# Patient Record
Sex: FEMALE | Race: BLACK OR AFRICAN AMERICAN | NOT HISPANIC OR LATINO | Employment: OTHER | URBAN - METROPOLITAN AREA
[De-identification: names, ages, dates, MRNs, and addresses within clinical notes are randomized per-mention and may not be internally consistent; named-entity substitution may affect disease eponyms.]

---

## 2017-01-03 ENCOUNTER — TELEPHONE (OUTPATIENT)
Dept: GASTROENTEROLOGY | Facility: CLINIC | Age: 72
End: 2017-01-03

## 2017-01-03 NOTE — TELEPHONE ENCOUNTER
Called patient and patient did not answer. A message was left own the voicemail that it is okay and if anymore question just give me call back.

## 2017-01-03 NOTE — TELEPHONE ENCOUNTER
----- Message from Leticia Flores sent at 1/3/2017 11:37 AM CST -----  Contact: pt  States she needs to speak to the nurse. States she has a procedure tomorrow and she wants to know if she can get the chewable gas X. Please call pt at [phone]. Thank you

## 2017-01-04 ENCOUNTER — SURGERY (OUTPATIENT)
Age: 72
End: 2017-01-04

## 2017-01-04 ENCOUNTER — TELEPHONE (OUTPATIENT)
Dept: GASTROENTEROLOGY | Facility: CLINIC | Age: 72
End: 2017-01-04

## 2017-01-04 ENCOUNTER — ANESTHESIA (OUTPATIENT)
Dept: ENDOSCOPY | Facility: HOSPITAL | Age: 72
End: 2017-01-04
Payer: MEDICARE

## 2017-01-04 ENCOUNTER — ANESTHESIA EVENT (OUTPATIENT)
Dept: ENDOSCOPY | Facility: HOSPITAL | Age: 72
End: 2017-01-04
Payer: MEDICARE

## 2017-01-04 PROBLEM — Z86.010 HX OF COLONIC POLYPS: Status: ACTIVE | Noted: 2017-01-04

## 2017-01-04 PROBLEM — Z86.0100 HX OF COLONIC POLYPS: Status: ACTIVE | Noted: 2017-01-04

## 2017-01-04 PROCEDURE — 25000003 PHARM REV CODE 250: Performed by: NURSE ANESTHETIST, CERTIFIED REGISTERED

## 2017-01-04 PROCEDURE — 63600175 PHARM REV CODE 636 W HCPCS: Performed by: NURSE ANESTHETIST, CERTIFIED REGISTERED

## 2017-01-04 RX ORDER — AMLODIPINE BESYLATE 10 MG/1
TABLET ORAL
Qty: 30 TABLET | Refills: 6 | Status: SHIPPED | OUTPATIENT
Start: 2017-01-04 | End: 2017-08-03 | Stop reason: SDUPTHER

## 2017-01-04 RX ORDER — LIDOCAINE HCL/PF 100 MG/5ML
SYRINGE (ML) INTRAVENOUS
Status: DISCONTINUED | OUTPATIENT
Start: 2017-01-04 | End: 2017-01-04

## 2017-01-04 RX ORDER — SODIUM CHLORIDE, SODIUM LACTATE, POTASSIUM CHLORIDE, CALCIUM CHLORIDE 600; 310; 30; 20 MG/100ML; MG/100ML; MG/100ML; MG/100ML
INJECTION, SOLUTION INTRAVENOUS CONTINUOUS PRN
Status: DISCONTINUED | OUTPATIENT
Start: 2017-01-04 | End: 2017-01-04

## 2017-01-04 RX ORDER — PROPOFOL 10 MG/ML
INJECTION, EMULSION INTRAVENOUS
Status: DISCONTINUED | OUTPATIENT
Start: 2017-01-04 | End: 2017-01-04

## 2017-01-04 RX ADMIN — PROPOFOL 30 MG: 10 INJECTION, EMULSION INTRAVENOUS at 09:01

## 2017-01-04 RX ADMIN — PROPOFOL 20 MG: 10 INJECTION, EMULSION INTRAVENOUS at 09:01

## 2017-01-04 RX ADMIN — SODIUM CHLORIDE, SODIUM LACTATE, POTASSIUM CHLORIDE, AND CALCIUM CHLORIDE: 600; 310; 30; 20 INJECTION, SOLUTION INTRAVENOUS at 09:01

## 2017-01-04 RX ADMIN — SODIUM CHLORIDE, SODIUM LACTATE, POTASSIUM CHLORIDE, AND CALCIUM CHLORIDE: 600; 310; 30; 20 INJECTION, SOLUTION INTRAVENOUS at 08:01

## 2017-01-04 RX ADMIN — LIDOCAINE HYDROCHLORIDE 50 MG: 20 INJECTION, SOLUTION INTRAVENOUS at 09:01

## 2017-01-04 RX ADMIN — EPHEDRINE SULFATE 25 MG: 50 INJECTION, SOLUTION INTRAMUSCULAR; INTRAVENOUS; SUBCUTANEOUS at 09:01

## 2017-01-04 RX ADMIN — PROPOFOL 140 MG: 10 INJECTION, EMULSION INTRAVENOUS at 09:01

## 2017-01-04 NOTE — ANESTHESIA POSTPROCEDURE EVALUATION
"Anesthesia Post Evaluation    Patient: Flory Cam    Procedure(s) Performed: Procedure(s):  COLONOSCOPY    Final Anesthesia Type: MAC  Patient location during evaluation: PACU  Patient participation: Yes- Able to Participate  Level of consciousness: awake and alert and oriented  Post-procedure vital signs: reviewed and stable  Pain management: adequate  Airway patency: patent  PONV status at discharge: No PONV  Anesthetic complications: no      Cardiovascular status: blood pressure returned to baseline  Respiratory status: unassisted, room air and spontaneous ventilation  Hydration status: euvolemic  Follow-up not needed.        Visit Vitals    BP (!) 116/58 (BP Location: Left arm, Patient Position: Lying, BP Method: Automatic)    Pulse 83    Temp 36.4 °C (97.5 °F)    Resp 16    Ht 5' 7" (1.702 m)    Wt 99.3 kg (219 lb)    SpO2 98%    BMI 34.3 kg/m2       Pain/Ángel Score: Pain Assessment Performed: Yes (1/4/2017  9:43 AM)  Presence of Pain: denies (1/4/2017  9:43 AM)      "

## 2017-01-04 NOTE — TRANSFER OF CARE
"Anesthesia Transfer of Care Note    Patient: Flory Cam    Procedure(s) Performed: Procedure(s):  COLONOSCOPY    Patient location: PACU    Anesthesia Type: MAC    Transport from OR: Transported from OR on room air with adequate spontaneous ventilation    Post pain: adequate analgesia    Post assessment: no apparent anesthetic complications    Post vital signs: stable    Level of consciousness: awake and alert    Nausea/Vomiting: no nausea/vomiting    Complications: none          Last vitals:   Visit Vitals    BP (!) 116/58 (BP Location: Left arm, Patient Position: Lying, BP Method: Automatic)    Pulse 83    Temp 36.4 °C (97.5 °F)    Resp 16    Ht 5' 7" (1.702 m)    Wt 99.3 kg (219 lb)    SpO2 98%    BMI 34.3 kg/m2     "

## 2017-01-04 NOTE — ANESTHESIA PREPROCEDURE EVALUATION
01/04/2017  Flory Cam is a 71 y.o., female.    [facility] Anesthesia Evaluation    I have reviewed the Patient Summary Reports.    I have reviewed the Nursing Notes.   I have reviewed the Medications.     Review of Systems  Anesthesia Hx:  No problems with previous Anesthesia    Social:  Smoker, No Alcohol Use 1 ppd for for over 50 year.   Hematology/Oncology:  Hematology Normal   Oncology Normal     Cardiovascular:   Exercise tolerance: good Hypertension, well controlled CAD asymptomatic CABG/stent  PVD hyperlipidemia Angioplasty last week with no changes in medication.  PVD  Acute c   Pulmonary:   Shortness of breath Clear productive cough.  snore   Renal/:  Renal/ Normal     Hepatic/GI:   Bowel Prep. PUD, GERD, well controlled 0430 last drink of fluid.   Musculoskeletal:   Arthritis     Neurological:  Neurology Normal    Endocrine:   Hypothyroidism    Dermatological:  Skin Normal    Psych:  Psychiatric Normal           Physical Exam  General:  Well nourished, Obesity    Airway/Jaw/Neck:  Airway Findings: Mallampati: III                Anesthesia Plan  Type of Anesthesia, risks & benefits discussed:  Anesthesia Type:  MAC  Patient's Preference:   Intra-op Monitoring Plan:   Intra-op Monitoring Plan Comments:   Post Op Pain Control Plan:   Post Op Pain Control Plan Comments:   Induction:   IV  Beta Blocker:  Patient is not currently on a Beta-Blocker (No further documentation required).       Informed Consent: Patient understands risks and agrees with Anesthesia plan.  Questions answered. Anesthesia consent signed with patient.  ASA Score: 2     Day of Surgery Review of History & Physical: I have interviewed and examined the patient. I have reviewed the patient's H&P dated: 01/04/16. There are no significant changes.  H&P update referred to the surgeon.         Ready For Surgery From Anesthesia  Perspective.

## 2017-01-04 NOTE — ANESTHESIA RELEASE NOTE
"Anesthesia Release from PACU Note    Patient: Flory Cam    Procedure(s) Performed: Procedure(s):  COLONOSCOPY    Anesthesia type: MAC    Post pain: Adequate analgesia    Post assessment: no apparent anesthetic complications, tolerated procedure well and no evidence of recall    Last Vitals:   Visit Vitals    BP (!) 116/58 (BP Location: Left arm, Patient Position: Lying, BP Method: Automatic)    Pulse 83    Temp 36.4 °C (97.5 °F)    Resp 16    Ht 5' 7" (1.702 m)    Wt 99.3 kg (219 lb)    SpO2 98%    BMI 34.3 kg/m2       Post vital signs: stable    Level of consciousness: awake, alert  and oriented    Nausea/Vomiting: no nausea/no vomiting    Complications: none    Airway Patency: patent    Respiratory: unassisted, spontaneous ventilation, room air    Cardiovascular: stable    Hydration: euvolemic  "

## 2017-01-05 ENCOUNTER — TELEPHONE (OUTPATIENT)
Dept: GASTROENTEROLOGY | Facility: CLINIC | Age: 72
End: 2017-01-05

## 2017-01-09 ENCOUNTER — TELEPHONE (OUTPATIENT)
Dept: INTERNAL MEDICINE | Facility: CLINIC | Age: 72
End: 2017-01-09

## 2017-01-09 NOTE — TELEPHONE ENCOUNTER
Pt was informed at this time [facility] no [facility] pt verbalization was understood.

## 2017-01-09 NOTE — TELEPHONE ENCOUNTER
----- Message from [place] Arreola sent at 1/9/2017 12:53 PM CST -----  Contact: Pt   Pt called and stated she needed to speak to the nurse. She stated she has questions and did not give details. She can be reached at [phone] (home).    Thanks,  TF

## 2017-01-16 ENCOUNTER — TELEPHONE (OUTPATIENT)
Dept: INTERNAL MEDICINE | Facility: CLINIC | Age: 72
End: 2017-01-16

## 2017-01-16 RX ORDER — TRAZODONE HYDROCHLORIDE 50 MG/1
50 TABLET ORAL NIGHTLY PRN
Qty: 30 TABLET | Refills: 3 | Status: SHIPPED | OUTPATIENT
Start: 2017-01-16 | End: 2018-02-22 | Stop reason: SDUPTHER

## 2017-01-16 NOTE — TELEPHONE ENCOUNTER
Pt requesting to resume trazodone 25 mg and discontinue Restoril because pt states medication makes he sleep to long and she awakes feeling groggy .

## 2017-01-16 NOTE — TELEPHONE ENCOUNTER
----- Message from Cris Garcia sent at 1/16/2017 11:11 AM CST -----  Contact: PT  Pt requests nurse to call her regarding being placed back on Trazodone. Pt can be reached at [phone] (davz)

## 2017-01-16 NOTE — TELEPHONE ENCOUNTER
Pt was informed per Dr. Ovalle sent prescription for Trazodone as requested pt verbalized understanding.

## 2017-01-23 ENCOUNTER — TELEPHONE (OUTPATIENT)
Dept: CARDIOLOGY | Facility: CLINIC | Age: 72
End: 2017-01-23

## 2017-01-23 NOTE — TELEPHONE ENCOUNTER
Patient requests that someone help her with personal care and helping with errands.  I advised patient call pcp or the [facility].  Patient voiced understanding.

## 2017-01-24 ENCOUNTER — TELEPHONE (OUTPATIENT)
Dept: INTERNAL MEDICINE | Facility: CLINIC | Age: 72
End: 2017-01-24

## 2017-01-24 NOTE — TELEPHONE ENCOUNTER
----- Message from Gilles Jeronimo sent at 1/24/2017 11:43 AM CST -----  Pt is requesting a call from nurse to discuss some questions and concerns regarding her health.        Please call pt back at [phone]

## 2017-01-26 ENCOUNTER — TELEPHONE (OUTPATIENT)
Dept: INTERNAL MEDICINE | Facility: CLINIC | Age: 72
End: 2017-01-26

## 2017-01-26 NOTE — LETTER
February 6, 2017    Flory Cam  [address]  [address]  [place] [address]             [facility]  [address] [place] [address]  Phone: [phone]  Fax: [phone] To whom it may concern:     Flory Cam is currently a patient under my care. She has been under my care since August of 2012. Due to her multiple chronic medical conditions, she requires assistance. Please accommodate accordingly.     If you have any questions or concerns, do not hesitate to contact my office.    Sincerely,        Tayler Ovalle D.O.  Internal Medicine Physician

## 2017-01-26 NOTE — TELEPHONE ENCOUNTER
Pt request letter stating she needs assistance due to her chronic health conditions. Pt states her niece helps her with her needs weekly  and she would like to have her added to apartment  lease pt states   requested letter for PCP stating she needs assistance.

## 2017-01-30 ENCOUNTER — TELEPHONE (OUTPATIENT)
Dept: GASTROENTEROLOGY | Facility: CLINIC | Age: 72
End: 2017-01-30

## 2017-01-30 NOTE — TELEPHONE ENCOUNTER
----- Message from Lea Garcia sent at 1/30/2017 10:57 AM CST -----  Contact: self [phone]  States that she is returning call please call back at [phone]//thank you acc

## 2017-01-30 NOTE — TELEPHONE ENCOUNTER
----- Message from Lea Garcia sent at 1/30/2017 10:57 AM CST -----  Contact: self [phone]  States that she is returning call please call back at [phone]//thank you acc

## 2017-02-08 RX ORDER — ESOMEPRAZOLE MAGNESIUM 40 MG/1
40 CAPSULE, DELAYED RELEASE ORAL
Qty: 90 CAPSULE | Refills: 1 | Status: SHIPPED | OUTPATIENT
Start: 2017-02-08 | End: 2017-10-02 | Stop reason: SDUPTHER

## 2017-02-17 ENCOUNTER — TELEPHONE (OUTPATIENT)
Dept: INTERNAL MEDICINE | Facility: CLINIC | Age: 72
End: 2017-02-17

## 2017-02-17 NOTE — TELEPHONE ENCOUNTER
----- Message from Mireya Nieves sent at 2/17/2017  8:29 AM CST -----  Contact: pt  Please call pt @ [phone], pt have some questions, pt will discuss with nurse.

## 2017-02-17 NOTE — TELEPHONE ENCOUNTER
Pt states she has frequent urination and burning upon urination and would like to try AZO for symptoms . Pt would like to make sure AZO would be safe take along with her current daily medication ? Pt was informed if symptoms persist or worsen she will need to go to urgent care pt verbalized understanding.

## 2017-02-21 ENCOUNTER — OFFICE VISIT (OUTPATIENT)
Dept: INTERNAL MEDICINE | Facility: CLINIC | Age: 72
End: 2017-02-21
Payer: MEDICARE

## 2017-02-21 VITALS
OXYGEN SATURATION: 98 % | DIASTOLIC BLOOD PRESSURE: 80 MMHG | WEIGHT: 220.88 LBS | HEART RATE: 70 BPM | BODY MASS INDEX: 34.67 KG/M2 | HEIGHT: 67 IN | SYSTOLIC BLOOD PRESSURE: 136 MMHG | TEMPERATURE: 96 F

## 2017-02-21 DIAGNOSIS — L98.9 SKIN LESION OF LEFT LOWER EXTREMITY: Primary | ICD-10-CM

## 2017-02-21 PROCEDURE — 99999 PR PBB SHADOW E&M-EST. PATIENT-LVL III: CPT | Mod: PBBFAC,,, | Performed by: INTERNAL MEDICINE

## 2017-02-21 PROCEDURE — 99213 OFFICE O/P EST LOW 20 MIN: CPT | Mod: S$PBB,,, | Performed by: INTERNAL MEDICINE

## 2017-02-21 PROCEDURE — 99213 OFFICE O/P EST LOW 20 MIN: CPT | Mod: PBBFAC | Performed by: INTERNAL MEDICINE

## 2017-02-21 RX ORDER — CLOTRIMAZOLE AND BETAMETHASONE DIPROPIONATE 10; .64 MG/G; MG/G
CREAM TOPICAL 2 TIMES DAILY
Qty: 15 G | Refills: 0 | Status: SHIPPED | OUTPATIENT
Start: 2017-02-21 | End: 2017-03-03

## 2017-02-21 NOTE — PROGRESS NOTES
Flory [facility]  71 y.o.  Black or  female    Chief Complaint   Patient presents with    Follow-up       HPI:  Initially the appointment was scheduled because she had burning with urination, but that has completely resolved.   She has a spot on her left upper thigh that was treated in the past with Lotrisone. It has improved but has not completely gone away.     PMH: Reviewed    MEDS: Reviewed med card    ALLERGIES: Reviewed allergy card    PE: Reviewed vitals  GENERAL: Alert and oriented, no acute distress  HEART: Regular rate  LUNGS: Unlabored respirations  SKIN: Left upper thigh fading circular lesion with few scabs, no drainage      ASSESSMENT/PLAN:    Flory was seen today for follow-up.    Diagnoses and all orders for this visit:    Skin lesion of left lower extremity  -     clotrimazole-betamethasone 1-0.05% (LOTRISONE) cream; Apply topically 2 (two) times daily.    RTC: As needed

## 2017-02-26 RX ORDER — EZETIMIBE 10 MG
TABLET ORAL
Qty: 90 TABLET | Refills: 1 | Status: SHIPPED | OUTPATIENT
Start: 2017-02-26 | End: 2017-08-29 | Stop reason: SDUPTHER

## 2017-03-08 RX ORDER — ATORVASTATIN CALCIUM 80 MG/1
80 TABLET, FILM COATED ORAL DAILY
Qty: 90 TABLET | Refills: 0 | Status: SHIPPED | OUTPATIENT
Start: 2017-03-08 | End: 2018-02-21 | Stop reason: SDUPTHER

## 2017-03-29 ENCOUNTER — TELEPHONE (OUTPATIENT)
Dept: DERMATOLOGY | Facility: CLINIC | Age: 72
End: 2017-03-29

## 2017-03-29 NOTE — TELEPHONE ENCOUNTER
Spoke with pt and offered pt appt. to see Dr. Yap tomorrow at 2:45, she declined and stated she would call back to get another appt.

## 2017-03-29 NOTE — TELEPHONE ENCOUNTER
----- Message from Hasmukh Kessler sent at 3/29/2017  9:54 AM CDT -----  Contact: Patient  Pt needs a return call regarding being worked into the schedule for a earlier appt as she is a lupus patient. Please call back @ ..[phone] (home) Thank you/NH

## 2017-04-10 DIAGNOSIS — M79.672 LEFT FOOT PAIN: Primary | ICD-10-CM

## 2017-04-11 ENCOUNTER — OFFICE VISIT (OUTPATIENT)
Dept: PODIATRY | Facility: CLINIC | Age: 72
End: 2017-04-11
Payer: MEDICARE

## 2017-04-11 ENCOUNTER — LAB VISIT (OUTPATIENT)
Dept: LAB | Facility: HOSPITAL | Age: 72
End: 2017-04-11
Attending: PODIATRIST
Payer: MEDICARE

## 2017-04-11 VITALS
HEART RATE: 72 BPM | HEIGHT: 67 IN | BODY MASS INDEX: 34.67 KG/M2 | SYSTOLIC BLOOD PRESSURE: 122 MMHG | DIASTOLIC BLOOD PRESSURE: 74 MMHG | WEIGHT: 220.88 LBS

## 2017-04-11 DIAGNOSIS — B35.1 DERMATOPHYTOSIS OF NAIL: ICD-10-CM

## 2017-04-11 DIAGNOSIS — L03.116 CELLULITIS OF LEFT FOOT: Primary | ICD-10-CM

## 2017-04-11 DIAGNOSIS — I73.9 PERIPHERAL VASCULAR DISEASE: ICD-10-CM

## 2017-04-11 DIAGNOSIS — L84 CORN OR CALLUS: ICD-10-CM

## 2017-04-11 DIAGNOSIS — L03.116 CELLULITIS OF LEFT FOOT: ICD-10-CM

## 2017-04-11 DIAGNOSIS — L02.612 ABSCESS OF LEFT FOOT: ICD-10-CM

## 2017-04-11 LAB
BASOPHILS # BLD AUTO: 0.03 K/UL
BASOPHILS NFR BLD: 0.4 %
CREAT SERPL-MCNC: 0.9 MG/DL
DIFFERENTIAL METHOD: NORMAL
EOSINOPHIL # BLD AUTO: 0.2 K/UL
EOSINOPHIL NFR BLD: 2.4 %
ERYTHROCYTE [DISTWIDTH] IN BLOOD BY AUTOMATED COUNT: 14.2 %
ERYTHROCYTE [SEDIMENTATION RATE] IN BLOOD BY WESTERGREN METHOD: 20 MM/HR
EST. GFR  (AFRICAN AMERICAN): >60 ML/MIN/1.73 M^2
EST. GFR  (NON AFRICAN AMERICAN): >60 ML/MIN/1.73 M^2
HCT VFR BLD AUTO: 43.4 %
HGB BLD-MCNC: 14.5 G/DL
LYMPHOCYTES # BLD AUTO: 2.6 K/UL
LYMPHOCYTES NFR BLD: 32.1 %
MCH RBC QN AUTO: 30.5 PG
MCHC RBC AUTO-ENTMCNC: 33.4 %
MCV RBC AUTO: 91 FL
MONOCYTES # BLD AUTO: 0.8 K/UL
MONOCYTES NFR BLD: 9.5 %
NEUTROPHILS # BLD AUTO: 4.5 K/UL
NEUTROPHILS NFR BLD: 55.4 %
PLATELET # BLD AUTO: 280 K/UL
PMV BLD AUTO: 10.5 FL
RBC # BLD AUTO: 4.75 M/UL
WBC # BLD AUTO: 8.07 K/UL

## 2017-04-11 PROCEDURE — 36415 COLL VENOUS BLD VENIPUNCTURE: CPT | Mod: PO

## 2017-04-11 PROCEDURE — 11721 DEBRIDE NAIL 6 OR MORE: CPT | Mod: 59,Q8,S$PBB, | Performed by: PODIATRIST

## 2017-04-11 PROCEDURE — 11055 PARING/CUTG B9 HYPRKER LES 1: CPT | Mod: Q8,S$PBB,, | Performed by: PODIATRIST

## 2017-04-11 PROCEDURE — 82565 ASSAY OF CREATININE: CPT

## 2017-04-11 PROCEDURE — 99214 OFFICE O/P EST MOD 30 MIN: CPT | Mod: 25,S$PBB,, | Performed by: PODIATRIST

## 2017-04-11 PROCEDURE — 85025 COMPLETE CBC W/AUTO DIFF WBC: CPT

## 2017-04-11 PROCEDURE — 99999 PR PBB SHADOW E&M-EST. PATIENT-LVL IV: CPT | Mod: PBBFAC,,, | Performed by: PODIATRIST

## 2017-04-11 PROCEDURE — 85651 RBC SED RATE NONAUTOMATED: CPT | Mod: PO

## 2017-04-11 RX ORDER — AMOXICILLIN AND CLAVULANATE POTASSIUM 875; 125 MG/1; MG/1
1 TABLET, FILM COATED ORAL EVERY 12 HOURS
Qty: 20 TABLET | Refills: 0 | Status: SHIPPED | OUTPATIENT
Start: 2017-04-11 | End: 2017-04-21

## 2017-04-11 NOTE — MR AVS SNAPSHOT
[facility]  [address] [place] [address]  Phone: [phone]  Fax: [phone]                  Flory Cam   2017 9:20 AM   Office Visit    Description:  Female : 1945   Provider:  Melva Orellana DPM   Department:  [facility] - Pod[facility]ry           Reason for Visit     Callouses           Diagnoses this Visit        Comments    Corn or callus    -  Primary     Cellulitis of left foot         Dermatophytosis of nail         Peripheral vascular disease         Abscess of left foot                To Do List           Future Appointments        Provider Department Dept Phone    2017 10:45 AM LAB, SAME DAY [facility] [phone]    2017 8:00 AM [facility] -  [phone]    2017 1:00 PM Melva Orellana DPM [facility] Podiatry [phone]    2017 8:00 AM LABORATORY, [address] [facility] [phone]    5/3/2017 9:00 AM DO [facility] [phone]      Goals (5 Years of Data)     None       These Medications        Disp Refills Start End    amoxicillin-clavulanate 875-125mg (AUGMENTIN) 875-125 mg per tablet 20 tablet 0 2017    Take 1 tablet by mouth every 12 (twelve) hours. - Oral    Pharmacy: [facility].  #: [phone]         [facility]     [facility] Nurse Care Line - 24/ Assistance  Unless otherwise directed by your provider, please contact [facility], our nurse care line that is available for / assistance.     Registered nurses in the [facility] provide: appointment scheduling, clinical advisement, health education, and other advisory services.  Call: [phone] (toll free)               Medications           Message regarding Medications     Verify the changes and/or additions to your medication regime listed below are the same as  discussed with your clinician today.  If any of these changes or additions are incorrect, please notify your healthcare provider.        START taking these NEW medications        Refills    amoxicillin-clavulanate 875-125mg (AUGMENTIN) 875-125 mg per tablet 0    Sig: Take 1 tablet by mouth every 12 (twelve) hours.    Class: Normal    Route: Oral           Verify that the below list of medications is an accurate representation of the medications you are currently taking.  If none reported, the list may be blank. If incorrect, please contact your healthcare provider. Carry this list with you in case of emergency.           Current Medications     albuterol 90 mcg/actuation inhaler Inhale 2 puffs into the lungs every 6 (six) hours as needed.    amlodipine (NORVASC) 10 MG tablet take 1 tablet by mouth once daily    atorvastatin (LIPITOR) 80 MG tablet Take 1 tablet (80 mg total) by mouth once daily.    cilostazol (PLETAL) 100 MG Tab Take 1 tablet (100 mg total) by mouth 2 (two) times daily.    clopidogrel (PLAVIX) 75 mg tablet take 1 tablet by mouth once daily    esomeprazole (NEXIUM) 40 MG capsule Take 1 capsule (40 mg total) by mouth before breakfast.    hydrocodone-acetaminophen 10-325mg (NORCO)  mg Tab Take  mg by mouth. 1 Tablet Oral Four times a day.  narcotic warning, contains tylenol    hydroxychloroquine (PLAQUENIL) 200 mg tablet Take 1 tablet (200 mg total) by mouth 2 (two) times daily.    levothyroxine (SYNTHROID) 137 MCG Tab tablet take 1 tablet by mouth once daily BEFORE BREAKFAST    trazodone (DESYREL) 50 MG tablet Take 1 tablet (50 mg total) by mouth nightly as needed for Insomnia.    urea (CARMOL) 40 % Crea Apply topically 2 (two) times daily. Apply to thickened areas of skin    valsartan (DIOVAN) 320 MG tablet Take 1 tablet (320 mg total) by mouth once daily.    ZETIA 10 mg tablet take 1 tablet by mouth once daily    amoxicillin-clavulanate 875-125mg (AUGMENTIN) 875-125 mg per tablet Take 1  "tablet by mouth every 12 (twelve) hours.           Clinical Reference Information           Your Vitals Were     BP Pulse Height Weight BMI    122/74 (BP Location: Left arm, Patient Position: Sitting, BP Method: Automatic) 72 5' 7" (1.702 m) 100.2 kg (220 lb 14.4 oz) 34.6 kg/m2      Blood Pressure          Most Recent Value    BP  122/74      Allergies as of 4/11/2017     No Known Allergies      Immunizations Administered on Date of Encounter - 4/11/2017     None      Orders Placed During Today's Visit      Normal Orders This Visit    Aerobic culture     Culture, Anaerobic     Future Labs/Procedures Expected by Expires    CBC auto differential  4/11/2017 6/10/2018    Creatinine, serum  4/11/2017 6/10/2018    MRI Lower Extremity W WO Contrast Left  4/11/2017 4/11/2018    Sedimentation rate, manual  4/11/2017 6/10/2018      [facility] Sign-Up     Activating your [facility] account is as easy as 1-2-3!     1) Visit [email or web address], select Sign Up Now, enter this activation code and your date of birth, then select Next.  [email or web address]  Expires: 5/26/2017 10:21 AM      2) Create a username and password to use when you visit [facility] in the future and select a security question in case you lose your password and select Next.    3) Enter your e-mail address and click Sign Up!    Additional Information  If you have questions, please e-mail [email or web address] or call [phone] to talk to our [facility] staff. Remember, [facility] is NOT to be used for urgent needs. For medical emergencies, dial 911.         Smoking Cessation     If you would like to quit smoking:   You may be eligible for free services if you are a [place] resident and started smoking cigarettes before September 1, 1988.  Call the Smoking Cessation Trust (SCT) toll free at (873) 110-4838 or (731) 652-2041.   Call 3-800-QUIT-NOW if you do not meet the above criteria.   Contact us via email: [email or web address]   View our website for more " information: [email or web address]        Language Assistance Services     ATTENTION: Language assistance services are available, free of charge. Please call [phone].      ATENCIÓN: Si habla ke, tiene a márquez disposición servicios gratuitos de asistencia lingüística. Llame al [phone].     CHÚ Ý: N?u b?n nói Ti?ng Vi?t, có các d?ch v? h? tr? ngôn ng? mi?n phí dành cho b?n. G?i s? [phone].         [facility] complies with applicable Federal civil rights laws and does not discriminate on the basis of race, color, national origin, age, disability, or sex.

## 2017-04-11 NOTE — PATIENT INSTRUCTIONS
Perform warm epsom salt soaks x 15 minutes, once daily  Apply iodine ointment to wound site on left foot  Cover with breathable band aid    Watch for any signs of infection (redness, pus, pain, increased swelling or fever) and call if such occurs or report to Emergency room if after hours.

## 2017-04-11 NOTE — PROGRESS NOTES
PODIATRY NOTE    CHIEF COMPLAINT  Chief Complaint   Patient presents with    Callouses     left foot callous pain located in arch of foot pain mostly when walking       HPI  SUBJECTIVE: Flory Cam is a 71 y.o. female w/ PMH of PVD, Lupus, hx of intermittent leg claudifcation- s/p vascular intervention with much improvement in symptoms and other medical problems who presents to clinic for high risk  foot exam and care.Patient admits to painful toenails and calluses aggravated by increased weight bearing, shoe gear, and pressure. States pain is relieved with routine debridements. She is being treated for lupus dermatological manifestations by Dr. Yap in dermatology. She has canceled follow up appointment with Dr. Yap for more recent evaluation and treatment recommendation.    Pt states left foot pain has increased. She rates pain 9/10. She has tried warm epsom salt soaks. She continues to smoke cigarettes. She does not want to quit. She notes some redness on bottom of left foot around callus site. She denies any N/V/F. Patient has no other pedal complaints at this time.    PCP: Dr. Tayler Ovalle, DO  / Last visit:2/21/17    HgA1c:   Hemoglobin A1C   Date Value Ref Range Status   09/14/2016 6.2 4.5 - 6.2 % Final     Comment:     According to ADA guidelines, hemoglobin A1C <7.0% represents  optimal control in non-pregnant diabetic patients.  Different  metrics may apply to specific populations.   Standards of Medical Care in Diabetes - 2016.  For the purpose of screening for the presence of diabetes:  <5.7%     Consistent with the absence of diabetes  5.7-6.4%  Consistent with increasing risk for diabetes   (prediabetes)  >or=6.5%  Consistent with diabetes  Currently no consensus exists for use of hemoglobin A1C  for diagnosis of diabetes for children.     04/13/2010 5.9 4.0 - 6.2 % Final       REVIEW OF SYSTEMS  General: Denies any fever or chills  Chest: Denies shortness of breath, wheezing, coughing, or  "sputum production  Heart: Denies chest pain.  As noted above and per history of current illness above, otherwise negative in the remainder of the 14 systems.     PHYSICAL EXAM  Vitals:    04/11/17 0942   BP: 122/74   Pulse: 72   Weight: 100.2 kg (220 lb 14.4 oz)   Height: 5' 7" (1.702 m)   PainSc:   9   PainLoc: Foot       GEN:  This patient is well-developed, well-nourished and appears stated age, well-oriented to person, place and time, and cooperative and pleasant on today's visit.      LOWER EXTREMITY  Vascular:   · DP pedal pulse 0/4 b/l, PT pedal pulse 1/4 b/l  · Skin temperature warm to warm from prox to distally  · CFT <5 secs b/l  · There is LE edema noted b/l.     Dermatologic:   · Thickened, dystrophic, elongated toenails with subungal debris 1-5 b/l.   · Webspaces are C/D/I B/L.  · There is hyperkeratotic tissue noted plantar aspect of b/l feet at medial arch x 2.  · Left foot with mild fluctuance, serosanguineous drainage, and localized erythema to plantar aspect.   · Skin texture and turgor dry with hyperkeratosis diffusely b/l plantar heel   · There is no pedal hair growth noted    Neurologic:  · Vibratory sensation diminished at level of Hallux IPJ b/l    · Protective sensation absent at 0/10 sites upon examination with Somers Weinsten 5.07 g monofilament.   · Propioception intact at 1st MTPJ b/l.   · Achilles and patellar deep tendon reflexes intact  · Babinski reflex absent b/l. Light touch and sharp/dull sensation intact b/l.    Musculoskeletal/Orthopedic:  · No symptomatic structural abnormalities noted.   · Muscle strength is 5/5 for foot inverters, everters, plantarflexors, and dorsiflexors. Muscle tone is normal.  · Pain free range of motion in all four quadrants with stiffness and limitation b/l  · PAIN with palpation of callus plantar medial arch, LEFT      ASSESSMENT  Cellulitis of left foot  -     MRI Lower Extremity W WO Contrast Left; Future; Expected date: 4/11/17  -     Creatinine, " serum; Future; Expected date: 4/11/17  -     CBC auto differential; Future; Expected date: 4/11/17  -     Sedimentation rate, manual; Future; Expected date: 4/11/17  -     Aerobic culture  -     Culture, Anaerobic    Corn or callus    Dermatophytosis of nail    Peripheral vascular disease  -     MRI Lower Extremity W WO Contrast Left; Future; Expected date: 4/11/17  -     Creatinine, serum; Future; Expected date: 4/11/17    Abscess of left foot  -     Aerobic culture  -     Culture, Anaerobic    Other orders  -     amoxicillin-clavulanate 875-125mg (AUGMENTIN) 875-125 mg per tablet; Take 1 tablet by mouth every 12 (twelve) hours.  Dispense: 20 tablet; Refill: 0        Plan:  -Discuss presenting problems, etiology, pathologic processes and management options with patient today.   -With patient's permission,Sharp excisional debridement of hyperkeratotic lesions deep to epidermal layer x 1 on left foot  was performed with a #15 blade without incident. There was scant purulent drainage expressed from the site. Wound culture taken.   -Rx Augmentin for antibiotic therapy, local wound care instructions   -With patient's permission, nails were aggressively reduced and debrided x 10 to their soft tissue attachment mechanically and with electric , removing all offending nail and debris. Patient relates relief following the procedure.   -MRI ordered, baseline labs  The total visit time face to face with the patient was over 30 minutes. Time spent in counseling, discussion and coordination of care with the patient was over 15 minutes. Topics discussed as noted above.      Future Appointments  Date Time Provider Department Center   4/18/2017 8:00 AM [address] MRI [place]   4/20/2017 1:00 PM Melva Orellana DPM [facility] POD [facility]   4/25/2017 8:00 AM LABORATORY, [address] [facility] LAB [facility]   5/3/2017 9:00 [facility]         Report Electronically Signed By:  Melva Orellana DPM   Podiatric  Medicine & Surgery  [facility]  4/11/2017

## 2017-04-13 LAB — BACTERIA SPEC AEROBE CULT: NORMAL

## 2017-04-17 LAB — BACTERIA SPEC ANAEROBE CULT: NORMAL

## 2017-04-24 ENCOUNTER — HOSPITAL ENCOUNTER (OUTPATIENT)
Dept: RADIOLOGY | Facility: HOSPITAL | Age: 72
Discharge: HOME | End: 2017-04-24
Attending: PODIATRIST
Payer: MEDICARE

## 2017-04-24 DIAGNOSIS — I73.9 PERIPHERAL VASCULAR DISEASE: ICD-10-CM

## 2017-04-24 DIAGNOSIS — L03.116 CELLULITIS OF LEFT FOOT: ICD-10-CM

## 2017-04-24 PROCEDURE — 25500020 PHARM REV CODE 255: Performed by: PODIATRIST

## 2017-04-24 PROCEDURE — A9585 GADOBUTROL INJECTION: HCPCS | Performed by: PODIATRIST

## 2017-04-24 PROCEDURE — 73720 MRI LWR EXTREMITY W/O&W/DYE: CPT | Mod: TC,LT

## 2017-04-24 RX ORDER — GADOBUTROL 604.72 MG/ML
10 INJECTION INTRAVENOUS
Status: COMPLETED | OUTPATIENT
Start: 2017-04-24 | End: 2017-04-24

## 2017-04-24 RX ADMIN — GADOBUTROL 10 ML: 604.72 INJECTION INTRAVENOUS at 03:04

## 2017-04-25 ENCOUNTER — TELEPHONE (OUTPATIENT)
Dept: PODIATRY | Facility: CLINIC | Age: 72
End: 2017-04-25

## 2017-04-25 DIAGNOSIS — I73.9 PVD (PERIPHERAL VASCULAR DISEASE): Primary | ICD-10-CM

## 2017-04-25 DIAGNOSIS — I73.9 PVD (PERIPHERAL VASCULAR DISEASE): ICD-10-CM

## 2017-04-25 DIAGNOSIS — L02.612 ABSCESS OF LEFT FOOT: Primary | ICD-10-CM

## 2017-04-25 NOTE — TELEPHONE ENCOUNTER
----- Message from Melva Orellana DPM sent at 4/25/2017  8:22 AM CDT -----  Call Dr. Cohen nurse for urgent evaluation, his pt with PVD has abscess in foot. Schedule to see her same day access or by tomorrow    Schedule ultrasound TODAY and VIPIN- call [facility] if any issues with scheduling-they have same day access    Also, go ahead and schedule vascular surgery consult- first available.    Then call patient, provide appts and inform her MRI shows she does have abscess/infection in her foot.     Email me back status on all note above.

## 2017-04-25 NOTE — TELEPHONE ENCOUNTER
Contacted patient and informed her per Dr. Esteban DPM MRI results show abscess/infection in foot. Pt was informed of VIPIN and Ultrasound testing appointments on 4/26/17 at 12:15 at [facility] and appointment with Dr. Cohen on 4/27/17 at 8:30. Pt expressed understanding to information given call ended pleasantly.  Niya Velez MA  Office of Dr. Melva Orellana DPM   Podiatry Department, [facility]

## 2017-04-26 ENCOUNTER — CLINICAL SUPPORT (OUTPATIENT)
Dept: CARDIOLOGY | Facility: CLINIC | Age: 72
End: 2017-04-26
Payer: MEDICARE

## 2017-04-26 DIAGNOSIS — I73.9 PVD (PERIPHERAL VASCULAR DISEASE): ICD-10-CM

## 2017-04-26 LAB — VASCULAR ANKLE BRACHIAL INDEX (ABI) LEFT: 0.57 (ref 0.9–1.2)

## 2017-04-26 PROCEDURE — 93925 LOWER EXTREMITY STUDY: CPT | Mod: 26,S$PBB,, | Performed by: INTERNAL MEDICINE

## 2017-04-26 PROCEDURE — 93922 UPR/L XTREMITY ART 2 LEVELS: CPT | Mod: PBBFAC | Performed by: INTERNAL MEDICINE

## 2017-04-27 ENCOUNTER — OFFICE VISIT (OUTPATIENT)
Dept: CARDIOLOGY | Facility: CLINIC | Age: 72
End: 2017-04-27
Payer: MEDICARE

## 2017-04-27 VITALS
HEART RATE: 68 BPM | WEIGHT: 218.69 LBS | SYSTOLIC BLOOD PRESSURE: 126 MMHG | HEIGHT: 67 IN | DIASTOLIC BLOOD PRESSURE: 76 MMHG | BODY MASS INDEX: 34.33 KG/M2

## 2017-04-27 DIAGNOSIS — E03.9 HYPOTHYROIDISM, UNSPECIFIED TYPE: ICD-10-CM

## 2017-04-27 DIAGNOSIS — I25.10 CORONARY ARTERY DISEASE, ANGINA PRESENCE UNSPECIFIED, UNSPECIFIED VESSEL OR LESION TYPE, UNSPECIFIED WHETHER NATIVE OR TRANSPLANTED HEART: Primary | ICD-10-CM

## 2017-04-27 DIAGNOSIS — I73.9 PVD (PERIPHERAL VASCULAR DISEASE): ICD-10-CM

## 2017-04-27 DIAGNOSIS — L98.9 SKIN DISORDER: ICD-10-CM

## 2017-04-27 DIAGNOSIS — E78.5 HYPERLIPIDEMIA, UNSPECIFIED HYPERLIPIDEMIA TYPE: ICD-10-CM

## 2017-04-27 DIAGNOSIS — Z72.0 TOBACCO USE: ICD-10-CM

## 2017-04-27 DIAGNOSIS — I10 ESSENTIAL HYPERTENSION: ICD-10-CM

## 2017-04-27 DIAGNOSIS — L02.619 CELLULITIS AND ABSCESS OF FOOT: ICD-10-CM

## 2017-04-27 DIAGNOSIS — L03.119 CELLULITIS AND ABSCESS OF FOOT: ICD-10-CM

## 2017-04-27 DIAGNOSIS — Z98.62 S/P ANGIOPLASTY: ICD-10-CM

## 2017-04-27 DIAGNOSIS — M32.9 SYSTEMIC LUPUS ERYTHEMATOSUS, UNSPECIFIED SLE TYPE, UNSPECIFIED ORGAN INVOLVEMENT STATUS: ICD-10-CM

## 2017-04-27 DIAGNOSIS — Z95.1 HX OF CABG: ICD-10-CM

## 2017-04-27 DIAGNOSIS — M05.771 RHEUMATOID ARTHRITIS INVOLVING BOTH FEET WITH POSITIVE RHEUMATOID FACTOR: ICD-10-CM

## 2017-04-27 DIAGNOSIS — L93.0 DISCOID LUPUS: ICD-10-CM

## 2017-04-27 DIAGNOSIS — I25.10 CORONARY ARTERY DISEASE INVOLVING NATIVE CORONARY ARTERY OF NATIVE HEART WITHOUT ANGINA PECTORIS: Primary | ICD-10-CM

## 2017-04-27 DIAGNOSIS — M32.19 OTHER SYSTEMIC LUPUS ERYTHEMATOSUS WITH OTHER ORGAN INVOLVEMENT: ICD-10-CM

## 2017-04-27 DIAGNOSIS — I65.23 BILATERAL CAROTID ARTERY STENOSIS: ICD-10-CM

## 2017-04-27 DIAGNOSIS — I24.9 ACUTE CORONARY SYNDROME: ICD-10-CM

## 2017-04-27 DIAGNOSIS — M05.772 RHEUMATOID ARTHRITIS INVOLVING BOTH FEET WITH POSITIVE RHEUMATOID FACTOR: ICD-10-CM

## 2017-04-27 DIAGNOSIS — I70.219 ATHEROSCLEROTIC PVD WITH INTERMITTENT CLAUDICATION: ICD-10-CM

## 2017-04-27 PROCEDURE — 99213 OFFICE O/P EST LOW 20 MIN: CPT | Mod: PBBFAC | Performed by: INTERNAL MEDICINE

## 2017-04-27 PROCEDURE — 99214 OFFICE O/P EST MOD 30 MIN: CPT | Mod: S$PBB,,, | Performed by: INTERNAL MEDICINE

## 2017-04-27 PROCEDURE — 99999 PR PBB SHADOW E&M-EST. PATIENT-LVL III: CPT | Mod: PBBFAC,,, | Performed by: INTERNAL MEDICINE

## 2017-04-27 NOTE — PROGRESS NOTES
Subjective:   Patient ID:  Flory Cam is a 71 y.o. female who presents for follow up of Coronary Artery Disease; Peripheral Vascular Disease; Hyperlipidemia; and Hypertension      HPI  A 70 yo female with h/o cad s/p cabg htn hlp sle discoid lupus is herre referred back from DR MORIN FOR LEFT FOOT ABSCESS CLINICALLY THAT WAS DRAINED AND STILL BY MRI. HE RPAIN IS IMPROVED SHE IS ON ANTIBIOTICS SHE HAS VASCULAR STUDIES DONE SHOWING LCFA SIGNIFICANT STENOSIS AND SFA OCCLUSION AS WELL AS MONOPHASIC FLOW IN THE INFRAPOPLITEAL VESSELS. HER VIPIN ON THE LEFT IS 0.57. SEH  HAS NO NEW COMPLAINTS OTHERWISE SHE CLAIMS SHE IS NOT SMOKING ANYMORE.HER MRI SHOWS MULTIPLE SOFT TISSUE EDEMA AND ABCSESS.   Past Medical History:   Diagnosis Date    Acute coronary syndrome     Anxiety     Bilateral carotid artery stenosis 11/17/2015    Chronic pain     Coronary artery disease     GERD (gastroesophageal reflux disease)     Hyperlipidemia     Hypertension     Hypothyroidism     Low back pain     Lupus     Osteoporosis     Poor circulation     PVD (peripheral vascular disease)     S/P peripheral artery angioplasty 2/13/2014    Skin disease        Past Surgical History:   Procedure Laterality Date    COLONOSCOPY N/A 1/4/2017    Procedure: COLONOSCOPY;  Surgeon: Sylvester Arboleda III, MD;  Location: [facility];  Service: Endoscopy;  Laterality: N/A;    CORONARY ANGIOPLASTY      CORONARY ARTERY BYPASS GRAFT      HYSTERECTOMY      THYROIDECTOMY         Social History   Substance Use Topics    Smoking status: Former Smoker     Packs/day: 0.25     Years: 40.00     Start date: 1961     Quit date: 03/2016    Smokeless tobacco: Never Used    Alcohol use No       Family History   Problem Relation Age of Onset    Melanoma Neg Hx     Psoriasis Neg Hx     Lupus Neg Hx     Eczema Neg Hx        Current Outpatient Prescriptions   Medication Sig    amlodipine (NORVASC) 10 MG tablet take 1 tablet by mouth once daily     atorvastatin (LIPITOR) 80 MG tablet Take 1 tablet (80 mg total) by mouth once daily.    cilostazol (PLETAL) 100 MG Tab Take 1 tablet (100 mg total) by mouth 2 (two) times daily.    clopidogrel (PLAVIX) 75 mg tablet take 1 tablet by mouth once daily    esomeprazole (NEXIUM) 40 MG capsule Take 1 capsule (40 mg total) by mouth before breakfast.    hydrocodone-acetaminophen 10-325mg (NORCO)  mg Tab Take  mg by mouth. 1 Tablet Oral Four times a day.  narcotic warning, contains tylenol    hydroxychloroquine (PLAQUENIL) 200 mg tablet Take 1 tablet (200 mg total) by mouth 2 (two) times daily.    levothyroxine (SYNTHROID) 137 MCG Tab tablet take 1 tablet by mouth once daily BEFORE BREAKFAST    trazodone (DESYREL) 50 MG tablet Take 1 tablet (50 mg total) by mouth nightly as needed for Insomnia.    valsartan (DIOVAN) 320 MG tablet Take 1 tablet (320 mg total) by mouth once daily.    ZETIA 10 mg tablet take 1 tablet by mouth once daily     No current facility-administered medications for this visit.      Current Outpatient Prescriptions on File Prior to Visit   Medication Sig    amlodipine (NORVASC) 10 MG tablet take 1 tablet by mouth once daily    atorvastatin (LIPITOR) 80 MG tablet Take 1 tablet (80 mg total) by mouth once daily.    cilostazol (PLETAL) 100 MG Tab Take 1 tablet (100 mg total) by mouth 2 (two) times daily.    clopidogrel (PLAVIX) 75 mg tablet take 1 tablet by mouth once daily    esomeprazole (NEXIUM) 40 MG capsule Take 1 capsule (40 mg total) by mouth before breakfast.    hydrocodone-acetaminophen 10-325mg (NORCO)  mg Tab Take  mg by mouth. 1 Tablet Oral Four times a day.  narcotic warning, contains tylenol    hydroxychloroquine (PLAQUENIL) 200 mg tablet Take 1 tablet (200 mg total) by mouth 2 (two) times daily.    levothyroxine (SYNTHROID) 137 MCG Tab tablet take 1 tablet by mouth once daily BEFORE BREAKFAST    trazodone (DESYREL) 50 MG tablet Take 1 tablet (50 mg  total) by mouth nightly as needed for Insomnia.    valsartan (DIOVAN) 320 MG tablet Take 1 tablet (320 mg total) by mouth once daily.    ZETIA 10 mg tablet take 1 tablet by mouth once daily    [DISCONTINUED] albuterol 90 mcg/actuation inhaler Inhale 2 puffs into the lungs every 6 (six) hours as needed.    [DISCONTINUED] urea (CARMOL) 40 % Crea Apply topically 2 (two) times daily. Apply to thickened areas of skin     No current facility-administered medications on file prior to visit.      Review of patient's allergies indicates:  No Known Allergies  Review of Systems   Constitution: Negative for diaphoresis, weakness, malaise/fatigue and weight gain.   HENT: Negative for headaches and hoarse voice.    Eyes: Negative for double vision and visual disturbance.   Cardiovascular: Negative for chest pain, claudication, cyanosis, dyspnea on exertion, irregular heartbeat, leg swelling, near-syncope, orthopnea, palpitations, paroxysmal nocturnal dyspnea and syncope.   Respiratory: Negative for cough, hemoptysis, shortness of breath and snoring.    Hematologic/Lymphatic: Negative for bleeding problem. Does not bruise/bleed easily.   Skin: Positive for color change, poor wound healing and suspicious lesions.        HAS MULTIPLE CALLOUSES AND SKIN CAHNGES FROM DISCOID LUPUS.   Musculoskeletal: Negative for muscle cramps, muscle weakness and myalgias.   Gastrointestinal: Negative for bloating, abdominal pain, change in bowel habit, diarrhea, heartburn, hematemesis, hematochezia, melena and nausea.   Neurological: Negative for excessive daytime sleepiness, dizziness, light-headedness, loss of balance and numbness.   Psychiatric/Behavioral: Negative for memory loss. The patient does not have insomnia.    Allergic/Immunologic: Negative for hives.       Objective:   Physical Exam  Vitals:    04/27/17 0932 04/27/17 0933   BP: 130/80 126/76   BP Location: Right arm Left arm   Patient Position: Sitting Sitting   Pulse: 68   "  Weight: 99.2 kg (218 lb 11.1 oz)    Height: 5' 7" (1.702 m)    Constitutional: She is oriented to person, place, and time. She appears well-developed and well-nourished. She does not appear ill. No distress.   HENT:   Head: Normocephalic and atraumatic.   Eyes: EOM are normal. Pupils are equal, round, and reactive to light. No scleral icterus.   Neck: Normal range of motion. Neck supple. Normal carotid pulses, no hepatojugular reflux and no JVD present. Carotid bruit is not present. No tracheal deviation present. No thyromegaly present.   Cardiovascular: Normal rate, regular rhythm and normal heart sounds. Exam reveals no gallop and no friction rub.   No murmur heard.  Pulses:  Carotid pulses are 3+ on the right side, and 3+ on the left side.LEFT CAROTID BRUIT NOTED  Radial pulses are 3+ on the right side, and 3+ on the left side.   Femoral pulses are 3+ on the right side, and 2+ on the left side.BILATERAL FEMORAL BRUITS NOTED  Popliteal pulses are 1+ on the right side, and 1+ on the left side.   Dorsalis pedis pulses are 1+ on the right side, and 0 on the left side.   Posterior tibial pulses are 1+ on the right side, and 0 on the left side.   Bilateral groin scars well healed.   Pulmonary/Chest: Effort normal and breath sounds normal. No respiratory distress. She has no wheezes. She has no rhonchi. She has no rales. She exhibits no tenderness.   Scar cabg well healed.   Abdominal: Soft. Normal appearance, normal aorta and bowel sounds are normal. She exhibits no abdominal bruit, no ascites and no pulsatile midline mass. There is no hepatomegaly. There is no tenderness.   Musculoskeletal: She exhibits no edema. SCAR OF GROIN SURGERY WELL HEALED.  Neurological: She is alert and oriented to person, place, and time. She has normal strength. No cranial nerve deficit. Coordination normal.   Skin: Skin is warm and dry. No rash noted. She is not diaphoretic. No cyanosis or erythema. Nails show no clubbing.   Has multiple " skin changes  HAS MULTIPLE CALLOUSES IN BOTH FEET ON THE PLANTAR ASPECT NO DISCHARGE.   Psychiatric: She has a normal mood and affect. Her speech is normal and behavior is normal.   Nursing note and vitals reviewed.     Lab Results   Component Value Date    CHOL 165 09/14/2016    CHOL 147 07/28/2015    CHOL 169 02/02/2015     Lab Results   Component Value Date    HDL 51 09/14/2016    HDL 50 07/28/2015    HDL 50 02/02/2015     Lab Results   Component Value Date    LDLCALC 93.0 09/14/2016    LDLCALC 87.0 07/28/2015    LDLCALC 104.4 02/02/2015     Lab Results   Component Value Date    TRIG 105 09/14/2016    TRIG 50 07/28/2015    TRIG 73 02/02/2015     Lab Results   Component Value Date    CHOLHDL 30.9 09/14/2016    CHOLHDL 34.0 07/28/2015    CHOLHDL 29.6 02/02/2015       Chemistry        Component Value Date/Time     10/26/2016 1038    K 4.2 10/26/2016 1038     10/26/2016 1038    CO2 24 10/26/2016 1038    BUN 18 10/26/2016 1038    CREATININE 0.9 04/11/2017 1048    GLU 92 10/26/2016 1038        Component Value Date/Time    CALCIUM 9.5 10/26/2016 1038    ALKPHOS 181 (H) 10/26/2016 1038    AST 16 10/26/2016 1038    ALT 9 (L) 10/26/2016 1038    BILITOT 0.4 10/26/2016 1038          Lab Results   Component Value Date    TSH 3.113 10/17/2016     Lab Results   Component Value Date    INR 0.9 07/26/2007     Lab Results   Component Value Date    WBC 8.07 04/11/2017    HGB 14.5 04/11/2017    HCT 43.4 04/11/2017    MCV 91 04/11/2017     04/11/2017     BMP  Sodium   Date Value Ref Range Status   10/26/2016 140 136 - 145 mmol/L Final     Potassium   Date Value Ref Range Status   10/26/2016 4.2 3.5 - 5.1 mmol/L Final     Chloride   Date Value Ref Range Status   10/26/2016 105 95 - 110 mmol/L Final     CO2   Date Value Ref Range Status   10/26/2016 24 23 - 29 mmol/L Final     BUN, Bld   Date Value Ref Range Status   10/26/2016 18 8 - 23 mg/dL Final     Creatinine   Date Value Ref Range Status   04/11/2017 0.9 0.5 -  1.4 mg/dL Final     Calcium   Date Value Ref Range Status   10/26/2016 9.5 8.7 - 10.5 mg/dL Final     Anion Gap   Date Value Ref Range Status   10/26/2016 11 8 - 16 mmol/L Final     eGFR if    Date Value Ref Range Status   04/11/2017 >60.0 >60 mL/min/1.73 m^2 Final     eGFR if non    Date Value Ref Range Status   04/11/2017 >60.0 >60 mL/min/1.73 m^2 Final     Comment:     Calculation used to obtain the estimated glomerular filtration  rate (eGFR) is the CKD-EPI equation. Since race is unknown   in our information system, the eGFR values for   -American and Non--American patients are given   for each creatinine result.       CrCl cannot be calculated (Patient has no serum creatinine result on file.).    PRE-TEST DATA   Resting VIPIN:    The right ankle brachial index was 0.65 which suggests moderate right lower extremity arterial disease.   The left ankle brachial index was 0.57 which suggests severe left lower extremity arterial disease.   The right TBI is 0.48.   The left TBI is 0.58.     TEST DESCRIPTION   RIGHT   cm/sec   cm/sec  SFAo 134 cm/sec  SFAp 152 cm/sec  SFAm 196 cm/sec  SFAd 208 cm/sec  POP 99 cm/sec  TIB TRNK 85 cm/sec  PER 68 cm/sec  AT 85 cm/sec  PT 58 cm/sec    The right VIPIN is .65    LEFT   cm/sec   cm/sec  SFAo  cm/sec,  occlusion  SFAp  cm/sec,  occlusion  SFAm  cm/sec  SFAd 149 cm/sec  POP 54 cm/sec  TIB TRNK 73 cm/sec  PER 50 cm/sec  AT 63 cm/sec  PT 51 cm/sec    SFAm stent velocity origin  cm/sec,  proximal  cm/sec,  mid  cm/sec,  distal  cm/sec    The left VIPIN is .65    vipin is suggestive of bilateral severe pvd.  the rt lower extremity waveforms are monophasic suggestive of inflow disease.  the left sfa is occluded with monophasic flow in the popliteal and infrapopliteal vessels.      MRI of the left foot without and with IV contrast    Clinical History: L03.116 Cellulitis of left lower limb; I73.9 Peripheral vascular  disease, unspecified    Technique: Standard foot MRI protocol without and with IV contrast was performed.       Finding: There are several ulcers in the soft tissue volar to the fourth and fifth metatarsals. There are several fluid collections adjacent to the ulcer volar to the fourth metatarsal. The largest one measures 11 mm in size and is located in the subcutaneous fat adjacent to the ulcer. There is a mild amount of edema in the proximal portion of the first metatarsal. This edema appears to be secondary to a nondisplaced fracture. There is no dislocation. There is a mild amount of edema in the lateral aspect of the medial cuneiform. There is a minimal amount of edema in the anterior aspect of the navicular bone. The visualized joints of the left foot and ankle have a normal amount of fluid within them. The visualized portions of the tendons and ligaments are normal in appearance.   Impression     1. There are several ulcers in the soft tissue volar to the fourth and fifth metatarsals. There are several fluid collections adjacent to the ulcer volar to the fourth metatarsal. These are characteristic of abscesses. The largest one measures 11 mm in size and is located in the subcutaneous fat adjacent to the ulcer.   2. There is a mild amount of edema in the proximal portion of the first metatarsal. This edema appears to be secondary to a nondisplaced fracture.   3. There is a mild amount of edema in the lateral aspect of the medial cuneiform. There is a minimal amount of edema in the anterior aspect of the navicular bone. This is characteristic of degenerative changes.  4. There is no suspected acute osteomyelitis.      Electronically signed by: KLEBER GATICA MD  Date: 04/24/17  Time: 16:50     Encounter   View Encounter      Reviewed By   Niya Velez MA on 4/25/2017  4:35 PM   Melva Orellana DPM on 4/25/2017  8:22 AM       Assessment:     1. Coronary artery disease involving native coronary artery of native  heart without angina pectoris    2. Tobacco use    3. Hx of CABG    4. Discoid lupus    5. PVD (peripheral vascular disease)    6. Other systemic lupus erythematosus with other organ involvement    7. Hypothyroidism, unspecified type    8. Hyperlipidemia, unspecified hyperlipidemia type    9. Rheumatoid arthritis involving both feet with positive rheumatoid factor    10. Bilateral carotid artery stenosis    11. Atherosclerotic PVD with intermittent claudication    12. Cellulitis and abscess of foot      PATIENT AHS A SIGNIFICANTLY ABNORMAL MRI POST DRAINAGE OF CALLOUS ON ANTIBIOTICS WITH STILL EVIDENEC OF SIGNIFICANT STENOSIS IN THE LCFA ENADRTERECTOMY SITE AND OCCLUDED LEFT SFA  SHE WILL BENEFIT FROM ANGIOGRAPHY TO ASSESS REVASCUL;ARIZATION HOPE TO HELP RESOLVE THE INFECTIOUS PROCESS IN THE ELFT LEG. SHE WA SCOUNSELD AGAIN ABOUT SMOKING CESSATION AND COMPLIANCE.    Plan:   AO GRAM WITH RUN OFF VIA RT CFA APPROACH/PTA  I have explained the risks, benefits , and alternatives of the procedure in detail.the patient voices understanding and all questions have been answered.the patient agrees to proceed as planned.  NEEDS STILL SOME  HYDRATION TO AVOID CONTRAST NEPHROPATHY BEFORE PROCEDURE

## 2017-04-27 NOTE — MR AVS SNAPSHOT
[facility]  [address] , [address]  [place] [address]  Phone: [phone]  Fax: [phone]                  Flory Cam   2017 8:45 AM   Office Visit    Description:  Female : 1945   Provider:  Christopher Cohen MD   Department:  [facility]           Reason for Visit     Coronary Artery Disease     Peripheral Vascular Disease     Hyperlipidemia     Hypertension           Diagnoses this Visit        Comments    Coronary artery disease involving native coronary artery of native heart without angina pectoris    -  Primary     Tobacco use         Hx of CABG         Discoid lupus         PVD (peripheral vascular disease)         Other systemic lupus erythematosus with other organ involvement         Hypothyroidism, unspecified type         Hyperlipidemia, unspecified hyperlipidemia type         Rheumatoid arthritis involving both feet with positive rheumatoid factor         Bilateral carotid artery stenosis         Atherosclerotic PVD with intermittent claudication         Cellulitis and abscess of foot                To Do List           Future Appointments        Provider Department Dept Phone    2017 11:20 AM Melva Orellana DPM [facility] [phone]    5/3/2017 9:00 AM Tayler Ovalle DO [facility] [phone]    2017 2:45 PM Christopher Cohen MD [facility] [phone]      Goals (5 Years of Data)     None      [facility]     [facility] - 24/7 Assistance  Unless otherwise directed by your provider, please contact [facility], our nurse care line that is available for 24/7 assistance.     Registered nurses in the [facility] provide: appointment scheduling, clinical advisement, health education, and other advisory services.  Call: [phone] (toll free)               Medications           Message regarding Medications     Verify the changes and/or  "additions to your medication regime listed below are the same as discussed with your clinician today.  If any of these changes or additions are incorrect, please notify your healthcare provider.        STOP taking these medications     albuterol 90 mcg/actuation inhaler Inhale 2 puffs into the lungs every 6 (six) hours as needed.    urea (CARMOL) 40 % Crea Apply topically 2 (two) times daily. Apply to thickened areas of skin           Verify that the below list of medications is an accurate representation of the medications you are currently taking.  If none reported, the list may be blank. If incorrect, please contact your healthcare provider. Carry this list with you in case of emergency.           Current Medications     amlodipine (NORVASC) 10 MG tablet take 1 tablet by mouth once daily    atorvastatin (LIPITOR) 80 MG tablet Take 1 tablet (80 mg total) by mouth once daily.    cilostazol (PLETAL) 100 MG Tab Take 1 tablet (100 mg total) by mouth 2 (two) times daily.    clopidogrel (PLAVIX) 75 mg tablet take 1 tablet by mouth once daily    esomeprazole (NEXIUM) 40 MG capsule Take 1 capsule (40 mg total) by mouth before breakfast.    hydrocodone-acetaminophen 10-325mg (NORCO)  mg Tab Take  mg by mouth. 1 Tablet Oral Four times a day.  narcotic warning, contains tylenol    hydroxychloroquine (PLAQUENIL) 200 mg tablet Take 1 tablet (200 mg total) by mouth 2 (two) times daily.    levothyroxine (SYNTHROID) 137 MCG Tab tablet take 1 tablet by mouth once daily BEFORE BREAKFAST    trazodone (DESYREL) 50 MG tablet Take 1 tablet (50 mg total) by mouth nightly as needed for Insomnia.    valsartan (DIOVAN) 320 MG tablet Take 1 tablet (320 mg total) by mouth once daily.    ZETIA 10 mg tablet take 1 tablet by mouth once daily           Clinical Reference Information           Your Vitals Were     BP Pulse Height Weight BMI    126/76 (BP Location: Left arm, Patient Position: Sitting) 68 5' 7" (1.702 m) 99.2 kg (218 lb " 11.1 oz) 34.25 kg/m2      Blood Pressure          Most Recent Value    Right Arm BP - Sitting  130/80    Left Arm BP - Sitting  126/76    BP  126/76      Allergies as of 4/27/2017     No Known Allergies      Immunizations Administered on Date of Encounter - 4/27/2017     None      [facility] Sign-Up     Activating your [facility] account is as easy as 1-2-3!     1) Visit [email or web address], select Sign Up Now, enter this activation code and your date of birth, then select Next.  [email or web address]  Expires: 5/26/2017 10:21 AM      2) Create a username and password to use when you visit [facility] in the future and select a security question in case you lose your password and select Next.    3) Enter your e-mail address and click Sign Up!    Additional Information  If you have questions, please e-mail [email or web address] or call [phone] to talk to our [facility] staff. Remember, [facility] is NOT to be used for urgent needs. For medical emergencies, dial 911.         Language Assistance Services     ATTENTION: Language assistance services are available, free of charge. Please call [phone].      ATENCIÓN: Si habla alconañol, tiene a márquez disposición servicios gratuitos de asistencia lingüística. Llame al [phone].     CHÚ Ý: N?u b?n nói Ti?ng Vi?t, có các d?ch v? h? tr? ngôn ng? mi?n phí dành cho b?n. G?i s? [phone].         [facility] complies with applicable Federal civil rights laws and does not discriminate on the basis of race, color, national origin, age, disability, or sex.

## 2017-04-28 ENCOUNTER — OFFICE VISIT (OUTPATIENT)
Dept: PODIATRY | Facility: CLINIC | Age: 72
End: 2017-04-28
Payer: MEDICARE

## 2017-04-28 ENCOUNTER — LAB VISIT (OUTPATIENT)
Dept: LAB | Facility: HOSPITAL | Age: 72
End: 2017-04-28
Attending: INTERNAL MEDICINE
Payer: MEDICARE

## 2017-04-28 VITALS — BODY MASS INDEX: 34.33 KG/M2 | HEIGHT: 67 IN | WEIGHT: 218.69 LBS

## 2017-04-28 DIAGNOSIS — I73.9 PVD (PERIPHERAL VASCULAR DISEASE): ICD-10-CM

## 2017-04-28 DIAGNOSIS — I24.9 ACUTE CORONARY SYNDROME: ICD-10-CM

## 2017-04-28 DIAGNOSIS — L02.612 ABSCESS OF LEFT FOOT: Primary | ICD-10-CM

## 2017-04-28 DIAGNOSIS — E78.5 HYPERLIPIDEMIA, UNSPECIFIED HYPERLIPIDEMIA TYPE: ICD-10-CM

## 2017-04-28 DIAGNOSIS — M32.9 SYSTEMIC LUPUS ERYTHEMATOSUS, UNSPECIFIED SLE TYPE, UNSPECIFIED ORGAN INVOLVEMENT STATUS: ICD-10-CM

## 2017-04-28 DIAGNOSIS — E03.9 HYPOTHYROIDISM, UNSPECIFIED TYPE: ICD-10-CM

## 2017-04-28 DIAGNOSIS — I10 ESSENTIAL HYPERTENSION: ICD-10-CM

## 2017-04-28 DIAGNOSIS — I25.10 CORONARY ARTERY DISEASE, ANGINA PRESENCE UNSPECIFIED, UNSPECIFIED VESSEL OR LESION TYPE, UNSPECIFIED WHETHER NATIVE OR TRANSPLANTED HEART: ICD-10-CM

## 2017-04-28 DIAGNOSIS — Z98.62 S/P ANGIOPLASTY: ICD-10-CM

## 2017-04-28 LAB
ANION GAP SERPL CALC-SCNC: 10 MMOL/L
APTT BLDCRRT: 22.7 SEC
BASOPHILS # BLD AUTO: 0.03 K/UL
BASOPHILS NFR BLD: 0.4 %
BUN SERPL-MCNC: 14 MG/DL
CALCIUM SERPL-MCNC: 9.6 MG/DL
CHLORIDE SERPL-SCNC: 105 MMOL/L
CO2 SERPL-SCNC: 26 MMOL/L
CREAT SERPL-MCNC: 0.8 MG/DL
DIFFERENTIAL METHOD: NORMAL
EOSINOPHIL # BLD AUTO: 0.2 K/UL
EOSINOPHIL NFR BLD: 2.1 %
ERYTHROCYTE [DISTWIDTH] IN BLOOD BY AUTOMATED COUNT: 14.5 %
EST. GFR  (AFRICAN AMERICAN): >60 ML/MIN/1.73 M^2
EST. GFR  (NON AFRICAN AMERICAN): >60 ML/MIN/1.73 M^2
GLUCOSE SERPL-MCNC: 90 MG/DL
HCT VFR BLD AUTO: 44.1 %
HGB BLD-MCNC: 14.2 G/DL
INR PPP: 1
LYMPHOCYTES # BLD AUTO: 2.4 K/UL
LYMPHOCYTES NFR BLD: 32.2 %
MCH RBC QN AUTO: 30.1 PG
MCHC RBC AUTO-ENTMCNC: 32.2 %
MCV RBC AUTO: 93 FL
MONOCYTES # BLD AUTO: 0.7 K/UL
MONOCYTES NFR BLD: 8.7 %
NEUTROPHILS # BLD AUTO: 4.2 K/UL
NEUTROPHILS NFR BLD: 56.5 %
PLATELET # BLD AUTO: 273 K/UL
PMV BLD AUTO: 11.2 FL
POTASSIUM SERPL-SCNC: 4.4 MMOL/L
PROTHROMBIN TIME: 10.4 SEC
RBC # BLD AUTO: 4.72 M/UL
SODIUM SERPL-SCNC: 141 MMOL/L
WBC # BLD AUTO: 7.45 K/UL

## 2017-04-28 PROCEDURE — 99213 OFFICE O/P EST LOW 20 MIN: CPT | Mod: PBBFAC | Performed by: PODIATRIST

## 2017-04-28 PROCEDURE — 99999 PR PBB SHADOW E&M-EST. PATIENT-LVL III: CPT | Mod: PBBFAC,,, | Performed by: PODIATRIST

## 2017-04-28 PROCEDURE — 99213 OFFICE O/P EST LOW 20 MIN: CPT | Mod: S$PBB,,, | Performed by: PODIATRIST

## 2017-04-28 RX ORDER — LIDOCAINE HYDROCHLORIDE 10 MG/ML
1 INJECTION, SOLUTION EPIDURAL; INFILTRATION; INTRACAUDAL; PERINEURAL ONCE
Status: CANCELLED | OUTPATIENT
Start: 2017-04-28 | End: 2017-04-28

## 2017-04-28 NOTE — MR AVS SNAPSHOT
[facility]  [address] [facility] [address]  Phone: [phone]  Fax: [phone]                  Flory Cam   2017 11:20 AM   Office Visit    Description:  Female : 1945   Provider:  Melva Orellana DPM   Department:  [facility]           Reason for Visit     Wound Care                To Do List           Future Appointments        Provider Department Dept Phone    2017 1:30 PM LABORATORY, [facility] [phone]    2017 10:00 AM DO [facility] [phone]    5/3/2017 2:30 PM PODIATRY NURSE, [facility] Podiatry [phone]    2017 2:45 PM Christopher Cohen MD [facility] Cardiology [phone]      Your Future Surgeries/Procedures     May 02, 2017   Surgery with Christopher Cohen MD   [facility]    [address] [facility] [address] [phone]              Goals (5 Years of Data)     None      [facility]     [facility] Nurse Care Line - 24/ Assistance  Unless otherwise directed by your provider, please contact [facility], our nurse care line that is available for 24/7 assistance.     Registered nurses in the [facility] provide: appointment scheduling, clinical advisement, health education, and other advisory services.  Call: [phone] (toll free)               Medications           Message regarding Medications     Verify the changes and/or additions to your medication regime listed below are the same as discussed with your clinician today.  If any of these changes or additions are incorrect, please notify your healthcare provider.             Verify that the below list of medications is an accurate representation of the medications you are currently taking.  If none reported, the list may be blank. If incorrect, please contact your healthcare provider. Carry this list with you  "in case of emergency.           Current Medications     amlodipine (NORVASC) 10 MG tablet take 1 tablet by mouth once daily    atorvastatin (LIPITOR) 80 MG tablet Take 1 tablet (80 mg total) by mouth once daily.    cilostazol (PLETAL) 100 MG Tab Take 1 tablet (100 mg total) by mouth 2 (two) times daily.    clopidogrel (PLAVIX) 75 mg tablet take 1 tablet by mouth once daily    esomeprazole (NEXIUM) 40 MG capsule Take 1 capsule (40 mg total) by mouth before breakfast.    hydrocodone-acetaminophen 10-325mg (NORCO)  mg Tab Take  mg by mouth. 1 Tablet Oral Four times a day.  narcotic warning, contains tylenol    hydroxychloroquine (PLAQUENIL) 200 mg tablet Take 1 tablet (200 mg total) by mouth 2 (two) times daily.    levothyroxine (SYNTHROID) 137 MCG Tab tablet take 1 tablet by mouth once daily BEFORE BREAKFAST    trazodone (DESYREL) 50 MG tablet Take 1 tablet (50 mg total) by mouth nightly as needed for Insomnia.    valsartan (DIOVAN) 320 MG tablet Take 1 tablet (320 mg total) by mouth once daily.    ZETIA 10 mg tablet take 1 tablet by mouth once daily           Clinical Reference Information           Your Vitals Were     Height Weight BMI          5' 7" (1.702 m) 99.2 kg (218 lb 11.1 oz) 34.25 kg/m2        Allergies as of 4/28/2017     No Known Allergies      Immunizations Administered on Date of Encounter - 4/28/2017     None      [facility] Sign-Up     Activating your [facility] account is as easy as 1-2-3!     1) Visit [email or web address], select Sign Up Now, enter this activation code and your date of birth, then select Next.  [email or web address]  Expires: 5/26/2017 10:21 AM      2) Create a username and password to use when you visit [facility] in the future and select a security question in case you lose your password and select Next.    3) Enter your e-mail address and click Sign Up!    Additional Information  If you have questions, please e-mail [email or web address] or call [phone] to talk to our " [facility] staff. Remember, [facility] is NOT to be used for urgent needs. For medical emergencies, dial 911.         Language Assistance Services     ATTENTION: Language assistance services are available, free of charge. Please call [phone].      ATENCIÓN: Si habla ke, tiene a márquez disposición servicios gratuitos de asistencia lingüística. Llame al [phone].     CHÚ Ý: N?u b?n nói Ti?ng Vi?t, có các d?ch v? h? tr? ngôn ng? mi?n phí dành cho b?n. G?i s? [phone].         [facility] complies with applicable Federal civil rights laws and does not discriminate on the basis of race, color, national origin, age, disability, or sex.

## 2017-05-01 ENCOUNTER — TELEPHONE (OUTPATIENT)
Dept: CARDIOLOGY | Facility: CLINIC | Age: 72
End: 2017-05-01

## 2017-05-01 ENCOUNTER — OFFICE VISIT (OUTPATIENT)
Dept: INTERNAL MEDICINE | Facility: CLINIC | Age: 72
DRG: 581 | End: 2017-05-01
Payer: MEDICARE

## 2017-05-01 ENCOUNTER — TELEPHONE (OUTPATIENT)
Dept: PODIATRY | Facility: CLINIC | Age: 72
End: 2017-05-01

## 2017-05-01 VITALS
OXYGEN SATURATION: 95 % | WEIGHT: 216 LBS | DIASTOLIC BLOOD PRESSURE: 68 MMHG | HEIGHT: 66 IN | BODY MASS INDEX: 34.72 KG/M2 | SYSTOLIC BLOOD PRESSURE: 130 MMHG | HEART RATE: 67 BPM | TEMPERATURE: 98 F

## 2017-05-01 DIAGNOSIS — L02.612 FOOT ABSCESS, LEFT: ICD-10-CM

## 2017-05-01 DIAGNOSIS — I10 ESSENTIAL HYPERTENSION: ICD-10-CM

## 2017-05-01 DIAGNOSIS — Z01.818 PREOPERATIVE EXAMINATION: Primary | ICD-10-CM

## 2017-05-01 DIAGNOSIS — R73.03 PREDIABETES: ICD-10-CM

## 2017-05-01 DIAGNOSIS — S92.315A: ICD-10-CM

## 2017-05-01 DIAGNOSIS — I73.9 PVD (PERIPHERAL VASCULAR DISEASE): ICD-10-CM

## 2017-05-01 DIAGNOSIS — I25.10 CORONARY ARTERY DISEASE INVOLVING NATIVE CORONARY ARTERY OF NATIVE HEART WITHOUT ANGINA PECTORIS: ICD-10-CM

## 2017-05-01 PROCEDURE — 99999 PR PBB SHADOW E&M-EST. PATIENT-LVL III: CPT | Mod: PBBFAC,,, | Performed by: INTERNAL MEDICINE

## 2017-05-01 PROCEDURE — 99214 OFFICE O/P EST MOD 30 MIN: CPT | Mod: S$PBB,,, | Performed by: INTERNAL MEDICINE

## 2017-05-01 NOTE — TELEPHONE ENCOUNTER
----- Message from Isabela Mcdermott sent at 5/1/2017 11:23 AM CDT -----  Contact: Patient  Patient called to speak with the nurse; she is having a procedure done tomorrow and had a question. She can be contacted at [phone].    Thanks,  Isabela

## 2017-05-01 NOTE — MR AVS SNAPSHOT
[facility]  [address] [facility] [address]  Phone: [phone]  Fax: [phone]                  Flory Cam   2017 10:00 AM   Office Visit    Description:  Female : 1945   Provider:  Tayler Ovalle DO   Department:  [facility]           Reason for Visit     Pre-op Exam           Diagnoses this Visit        Comments    Foot abscess, left    -  Primary     Nondisplaced fracture of first metatarsal bone of left foot, initial encounter         PVD (peripheral vascular disease)         Essential hypertension         Prediabetes                To Do List           Future Appointments        Provider Department Dept Phone    5/3/2017 2:30 PM PODIATRY NURSE, [facility] Podiatry [phone]    2017 2:45 PM Christopher Cohen MD [facility] Cardiology [phone]    2017 7:40 AM Melva Orellana DPM [facility] Podiatry [phone]    2017 3:00 PM Melva Orellana DPM [facility] Podiatry [phone]      Your Future Surgeries/Procedures     May 02, 2017   Surgery with Christopher Cohen MD   [facility]    [address] [facility] [address] [phone]              Goals (5 Years of Data)     None      [facility]     [facility] Nurse Care Line -  Assistance  Unless otherwise directed by your provider, please contact [facility], our nurse care line that is available for  assistance.     Registered nurses in the [facility] provide: appointment scheduling, clinical advisement, health education, and other advisory services.  Call: [phone] (toll free)               Medications           Message regarding Medications     Verify the changes and/or additions to your medication regime listed below are the same as discussed with your clinician today.  If any of these changes or additions are incorrect, please notify your healthcare provider.       "       Verify that the below list of medications is an accurate representation of the medications you are currently taking.  If none reported, the list may be blank. If incorrect, please contact your healthcare provider. Carry this list with you in case of emergency.           Current Medications     amlodipine (NORVASC) 10 MG tablet take 1 tablet by mouth once daily    atorvastatin (LIPITOR) 80 MG tablet Take 1 tablet (80 mg total) by mouth once daily.    cilostazol (PLETAL) 100 MG Tab Take 1 tablet (100 mg total) by mouth 2 (two) times daily.    clopidogrel (PLAVIX) 75 mg tablet take 1 tablet by mouth once daily    esomeprazole (NEXIUM) 40 MG capsule Take 1 capsule (40 mg total) by mouth before breakfast.    hydrocodone-acetaminophen 10-325mg (NORCO)  mg Tab Take  mg by mouth. 1 Tablet Oral Four times a day.  narcotic warning, contains tylenol    hydroxychloroquine (PLAQUENIL) 200 mg tablet Take 1 tablet (200 mg total) by mouth 2 (two) times daily.    levothyroxine (SYNTHROID) 137 MCG Tab tablet take 1 tablet by mouth once daily BEFORE BREAKFAST    trazodone (DESYREL) 50 MG tablet Take 1 tablet (50 mg total) by mouth nightly as needed for Insomnia.    valsartan (DIOVAN) 320 MG tablet Take 1 tablet (320 mg total) by mouth once daily.    ZETIA 10 mg tablet take 1 tablet by mouth once daily           Clinical Reference Information           Your Vitals Were     BP Pulse Temp Height Weight SpO2    130/68 67 97.7 °F (36.5 °C) (Tympanic) 5' 6" (1.676 m) 98 kg (216 lb) 95%    BMI                34.86 kg/m2          Blood Pressure          Most Recent Value    BP  130/68      Allergies as of 5/1/2017     No Known Allergies      Immunizations Administered on Date of Encounter - 5/1/2017     None      [facility] Sign-Up     Activating your [facility] account is as easy as 1-2-3!     1) Visit [email or web address], select Sign Up Now, enter this activation code and your date of birth, then select " Next.  [email or web address]  Expires: 5/26/2017 10:21 AM      2) Create a username and password to use when you visit [facility] in the future and select a security question in case you lose your password and select Next.    3) Enter your e-mail address and click Sign Up!    Additional Information  If you have questions, please e-mail [email or web address] or call [phone] to talk to our [email or web address] staff. Remember, [facility] is NOT to be used for urgent needs. For medical emergencies, dial 911.         Language Assistance Services     ATTENTION: Language assistance services are available, free of charge. Please call [phone].      ATENCIÓN: Si cj rondonaislinn, tiene a márquez disposición servicios gratuitos de asistencia lingüística. Llame al [phone].     CHÚ Ý: N?u b?n nói Ti?ng Vi?t, có các d?ch v? h? tr? ngôn ng? mi?n phí dành cho b?n. G?i s? [phone].         [facility] complies with applicable Federal civil rights laws and does not discriminate on the basis of race, color, national origin, age, disability, or sex.

## 2017-05-01 NOTE — PROGRESS NOTES
Subjective:       Patient ID: Flory Cam is a 71 y.o. female.    Chief Complaint: Pre-op Exam    HPI Comments: Flory Cam  71 y.o. Black or  female     Patient presents with:  Pre-op Exam    HPI: Here today for a preoperative evaluation. She will be having foot surgery for left foot abscess seen on MRI. She also has a first metatarsal non displaced fracture seen on MRI. She states she thinks she injured it approximately one month ago.   She is currently on antibiotics.   She denies prior history of surgical or anesthetic complications.  PVD--she will be having a stent placed in the left lower extremity tomorrow.   CAD--asymptomatic. She had a negative nuclear stress test in January 2016.    HTN--stable on current management.   Prediabetes--she denies symptoms of diabetes. Her recent glucose was within normal range.                  HGBA1C                   6.2                 09/14/2016              Past Medical History:  Acute coronary syndrome  Anxiety  11/17/2015: Bilateral carotid artery stenosis  Chronic pain  Coronary artery disease  GERD (gastroesophageal reflux disease)  Hyperlipidemia  Hypertension  Hypothyroidism  Low back pain  Lupus  Osteoporosis  Poor circulation  PVD (peripheral vascular disease)  02/13/2014: S/P peripheral artery angioplasty  Skin disease    Past Surgical History:  1/4/2017: COLONOSCOPY N/A      Comment: Procedure: COLONOSCOPY;  Surgeon: Sylvester Arboleda III, MD;  Location: [facility];  Service:               Endoscopy;  Laterality: N/A;  CORONARY ANGIOPLASTY  CORONARY ARTERY BYPASS GRAFT  HYSTERECTOMY  THYROIDECTOMY    Review of patient's family history indicates:    Melanoma                       Neg Hx                    Psoriasis                      Neg Hx                    Lupus                          Neg Hx                    Eczema                         Neg Hx                    Current Outpatient Prescriptions on File  Prior to Visit:  amlodipine (NORVASC) 10 MG tablet, take 1 tablet by mouth once daily, Disp: 30 tablet, Rfl: 6  atorvastatin (LIPITOR) 80 MG tablet, Take 1 tablet (80 mg total) by mouth once daily., Disp: 90 tablet, Rfl: 0  cilostazol (PLETAL) 100 MG Tab, Take 1 tablet (100 mg total) by mouth 2 (two) times daily., Disp: 60 tablet, Rfl: 6  clopidogrel (PLAVIX) 75 mg tablet, take 1 tablet by mouth once daily, Disp: 90 tablet, Rfl: 3  esomeprazole (NEXIUM) 40 MG capsule, Take 1 capsule (40 mg total) by mouth before breakfast., Disp: 90 capsule, Rfl: 1  hydrocodone-acetaminophen 10-325mg (NORCO)  mg Tab, Take  mg by mouth. 1 Tablet Oral Four times a day.  narcotic warning, contains tylenol, Disp: , Rfl:   hydroxychloroquine (PLAQUENIL) 200 mg tablet, Take 1 tablet (200 mg total) by mouth 2 (two) times daily., Disp: 180 tablet, Rfl: 0  levothyroxine (SYNTHROID) 137 MCG Tab tablet, take 1 tablet by mouth once daily BEFORE BREAKFAST, Disp: 30 tablet, Rfl: 11  trazodone (DESYREL) 50 MG tablet, Take 1 tablet (50 mg total) by mouth nightly as needed for Insomnia., Disp: 30 tablet, Rfl: 3  valsartan (DIOVAN) 320 MG tablet, Take 1 tablet (320 mg total) by mouth once daily., Disp: 90 tablet, Rfl: 3  ZETIA 10 mg tablet, take 1 tablet by mouth once daily, Disp: 90 tablet, Rfl: 1    Allergies:  Review of patient's allergies indicates:  No Known Allergies      Review of Systems   Constitutional: Negative for fever and unexpected weight change.   HENT: Negative for sore throat.    Respiratory: Negative for cough and shortness of breath.    Cardiovascular: Negative for chest pain.   Gastrointestinal: Negative for abdominal pain and constipation.   Genitourinary: Negative for dysuria.   Musculoskeletal: Positive for gait problem.   Skin: Positive for wound.   Neurological: Negative for dizziness and headaches.       Objective:      Physical Exam   Constitutional: She is oriented to person, place, and time. She appears  well-developed and well-nourished. No distress.   Eyes: No scleral icterus.   Neck: No tracheal deviation present. No thyromegaly present.   Cardiovascular: Normal rate, regular rhythm and normal heart sounds.    Pulmonary/Chest: Effort normal and breath sounds normal. No respiratory distress. She has no wheezes. She has no rales.   Abdominal: Soft. Bowel sounds are normal.   Lymphadenopathy:     She has no cervical adenopathy.   Neurological: She is alert and oriented to person, place, and time.   Skin: Skin is warm and dry.   Left foot lesion on plantar surface with mild drainage and swelling.   Psychiatric: She has a normal mood and affect.   Vitals reviewed.      Assessment:       1. Preoperative examination    2. Foot abscess, left    3. Nondisplaced fracture of first metatarsal bone of left foot, initial encounter    4. PVD (peripheral vascular disease)    5. Coronary artery disease involving native coronary artery of native heart without angina pectoris    6. Essential hypertension    7. Prediabetes        Plan:       Flory was seen today for pre-op exam.    Diagnoses and all orders for this visit:    Preoperative examination  -     Reviewed labs--all normal  -     No active cardiac conditions   -     Proceed with surgery    Foot abscess, left    Nondisplaced fracture of first metatarsal bone of left foot, initial encounter    PVD (peripheral vascular disease)    Coronary artery disease involving native coronary artery of native heart without angina pectoris    Essential hypertension    Prediabetes    Proceed with surgery as planned.

## 2017-05-01 NOTE — TELEPHONE ENCOUNTER
Spoke to patient who called to confirm that she should stop taking Plavix before surgery scheduled with Dr. Esteban DPM. States that she already contacted cardiology office to get information on medications that she should stop taking. Informed patient that she should stop taking blood thinners if recommended by cardiology. Verbalized understanding.

## 2017-05-01 NOTE — TELEPHONE ENCOUNTER
----- Message from Gilles Jeronimo sent at 5/1/2017 11:38 AM CDT -----  Pt is requesting a call from nurse to discuss her medications before her procedure.            Please call pt back at [phone]

## 2017-05-02 ENCOUNTER — HOSPITAL ENCOUNTER (INPATIENT)
Facility: HOSPITAL | Age: 72
LOS: 2 days | Discharge: HOME HEALTH | DRG: 581 | End: 2017-05-06
Attending: INTERNAL MEDICINE | Admitting: INTERNAL MEDICINE
Payer: MEDICARE

## 2017-05-02 ENCOUNTER — SURGERY (OUTPATIENT)
Age: 72
End: 2017-05-02

## 2017-05-02 DIAGNOSIS — L02.619 CELLULITIS AND ABSCESS OF FOOT: ICD-10-CM

## 2017-05-02 DIAGNOSIS — I70.219 ATHEROSCLEROSIS OF NATIVE ARTERIES OF EXTREMITY WITH INTERMITTENT CLAUDICATION: ICD-10-CM

## 2017-05-02 DIAGNOSIS — I10 ESSENTIAL HYPERTENSION: ICD-10-CM

## 2017-05-02 DIAGNOSIS — I73.9 PVD (PERIPHERAL VASCULAR DISEASE): ICD-10-CM

## 2017-05-02 DIAGNOSIS — I70.219 ATHEROSCLEROTIC PVD WITH INTERMITTENT CLAUDICATION: Primary | ICD-10-CM

## 2017-05-02 DIAGNOSIS — L02.612 FOOT ABSCESS, LEFT: ICD-10-CM

## 2017-05-02 DIAGNOSIS — L03.119 CELLULITIS AND ABSCESS OF FOOT: ICD-10-CM

## 2017-05-02 DIAGNOSIS — I25.119 ATHEROSCLEROTIC HEART DISEASE OF NATIVE CORONARY ARTERY WITH ANGINA PECTORIS: ICD-10-CM

## 2017-05-02 DIAGNOSIS — L02.612 ABSCESS OF LEFT FOOT: ICD-10-CM

## 2017-05-02 LAB
ALBUMIN SERPL BCP-MCNC: 3.5 G/DL
ALP SERPL-CCNC: 174 U/L
ALT SERPL W/O P-5'-P-CCNC: 18 U/L
ANION GAP SERPL CALC-SCNC: 8 MMOL/L
AST SERPL-CCNC: 24 U/L
BASOPHILS # BLD AUTO: 0.03 K/UL
BASOPHILS NFR BLD: 0.3 %
BILIRUB SERPL-MCNC: 0.4 MG/DL
BUN SERPL-MCNC: 16 MG/DL
CALCIUM SERPL-MCNC: 9 MG/DL
CHLORIDE SERPL-SCNC: 108 MMOL/L
CO2 SERPL-SCNC: 27 MMOL/L
CREAT SERPL-MCNC: 0.8 MG/DL
DIFFERENTIAL METHOD: ABNORMAL
EOSINOPHIL # BLD AUTO: 0.2 K/UL
EOSINOPHIL NFR BLD: 2.4 %
ERYTHROCYTE [DISTWIDTH] IN BLOOD BY AUTOMATED COUNT: 14.6 %
EST. GFR  (AFRICAN AMERICAN): >60 ML/MIN/1.73 M^2
EST. GFR  (NON AFRICAN AMERICAN): >60 ML/MIN/1.73 M^2
GLUCOSE SERPL-MCNC: 108 MG/DL
HCT VFR BLD AUTO: 41.7 %
HGB BLD-MCNC: 13.8 G/DL
LYMPHOCYTES # BLD AUTO: 3.3 K/UL
LYMPHOCYTES NFR BLD: 36.1 %
MCH RBC QN AUTO: 30.9 PG
MCHC RBC AUTO-ENTMCNC: 33.1 %
MCV RBC AUTO: 94 FL
MONOCYTES # BLD AUTO: 0.6 K/UL
MONOCYTES NFR BLD: 6.4 %
NEUTROPHILS # BLD AUTO: 5 K/UL
NEUTROPHILS NFR BLD: 54.8 %
PERIPHERAL STENOSIS: ABNORMAL
PLATELET # BLD AUTO: 242 K/UL
PMV BLD AUTO: 10.7 FL
POC ACTIVATED CLOTTING TIME K: 152 SEC (ref 74–137)
POC ACTIVATED CLOTTING TIME K: 178 SEC (ref 74–137)
POTASSIUM SERPL-SCNC: 4.3 MMOL/L
PROT SERPL-MCNC: 6.8 G/DL
RBC # BLD AUTO: 4.46 M/UL
SAMPLE: ABNORMAL
SAMPLE: ABNORMAL
SODIUM SERPL-SCNC: 143 MMOL/L
WBC # BLD AUTO: 9.2 K/UL

## 2017-05-02 PROCEDURE — 85025 COMPLETE CBC W/AUTO DIFF WBC: CPT

## 2017-05-02 PROCEDURE — 25000003 PHARM REV CODE 250

## 2017-05-02 PROCEDURE — 85347 COAGULATION TIME ACTIVATED: CPT | Performed by: INTERNAL MEDICINE

## 2017-05-02 PROCEDURE — 63600175 PHARM REV CODE 636 W HCPCS

## 2017-05-02 PROCEDURE — 25000003 PHARM REV CODE 250: Performed by: INTERNAL MEDICINE

## 2017-05-02 PROCEDURE — 80053 COMPREHEN METABOLIC PANEL: CPT

## 2017-05-02 PROCEDURE — 27200991 HC D-STAT PATCH: Performed by: INTERNAL MEDICINE

## 2017-05-02 PROCEDURE — 75716 ARTERY X-RAYS ARMS/LEGS: CPT | Mod: 26,,, | Performed by: INTERNAL MEDICINE

## 2017-05-02 PROCEDURE — 36200 PLACE CATHETER IN AORTA: CPT | Mod: ,,, | Performed by: INTERNAL MEDICINE

## 2017-05-02 PROCEDURE — 75625 CONTRAST EXAM ABDOMINL AORTA: CPT | Mod: 26,,, | Performed by: INTERNAL MEDICINE

## 2017-05-02 PROCEDURE — 36415 COLL VENOUS BLD VENIPUNCTURE: CPT

## 2017-05-02 PROCEDURE — 99152 MOD SED SAME PHYS/QHP 5/>YRS: CPT | Mod: ,,, | Performed by: INTERNAL MEDICINE

## 2017-05-02 PROCEDURE — C1769 GUIDE WIRE: HCPCS

## 2017-05-02 RX ORDER — CLOPIDOGREL BISULFATE 75 MG/1
75 TABLET ORAL DAILY
Status: DISCONTINUED | OUTPATIENT
Start: 2017-05-03 | End: 2017-05-04

## 2017-05-02 RX ORDER — SODIUM CHLORIDE 9 MG/ML
INJECTION, SOLUTION INTRAVENOUS CONTINUOUS
Status: ACTIVE | OUTPATIENT
Start: 2017-05-02 | End: 2017-05-03

## 2017-05-02 RX ORDER — IPRATROPIUM BROMIDE AND ALBUTEROL SULFATE 2.5; .5 MG/3ML; MG/3ML
3 SOLUTION RESPIRATORY (INHALATION) EVERY 6 HOURS PRN
Status: DISCONTINUED | OUTPATIENT
Start: 2017-05-02 | End: 2017-05-06 | Stop reason: HOSPADM

## 2017-05-02 RX ORDER — TRAZODONE HYDROCHLORIDE 50 MG/1
50 TABLET ORAL NIGHTLY PRN
Status: DISCONTINUED | OUTPATIENT
Start: 2017-05-02 | End: 2017-05-06 | Stop reason: HOSPADM

## 2017-05-02 RX ORDER — HYDROXYCHLOROQUINE SULFATE 200 MG/1
200 TABLET, FILM COATED ORAL 2 TIMES DAILY
Status: DISCONTINUED | OUTPATIENT
Start: 2017-05-02 | End: 2017-05-06 | Stop reason: HOSPADM

## 2017-05-02 RX ORDER — DIAZEPAM 5 MG/1
5 TABLET ORAL
Status: DISCONTINUED | OUTPATIENT
Start: 2017-05-02 | End: 2017-05-02

## 2017-05-02 RX ORDER — VALSARTAN 80 MG/1
320 TABLET ORAL DAILY
Status: DISCONTINUED | OUTPATIENT
Start: 2017-05-03 | End: 2017-05-06 | Stop reason: HOSPADM

## 2017-05-02 RX ORDER — PANTOPRAZOLE SODIUM 40 MG/1
40 TABLET, DELAYED RELEASE ORAL DAILY
Status: DISCONTINUED | OUTPATIENT
Start: 2017-05-03 | End: 2017-05-06 | Stop reason: HOSPADM

## 2017-05-02 RX ORDER — VALSARTAN 320 MG/1
TABLET ORAL
Qty: 90 TABLET | Refills: 2 | Status: SHIPPED | OUTPATIENT
Start: 2017-05-02 | End: 2018-05-02 | Stop reason: SDUPTHER

## 2017-05-02 RX ORDER — DIPHENHYDRAMINE HCL 50 MG
50 CAPSULE ORAL ONCE
Status: COMPLETED | OUTPATIENT
Start: 2017-05-02 | End: 2017-05-02

## 2017-05-02 RX ORDER — SODIUM CHLORIDE 9 MG/ML
INJECTION, SOLUTION INTRAVENOUS CONTINUOUS
Status: DISCONTINUED | OUTPATIENT
Start: 2017-05-02 | End: 2017-05-02

## 2017-05-02 RX ORDER — AMLODIPINE BESYLATE 10 MG/1
10 TABLET ORAL DAILY
Status: DISCONTINUED | OUTPATIENT
Start: 2017-05-03 | End: 2017-05-06 | Stop reason: HOSPADM

## 2017-05-02 RX ORDER — HYDROCODONE BITARTRATE AND ACETAMINOPHEN 10; 325 MG/1; MG/1
1 TABLET ORAL EVERY 6 HOURS PRN
Status: DISCONTINUED | OUTPATIENT
Start: 2017-05-02 | End: 2017-05-06 | Stop reason: HOSPADM

## 2017-05-02 RX ORDER — ATORVASTATIN CALCIUM 40 MG/1
80 TABLET, FILM COATED ORAL DAILY
Status: DISCONTINUED | OUTPATIENT
Start: 2017-05-03 | End: 2017-05-06 | Stop reason: HOSPADM

## 2017-05-02 RX ORDER — EZETIMIBE 10 MG/1
10 TABLET ORAL DAILY
Status: DISCONTINUED | OUTPATIENT
Start: 2017-05-03 | End: 2017-05-06 | Stop reason: HOSPADM

## 2017-05-02 RX ORDER — CILOSTAZOL 100 MG/1
100 TABLET ORAL 2 TIMES DAILY
Status: DISCONTINUED | OUTPATIENT
Start: 2017-05-02 | End: 2017-05-06 | Stop reason: HOSPADM

## 2017-05-02 RX ORDER — NAPROXEN SODIUM 220 MG/1
81 TABLET, FILM COATED ORAL ONCE
Status: COMPLETED | OUTPATIENT
Start: 2017-05-02 | End: 2017-05-02

## 2017-05-02 RX ADMIN — TRAZODONE HYDROCHLORIDE 50 MG: 50 TABLET ORAL at 11:05

## 2017-05-02 RX ADMIN — SODIUM CHLORIDE: 9 INJECTION, SOLUTION INTRAVENOUS at 08:05

## 2017-05-02 RX ADMIN — SODIUM CHLORIDE: 0.9 INJECTION, SOLUTION INTRAVENOUS at 04:05

## 2017-05-02 RX ADMIN — Medication 50 MG: at 08:05

## 2017-05-02 RX ADMIN — NAPROXEN SODIUM 81 MG: 220 TABLET, FILM COATED ORAL at 08:05

## 2017-05-02 RX ADMIN — DIAZEPAM 5 MG: 5 TABLET ORAL at 08:05

## 2017-05-02 NOTE — OP NOTE
INPATIENT Operative Note         SUMMARY     Surgery Date: 5/2/2017     Surgeon(s) and Role:     * Christopher Cohen MD - Primary    ASSISTANT:Tayler Sanabria    Pre-op Diagnosis:  Atherosclerosis of artery of extremity with intermittent claudication [I70.219]  Left foot absess  Pain in both lower extremities [M79.604, M79.605]      Post-op Diagnosis:  Occluded lsfa    Procedure(s) (LRB):  AORTOGRAM WITH RUNOFF/PTA (Right)  PTA-PERIPHERAL (Right)    COMPLICATION:none    Anesthesia: RN IV Sedation    Findings/Key Components:  Occluded lsfa   Diffuse disease of the left and rt iliacs w/o any significant gardient.  3 vessel run off bilateral.    Estimated Blood Loss: < 50 ML.         SPECIMEN: NONE    Devices/Prostetics: None    PLAN:  Admit and proceed with surgery as planned.  Discussed with ANYI LOYD AND SHANTA

## 2017-05-02 NOTE — ASSESSMENT & PLAN NOTE
-Podiatry consulted  -MRI of LLE showed edema and multiple abscess  -antibiotics continued  -pt afebrile with WBC normal

## 2017-05-02 NOTE — IP AVS SNAPSHOT
[address] [place] [address]           Patient Discharge Instructions   Our goal is to set you up for success. This packet includes information on your condition, medications, and your home care.  It will help you care for yourself to prevent having to return to the hospital.     Please ask your nurse if you have any questions.      There are many details to remember when preparing to leave the hospital. Here is what you will need to do:    1. Take your medicine. If you are prescribed medications, review your Medication List on the following pages. You may have new medications to  at the pharmacy and others that you'll need to stop taking. Review the instructions for how and when to take your medications. Talk with your doctor or nurses if you are unsure of what to do.     2. Go to your follow-up appointments. Specific follow-up information is listed in the following pages. Your may be contacted by a nurse or clinical provider about future appointments. Be sure we have all of the phone numbers to reach you. Please contact your provider's office if you are unable to make an appointment.     3. Watch for warning signs. Your doctor or nurse will give you detailed warning signs to watch for and when to call for assistance. These instructions may also include educational information about your condition. If you experience any of warning signs to your health, call your doctor.               ** Verify the list of medication(s) below is accurate and up to date. Carry this with you in case of emergency. If your medications have changed, please notify your healthcare provider.             Medication List      START taking these medications        Additional Info                      clindamycin 300 MG capsule   Commonly known as:  CLEOCIN   Quantity:  40 capsule   Refills:  0   Dose:  300 mg    Instructions:  Take 1 capsule (300 mg total) by mouth 4 (four) times daily.      Begin Date    AM    Noon    PM    Bedtime         CHANGE how you take these medications        Additional Info                      cilostazol 100 MG Tab   Commonly known as:  PLETAL   Quantity:  60 tablet   Refills:  6   Dose:  100 mg   What changed:  when to take this    Last time this was given:  100 mg on 5/6/2017  9:31 AM   Instructions:  Take 1 tablet (100 mg total) by mouth 2 (two) times daily.     Begin Date    AM    Noon    PM    Bedtime       hydroxychloroquine 200 mg tablet   Commonly known as:  PLAQUENIL   Quantity:  180 tablet   Refills:  0   Dose:  200 mg   What changed:  when to take this   Comments:  **Patient requests 90 days supply**    Last time this was given:  200 mg on 5/6/2017  9:33 AM   Instructions:  Take 1 tablet (200 mg total) by mouth 2 (two) times daily.     Begin Date    AM    Noon    PM    Bedtime       valsartan 320 MG tablet   Commonly known as:  DIOVAN   Quantity:  90 tablet   Refills:  2   What changed:  See the new instructions.    Last time this was given:  320 mg on 5/6/2017  9:31 AM   Instructions:  take 1 tablet by mouth once daily     Begin Date    AM    Noon    PM    Bedtime         CONTINUE taking these medications        Additional Info                      amlodipine 10 MG tablet   Commonly known as:  NORVASC   Quantity:  30 tablet   Refills:  6    Last time this was given:  10 mg on 5/6/2017  9:31 AM   Instructions:  take 1 tablet by mouth once daily     Begin Date    AM    Noon    PM    Bedtime       atorvastatin 80 MG tablet   Commonly known as:  LIPITOR   Quantity:  90 tablet   Refills:  0   Dose:  80 mg   Comments:  **Patient requests 90 days supply**    Last time this was given:  80 mg on 5/6/2017  9:32 AM   Instructions:  Take 1 tablet (80 mg total) by mouth once daily.     Begin Date    AM    Noon    PM    Bedtime       clopidogrel 75 mg tablet   Commonly known as:  PLAVIX   Quantity:  90 tablet   Refills:  3    Last time this was given:  75 mg on 5/4/2017   8:40 AM   Instructions:  take 1 tablet by mouth once daily     Begin Date    AM    Noon    PM    Bedtime       esomeprazole 40 MG capsule   Commonly known as:  NEXIUM   Quantity:  90 capsule   Refills:  1   Dose:  40 mg   Comments:  **Patient requests 90 days supply**    Instructions:  Take 1 capsule (40 mg total) by mouth before breakfast.     Begin Date    AM    Noon    PM    Bedtime       hydrocodone-acetaminophen 10-325mg  mg Tab   Commonly known as:  NORCO   Refills:  0   Dose:   mg    Instructions:  Take  mg by mouth. 1 Tablet Oral Four times a day.  narcotic warning, contains tylenol     Begin Date    AM    Noon    PM    Bedtime       levothyroxine 137 MCG Tab tablet   Commonly known as:  SYNTHROID   Quantity:  30 tablet   Refills:  11    Last time this was given:  137 mcg on 5/6/2017  9:31 AM   Instructions:  take 1 tablet by mouth once daily BEFORE BREAKFAST     Begin Date    AM    Noon    PM    Bedtime       trazodone 50 MG tablet   Commonly known as:  DESYREL   Quantity:  30 tablet   Refills:  3   Dose:  50 mg    Last time this was given:  50 mg on 5/3/2017 10:39 PM   Instructions:  Take 1 tablet (50 mg total) by mouth nightly as needed for Insomnia.     Begin Date    AM    Noon    PM    Bedtime       ZETIA 10 mg tablet   Quantity:  90 tablet   Refills:  1   Generic drug:  ezetimibe    Last time this was given:  10 mg on 5/6/2017  9:32 AM   Instructions:  take 1 tablet by mouth once daily     Begin Date    AM    Noon    PM    Bedtime            Where to Get Your Medications      These [facility] [address] [facility]  [address] [facility]  [address] [facility] [address] [address] [address]     Phone:  [phone]     clindamycin 300 MG capsule    valsartan 320 MG tablet                  Please bring to all follow up appointments:    1. A copy of your discharge instructions.  2. All medicines you are currently taking in their  original bottles.  3. Identification and insurance card.    Please arrive 15 minutes ahead of scheduled appointment time.    Please call 24 hours in advance if you must reschedule your appointment and/or time.        Your Scheduled Appointments     May 09, 2017  7:40 AM CDT   Post OP with XIAO Burk - Podiatry ([facility])    [address] [place] [address] [phone]            May 18, 2017  3:00 PM CDT   Post OP with XIAO Burk - Podiatry ([facility])    [address]  [place] [id number] [phone]            Aug 04, 2017  8:10 AM CDT   Fasting Lab with PAM MERRITT   [facility] ([facility])    [address] [place] [id number] [phone]            Aug 07, 2017 10:40 AM CDT   Established Patient Visit with DO Pam Mayorga - Internal Medicine ([facility])    [address] [place] [id number] [phone]              Follow-up Information     Follow up with Tayler Ovalle DO In 1 week.    Specialty:  Internal Medicine    Why:  Hospital follow up    Contact information:    [address] [place] [address] DR [place] [id number] [phone]          Follow up with Melva Orellana DPM In 1 week.    Specialty:  Podiatry    Why:  Wound care and hospital follow up.    Contact information:    [address]  [place] [address] [phone]          Follow up with Bessie Cohen MD In 2 weeks.    Specialty:  Cardiology    Why:  Hospital follow up    Contact information:    [address] [place] [address] [phone]          Follow up with [facility] Call today.    Specialty:  Home Health Services    Why:  Please call your home [facility] agency when you arrive home today.     Contact information:    [address] [facility]  [address]  [place] [id number] [phone]          Discharge Instructions     Future Orders    Diet Cardiac     Weight  "bearing restrictions (specify)     Scheduling Instructions:    Non-weight bearing left foot    WHEELCHAIR FOR HOME USE     Questions:    Hours in W/C per day:  12    Type of Wheelchair:  Standard    Size(Width):  18"(STD adult)    Leg Support:  STD footrests    Arm Height:      Desired seat depth:      Back height:      Lower leg length:      Actual seat depth:      Lap Belt:  Velcro    Accessories:      Cushion:  Basic    Justification for cushion:      Height:  5' 6" (1.676 m)    Weight:  97.9 kg (215 lb 13.3 oz)    Does patient have medical equipment at home?:  none    Length of need (1-99 months):  3    Please check all that apply:  Patient mobility limitations cannot be sufficiently resolved by the use of other ambulatory therapies.      Discharge References/Attachments     ABSCESS, INCISION AND DRAINAGE (CHILD) (ENGLISH)    CHOLESTEROL, LIFESTYLE CHANGES TO CONTROL (ENGLISH)        Primary Diagnosis     Your primary diagnosis was:  Abscess Of Left Foot      Admission Information     Date & Time Provider Department CSN    5/2/2017  7:04 AM Brendon Carbone MD [facility] -  [id number]      Care Providers     Provider Role Specialty Primary office phone    Brendon Carbone MD Attending Provider Internal Medicine [phone]    Christopher Cohen MD Surgeon  Cardiology [phone]    Melva Orellana DPM Consulting Physician  Podiatry [phone]    Melva Orellana DPM Surgeon  Podiatry [phone]    Brendon Carbone MD Team Attending  Internal Medicine [phone]      Your Vitals Were     BP Pulse Temp Resp Height Weight    149/58 (BP Location: Right arm, Patient Position: Lying, BP Method: Automatic) 81 98 °F (36.7 °C) (Oral) 18 5' 6" (1.676 m) 97.9 kg (215 lb 13.3 oz)    SpO2 BMI             94% 34.84 kg/m2         Recent Lab Values        4/13/2010 9/14/2016                       10:20 AM  8:43 AM          A1C 5.9 6.2          Comment for A1C at  8:43 AM on 9/14/2016:  According to ADA " guidelines, hemoglobin A1C <7.0% represents  optimal control in non-pregnant diabetic patients.  Different  metrics may apply to specific populations.   Standards of Medical Care in Diabetes - 2016.  For the purpose of screening for the presence of diabetes:  <5.7%     Consistent with the absence of diabetes  5.7-6.4%  Consistent with increasing risk for diabetes   (prediabetes)  >or=6.5%  Consistent with diabetes  Currently no consensus exists for use of hemoglobin A1C  for diagnosis of diabetes for children.        Pending Labs     Order Current Status    Aerobic culture In process    Aerobic culture In process    Culture, Anaerobe In process    Culture, Anaerobe In process    Fungus culture In process    Fungus culture In process      Allergies as of 5/6/2017     No Known Allergies      [facility]     [facility] Nurse Care Line - 24/7 Assistance  Unless otherwise directed by your provider, please contact [facility], our nurse care line that is available for 24/7 assistance.     Registered nurses in the [facility] provide clinical advisement, health education, appointment booking, and other advisory services.  Call for this free service at [phone].        Advance Directives     An advance directive is a document which, in the event you are no longer able to make decisions for yourself, tells your healthcare team what kind of treatment you do or do not want to receive, or who you would like to make those decisions for you.  If you do not currently have an advance directive, [facility] encourages you to create one.  For more information call:  [phone]), [phone] ([phone]),  or log on to [email or web address].        Smoking Cessation     If you would like to quit smoking:   You may be eligible for free services if you are a [place] resident and started smoking cigarettes before September 1, 1988.  Call the Smoking Cessation Trust (SCT) toll free at  (243) 880-3201 or (841) 767-1496.   Call 1-800-QUIT-NOW if you do not meet the above criteria.   Contact us via email: [email or web address]   View our website for more information: [email or web address]        Language Assistance Services     ATTENTION: Language assistance services are available, free of charge. Please call [phone].      ATENCIÓN: Si habla español, tiene a márquez disposición servicios gratuitos de asistencia lingüística. Llame al [phone].     CHÚ Ý: N?u b?n nói Ti?ng Vi?t, có các d?ch v? h? tr? ngôn ng? mi?n phí dành cho b?n. G?i s? [phone].        Stroke Education              [facility] Sign-Up     Activating your [facility] account is as easy as 1-2-3!     1) Visit [email or web address], select Sign Up Now, enter this activation code and your date of birth, then select Next.  [email or web address]  Expires: 5/26/2017 10:21 AM      2) Create a username and password to use when you visit [facility] in the future and select a security question in case you lose your password and select Next.    3) Enter your e-mail address and click Sign Up!    Additional Information  If you have questions, please e-mail [email or web address] or call [phone] to talk to our [facility] staff. Remember, [facility] is NOT to be used for urgent needs. For medical emergencies, dial 911.          [facility] complies with applicable Federal civil rights laws and does not discriminate on the basis of race, color, national origin, age, disability, or sex.

## 2017-05-02 NOTE — SUBJECTIVE & OBJECTIVE
Interval History: Angiogram with run off completed by Dr. Cohen (Cardiology) on LLE.  No intervention performed.  Dr. Alejandro (Podiatry) consulted for drainage of multiple abscesses present on MRI.      Review of Systems   Constitutional: Negative for activity change, appetite change, fatigue and fever.   HENT: Negative for congestion, postnasal drip, sinus pressure, sneezing and sore throat.    Eyes: Negative for redness, itching and visual disturbance.   Respiratory: Negative for cough, chest tightness, shortness of breath and wheezing.    Cardiovascular: Positive for leg swelling. Negative for chest pain and palpitations.   Gastrointestinal: Negative for abdominal distention, abdominal pain, diarrhea, nausea and vomiting.   Endocrine: Negative for cold intolerance and heat intolerance.   Genitourinary: Negative for difficulty urinating, dysuria, flank pain, frequency and urgency.   Musculoskeletal: Positive for arthralgias and myalgias. Negative for back pain.   Skin: Positive for wound. Negative for color change.   Allergic/Immunologic: Negative for environmental allergies and food allergies.   Neurological: Negative for dizziness, weakness and headaches.   Hematological: Does not bruise/bleed easily.   Psychiatric/Behavioral: Negative for confusion and sleep disturbance. The patient is not nervous/anxious.      Objective:     Vital Signs (Most Recent):  Temp: 98.9 °F (37.2 °C) (05/02/17 1619)  Pulse: (!) 57 (05/02/17 1619)  Resp: 20 (05/02/17 1619)  BP: (!) 158/73 (05/02/17 1619)  SpO2: 97 % (05/02/17 1619) Vital Signs (24h Range):  Temp:  [97.5 °F (36.4 °C)-98.9 °F (37.2 °C)] 98.9 °F (37.2 °C)  Pulse:  [48-69] 57  Resp:  [13-20] 20  SpO2:  [92 %-97 %] 97 %  BP: ()/() 158/73     Weight: 97.9 kg (215 lb 13.3 oz)  Body mass index is 34.84 kg/(m^2).  No intake or output data in the 24 hours ending 05/02/17 1703   Physical Exam   Constitutional: She is oriented to person, place, and time. She appears  well-developed and well-nourished.   HENT:   Head: Normocephalic.   Nose: Nose normal.   Mouth/Throat: Oropharynx is clear and moist.   Eyes: Conjunctivae and EOM are normal.   Neck: Normal range of motion. Neck supple.   Cardiovascular: Normal rate, regular rhythm and intact distal pulses.  Exam reveals no friction rub.    No murmur heard.  Pulses:       Dorsalis pedis pulses are 1+ on the right side, and 1+ on the left side.        Posterior tibial pulses are 1+ on the right side, and 1+ on the left side.   Pulmonary/Chest: Effort normal. No respiratory distress. She has wheezes.   Abdominal: Soft. Bowel sounds are normal. She exhibits no distension. There is no tenderness.   Musculoskeletal: Normal range of motion.   Neurological: She is alert and oriented to person, place, and time.   Skin: Skin is warm and dry.   Healing wounds to bottom of feet.  Skin to BLE with multiple areas of discoloration   Psychiatric: She has a normal mood and affect. Her behavior is normal. Thought content normal.       Significant Labs: CBC: No results for input(s): WBC, HGB, HCT, PLT in the last 48 hours.  CMP: No results for input(s): NA, K, CL, CO2, GLU, BUN, CREATININE, CALCIUM, PROT, ALBUMIN, BILITOT, ALKPHOS, AST, ALT, ANIONGAP, EGFRNONAA in the last 48 hours.    Invalid input(s): ESTGF[place]FR[place]    Significant Imaging:   Imaging Results         IR Heart Cath Images (In process)

## 2017-05-02 NOTE — NURSING TRANSFER
Nursing Transfer Note      5/2/2017     Transfer To: 231    Transfer via stretcher    Transfer with NONE    Transported by KENJI CANDELARIA RN    Medicines sent: NONE    Chart send with patient: Yes    Notified: JONG HORTON    Patient reassessed at: TRANSFERRED TO ROOM. TRANSFERRED TO BED.  TELEMETRY MONITER ON.  IV FLUIDS ON PUMP AT PRESCRIBED RATE.  PROCEDURAL ACCESS SITE WITHOUT BLEEDING OR HEMATOMA.  DRESSING DRY AND INTACT.  CARE HANDED OFF TO [facility] 5/2/17 1540.......... (date, time)    Upon arrival to floor: cardiac monitor applied, patient oriented to room, call bell in reach and bed in lowest position

## 2017-05-02 NOTE — INTERVAL H&P NOTE
The patient has been examined and the H&P has been reviewed:    I concur with the findings and no changes have occurred since H&P was written.  Has multi[ple abcess in the left foot needing drainage needing revascularization.  Anesthesia/Surgery risks, benefits and alternative options discussed and understood by patient/family.  I have explained the risks, benefits , and alternatives of the procedure in detail.the patient voices understanding and all questions have been answered.the patient agrees to proceed as planned.          Active Hospital Problems    Diagnosis  POA    *Atherosclerotic PVD with intermittent claudication [I73.9]  Yes      Resolved Hospital Problems    Diagnosis Date Resolved POA   No resolved problems to display.

## 2017-05-02 NOTE — PROGRESS NOTES
[facility] Hospital Medicine  Progress Note    Patient Name: Flory Cam  MRN: [id number]  Patient Class: OP- Outpatient Recovery   Admission Date: 5/2/2017  Length of Stay: 0 days  Attending Physician: Christopher Cohen MD  Primary Care Provider: Tayler Ovalle DO        Subjective:     Principal Problem:Atherosclerotic PVD with intermittent claudication    HPI:  Flory Cam is a 71 y.o. female who presents with PMhx of Coronary Artery Disease s/p CABG, Peripheral Vascular Disease; Hyperlipidemia; SLE discoid lupus; and Hypertension.  Pt initially seen by Dr. Alejandro (Podiatry) for left foot abscess.  Abscess drained by Podiatry outpatient and pt placed on antibiotics due to remaining presence on MRI.  Vascular studies completed with patient referred to Cardiology.  Stenosis, SFA occlusion, and left VIPIN of 0.57 noted per Cardiology.  MRI of left lower extremity showed edema and several abscess.  Angiogram with run off completed per Cardiology today.  Pt admitted to Observation Unit from [facility] and care transferred to Hospital Medicine at the request of Cardiology for further management and evaluation.  Pt maintains compliance with prescribed medications. Pt continues to smoke cigarettes.                  Hospital Course:       Interval History: Angiogram with run off completed by Dr. Cohen (Cardiology) on LLE.  No intervention performed.  Dr. Alejandro (Podiatry) consulted for drainage of multiple abscesses present on MRI.      Review of Systems   Constitutional: Negative for activity change, appetite change, fatigue and fever.   HENT: Negative for congestion, postnasal drip, sinus pressure, sneezing and sore throat.    Eyes: Negative for redness, itching and visual disturbance.   Respiratory: Negative for cough, chest tightness, shortness of breath and wheezing.    Cardiovascular: Positive for leg swelling. Negative for chest pain and palpitations.   Gastrointestinal: Negative for  abdominal distention, abdominal pain, diarrhea, nausea and vomiting.   Endocrine: Negative for cold intolerance and heat intolerance.   Genitourinary: Negative for difficulty urinating, dysuria, flank pain, frequency and urgency.   Musculoskeletal: Positive for arthralgias and myalgias. Negative for back pain.   Skin: Positive for wound. Negative for color change.   Allergic/Immunologic: Negative for environmental allergies and food allergies.   Neurological: Negative for dizziness, weakness and headaches.   Hematological: Does not bruise/bleed easily.   Psychiatric/Behavioral: Negative for confusion and sleep disturbance. The patient is not nervous/anxious.      Objective:     Vital Signs (Most Recent):  Temp: 98.9 °F (37.2 °C) (05/02/17 1619)  Pulse: (!) 57 (05/02/17 1619)  Resp: 20 (05/02/17 1619)  BP: (!) 158/73 (05/02/17 1619)  SpO2: 97 % (05/02/17 1619) Vital Signs (24h Range):  Temp:  [97.5 °F (36.4 °C)-98.9 °F (37.2 °C)] 98.9 °F (37.2 °C)  Pulse:  [48-69] 57  Resp:  [13-20] 20  SpO2:  [92 %-97 %] 97 %  BP: ()/() 158/73     Weight: 97.9 kg (215 lb 13.3 oz)  Body mass index is 34.84 kg/(m^2).  No intake or output data in the 24 hours ending 05/02/17 1703   Physical Exam   Constitutional: She is oriented to person, place, and time. She appears well-developed and well-nourished.   HENT:   Head: Normocephalic.   Nose: Nose normal.   Mouth/Throat: Oropharynx is clear and moist.   Eyes: Conjunctivae and EOM are normal.   Neck: Normal range of motion. Neck supple.   Cardiovascular: Normal rate, regular rhythm and intact distal pulses.  Exam reveals no friction rub.    No murmur heard.  Pulses:       Dorsalis pedis pulses are 1+ on the right side, and 1+ on the left side.        Posterior tibial pulses are 1+ on the right side, and 1+ on the left side.   Pulmonary/Chest: Effort normal. No respiratory distress. She has wheezes.   Abdominal: Soft. Bowel sounds are normal. She exhibits no distension. There  is no tenderness.   Musculoskeletal: Normal range of motion.   Neurological: She is alert and oriented to person, place, and time.   Skin: Skin is warm and dry.   Healing wounds to bottom of feet.  Skin to BLE with multiple areas of discoloration   Psychiatric: She has a normal mood and affect. Her behavior is normal. Thought content normal.       Significant Labs: CBC: No results for input(s): WBC, HGB, HCT, PLT in the last 48 hours.  CMP: No results for input(s): NA, K, CL, CO2, GLU, BUN, CREATININE, CALCIUM, PROT, ALBUMIN, BILITOT, ALKPHOS, AST, ALT, ANIONGAP, EGFRNONAA in the last 48 hours.    Invalid input(s): ESTGFAFR[place]    Significant Imaging:   Imaging Results         IR Heart Cath Images (In process)         Assessment/Plan:      * Atherosclerotic PVD with intermittent claudication  -Pt admitted to Observation Unit  -s/p Angiogram   -Pletal continued  -Podiatry consulted  -neurovascular checks  -assess vascular site and pedal pulses  -analgesia prn       HLD (hyperlipidemia)  -Statin and Zetia continued      HTN (hypertension)  -home medications resumed      Hypothyroid  -Synthroid continued      Discoid lupus  -Plaquenil continued    Tobacco use  -pt counseled on smoking cessation   -Duonebs prn     CAD (coronary artery disease)  ASA and Plavix continued      Foot abscess, left  -Podiatry consulted  -MRI of LLE showed edema and multiple abscess  -antibiotics continued  -pt afebrile with WBC normal      VTE Risk Mitigation     None          Lisbeth Mercado NP  Department of Hospital Medicine   [facility]

## 2017-05-02 NOTE — ASSESSMENT & PLAN NOTE
-Pt admitted to Observation Unit  -s/p Angiogram   -Pletal continued  -Podiatry consulted  -neurovascular checks  -assess vascular site and pedal pulses  -analgesia prn

## 2017-05-02 NOTE — PLAN OF CARE
Problem: Patient Care Overview  Goal: Plan of Care Review  Outcome: Ongoing (interventions implemented as appropriate)  Patient remains free from falls, fall precaution in place. Bed rest.   VS stable. R groin site clean, dry, intact.  Angiogram unsucessful.  No other C/O at this time.Call bell and belongings within reach, reminded to call for assistance.

## 2017-05-03 ENCOUNTER — TELEPHONE (OUTPATIENT)
Dept: PODIATRY | Facility: CLINIC | Age: 72
End: 2017-05-03

## 2017-05-03 LAB
ANION GAP SERPL CALC-SCNC: 7 MMOL/L
BASOPHILS # BLD AUTO: 0.02 K/UL
BASOPHILS NFR BLD: 0.3 %
BUN SERPL-MCNC: 19 MG/DL
CALCIUM SERPL-MCNC: 8.6 MG/DL
CHLORIDE SERPL-SCNC: 110 MMOL/L
CO2 SERPL-SCNC: 24 MMOL/L
CREAT SERPL-MCNC: 0.8 MG/DL
DIFFERENTIAL METHOD: ABNORMAL
EOSINOPHIL # BLD AUTO: 0.2 K/UL
EOSINOPHIL NFR BLD: 2.9 %
ERYTHROCYTE [DISTWIDTH] IN BLOOD BY AUTOMATED COUNT: 14.8 %
EST. GFR  (AFRICAN AMERICAN): >60 ML/MIN/1.73 M^2
EST. GFR  (NON AFRICAN AMERICAN): >60 ML/MIN/1.73 M^2
GLUCOSE SERPL-MCNC: 106 MG/DL
HCT VFR BLD AUTO: 40.2 %
HGB BLD-MCNC: 13.2 G/DL
LYMPHOCYTES # BLD AUTO: 2.4 K/UL
LYMPHOCYTES NFR BLD: 32.8 %
MCH RBC QN AUTO: 30.8 PG
MCHC RBC AUTO-ENTMCNC: 32.8 %
MCV RBC AUTO: 94 FL
MONOCYTES # BLD AUTO: 0.6 K/UL
MONOCYTES NFR BLD: 8.8 %
NEUTROPHILS # BLD AUTO: 4 K/UL
NEUTROPHILS NFR BLD: 55.2 %
PLATELET # BLD AUTO: 217 K/UL
PMV BLD AUTO: 10.6 FL
POTASSIUM SERPL-SCNC: 4.1 MMOL/L
RBC # BLD AUTO: 4.29 M/UL
SODIUM SERPL-SCNC: 141 MMOL/L
WBC # BLD AUTO: 7.17 K/UL

## 2017-05-03 PROCEDURE — 63600175 PHARM REV CODE 636 W HCPCS: Performed by: INTERNAL MEDICINE

## 2017-05-03 PROCEDURE — 94761 N-INVAS EAR/PLS OXIMETRY MLT: CPT

## 2017-05-03 PROCEDURE — 25000003 PHARM REV CODE 250: Performed by: INTERNAL MEDICINE

## 2017-05-03 PROCEDURE — 99223 1ST HOSP IP/OBS HIGH 75: CPT | Mod: ,,, | Performed by: PODIATRIST

## 2017-05-03 PROCEDURE — 36415 COLL VENOUS BLD VENIPUNCTURE: CPT

## 2017-05-03 PROCEDURE — 85025 COMPLETE CBC W/AUTO DIFF WBC: CPT

## 2017-05-03 PROCEDURE — 99232 SBSQ HOSP IP/OBS MODERATE 35: CPT | Mod: ,,, | Performed by: INTERNAL MEDICINE

## 2017-05-03 PROCEDURE — 80048 BASIC METABOLIC PNL TOTAL CA: CPT

## 2017-05-03 RX ORDER — LINEZOLID 2 MG/ML
600 INJECTION, SOLUTION INTRAVENOUS
Status: DISCONTINUED | OUTPATIENT
Start: 2017-05-03 | End: 2017-05-06 | Stop reason: HOSPADM

## 2017-05-03 RX ORDER — METRONIDAZOLE 500 MG/100ML
500 INJECTION, SOLUTION INTRAVENOUS
Status: DISCONTINUED | OUTPATIENT
Start: 2017-05-03 | End: 2017-05-06 | Stop reason: HOSPADM

## 2017-05-03 RX ORDER — CEFEPIME HYDROCHLORIDE 1 G/50ML
1 INJECTION, SOLUTION INTRAVENOUS
Status: DISCONTINUED | OUTPATIENT
Start: 2017-05-03 | End: 2017-05-06 | Stop reason: HOSPADM

## 2017-05-03 RX ADMIN — PANTOPRAZOLE SODIUM 40 MG: 40 TABLET, DELAYED RELEASE ORAL at 08:05

## 2017-05-03 RX ADMIN — LINEZOLID 600 MG: 600 INJECTION, SOLUTION INTRAVENOUS at 10:05

## 2017-05-03 RX ADMIN — AMLODIPINE BESYLATE 10 MG: 10 TABLET ORAL at 08:05

## 2017-05-03 RX ADMIN — ATORVASTATIN CALCIUM 80 MG: 40 TABLET, FILM COATED ORAL at 08:05

## 2017-05-03 RX ADMIN — TRAZODONE HYDROCHLORIDE 50 MG: 50 TABLET ORAL at 10:05

## 2017-05-03 RX ADMIN — METRONIDAZOLE 500 MG: 500 INJECTION, SOLUTION INTRAVENOUS at 08:05

## 2017-05-03 RX ADMIN — CLOPIDOGREL BISULFATE 75 MG: 75 TABLET ORAL at 08:05

## 2017-05-03 RX ADMIN — CEFEPIME HYDROCHLORIDE 1 G: 1 INJECTION, POWDER, FOR SOLUTION INTRAMUSCULAR; INTRAVENOUS at 08:05

## 2017-05-03 RX ADMIN — CILOSTAZOL 100 MG: 100 TABLET ORAL at 08:05

## 2017-05-03 RX ADMIN — LEVOTHYROXINE SODIUM 137 MCG: 25 TABLET ORAL at 08:05

## 2017-05-03 RX ADMIN — VALSARTAN 320 MG: 80 TABLET, FILM COATED ORAL at 08:05

## 2017-05-03 RX ADMIN — EZETIMIBE 10 MG: 10 TABLET ORAL at 08:05

## 2017-05-03 RX ADMIN — HYDROXYCHLOROQUINE SULFATE 200 MG: 200 TABLET, FILM COATED ORAL at 08:05

## 2017-05-03 NOTE — ASSESSMENT & PLAN NOTE
-Podiatry on board  -MRI of LLE showed edema and multiple abscess  -antibiotics continued  -pt afebrile with WBC normal  -Surgery on Friday per Podiatrist

## 2017-05-03 NOTE — TELEPHONE ENCOUNTER
----- Message from Cris Garcia sent at 5/3/2017  9:42 AM CDT -----  Contact: pt  Pt requests nurse to call her at [phone]  in reference to Dr Orellana visiting her in the hospital today.

## 2017-05-03 NOTE — CONSULTS
PODIATRY CONSULTATION/ H&P NOTE  5/3/2017    CONSULTING PHYSICIAN Dr. Cohen    REASON FOR CONSULTATION multiple abscess of left foot     CHIEF COMPLAINT left foot infection     HISTORY OF PRESENT ILLNESS    Flory Cam is a 71 y.o. female  w/ PMH of PVD, CAD, Lupus  and other multiple medical co-morbidities, who was admitted to the hospital for PVD and left foot infection. Pt has had MRI performed in which was positive for abscess in left foot. She did undergo bedside drainage in my clinic on 4/11/17. Cultures to date reveal negative bacteria. Pt has severe PVD and was taken into surgery by Dr. Cohen on 5/2/17, Aortogram with runoff PTA Right, PTA Peripheral on RIGHT. Of note, patient has occluded LSFA, diffuse disease of left and right illiacs, and 3 vessel run off. Discussed case with  who notes patient should have adequate perfusion to heal incision and drainage of left foot. Pt has been admitted for IV antibiosis. Surgery is scheduled Friday, 5/5/17. Pt seen at bedside this AM. She notes pain has decreased since bedside debridements. She notes mild pain with ambulation/direct pressure. No further pedal complaints.    PMH   Past Medical History:   Diagnosis Date    Acute coronary syndrome     Anxiety     Bilateral carotid artery stenosis 11/17/2015    Chronic pain     Coronary artery disease     GERD (gastroesophageal reflux disease)     Hyperlipidemia     Hypertension     Hypothyroidism     Low back pain     Lupus     Osteoporosis     Poor circulation     PVD (peripheral vascular disease)     S/P peripheral artery angioplasty 02/13/2014    Skin disease         MEDS  Current Facility-Administered Medications   Medication Dose Route Frequency Provider Last Rate Last Dose    albuterol-ipratropium 2.5mg-0.5mg/3mL nebulizer solution 3 mL  3 mL Nebulization Q6H PRN Lisbeth Mercado NP        amlodipine tablet 10 mg  10 mg Oral Daily Christopher Cohen MD   10 mg at 05/03/17 0812     atorvastatin tablet 80 mg  80 mg Oral Daily Christopher Cohen MD   80 mg at 05/03/17 0811    cilostazol tablet 100 mg  100 mg Oral BID Christopher Cohen MD   100 mg at 05/03/17 0811    clopidogrel tablet 75 mg  75 mg Oral Daily Christopher Cohen MD   75 mg at 05/03/17 0810    ezetimibe tablet 10 mg  10 mg Oral Daily Christopher Cohen MD   10 mg at 05/03/17 0811    hydrocodone-acetaminophen 10-325mg per tablet 1 tablet  1 tablet Oral Q6H PRN Christopher Cohen MD        hydroxychloroquine tablet 200 mg  200 mg Oral BID Christopher Cohen MD   200 mg at 05/03/17 0812    levothyroxine tablet 137 mcg  137 mcg Oral Daily Christopher Cohen MD   137 mcg at 05/03/17 0812    pantoprazole EC tablet 40 mg  40 mg Oral Daily Christopher Cohen MD   40 mg at 05/03/17 0811    trazodone tablet 50 mg  50 mg Oral Nightly PRN Christopher Cohen MD   50 mg at 05/02/17 2315    valsartan tablet 320 mg  320 mg Oral Daily Christopher Cohen MD   320 mg at 05/03/17 0810       Current Facility-Administered Medications   Medication    albuterol-ipratropium 2.5mg-0.5mg/3mL nebulizer solution 3 mL    amlodipine tablet 10 mg    atorvastatin tablet 80 mg    cilostazol tablet 100 mg    clopidogrel tablet 75 mg    ezetimibe tablet 10 mg    hydrocodone-acetaminophen 10-325mg per tablet 1 tablet    hydroxychloroquine tablet 200 mg    levothyroxine tablet 137 mcg    pantoprazole EC tablet 40 mg    trazodone tablet 50 mg    valsartan tablet 320 mg       VITAL SIGNS (Last 24H):  Temp:  [97.5 °F (36.4 °C)-98.9 °F (37.2 °C)]   Pulse:  [48-66]   Resp:  [13-20]   BP: ()/()   SpO2:  [92 %-100 %]     PSH   Past Surgical History:   Procedure Laterality Date    COLONOSCOPY N/A 1/4/2017    Procedure: COLONOSCOPY;  Surgeon: Sylvester Arboleda III, MD;  Location: [facility];  Service: Endoscopy;  Laterality: N/A;    CORONARY ANGIOPLASTY      CORONARY ARTERY BYPASS GRAFT      HYSTERECTOMY      THYROIDECTOMY          ALL  Review of patient's allergies  "indicates:  No Known Allergies     SOC    Social History   Substance Use Topics    Smoking status: Former Smoker     Packs/day: 0.25     Years: 40.00     Start date:      Quit date: 2016    Smokeless tobacco: Never Used    Alcohol use No       Family HX  Family History   Problem Relation Age of Onset    Melanoma Neg Hx     Psoriasis Neg Hx     Lupus Neg Hx     Eczema Neg Hx           OBJECTIVE  Temp (24hrs), Av.3 °F (36.8 °C), Min:97.5 °F (36.4 °C), Max:98.9 °F (37.2 °C)    Vitals:    17 1145 17 1200 17 1215 17 1230   BP: 137/66 (!) 136/58 (!) 142/66 91/68   Pulse: (!) 51 (!) 50 (!) 49 (!) 50   Resp: 13 14 15 16   Temp:       TempSrc:       SpO2: (!) 93% 96% (!) 93% 95%   Weight:       Height:        17 1245 17 1250 17 1300 17 1315   BP: 116/86 (!) 129/57 (!) 142/66 137/64   Pulse: (!) 53 (!) 52 (!) 49 (!) 49   Resp: 18 18 15 15   Temp:       TempSrc:       SpO2: (!) 92% (!) 94% (!) 93% 97%   Weight:       Height:        17 1330 17 1400 17 1619 17   BP: (!) 157/130 (!) 161/70 (!) 158/73 (!) 162/76   Pulse: (!) 48 61 (!) 57 60   Resp: 15 17 20 18   Temp:   98.9 °F (37.2 °C) 98.4 °F (36.9 °C)   TempSrc:   Oral Oral   SpO2: 95% (!) 94% 97% 96%   Weight:   97.9 kg (215 lb 13.3 oz)    Height:   5' 6" (1.676 m)     17 0049 17 0415 17 0701   BP: (!) 152/74  (!) 151/68   Pulse: (!) 57 66 (!) 58   Resp:    Temp: 98.4 °F (36.9 °C) 98.3 °F (36.8 °C) 98.1 °F (36.7 °C)   TempSrc: Oral Oral Oral   SpO2: (!) 94% 100% 98%   Weight:      Height:          VITAL SIGNS (Last 24H):  Temp:  [97.5 °F (36.4 °C)-98.9 °F (37.2 °C)]   Pulse:  [48-66]   Resp:  [13-20]   BP: ()/()   SpO2:  [92 %-100 %]     GENERAL  -14 point ROS negative other than stated in HPI  - Pleasant female with no apparent distress    LOWER EXTREMITY  Vascular:   · DP pedal pulse 0/4 b/l, PT pedal pulse 1/4 RIGHT  · Skin temperature warm " to warm from prox to distally  · CFT <5 secs b/l  · There is LE edema noted b/l.      Dermatologic:   · Thickened, dystrophic, elongated toenails with subungal debris 1-5 b/l.   · Webspaces are C/D/I B/L.  · Left foot hyperkeratotic tissue distal shaft 4th/5th metatarsal, post debridement sanguineous drainage   · Skin texture and turgor dry with hyperkeratosis diffusely b/l plantar heel   · There is no pedal hair growth noted     Neurologic:  · Vibratory sensation diminished at level of Hallux IPJ b/l   · Protective sensation absent at 0/10 sites upon examination with [place] 5.07 g monofilament.   · Propioception intact at 1st MTPJ b/l.   · Achilles and patellar deep tendon reflexes intact  · Babinski reflex absent b/l. Light touch and sharp/dull sensation intact b/l.     Musculoskeletal/Orthopedic:  · No symptomatic structural abnormalities noted.   · Muscle strength is 5/5 for foot inverters, everters, plantarflexors, and dorsiflexors. Muscle tone is normal.  · Pain free range of motion in all four quadrants with stiffness and limitation b/l  · PAIN with palpation of callus plantar medial arch, LEFT    SELECTED RESULTS  Recent Labs      05/02/17   1819  05/03/17   0515   NA  143  141   K  4.3  4.1   CL  108  110   CO2  27  24   BUN  16  19   ALT  18   --    AST  24   --      Lab Results   Component Value Date     05/03/2017    K 4.1 05/03/2017     05/03/2017    CO2 24 05/03/2017     Lab Results   Component Value Date    WBC 7.17 05/03/2017    HGB 13.2 05/03/2017    HCT 40.2 05/03/2017    MCV 94 05/03/2017     05/03/2017      [unfilled]  Lab Results   Component Value Date    SEDRATE 20 04/11/2017     Lab Results   Component Value Date    CRP 4.0 10/26/2016     Lab Results   Component Value Date    HGBA1C 6.2 09/14/2016       Reviewed and noted in plan where applicable. Please see chart for full laboratory data.    No results for input(s): CPK, CPKMB, TROPONINI, MB in the last 24  hours. No results for input(s): POCTGLUCOSE in the last 24 hours.     Lab Results   Component Value Date    INR 1.0 04/28/2017    INR 0.9 07/26/2007       Lab Results   Component Value Date    WBC 7.17 05/03/2017    HGB 13.2 05/03/2017    HCT 40.2 05/03/2017    MCV 94 05/03/2017     05/03/2017       BMP    Recent Labs  Lab 05/03/17  0515         K 4.1      CO2 24   BUN 19   CREATININE 0.8   CALCIUM 8.6*       INTAKE & OUTPUT (Last 24H):    Intake/Output Summary (Last 24 hours) at 05/03/17 1053  Last data filed at 05/03/17 0600   Gross per 24 hour   Intake           651.67 ml   Output                0 ml   Net           651.67 ml       RESULTS OF DIAGNOSTIC STUDIES  Imaging Results         IR Heart Cath Images (In process)           MRI:   MRI of the left foot without and with IV contrast    Clinical History: L03.116 Cellulitis of left lower limb; I73.9 Peripheral vascular disease, unspecified    Technique: Standard foot MRI protocol without and with IV contrast was performed.       Finding: There are several ulcers in the soft tissue volar to the fourth and fifth metatarsals. There are several fluid collections adjacent to the ulcer volar to the fourth metatarsal. The largest one measures 11 mm in size and is located in the subcutaneous fat adjacent to the ulcer. There is a mild amount of edema in the proximal portion of the first metatarsal. This edema appears to be secondary to a nondisplaced fracture. There is no dislocation. There is a mild amount of edema in the lateral aspect of the medial cuneiform. There is a minimal amount of edema in the anterior aspect of the navicular bone. The visualized joints of the left foot and ankle have a normal amount of fluid within them. The visualized portions of the tendons and ligaments are normal in appearance.   Impression     1. There are several ulcers in the soft tissue volar to the fourth and fifth metatarsals. There are several fluid  collections adjacent to the ulcer volar to the fourth metatarsal. These are characteristic of abscesses. The largest one measures 11 mm in size and is located in the subcutaneous fat adjacent to the ulcer.   2. There is a mild amount of edema in the proximal portion of the first metatarsal. This edema appears to be secondary to a nondisplaced fracture.   3. There is a mild amount of edema in the lateral aspect of the medial cuneiform. There is a minimal amount of edema in the anterior aspect of the navicular bone. This is characteristic of degenerative changes.  4. There is no suspected acute osteomyelitis.         MICRO  Aerobic:  Anaerobic:  Gram Stain:  Pathology:  Blood Cx:  Antibiosis:    ASSESSMENT  Flory Cam is a 71 y.o. female who presents with LEFT foot abscess,PVD, s/p aortogram w/runoff/PTA (Right) and PTA-peripheral (Right), DOS 5/2/17.      PLAN  · Discussed with Pt the nature and prognosis of, and the therapeutic/surgical options if indicated for Pt's condition.  · IV antibiotics- broad spectrum while in house  · Plan for surgery Incision and drainage, Friday 5/5/17.NPO after midnight 5/5/17  Informed Consent has been given to the patient. The patient understands the surgery, procedure, risks, alternatives and complications, including but not limited to reaction to medication/anesthetics, bleeding, infection, continuous pain, incomplete pain relief, worse pain, complex regional pain syndrome/RSD, failure to relieve pain, incomplete bone and soft tissue healing, numbness, nerve damage, prolonged or incomplete correction, recurrence, foot/ankle deterioration, new deformity, permanent edema, nerve or vascular damage, blood clots, pulmonary embolism, scarring, instability, limb length inequality, problems with motion and strength, failure of implants/fixation (if applicable), the possible need for more extensive surgery, the possible need for future surgery, and the distant possibility of  myocardial infarction, stroke, loss of limb/life. We also discussed the expected benefits and alternatives of the procedure, including the consequences of not proceeding with the surgery, as well as the expected postop course. No guarantees were given nor implied. The procedure has been fully reviewed with the patient and written informed consent has been obtained. We have discussed the perioperative expectations. Having answered these questions and understanding the risks and benefits of the surgery, patient has opted to proceed with the surgery.   · Will continue to follow     Thank you for allowing us to participate in the care of this patient. Feel free to contact if any further questions or assistance needed.    Report Electronically Signed By:  Melva Orellana DPM   Podiatric Medicine & Surgery  [facility]  5/3/2017

## 2017-05-03 NOTE — PLAN OF CARE
Problem: Patient Care Overview  Goal: Plan of Care Review  Outcome: Ongoing (interventions implemented as appropriate)  Plan of care reviewed with patient and ongoing

## 2017-05-03 NOTE — PROGRESS NOTES
[facility] Hospital Medicine  Progress Note    Patient Name: Flory Cam  MRN: [id number]  Patient Class: OP- Outpatient Recovery   Admission Date: 5/2/2017  Length of Stay: 0 days  Attending Physician: Christopher Cohen MD  Primary Care Provider: Tayler Ovalle DO        Subjective:     Principal Problem:Atherosclerotic PVD with intermittent claudication    HPI:  Flory Cam is a 71 y.o. female who presents with PMhx of Coronary Artery Disease s/p CABG, Peripheral Vascular Disease; Hyperlipidemia; SLE discoid lupus; and Hypertension.  Pt initially seen by Dr. Alejandro (Podiatry) for left foot abscess.  Abscess drained by Podiatry outpatient and pt placed on antibiotics due to remaining presence on MRI.  Vascular studies completed with patient referred to Cardiology.  Stenosis, SFA occlusion, and left VIPIN of 0.57 noted per Cardiology.  MRI of left lower extremity showed edema and several abscess.  Angiogram with run off completed per Cardiology today.  Pt admitted to Observation Unit from [facility] and care transferred to Hospital Medicine at the request of Cardiology for further management and evaluation.  Pt maintains compliance with prescribed medications. Pt continues to smoke cigarettes.                  Hospital Course:  72 y/o aaf admitted to the hospital for PVD and left foot infection. Pt has had MRI performed in which was positive for abscess in left foot. She did undergo bedside drainage by Dr Orellana on 4/11/17. Cultures to date reveal negative bacteria. Pt has severe PVD and was taken into surgery by Dr. Cohen on 5/2/17, Aortogram with runoff PTA Right, PTA Peripheral on RIGHT. Patient has occluded LSFA, diffuse disease of left and right illiacs, and 3 vessel run off. Surgery is scheduled Friday, 5/5/17 by Dr Orellana.     Interval History: Pt was seen and examined at bedside . She denies any complaint  For today . No acute event overnight .     Review of Systems    Constitutional: Negative for activity change, appetite change, fatigue and fever.   HENT: Negative for congestion, postnasal drip, sinus pressure, sneezing and sore throat.    Eyes: Negative for redness, itching and visual disturbance.   Respiratory: Negative for cough, chest tightness, shortness of breath and wheezing.    Cardiovascular: Positive for leg swelling. Negative for chest pain and palpitations.   Gastrointestinal: Negative for abdominal distention, abdominal pain, diarrhea, nausea and vomiting.   Endocrine: Negative for cold intolerance and heat intolerance.   Genitourinary: Negative for difficulty urinating, dysuria, flank pain, frequency and urgency.   Musculoskeletal: Positive for arthralgias and myalgias. Negative for back pain.   Skin: Positive for wound. Negative for color change.   Allergic/Immunologic: Negative for environmental allergies and food allergies.   Neurological: Negative for dizziness, weakness and headaches.   Hematological: Does not bruise/bleed easily.   Psychiatric/Behavioral: Negative for confusion and sleep disturbance. The patient is not nervous/anxious.      Objective:     Vital Signs (Most Recent):  Temp: 98.1 °F (36.7 °C) (05/03/17 0701)  Pulse: (!) 58 (05/03/17 0701)  Resp: 18 (05/03/17 0701)  BP: (!) 151/68 (05/03/17 0701)  SpO2: 98 % (05/03/17 0701) Vital Signs (24h Range):  Temp:  [98.1 °F (36.7 °C)-98.9 °F (37.2 °C)] 98.1 °F (36.7 °C)  Pulse:  [48-66] 58  Resp:  [13-20] 18  SpO2:  [92 %-100 %] 98 %  BP: ()/() 151/68     Weight: 97.9 kg (215 lb 13.3 oz)  Body mass index is 34.84 kg/(m^2).    Intake/Output Summary (Last 24 hours) at 05/03/17 1143  Last data filed at 05/03/17 0600   Gross per 24 hour   Intake           651.67 ml   Output                0 ml   Net           651.67 ml      Physical Exam   Constitutional: She is oriented to person, place, and time. She appears well-developed and well-nourished.   HENT:   Head: Normocephalic.   Nose: Nose normal.    Mouth/Throat: Oropharynx is clear and moist.   Eyes: Conjunctivae and EOM are normal.   Neck: Normal range of motion. Neck supple.   Cardiovascular: Normal rate, regular rhythm and intact distal pulses.  Exam reveals no friction rub.    No murmur heard.  Pulses:       Dorsalis pedis pulses are 1+ on the right side, and 1+ on the left side.        Posterior tibial pulses are 1+ on the right side, and 1+ on the left side.   Pulmonary/Chest: Effort normal. No respiratory distress.   Abdominal: Soft. Bowel sounds are normal. She exhibits no distension. There is no tenderness.   Musculoskeletal: Normal range of motion.   Neurological: She is alert and oriented to person, place, and time.   Skin: Skin is warm and dry.   Healing wounds to bottom of feet.  Skin to BLE with multiple areas of discoloration   Psychiatric: She has a normal mood and affect. Her behavior is normal. Thought content normal.       Significant Labs:   BMP:   Recent Labs  Lab 05/03/17  0515         K 4.1      CO2 24   BUN 19   CREATININE 0.8   CALCIUM 8.6*     CBC:   Recent Labs  Lab 05/02/17  1819 05/03/17  0515   WBC 9.20 7.17   HGB 13.8 13.2   HCT 41.7 40.2    217       Significant Imaging: I have reviewed all pertinent imaging results/findings within the past 24 hours.    Assessment/Plan:      * Atherosclerotic PVD with intermittent claudication  -s/p Angiogram   -Pletal continued  -Podiatry consulted  -neurovascular checks  -assess vascular site and pedal pulses  -analgesia prn       HLD (hyperlipidemia)  -Statin and Zetia continued      HTN (hypertension)  -home medications resumed      Hypothyroid  -Synthroid continued      Discoid lupus  -Plaquenil continued    Tobacco use  -pt counseled on smoking cessation   -Duonebs prn     CAD (coronary artery disease)  ASA and Plavix continued      Foot abscess, left  -Podiatry on board  -MRI of LLE showed edema and multiple abscess  -antibiotics continued  -pt afebrile with  WBC normal  -Surgery on Friday per Podiatrist       VTE Risk Mitigation     None          David Castillo MD  Department of Hospital Medicine   [facility]

## 2017-05-03 NOTE — ASSESSMENT & PLAN NOTE
-s/p Angiogram   -Pletal continued  -Podiatry consulted  -neurovascular checks  -assess vascular site and pedal pulses  -analgesia prn

## 2017-05-03 NOTE — PLAN OF CARE
Problem: Patient Care Overview  Goal: Plan of Care Review  Outcome: Ongoing (interventions implemented as appropriate)  Patient stable. Plan of care reviewed. Patient verbalizes understanding. Left pedal pulses obtained per doppler. IVF completed. Bed low, wheels locked, bed alarm on, call light in reach. Patient instructed to call for assistance. Will continue to monitor.

## 2017-05-03 NOTE — SUBJECTIVE & OBJECTIVE
Interval History: Pt was seen and examined at bedside . She denies any complaint  For today . No acute event overnight .     Review of Systems   Constitutional: Negative for activity change, appetite change, fatigue and fever.   HENT: Negative for congestion, postnasal drip, sinus pressure, sneezing and sore throat.    Eyes: Negative for redness, itching and visual disturbance.   Respiratory: Negative for cough, chest tightness, shortness of breath and wheezing.    Cardiovascular: Positive for leg swelling. Negative for chest pain and palpitations.   Gastrointestinal: Negative for abdominal distention, abdominal pain, diarrhea, nausea and vomiting.   Endocrine: Negative for cold intolerance and heat intolerance.   Genitourinary: Negative for difficulty urinating, dysuria, flank pain, frequency and urgency.   Musculoskeletal: Positive for arthralgias and myalgias. Negative for back pain.   Skin: Positive for wound. Negative for color change.   Allergic/Immunologic: Negative for environmental allergies and food allergies.   Neurological: Negative for dizziness, weakness and headaches.   Hematological: Does not bruise/bleed easily.   Psychiatric/Behavioral: Negative for confusion and sleep disturbance. The patient is not nervous/anxious.      Objective:     Vital Signs (Most Recent):  Temp: 98.1 °F (36.7 °C) (05/03/17 0701)  Pulse: (!) 58 (05/03/17 0701)  Resp: 18 (05/03/17 0701)  BP: (!) 151/68 (05/03/17 0701)  SpO2: 98 % (05/03/17 0701) Vital Signs (24h Range):  Temp:  [98.1 °F (36.7 °C)-98.9 °F (37.2 °C)] 98.1 °F (36.7 °C)  Pulse:  [48-66] 58  Resp:  [13-20] 18  SpO2:  [92 %-100 %] 98 %  BP: ()/() 151/68     Weight: 97.9 kg (215 lb 13.3 oz)  Body mass index is 34.84 kg/(m^2).    Intake/Output Summary (Last 24 hours) at 05/03/17 1143  Last data filed at 05/03/17 0600   Gross per 24 hour   Intake           651.67 ml   Output                0 ml   Net           651.67 ml      Physical Exam   Constitutional:  She is oriented to person, place, and time. She appears well-developed and well-nourished.   HENT:   Head: Normocephalic.   Nose: Nose normal.   Mouth/Throat: Oropharynx is clear and moist.   Eyes: Conjunctivae and EOM are normal.   Neck: Normal range of motion. Neck supple.   Cardiovascular: Normal rate, regular rhythm and intact distal pulses.  Exam reveals no friction rub.    No murmur heard.  Pulses:       Dorsalis pedis pulses are 1+ on the right side, and 1+ on the left side.        Posterior tibial pulses are 1+ on the right side, and 1+ on the left side.   Pulmonary/Chest: Effort normal. No respiratory distress.   Abdominal: Soft. Bowel sounds are normal. She exhibits no distension. There is no tenderness.   Musculoskeletal: Normal range of motion.   Neurological: She is alert and oriented to person, place, and time.   Skin: Skin is warm and dry.   Healing wounds to bottom of feet.  Skin to BLE with multiple areas of discoloration   Psychiatric: She has a normal mood and affect. Her behavior is normal. Thought content normal.       Significant Labs:   BMP:   Recent Labs  Lab 05/03/17  0515         K 4.1      CO2 24   BUN 19   CREATININE 0.8   CALCIUM 8.6*     CBC:   Recent Labs  Lab 05/02/17  1819 05/03/17  0515   WBC 9.20 7.17   HGB 13.8 13.2   HCT 41.7 40.2    217       Significant Imaging: I have reviewed all pertinent imaging results/findings within the past 24 hours.

## 2017-05-04 ENCOUNTER — TELEPHONE (OUTPATIENT)
Dept: PODIATRY | Facility: CLINIC | Age: 72
End: 2017-05-04

## 2017-05-04 ENCOUNTER — ANESTHESIA EVENT (OUTPATIENT)
Dept: SURGERY | Facility: HOSPITAL | Age: 72
DRG: 581 | End: 2017-05-04
Payer: MEDICARE

## 2017-05-04 PROBLEM — L02.612 ABSCESS OF LEFT FOOT: Status: ACTIVE | Noted: 2017-05-04

## 2017-05-04 PROCEDURE — 99232 SBSQ HOSP IP/OBS MODERATE 35: CPT | Mod: ,,, | Performed by: INTERNAL MEDICINE

## 2017-05-04 PROCEDURE — 11000001 HC ACUTE MED/SURG PRIVATE ROOM

## 2017-05-04 PROCEDURE — 63600175 PHARM REV CODE 636 W HCPCS: Performed by: INTERNAL MEDICINE

## 2017-05-04 PROCEDURE — 25000003 PHARM REV CODE 250: Performed by: INTERNAL MEDICINE

## 2017-05-04 PROCEDURE — 94761 N-INVAS EAR/PLS OXIMETRY MLT: CPT

## 2017-05-04 PROCEDURE — 25000003 PHARM REV CODE 250: Performed by: TECHNICIAN, OTHER

## 2017-05-04 RX ORDER — LIDOCAINE HYDROCHLORIDE 10 MG/ML
1 INJECTION, SOLUTION EPIDURAL; INFILTRATION; INTRACAUDAL; PERINEURAL ONCE
Status: DISCONTINUED | OUTPATIENT
Start: 2017-05-04 | End: 2017-05-05 | Stop reason: HOSPADM

## 2017-05-04 RX ORDER — CEFAZOLIN SODIUM 2 G/50ML
2 SOLUTION INTRAVENOUS
Status: DISCONTINUED | OUTPATIENT
Start: 2017-05-04 | End: 2017-05-05 | Stop reason: HOSPADM

## 2017-05-04 RX ORDER — SODIUM CHLORIDE, SODIUM LACTATE, POTASSIUM CHLORIDE, CALCIUM CHLORIDE 600; 310; 30; 20 MG/100ML; MG/100ML; MG/100ML; MG/100ML
INJECTION, SOLUTION INTRAVENOUS CONTINUOUS
Status: DISCONTINUED | OUTPATIENT
Start: 2017-05-04 | End: 2017-05-05

## 2017-05-04 RX ADMIN — CEFEPIME HYDROCHLORIDE 1 G: 1 INJECTION, POWDER, FOR SOLUTION INTRAMUSCULAR; INTRAVENOUS at 11:05

## 2017-05-04 RX ADMIN — CILOSTAZOL 100 MG: 100 TABLET ORAL at 08:05

## 2017-05-04 RX ADMIN — AMLODIPINE BESYLATE 10 MG: 10 TABLET ORAL at 08:05

## 2017-05-04 RX ADMIN — CEFEPIME HYDROCHLORIDE 1 G: 1 INJECTION, POWDER, FOR SOLUTION INTRAMUSCULAR; INTRAVENOUS at 04:05

## 2017-05-04 RX ADMIN — METRONIDAZOLE 500 MG: 500 INJECTION, SOLUTION INTRAVENOUS at 11:05

## 2017-05-04 RX ADMIN — METRONIDAZOLE 500 MG: 500 INJECTION, SOLUTION INTRAVENOUS at 03:05

## 2017-05-04 RX ADMIN — VALSARTAN 320 MG: 80 TABLET, FILM COATED ORAL at 08:05

## 2017-05-04 RX ADMIN — CLOPIDOGREL BISULFATE 75 MG: 75 TABLET ORAL at 08:05

## 2017-05-04 RX ADMIN — LEVOTHYROXINE SODIUM 137 MCG: 25 TABLET ORAL at 08:05

## 2017-05-04 RX ADMIN — SODIUM CHLORIDE, SODIUM LACTATE, POTASSIUM CHLORIDE, AND CALCIUM CHLORIDE: 600; 310; 30; 20 INJECTION, SOLUTION INTRAVENOUS at 08:05

## 2017-05-04 RX ADMIN — PANTOPRAZOLE SODIUM 40 MG: 40 TABLET, DELAYED RELEASE ORAL at 08:05

## 2017-05-04 RX ADMIN — CEFEPIME HYDROCHLORIDE 1 G: 1 INJECTION, POWDER, FOR SOLUTION INTRAMUSCULAR; INTRAVENOUS at 01:05

## 2017-05-04 RX ADMIN — EZETIMIBE 10 MG: 10 TABLET ORAL at 08:05

## 2017-05-04 RX ADMIN — HYDROXYCHLOROQUINE SULFATE 200 MG: 200 TABLET, FILM COATED ORAL at 08:05

## 2017-05-04 RX ADMIN — ATORVASTATIN CALCIUM 80 MG: 40 TABLET, FILM COATED ORAL at 08:05

## 2017-05-04 RX ADMIN — LINEZOLID 600 MG: 600 INJECTION, SOLUTION INTRAVENOUS at 09:05

## 2017-05-04 RX ADMIN — METRONIDAZOLE 500 MG: 500 INJECTION, SOLUTION INTRAVENOUS at 08:05

## 2017-05-04 NOTE — PROGRESS NOTES
Cardiology Progress Note        SUBJECTIVE:     History of Present Illness:  Patient is a 71 y.o. female presents with PVD. She is feeling well today denies chest pain or shortness of breath. Denies bilateral leg pain or claudication. She has been ambulating in fish without difficult.         OBJECTIVE:     Vital Signs (Most Recent)  Temp: 98.8 °F (37.1 °C) (05/04/17 1224)  Pulse: 87 (05/04/17 1224)  Resp: 18 (05/04/17 1224)  BP: (!) 149/77 (05/04/17 1224)  SpO2: 98 % (05/04/17 1500)    Vital Signs Range (Last 24H):  Temp:  [97.6 °F (36.4 °C)-98.8 °F (37.1 °C)]   Pulse:  [51-87]   Resp:  [18]   BP: ()/(64-77)   SpO2:  [96 %-98 %]     Intake/Output last 3 shifts:  I/O last 3 completed shifts:  In: 1470 [P.O.:1020; IV Piggyback:450]  Out: -     Intake/Output this shift:  I/O this shift:  In: 240 [P.O.:240]  Out: -     Review of patient's allergies indicates:  No Known Allergies    Current Facility-Administered Medications   Medication    albuterol-ipratropium 2.5mg-0.5mg/3mL nebulizer solution 3 mL    amlodipine tablet 10 mg    atorvastatin tablet 80 mg    cefepime in dextrose 5 % 1 gram/50 mL IVPB 1 g    cilostazol tablet 100 mg    ezetimibe tablet 10 mg    hydrocodone-acetaminophen 10-325mg per tablet 1 tablet    hydroxychloroquine tablet 200 mg    levothyroxine tablet 137 mcg    linezolid 600mg/300ml IVPB 600 mg    metronidazole IVPB 500 mg    pantoprazole EC tablet 40 mg    trazodone tablet 50 mg    valsartan tablet 320 mg     No current facility-administered medications on file prior to encounter.      Current Outpatient Prescriptions on File Prior to Encounter   Medication Sig    amlodipine (NORVASC) 10 MG tablet take 1 tablet by mouth once daily    atorvastatin (LIPITOR) 80 MG tablet Take 1 tablet (80 mg total) by mouth once daily.    cilostazol (PLETAL) 100 MG Tab Take 1 tablet (100 mg total) by mouth 2 (two) times daily. (Patient taking differently: Take 100 mg by mouth once daily at  6am. )    clopidogrel (PLAVIX) 75 mg tablet take 1 tablet by mouth once daily    esomeprazole (NEXIUM) 40 MG capsule Take 1 capsule (40 mg total) by mouth before breakfast.    hydrocodone-acetaminophen 10-325mg (NORCO)  mg Tab Take  mg by mouth. 1 Tablet Oral Four times a day.  narcotic warning, contains tylenol    hydroxychloroquine (PLAQUENIL) 200 mg tablet Take 1 tablet (200 mg total) by mouth 2 (two) times daily. (Patient taking differently: Take 200 mg by mouth once daily at 6am. )    levothyroxine (SYNTHROID) 137 MCG Tab tablet take 1 tablet by mouth once daily BEFORE BREAKFAST    ZETIA 10 mg tablet take 1 tablet by mouth once daily    trazodone (DESYREL) 50 MG tablet Take 1 tablet (50 mg total) by mouth nightly as needed for Insomnia.       Physical Exam:  General appearance: alert, appears stated age and cooperative  Head: Normocephalic, without obvious abnormality, atraumatic  Eyes:  conjunctivae/corneas clear. PERRL, EOM's intact. Fundi benign.  Nose: no discharge  Throat: normal findings: tongue midline and normal  Neck: no adenopathy, no carotid bruit, no JVD, supple, symmetrical, trachea midline and thyroid not enlarged, symmetric, no tenderness/mass/nodules  Back:  no skin lesions, erythema, or scars  Lungs:  clear to auscultation bilaterally  Chest wall: no tenderness  Heart: regular rate and rhythm, S1, S2 normal, no murmur, click, rub or gallop  Abdomen: soft, non-tender; bowel sounds normal; no masses,  no organomegaly  Extremities: Left foot with skin discoloration to lateral and mid area of foot,   Pulses: 1+ and symmetric  Skin: Skin dry to bilaterally lower ext   Neurologic: Grossly normal    Laboratory:  Chemistry:   Lab Results   Component Value Date     05/03/2017    K 4.1 05/03/2017     05/03/2017    CO2 24 05/03/2017    BUN 19 05/03/2017    CREATININE 0.8 05/03/2017    CALCIUM 8.6 (L) 05/03/2017     Cardiac Markers:   Lab Results   Component Value Date     CKMB 0.9 04/13/2010    TROPONINI <0.04 04/13/2010     Cardiac Markers (Last 3):   Lab Results   Component Value Date    CKMB 0.9 04/13/2010    TROPONINI <0.04 04/13/2010     CBC:   Lab Results   Component Value Date    WBC 7.17 05/03/2017    HGB 13.2 05/03/2017    HCT 40.2 05/03/2017    MCV 94 05/03/2017     05/03/2017     Lipids:   Lab Results   Component Value Date    CHOL 165 09/14/2016    TRIG 105 09/14/2016    HDL 51 09/14/2016     Coagulation:   Lab Results   Component Value Date    INR 1.0 04/28/2017    APTT 22.7 04/28/2017       Diagnostic Results:  ECG: Reviewed  X-Ray: Reviewed  US: Reviewed  CT: Reviewed  Echo: Reviewed      ASSESSMENT/PLAN:     Patient Active Problem List   Diagnosis    HLD (hyperlipidemia)    HTN (hypertension)    Hypothyroid    Lupus    PVD (peripheral vascular disease)    Discoid lupus    Eczema    Osteoporosis    Osteoarthritis    Tobacco use    CAD (coronary artery disease)    Hx of CABG    Atherosclerotic PVD with intermittent claudication    Abnormal cardiovascular function study    Bilateral carotid artery stenosis    Discoid lupus erythematosus    Rheumatoid arthritis involving both feet with positive rheumatoid factor    Lichen planus    Hx of colonic polyps    Foot abscess, left        Plan:     Continue Statin, Amlodipine, and ARB  Risk modification   Continue IV antibiotic   Surgery for tomorrow per Podiatry     Chart reviewed. Dr. Lagos agrees with plan as outlined above.

## 2017-05-04 NOTE — PLAN OF CARE
Met with patient. Patient anticipates need for walker after surgery for her feet. Awaiting recommendations for IV medications, will follow for discharge needs.   Patient lives alone in a handicapped apartment but will have family support as needed.   Provided information about case management role in discharge planning and contact information for SW.      05/04/17 0939   Discharge Assessment   Assessment Type Discharge Planning Assessment   Confirmed/corrected address and phone number on facesheet? Yes   Assessment information obtained from? Patient;Medical Record   Prior to hospitilization cognitive status: Alert/Oriented   Prior to hospitalization functional status: Needs Assistance   Current cognitive status: Alert/Oriented   Current Functional Status: Needs Assistance   Arrived From home or self-care   Lives With alone  (Lives in a handicapped apt, niece comes to help her when needed. )   Able to Return to Prior Arrangements yes   Is patient able to care for self after discharge? Unable to determine at this time (comments)   How many people do you have in your home that can help with your care after discharge? 1   Patient's perception of discharge disposition home or selfcare   Readmission Within The Last 30 Days no previous admission in last 30 days   Patient currently being followed by outpatient case management? No   Patient currently receives home health services? No   Does the patient currently use HME? No   Do you have any problems affording any of your prescribed medications? No   Is the patient taking medications as prescribed? yes   Do you have any financial concerns preventing you from receiving the healthcare you need? No   Does the patient have transportation to healthcare appointments? Yes   Transportation Available public transportation  ([employer])   On Dialysis? No   Discharge Plan A Home with family   Discharge Plan B Home Health   Patient/Family In Agreement With Plan yes

## 2017-05-04 NOTE — ASSESSMENT & PLAN NOTE
-Podiatry on board  -MRI of LLE showed edema and multiple abscess  -antibiotics continued  -pt afebrile with WBC normal  -Surgery on Friday per Podiatrist   - Hold plavix

## 2017-05-04 NOTE — TELEPHONE ENCOUNTER
----- Message from Isabela Mcdermott sent at 5/4/2017  9:49 AM CDT -----  Contact: Patient  Patient called to speak with the nurse; she wants to know if Dr. Orellana is going to the hospital at [facility] today. She can be contacted at [phone].    Thanks,  Isabela

## 2017-05-04 NOTE — TELEPHONE ENCOUNTER
Spoke to patient who called to find out if Dr. Esteban DPM would be making rounds today at the hospital. Patient is currently admitted to hosptial and denies having any other questions/concerns/complications. Educated that Dr. Orellana will be rounding on her before her scheduled surgery on tomorrow and that she can contact us if she has any other question/concerns/complications. Verbalized understanding and ended call pleasantly.

## 2017-05-04 NOTE — PROGRESS NOTES
Cardiology Progress Note        SUBJECTIVE:     History of Present Illness:  Ms Downey is a 71 y.o. female presents with PVD. Patient under went Aortogram with Runoff by Dr Cohen on yesterday. Result showed occulted LSFA. Diffuse disease of the left and right iliacs w/o any significant gradient. Patient currently admitting with plans for IV antibiotic and surgery by Podiatry.      OBJECTIVE:     Vital Signs (Most Recent)  Temp: 97.8 °F (36.6 °C) (05/03/17 1623)  Pulse: 67 (05/03/17 1624)  Resp: 18 (05/03/17 1623)  BP: 138/71 (05/03/17 1623)  SpO2: 97 % (05/03/17 1624)    Vital Signs Range (Last 24H):  Temp:  [97.8 °F (36.6 °C)-98.4 °F (36.9 °C)]   Pulse:  [57-69]   Resp:  [18-19]   BP: (138-162)/(68-76)   SpO2:  [94 %-100 %]     Intake/Output last 3 shifts:  I/O last 3 completed shifts:  In: 831.7 [P.O.:660; I.V.:171.7]  Out: -     Intake/Output this shift:       Review of patient's allergies indicates:  No Known Allergies    Current Facility-Administered Medications   Medication    albuterol-ipratropium 2.5mg-0.5mg/3mL nebulizer solution 3 mL    amlodipine tablet 10 mg    atorvastatin tablet 80 mg    cefepime in dextrose 5 % 1 gram/50 mL IVPB 1 g    cilostazol tablet 100 mg    clopidogrel tablet 75 mg    ezetimibe tablet 10 mg    hydrocodone-acetaminophen 10-325mg per tablet 1 tablet    hydroxychloroquine tablet 200 mg    levothyroxine tablet 137 mcg    linezolid 600mg/300ml IVPB 600 mg    metronidazole IVPB 500 mg    pantoprazole EC tablet 40 mg    trazodone tablet 50 mg    valsartan tablet 320 mg     No current facility-administered medications on file prior to encounter.      Current Outpatient Prescriptions on File Prior to Encounter   Medication Sig    amlodipine (NORVASC) 10 MG tablet take 1 tablet by mouth once daily    atorvastatin (LIPITOR) 80 MG tablet Take 1 tablet (80 mg total) by mouth once daily.    cilostazol (PLETAL) 100 MG Tab Take 1 tablet (100 mg total) by mouth 2 (two) times  daily.    clopidogrel (PLAVIX) 75 mg tablet take 1 tablet by mouth once daily    esomeprazole (NEXIUM) 40 MG capsule Take 1 capsule (40 mg total) by mouth before breakfast.    hydrocodone-acetaminophen 10-325mg (NORCO)  mg Tab Take  mg by mouth. 1 Tablet Oral Four times a day.  narcotic warning, contains tylenol    hydroxychloroquine (PLAQUENIL) 200 mg tablet Take 1 tablet (200 mg total) by mouth 2 (two) times daily.    levothyroxine (SYNTHROID) 137 MCG Tab tablet take 1 tablet by mouth once daily BEFORE BREAKFAST    ZETIA 10 mg tablet take 1 tablet by mouth once daily    trazodone (DESYREL) 50 MG tablet Take 1 tablet (50 mg total) by mouth nightly as needed for Insomnia.       Physical Exam:  General appearance: alert, appears stated age and cooperative  Head: Normocephalic, without obvious abnormality, atraumatic  Eyes:  conjunctivae/corneas clear. PERRL, EOM's intact. Fundi benign.  Nose: no discharge  Throat: normal findings: tongue midline and normal  Neck: no adenopathy, no carotid bruit, no JVD, supple, symmetrical, trachea midline and thyroid not enlarged, symmetric, no tenderness/mass/nodules  Back:  no skin lesions, erythema, or scars  Lungs:  clear to auscultation bilaterally  Chest wall: no tenderness  Heart: regular rate and rhythm, S1, S2 normal, no murmur, click, rub or gallop  Abdomen: soft, non-tender; bowel sounds normal; no masses,  no organomegaly  Extremities: Left foot with skin discoloration to lateral and mid area of foot, Lt foot abscess   Pulses: 0+ and symmetric  Skin: Skin dry to bilateral lower ext  Neurologic: Grossly normal    Laboratory:  Chemistry:   Lab Results   Component Value Date     05/03/2017    K 4.1 05/03/2017     05/03/2017    CO2 24 05/03/2017    BUN 19 05/03/2017    CREATININE 0.8 05/03/2017    CALCIUM 8.6 (L) 05/03/2017     Cardiac Markers:   Lab Results   Component Value Date    CKMB 0.9 04/13/2010    TROPONINI <0.04 04/13/2010     Cardiac  Markers (Last 3):   Lab Results   Component Value Date    CKMB 0.9 04/13/2010    TROPONINI <0.04 04/13/2010     CBC:   Lab Results   Component Value Date    WBC 7.17 05/03/2017    HGB 13.2 05/03/2017    HCT 40.2 05/03/2017    MCV 94 05/03/2017     05/03/2017     Lipids:   Lab Results   Component Value Date    CHOL 165 09/14/2016    TRIG 105 09/14/2016    HDL 51 09/14/2016     Coagulation:   Lab Results   Component Value Date    INR 1.0 04/28/2017    APTT 22.7 04/28/2017       Diagnostic Results:  ECG: Reviewed  X-Ray: Reviewed  US: Reviewed  CT: Reviewed  Echo: Reviewed      ASSESSMENT/PLAN:     Patient Active Problem List   Diagnosis    HLD (hyperlipidemia)    HTN (hypertension)    Hypothyroid    Lupus    PVD (peripheral vascular disease)    Discoid lupus    Eczema    Osteoporosis    Osteoarthritis    Tobacco use    CAD (coronary artery disease)    Hx of CABG    Atherosclerotic PVD with intermittent claudication    Abnormal cardiovascular function study    Bilateral carotid artery stenosis    Discoid lupus erythematosus    Rheumatoid arthritis involving both feet with positive rheumatoid factor    Lichen planus    Hx of colonic polyps    Foot abscess, left        Plan:    Will continue current medical management.  Continue ASA, Plavix, Amlodipine, Statin, Plavix and ARB  IV Antibotics per Primary team  Surgery for Friday 5/52017 per Podiatry       Chart reviewed. Dr. Lagos agrees with plan as outlined above.

## 2017-05-04 NOTE — PROGRESS NOTES
[facility] Hospital Medicine  Progress Note    Patient Name: Flory Cam  MRN: [id number]  Patient Class: IP- Inpatient   Admission Date: 5/2/2017  Length of Stay: 0 days  Attending Physician: David Castillo, *  Primary Care Provider: Tayler Ovalle DO        Subjective:     Principal Problem:Foot abscess, left    HPI:  Flory Cam is a 71 y.o. female who presents with PMhx of Coronary Artery Disease s/p CABG, Peripheral Vascular Disease; Hyperlipidemia; SLE discoid lupus; and Hypertension.  Pt initially seen by Dr. Alejandro (Podiatry) for left foot abscess.  Abscess drained by Podiatry outpatient and pt placed on antibiotics due to remaining presence on MRI.  Vascular studies completed with patient referred to Cardiology.  Stenosis, SFA occlusion, and left VIPIN of 0.57 noted per Cardiology.  MRI of left lower extremity showed edema and several abscess.  Angiogram with run off completed per Cardiology today.  Pt admitted to Observation Unit from [facility] and care transferred to Hospital Medicine at the request of Cardiology for further management and evaluation.  Pt maintains compliance with prescribed medications. Pt continues to smoke cigarettes.                  Hospital Course:  70 y/o aaf admitted to the hospital for PVD and left foot infection. Pt has had MRI performed in which was positive for abscess in left foot. She did undergo bedside drainage by Dr Orellana on 4/11/17. Cultures to date reveal negative bacteria. Pt has severe PVD and was taken into surgery by Dr. Cohen on 5/2/17, Aortogram with runoff PTA Right, PTA Peripheral on RIGHT. Patient has occluded LSFA, diffuse disease of left and right illiacs, and 3 vessel run off. Surgery is scheduled Friday, 5/5/17 by Dr Orellana. 5/4- No issues of complaints overnight. Plan for I&D of left foot abscess tomorrow by Podiatry.     Interval History: No issues of complaints overnight. Plan for I&D of left foot abscess tomorrow  by Podiatry.     Review of Systems   Constitutional: Negative for activity change, appetite change, fatigue and fever.   HENT: Negative for congestion, postnasal drip, sinus pressure, sneezing and sore throat.    Eyes: Negative for redness, itching and visual disturbance.   Respiratory: Negative for cough, chest tightness, shortness of breath and wheezing.    Cardiovascular: Positive for leg swelling. Negative for chest pain and palpitations.   Gastrointestinal: Negative for abdominal distention, abdominal pain, diarrhea, nausea and vomiting.   Endocrine: Negative for cold intolerance and heat intolerance.   Genitourinary: Negative for difficulty urinating, dysuria, flank pain, frequency and urgency.   Musculoskeletal: Negative for back pain.   Skin: Positive for wound. Negative for color change.   Allergic/Immunologic: Negative for environmental allergies and food allergies.   Neurological: Negative for dizziness, weakness and headaches.   Hematological: Does not bruise/bleed easily.   Psychiatric/Behavioral: Negative for confusion and sleep disturbance. The patient is not nervous/anxious.      Objective:     Vital Signs (Most Recent):  Temp: 97.9 °F (36.6 °C) (05/04/17 0751)  Pulse: 69 (05/04/17 0751)  Resp: 18 (05/04/17 0751)  BP: (!) 150/67 (05/04/17 0751)  SpO2: 97 % (05/04/17 0751) Vital Signs (24h Range):  Temp:  [97.6 °F (36.4 °C)-98.4 °F (36.9 °C)] 97.9 °F (36.6 °C)  Pulse:  [51-71] 69  Resp:  [18] 18  SpO2:  [96 %-98 %] 97 %  BP: ()/(64-73) 150/67     Weight: 97.9 kg (215 lb 13.3 oz)  Body mass index is 34.84 kg/(m^2).    Intake/Output Summary (Last 24 hours) at 05/04/17 1100  Last data filed at 05/04/17 0900   Gross per 24 hour   Intake             1230 ml   Output                0 ml   Net             1230 ml      Physical Exam   Constitutional: She is oriented to person, place, and time. She appears well-developed and well-nourished.   HENT:   Head: Normocephalic and atraumatic.   Nose: Nose  normal.   Mouth/Throat: Oropharynx is clear and moist.   Eyes: Conjunctivae and EOM are normal. Pupils are equal, round, and reactive to light.   Neck: Normal range of motion. Neck supple.   Cardiovascular: Normal rate, regular rhythm, normal heart sounds and intact distal pulses.  Exam reveals no friction rub.    No murmur heard.  Pulses:       Dorsalis pedis pulses are 1+ on the right side, and 1+ on the left side.        Posterior tibial pulses are 1+ on the right side, and 1+ on the left side.   Pulmonary/Chest: Effort normal and breath sounds normal. No respiratory distress.   Abdominal: Soft. Bowel sounds are normal. She exhibits no distension. There is no tenderness.   Musculoskeletal: Normal range of motion. She exhibits no edema, tenderness or deformity.   Neurological: She is alert and oriented to person, place, and time. She has normal reflexes.   Skin: Skin is warm and dry. No erythema.   Healing wounds to bottom of feet.  Skin to BLE with multiple areas of discoloration   Psychiatric: She has a normal mood and affect. Her behavior is normal. Thought content normal.   Nursing note and vitals reviewed.      Significant Labs: All pertinent labs within the past 24 hours have been reviewed.    Significant Imaging:   Imaging Results         IR Heart Cath Images (Final result) Result time:  05/03/17 14:19:51    Final result by Interface, Rad Results In (05/03/17 14:19:51)    Impression:     Please see Cardiology procedure notes for report.      Electronically signed by: VIRTUAL RADIOLOGIST  Date:     05/03/17  Time:    14:19     Narrative:    Please see Cardiology procedure notes for report.                 Assessment/Plan:      * Foot abscess, left  -Podiatry on board  -MRI of LLE showed edema and multiple abscess  -antibiotics continued  -pt afebrile with WBC normal  -Surgery on Friday per Podiatrist   - Hold plavix      Atherosclerotic PVD with intermittent claudication  -s/p Angiogram   -Pletal  continued  -Podiatry consulted  -neurovascular checks  -assess vascular site and pedal pulses  -analgesia prn       HLD (hyperlipidemia)  -Statin and Zetia continued      HTN (hypertension)  -home medications resumed      Hypothyroid  -Synthroid continued      Discoid lupus  -Plaquenil continued    Tobacco use  -pt counseled on smoking cessation   -Duonebs prn     CAD (coronary artery disease)  ASA and Plavix continued      VTE Risk Mitigation         Ordered     Low Risk of VTE  Once      05/03/17 1952          James Wallace NP  Department of Hospital Medicine   [facility]

## 2017-05-04 NOTE — SUBJECTIVE & OBJECTIVE
Interval History: No issues of complaints overnight. Plan for I&D of left foot abscess tomorrow by Podiatry.     Review of Systems   Constitutional: Negative for activity change, appetite change, fatigue and fever.   HENT: Negative for congestion, postnasal drip, sinus pressure, sneezing and sore throat.    Eyes: Negative for redness, itching and visual disturbance.   Respiratory: Negative for cough, chest tightness, shortness of breath and wheezing.    Cardiovascular: Positive for leg swelling. Negative for chest pain and palpitations.   Gastrointestinal: Negative for abdominal distention, abdominal pain, diarrhea, nausea and vomiting.   Endocrine: Negative for cold intolerance and heat intolerance.   Genitourinary: Negative for difficulty urinating, dysuria, flank pain, frequency and urgency.   Musculoskeletal: Negative for back pain.   Skin: Positive for wound. Negative for color change.   Allergic/Immunologic: Negative for environmental allergies and food allergies.   Neurological: Negative for dizziness, weakness and headaches.   Hematological: Does not bruise/bleed easily.   Psychiatric/Behavioral: Negative for confusion and sleep disturbance. The patient is not nervous/anxious.      Objective:     Vital Signs (Most Recent):  Temp: 97.9 °F (36.6 °C) (05/04/17 0751)  Pulse: 69 (05/04/17 0751)  Resp: 18 (05/04/17 0751)  BP: (!) 150/67 (05/04/17 0751)  SpO2: 97 % (05/04/17 0751) Vital Signs (24h Range):  Temp:  [97.6 °F (36.4 °C)-98.4 °F (36.9 °C)] 97.9 °F (36.6 °C)  Pulse:  [51-71] 69  Resp:  [18] 18  SpO2:  [96 %-98 %] 97 %  BP: ()/(64-73) 150/67     Weight: 97.9 kg (215 lb 13.3 oz)  Body mass index is 34.84 kg/(m^2).    Intake/Output Summary (Last 24 hours) at 05/04/17 1100  Last data filed at 05/04/17 0900   Gross per 24 hour   Intake             1230 ml   Output                0 ml   Net             1230 ml      Physical Exam   Constitutional: She is oriented to person, place, and time. She appears  well-developed and well-nourished.   HENT:   Head: Normocephalic and atraumatic.   Nose: Nose normal.   Mouth/Throat: Oropharynx is clear and moist.   Eyes: Conjunctivae and EOM are normal. Pupils are equal, round, and reactive to light.   Neck: Normal range of motion. Neck supple.   Cardiovascular: Normal rate, regular rhythm, normal heart sounds and intact distal pulses.  Exam reveals no friction rub.    No murmur heard.  Pulses:       Dorsalis pedis pulses are 1+ on the right side, and 1+ on the left side.        Posterior tibial pulses are 1+ on the right side, and 1+ on the left side.   Pulmonary/Chest: Effort normal and breath sounds normal. No respiratory distress.   Abdominal: Soft. Bowel sounds are normal. She exhibits no distension. There is no tenderness.   Musculoskeletal: Normal range of motion. She exhibits no edema, tenderness or deformity.   Neurological: She is alert and oriented to person, place, and time. She has normal reflexes.   Skin: Skin is warm and dry. No erythema.   Healing wounds to bottom of feet.  Skin to BLE with multiple areas of discoloration   Psychiatric: She has a normal mood and affect. Her behavior is normal. Thought content normal.   Nursing note and vitals reviewed.      Significant Labs: All pertinent labs within the past 24 hours have been reviewed.    Significant Imaging:   Imaging Results         IR Heart Cath Images (Final result) Result time:  05/03/17 14:19:51    Final result by Interface, Rad Results In (05/03/17 14:19:51)    Impression:     Please see Cardiology procedure notes for report.      Electronically signed by: VIRTUAL RADIOLOGIST  Date:     05/03/17  Time:    14:19     Narrative:    Please see Cardiology procedure notes for report.

## 2017-05-04 NOTE — PLAN OF CARE
POC discussed and review with patient and family, verbal understanding received.   Intervention ongoing   Pain managed   Diabetes managed

## 2017-05-04 NOTE — PLAN OF CARE
Problem: Patient Care Overview  Goal: Plan of Care Review  Outcome: Ongoing (interventions implemented as appropriate)  Patient remains free from injury or falls during shift; plan of care reviewed with patient; pt verbalized understanding; IV antibiotics administered per MAR; NS-SB on monitor; patient sleeping comfortably between care; Will continue to monitor with hourly rounding.

## 2017-05-04 NOTE — ANESTHESIA PREPROCEDURE EVALUATION
05/04/2017  Flory Cam is a 71 y.o., female.    Past Medical History:  Acute coronary syndrome  Anxiety  11/17/2015: Bilateral carotid artery stenosis  Chronic pain  Coronary artery disease  GERD (gastroesophageal reflux disease)  Hyperlipidemia  Hypertension  Hypothyroidism  Low back pain  Lupus  Osteoporosis  Poor circulation  PVD (peripheral vascular disease)  02/13/2014: S/P peripheral artery angioplasty  Skin disease    Anesthesia Evaluation    I have reviewed the Patient Summary Reports.    I have reviewed the Nursing Notes.   I have reviewed the Medications.     Review of Systems  Anesthesia Hx:  No problems with previous Anesthesia  Denies Family Hx of Anesthesia complications.   Denies Personal Hx of Anesthesia complications.   Social:  Smoker, No Alcohol Use 1 ppd for for over 50 year.   Hematology/Oncology:  Hematology Normal   Oncology Normal     Cardiovascular:   Exercise tolerance: good Hypertension, well controlled CAD asymptomatic CABG/stent  PVD hyperlipidemia Carotid disease   Pulmonary:   Shortness of breath Clear productive cough.  snore   Renal/:  Renal/ Normal     Hepatic/GI:   Bowel Prep. PUD, GERD, well controlled 0430 last drink of fluid.   Musculoskeletal:   Arthritis  Osteoporosis  Left foot abscess   Neurological:  Neurology Normal    Endocrine:   Hypothyroidism Pre diabetes   Dermatological:  Skin Normal Discoid lupus   Psych:  Psychiatric Normal           Physical Exam  General:  Morbid Obesity    Airway/Jaw/Neck:  Airway Findings: Mouth Opening: Normal General Airway Assessment: Adult  Mallampati: III       Chest/Lungs:  Chest/Lungs Findings: Expiratory Wheezes, Mild     Heart/Vascular:  Heart Findings: Rate: Normal  Rhythm: Regular Rhythm             Anesthesia Plan  Type of Anesthesia, risks & benefits discussed:  Anesthesia Type:  general  Patient's Preference:    Intra-op Monitoring Plan:   Intra-op Monitoring Plan Comments:   Post Op Pain Control Plan:   Post Op Pain Control Plan Comments:   Induction:   IV  Beta Blocker:  Patient is not currently on a Beta-Blocker (No further documentation required).       Informed Consent: Patient understands risks and agrees with Anesthesia plan.  Questions answered. Anesthesia consent signed with patient.  ASA Score: 3     Day of Surgery Review of History & Physical: I have interviewed and examined the patient. I have reviewed the patient's H&P dated: 4/2/17.           Ready For Surgery From Anesthesia Perspective.

## 2017-05-05 ENCOUNTER — ANESTHESIA (OUTPATIENT)
Dept: SURGERY | Facility: HOSPITAL | Age: 72
DRG: 581 | End: 2017-05-05
Payer: MEDICARE

## 2017-05-05 ENCOUNTER — SURGERY (OUTPATIENT)
Age: 72
End: 2017-05-05

## 2017-05-05 LAB
ALBUMIN SERPL BCP-MCNC: 3.8 G/DL
ALP SERPL-CCNC: 177 U/L
ALT SERPL W/O P-5'-P-CCNC: 24 U/L
ANION GAP SERPL CALC-SCNC: 11 MMOL/L
AST SERPL-CCNC: 25 U/L
BASOPHILS # BLD AUTO: 0.02 K/UL
BASOPHILS NFR BLD: 0.3 %
BILIRUB SERPL-MCNC: 0.4 MG/DL
BUN SERPL-MCNC: 15 MG/DL
CALCIUM SERPL-MCNC: 9.6 MG/DL
CHLORIDE SERPL-SCNC: 106 MMOL/L
CO2 SERPL-SCNC: 25 MMOL/L
CREAT SERPL-MCNC: 0.8 MG/DL
DIFFERENTIAL METHOD: ABNORMAL
EOSINOPHIL # BLD AUTO: 0.2 K/UL
EOSINOPHIL NFR BLD: 2.8 %
ERYTHROCYTE [DISTWIDTH] IN BLOOD BY AUTOMATED COUNT: 14.6 %
EST. GFR  (AFRICAN AMERICAN): >60 ML/MIN/1.73 M^2
EST. GFR  (NON AFRICAN AMERICAN): >60 ML/MIN/1.73 M^2
GLUCOSE SERPL-MCNC: 99 MG/DL
HCT VFR BLD AUTO: 42.5 %
HGB BLD-MCNC: 14.1 G/DL
LYMPHOCYTES # BLD AUTO: 2.6 K/UL
LYMPHOCYTES NFR BLD: 33.8 %
MCH RBC QN AUTO: 30.8 PG
MCHC RBC AUTO-ENTMCNC: 33.2 %
MCV RBC AUTO: 93 FL
MONOCYTES # BLD AUTO: 0.7 K/UL
MONOCYTES NFR BLD: 9.1 %
NEUTROPHILS # BLD AUTO: 4.2 K/UL
NEUTROPHILS NFR BLD: 54 %
PLATELET # BLD AUTO: 248 K/UL
PMV BLD AUTO: 11 FL
POTASSIUM SERPL-SCNC: 4.5 MMOL/L
PROT SERPL-MCNC: 7.3 G/DL
RBC # BLD AUTO: 4.58 M/UL
SODIUM SERPL-SCNC: 142 MMOL/L
WBC # BLD AUTO: 7.8 K/UL

## 2017-05-05 PROCEDURE — 25000003 PHARM REV CODE 250: Performed by: INTERNAL MEDICINE

## 2017-05-05 PROCEDURE — 85025 COMPLETE CBC W/AUTO DIFF WBC: CPT

## 2017-05-05 PROCEDURE — 37000008 HC ANESTHESIA 1ST 15 MINUTES: Performed by: PODIATRIST

## 2017-05-05 PROCEDURE — 0J9R0ZZ DRAINAGE OF LEFT FOOT SUBCUTANEOUS TISSUE AND FASCIA, OPEN APPROACH: ICD-10-PCS | Performed by: PODIATRIST

## 2017-05-05 PROCEDURE — 87077 CULTURE AEROBIC IDENTIFY: CPT

## 2017-05-05 PROCEDURE — 63600175 PHARM REV CODE 636 W HCPCS: Performed by: ANESTHESIOLOGY

## 2017-05-05 PROCEDURE — 25000242 PHARM REV CODE 250 ALT 637 W/ HCPCS: Performed by: NURSE ANESTHETIST, CERTIFIED REGISTERED

## 2017-05-05 PROCEDURE — 27000221 HC OXYGEN, UP TO 24 HOURS

## 2017-05-05 PROCEDURE — 63600175 PHARM REV CODE 636 W HCPCS: Performed by: NURSE ANESTHETIST, CERTIFIED REGISTERED

## 2017-05-05 PROCEDURE — 87102 FUNGUS ISOLATION CULTURE: CPT

## 2017-05-05 PROCEDURE — 25000003 PHARM REV CODE 250: Performed by: NURSE ANESTHETIST, CERTIFIED REGISTERED

## 2017-05-05 PROCEDURE — 25000003 PHARM REV CODE 250: Performed by: TECHNICIAN, OTHER

## 2017-05-05 PROCEDURE — 63600175 PHARM REV CODE 636 W HCPCS: Performed by: PODIATRIST

## 2017-05-05 PROCEDURE — 87070 CULTURE OTHR SPECIMN AEROBIC: CPT | Mod: 59

## 2017-05-05 PROCEDURE — 37000009 HC ANESTHESIA EA ADD 15 MINS: Performed by: PODIATRIST

## 2017-05-05 PROCEDURE — 63600175 PHARM REV CODE 636 W HCPCS: Performed by: INTERNAL MEDICINE

## 2017-05-05 PROCEDURE — 94761 N-INVAS EAR/PLS OXIMETRY MLT: CPT

## 2017-05-05 PROCEDURE — 87075 CULTR BACTERIA EXCEPT BLOOD: CPT

## 2017-05-05 PROCEDURE — 25000003 PHARM REV CODE 250: Performed by: PODIATRIST

## 2017-05-05 PROCEDURE — 80053 COMPREHEN METABOLIC PANEL: CPT

## 2017-05-05 PROCEDURE — 87186 SC STD MICRODIL/AGAR DIL: CPT

## 2017-05-05 PROCEDURE — 11000001 HC ACUTE MED/SURG PRIVATE ROOM

## 2017-05-05 PROCEDURE — 28003 TREATMENT OF FOOT INFECTION: CPT | Mod: ,,, | Performed by: PODIATRIST

## 2017-05-05 PROCEDURE — 36415 COLL VENOUS BLD VENIPUNCTURE: CPT

## 2017-05-05 PROCEDURE — 87076 CULTURE ANAEROBE IDENT EACH: CPT

## 2017-05-05 PROCEDURE — 36000704 HC OR TIME LEV I 1ST 15 MIN: Performed by: PODIATRIST

## 2017-05-05 PROCEDURE — 71000033 HC RECOVERY, INTIAL HOUR: Performed by: PODIATRIST

## 2017-05-05 PROCEDURE — 36000705 HC OR TIME LEV I EA ADD 15 MIN: Performed by: PODIATRIST

## 2017-05-05 RX ORDER — PROPOFOL 10 MG/ML
VIAL (ML) INTRAVENOUS
Status: DISCONTINUED | OUTPATIENT
Start: 2017-05-05 | End: 2017-05-05

## 2017-05-05 RX ORDER — BUPIVACAINE HYDROCHLORIDE 5 MG/ML
INJECTION, SOLUTION EPIDURAL; INTRACAUDAL
Status: DISCONTINUED | OUTPATIENT
Start: 2017-05-05 | End: 2017-05-05 | Stop reason: HOSPADM

## 2017-05-05 RX ORDER — ONDANSETRON 2 MG/ML
4 INJECTION INTRAMUSCULAR; INTRAVENOUS EVERY 8 HOURS PRN
Status: DISCONTINUED | OUTPATIENT
Start: 2017-05-05 | End: 2017-05-06 | Stop reason: HOSPADM

## 2017-05-05 RX ORDER — PHENYLEPHRINE HYDROCHLORIDE 10 MG/ML
INJECTION INTRAVENOUS
Status: DISCONTINUED | OUTPATIENT
Start: 2017-05-05 | End: 2017-05-05

## 2017-05-05 RX ORDER — LIDOCAINE HCL/PF 100 MG/5ML
SYRINGE (ML) INTRAVENOUS
Status: DISCONTINUED | OUTPATIENT
Start: 2017-05-05 | End: 2017-05-05

## 2017-05-05 RX ORDER — ALBUTEROL SULFATE 90 UG/1
AEROSOL, METERED RESPIRATORY (INHALATION)
Status: DISCONTINUED | OUTPATIENT
Start: 2017-05-05 | End: 2017-05-05

## 2017-05-05 RX ORDER — SODIUM CHLORIDE 0.9 % (FLUSH) 0.9 %
3 SYRINGE (ML) INJECTION
Status: DISCONTINUED | OUTPATIENT
Start: 2017-05-05 | End: 2017-05-06 | Stop reason: HOSPADM

## 2017-05-05 RX ORDER — FENTANYL CITRATE 50 UG/ML
INJECTION, SOLUTION INTRAMUSCULAR; INTRAVENOUS
Status: DISCONTINUED | OUTPATIENT
Start: 2017-05-05 | End: 2017-05-05

## 2017-05-05 RX ORDER — SUCCINYLCHOLINE CHLORIDE 20 MG/ML
INJECTION INTRAMUSCULAR; INTRAVENOUS
Status: DISCONTINUED | OUTPATIENT
Start: 2017-05-05 | End: 2017-05-05

## 2017-05-05 RX ORDER — ROCURONIUM BROMIDE 10 MG/ML
INJECTION, SOLUTION INTRAVENOUS
Status: DISCONTINUED | OUTPATIENT
Start: 2017-05-05 | End: 2017-05-05

## 2017-05-05 RX ORDER — MORPHINE SULFATE 10 MG/ML
2 INJECTION INTRAMUSCULAR; INTRAVENOUS; SUBCUTANEOUS EVERY 5 MIN PRN
Status: DISCONTINUED | OUTPATIENT
Start: 2017-05-05 | End: 2017-05-05 | Stop reason: HOSPADM

## 2017-05-05 RX ORDER — ONDANSETRON 2 MG/ML
4 INJECTION INTRAMUSCULAR; INTRAVENOUS DAILY PRN
Status: DISCONTINUED | OUTPATIENT
Start: 2017-05-05 | End: 2017-05-05 | Stop reason: HOSPADM

## 2017-05-05 RX ORDER — HYDROCODONE BITARTRATE AND ACETAMINOPHEN 5; 325 MG/1; MG/1
1 TABLET ORAL
Status: DISCONTINUED | OUTPATIENT
Start: 2017-05-05 | End: 2017-05-05 | Stop reason: HOSPADM

## 2017-05-05 RX ORDER — ONDANSETRON 2 MG/ML
INJECTION INTRAMUSCULAR; INTRAVENOUS
Status: DISCONTINUED | OUTPATIENT
Start: 2017-05-05 | End: 2017-05-05

## 2017-05-05 RX ORDER — FENTANYL CITRATE 50 UG/ML
25 INJECTION, SOLUTION INTRAMUSCULAR; INTRAVENOUS EVERY 5 MIN PRN
Status: DISCONTINUED | OUTPATIENT
Start: 2017-05-05 | End: 2017-05-05 | Stop reason: HOSPADM

## 2017-05-05 RX ADMIN — ALBUTEROL SULFATE 2 PUFF: 90 AEROSOL, METERED RESPIRATORY (INHALATION) at 11:05

## 2017-05-05 RX ADMIN — PROPOFOL 130 MG: 10 INJECTION, EMULSION INTRAVENOUS at 11:05

## 2017-05-05 RX ADMIN — SODIUM CHLORIDE, SODIUM LACTATE, POTASSIUM CHLORIDE, AND CALCIUM CHLORIDE: 600; 310; 30; 20 INJECTION, SOLUTION INTRAVENOUS at 11:05

## 2017-05-05 RX ADMIN — METRONIDAZOLE 500 MG: 500 INJECTION, SOLUTION INTRAVENOUS at 11:05

## 2017-05-05 RX ADMIN — LEVOTHYROXINE SODIUM 137 MCG: 25 TABLET ORAL at 08:05

## 2017-05-05 RX ADMIN — Medication 20 MG: at 11:05

## 2017-05-05 RX ADMIN — LIDOCAINE HYDROCHLORIDE 80 MG: 20 INJECTION, SOLUTION INTRAVENOUS at 11:05

## 2017-05-05 RX ADMIN — ONDANSETRON 4 MG: 2 INJECTION INTRAMUSCULAR; INTRAVENOUS at 02:05

## 2017-05-05 RX ADMIN — LINEZOLID 600 MG: 600 INJECTION, SOLUTION INTRAVENOUS at 12:05

## 2017-05-05 RX ADMIN — FENTANYL CITRATE 50 MCG: 50 INJECTION, SOLUTION INTRAMUSCULAR; INTRAVENOUS at 11:05

## 2017-05-05 RX ADMIN — METRONIDAZOLE 500 MG: 500 INJECTION, SOLUTION INTRAVENOUS at 07:05

## 2017-05-05 RX ADMIN — CEFEPIME HYDROCHLORIDE 1 G: 1 INJECTION, POWDER, FOR SOLUTION INTRAMUSCULAR; INTRAVENOUS at 07:05

## 2017-05-05 RX ADMIN — EPHEDRINE SULFATE 5 MG: 50 INJECTION, SOLUTION INTRAMUSCULAR; INTRAVENOUS; SUBCUTANEOUS at 11:05

## 2017-05-05 RX ADMIN — VALSARTAN 320 MG: 80 TABLET, FILM COATED ORAL at 08:05

## 2017-05-05 RX ADMIN — ALBUTEROL SULFATE 2 PUFF: 90 AEROSOL, METERED RESPIRATORY (INHALATION) at 12:05

## 2017-05-05 RX ADMIN — EPHEDRINE SULFATE 15 MG: 50 INJECTION, SOLUTION INTRAMUSCULAR; INTRAVENOUS; SUBCUTANEOUS at 11:05

## 2017-05-05 RX ADMIN — FENTANYL CITRATE 25 MCG: 50 INJECTION INTRAMUSCULAR; INTRAVENOUS at 01:05

## 2017-05-05 RX ADMIN — CILOSTAZOL 100 MG: 100 TABLET ORAL at 09:05

## 2017-05-05 RX ADMIN — ALBUTEROL SULFATE 8 PUFF: 90 AEROSOL, METERED RESPIRATORY (INHALATION) at 12:05

## 2017-05-05 RX ADMIN — HYDROXYCHLOROQUINE SULFATE 200 MG: 200 TABLET, FILM COATED ORAL at 08:05

## 2017-05-05 RX ADMIN — ROCURONIUM BROMIDE 5 MG: 10 INJECTION, SOLUTION INTRAVENOUS at 11:05

## 2017-05-05 RX ADMIN — MORPHINE SULFATE 2 MG: 10 INJECTION, SOLUTION INTRAMUSCULAR; INTRAVENOUS at 12:05

## 2017-05-05 RX ADMIN — SUCCINYLCHOLINE CHLORIDE 160 MG: 20 INJECTION, SOLUTION INTRAMUSCULAR; INTRAVENOUS at 11:05

## 2017-05-05 RX ADMIN — MORPHINE SULFATE 2 MG: 10 INJECTION, SOLUTION INTRAMUSCULAR; INTRAVENOUS at 01:05

## 2017-05-05 RX ADMIN — HYDROXYCHLOROQUINE SULFATE 200 MG: 200 TABLET, FILM COATED ORAL at 09:05

## 2017-05-05 RX ADMIN — ONDANSETRON 4 MG: 2 INJECTION INTRAMUSCULAR; INTRAVENOUS at 09:05

## 2017-05-05 RX ADMIN — CILOSTAZOL 100 MG: 100 TABLET ORAL at 08:05

## 2017-05-05 RX ADMIN — PHENYLEPHRINE HYDROCHLORIDE 150 MCG: 10 INJECTION INTRAVENOUS at 11:05

## 2017-05-05 RX ADMIN — EZETIMIBE 10 MG: 10 TABLET ORAL at 08:05

## 2017-05-05 RX ADMIN — CEFAZOLIN SODIUM 2 G: 2 SOLUTION INTRAVENOUS at 11:05

## 2017-05-05 RX ADMIN — PANTOPRAZOLE SODIUM 40 MG: 40 TABLET, DELAYED RELEASE ORAL at 08:05

## 2017-05-05 RX ADMIN — ATORVASTATIN CALCIUM 80 MG: 40 TABLET, FILM COATED ORAL at 08:05

## 2017-05-05 RX ADMIN — PHENYLEPHRINE HYDROCHLORIDE 100 MCG: 10 INJECTION INTRAVENOUS at 11:05

## 2017-05-05 RX ADMIN — AMLODIPINE BESYLATE 10 MG: 10 TABLET ORAL at 08:05

## 2017-05-05 RX ADMIN — CEFEPIME HYDROCHLORIDE 1 G: 1 INJECTION, POWDER, FOR SOLUTION INTRAMUSCULAR; INTRAVENOUS at 02:05

## 2017-05-05 RX ADMIN — CEFEPIME HYDROCHLORIDE 1 G: 1 INJECTION, POWDER, FOR SOLUTION INTRAMUSCULAR; INTRAVENOUS at 11:05

## 2017-05-05 RX ADMIN — PROPOFOL 50 MG: 10 INJECTION, EMULSION INTRAVENOUS at 11:05

## 2017-05-05 RX ADMIN — LINEZOLID 600 MG: 600 INJECTION, SOLUTION INTRAVENOUS at 02:05

## 2017-05-05 RX ADMIN — METRONIDAZOLE 500 MG: 500 INJECTION, SOLUTION INTRAVENOUS at 05:05

## 2017-05-05 RX ADMIN — BUPIVACAINE HYDROCHLORIDE 30 ML: 5 INJECTION, SOLUTION EPIDURAL; INTRACAUDAL; PERINEURAL at 11:05

## 2017-05-05 RX ADMIN — ONDANSETRON 4 MG: 2 INJECTION, SOLUTION INTRAMUSCULAR; INTRAVENOUS at 12:05

## 2017-05-05 NOTE — PROGRESS NOTES
Pt is s/p left foot incision and drainage with closure  Pt is OK to DC to home as long as cleared from medical standpoint  Will need PO antibiotics x 10 days for discharge and pain medication norco 5/325   Pt should keep dressing clean, dry, intact. Do not get dressing wet. Do not remove dressing. Must be NON WEIGHT BEARING TO OPERATIVE FOOT. Will need PT clearance prior to discharge for non weight bearing and assistive device   Pt is scheduled to follow up in podiatry clinic as scheduled below for post wound check with Dr. Orellana      Future Appointments  Date Time Provider Department Center   5/9/2017 7:40 AM Melva Orellana DPM [facility] POD [place]   5/18/2017 3:00 PM [facility] [place]   8/4/2017 8:10 AM JAQUELINE MERRITT [facility] LAB [place]   8/7/2017 10:40 [facility]     Report Electronically Signed By:  Melva Orellana DPM   Podiatric Medicine & Surgery  [facility]  5/5/2017  12:39 PM

## 2017-05-05 NOTE — TRANSFER OF CARE
"Anesthesia Transfer of Care Note    Patient: Flory Cam    Procedure(s) Performed: Procedure(s) (LRB):  INCISION AND DRAINAGE (I&D), FOOT (Left)    Patient location: PACU    Anesthesia Type: general    Transport from OR: Transported from OR on room air with adequate spontaneous ventilation    Post pain: adequate analgesia    Post assessment: no apparent anesthetic complications    Post vital signs: stable    Level of consciousness: awake    Nausea/Vomiting: no nausea/vomiting    Complications: none    Transfer of care protocol was followed      Last vitals:   Visit Vitals    BP (!) 153/70 (BP Location: Left arm, Patient Position: Lying, BP Method: Automatic)    Pulse 87    Temp 36.5 °C (97.7 °F) (Temporal)    Resp (!) 21    Ht 5' 6" (1.676 m)    Wt 97.9 kg (215 lb 13.3 oz)    SpO2 100%    Breastfeeding No    BMI 34.84 kg/m2     "

## 2017-05-05 NOTE — INTERVAL H&P NOTE
The patient has been examined and the H&P has been reviewed:    I concur with the findings and no changes have occurred since H&P was written.    Anesthesia/Surgery risks, benefits and alternative options discussed and understood by patient/family.          Active Hospital Problems    Diagnosis  POA    *Foot abscess, left [L02.612]  Yes    Abscess of left foot [L02.612]  Yes    Atherosclerotic PVD with intermittent claudication [I73.9]  Yes    Tobacco use [Z72.0]  Yes    CAD (coronary artery disease) [I25.10]  Yes    Discoid lupus [L93.0]  Yes    HLD (hyperlipidemia) [E78.5]  Yes    HTN (hypertension) [I10]  Yes    Hypothyroid [E03.9]  Yes      Resolved Hospital Problems    Diagnosis Date Resolved POA   No resolved problems to display.

## 2017-05-05 NOTE — SUBJECTIVE & OBJECTIVE
Interval History: Pt was seen and examined at bedside. She is s/p  Foot I&D . She is drowsy  And complaining of nausea most likely due  anesthesia side effect .      Review of Systems   Constitutional: Negative for activity change, appetite change, fatigue and fever.   HENT: Negative for congestion, postnasal drip, sinus pressure, sneezing and sore throat.    Eyes: Negative for redness, itching and visual disturbance.   Respiratory: Negative for cough, chest tightness, shortness of breath and wheezing.    Cardiovascular: Positive for leg swelling. Negative for chest pain and palpitations.   Gastrointestinal: Negative for abdominal distention, abdominal pain, diarrhea, nausea and vomiting.   Endocrine: Negative for cold intolerance and heat intolerance.   Genitourinary: Negative for difficulty urinating, dysuria, flank pain, frequency and urgency.   Musculoskeletal: Negative for back pain.   Skin: Positive for wound. Negative for color change.   Allergic/Immunologic: Negative for environmental allergies and food allergies.   Neurological: Negative for dizziness, weakness and headaches.   Hematological: Does not bruise/bleed easily.   Psychiatric/Behavioral: Negative for confusion and sleep disturbance. The patient is not nervous/anxious.      Objective:     Vital Signs (Most Recent):  Temp: 97.2 °F (36.2 °C) (05/05/17 1527)  Pulse: 74 (05/05/17 1527)  Resp: 18 (05/05/17 1527)  BP: (!) 103/57 (05/05/17 1527)  SpO2: 95 % (05/05/17 1527) Vital Signs (24h Range):  Temp:  [97.1 °F (36.2 °C)-98.5 °F (36.9 °C)] 97.2 °F (36.2 °C)  Pulse:  [] 74  Resp:  [17-21] 18  SpO2:  [94 %-100 %] 95 %  BP: (103-196)/(57-94) 103/57     Weight: 97.9 kg (215 lb 13.3 oz)  Body mass index is 34.84 kg/(m^2).    Intake/Output Summary (Last 24 hours) at 05/05/17 1652  Last data filed at 05/05/17 1445   Gross per 24 hour   Intake             2813 ml   Output               10 ml   Net             2803 ml      Physical Exam   Constitutional:  She is oriented to person, place, and time. She appears well-developed and well-nourished.   HENT:   Head: Normocephalic and atraumatic.   Nose: Nose normal.   Mouth/Throat: Oropharynx is clear and moist.   Eyes: Conjunctivae and EOM are normal. Pupils are equal, round, and reactive to light.   Neck: Normal range of motion. Neck supple.   Cardiovascular: Normal rate, regular rhythm, normal heart sounds and intact distal pulses.  Exam reveals no friction rub.    No murmur heard.  Pulses:       Dorsalis pedis pulses are 1+ on the right side, and 1+ on the left side.        Posterior tibial pulses are 1+ on the right side, and 1+ on the left side.   Pulmonary/Chest: Effort normal and breath sounds normal. No respiratory distress.   Abdominal: Soft. Bowel sounds are normal. She exhibits no distension. There is no tenderness.   Musculoskeletal: Normal range of motion. She exhibits no edema, tenderness or deformity.   Neurological: She is alert and oriented to person, place, and time. She has normal reflexes.   Skin: Skin is warm and dry. No erythema.   Healing wounds to bottom of feet.  Skin to BLE with multiple areas of discoloration   Psychiatric: She has a normal mood and affect. Her behavior is normal. Thought content normal.   Nursing note and vitals reviewed.      Significant Labs:   Blood Culture: No results for input(s): LABBLOO in the last 48 hours.  BMP:   Recent Labs  Lab 05/05/17 0427   GLU 99      K 4.5      CO2 25   BUN 15   CREATININE 0.8   CALCIUM 9.6     CBC:   Recent Labs  Lab 05/05/17 0427   WBC 7.80   HGB 14.1   HCT 42.5          Significant Imaging: I have reviewed all pertinent imaging results/findings within the past 24 hours.

## 2017-05-05 NOTE — ASSESSMENT & PLAN NOTE
-Podiatry on board  -MRI of LLE showed edema and multiple abscess  -antibiotics continued  -pt afebrile with WBC normal  -S/P I&D  - resume plavix on d/c plavix

## 2017-05-05 NOTE — PROGRESS NOTES
Pt remains free of injuries. C/o pain 4-5/10, Lt foot, moderately controlled c PRN meds. IVF antibiotics per MD orders. Intermittent n/v controlled c PRN zofran. 12 hr chart completed. Will continue to monitor.

## 2017-05-05 NOTE — H&P (VIEW-ONLY)
PODIATRY CONSULTATION/ H&P NOTE  5/3/2017    CONSULTING PHYSICIAN Dr. Cohen    REASON FOR CONSULTATION multiple abscess of left foot     CHIEF COMPLAINT left foot infection     HISTORY OF PRESENT ILLNESS    Flory Cam is a 71 y.o. female  w/ PMH of PVD, CAD, Lupus  and other multiple medical co-morbidities, who was admitted to the hospital for PVD and left foot infection. Pt has had MRI performed in which was positive for abscess in left foot. She did undergo bedside drainage in my clinic on 4/11/17. Cultures to date reveal negative bacteria. Pt has severe PVD and was taken into surgery by Dr. Cohen on 5/2/17, Aortogram with runoff PTA Right, PTA Peripheral on RIGHT. Of note, patient has occluded LSFA, diffuse disease of left and right illiacs, and 3 vessel run off. Discussed case with  who notes patient should have adequate perfusion to heal incision and drainage of left foot. Pt has been admitted for IV antibiosis. Surgery is scheduled Friday, 5/5/17. Pt seen at bedside this AM. She notes pain has decreased since bedside debridements. She notes mild pain with ambulation/direct pressure. No further pedal complaints.    PMH   Past Medical History:   Diagnosis Date    Acute coronary syndrome     Anxiety     Bilateral carotid artery stenosis 11/17/2015    Chronic pain     Coronary artery disease     GERD (gastroesophageal reflux disease)     Hyperlipidemia     Hypertension     Hypothyroidism     Low back pain     Lupus     Osteoporosis     Poor circulation     PVD (peripheral vascular disease)     S/P peripheral artery angioplasty 02/13/2014    Skin disease         MEDS  Current Facility-Administered Medications   Medication Dose Route Frequency Provider Last Rate Last Dose    albuterol-ipratropium 2.5mg-0.5mg/3mL nebulizer solution 3 mL  3 mL Nebulization Q6H PRN Lisbeth Mercado NP        amlodipine tablet 10 mg  10 mg Oral Daily Christopher Cohen MD   10 mg at 05/03/17 0812     atorvastatin tablet 80 mg  80 mg Oral Daily Christopher Cohen MD   80 mg at 05/03/17 0811    cilostazol tablet 100 mg  100 mg Oral BID Christopher Cohen MD   100 mg at 05/03/17 0811    clopidogrel tablet 75 mg  75 mg Oral Daily Christopher Cohen MD   75 mg at 05/03/17 0810    ezetimibe tablet 10 mg  10 mg Oral Daily Christopher Cohen MD   10 mg at 05/03/17 0811    hydrocodone-acetaminophen 10-325mg per tablet 1 tablet  1 tablet Oral Q6H PRN Christopher Cohen MD        hydroxychloroquine tablet 200 mg  200 mg Oral BID Christopher Cohen MD   200 mg at 05/03/17 0812    levothyroxine tablet 137 mcg  137 mcg Oral Daily Christopher Cohen MD   137 mcg at 05/03/17 0812    pantoprazole EC tablet 40 mg  40 mg Oral Daily Christopher Cohen MD   40 mg at 05/03/17 0811    trazodone tablet 50 mg  50 mg Oral Nightly PRN Christopehr Cohen MD   50 mg at 05/02/17 2315    valsartan tablet 320 mg  320 mg Oral Daily Christopher Cohen MD   320 mg at 05/03/17 0810       Current Facility-Administered Medications   Medication    albuterol-ipratropium 2.5mg-0.5mg/3mL nebulizer solution 3 mL    amlodipine tablet 10 mg    atorvastatin tablet 80 mg    cilostazol tablet 100 mg    clopidogrel tablet 75 mg    ezetimibe tablet 10 mg    hydrocodone-acetaminophen 10-325mg per tablet 1 tablet    hydroxychloroquine tablet 200 mg    levothyroxine tablet 137 mcg    pantoprazole EC tablet 40 mg    trazodone tablet 50 mg    valsartan tablet 320 mg       VITAL SIGNS (Last 24H):  Temp:  [97.5 °F (36.4 °C)-98.9 °F (37.2 °C)]   Pulse:  [48-66]   Resp:  [13-20]   BP: ()/()   SpO2:  [92 %-100 %]     PSH   Past Surgical History:   Procedure Laterality Date    COLONOSCOPY N/A 1/4/2017    Procedure: COLONOSCOPY;  Surgeon: Sylvester Arboleda III, MD;  [facility];  Service: Endoscopy;  Laterality: N/A;    CORONARY ANGIOPLASTY      CORONARY ARTERY BYPASS GRAFT      HYSTERECTOMY      THYROIDECTOMY          ALL  Review of patient's allergies  "indicates:  No Known Allergies     SOC    Social History   Substance Use Topics    Smoking status: Former Smoker     Packs/day: 0.25     Years: 40.00     Start date:      Quit date: 2016    Smokeless tobacco: Never Used    Alcohol use No       Family HX  Family History   Problem Relation Age of Onset    Melanoma Neg Hx     Psoriasis Neg Hx     Lupus Neg Hx     Eczema Neg Hx           OBJECTIVE  Temp (24hrs), Av.3 °F (36.8 °C), Min:97.5 °F (36.4 °C), Max:98.9 °F (37.2 °C)    Vitals:    17 1145 17 1200 17 1215 17 1230   BP: 137/66 (!) 136/58 (!) 142/66 91/68   Pulse: (!) 51 (!) 50 (!) 49 (!) 50   Resp: 13 14 15 16   Temp:       TempSrc:       SpO2: (!) 93% 96% (!) 93% 95%   Weight:       Height:        17 1245 17 1250 17 1300 17 1315   BP: 116/86 (!) 129/57 (!) 142/66 137/64   Pulse: (!) 53 (!) 52 (!) 49 (!) 49   Resp: 18 18 15 15   Temp:       TempSrc:       SpO2: (!) 92% (!) 94% (!) 93% 97%   Weight:       Height:        17 1330 17 1400 17 1619 17   BP: (!) 157/130 (!) 161/70 (!) 158/73 (!) 162/76   Pulse: (!) 48 61 (!) 57 60   Resp: 15 17 20 18   Temp:   98.9 °F (37.2 °C) 98.4 °F (36.9 °C)   TempSrc:   Oral Oral   SpO2: 95% (!) 94% 97% 96%   Weight:   97.9 kg (215 lb 13.3 oz)    Height:   5' 6" (1.676 m)     17 0049 17 0415 17 0701   BP: (!) 152/74  (!) 151/68   Pulse: (!) 57 66 (!) 58   Resp:    Temp: 98.4 °F (36.9 °C) 98.3 °F (36.8 °C) 98.1 °F (36.7 °C)   TempSrc: Oral Oral Oral   SpO2: (!) 94% 100% 98%   Weight:      Height:          VITAL SIGNS (Last 24H):  Temp:  [97.5 °F (36.4 °C)-98.9 °F (37.2 °C)]   Pulse:  [48-66]   Resp:  [13-20]   BP: ()/()   SpO2:  [92 %-100 %]     GENERAL  -14 point ROS negative other than stated in HPI  - Pleasant female with no apparent distress    LOWER EXTREMITY  Vascular:   · DP pedal pulse 0/4 b/l, PT pedal pulse 1/4 RIGHT  · Skin temperature warm " to warm from prox to distally  · CFT <5 secs b/l  · There is LE edema noted b/l.      Dermatologic:   · Thickened, dystrophic, elongated toenails with subungal debris 1-5 b/l.   · Webspaces are C/D/I B/L.  · Left foot hyperkeratotic tissue distal shaft 4th/5th metatarsal, post debridement sanguineous drainage   · Skin texture and turgor dry with hyperkeratosis diffusely b/l plantar heel   · There is no pedal hair growth noted     Neurologic:  · Vibratory sensation diminished at level of Hallux IPJ b/l   · Protective sensation absent at 0/10 sites upon examination with [place] Weinsten 5.07 g monofilament.   · Propioception intact at 1st MTPJ b/l.   · Achilles and patellar deep tendon reflexes intact  · Babinski reflex absent b/l. Light touch and sharp/dull sensation intact b/l.     Musculoskeletal/Orthopedic:  · No symptomatic structural abnormalities noted.   · Muscle strength is 5/5 for foot inverters, everters, plantarflexors, and dorsiflexors. Muscle tone is normal.  · Pain free range of motion in all four quadrants with stiffness and limitation b/l  · PAIN with palpation of callus plantar medial arch, LEFT    SELECTED RESULTS  Recent Labs      05/02/17   1819  05/03/17   0515   NA  143  141   K  4.3  4.1   CL  108  110   CO2  27  24   BUN  16  19   ALT  18   --    AST  24   --      Lab Results   Component Value Date     05/03/2017    K 4.1 05/03/2017     05/03/2017    CO2 24 05/03/2017     Lab Results   Component Value Date    WBC 7.17 05/03/2017    HGB 13.2 05/03/2017    HCT 40.2 05/03/2017    MCV 94 05/03/2017     05/03/2017      [unfilled]  Lab Results   Component Value Date    SEDRATE 20 04/11/2017     Lab Results   Component Value Date    CRP 4.0 10/26/2016     Lab Results   Component Value Date    HGBA1C 6.2 09/14/2016       Reviewed and noted in plan where applicable. Please see chart for full laboratory data.    No results for input(s): CPK, CPKMB, TROPONINI, MB in the last 24  hours. No results for input(s): POCTGLUCOSE in the last 24 hours.     Lab Results   Component Value Date    INR 1.0 04/28/2017    INR 0.9 07/26/2007       Lab Results   Component Value Date    WBC 7.17 05/03/2017    HGB 13.2 05/03/2017    HCT 40.2 05/03/2017    MCV 94 05/03/2017     05/03/2017       BMP    Recent Labs  Lab 05/03/17  0515         K 4.1      CO2 24   BUN 19   CREATININE 0.8   CALCIUM 8.6*       INTAKE & OUTPUT (Last 24H):    Intake/Output Summary (Last 24 hours) at 05/03/17 1053  Last data filed at 05/03/17 0600   Gross per 24 hour   Intake           651.67 ml   Output                0 ml   Net           651.67 ml       RESULTS OF DIAGNOSTIC STUDIES  Imaging Results         IR Heart Cath Images (In process)           MRI:   MRI of the left foot without and with IV contrast    Clinical History: L03.116 Cellulitis of left lower limb; I73.9 Peripheral vascular disease, unspecified    Technique: Standard foot MRI protocol without and with IV contrast was performed.       Finding: There are several ulcers in the soft tissue volar to the fourth and fifth metatarsals. There are several fluid collections adjacent to the ulcer volar to the fourth metatarsal. The largest one measures 11 mm in size and is located in the subcutaneous fat adjacent to the ulcer. There is a mild amount of edema in the proximal portion of the first metatarsal. This edema appears to be secondary to a nondisplaced fracture. There is no dislocation. There is a mild amount of edema in the lateral aspect of the medial cuneiform. There is a minimal amount of edema in the anterior aspect of the navicular bone. The visualized joints of the left foot and ankle have a normal amount of fluid within them. The visualized portions of the tendons and ligaments are normal in appearance.   Impression     1. There are several ulcers in the soft tissue volar to the fourth and fifth metatarsals. There are several fluid  collections adjacent to the ulcer volar to the fourth metatarsal. These are characteristic of abscesses. The largest one measures 11 mm in size and is located in the subcutaneous fat adjacent to the ulcer.   2. There is a mild amount of edema in the proximal portion of the first metatarsal. This edema appears to be secondary to a nondisplaced fracture.   3. There is a mild amount of edema in the lateral aspect of the medial cuneiform. There is a minimal amount of edema in the anterior aspect of the navicular bone. This is characteristic of degenerative changes.  4. There is no suspected acute osteomyelitis.         MICRO  Aerobic:  Anaerobic:  Gram Stain:  Pathology:  Blood Cx:  Antibiosis:    ASSESSMENT  Flory Cam is a 71 y.o. female who presents with LEFT foot abscess,PVD, s/p aortogram w/runoff/PTA (Right) and PTA-peripheral (Right), DOS 5/2/17.      PLAN  · Discussed with Pt the nature and prognosis of, and the therapeutic/surgical options if indicated for Pt's condition.  · IV antibiotics- broad spectrum while in house  · Plan for surgery Incision and drainage, Friday 5/5/17.NPO after midnight 5/5/17  Informed Consent has been given to the patient. The patient understands the surgery, procedure, risks, alternatives and complications, including but not limited to reaction to medication/anesthetics, bleeding, infection, continuous pain, incomplete pain relief, worse pain, complex regional pain syndrome/RSD, failure to relieve pain, incomplete bone and soft tissue healing, numbness, nerve damage, prolonged or incomplete correction, recurrence, foot/ankle deterioration, new deformity, permanent edema, nerve or vascular damage, blood clots, pulmonary embolism, scarring, instability, limb length inequality, problems with motion and strength, failure of implants/fixation (if applicable), the possible need for more extensive surgery, the possible need for future surgery, and the distant possibility of  myocardial infarction, stroke, loss of limb/life. We also discussed the expected benefits and alternatives of the procedure, including the consequences of not proceeding with the surgery, as well as the expected postop course. No guarantees were given nor implied. The procedure has been fully reviewed with the patient and written informed consent has been obtained. We have discussed the perioperative expectations. Having answered these questions and understanding the risks and benefits of the surgery, patient has opted to proceed with the surgery.   · Will continue to follow     Thank you for allowing us to participate in the care of this patient. Feel free to contact if any further questions or assistance needed.    Report Electronically Signed By:  Melva Orellana DPM   Podiatric Medicine & Surgery  [facility]  5/3/2017

## 2017-05-05 NOTE — OP NOTE
[facility]  OPERATIVE NOTE  Date: 5/5/2017   Patient: Flory Cam   Medical Record Number: [id number]   Surgeon: Melva Orellana DPM  Assistant: Virginia De La Rosa LPN   Preoperative Diagnosis: left foot abscess  Postoperative Diagnosis: left foot abscess  Surgical Procedure(s): left foot incision and drainage deep space abscess   Pathology: deep wound cultures pre and post irrigation   Anesthesia:   General  Hemostasis: none  Estimated blood loss: 10 mL  Materials:   1.2-0 and 3-0 nylon suture   Implants:none   Injectables: Post operative block consisting of 10ccs of 0.5% marcaine plain   Findings: consistent with diagnosis  Fluids: 800 mL of Lactated Ringers  Complications: None  Condition: Stable    Indications for Surgery:   Flory Cam is a 71 y.o. female who presents with left foot abscess. The patient's preoperative radiographs and objective clinical findings were reviewed with the patient. Potential risks and complications were discussed with the patient in detail. The patient voiced understanding of such. The patient elected to undergo surgical treatment and accepts risks and possible complications. Preoperative history and physical were reviewed. There were no obvious contraindications noted to the surgical procedure. All the patient's questions were answered. Absolutely no guarantees were given or implied.     Procedure in Detail:   Patient was brought to operating room and placed on table in supine position. A time out was performed to confirm correct surgical procedure and patient identification. Following IV sedation, the left lower extremity was scrubbed, prepped and draped in the usual sterile manner.     Attention was directed to the plantar aspect of the left foot just plantar to fourth and fifth metatarsal shafts.  An incision was made overlying the infection sited deep to the plantar fascia at the fourth and fifth metatarsal and purulent drainage was immediately expressed.  The foot was milked from lateral to medially and purulent drainage was milked from the sites. Deep wound cultures were taken and sent for gram stain, aerobic, anaerobic, and fungus. The wound was evaluated and milked for further purulent drainage. No further necrosis or purulence was noted. There was some bleeding noted. The wound was copiously irrigated with normal saline with 3L of pulse lavage. Post irrigation deep soft tissue cultures were taken and sent for gram stain, aerobic, anaerobic and fungus. The wound was noted to be cleaned and therefore the wound was closed with 2-0 and 3-0 nylon sutures. A dry sterile dressing was then applied to the operative foot.     The patient tolerated the procedure and anesthesia well without complication and was transferred to the recovery room with vital signs stable and vascular status intact to all toes of the left foot. After a period of postoperative monitoring, the patient will be transferred back to the medical/surgery floor.    Report Electronically Signed By:  Melva Orellana DPM   Podiatric Medicine & Surgery  [facility]  5/5/2017  12:37 PM

## 2017-05-05 NOTE — ANESTHESIA POSTPROCEDURE EVALUATION
"Anesthesia Post Evaluation    Patient: Flory Cam    Procedure(s) Performed: Procedure(s) (LRB):  INCISION AND DRAINAGE (I&D), FOOT (Left)    Final Anesthesia Type: general  Patient location during evaluation: PACU  Patient participation: Yes- Able to Participate  Level of consciousness: awake and alert  Post-procedure vital signs: reviewed and stable  Pain management: adequate  Airway patency: patent  PONV status at discharge: No PONV  Anesthetic complications: no      Cardiovascular status: hemodynamically stable  Respiratory status: unassisted  Hydration status: euvolemic  Follow-up not needed.        Visit Vitals    BP (!) 103/57 (BP Location: Right arm, Patient Position: Lying, BP Method: Automatic)    Pulse 74    Temp 36.2 °C (97.2 °F) (Oral)    Resp 18    Ht 5' 6" (1.676 m)    Wt 97.9 kg (215 lb 13.3 oz)    SpO2 95%    Breastfeeding No    BMI 34.84 kg/m2       Pain/Ángel Score: Pain Assessment Performed: Yes (5/5/2017  1:30 PM)  Presence of Pain: complains of pain/discomfort (5/5/2017  1:30 PM)  Pain Rating Prior to Med Admin: 6 (5/5/2017  1:24 PM)  Ángel Score: 9 (5/5/2017  1:30 PM)      "

## 2017-05-05 NOTE — PROGRESS NOTES
[facility] Hospital Medicine  Progress Note    Patient Name: Flory Cam  MRN: [id number]  Patient Class: IP- Inpatient   Admission Date: 5/2/2017  Length of Stay: 1 days  Attending Physician: David Castillo, *  Primary Care Provider: Tayler Ovalle DO        Subjective:     Principal Problem:Foot abscess, left    HPI:  Flory Cam is a 71 y.o. female who presents with PMhx of Coronary Artery Disease s/p CABG, Peripheral Vascular Disease; Hyperlipidemia; SLE discoid lupus; and Hypertension.  Pt initially seen by Dr. Alejandro (Podiatry) for left foot abscess.  Abscess drained by Podiatry outpatient and pt placed on antibiotics due to remaining presence on MRI.  Vascular studies completed with patient referred to Cardiology.  Stenosis, SFA occlusion, and left VIPIN of 0.57 noted per Cardiology.  MRI of left lower extremity showed edema and several abscess.  Angiogram with run off completed per Cardiology today.  Pt admitted to Observation Unit from [facility] and care transferred to Hospital Medicine at the request of Cardiology for further management and evaluation.  Pt maintains compliance with prescribed medications. Pt continues to smoke cigarettes.                  Hospital Course:  70 y/o aaf admitted to the hospital for PVD and left foot infection. Pt has had MRI performed in which was positive for abscess in left foot. She did undergo bedside drainage by Dr Orellana on 4/11/17. Cultures to date reveal negative bacteria. Pt has severe PVD and was taken into surgery by Dr. Cohen on 5/2/17, Aortogram with runoff PTA Right, PTA Peripheral on RIGHT. Patient has occluded LSFA, diffuse disease of left and right illiacs, and 3 vessel run off. Surgery is scheduled Friday, 5/5/17 by Dr Orellana. 5/4- No issues of complaints overnight. Plan for I&D of left foot abscess tomorrow by Podiatry.     Interval History: Pt was seen and examined at bedside. She is s/p  Foot I&D . She is drowsy  And  complaining of nausea most likely due  anesthesia side effect .      Review of Systems   Constitutional: Negative for activity change, appetite change, fatigue and fever.   HENT: Negative for congestion, postnasal drip, sinus pressure, sneezing and sore throat.    Eyes: Negative for redness, itching and visual disturbance.   Respiratory: Negative for cough, chest tightness, shortness of breath and wheezing.    Cardiovascular: Positive for leg swelling. Negative for chest pain and palpitations.   Gastrointestinal: Negative for abdominal distention, abdominal pain, diarrhea, nausea and vomiting.   Endocrine: Negative for cold intolerance and heat intolerance.   Genitourinary: Negative for difficulty urinating, dysuria, flank pain, frequency and urgency.   Musculoskeletal: Negative for back pain.   Skin: Positive for wound. Negative for color change.   Allergic/Immunologic: Negative for environmental allergies and food allergies.   Neurological: Negative for dizziness, weakness and headaches.   Hematological: Does not bruise/bleed easily.   Psychiatric/Behavioral: Negative for confusion and sleep disturbance. The patient is not nervous/anxious.      Objective:     Vital Signs (Most Recent):  Temp: 97.2 °F (36.2 °C) (05/05/17 1527)  Pulse: 74 (05/05/17 1527)  Resp: 18 (05/05/17 1527)  BP: (!) 103/57 (05/05/17 1527)  SpO2: 95 % (05/05/17 1527) Vital Signs (24h Range):  Temp:  [97.1 °F (36.2 °C)-98.5 °F (36.9 °C)] 97.2 °F (36.2 °C)  Pulse:  [] 74  Resp:  [17-21] 18  SpO2:  [94 %-100 %] 95 %  BP: (103-196)/(57-94) 103/57     Weight: 97.9 kg (215 lb 13.3 oz)  Body mass index is 34.84 kg/(m^2).    Intake/Output Summary (Last 24 hours) at 05/05/17 1652  Last data filed at 05/05/17 1445   Gross per 24 hour   Intake             2813 ml   Output               10 ml   Net             2803 ml      Physical Exam   Constitutional: She is oriented to person, place, and time. She appears well-developed and well-nourished.    HENT:   Head: Normocephalic and atraumatic.   Nose: Nose normal.   Mouth/Throat: Oropharynx is clear and moist.   Eyes: Conjunctivae and EOM are normal. Pupils are equal, round, and reactive to light.   Neck: Normal range of motion. Neck supple.   Cardiovascular: Normal rate, regular rhythm, normal heart sounds and intact distal pulses.  Exam reveals no friction rub.    No murmur heard.  Pulses:       Dorsalis pedis pulses are 1+ on the right side, and 1+ on the left side.        Posterior tibial pulses are 1+ on the right side, and 1+ on the left side.   Pulmonary/Chest: Effort normal and breath sounds normal. No respiratory distress.   Abdominal: Soft. Bowel sounds are normal. She exhibits no distension. There is no tenderness.   Musculoskeletal: Normal range of motion. She exhibits no edema, tenderness or deformity.   Neurological: She is alert and oriented to person, place, and time. She has normal reflexes.   Skin: Skin is warm and dry. No erythema.   Healing wounds to bottom of feet.  Skin to BLE with multiple areas of discoloration   Psychiatric: She has a normal mood and affect. Her behavior is normal. Thought content normal.   Nursing note and vitals reviewed.      Significant Labs:   Blood Culture: No results for input(s): LABBLOO in the last 48 hours.  BMP:   Recent Labs  Lab 05/05/17  0427   GLU 99      K 4.5      CO2 25   BUN 15   CREATININE 0.8   CALCIUM 9.6     CBC:   Recent Labs  Lab 05/05/17  0427   WBC 7.80   HGB 14.1   HCT 42.5          Significant Imaging: I have reviewed all pertinent imaging results/findings within the past 24 hours.    Assessment/Plan:      * Foot abscess, left  -Podiatry on board  -MRI of LLE showed edema and multiple abscess  -antibiotics continued  -pt afebrile with WBC normal  -S/P I&D  - resume plavix on d/c plavix      HLD (hyperlipidemia)  -Statin and Zetia continued      HTN (hypertension)  -home medications resumed      Hypothyroid  -Synthroid  continued      Discoid lupus  -Plaquenil continued    Tobacco use  -pt counseled on smoking cessation   -Duonebs prn     CAD (coronary artery disease)  ASA and Plavix continued      Atherosclerotic PVD with intermittent claudication  -s/p Angiogram   -Pletal continued  -Podiatry consulted  -neurovascular checks  -assess vascular site and pedal pulses  -analgesia prn     Plan: Pt can ne d/c tomorrow on po antibiotic for 10d ay  F/u with PCP , Dr Akhtar  And Dr Olsen   VTE Risk Mitigation         Ordered     Low Risk of VTE  Once      05/03/17 1952          David Castillo MD  Department of Hospital Medicine   [facility]

## 2017-05-05 NOTE — PLAN OF CARE
Problem: Patient Care Overview  Goal: Plan of Care Review  Outcome: Ongoing (interventions implemented as appropriate)  erum had a restful night, iv abx infusing, no complaints of pain, remained free from falls, npo after midngiht for sx in am. Will continue to [facility].

## 2017-05-05 NOTE — PLAN OF CARE
Ambulated well in room to stretcher. Escorted to preop via stretcher and accompanied by rn with portable monitor. Dr ely at bedside. Pt marked. Consent signed by dr ely and pt. Dr ely informed pt took plavix yesterday morning and pletal x2 doses yesterday and 1 dose this am at 0842. Plan to proceed with surgery as scheduled.

## 2017-05-06 VITALS
OXYGEN SATURATION: 94 % | HEIGHT: 66 IN | HEART RATE: 81 BPM | WEIGHT: 215.81 LBS | BODY MASS INDEX: 34.68 KG/M2 | TEMPERATURE: 98 F | RESPIRATION RATE: 18 BRPM | SYSTOLIC BLOOD PRESSURE: 149 MMHG | DIASTOLIC BLOOD PRESSURE: 58 MMHG

## 2017-05-06 PROCEDURE — 63600175 PHARM REV CODE 636 W HCPCS: Performed by: INTERNAL MEDICINE

## 2017-05-06 PROCEDURE — 94761 N-INVAS EAR/PLS OXIMETRY MLT: CPT

## 2017-05-06 PROCEDURE — 25000003 PHARM REV CODE 250: Performed by: INTERNAL MEDICINE

## 2017-05-06 PROCEDURE — 94799 UNLISTED PULMONARY SVC/PX: CPT

## 2017-05-06 PROCEDURE — G8979 MOBILITY GOAL STATUS: HCPCS | Mod: CJ

## 2017-05-06 PROCEDURE — G8978 MOBILITY CURRENT STATUS: HCPCS | Mod: CL

## 2017-05-06 PROCEDURE — 97162 PT EVAL MOD COMPLEX 30 MIN: CPT

## 2017-05-06 PROCEDURE — 27000221 HC OXYGEN, UP TO 24 HOURS

## 2017-05-06 PROCEDURE — 99900035 HC TECH TIME PER 15 MIN (STAT)

## 2017-05-06 RX ORDER — ONDANSETRON 4 MG/1
4 TABLET, FILM COATED ORAL EVERY 6 HOURS PRN
Qty: 20 TABLET | Refills: 1 | Status: SHIPPED | OUTPATIENT
Start: 2017-05-06 | End: 2017-05-11

## 2017-05-06 RX ORDER — CLINDAMYCIN HYDROCHLORIDE 300 MG/1
300 CAPSULE ORAL 4 TIMES DAILY
Qty: 40 CAPSULE | Refills: 0 | Status: SHIPPED | OUTPATIENT
Start: 2017-05-06 | End: 2017-05-06

## 2017-05-06 RX ORDER — CLINDAMYCIN HYDROCHLORIDE 300 MG/1
300 CAPSULE ORAL 4 TIMES DAILY
Qty: 40 CAPSULE | Refills: 0 | Status: SHIPPED | OUTPATIENT
Start: 2017-05-06 | End: 2017-05-08 | Stop reason: SINTOL

## 2017-05-06 RX ADMIN — EZETIMIBE 10 MG: 10 TABLET ORAL at 09:05

## 2017-05-06 RX ADMIN — VALSARTAN 320 MG: 80 TABLET, FILM COATED ORAL at 09:05

## 2017-05-06 RX ADMIN — HYDROXYCHLOROQUINE SULFATE 200 MG: 200 TABLET, FILM COATED ORAL at 09:05

## 2017-05-06 RX ADMIN — ATORVASTATIN CALCIUM 80 MG: 40 TABLET, FILM COATED ORAL at 09:05

## 2017-05-06 RX ADMIN — METRONIDAZOLE 500 MG: 500 INJECTION, SOLUTION INTRAVENOUS at 11:05

## 2017-05-06 RX ADMIN — LINEZOLID 600 MG: 600 INJECTION, SOLUTION INTRAVENOUS at 01:05

## 2017-05-06 RX ADMIN — LEVOTHYROXINE SODIUM 137 MCG: 25 TABLET ORAL at 09:05

## 2017-05-06 RX ADMIN — CEFEPIME HYDROCHLORIDE 1 G: 1 INJECTION, POWDER, FOR SOLUTION INTRAMUSCULAR; INTRAVENOUS at 06:05

## 2017-05-06 RX ADMIN — METRONIDAZOLE 500 MG: 500 INJECTION, SOLUTION INTRAVENOUS at 03:05

## 2017-05-06 RX ADMIN — LINEZOLID 600 MG: 600 INJECTION, SOLUTION INTRAVENOUS at 12:05

## 2017-05-06 RX ADMIN — AMLODIPINE BESYLATE 10 MG: 10 TABLET ORAL at 09:05

## 2017-05-06 RX ADMIN — PANTOPRAZOLE SODIUM 40 MG: 40 TABLET, DELAYED RELEASE ORAL at 09:05

## 2017-05-06 RX ADMIN — CILOSTAZOL 100 MG: 100 TABLET ORAL at 09:05

## 2017-05-06 NOTE — DISCHARGE SUMMARY
[facility] Hospital Medicine  Discharge Summary      Patient Name: Flory Cam  MRN: [id number]  Admission Date: 5/2/2017  Hospital Length of Stay: 2 days  Discharge Date and Time:  1830, 06 May, 2017  Attending Physician: Brendon Carbone MD   Discharging Provider: Brendon Carbone MD  Primary Care Provider: Tayler Ovalle DO      HPI:   Flory Cma is a 71 y.o. female who presents with PMhx of Coronary Artery Disease s/p CABG, Peripheral Vascular Disease; Hyperlipidemia; SLE discoid lupus; and Hypertension.  Pt initially seen by Dr. Alejandro (Podiatry) for left foot abscess.  Abscess drained by Podiatry outpatient and pt placed on antibiotics due to remaining presence on MRI.  Vascular studies completed with patient referred to Cardiology.  Stenosis, SFA occlusion, and left VIPIN of 0.57 noted per Cardiology.  MRI of left lower extremity showed edema and several abscess.  Angiogram with run off completed per Cardiology today.  Pt admitted to Observation Unit from [facility] and care transferred to Hospital Medicine at the request of Cardiology for further management and evaluation.  Pt maintains compliance with prescribed medications. Pt continues to smoke cigarettes.                  Procedure(s) (LRB):  INCISION AND DRAINAGE (I&D), FOOT (Left)      Indwelling Lines/Drains at time of discharge:   Lines/Drains/Airways          No matching active lines, drains, or airways        Hospital Course:   72 y/o aaf admitted to the hospital for PVD and left foot infection. Pt has had MRI performed in which was positive for abscess in left foot. She did undergo bedside drainage by Dr Orellana on 4/11/17. Cultures to date reveal negative bacteria. Pt has severe PVD and was taken into surgery by Dr. Cohen on 5/2/17, Aortogram with runoff PTA Right, PTA Peripheral on RIGHT. Patient has occluded LSFA, diffuse disease of left and right illiacs, and 3 vessel run off.  I&D of left foot abscess by  Podiatry, Dr. Orellana, on 05 May.  Treated with Cefepime, Linezolid, and Metronidazole.  Anaerobic cultures negative.  Changed to Clindamycin for 10 days at discharge.  Follow up plan with Podiatry for wound care and to remain non-weight bearing on the left foot.  Arranged wheelchair for Monday at home and crutches for ambulation until then.  I have seen and examined Ms. Cam on the day of discharge and deemed her appropriate to return home.       Consults:   Consults         Status Ordering Provider     Inpatient consult to Podiatry  Once     Provider:  Melva Orellana DPM    Completed CHANCE VELASQUEZ.     Inpatient consult to Social Work  Once     Provider:  (Not yet assigned)    Acknowledged JULIAN CONNER     Inpatient consult to Social Work  Once     Provider:  (Not yet assigned)    Acknowledged JULIAN CONNER          Significant Diagnostic Studies: Labs:   CMP   Recent Labs  Lab 05/05/17  0427      K 4.5      CO2 25   GLU 99   BUN 15   CREATININE 0.8   CALCIUM 9.6   PROT 7.3   ALBUMIN 3.8   BILITOT 0.4   ALKPHOS 177*   AST 25   ALT 24   ANIONGAP 11   ESTGFRAFRICA >60   EGFRNONAA >60    and CBC   Recent Labs  Lab 05/05/17 0427   WBC 7.80   HGB 14.1   HCT 42.5          Pending Diagnostic Studies:     None        Final Active Diagnoses:    Diagnosis Date Noted POA    PRINCIPAL PROBLEM:  Foot abscess, left [L02.612] 04/27/2017 Yes    Abscess of left foot [L02.612] 05/04/2017 Yes    Atherosclerotic PVD with intermittent claudication [I73.9] 11/17/2015 Yes    Tobacco use [Z72.0] 10/08/2015 Yes    CAD (coronary artery disease) [I25.10] 10/08/2015 Yes    Discoid lupus [L93.0] 02/26/2014 Yes    HLD (hyperlipidemia) [E78.5] 09/30/2013 Yes    HTN (hypertension) [I10] 09/30/2013 Yes    Hypothyroid [E03.9] 09/30/2013 Yes      Problems Resolved During this Admission:    Diagnosis Date Noted Date Resolved POA      No new Assessment & Plan notes have been filed under this hospital  "service since the last note was generated.  Service: Hospital Medicine      Discharged Condition: good    Disposition: Home-Health Care [place]    Follow Up:  Follow-up Information     Follow up with Tayler Ovalle DO In 1 week.    Specialty:  Internal Medicine    Why:  Hospital follow up    Contact information:    [id number] [place] [address] [address] [place] [place] [id number]  [phone]          Follow up with Melva Orellana DPM In 1 week.    Specialty:  Podiatry    Why:  Wound care and hospital follow up.    Contact information:    [address]  [place] [place] [address] [phone]          Follow up with Bessie Cohen MD In 2 weeks.    Specialty:  Cardiology    Why:  Hospital follow up    Contact information:    [address] [address] [phone]          Follow up with [facility] - . Call today.    Specialty:  Home Health Services    Why:  Please call your home [facility] agency when you arrive home today.     Contact information:    [address]  [address]  [place] [address] [phone]          Patient Instructions:     WHEELCHAIR FOR HOME USE   Order Specific Question Answer Comments   Hours in W/C per day: 12    Type of Wheelchair: Standard    Size(Width): 18"(STD adult)    Leg Support: STD footrests    Lap Belt: Velcro    Cushion: Basic    Height: 5' 6" (1.676 m)    Weight: 97.9 kg (215 lb 13.3 oz)    Does patient have medical equipment at home? none    Length of need (1-99 months): 3    Please check all that apply: Patient mobility limitations cannot be sufficiently resolved by the use of other ambulatory therapies.      Diet Cardiac     Weight bearing restrictions (specify)   Scheduling Instructions: Non-weight bearing left foot       Medications:  Reconciled Home Medications:   Current Discharge Medication List      START taking these medications    Details   clindamycin (CLEOCIN) 300 MG capsule Take 1 capsule (300 mg total) by mouth 4 (four) times daily.  Qty: 40 capsule, " Refills: 0      ondansetron (ZOFRAN) 4 MG tablet Take 1 tablet (4 mg total) by mouth every 6 (six) hours as needed for Nausea.  Qty: 20 tablet, Refills: 1         CONTINUE these medications which have NOT CHANGED    Details   amlodipine (NORVASC) 10 MG tablet take 1 tablet by mouth once daily  Qty: 30 tablet, Refills: 6      atorvastatin (LIPITOR) 80 MG tablet Take 1 tablet (80 mg total) by mouth once daily.  Qty: 90 tablet, Refills: 0    Comments: **Patient requests 90 days supply**      cilostazol (PLETAL) 100 MG Tab Take 1 tablet (100 mg total) by mouth 2 (two) times daily.  Qty: 60 tablet, Refills: 6    Associated Diagnoses: PAD (peripheral artery disease)      clopidogrel (PLAVIX) 75 mg tablet take 1 tablet by mouth once daily  Qty: 90 tablet, Refills: 3    Associated Diagnoses: PAD (peripheral artery disease)      esomeprazole (NEXIUM) 40 MG capsule Take 1 capsule (40 mg total) by mouth before breakfast.  Qty: 90 capsule, Refills: 1    Comments: **Patient requests 90 days supply**      hydrocodone-acetaminophen 10-325mg (NORCO)  mg Tab Take  mg by mouth. 1 Tablet Oral Four times a day.  narcotic warning, contains tylenol      hydroxychloroquine (PLAQUENIL) 200 mg tablet Take 1 tablet (200 mg total) by mouth 2 (two) times daily.  Qty: 180 tablet, Refills: 0    Comments: **Patient requests 90 days supply**  Associated Diagnoses: Discoid lupus erythematosus; Rheumatoid arthritis involving both feet with positive rheumatoid factor      levothyroxine (SYNTHROID) 137 MCG Tab tablet take 1 tablet by mouth once daily BEFORE BREAKFAST  Qty: 30 tablet, Refills: 11      ZETIA 10 mg tablet take 1 tablet by mouth once daily  Qty: 90 tablet, Refills: 1      trazodone (DESYREL) 50 MG tablet Take 1 tablet (50 mg total) by mouth nightly as needed for Insomnia.  Qty: 30 tablet, Refills: 3      valsartan (DIOVAN) 320 MG tablet take 1 tablet by mouth once daily  Qty: 90 tablet, Refills: 2    Associated Diagnoses:  Essential hypertension           Time spent on the discharge of patient: 30 minutes    Brendon Carbone MD  Department of Hospital Medicine  [facility]

## 2017-05-06 NOTE — PLAN OF CARE
Problem: Patient Care Overview  Goal: Plan of Care Review  Outcome: Ongoing (interventions implemented as appropriate)  POC reviewed with patient. Indications for medications and possible side effects explained. Pt verbalized understanding. Antibiotics administered per order without any adverse reactions noted. Dressing to LLE dry and intact. Toes warm, without discoloration, and cap refill <3 secs. Pt c/o nausea and vomiting after arriving on the unit. Zofran administered. Pt reported moderate relief. VS stable. Pt resting quietly; respirations even and unlabored. Will continue to monitor. 24 hour chart check complete.

## 2017-05-06 NOTE — PT/OT/SLP EVAL
Physical Therapy  Evaluation    Flory Cam   MRN: [id number]   Admitting Diagnosis: Foot abscess, left    PT Received On: 05/06/17  PT Start Time: 0845     PT Stop Time: 0922    PT Total Time (min): 37 min       Billable Minutes:  Evaluation 37    Diagnosis: Foot abscess, left      Past Medical History:   Diagnosis Date    Acute coronary syndrome     Anxiety     Bilateral carotid artery stenosis 11/17/2015    Chronic pain     Coronary artery disease     GERD (gastroesophageal reflux disease)     Hyperlipidemia     Hypertension     Hypothyroidism     Low back pain     Lupus     Osteoporosis     Poor circulation     PVD (peripheral vascular disease)     S/P peripheral artery angioplasty 02/13/2014    Skin disease       Past Surgical History:   Procedure Laterality Date    COLONOSCOPY N/A 1/4/2017    Procedure: COLONOSCOPY;  Surgeon: Sylvester Arboleda III, MD;  Location: [facility];  Service: Endoscopy;  Laterality: N/A;    CORONARY ANGIOPLASTY      CORONARY ARTERY BYPASS GRAFT      HYSTERECTOMY      THYROIDECTOMY         Referring physician: Dr Carbone  Date referred to PT: 5/5/17    General Precautions: Standard, fall  Orthopedic Precautions: LLE non weight bearing   Braces:              Patient History:  Lives With: alone (niece is staying with her)  Living Arrangements: apartment  Home Accessibility:  (wheelchair accessible first floor apartment)  Home Layout: Able to live on 1st floor  Stair Railings at Home: none  Transportation Available: car, public transportation  Equipment Currently Used at Home: none  DME owned (not currently used): none    Previous Level of Function:  Ambulation Skills: independent  Transfer Skills: independent  ADL Skills: independent  Work/Leisure Activity: independent    Subjective:  Communicated with Epic and Farrand prior to session.    Chief Complaint: I am not going to be able to hop  Patient goals: to go home    Pain Rating: 3/10   Location - Side: Left      Location: foot     Pain Rating Post-Intervention: 3/10    Objective:   Patient found with: peripheral IV     Cognitive Exam:  Oriented to: Person, Place, Time and Situation    Follows Commands/attention: Follows multistep  commands  Communication: clear/fluent  Safety awareness/insight to disability: intact    Physical Exam:  Postural examination/scapula alignment: No postural abnormalities identified    Skin integrity: Wound  Covered left foot  Edema: None noted     Sensation:   Intact    Upper Extremity Range of Motion:  Right Upper Extremity: WFL  Left Upper Extremity: WFL    Upper Extremity Strength:  Right Upper Extremity: WFL  Left Upper Extremity: WFL    Lower Extremity Range of Motion:  Right Lower Extremity: WFL  Left Lower Extremity: Deficits: decrease DF ROM    Lower Extremity Strength:  Right Lower Extremity: WFL except hip strength 3/5  Left Lower Extremity: WFL except hip strength 3/5     Fine motor coordination:  Intact    Gross motor coordination: WFL    Functional Mobility:  Bed Mobility:  Rolling/Turning to Left: Supervision  Rolling/Turning Right: Supervision  Scooting/Bridging: Supervision  Supine to Sit: Supervision    Transfers:  Sit <> Stand Assistance: Minimum Assistance  Sit <> Stand Assistive Device: Rolling Walker  Bed <> Chair Technique: Stand Pivot  Bed <> Chair Assistance: Minimum Assistance  Bed <> Chair Assistive Device: Rolling Walker    Gait:   Gait Distance:  (3')  Assistance 1: Minimum assistance  Gait Assistive Device: Rolling walker  Gait Pattern: non-weightbearing  Gait Deviation(s): decreased toe-to-floor clearance    Stairs:      Balance:   Static Sit: GOOD: Takes MODERATE challenges from all directions  Dynamic Sit: GOOD: Maintains balance through MODERATE excursions of active trunk movement  Static Stand: FAIR: Maintains without assist but unable to take challenges  Dynamic stand: FAIR: Needs CONTACT GUARD during gait    Therapeutic Activities and Exercises:  Sit to stand  from bed and chair min A.  Able to ambulate/hop 3 steps forward, patient state difficult and fear of falling.    AM-PAC 6 CLICK MOBILITY  How much help from another person does this patient currently need?   1 = Unable, Total/Dependent Assistance  2 = A lot, Maximum/Moderate Assistance  3 = A little, Minimum/Contact Guard/Supervision  4 = None, Modified [place]/Independent          AM-PAC Raw Score CMS G-Code Modifier Level of Impairment Assistance   6 % Total / Unable   7 - 9 CM 80 - 100% Maximal Assist   10 - 14 CL 60 - 80% Moderate Assist   15 - 19 CK 40 - 60% Moderate Assist   20 - 22 CJ 20 - 40% Minimal Assist   23 CI 1-20% SBA / CGA   24 CH 0% Independent/ Mod I     Patient left up in chair, left foot elevated with all lines intact, call button in reach and Farrand notified.    Assessment:   Flory Cam is a 71 y.o. female with a medical diagnosis of Foot abscess, left and presents with gait instability and decreased functional mobility.    Rehab identified problem list/impairments: Rehab identified problem list/impairments: weakness, impaired functional mobilty, impaired balance, gait instability, pain    Rehab potential is good.    Activity tolerance: Good    Discharge recommendations: Discharge Facility/Level Of Care Needs: home health PT (home health versus LTAC based on wound)     Barriers to discharge: Barriers to Discharge: Decreased caregiver support    Equipment recommendations: Equipment Needed After Discharge: walker, rolling, wheelchair     GOALS:   Physical Therapy Goals        Problem: Physical Therapy Goal    Goal Priority Disciplines Outcome Goal Variances Interventions   Physical Therapy Goal     PT/OT, PT Ongoing (interventions implemented as appropriate)               PLAN:    Patient to be seen  (pt will be seen a min of 5-7 days per week as tolerated) to address the above listed problems via gait training, therapeutic activities, therapeutic exercises  Plan of Care  expires: 05/13/17  Plan of Care reviewed with: patient          Milvia Nanci, PT  05/06/2017

## 2017-05-06 NOTE — PLAN OF CARE
CM consult noted for Home Health and E; Ownership disclosure form completed and signed. Michelle  notified of referral- spoke to Obdul[facility] on call nurse, notified of discharge today.  Order for wheel chair faxed to [facility]-[phone]; As per [facility] wheelchair will not be delivered until Monday 5/8/17; informed MD Carbone and patient of wheelchair delivery date. As per Dr. Carbone, he will order for patient to have crutches until wheelchair arrives.

## 2017-05-06 NOTE — NURSING
Pt instructed to make cards and PCP appt, NWRORY to [facility], w/c will be delivered on Monday, when to f/u c Dr. Orellana, and that podiatry will handle dressing changes. Will call when ride arrives.

## 2017-05-06 NOTE — PLAN OF CARE
Problem: Physical Therapy Goal  Goal: Physical Therapy Goal  Outcome: Ongoing (interventions implemented as appropriate)  Goals to be achieved within 7 days  1.  Pt will t/f sit=stand Mod I.  2.  Pt will t/f chair=bed Mod I.  3.  Pt will ambulate 10' with RW maintaining NWB status left LE.    4.  Pt will be mod I with bed mobility.

## 2017-05-06 NOTE — PLAN OF CARE
Problem: Patient Care Overview  Goal: Plan of Care Review  Outcome: Ongoing (interventions implemented as appropriate)  Pt on room air tolerating well. No distress noted at this time. RT educated patient about IS and its uses. IS X 10 @ 1000 per patient tolerance at this time. Please reinforce patient education with continued attempts.   Thank you much, Respiratory.

## 2017-05-08 ENCOUNTER — TELEPHONE (OUTPATIENT)
Dept: PODIATRY | Facility: CLINIC | Age: 72
End: 2017-05-08

## 2017-05-08 ENCOUNTER — PATIENT OUTREACH (OUTPATIENT)
Dept: ADMINISTRATIVE | Facility: CLINIC | Age: 72
End: 2017-05-08
Payer: MEDICARE

## 2017-05-08 LAB — BACTERIA SPEC AEROBE CULT: NORMAL

## 2017-05-08 RX ORDER — SULFAMETHOXAZOLE AND TRIMETHOPRIM 800; 160 MG/1; MG/1
1 TABLET ORAL 2 TIMES DAILY
Qty: 20 TABLET | Refills: 0 | Status: SHIPPED | OUTPATIENT
Start: 2017-05-08 | End: 2017-05-18

## 2017-05-08 NOTE — TELEPHONE ENCOUNTER
Patient made aware of new antibiotic prescription called into [facility] pharmacy on file. Verbalized understanding and states that she will talk to Dr. Esteban DPM at her post-op appointment about other concerns.

## 2017-05-08 NOTE — TELEPHONE ENCOUNTER
----- Message from Malka Weinberg sent at 5/8/2017  9:11 AM CDT -----  PLEASE CALL PT BACK [phone] CONCERNING HER DISCHARGE.

## 2017-05-08 NOTE — PATIENT INSTRUCTIONS
Abscess (Incision & Drainage)  An abscess (sometimes called a boil) occurs when bacteria get trapped under the skin and start to grow. Pus forms inside the abscess as the body responds to the bacteria. An abscess can happen with an insect bite, ingrown hair, blocked oil gland, pimple, cyst, or puncture wound.  Your healthcare provider has drained the pus from your abscess. If the abscess pocket was large, your healthcare provider may have inserted gauze packing. Your provider will need to remove and possibly replace it on your next visit. You may not need antibiotics to treat a simple abscess, unless the infection is spreading into the skin around the wound (cellulitis).  Healing of the wound will take about 1 to 2 weeks, depending on the size of the abscess. Healthy tissue will grow from the bottom and sides of the opening until it seals over.  Home care  These tips can help your wound heal:  · The wound may drain for the first 2 days. Cover the wound with a clean dry dressing. If the dressing becomes soaked with blood or pus, change it.  · If a gauze packing was placed inside the abscess cavity, you may be told to remove it yourself. You may do this in the shower. Once the packing is removed, you should wash the area in the shower or bath 3 to 4 times a day, until the skin opening has closed. Make sure you wash your hands after changing the packing or cleaning the wound.  · If you were prescribed antibiotics, take them as directed until they are all gone.  · You may use acetaminophen or ibuprofen to control pain, unless another pain medicine was prescribed. If you have liver disease or ever had a stomach ulcer, talk with your doctor before using these medicines.  Follow-up care  Follow up with your healthcare provider, or as advised. If a gauze packing was inserted in your wound, it should be removed in 1 to 2 days. Check your wound every day for the signs of worsening infection listed below.  When to seek  medical advice  Call your healthcare provider right away if any of these occur:  · Increasing redness or swelling  · Red streaks in the skin leading away from the wound  · Increasing local pain or swelling  · Continued pus draining from the wound 2 days after treatment  · Fever of 100.4ºF (38ºC) or higher, or as directed by your healthcare provider  · Boil returns when you are at home  Date Last Reviewed: 9/1/2016  © 5198-7032 [employer]. [address] [place] [address]. All rights reserved. This information is not intended as a substitute for professional medical care. Always follow your healthcare professional's instructions.

## 2017-05-08 NOTE — TELEPHONE ENCOUNTER
----- Message from Elissa Tejeda DPM sent at 5/8/2017  1:29 PM CDT -----  She may discontinue clindamycin and begin bactrim which has been called in to her pharmacy.    ----- Message -----     From: Virginia De La Rosa LPN     Sent: 5/8/2017  10:49 AM       To: Elissa Tejeda DPM    States that she is having severe diarrhea since she started taking Clindamycin. Please advise.

## 2017-05-08 NOTE — TELEPHONE ENCOUNTER
Spoke to patient who states that she has been having severe diarrhea since starting Clindamycin. Denies fever, chills, nausea/vomiting. Informed that I would consult with on-call podiatrist and contact her once new orders are received. Educated that she should stop taking Clindamycin until new orders are received due to reaction. Fluids encouraged as tolerated. Verbalized understanding.

## 2017-05-09 ENCOUNTER — OFFICE VISIT (OUTPATIENT)
Dept: PODIATRY | Facility: CLINIC | Age: 72
End: 2017-05-09
Payer: MEDICARE

## 2017-05-09 VITALS
SYSTOLIC BLOOD PRESSURE: 135 MMHG | BODY MASS INDEX: 34.68 KG/M2 | HEART RATE: 78 BPM | TEMPERATURE: 99 F | WEIGHT: 215.81 LBS | DIASTOLIC BLOOD PRESSURE: 86 MMHG | HEIGHT: 66 IN

## 2017-05-09 DIAGNOSIS — L02.612 ABSCESS OF LEFT FOOT: Primary | ICD-10-CM

## 2017-05-09 PROCEDURE — 99024 POSTOP FOLLOW-UP VISIT: CPT | Mod: ,,, | Performed by: PODIATRIST

## 2017-05-09 PROCEDURE — 99213 OFFICE O/P EST LOW 20 MIN: CPT | Mod: PBBFAC,PO | Performed by: PODIATRIST

## 2017-05-09 PROCEDURE — 99999 PR PBB SHADOW E&M-EST. PATIENT-LVL III: CPT | Mod: PBBFAC,,, | Performed by: PODIATRIST

## 2017-05-09 NOTE — MR AVS SNAPSHOT
[facility]  [address] [place] [address]  Phone: [phone]  Fax: [phone]                  Flory Cam   2017 7:40 AM   Office Visit    Description:  Female : 1945   Provider:  Melva Orellana DPM   Department:  [facility]           Reason for Visit     Post-op Evaluation                To Do List           Future Appointments        Provider Department Dept Phone    2017 1:30 PM TRACIE Deng [facility] [phone]    2017 3:00 PM Melva Orellana DPM [facility] [phone]    2017 8:10 AM [address] [facility] [phone]    2017 10:40 AM Tayler Ovalle DO [facility] - Internal Medicine [phone]      Goals (5 Years of Data)     None      [facility]     [facility] Nurse Care Line -  Assistance  Unless otherwise directed by your provider, please contact [facility], our nurse care line that is available for  assistance.     Registered nurses in the [facility] provide: appointment scheduling, clinical advisement, health education, and other advisory services.  Call: [phone] (toll free)               Medications           Message regarding Medications     Verify the changes and/or additions to your medication regime listed below are the same as discussed with your clinician today.  If any of these changes or additions are incorrect, please notify your healthcare provider.             Verify that the below list of medications is an accurate representation of the medications you are currently taking.  If none reported, the list may be blank. If incorrect, please contact your healthcare provider. Carry this list with you in case of emergency.           Current Medications     amlodipine (NORVASC) 10 MG tablet take 1 tablet by mouth once daily    atorvastatin (LIPITOR) 80 MG tablet Take 1 tablet (80 mg total) by mouth once daily.    cilostazol (PLETAL)  "100 MG Tab Take 1 tablet (100 mg total) by mouth 2 (two) times daily.    clopidogrel (PLAVIX) 75 mg tablet take 1 tablet by mouth once daily    hydrocodone-acetaminophen 10-325mg (NORCO)  mg Tab Take  mg by mouth. 1 Tablet Oral Four times a day.  narcotic warning, contains tylenol    hydroxychloroquine (PLAQUENIL) 200 mg tablet Take 1 tablet (200 mg total) by mouth 2 (two) times daily.    levothyroxine (SYNTHROID) 137 MCG Tab tablet take 1 tablet by mouth once daily BEFORE BREAKFAST    ondansetron (ZOFRAN) 4 MG tablet Take 1 tablet (4 mg total) by mouth every 6 (six) hours as needed for Nausea.    trazodone (DESYREL) 50 MG tablet Take 1 tablet (50 mg total) by mouth nightly as needed for Insomnia.    valsartan (DIOVAN) 320 MG tablet take 1 tablet by mouth once daily    ZETIA 10 mg tablet take 1 tablet by mouth once daily    esomeprazole (NEXIUM) 40 MG capsule Take 1 capsule (40 mg total) by mouth before breakfast.    sulfamethoxazole-trimethoprim 800-160mg (BACTRIM DS) 800-160 mg Tab Take 1 tablet by mouth 2 (two) times daily.           Clinical Reference Information           Your Vitals Were     BP Pulse Temp Height Weight BMI    135/86 (BP Location: Right arm, Patient Position: Sitting, BP Method: Automatic) 78 98.6 °F (37 °C) (Oral) 5' 6" (1.676 m) 97.9 kg (215 lb 13.3 oz) 34.84 kg/m2      Blood Pressure          Most Recent Value    BP  135/86      Allergies as of 5/9/2017     No Known Allergies      Immunizations Administered on Date of Encounter - 5/9/2017     None      [facility] Sign-Up     Activating your [facility] account is as easy as 1-2-3!     1) Visit [email or web address], select Sign Up Now, enter this activation code and your date of birth, then select Next.  [email or web address]  Expires: 5/26/2017 10:21 AM      2) Create a username and password to use when you visit [facility] in the future and select a security question in case you lose your password and select Next.    3) Enter your e-mail " address and click Sign Up!    Additional Information  If you have questions, please e-mail [email or web address] or call [phone] to talk to our [facility] staff. Remember, [facility] is NOT to be used for urgent needs. For medical emergencies, dial 911.         Language Assistance Services     ATTENTION: Language assistance services are available, free of charge. Please call [phone].      ATENCIÓN: Si habla ke, tiene a márquez disposición servicios gratuitos de asistencia lingüística. Llame al [phone].     CHÚ Ý: N?u b?n nói Ti?ng Vi?t, có các d?ch v? h? tr? ngôn ng? mi?n phí dành cho b?n. G?i s? [phone].         [facility] complies with applicable Federal civil rights laws and does not discriminate on the basis of race, color, national origin, age, disability, or sex.

## 2017-05-09 NOTE — PROGRESS NOTES
"PODIATRY PROGRESS NOTE  Chief Complaint   Patient presents with    Post-op Evaluation     1 week post-op DOS 5/5/17 Left foot I&D.         SUBJECTIVE   Flory Cam is a 71 y.o. female status post left foot I&D secondary to abscess , DOS 5/5/17. Pt is appx 1 week(s) post operatively. Pain is well controlled with pain medications. Pt has been WBAT.  States  has kept dressing clean/dry; Pt denies fever, chills, nausea, and/or vomiting.     She does admit to diarrhea. She stopped clindamycin and contacted on call podiatrist who prescribed Bactrim. she has not started Bactrim but will  medication today. Pt has no new complaint(s).     OBJECTIVE   Vitals:    05/09/17 0813   BP: 135/86   Pulse: 78   Temp: 98.6 °F (37 °C)   TempSrc: Oral   Weight: 97.9 kg (215 lb 13.3 oz)   Height: 5' 6" (1.676 m)   PainSc:   4   PainLoc: Foot       Neurovascular status - vascular status grossly intact.   Surgical incision well coapted with sutures plantar left foot   Pins - N/A   Erythema - none  Edema - none  Warmth - no increase in skin temp from prox to distally b/l   TTP - mild at the incision site consistent with po course  ROM - good, pain free    Dehiscence- none  Drainage- none    MICRO: Staph Lugdunesis     ASSESSMENT  Flory Cam is a 71 y.o. female s/p left foot incision and drainage 2/2 to abscess, +Staph, DOS 5/5/17 with appropriate post operative recovery course.    PLAN  - Weight bearing status - heel WBAT   - Ambulatory aids - crutches/wheelchair/walker  - Dressing - Dressing change was performed. Pt instructed to keep clean, dry, intact. Do not get wet.   - Follow up - RTC as scheduled     Future Appointments  Date Time Provider Department Center   5/11/2017 1:30 PM TRACIE Deng [facility]   5/18/2017 3:00 PM Melva Orellana DPM [facility]   8/4/2017 8:10 AM [facility]ATORY, [facility]   8/7/2017 10:40 AM Tayler Ovalle DO [facility]       Report Electronically " Signed By:  Melva Orellana DPM   Podiatric Medicine & Surgery  [facility]  5/9/2017

## 2017-05-10 LAB — BACTERIA SPEC AEROBE CULT: NORMAL

## 2017-05-11 ENCOUNTER — DOCUMENTATION ONLY (OUTPATIENT)
Dept: CARDIOLOGY | Facility: CLINIC | Age: 72
End: 2017-05-11

## 2017-05-11 LAB
BACTERIA SPEC ANAEROBE CULT: NORMAL
BACTERIA SPEC ANAEROBE CULT: NORMAL

## 2017-05-15 NOTE — PROGRESS NOTES
Contacted [employer] regarding Medication Alert--- Control # [id number]  Patient was prescribed a 10 day therapy of Bactrim by another Provider with lab work and blood pressure follow-up

## 2017-05-18 ENCOUNTER — OFFICE VISIT (OUTPATIENT)
Dept: PODIATRY | Facility: CLINIC | Age: 72
End: 2017-05-18
Payer: MEDICARE

## 2017-05-18 VITALS
HEIGHT: 66 IN | SYSTOLIC BLOOD PRESSURE: 141 MMHG | HEART RATE: 64 BPM | DIASTOLIC BLOOD PRESSURE: 82 MMHG | WEIGHT: 215.81 LBS | BODY MASS INDEX: 34.68 KG/M2

## 2017-05-18 DIAGNOSIS — L02.612 ABSCESS OF LEFT FOOT: Primary | ICD-10-CM

## 2017-05-18 PROCEDURE — 99213 OFFICE O/P EST LOW 20 MIN: CPT | Mod: PBBFAC,PO | Performed by: PODIATRIST

## 2017-05-18 PROCEDURE — 99999 PR PBB SHADOW E&M-EST. PATIENT-LVL III: CPT | Mod: PBBFAC,,, | Performed by: PODIATRIST

## 2017-05-18 PROCEDURE — 99024 POSTOP FOLLOW-UP VISIT: CPT | Mod: ,,, | Performed by: PODIATRIST

## 2017-05-18 NOTE — PROGRESS NOTES
"PODIATRY PROGRESS NOTE  Chief Complaint   Patient presents with    Post-op Evaluation     DOS 5/5/17 Left foot I&D         SUBJECTIVE   Flory Cam is a 71 y.o. female status post left foot I&D secondary to abscess , DOS 5/5/17. Pt is appx 2 week(s) post operatively. Pain is well controlled with pain medications. Pt has been WBAT.  States  has kept dressing clean/dry; Pt denies fever, chills, nausea, and/or vomiting.     She does admit to diarrhea. She stopped clindamycin and contacted on call podiatrist who prescribed Bactrim. she has not started Bactrim but will  medication today. Pt has no new complaint(s).     OBJECTIVE   Vitals:    05/18/17 1540   BP: (!) 141/82   Pulse: 64   Weight: 97.9 kg (215 lb 13.3 oz)   Height: 5' 6" (1.676 m)   PainSc: 0-No pain       Neurovascular status - vascular status grossly intact.   Surgical incision well coapted with sutures plantar left foot   Pins - N/A   Erythema - none  Edema - none  Warmth - no increase in skin temp from prox to distally b/l   TTP - mild at the incision site consistent with po course  ROM - good, pain free    Dehiscence- none  Drainage- none    MICRO: Staph Lugdunesis     ASSESSMENT  Flory Cam is a 71 y.o. female s/p left foot incision and drainage 2/2 to abscess, +Staph, DOS 5/5/17 with appropriate post operative recovery course.    PLAN  - Weight bearing status - heel WBAT   - Ambulatory aids - crutches/wheelchair/walker  - Dressing - Dressing change was performed. Pt instructed to keep clean, dry, intact. Do not get wet.   - Follow up - RTC as scheduled     Future Appointments  Date Time Provider Department Center   5/25/2017 1:20 PM Melva Orellana DPM [facility]   8/4/2017 8:10 AM LAB[facility]ATORY, [facility] [facility]   8/7/2017 10:40 AM [facility]       Report Electronically Signed By:  Melva Orellana DPM   Podiatric Medicine & Surgery  [facility]  5/18/2017              "

## 2017-05-18 NOTE — MR AVS SNAPSHOT
[facility]  [address] [place] [address]  Phone: [phone]  Fax: [phone]                  Flory Cam   2017 3:40 PM   Office Visit    Description:  Female : 1945   Provider:  Melva Orellana DPM   Department:  [facility]           Reason for Visit     Post-op Evaluation                To Do List           Future Appointments        Provider Department Dept Phone    2017 1:20 PM Melva Orellana DPM [facility] [phone]    2017 8:10 AM SHIANA, [address] [facility] [phone]    2017 10:40 AM Tayler Ovalle DO [facility] [phone]      Goals (5 Years of Data)     None      [facility]     [facility] Nurse Care Line -  Assistance  Unless otherwise directed by your provider, please contact [facility], our nurse care line that is available for  assistance.     Registered nurses in the [facility] provide: appointment scheduling, clinical advisement, health education, and other advisory services.  Call: [phone] (toll free)               Medications           Message regarding Medications     Verify the changes and/or additions to your medication regime listed below are the same as discussed with your clinician today.  If any of these changes or additions are incorrect, please notify your healthcare provider.             Verify that the below list of medications is an accurate representation of the medications you are currently taking.  If none reported, the list may be blank. If incorrect, please contact your healthcare provider. Carry this list with you in case of emergency.           Current Medications     amlodipine (NORVASC) 10 MG tablet take 1 tablet by mouth once daily    atorvastatin (LIPITOR) 80 MG tablet Take 1 tablet (80 mg total) by mouth once daily.    cilostazol (PLETAL) 100 MG Tab Take 1 tablet (100 mg total) by mouth 2 (two) times daily.     "clopidogrel (PLAVIX) 75 mg tablet take 1 tablet by mouth once daily    esomeprazole (NEXIUM) 40 MG capsule Take 1 capsule (40 mg total) by mouth before breakfast.    hydrocodone-acetaminophen 10-325mg (NORCO)  mg Tab Take  mg by mouth. 1 Tablet Oral Four times a day.  narcotic warning, contains tylenol    hydroxychloroquine (PLAQUENIL) 200 mg tablet Take 1 tablet (200 mg total) by mouth 2 (two) times daily.    levothyroxine (SYNTHROID) 137 MCG Tab tablet take 1 tablet by mouth once daily BEFORE BREAKFAST    sulfamethoxazole-trimethoprim 800-160mg (BACTRIM DS) 800-160 mg Tab Take 1 tablet by mouth 2 (two) times daily.    trazodone (DESYREL) 50 MG tablet Take 1 tablet (50 mg total) by mouth nightly as needed for Insomnia.    valsartan (DIOVAN) 320 MG tablet take 1 tablet by mouth once daily    ZETIA 10 mg tablet take 1 tablet by mouth once daily           Clinical Reference Information           Your Vitals Were     BP Pulse Height Weight BMI    141/82 (BP Location: Right arm, Patient Position: Sitting, BP Method: Automatic) 64 5' 6" (1.676 m) 97.9 kg (215 lb 13.3 oz) 34.84 kg/m2      Blood Pressure          Most Recent Value    BP  (!)  141/82      Allergies as of 5/18/2017     No Known Allergies      Immunizations Administered on Date of Encounter - 5/18/2017     None      [facility] Sign-Up     Activating your [facility] account is as easy as 1-2-3!     1) Visit [email or web address], select Sign Up Now, enter this activation code and your date of birth, then select Next.  [email or web address]  Expires: 5/26/2017 10:21 AM      2) Create a username and password to use when you visit [facility] in the future and select a security question in case you lose your password and select Next.    3) Enter your e-mail address and click Sign Up!    Additional Information  If you have questions, please e-mail [email or web address] or call [phone] to talk to our [facility] staff. Remember, [facility] is NOT to be used for " urgent needs. For medical emergencies, dial 911.         Language Assistance Services     ATTENTION: Language assistance services are available, free of charge. Please call [phone].      ATENCIÓN: Si habla ke, tiene a márquez disposición servicios gratuitos de asistencia lingüística. Llame al [phone].     CHÚ Ý: N?u b?n nói Ti?ng Vi?t, có các d?ch v? h? tr? ngôn ng? mi?n phí dành cho b?n. G?i s? [phone].         [facility] complies with applicable Federal civil rights laws and does not discriminate on the basis of race, color, national origin, age, disability, or sex.

## 2017-05-22 RX ORDER — ALBUTEROL SULFATE 90 UG/1
2 AEROSOL, METERED RESPIRATORY (INHALATION) EVERY 6 HOURS PRN
COMMUNITY
End: 2017-05-22 | Stop reason: SDUPTHER

## 2017-05-22 NOTE — TELEPHONE ENCOUNTER
----- Message from Melani Christo sent at 5/19/2017  4:09 PM CDT -----  Contact: Patient   Refill request for Rx Inhaler, Please call her at [phone].      [facility] [facility] [address] [facility]  [address] [place] [address]  Phone: [phone] Fax: [phone]    Thanks  td

## 2017-05-23 RX ORDER — ALBUTEROL SULFATE 90 UG/1
2 AEROSOL, METERED RESPIRATORY (INHALATION) EVERY 6 HOURS PRN
Qty: 1 INHALER | Refills: 1 | Status: SHIPPED | OUTPATIENT
Start: 2017-05-23 | End: 2019-04-26 | Stop reason: SDUPTHER

## 2017-05-25 ENCOUNTER — OFFICE VISIT (OUTPATIENT)
Dept: PODIATRY | Facility: CLINIC | Age: 72
End: 2017-05-25
Payer: MEDICARE

## 2017-05-25 VITALS
WEIGHT: 215.81 LBS | BODY MASS INDEX: 34.68 KG/M2 | HEIGHT: 66 IN | HEART RATE: 68 BPM | DIASTOLIC BLOOD PRESSURE: 73 MMHG | SYSTOLIC BLOOD PRESSURE: 126 MMHG

## 2017-05-25 DIAGNOSIS — Z09 FOLLOW-UP EXAMINATION FOLLOWING SURGERY: Primary | ICD-10-CM

## 2017-05-25 PROCEDURE — 99213 OFFICE O/P EST LOW 20 MIN: CPT | Mod: PBBFAC,PO | Performed by: PODIATRIST

## 2017-05-25 PROCEDURE — 99999 PR PBB SHADOW E&M-EST. PATIENT-LVL III: CPT | Mod: PBBFAC,,, | Performed by: PODIATRIST

## 2017-05-25 PROCEDURE — 99024 POSTOP FOLLOW-UP VISIT: CPT | Mod: ,,, | Performed by: PODIATRIST

## 2017-05-25 RX ORDER — DICLOFENAC SODIUM 10 MG/G
GEL TOPICAL
COMMUNITY
Start: 2017-03-10 | End: 2017-12-27

## 2017-05-25 RX ORDER — AMMONIUM LACTATE 12 G/100G
LOTION TOPICAL
COMMUNITY
Start: 2017-04-05 | End: 2020-04-17

## 2017-05-25 NOTE — PROGRESS NOTES
"PODIATRY PROGRESS NOTE  Chief Complaint   Patient presents with    Post-op Evaluation     3 wk post I&D DOS 5/5/17. Sutures intact, no redness or irritation noted         SUBJECTIVE   Flory Cam is a 71 y.o. female status post left foot I&D secondary to abscess , DOS 5/5/17. Pt is appx  3 week(s) post operatively. Pain is well controlled with pain medications. Pt has been WBAT.  States  has kept dressing clean/dry; Pt denies fever, chills, nausea, and/or vomiting.       OBJECTIVE   Vitals:    05/25/17 1339   BP: 126/73   Pulse: 68   Weight: 97.9 kg (215 lb 13.3 oz)   Height: 5' 6" (1.676 m)   PainSc: 0-No pain       Neurovascular status - vascular status grossly intact.   Surgical incision well coapted with sutures plantar left foot   Pins - N/A   Erythema - none  Edema - none  Warmth - no increase in skin temp from prox to distally b/l   TTP - mild at the incision site consistent with po course  ROM - good, pain free    Dehiscence- none  Drainage- none    MICRO: Staph Lugdunesis     ASSESSMENT  Flory Cam is a 71 y.o. female s/p left foot incision and drainage 2/2 to abscess, +Staph, DOS 5/5/17 with appropriate post operative recovery course.    PLAN  - Weight bearing status - heel WBAT   - Ambulatory aids - crutches/wheelchair/walker  - Dressing - Dressing change was performed. Sutures removed and dry sterile dressing applied. Pt instructed to keep clean, dry, intact. Do not get wet.   - Follow up - RTC as scheduled     Future Appointments  Date Time Provider Department Center   6/2/2017 12:40 PM Melva Orellana DPM [facility]   8/4/2017 8:10 AM JAQUELINE MERRITT ON LAB [facility]   8/7/2017 10:40 AM DO JULIÁN Mayorga IM [facility]       Report Electronically Signed By:  Melva Orellana DPM   Podiatric Medicine & Surgery  [facility]  5/25/2017              "

## 2017-05-30 ENCOUNTER — OFFICE VISIT (OUTPATIENT)
Dept: CARDIOLOGY | Facility: CLINIC | Age: 72
End: 2017-05-30
Payer: MEDICARE

## 2017-05-30 ENCOUNTER — OFFICE VISIT (OUTPATIENT)
Dept: INTERNAL MEDICINE | Facility: CLINIC | Age: 72
End: 2017-05-30
Payer: MEDICARE

## 2017-05-30 VITALS
DIASTOLIC BLOOD PRESSURE: 76 MMHG | BODY MASS INDEX: 31.71 KG/M2 | SYSTOLIC BLOOD PRESSURE: 124 MMHG | WEIGHT: 202 LBS | HEIGHT: 67 IN | HEART RATE: 74 BPM

## 2017-05-30 VITALS
TEMPERATURE: 100 F | HEIGHT: 67 IN | BODY MASS INDEX: 31.64 KG/M2 | DIASTOLIC BLOOD PRESSURE: 70 MMHG | HEART RATE: 62 BPM | SYSTOLIC BLOOD PRESSURE: 130 MMHG | OXYGEN SATURATION: 97 %

## 2017-05-30 DIAGNOSIS — Z95.1 HX OF CABG: ICD-10-CM

## 2017-05-30 DIAGNOSIS — M32.19 OTHER SYSTEMIC LUPUS ERYTHEMATOSUS WITH OTHER ORGAN INVOLVEMENT: ICD-10-CM

## 2017-05-30 DIAGNOSIS — J06.9 UPPER RESPIRATORY TRACT INFECTION, UNSPECIFIED TYPE: Primary | ICD-10-CM

## 2017-05-30 DIAGNOSIS — I25.10 CORONARY ARTERY DISEASE INVOLVING NATIVE CORONARY ARTERY OF NATIVE HEART WITHOUT ANGINA PECTORIS: ICD-10-CM

## 2017-05-30 DIAGNOSIS — I70.219 ATHEROSCLEROTIC PVD WITH INTERMITTENT CLAUDICATION: ICD-10-CM

## 2017-05-30 DIAGNOSIS — Z72.0 TOBACCO USE: ICD-10-CM

## 2017-05-30 DIAGNOSIS — I10 ESSENTIAL HYPERTENSION: ICD-10-CM

## 2017-05-30 DIAGNOSIS — I65.23 BILATERAL CAROTID ARTERY STENOSIS: ICD-10-CM

## 2017-05-30 DIAGNOSIS — I73.9 PVD (PERIPHERAL VASCULAR DISEASE): Primary | ICD-10-CM

## 2017-05-30 PROCEDURE — 99214 OFFICE O/P EST MOD 30 MIN: CPT | Mod: S$PBB,,, | Performed by: PHYSICIAN ASSISTANT

## 2017-05-30 PROCEDURE — 99999 PR PBB SHADOW E&M-EST. PATIENT-LVL III: CPT | Mod: PBBFAC,,, | Performed by: NURSE PRACTITIONER

## 2017-05-30 PROCEDURE — 99214 OFFICE O/P EST MOD 30 MIN: CPT | Mod: PBBFAC | Performed by: PHYSICIAN ASSISTANT

## 2017-05-30 PROCEDURE — 99214 OFFICE O/P EST MOD 30 MIN: CPT | Mod: S$PBB,,, | Performed by: NURSE PRACTITIONER

## 2017-05-30 PROCEDURE — 99999 PR PBB SHADOW E&M-EST. PATIENT-LVL IV: CPT | Mod: PBBFAC,,, | Performed by: PHYSICIAN ASSISTANT

## 2017-05-30 RX ORDER — PROMETHAZINE HYDROCHLORIDE AND CODEINE PHOSPHATE 6.25; 1 MG/5ML; MG/5ML
5 SOLUTION ORAL EVERY 4 HOURS PRN
Qty: 240 ML | Refills: 0 | Status: SHIPPED | OUTPATIENT
Start: 2017-05-30 | End: 2017-05-30

## 2017-05-30 RX ORDER — PREDNISONE 10 MG/1
TABLET ORAL
Qty: 18 TABLET | Refills: 0 | Status: SHIPPED | OUTPATIENT
Start: 2017-05-30 | End: 2017-10-13

## 2017-05-30 RX ORDER — BENZONATATE 200 MG/1
200 CAPSULE ORAL 3 TIMES DAILY PRN
Qty: 30 CAPSULE | Refills: 1 | Status: SHIPPED | OUTPATIENT
Start: 2017-05-30 | End: 2017-06-09

## 2017-05-30 NOTE — PROGRESS NOTES
Subjective:    Patient ID:  Flory Cam is a 71 y.o. female who presents for follow-up of Peripheral Vascular Disease and Hospital Follow Up        HPI   Ms Cam is a 71 year old male with PMHx of PVD, CAD, HLD, HTN, tobacco use, CABG and DM. She visits the clinic today for hospital follow up. Patient underwent Aorto Gram with Runoff by Dr Cohen on 5/2/2017 results showed three vessel runoff below the knee bilaterally, occluded lsfa with excellent collaterals via profunda and minimal inflow disease on the left w/o any hemodynamically significant stenosis. Patient also underwent Lt foot I &D of abscess by Dr Orellana on 5/5/2017. She has been doing well. Denies chest pain, shortness of breath, palpitations, dizziness or syncope. Lt foot ACE wrap intact, being treated by Podiatry. Rt groin puncture site intact no bleeding or hematoma present.     Review of Systems   Constitution: Negative for diaphoresis, weakness, malaise/fatigue, weight gain and weight loss.   HENT: Negative for congestion and nosebleeds.    Eyes: Negative for blurred vision and double vision.   Cardiovascular: Negative for chest pain, claudication, cyanosis, dyspnea on exertion, irregular heartbeat, leg swelling, near-syncope, orthopnea, palpitations, paroxysmal nocturnal dyspnea and syncope.   Respiratory: Positive for cough and sputum production. Negative for hemoptysis, shortness of breath and wheezing.    Hematologic/Lymphatic: Negative for bleeding problem. Does not bruise/bleed easily.   Skin: Negative for rash.   Musculoskeletal: Positive for back pain and joint swelling. Negative for falls, joint pain and neck pain.   Gastrointestinal: Negative for abdominal pain, heartburn and vomiting.   Genitourinary: Negative for dysuria, frequency and hematuria.   Neurological: Negative for difficulty with concentration, dizziness, focal weakness, light-headedness, numbness and seizures.   Psychiatric/Behavioral: Negative for depression,  memory loss and substance abuse.   Allergic/Immunologic: Negative for HIV exposure and hives.               Past Medical History:   Diagnosis Date    Acute coronary syndrome     Anxiety     Bilateral carotid artery stenosis 11/17/2015    Chronic pain     Coronary artery disease     GERD (gastroesophageal reflux disease)     Hyperlipidemia     Hypertension     Hypothyroidism     Low back pain     Lupus     Osteoporosis     Poor circulation     PVD (peripheral vascular disease)     S/P peripheral artery angioplasty 02/13/2014    Skin disease      Past Surgical History:   Procedure Laterality Date    COLONOSCOPY N/A 1/4/2017    Procedure: COLONOSCOPY;  Surgeon: Sylvester Arboleda III, MD;  Location: [facility];  Service: Endoscopy;  Laterality: N/A;    CORONARY ANGIOPLASTY      CORONARY ARTERY BYPASS GRAFT      HYSTERECTOMY      THYROIDECTOMY       Family History   Problem Relation Age of Onset    Melanoma Neg Hx     Psoriasis Neg Hx     Lupus Neg Hx     Eczema Neg Hx      Social History     Social History    Marital status:      Spouse name: N/A    Number of children: N/A    Years of education: N/A     Occupational History    Retired      Dispatcher     Social History Main Topics    Smoking status: Former Smoker     Packs/day: 0.25     Years: 40.00     Start date: 1961     Quit date: 03/2016    Smokeless tobacco: Never Used    Alcohol use No    Drug use: No    Sexual activity: Not Asked     Other Topics Concern    Are You Pregnant Or Think You May Be? No    Breast-Feeding No     Social History Narrative    Patient is retired dispatcher.     Review of patient's allergies indicates:  No Known Allergies  Current Outpatient Prescriptions on File Prior to Visit   Medication Sig Dispense Refill    albuterol 90 mcg/actuation inhaler Inhale 2 puffs into the lungs every 6 (six) hours as needed for Wheezing. Rescue 1 Inhaler 1    amlodipine (NORVASC) 10 MG tablet take 1 tablet by mouth  once daily 30 tablet 6    ammonium lactate (LAC-HYDRIN) 12 % lotion       atorvastatin (LIPITOR) 80 MG tablet Take 1 tablet (80 mg total) by mouth once daily. 90 tablet 0    cilostazol (PLETAL) 100 MG Tab Take 1 tablet (100 mg total) by mouth 2 (two) times daily. (Patient taking differently: Take 100 mg by mouth once daily at 6am. ) 60 tablet 6    clopidogrel (PLAVIX) 75 mg tablet take 1 tablet by mouth once daily 90 tablet 3    diclofenac sodium 1 % Gel       esomeprazole (NEXIUM) 40 MG capsule Take 1 capsule (40 mg total) by mouth before breakfast. 90 capsule 1    hydrocodone-acetaminophen 10-325mg (NORCO)  mg Tab Take  mg by mouth. 1 Tablet Oral Four times a day.  narcotic warning, contains tylenol      hydroxychloroquine (PLAQUENIL) 200 mg tablet Take 1 tablet (200 mg total) by mouth 2 (two) times daily. (Patient taking differently: Take 200 mg by mouth once daily at 6am. ) 180 tablet 0    levothyroxine (SYNTHROID) 137 MCG Tab tablet take 1 tablet by mouth once daily BEFORE BREAKFAST 30 tablet 11    trazodone (DESYREL) 50 MG tablet Take 1 tablet (50 mg total) by mouth nightly as needed for Insomnia. 30 tablet 3    valsartan (DIOVAN) 320 MG tablet take 1 tablet by mouth once daily 90 tablet 2    ZETIA 10 mg tablet take 1 tablet by mouth once daily 90 tablet 1     No current facility-administered medications on file prior to visit.      .  Objective:    Physical Exam   Constitutional: She appears well-developed and well-nourished.   HENT:   Head: Normocephalic and atraumatic.   Eyes: Right eye exhibits no discharge. Left eye exhibits no discharge.   Neck: Normal range of motion. No JVD present.   Cardiovascular:   No murmur heard.  Pulmonary/Chest: Effort normal and breath sounds normal. No respiratory distress. She has no wheezes. She has no rales. She exhibits no tenderness.   Abdominal: Soft. Bowel sounds are normal. She exhibits no distension and no mass. There is no tenderness. There  "is no rebound and no guarding.   Musculoskeletal: She exhibits no edema, tenderness or deformity.   ACE wrap to Lt foot   Rt groin puncture site intact, no bleeding or hematoma present   Neurological: She is alert.   Skin: Skin is warm and dry.   Psychiatric: She has a normal mood and affect. Her behavior is normal. Judgment and thought content normal.   Nursing note and vitals reviewed.        Lab Results   Component Value Date    CHOL 165 09/14/2016    CHOL 147 07/28/2015    CHOL 169 02/02/2015     Lab Results   Component Value Date    HDL 51 09/14/2016    HDL 50 07/28/2015    HDL 50 02/02/2015     Lab Results   Component Value Date    LDLCALC 93.0 09/14/2016    LDLCALC 87.0 07/28/2015    LDLCALC 104.4 02/02/2015     Lab Results   Component Value Date    TRIG 105 09/14/2016    TRIG 50 07/28/2015    TRIG 73 02/02/2015     Lab Results   Component Value Date    CHOLHDL 30.9 09/14/2016    CHOLHDL 34.0 07/28/2015    CHOLHDL 29.6 02/02/2015       Chemistry        Component Value Date/Time     05/05/2017 0427    K 4.5 05/05/2017 0427     05/05/2017 0427    CO2 25 05/05/2017 0427    BUN 15 05/05/2017 0427    CREATININE 0.8 05/05/2017 0427    GLU 99 05/05/2017 0427        Component Value Date/Time    CALCIUM 9.6 05/05/2017 0427    ALKPHOS 177 (H) 05/05/2017 0427    AST 25 05/05/2017 0427    ALT 24 05/05/2017 0427    BILITOT 0.4 05/05/2017 0427          Lab Results   Component Value Date    TSH 3.113 10/17/2016     Lab Results   Component Value Date    INR 1.0 04/28/2017    INR 0.9 07/26/2007     Lab Results   Component Value Date    WBC 7.80 05/05/2017    HGB 14.1 05/05/2017    HCT 42.5 05/05/2017    MCV 93 05/05/2017     05/05/2017     /76 (BP Location: Right arm, Patient Position: Sitting, BP Method: Manual)   Pulse 74   Ht 5' 7" (1.702 m)   Wt 91.6 kg (202 lb)   BMI 31.64 kg/m²     Assessment:       1. PVD (peripheral vascular disease)    2. Hx of CABG    3. Tobacco use    4. Other systemic " lupus erythematosus with other organ involvement    5. Essential hypertension    6. Coronary artery disease involving native coronary artery of native heart without angina pectoris    7. Atherosclerotic PVD with intermittent claudication    8. Bilateral carotid artery stenosis      Patient doing well. Vital signs stable. Patient reports she has not started back smoking.      Plan:     Will continue current medications and treatment plan  Risk modification   Continue smoking cessation   Follow up in clinic in 3 months or sooner if needed

## 2017-05-30 NOTE — PROGRESS NOTES
Subjective:       Patient ID: Flory Cam is a 71 y.o. female.    Chief Complaint: URI    URI    This is a new problem. The current episode started in the past 7 days. The problem has been gradually worsening. There has been no fever. Associated symptoms include congestion, coughing and wheezing. Pertinent negatives include no abdominal pain, chest pain, headaches, plugged ear sensation, rhinorrhea, sinus pain, sneezing or sore throat. She has tried nothing for the symptoms.     Review of Systems   Constitutional: Negative for chills, fatigue and fever.   HENT: Positive for congestion. Negative for hearing loss, postnasal drip, rhinorrhea, sinus pressure, sneezing and sore throat.    Respiratory: Positive for cough and wheezing. Negative for chest tightness and shortness of breath.    Cardiovascular: Negative for chest pain.   Gastrointestinal: Negative for abdominal pain.   Neurological: Negative for headaches.       Objective:      Physical Exam   Constitutional: She appears well-developed and well-nourished. No distress.   HENT:   Head: Normocephalic and atraumatic.   Cardiovascular: Normal rate and regular rhythm.  Exam reveals no gallop and no friction rub.    No murmur heard.  Pulmonary/Chest: Effort normal and breath sounds normal. No respiratory distress. She has no wheezes. She has no rales. She exhibits no tenderness.   Skin: She is not diaphoretic.   Nursing note and vitals reviewed.      Assessment:       1. Upper respiratory tract infection, unspecified type        Plan:       Upper respiratory tract infection, unspecified type    Other orders  -     predniSONE (DELTASONE) 10 MG tablet; Take 3 daily for 3 days, then 2 daily for three days, then 1 daily for three days.  Dispense: 18 tablet; Refill: 0  -     Discontinue: promethazine-codeine 6.25-10 mg/5 ml (PHENERGAN WITH CODEINE) 6.25-10 mg/5 mL syrup; Take 5 mLs by mouth every 4 (four) hours as needed for Cough.  Dispense: 240 mL; Refill: 0  -      benzonatate (TESSALON) 200 MG capsule; Take 1 capsule (200 mg total) by mouth 3 (three) times daily as needed for Cough.  Dispense: 30 capsule; Refill: 1

## 2017-06-02 ENCOUNTER — OFFICE VISIT (OUTPATIENT)
Dept: PODIATRY | Facility: CLINIC | Age: 72
End: 2017-06-02
Payer: MEDICARE

## 2017-06-02 VITALS
BODY MASS INDEX: 31.7 KG/M2 | DIASTOLIC BLOOD PRESSURE: 75 MMHG | WEIGHT: 201.94 LBS | HEART RATE: 77 BPM | SYSTOLIC BLOOD PRESSURE: 108 MMHG | HEIGHT: 67 IN

## 2017-06-02 DIAGNOSIS — L93.0 DISCOID LUPUS: ICD-10-CM

## 2017-06-02 DIAGNOSIS — Z09 FOLLOW-UP EXAMINATION FOLLOWING SURGERY: Primary | ICD-10-CM

## 2017-06-02 PROCEDURE — 99999 PR PBB SHADOW E&M-EST. PATIENT-LVL III: CPT | Mod: PBBFAC,,, | Performed by: PODIATRIST

## 2017-06-02 PROCEDURE — 99213 OFFICE O/P EST LOW 20 MIN: CPT | Mod: PBBFAC | Performed by: PODIATRIST

## 2017-06-02 PROCEDURE — 99024 POSTOP FOLLOW-UP VISIT: CPT | Mod: ,,, | Performed by: PODIATRIST

## 2017-06-02 NOTE — PROGRESS NOTES
"PODIATRY PROGRESS NOTE  Chief Complaint   Patient presents with    Post-op Evaluation     4 wk post-op DOS 5/5/17. Left foot I&D Steri-strips intact. Denies pain or discomfort.         SUBJECTIVE   Flory Cam is a 71 y.o. female status post left foot I&D secondary to abscess , DOS 5/5/17. Pt is appx  3 week(s) post operatively. Pain is well controlled with pain medications. Pt has been WBAT.  States  has kept dressing clean/dry; Pt denies fever, chills, nausea, and/or vomiting.       OBJECTIVE   Vitals:    06/02/17 1303   BP: 108/75   Pulse: 77   Weight: 91.6 kg (201 lb 15.1 oz)   Height: 5' 7" (1.702 m)   PainSc: 0-No pain       Neurovascular status - vascular status grossly intact.   Surgical incision well coapted with steri strips intact   Pins - N/A   Erythema - none  Edema - none  Warmth - no increase in skin temp from prox to distally b/l   TTP - mild at the incision site consistent with po course  ROM - good, pain free    Dehiscence- none  Drainage- none    MICRO: Staph Lugdunesis     ASSESSMENT  Flory Cam is a 71 y.o. female s/p left foot incision and drainage 2/2 to abscess, +Staph, DOS 5/5/17 with appropriate post operative recovery course.    PLAN  - Weight bearing status - heel WBAT   - ok to change dressing 3 days, can get wet  -keep foot clean, dry. Pt would like appt rescheduled with dermatology as she canceled her appt with dermatologist before for re-evaluation of skin disorders  -f/u with me in 2-3 weeks    Future Appointments  Date Time Provider Department Center   6/21/2017 8:30 AM Treasure Yap MD [facility] DERM [facility]   6/21/2017 10:00 AM Melva Orellana DPM [facility] POD [facility]   8/4/2017 8:10 AM LABORATORY, [facility]   8/7/2017 10:40 AM DO DREW Mayorga IM [facility]       Report Electronically Signed By:  Melva Orellana DPM   Podiatric Medicine & Surgery  [facility]  6/2/2017              "

## 2017-06-06 LAB
FUNGUS SPEC CULT: NORMAL
FUNGUS SPEC CULT: NORMAL

## 2017-06-08 ENCOUNTER — TELEPHONE (OUTPATIENT)
Dept: PODIATRY | Facility: CLINIC | Age: 72
End: 2017-06-08

## 2017-06-08 DIAGNOSIS — M05.771 RHEUMATOID ARTHRITIS INVOLVING BOTH FEET WITH POSITIVE RHEUMATOID FACTOR: ICD-10-CM

## 2017-06-08 DIAGNOSIS — L93.0 DISCOID LUPUS ERYTHEMATOSUS: ICD-10-CM

## 2017-06-08 DIAGNOSIS — M05.772 RHEUMATOID ARTHRITIS INVOLVING BOTH FEET WITH POSITIVE RHEUMATOID FACTOR: ICD-10-CM

## 2017-06-08 NOTE — TELEPHONE ENCOUNTER
Patient called stating that steri-strips were beginning to fall off. Educated that this is normal and that strips will eventually fall off on their own or be removed by Dr. Esteban DPM so she should not attempt to remove them herself. Verbalized understanding and ended call pleasantly.

## 2017-06-08 NOTE — TELEPHONE ENCOUNTER
----- Message from Oswald Oneil sent at 6/8/2017  8:06 AM CDT -----  Contact: [phone]  Pt would like to consult with nurse about strips on left foot from procedure. Strips are coming off. Please call back at [phone]. TERESITA

## 2017-06-10 RX ORDER — HYDROXYCHLOROQUINE SULFATE 200 MG/1
TABLET, FILM COATED ORAL
Qty: 180 TABLET | Refills: 0 | OUTPATIENT
Start: 2017-06-10

## 2017-06-12 DIAGNOSIS — M05.771 RHEUMATOID ARTHRITIS INVOLVING BOTH FEET WITH POSITIVE RHEUMATOID FACTOR: ICD-10-CM

## 2017-06-12 DIAGNOSIS — L93.0 DISCOID LUPUS ERYTHEMATOSUS: ICD-10-CM

## 2017-06-12 DIAGNOSIS — M05.772 RHEUMATOID ARTHRITIS INVOLVING BOTH FEET WITH POSITIVE RHEUMATOID FACTOR: ICD-10-CM

## 2017-06-12 NOTE — TELEPHONE ENCOUNTER
----- Message from Cedric Londono sent at 6/12/2017  9:14 AM CDT -----  Contact: Pt   Pt is requesting to speak with the nurse in regards to getting a refill on her plaquenil../ Please advise [phone]      [facility] - [address] [place] [address]  [address] [place] [address]  Phone: [phone] Fax: [phone]

## 2017-06-13 RX ORDER — HYDROXYCHLOROQUINE SULFATE 200 MG/1
200 TABLET, FILM COATED ORAL 2 TIMES DAILY
Qty: 180 TABLET | Refills: 0 | OUTPATIENT
Start: 2017-06-13

## 2017-06-14 RX ORDER — HYDROXYCHLOROQUINE SULFATE 200 MG/1
200 TABLET, FILM COATED ORAL 2 TIMES DAILY
Qty: 180 TABLET | Refills: 0 | Status: SHIPPED | OUTPATIENT
Start: 2017-06-14 | End: 2017-12-21 | Stop reason: SDUPTHER

## 2017-06-14 NOTE — TELEPHONE ENCOUNTER
Spoke with pt, notified of message below  Pt says that she no longer see's a rheumatologist and  She would like to know if you can refill it until she gets in to see rheum

## 2017-06-21 ENCOUNTER — OFFICE VISIT (OUTPATIENT)
Dept: PODIATRY | Facility: CLINIC | Age: 72
End: 2017-06-21
Payer: MEDICARE

## 2017-06-21 ENCOUNTER — INITIAL CONSULT (OUTPATIENT)
Dept: DERMATOLOGY | Facility: CLINIC | Age: 72
End: 2017-06-21
Payer: MEDICARE

## 2017-06-21 ENCOUNTER — TELEPHONE (OUTPATIENT)
Dept: PODIATRY | Facility: CLINIC | Age: 72
End: 2017-06-21

## 2017-06-21 VITALS
WEIGHT: 201.94 LBS | DIASTOLIC BLOOD PRESSURE: 83 MMHG | HEART RATE: 57 BPM | HEIGHT: 67 IN | BODY MASS INDEX: 31.7 KG/M2 | SYSTOLIC BLOOD PRESSURE: 151 MMHG

## 2017-06-21 DIAGNOSIS — B35.1 DERMATOPHYTOSIS OF NAIL: Primary | ICD-10-CM

## 2017-06-21 DIAGNOSIS — I73.9 PERIPHERAL VASCULAR DISEASE: ICD-10-CM

## 2017-06-21 DIAGNOSIS — L84 CORN OR CALLUS: ICD-10-CM

## 2017-06-21 DIAGNOSIS — L43.0 LICHEN PLANUS HYPERTROPHICUS: Primary | ICD-10-CM

## 2017-06-21 PROCEDURE — 11056 PARNG/CUTG B9 HYPRKR LES 2-4: CPT | Mod: Q8,S$PBB,, | Performed by: PODIATRIST

## 2017-06-21 PROCEDURE — 99999 PR PBB SHADOW E&M-EST. PATIENT-LVL II: CPT | Mod: PBBFAC,,, | Performed by: DERMATOLOGY

## 2017-06-21 PROCEDURE — 11721 DEBRIDE NAIL 6 OR MORE: CPT | Mod: 59,Q8,S$PBB, | Performed by: PODIATRIST

## 2017-06-21 PROCEDURE — 99213 OFFICE O/P EST LOW 20 MIN: CPT | Mod: PBBFAC,PO | Performed by: PODIATRIST

## 2017-06-21 PROCEDURE — 99999 PR PBB SHADOW E&M-EST. PATIENT-LVL III: CPT | Mod: PBBFAC,,, | Performed by: PODIATRIST

## 2017-06-21 PROCEDURE — 11056 PARNG/CUTG B9 HYPRKR LES 2-4: CPT | Mod: Q8,PBBFAC,PO | Performed by: PODIATRIST

## 2017-06-21 PROCEDURE — 1159F MED LIST DOCD IN RCRD: CPT | Mod: ,,, | Performed by: DERMATOLOGY

## 2017-06-21 PROCEDURE — 1159F MED LIST DOCD IN RCRD: CPT | Mod: ,,, | Performed by: PODIATRIST

## 2017-06-21 PROCEDURE — 99213 OFFICE O/P EST LOW 20 MIN: CPT | Mod: S$PBB,,, | Performed by: DERMATOLOGY

## 2017-06-21 PROCEDURE — 99213 OFFICE O/P EST LOW 20 MIN: CPT | Mod: 25,S$PBB,, | Performed by: PODIATRIST

## 2017-06-21 PROCEDURE — 11721 DEBRIDE NAIL 6 OR MORE: CPT | Mod: Q8,PBBFAC,PO | Performed by: PODIATRIST

## 2017-06-21 RX ORDER — UREA 40 %
CREAM (GRAM) TOPICAL 2 TIMES DAILY
Qty: 1 TUBE | Refills: 3 | Status: SHIPPED | OUTPATIENT
Start: 2017-06-21 | End: 2017-12-18 | Stop reason: SDUPTHER

## 2017-06-21 NOTE — TELEPHONE ENCOUNTER
----- Message from Milvia Londono sent at 6/21/2017  1:14 PM CDT -----  Patient states she need to speak to nurse regarding her medication. Please adv/call [phone].//thanks. cw

## 2017-06-21 NOTE — TELEPHONE ENCOUNTER
Pt called and stated urea cream prescribed is not covered by insurance and asked about different prescription pt was told to purchase Amlactin cream OTC at [facility] pharmacy if she is not able to get Urea. Pt expressed understanding call ended pleasantly.  Niya Velez MA  Office of Dr. Melva Orellana, DPM   Podiatry Department, [facility]

## 2017-06-21 NOTE — PATIENT INSTRUCTIONS
Understanding Lichen Planus  Lichen planus is a long-term (chronic) skin disease. Its a rash that can develop anywhere on the body. But it most often erupts on the wrists, arms, legs, scalp, and genitals. It may also appear on the nails and in the mouth. Peoples ages 30 to 60 are more likely to get it.  How to say it  LY-grant play-nuhs   What causes lichen planus?  The cause of lichen planus is often not known. But metals such as gold, and certain medicines may trigger it. These include non-steroidal anti-inflammatory medicines and penicillin. Some research suggests the disease may also be linked to hepatitis C.  Symptoms of lichen planus  Lichen planus causes flat-topped bumps or patches to form on the skin. These bumps may hurt and be very itchy. They are usually shiny and violet in color. But they may be dark red or purple. They may also have fine white lines on them. In the mouth, the condition may look like patches of white lace.  Treatment for lichen planus  Lichen planus often goes away within a few years. It may do so without treatment. But to speed up healing, treatment options include:  · Steroids. These medicines can be put directly onto the skin or injected into the affected area. They can also be taken by mouth.  · Other medicines. Your healthcare provider may give you other medicines, such as an antihistamine or retinoid, to ease itching and pain. Anti-itch creams or ointments may also work. Your provider might also prescribe other medicines that suppress the immune system.  · Phototherapy. This treatment directs ultraviolet light on the skin to help clear it.  Self-care tips for lichen planus  · Dont scratch any affected areas.  · Use mild soap and moisturizing lotion after bathing.  When to call your healthcare provider  Call your healthcare provider right away if you have any of these:  · Fever of 100.4°F (38°C) or higher, or as directed  · New symptoms  · Pain that gets worse  · Symptoms that  dont get better, or get worse  · Ulcers in the mouth   Date Last Reviewed: 5/1/2016  © 6559-6500 [employer]. [address] [place] [address], [place] [address]. All rights reserved. This information is not intended as a substitute for professional medical care. Always follow your healthcare professional's instructions.

## 2017-06-21 NOTE — PROGRESS NOTES
Subjective:       Patient ID:  Flory Cam is a 71 y.o. female who presents for   Chief Complaint   Patient presents with    Lichen Planus     f/u no new concerns no improvement     68 y/o female c/ hx of lupus and lichen planus overlap of the bilateral arms x 40 years, last seen on 3/2/15.  She had biopsy done showing benign verrucous keratosis with focal lichenoid inflammation. She has used protopic 0.1% ointment BID and fluticasone lotion but has ran out. She states that lesions are stable. She is currently on plaquenil 200 mg BID.  She understands risk of possible skin cancer and greater risk for metastases in scars.        Prior treatments: protopic 0.1% ointment BID, fluticasone, ILK and topical steroids             Review of Systems   Constitutional: Negative for fever and chills.   Gastrointestinal: Negative for nausea and vomiting.   Skin: Positive for rash. Negative for itching, daily sunscreen use, activity-related sunscreen use and recent sunburn.   Hematologic/Lymphatic: Does not bruise/bleed easily.        Objective:    Physical Exam   Constitutional: She appears well-developed and well-nourished. No distress.   Neurological: She is alert and oriented to person, place, and time. She is not disoriented.   Psychiatric: She has a normal mood and affect.   Skin:   Areas Examined (abnormalities noted in diagram):   Head / Face Inspection Performed  Neck Inspection Performed  Chest / Axilla Inspection Performed  Abdomen Inspection Performed  Back Inspection Performed  RUE Inspected  LUE Inspection Performed  Nails and Digits Inspection Performed              Assessment / Plan:        Lichen planus hypertrophicus    Currently stable.  Discussed/reiterated risks of development of SCC and metasatses in long-standing hypertrophic LP.  The patient acknowledged understanding. Will start compounded fluocinonide/urea cream.           Return in about 6 months (around 12/21/2017).

## 2017-06-21 NOTE — PROGRESS NOTES
"PODIATRY NOTE    CHIEF COMPLAINT  Chief Complaint   Patient presents with    Routine Foot Care     at risk foot/nail care PCP Dr. Ovalle 5/1/2017    Post-op Evaluation     DOS 5/5/17 left I&D       HPI  SUBJECTIVE: Flory Cam is a 71 y.o. female w/ PMH of PVD, Lupus, hx of intermittent leg claudifcation- s/p vascular intervention with much improvement in symptoms and other medical problems who presents to clinic for high risk  foot exam and care.Patient admits to painful toenails and calluses aggravated by increased weight bearing, shoe gear, and pressure. States pain is relieved with routine debridements. She is also s/p PO left foot I&D and wound closure 5/5/17. She denies any N/V/F. Patient has no other pedal complaints at this time.    HgA1c:   Hemoglobin A1C   Date Value Ref Range Status   09/14/2016 6.2 4.5 - 6.2 % Final     Comment:     According to ADA guidelines, hemoglobin A1C <7.0% represents  optimal control in non-pregnant diabetic patients.  Different  metrics may apply to specific populations.   Standards of Medical Care in Diabetes - 2016.  For the purpose of screening for the presence of diabetes:  <5.7%     Consistent with the absence of diabetes  5.7-6.4%  Consistent with increasing risk for diabetes   (prediabetes)  >or=6.5%  Consistent with diabetes  Currently no consensus exists for use of hemoglobin A1C  for diagnosis of diabetes for children.     04/13/2010 5.9 4.0 - 6.2 % Final       REVIEW OF SYSTEMS  General: Denies any fever or chills  Chest: Denies shortness of breath, wheezing, coughing, or sputum production  Heart: Denies chest pain.  As noted above and per history of current illness above, otherwise negative in the remainder of the 14 systems.     PHYSICAL EXAM  Vitals:    06/21/17 1003   BP: (!) 151/83   Pulse: (!) 57   Weight: 91.6 kg (201 lb 15.1 oz)   Height: 5' 7" (1.702 m)       GEN:  This patient is well-developed, well-nourished and appears stated age, " well-oriented to person, place and time, and cooperative and pleasant on today's visit.      LOWER EXTREMITY  Vascular:   · DP pedal pulse 0/4 b/l, PT pedal pulse 1/4 b/l  · Skin temperature warm to warm from prox to distally  · CFT <5 secs b/l  · There is LE edema noted b/l.     Dermatologic:   · Thickened, dystrophic, elongated toenails with subungal debris 1-5 b/l.   · Webspaces are C/D/I B/L.  · There is hyperkeratotic tissue noted plantar aspect of b/l feet at medial arch x 2  · Plantar incision site LEFT foot healed  · Skin texture and turgor dry with hyperkeratosis diffusely b/l plantar heel   · There is no pedal hair growth noted    Neurologic:  · Vibratory sensation diminished at level of Hallux IPJ b/l    · Protective sensation absent at 0/10 sites upon examination with Purdon Weinsten 5.07 g monofilament.   · Propioception intact at 1st MTPJ b/l.   · Achilles and patellar deep tendon reflexes intact  · Babinski reflex absent b/l. Light touch and sharp/dull sensation intact b/l.    Musculoskeletal/Orthopedic:  · No symptomatic structural abnormalities noted.   · Muscle strength is 5/5 for foot inverters, everters, plantarflexors, and dorsiflexors. Muscle tone is normal.  · Pain free range of motion in all four quadrants with stiffness and limitation b/l  · PAIN with palpation of callus plantar medial arch, LEFT      ASSESSMENT  Dermatophytosis of nail    Corn or callus    Peripheral vascular disease    Other orders  -     urea (CARMOL) 40 % Crea; Apply topically 2 (two) times daily. Apply to thickened areas of skin  Dispense: 1 Tube; Refill: 3        Plan:  -Discuss presenting problems, etiology, pathologic processes and management options with patient today.   -With patient's permission,Sharp excisional debridement of hyperkeratotic lesions deep to epidermal layer x2 on left foot  was performed with a #15 blade without incident.   -With patient's permission, nails were aggressively reduced and debrided x  10 to their soft tissue attachment mechanically and with electric , removing all offending nail and debris. Patient relates relief following the procedure.   -Rx UREA cream  -RTC as scheduled    Future Appointments  Date Time Provider Department Center   8/4/2017 8:10 AM LABORATORY, [facility] [place]   8/7/2017 10:40 AM Tayler Ovalle DO [facility] [place]   8/16/2017 12:00 PM HRA, [facility] [place]   9/28/2017 9:40 AM Melva Orellana DPM [facility] POD [place]         Report Electronically Signed By:  Melva Orellana DPM   Podiatric Medicine & Surgery  [facility]  6/21/2017

## 2017-06-21 NOTE — LETTER
June 21, 2017      Melva Orellana DPM  [address] [place] [address]           [facility]  [address]  [place] [address]  Phone: [phone]  Fax: [phone]          Patient: Flory Cam   MR Number: [id number]   YOB: 1945   Date of Visit: 6/21/2017       Dear Dr. Melva Orellana:    Thank you for referring Flory Cam to me for evaluation. Attached you will find relevant portions of my assessment and plan of care.    If you have questions, please do not hesitate to call me. I look forward to following Flory Cam along with you.    Sincerely,    Treasure Yap MD    Enclosure  CC:  No Recipients    If you would like to receive this communication electronically, please contact [email or web address] or [phone] to request more information on [facility] access.    For providers and/or their staff who would like to refer a patient to [facility], please contact us through our one-stop-shop provider referral line, [facility], at [phone].    If you feel you have received this communication in error or would no longer like to receive these types of communications, please e-mail [email or web address]

## 2017-06-26 ENCOUNTER — TELEPHONE (OUTPATIENT)
Dept: DERMATOLOGY | Facility: CLINIC | Age: 72
End: 2017-06-26

## 2017-08-04 ENCOUNTER — LAB VISIT (OUTPATIENT)
Dept: LAB | Facility: HOSPITAL | Age: 72
End: 2017-08-04
Attending: INTERNAL MEDICINE
Payer: MEDICARE

## 2017-08-04 DIAGNOSIS — R53.83 FATIGUE: ICD-10-CM

## 2017-08-04 DIAGNOSIS — E78.5 HYPERLIPIDEMIA: ICD-10-CM

## 2017-08-04 DIAGNOSIS — I10 HYPERTENSION GOAL BP (BLOOD PRESSURE) < 140/90: ICD-10-CM

## 2017-08-04 DIAGNOSIS — R73.03 PREDIABETES: ICD-10-CM

## 2017-08-04 LAB
ALBUMIN SERPL BCP-MCNC: 3.5 G/DL
ALP SERPL-CCNC: 198 U/L
ALT SERPL W/O P-5'-P-CCNC: 10 U/L
ANION GAP SERPL CALC-SCNC: 10 MMOL/L
AST SERPL-CCNC: 15 U/L
BILIRUB SERPL-MCNC: 0.4 MG/DL
BUN SERPL-MCNC: 14 MG/DL
CALCIUM SERPL-MCNC: 9.5 MG/DL
CHLORIDE SERPL-SCNC: 106 MMOL/L
CHOLEST/HDLC SERPL: 2.9 {RATIO}
CO2 SERPL-SCNC: 26 MMOL/L
CREAT SERPL-MCNC: 0.8 MG/DL
EST. GFR  (AFRICAN AMERICAN): >60 ML/MIN/1.73 M^2
EST. GFR  (NON AFRICAN AMERICAN): >60 ML/MIN/1.73 M^2
ESTIMATED AVG GLUCOSE: 120 MG/DL
GLUCOSE SERPL-MCNC: 89 MG/DL
HBA1C MFR BLD HPLC: 5.8 %
HDL/CHOLESTEROL RATIO: 34 %
HDLC SERPL-MCNC: 144 MG/DL
HDLC SERPL-MCNC: 49 MG/DL
INSULIN COLLECTION INTERVAL: NORMAL
INSULIN SERPL-ACNC: 13.1 UU/ML
LDLC SERPL CALC-MCNC: 80 MG/DL
NONHDLC SERPL-MCNC: 95 MG/DL
POTASSIUM SERPL-SCNC: 4.6 MMOL/L
PROT SERPL-MCNC: 7.1 G/DL
SODIUM SERPL-SCNC: 142 MMOL/L
TRIGL SERPL-MCNC: 75 MG/DL

## 2017-08-04 PROCEDURE — 80053 COMPREHEN METABOLIC PANEL: CPT

## 2017-08-04 PROCEDURE — 83525 ASSAY OF INSULIN: CPT

## 2017-08-04 PROCEDURE — 80061 LIPID PANEL: CPT

## 2017-08-04 PROCEDURE — 83036 HEMOGLOBIN GLYCOSYLATED A1C: CPT

## 2017-08-04 PROCEDURE — 36415 COLL VENOUS BLD VENIPUNCTURE: CPT

## 2017-08-04 RX ORDER — AMLODIPINE BESYLATE 10 MG/1
TABLET ORAL
Qty: 30 TABLET | Refills: 6 | Status: SHIPPED | OUTPATIENT
Start: 2017-08-04 | End: 2018-05-03 | Stop reason: SDUPTHER

## 2017-08-07 ENCOUNTER — OFFICE VISIT (OUTPATIENT)
Dept: INTERNAL MEDICINE | Facility: CLINIC | Age: 72
End: 2017-08-07
Payer: MEDICARE

## 2017-08-07 VITALS
HEIGHT: 67 IN | BODY MASS INDEX: 33.84 KG/M2 | SYSTOLIC BLOOD PRESSURE: 130 MMHG | TEMPERATURE: 98 F | WEIGHT: 215.63 LBS | HEART RATE: 81 BPM | DIASTOLIC BLOOD PRESSURE: 60 MMHG

## 2017-08-07 DIAGNOSIS — Z72.0 TOBACCO USE: ICD-10-CM

## 2017-08-07 DIAGNOSIS — I10 HYPERTENSION GOAL BP (BLOOD PRESSURE) < 140/90: Primary | Chronic | ICD-10-CM

## 2017-08-07 DIAGNOSIS — F41.9 ANXIETY: ICD-10-CM

## 2017-08-07 DIAGNOSIS — I25.10 CORONARY ARTERY DISEASE INVOLVING NATIVE CORONARY ARTERY OF NATIVE HEART WITHOUT ANGINA PECTORIS: ICD-10-CM

## 2017-08-07 DIAGNOSIS — R73.03 PREDIABETES: ICD-10-CM

## 2017-08-07 DIAGNOSIS — E78.5 HYPERLIPIDEMIA LDL GOAL <70: Chronic | ICD-10-CM

## 2017-08-07 DIAGNOSIS — E03.9 HYPOTHYROIDISM (ACQUIRED): ICD-10-CM

## 2017-08-07 PROCEDURE — 99999 PR PBB SHADOW E&M-EST. PATIENT-LVL III: CPT | Mod: PBBFAC,,, | Performed by: INTERNAL MEDICINE

## 2017-08-07 PROCEDURE — 99213 OFFICE O/P EST LOW 20 MIN: CPT | Mod: PBBFAC | Performed by: INTERNAL MEDICINE

## 2017-08-07 PROCEDURE — 99214 OFFICE O/P EST MOD 30 MIN: CPT | Mod: S$PBB,,, | Performed by: INTERNAL MEDICINE

## 2017-08-07 PROCEDURE — 3008F BODY MASS INDEX DOCD: CPT | Mod: ,,, | Performed by: INTERNAL MEDICINE

## 2017-08-07 PROCEDURE — 1159F MED LIST DOCD IN RCRD: CPT | Mod: ,,, | Performed by: INTERNAL MEDICINE

## 2017-08-07 RX ORDER — HYDROXYZINE HYDROCHLORIDE 25 MG/1
25 TABLET, FILM COATED ORAL 3 TIMES DAILY PRN
Qty: 30 TABLET | Refills: 0 | Status: SHIPPED | OUTPATIENT
Start: 2017-08-07 | End: 2017-12-13 | Stop reason: SDUPTHER

## 2017-08-07 NOTE — PROGRESS NOTES
Subjective:       Patient ID: Flory Cam is a 72 y.o. female.    Chief Complaint: Follow-up    Flory Cam  72 y.o. Black or  female    Patient presents with:  Follow-up    HPI: Here today to follow up on chronic conditions.  HTN--stable on amlodipine and valsartan. She denies symptoms. She checks her b/p regularly at home.   HLD--compliant with atorvastatin and Zetia.                      CHOL                     144                 08/04/2017                 HDL                      49                  08/04/2017                 LDLCALC                  80.0                08/04/2017                  TRIG                     75                  08/04/2017            Prediabetes--improved. She denies symptoms of diabetes.                           HGBA1C                   5.8 (H)             08/04/2017            CAD--asymptomatic. Management per cardiology.   Hypothyroidism--stable on levothyroxine. She denies symptoms.                 TSH                      3.113               10/17/2016            Anxiety--on occasion. She was on as needed medication in the past.   She continues to smoke but not as much.     Past Medical History:  Acute coronary syndrome  Anxiety  11/17/2015: Bilateral carotid artery stenosis  Chronic pain  Coronary artery disease  GERD (gastroesophageal reflux disease)  Hyperlipidemia  Hypertension  Hypothyroidism  Low back pain  Lupus  Osteoporosis  Poor circulation  PVD (peripheral vascular disease)  02/13/2014: S/P peripheral artery angioplasty  Skin disease    Current Outpatient Prescriptions on File Prior to Visit:  albuterol 90 mcg/actuation inhaler, Inhale 2 puffs into the lungs every 6 (six) hours as needed for Wheezing. Rescue, Disp: 1 Inhaler, Rfl: 1  amlodipine (NORVASC) 10 MG tablet, take 1 tablet by mouth once daily, Disp: 30 tablet, Rfl: 6  atorvastatin (LIPITOR) 80 MG tablet, Take 1 tablet (80 mg total) by mouth once daily., Disp: 90 tablet,  Rfl: 0  clopidogrel (PLAVIX) 75 mg tablet, take 1 tablet by mouth once daily, Disp: 90 tablet, Rfl: 3  esomeprazole (NEXIUM) 40 MG capsule, Take 1 capsule (40 mg total) by mouth before breakfast., Disp: 90 capsule, Rfl: 1  hydrocodone-acetaminophen 10-325mg (NORCO)  mg Tab, Take  mg by mouth. 1 Tablet Oral Four times a day.  narcotic warning, contains tylenol, Disp: , Rfl:   levothyroxine (SYNTHROID) 137 MCG Tab tablet, take 1 tablet by mouth once daily BEFORE BREAKFAST, Disp: 30 tablet, Rfl: 11  trazodone (DESYREL) 50 MG tablet, Take 1 tablet (50 mg total) by mouth nightly as needed for Insomnia., Disp: 30 tablet, Rfl: 3  urea (CARMOL) 40 % Crea, Apply topically 2 (two) times daily. Apply to thickened areas of skin, Disp: 1 Tube, Rfl: 3  ammonium lactate (LAC-HYDRIN) 12 % lotion, , Disp: , Rfl:   cilostazol (PLETAL) 100 MG Tab, Take 1 tablet (100 mg total) by mouth 2 (two) times daily. (Patient taking differently: Take 100 mg by mouth once daily at 6am. ), Disp: 60 tablet, Rfl: 6  diclofenac sodium 1 % Gel, , Disp: , Rfl:   hydroxychloroquine (PLAQUENIL) 200 mg tablet, Take 1 tablet (200 mg total) by mouth 2 (two) times daily., Disp: 180 tablet, Rfl: 0  predniSONE (DELTASONE) 10 MG tablet, Take 3 daily for 3 days, then 2 daily for three days, then 1 daily for three days., Disp: 18 tablet, Rfl: 0  valsartan (DIOVAN) 320 MG tablet, take 1 tablet by mouth once daily, Disp: 90 tablet, Rfl: 2  ZETIA 10 mg tablet, take 1 tablet by mouth once daily, Disp: 90 tablet, Rfl: 1    Allergies:  Review of patient's allergies indicates:  No Known Allergies          Review of Systems   Constitutional: Negative for fever and unexpected weight change.   Respiratory: Negative for shortness of breath.    Cardiovascular: Negative for chest pain and leg swelling.   Gastrointestinal: Negative for abdominal pain and constipation.   Genitourinary: Negative for dysuria.   Neurological: Negative for dizziness and headaches.    Psychiatric/Behavioral: The patient is nervous/anxious.        Objective:      Physical Exam   Constitutional: She is oriented to person, place, and time. She appears well-developed and well-nourished. No distress.   Eyes: No scleral icterus.   Neck: No tracheal deviation present.   Cardiovascular: Normal rate, regular rhythm and normal heart sounds.    Pulmonary/Chest: Effort normal and breath sounds normal. No respiratory distress.   Abdominal: Soft. Bowel sounds are normal.   Musculoskeletal: She exhibits no edema.   Neurological: She is alert and oriented to person, place, and time.   Skin: Skin is warm and dry.   Psychiatric: She has a normal mood and affect.   Vitals reviewed.      Assessment:       1. Hypertension goal BP (blood pressure) < 140/90    2. Hyperlipidemia LDL goal <70    3. Prediabetes    4. Coronary artery disease involving native coronary artery of native heart without angina pectoris    5. Hypothyroidism (acquired)    6. Anxiety    7. Tobacco use        Plan:       Flory was seen today for follow-up.    Diagnoses and all orders for this visit:    Hypertension goal BP (blood pressure) < 140/90  -     Continue current management  -     Continue home b/p monitoring    Hyperlipidemia LDL goal <70  -     Continue current management    Prediabetes  -     Continue to monitor    Coronary artery disease involving native coronary artery of native heart without angina pectoris  -     F/U with cardiology    Hypothyroidism (acquired)  -     TSH; Standing    Anxiety  -     hydrOXYzine HCl (ATARAX) 25 MG tablet; Take 1 tablet (25 mg total) by mouth 3 (three) times daily as needed for Anxiety.    Tobacco use  -     Recommend cessation    Labs and f/u in 6 months

## 2017-08-08 ENCOUNTER — TELEPHONE (OUTPATIENT)
Dept: INTERNAL MEDICINE | Facility: CLINIC | Age: 72
End: 2017-08-08

## 2017-08-08 NOTE — TELEPHONE ENCOUNTER
Spoke with pt, she is having surgery on the 21st. Dr ordered a cbc and mp, she told her dr that she had blood work done and will check to see if these were the ones ordered, I notified her that she had both cbc on 5/5/17 and cmp on 8/4/17  Would like results faxed to Alicia at [facility] in plastic surgery    Fax: [phone]

## 2017-08-08 NOTE — TELEPHONE ENCOUNTER
----- Message from Marina Zuniga sent at 8/8/2017  1:09 PM CDT -----  Contact: pt   Pt has a question, please call her back at [phone]

## 2017-08-29 RX ORDER — EZETIMIBE 10 MG
TABLET ORAL
Qty: 90 TABLET | Refills: 1 | Status: SHIPPED | OUTPATIENT
Start: 2017-08-29 | End: 2018-02-28 | Stop reason: SDUPTHER

## 2017-10-03 RX ORDER — ESOMEPRAZOLE MAGNESIUM 40 MG/1
CAPSULE, DELAYED RELEASE ORAL
Qty: 90 CAPSULE | Refills: 1 | Status: SHIPPED | OUTPATIENT
Start: 2017-10-03 | End: 2019-10-10 | Stop reason: SDUPTHER

## 2017-10-13 ENCOUNTER — HOSPITAL ENCOUNTER (OUTPATIENT)
Dept: RADIOLOGY | Facility: HOSPITAL | Age: 72
Discharge: HOME | End: 2017-10-13
Attending: PHYSICIAN ASSISTANT
Payer: MEDICARE

## 2017-10-13 ENCOUNTER — OFFICE VISIT (OUTPATIENT)
Dept: INTERNAL MEDICINE | Facility: CLINIC | Age: 72
End: 2017-10-13
Payer: MEDICARE

## 2017-10-13 VITALS
TEMPERATURE: 99 F | SYSTOLIC BLOOD PRESSURE: 138 MMHG | BODY MASS INDEX: 33.28 KG/M2 | WEIGHT: 212.06 LBS | DIASTOLIC BLOOD PRESSURE: 74 MMHG | HEIGHT: 67 IN | HEART RATE: 81 BPM

## 2017-10-13 DIAGNOSIS — L93.0 DISCOID LUPUS ERYTHEMATOSUS: Chronic | ICD-10-CM

## 2017-10-13 DIAGNOSIS — Z72.0 TOBACCO USE: ICD-10-CM

## 2017-10-13 DIAGNOSIS — M05.772 RHEUMATOID ARTHRITIS INVOLVING BOTH FEET WITH POSITIVE RHEUMATOID FACTOR: Chronic | ICD-10-CM

## 2017-10-13 DIAGNOSIS — J40 BRONCHITIS: Primary | ICD-10-CM

## 2017-10-13 DIAGNOSIS — J40 BRONCHITIS: ICD-10-CM

## 2017-10-13 DIAGNOSIS — M05.771 RHEUMATOID ARTHRITIS INVOLVING BOTH FEET WITH POSITIVE RHEUMATOID FACTOR: Chronic | ICD-10-CM

## 2017-10-13 PROCEDURE — 71020 XR CHEST PA AND LATERAL: CPT | Mod: TC

## 2017-10-13 PROCEDURE — 99215 OFFICE O/P EST HI 40 MIN: CPT | Mod: PBBFAC,25 | Performed by: PHYSICIAN ASSISTANT

## 2017-10-13 PROCEDURE — 99214 OFFICE O/P EST MOD 30 MIN: CPT | Mod: S$PBB,,, | Performed by: PHYSICIAN ASSISTANT

## 2017-10-13 PROCEDURE — 71020 XR CHEST PA AND LATERAL: CPT | Mod: 26,,, | Performed by: RADIOLOGY

## 2017-10-13 PROCEDURE — 99999 PR PBB SHADOW E&M-EST. PATIENT-LVL V: CPT | Mod: PBBFAC,,, | Performed by: PHYSICIAN ASSISTANT

## 2017-10-13 RX ORDER — FLUTICASONE PROPIONATE AND SALMETEROL 100; 50 UG/1; UG/1
1 POWDER RESPIRATORY (INHALATION) 2 TIMES DAILY
Qty: 60 EACH | Refills: 5 | Status: SHIPPED | OUTPATIENT
Start: 2017-10-13 | End: 2019-08-09

## 2017-10-13 RX ORDER — PROMETHAZINE HYDROCHLORIDE AND DEXTROMETHORPHAN HYDROBROMIDE 6.25; 15 MG/5ML; MG/5ML
5 SYRUP ORAL 3 TIMES DAILY PRN
Qty: 240 ML | Refills: 0 | Status: SHIPPED | OUTPATIENT
Start: 2017-10-13 | End: 2017-10-23

## 2017-10-13 NOTE — MEDICAL/APP STUDENT
Subjective:       Patient ID: Flory Cam is a 72 y.o. female.    Chief Complaint: URI    URI    This is a new problem. Episode onset: 3-4 days ago. The problem has been gradually worsening. There has been no fever. Associated symptoms include coughing, rhinorrhea and sneezing. Pertinent negatives include no abdominal pain, chest pain, diarrhea, headaches, nausea, sinus pain, sore throat, swollen glands, vomiting or wheezing. Associated symptoms comments: Diarrhea has resolved. Treatments tried: coriciden HBP. The treatment provided mild (Pt is a daily smoker. Has cut down to 1 cigarette daily) relief.     Review of Systems   Constitutional: Negative for chills, diaphoresis, fatigue and fever.   HENT: Positive for rhinorrhea and sneezing. Negative for sinus pain and sore throat.    Respiratory: Positive for cough. Negative for chest tightness, shortness of breath and wheezing.    Cardiovascular: Negative for chest pain.   Gastrointestinal: Negative for abdominal pain, diarrhea, nausea and vomiting.   Neurological: Negative for dizziness, weakness and headaches.       Objective:      Physical Exam   Constitutional:   Elderly female in NAD     HENT:   Mouth/Throat: Oropharynx is clear and moist. No oropharyngeal exudate.   Neck: Normal range of motion.   Cardiovascular: Normal rate, regular rhythm and normal heart sounds.    Pulmonary/Chest: Effort normal. No respiratory distress. She has wheezes.   Abdominal: Soft. Bowel sounds are normal.   Lymphadenopathy:     She has no cervical adenopathy.       Assessment:       1. Rheumatoid arthritis involving both feet with positive rheumatoid factor    2. Discoid lupus erythematosus    3. Bronchitis        Plan:         Rheumatoid arthritis involving both feet with positive rheumatoid factor    Discoid lupus erythematosus    Bronchitis  -     Ambulatory consult to Pulmonology  -     Complete PFT with bronchodilator; Future  -     X-Ray Chest PA And Lateral; Future;  Expected date: 10/13/2017    Tobacco use  -     Ambulatory consult to Pulmonology  -     Complete PFT with bronchodilator; Future  -     X-Ray Chest PA And Lateral; Future; Expected date: 10/13/2017    Other orders  -     fluticasone-salmeterol 100-50 mcg/dose (ADVAIR) 100-50 mcg/dose diskus inhaler; Inhale 1 puff into the lungs 2 (two) times daily. Controller  Dispense: 60 each; Refill: 5  -     promethazine-dextromethorphan (PROMETHAZINE-DM) 6.25-15 mg/5 mL Syrp; Take 5 mLs by mouth 3 (three) times daily as needed.  Dispense: 240 mL; Refill: 0

## 2017-10-13 NOTE — PROGRESS NOTES
Patient ID: Flory Cam is a 72 y.o. female.     Chief Complaint: URI     URI    This is a new problem. Episode onset: 3-4 days ago. The problem has been gradually worsening. There has been no fever. Associated symptoms include coughing, rhinorrhea and sneezing. Pertinent negatives include no abdominal pain, chest pain, diarrhea, headaches, nausea, sinus pain, sore throat, swollen glands, vomiting or wheezing. Associated symptoms comments: Diarrhea has resolved. Treatments tried: coriciden HBP. The treatment provided mild (Pt is a daily smoker. Has cut down to 1 cigarette daily) relief.      Past Medical History:   Diagnosis Date    Acute coronary syndrome     Anxiety     Bilateral carotid artery stenosis 11/17/2015    Chronic pain     Coronary artery disease     GERD (gastroesophageal reflux disease)     Hyperlipidemia     Hypertension     Hypothyroidism     Low back pain     Lupus     Osteoporosis     Poor circulation     PVD (peripheral vascular disease)     S/P peripheral artery angioplasty 02/13/2014    Skin disease        Past Surgical History:   Procedure Laterality Date    COLONOSCOPY N/A 1/4/2017    Procedure: COLONOSCOPY;  Surgeon: Sylvester Arboleda III, MD;  Location: [facility];  Service: Endoscopy;  Laterality: N/A;    CORONARY ANGIOPLASTY      CORONARY ARTERY BYPASS GRAFT      HYSTERECTOMY      THYROIDECTOMY         Family History   Problem Relation Age of Onset    Melanoma Neg Hx     Psoriasis Neg Hx     Lupus Neg Hx     Eczema Neg Hx        Social History     Social History    Marital status:      Spouse name: N/A    Number of children: N/A    Years of education: N/A     Occupational History    Retired      Dispatcher     Social History Main Topics    Smoking status: Current Some Day Smoker     Packs/day: 0.25     Years: 40.00     Start date: 1961     Last attempt to quit: 03/2016    Smokeless tobacco: Never Used    Alcohol use No    Drug use: No     Sexual activity: Not on file     Other Topics Concern    Are You Pregnant Or Think You May Be? No    Breast-Feeding No     Social History Narrative    Patient is retired dispatcher.       Review of patient's allergies indicates:  No Known Allergies      Current Outpatient Prescriptions:     albuterol 90 mcg/actuation inhaler, Inhale 2 puffs into the lungs every 6 (six) hours as needed for Wheezing. Rescue, Disp: 1 Inhaler, Rfl: 1    amlodipine (NORVASC) 10 MG tablet, take 1 tablet by mouth once daily, Disp: 30 tablet, Rfl: 6    atorvastatin (LIPITOR) 80 MG tablet, Take 1 tablet (80 mg total) by mouth once daily., Disp: 90 tablet, Rfl: 0    cilostazol (PLETAL) 100 MG Tab, Take 1 tablet (100 mg total) by mouth 2 (two) times daily. (Patient taking differently: Take 100 mg by mouth once daily at 6am. ), Disp: 60 tablet, Rfl: 6    clopidogrel (PLAVIX) 75 mg tablet, take 1 tablet by mouth once daily, Disp: 90 tablet, Rfl: 3    hydrocodone-acetaminophen 10-325mg (NORCO)  mg Tab, Take  mg by mouth. 1 Tablet Oral Four times a day.  narcotic warning, contains tylenol, Disp: , Rfl:     hydroxychloroquine (PLAQUENIL) 200 mg tablet, Take 1 tablet (200 mg total) by mouth 2 (two) times daily., Disp: 180 tablet, Rfl: 0    hydrOXYzine HCl (ATARAX) 25 MG tablet, Take 1 tablet (25 mg total) by mouth 3 (three) times daily as needed for Anxiety., Disp: 30 tablet, Rfl: 0    levothyroxine (SYNTHROID) 137 MCG Tab tablet, take 1 tablet by mouth once daily BEFORE BREAKFAST, Disp: 30 tablet, Rfl: 11    NEXIUM 40 mg capsule, take 1 capsule by mouth once daily BEFORE BREAKFAST, Disp: 90 capsule, Rfl: 1    trazodone (DESYREL) 50 MG tablet, Take 1 tablet (50 mg total) by mouth nightly as needed for Insomnia., Disp: 30 tablet, Rfl: 3    valsartan (DIOVAN) 320 MG tablet, take 1 tablet by mouth once daily, Disp: 90 tablet, Rfl: 2    ZETIA 10 mg tablet, take 1 tablet by mouth once daily, Disp: 90 tablet, Rfl: 1    ammonium  "lactate (LAC-HYDRIN) 12 % lotion, , Disp: , Rfl:     diclofenac sodium 1 % Gel, , Disp: , Rfl:     fluticasone-salmeterol 100-50 mcg/dose (ADVAIR) 100-50 mcg/dose diskus inhaler, Inhale 1 puff into the lungs 2 (two) times daily. Controller, Disp: 60 each, Rfl: 5    promethazine-dextromethorphan (PROMETHAZINE-DM) 6.25-15 mg/5 mL Syrp, Take 5 mLs by mouth 3 (three) times daily as needed., Disp: 240 mL, Rfl: 0    urea (CARMOL) 40 % Crea, Apply topically 2 (two) times daily. Apply to thickened areas of skin, Disp: 1 Tube, Rfl: 3    /74 (BP Location: Left arm, Patient Position: Sitting, BP Method: Large (Manual))   Pulse 81   Temp 98.9 °F (37.2 °C) (Tympanic)   Ht 5' 7" (1.702 m)   Wt 96.2 kg (212 lb 1.3 oz)   BMI 33.22 kg/m²   Review of Systems   Constitutional: Negative for chills, diaphoresis, fatigue and fever.   HENT: Positive for rhinorrhea and sneezing. Negative for sinus pain and sore throat.    Respiratory: Positive for cough. Negative for chest tightness, shortness of breath and wheezing.    Cardiovascular: Negative for chest pain.   Gastrointestinal: Negative for abdominal pain, diarrhea, nausea and vomiting.   Neurological: Negative for dizziness, weakness and headaches.       Objective:      Physical Exam   Constitutional:   Elderly female in NAD     HENT:   Mouth/Throat: Oropharynx is clear and moist. No oropharyngeal exudate.   Neck: Normal range of motion.   Cardiovascular: Normal rate, regular rhythm and normal heart sounds.    Pulmonary/Chest: Effort normal. No respiratory distress. She has wheezes.   Abdominal: Soft. Bowel sounds are normal.   Lymphadenopathy:     She has no cervical adenopathy.       Assessment:       1. Rheumatoid arthritis involving both feet with positive rheumatoid factor    2. Discoid lupus erythematosus    3. Bronchitis        Plan:         Rheumatoid arthritis involving both feet with positive rheumatoid factor     Discoid lupus erythematosus     Bronchitis  -    "  Ambulatory consult to Pulmonology  -     Complete PFT with bronchodilator; Future  -     X-Ray Chest PA And Lateral; Future; Expected date: 10/13/2017     Tobacco use  -     Ambulatory consult to Pulmonology  -     Complete PFT with bronchodilator; Future  -     X-Ray Chest PA And Lateral; Future; Expected date: 10/13/2017  Declines smoking  classes.   Other orders  -     fluticasone-salmeterol 100-50 mcg/dose (ADVAIR) 100-50 mcg/dose diskus inhaler; Inhale 1 puff into the lungs 2 (two) times daily. Controller  Dispense: 60 each; Refill: 5  -     promethazine-dextromethorphan (PROMETHAZINE-DM) 6.25-15 mg/5 mL Syrp; Take 5 mLs by mouth 3 (three) times daily as needed.  Dispense: 240 mL; Refill: 0

## 2017-10-26 ENCOUNTER — TELEPHONE (OUTPATIENT)
Dept: INTERNAL MEDICINE | Facility: CLINIC | Age: 72
End: 2017-10-26

## 2017-10-26 NOTE — TELEPHONE ENCOUNTER
appts booked for 11/17/17 pft at 8 am and Cyndy Mccall at 9 am. Patient verbalized understanding of appts and excepts them. Request for them to be mailed to home address. Done.

## 2017-10-26 NOTE — TELEPHONE ENCOUNTER
----- Message from Yolis Pina MA sent at 10/26/2017  4:04 PM CDT -----  Contact: [phone]  Please advise, pt states that you were supposed to be scheduling an appt with pulmonary.  ----- Message -----  From: Razia Tabares  Sent: 10/26/2017   1:13 PM  To: [facility] Lester Staff    Would like to consult with nurse. Has questions. Pt would not give details. Please call bk at [phone]. thx lj

## 2017-11-01 DIAGNOSIS — I73.9 PAD (PERIPHERAL ARTERY DISEASE): ICD-10-CM

## 2017-11-02 RX ORDER — CLOPIDOGREL BISULFATE 75 MG/1
TABLET ORAL
Qty: 90 TABLET | Refills: 0 | Status: SHIPPED | OUTPATIENT
Start: 2017-11-02 | End: 2018-05-19 | Stop reason: SDUPTHER

## 2017-11-02 RX ORDER — LEVOTHYROXINE SODIUM 137 UG/1
TABLET ORAL
Qty: 30 TABLET | Refills: 3 | Status: SHIPPED | OUTPATIENT
Start: 2017-11-02 | End: 2018-05-14 | Stop reason: SDUPTHER

## 2017-11-02 NOTE — TELEPHONE ENCOUNTER
Please clarify, when I refilled,  Do you want her on Pletal and Plavix?  It was listed as such, so I refilled.  Wanted to confirm.  Dr. Villafuerte

## 2017-11-13 ENCOUNTER — OFFICE VISIT (OUTPATIENT)
Dept: DERMATOLOGY | Facility: CLINIC | Age: 72
End: 2017-11-13
Payer: MEDICARE

## 2017-11-13 DIAGNOSIS — T14.8XXA EXCORIATION: ICD-10-CM

## 2017-11-13 DIAGNOSIS — L43.0 LICHEN PLANUS HYPERTROPHICUS: Primary | ICD-10-CM

## 2017-11-13 PROCEDURE — 99213 OFFICE O/P EST LOW 20 MIN: CPT | Mod: S$PBB,,, | Performed by: DERMATOLOGY

## 2017-11-13 PROCEDURE — 99212 OFFICE O/P EST SF 10 MIN: CPT | Mod: PBBFAC,PO | Performed by: DERMATOLOGY

## 2017-11-13 PROCEDURE — 99999 PR PBB SHADOW E&M-EST. PATIENT-LVL II: CPT | Mod: PBBFAC,,, | Performed by: DERMATOLOGY

## 2017-11-13 RX ORDER — TACROLIMUS 1 MG/G
OINTMENT TOPICAL 2 TIMES DAILY PRN
Qty: 60 G | Refills: 3 | Status: SHIPPED | OUTPATIENT
Start: 2017-11-13 | End: 2018-03-15

## 2017-11-13 RX ORDER — MUPIROCIN 20 MG/G
OINTMENT TOPICAL
Qty: 30 G | Refills: 1 | Status: SHIPPED | OUTPATIENT
Start: 2017-11-13 | End: 2017-12-03 | Stop reason: SDUPTHER

## 2017-11-13 NOTE — PROGRESS NOTES
Subjective:       Patient ID:  Flory Cam is a 72 y.o. female who presents for   Chief Complaint   Patient presents with    Follow-up     70 y/o female c/ hx of lupus and lichen planus overlap of the bilateral arms x 50 years, last seen on 6/21/17.  She had biopsy done showing benign verrucous keratosis with focal lichenoid inflammation of the right palm. She c/o rash on the arms and neck for several days.  + pruritus. She has used fluocinonide and urea cream without improvement.  She has run out of medications, but is uncertain of which one she has continued to use.  She is currently on plaquenil 200 mg BID.      She understands risk of possible skin cancer and greater risk for metastases in scars.         Prior treatments: protopic 0.1% ointment BID, fluticasone, ILK and topical steroids        Review of Systems   Constitutional: Negative for fever and chills.   Gastrointestinal: Negative for nausea and vomiting.   Skin: Positive for itching and rash. Negative for daily sunscreen use, activity-related sunscreen use and recent sunburn.   Hematologic/Lymphatic: Does not bruise/bleed easily.        Objective:    Physical Exam   Constitutional: She appears well-developed and well-nourished. No distress.   Neurological: She is alert and oriented to person, place, and time. She is not disoriented.   Psychiatric: She has a normal mood and affect.   Skin:   Areas Examined (abnormalities noted in diagram):   Head / Face Inspection Performed  Neck Inspection Performed  Chest / Axilla Inspection Performed  RUE Inspected  LUE Inspection Performed  Nails and Digits Inspection Performed                 Assessment / Plan:        Lichen planus hypertrophicus  -     tacrolimus (PROTOPIC) 0.1 % ointment; Apply topically 2 (two) times daily as needed. For rash on hands  Dispense: 60 g; Refill: 3  -     Currently stable.  Pt possibly using fluocinonide and urea ointments seperately, but pt is uncertain.  Will add  protopic oint.     Excoriation  -     mupirocin (BACTROBAN) 2 % ointment; AAA bid for 10 days for sores on skin  Dispense: 30 g; Refill: 1  -     No active rash noted, + secondary lesions.  Will start above meds.              Return if symptoms worsen or fail to improve.

## 2017-11-13 NOTE — PATIENT INSTRUCTIONS
Over-the-counter Retinol Products    Neutrogena Rapid Wrinkle Repair with retinol     - OR -    O[employer] of [place] Regenerist Intensive Repair with retinol    Apply a small amount to the entire face at bedtime    Use a facial sunscreen with SPF of at least 30 daily

## 2017-11-14 DIAGNOSIS — I25.10 CORONARY ARTERY DISEASE, ANGINA PRESENCE UNSPECIFIED, UNSPECIFIED VESSEL OR LESION TYPE, UNSPECIFIED WHETHER NATIVE OR TRANSPLANTED HEART: Primary | ICD-10-CM

## 2017-11-15 ENCOUNTER — OFFICE VISIT (OUTPATIENT)
Dept: CARDIOLOGY | Facility: CLINIC | Age: 72
End: 2017-11-15
Payer: MEDICARE

## 2017-11-15 ENCOUNTER — CLINICAL SUPPORT (OUTPATIENT)
Dept: CARDIOLOGY | Facility: CLINIC | Age: 72
End: 2017-11-15
Payer: MEDICARE

## 2017-11-15 VITALS
BODY MASS INDEX: 32.49 KG/M2 | WEIGHT: 207 LBS | HEIGHT: 67 IN | DIASTOLIC BLOOD PRESSURE: 68 MMHG | SYSTOLIC BLOOD PRESSURE: 128 MMHG | HEART RATE: 73 BPM

## 2017-11-15 DIAGNOSIS — I10 HYPERTENSION GOAL BP (BLOOD PRESSURE) < 140/90: Chronic | ICD-10-CM

## 2017-11-15 DIAGNOSIS — I73.9 PVD (PERIPHERAL VASCULAR DISEASE): Chronic | ICD-10-CM

## 2017-11-15 DIAGNOSIS — I25.10 CORONARY ARTERY DISEASE, ANGINA PRESENCE UNSPECIFIED, UNSPECIFIED VESSEL OR LESION TYPE, UNSPECIFIED WHETHER NATIVE OR TRANSPLANTED HEART: ICD-10-CM

## 2017-11-15 DIAGNOSIS — E78.5 HYPERLIPIDEMIA LDL GOAL <70: Chronic | ICD-10-CM

## 2017-11-15 DIAGNOSIS — I70.219 ATHEROSCLEROTIC PVD WITH INTERMITTENT CLAUDICATION: Primary | Chronic | ICD-10-CM

## 2017-11-15 DIAGNOSIS — Z95.1 HX OF CABG: Chronic | ICD-10-CM

## 2017-11-15 PROCEDURE — 93005 ELECTROCARDIOGRAM TRACING: CPT | Mod: PBBFAC,PO | Performed by: INTERNAL MEDICINE

## 2017-11-15 PROCEDURE — 99214 OFFICE O/P EST MOD 30 MIN: CPT | Mod: S$PBB,,, | Performed by: INTERNAL MEDICINE

## 2017-11-15 PROCEDURE — 99499 UNLISTED E&M SERVICE: CPT | Mod: S$PBB,,, | Performed by: INTERNAL MEDICINE

## 2017-11-15 PROCEDURE — 93010 ELECTROCARDIOGRAM REPORT: CPT | Mod: S$PBB,,, | Performed by: INTERNAL MEDICINE

## 2017-11-15 PROCEDURE — 99999 PR PBB SHADOW E&M-EST. PATIENT-LVL III: CPT | Mod: PBBFAC,,, | Performed by: INTERNAL MEDICINE

## 2017-11-15 PROCEDURE — 99213 OFFICE O/P EST LOW 20 MIN: CPT | Mod: PBBFAC,PO,25 | Performed by: INTERNAL MEDICINE

## 2017-11-15 NOTE — PROGRESS NOTES
Subjective:   Patient ID:  Flory Cam is a 72 y.o. female who presents for follow up of Peripheral Vascular Disease (Patient presents to clinic today for 6 month follow up.); Carotid Artery Disease; Hyperlipidemia; and Hypertension      HPI  A 71 yo female with h/o pvd s/p cfa endarterectomy discoid lupus carotid disease  htn hlp cad s/p cabg is here fro f/u cad and pvd has no new claudication is still smoking taking meds non compliant no regular exercise. htn however is controlled.   Past Medical History:   Diagnosis Date    Acute coronary syndrome     Anxiety     Bilateral carotid artery stenosis 11/17/2015    Chronic pain     Coronary artery disease     GERD (gastroesophageal reflux disease)     Hyperlipidemia     Hypertension     Hypothyroidism     Low back pain     Lupus     Osteoporosis     Poor circulation     PVD (peripheral vascular disease)     S/P peripheral artery angioplasty 02/13/2014    Skin disease        Past Surgical History:   Procedure Laterality Date    COLONOSCOPY N/A 1/4/2017    Procedure: COLONOSCOPY;  Surgeon: Sylvester Arboleda III, MD;  Location: [facility];  Service: Endoscopy;  Laterality: N/A;    CORONARY ANGIOPLASTY      CORONARY ARTERY BYPASS GRAFT      HYSTERECTOMY      THYROIDECTOMY         Social History   Substance Use Topics    Smoking status: Current Some Day Smoker     Packs/day: 0.25     Years: 40.00     Start date: 1961     Last attempt to quit: 03/2016    Smokeless tobacco: Never Used    Alcohol use No       Family History   Problem Relation Age of Onset    Melanoma Neg Hx     Psoriasis Neg Hx     Lupus Neg Hx     Eczema Neg Hx        Current Outpatient Prescriptions   Medication Sig    albuterol 90 mcg/actuation inhaler Inhale 2 puffs into the lungs every 6 (six) hours as needed for Wheezing. Rescue    amlodipine (NORVASC) 10 MG tablet take 1 tablet by mouth once daily    ammonium lactate (LAC-HYDRIN) 12 % lotion     atorvastatin  (LIPITOR) 80 MG tablet Take 1 tablet (80 mg total) by mouth once daily.    cilostazol (PLETAL) 100 MG Tab Take 1 tablet (100 mg total) by mouth 2 (two) times daily. (Patient taking differently: Take 100 mg by mouth once daily at 6am. )    clopidogrel (PLAVIX) 75 mg tablet take 1 tablet by mouth once daily    diclofenac sodium 1 % Gel     fluticasone-salmeterol 100-50 mcg/dose (ADVAIR) 100-50 mcg/dose diskus inhaler Inhale 1 puff into the lungs 2 (two) times daily. Controller    hydrocodone-acetaminophen 10-325mg (NORCO)  mg Tab Take  mg by mouth. 1 Tablet Oral Four times a day.  narcotic warning, contains tylenol    hydroxychloroquine (PLAQUENIL) 200 mg tablet Take 1 tablet (200 mg total) by mouth 2 (two) times daily.    hydrOXYzine HCl (ATARAX) 25 MG tablet Take 1 tablet (25 mg total) by mouth 3 (three) times daily as needed for Anxiety.    levothyroxine (SYNTHROID) 137 MCG Tab tablet TAKE 1 TABLET BY MOUTH ONCE DAILY BEFORE BREAKFAST    mupirocin (BACTROBAN) 2 % ointment AAA bid for 10 days for sores on skin    NEXIUM 40 mg capsule take 1 capsule by mouth once daily BEFORE BREAKFAST    tacrolimus (PROTOPIC) 0.1 % ointment Apply topically 2 (two) times daily as needed. For rash on hands    trazodone (DESYREL) 50 MG tablet Take 1 tablet (50 mg total) by mouth nightly as needed for Insomnia.    urea (CARMOL) 40 % Crea Apply topically 2 (two) times daily. Apply to thickened areas of skin    valsartan (DIOVAN) 320 MG tablet take 1 tablet by mouth once daily    ZETIA 10 mg tablet take 1 tablet by mouth once daily     No current facility-administered medications for this visit.      Current Outpatient Prescriptions on File Prior to Visit   Medication Sig    albuterol 90 mcg/actuation inhaler Inhale 2 puffs into the lungs every 6 (six) hours as needed for Wheezing. Rescue    amlodipine (NORVASC) 10 MG tablet take 1 tablet by mouth once daily    ammonium lactate (LAC-HYDRIN) 12 % lotion      atorvastatin (LIPITOR) 80 MG tablet Take 1 tablet (80 mg total) by mouth once daily.    cilostazol (PLETAL) 100 MG Tab Take 1 tablet (100 mg total) by mouth 2 (two) times daily. (Patient taking differently: Take 100 mg by mouth once daily at 6am. )    clopidogrel (PLAVIX) 75 mg tablet take 1 tablet by mouth once daily    diclofenac sodium 1 % Gel     fluticasone-salmeterol 100-50 mcg/dose (ADVAIR) 100-50 mcg/dose diskus inhaler Inhale 1 puff into the lungs 2 (two) times daily. Controller    hydrocodone-acetaminophen 10-325mg (NORCO)  mg Tab Take  mg by mouth. 1 Tablet Oral Four times a day.  narcotic warning, contains tylenol    hydroxychloroquine (PLAQUENIL) 200 mg tablet Take 1 tablet (200 mg total) by mouth 2 (two) times daily.    hydrOXYzine HCl (ATARAX) 25 MG tablet Take 1 tablet (25 mg total) by mouth 3 (three) times daily as needed for Anxiety.    levothyroxine (SYNTHROID) 137 MCG Tab tablet TAKE 1 TABLET BY MOUTH ONCE DAILY BEFORE BREAKFAST    mupirocin (BACTROBAN) 2 % ointment AAA bid for 10 days for sores on skin    NEXIUM 40 mg capsule take 1 capsule by mouth once daily BEFORE BREAKFAST    tacrolimus (PROTOPIC) 0.1 % ointment Apply topically 2 (two) times daily as needed. For rash on hands    trazodone (DESYREL) 50 MG tablet Take 1 tablet (50 mg total) by mouth nightly as needed for Insomnia.    urea (CARMOL) 40 % Crea Apply topically 2 (two) times daily. Apply to thickened areas of skin    valsartan (DIOVAN) 320 MG tablet take 1 tablet by mouth once daily    ZETIA 10 mg tablet take 1 tablet by mouth once daily     No current facility-administered medications on file prior to visit.      Review of patient's allergies indicates:  No Known Allergies  ROS  Constitution: Negative for diaphoresis, weakness, malaise/fatigue and weight gain.   HENT: Negative for headaches and hoarse voice.    Eyes: Negative for double vision and visual disturbance.   Cardiovascular: Negative for chest  "pain, claudication, cyanosis, dyspnea on exertion, irregular heartbeat, leg swelling, near-syncope, orthopnea, palpitations, paroxysmal nocturnal dyspnea and syncope.   Respiratory: Negative for cough, hemoptysis, shortness of breath and snoring.    Hematologic/Lymphatic: Negative for bleeding problem. Does not bruise/bleed easily.   Skin: Positive for color change, poor wound healing resolved and suspicious lesions.        HAS MULTIPLE CALLOUSES AND SKIN CAHNGES FROM DISCOID LUPUS.   Musculoskeletal: Negative for muscle cramps, muscle weakness and myalgias.   Gastrointestinal: Negative for bloating, abdominal pain, change in bowel habit, diarrhea, heartburn, hematemesis, hematochezia, melena and nausea.   Neurological: Negative for excessive daytime sleepiness, dizziness, light-headedness, loss of balance and numbness.   Psychiatric/Behavioral: Negative for memory loss. The patient does not have insomnia.    Allergic/Immunologic: Negative for hives.      Objective:   Physical Exam  Vitals:    11/15/17 1552   BP: 128/68   Pulse: 73   Weight: 93.9 kg (207 lb)   Height: 5' 7" (1.702 m)   Constitutional: She is oriented to person, place, and time. She appears well-developed and well-nourished. She does not appear ill. No distress.   HENT:   Head: Normocephalic and atraumatic.   Eyes: EOM are normal. Pupils are equal, round, and reactive to light. No scleral icterus.   Neck: Normal range of motion. Neck supple. Normal carotid pulses, no hepatojugular reflux and no JVD present. Carotid bruit is not present. No tracheal deviation present. No thyromegaly present.   Cardiovascular: Normal rate, regular rhythm and normal heart sounds. Exam reveals no gallop and no friction rub.   No murmur heard.  Pulses:  Carotid pulses are 3+ on the right side, and 3+ on the left side.LEFT CAROTID BRUIT NOTED  Radial pulses are 3+ on the right side, and 3+ on the left side.   Femoral pulses are 3+ on the right side, and 2+ on the left " side.BILATERAL FEMORAL BRUITS NOTED  Popliteal pulses are 1+ on the right side, and 1+ on the left side.   Dorsalis pedis pulses are 1+ on the right side, and 0 on the left side.   Posterior tibial pulses are 1+ on the right side, and 0 on the left side.   Bilateral groin scars well healed.   Pulmonary/Chest: Effort normal and breath sounds normal. No respiratory distress. She has no wheezes. She has no rhonchi. She has no rales. She exhibits no tenderness.   Scar cabg well healed.   Abdominal: Soft. Normal appearance, normal aorta and bowel sounds are normal. She exhibits no abdominal bruit, no ascites and no pulsatile midline mass. There is no hepatomegaly. There is no tenderness.   Musculoskeletal: She exhibits no edema. SCAR OF GROIN SURGERY WELL HEALED.  Neurological: She is alert and oriented to person, place, and time. She has normal strength. No cranial nerve deficit. Coordination normal.   Skin: Skin is warm and dry. No rash noted. She is not diaphoretic. No cyanosis or erythema. Nails show no clubbing.   Has multiple skin changes  HAS MULTIPLE CALLOUSES IN BOTH FEET ON THE PLANTAR ASPECT NO DISCHARGE.   Psychiatric: She has a normal mood and affect. Her speech is normal and behavior is normal.   Nursing note and vitals reviewed.  Lab Results   Component Value Date    CHOL 144 08/04/2017    CHOL 165 09/14/2016    CHOL 147 07/28/2015     Lab Results   Component Value Date    HDL 49 08/04/2017    HDL 51 09/14/2016    HDL 50 07/28/2015     Lab Results   Component Value Date    LDLCALC 80.0 08/04/2017    LDLCALC 93.0 09/14/2016    LDLCALC 87.0 07/28/2015     Lab Results   Component Value Date    TRIG 75 08/04/2017    TRIG 105 09/14/2016    TRIG 50 07/28/2015     Lab Results   Component Value Date    CHOLHDL 34.0 08/04/2017    CHOLHDL 30.9 09/14/2016    CHOLHDL 34.0 07/28/2015       Chemistry        Component Value Date/Time     08/04/2017 1003    K 4.6 08/04/2017 1003     08/04/2017 1003    CO2 26  08/04/2017 1003    BUN 14 08/04/2017 1003    CREATININE 0.8 08/04/2017 1003    GLU 89 08/04/2017 1003        Component Value Date/Time    CALCIUM 9.5 08/04/2017 1003    ALKPHOS 198 (H) 08/04/2017 1003    AST 15 08/04/2017 1003    ALT 10 08/04/2017 1003    BILITOT 0.4 08/04/2017 1003    ESTGFRAFRICA >60.0 08/04/2017 1003    EGFRNONAA >60.0 08/04/2017 1003          Lab Results   Component Value Date    TSH 3.113 10/17/2016     Lab Results   Component Value Date    INR 1.0 04/28/2017    INR 0.9 07/26/2007     Lab Results   Component Value Date    WBC 7.80 05/05/2017    HGB 14.1 05/05/2017    HCT 42.5 05/05/2017    MCV 93 05/05/2017     05/05/2017     BMP  Sodium   Date Value Ref Range Status   08/04/2017 142 136 - 145 mmol/L Final     Potassium   Date Value Ref Range Status   08/04/2017 4.6 3.5 - 5.1 mmol/L Final     Chloride   Date Value Ref Range Status   08/04/2017 106 95 - 110 mmol/L Final     CO2   Date Value Ref Range Status   08/04/2017 26 23 - 29 mmol/L Final     BUN, Bld   Date Value Ref Range Status   08/04/2017 14 8 - 23 mg/dL Final     Creatinine   Date Value Ref Range Status   08/04/2017 0.8 0.5 - 1.4 mg/dL Final     Calcium   Date Value Ref Range Status   08/04/2017 9.5 8.7 - 10.5 mg/dL Final     Anion Gap   Date Value Ref Range Status   08/04/2017 10 8 - 16 mmol/L Final     eGFR if    Date Value Ref Range Status   08/04/2017 >60.0 >60 mL/min/1.73 m^2 Final     eGFR if non    Date Value Ref Range Status   08/04/2017 >60.0 >60 mL/min/1.73 m^2 Final     Comment:     Calculation used to obtain the estimated glomerular filtration  rate (eGFR) is the CKD-EPI equation. Since race is unknown   in our information system, the eGFR values for   -American and Non--American patients are given   for each creatinine result.       CrCl cannot be calculated (Patient's most recent lab result is older than the maximum 7 days allowed.).    Assessment:     1.  Atherosclerotic PVD with intermittent claudication    2. Hyperlipidemia LDL goal <70    3. Hypertension goal BP (blood pressure) < 140/90    4. PVD (peripheral vascular disease)    5. CAD: Hx of CABG      Needs compliance smoking cessation diet exercise weight loss. She understands and is willing to proceed.  Plan:   Continue current therapy  Cardiac low salt diet.  Risk factor modification and excercise program.  Smoking cessation counseling  F/u in 6 months with lipid cmp

## 2017-11-27 ENCOUNTER — TELEPHONE (OUTPATIENT)
Dept: INTERNAL MEDICINE | Facility: CLINIC | Age: 72
End: 2017-11-27

## 2017-11-27 NOTE — TELEPHONE ENCOUNTER
----- Message from Yolis Pina MA sent at 11/27/2017  2:07 PM CST -----  Contact: TabithaKarma [facility]-[phone] Ext.  [phone]   Timoteo [facility]-[phone] Ext.  [phone]  would like face to face notes on patient from 05/06/17 or within six month from the date along with necessity letter for wheelchair.    ----- Message -----  From: Tayler Ovalle DO  Sent: 11/24/2017   4:27 PM  To: Yolis Pina MA    Please clarify this message. Who needs what?  ----- Message -----  From: Yolis Pina MA  Sent: 11/16/2017   2:30 PM  To: Tayler Ovalle DO    Please advise  ----- Message -----  From: Oswald Oneil  Sent: 11/16/2017  10:59 AM  To: Vasquez Lester Staff    Pt would like face to face notes on patient from 05/06/17 or within six month from the date along with necessity letter for wheelchair.  Please call back at [phone] Ext.  [phone]. Thx-AH

## 2017-11-27 NOTE — TELEPHONE ENCOUNTER
I do not see where patient has had an official evaluation by me for a wheelchair. Has she gone to PT for a wheelchair evaluation?

## 2017-11-28 ENCOUNTER — TELEPHONE (OUTPATIENT)
Dept: DERMATOLOGY | Facility: CLINIC | Age: 72
End: 2017-11-28

## 2017-11-28 ENCOUNTER — TELEPHONE (OUTPATIENT)
Dept: PODIATRY | Facility: CLINIC | Age: 72
End: 2017-11-28

## 2017-11-28 DIAGNOSIS — L98.9 DERMATOSIS: Primary | ICD-10-CM

## 2017-11-28 RX ORDER — LEVOCETIRIZINE DIHYDROCHLORIDE 5 MG/1
TABLET, FILM COATED ORAL
Qty: 30 TABLET | Refills: 3 | Status: SHIPPED | OUTPATIENT
Start: 2017-11-28 | End: 2018-04-30

## 2017-11-28 NOTE — TELEPHONE ENCOUNTER
----- Message from Leticia Flores sent at 11/28/2017 11:57 AM CST -----  Contact: pt  States she needs to speak to the nurse about an appt she had. Please call pt at [phone]. Thank you

## 2017-11-28 NOTE — TELEPHONE ENCOUNTER
----- Message from Treasure Yap MD sent at 11/28/2017 10:41 AM CST -----  Will not send prednisone due to other side effects.  Will send anti-histamine which will help with hives  ----- Message -----  From: Nino Cormier MA  Sent: 11/28/2017  10:05 AM  To: Treasure Yap MD    Pt states that she is noticing that she is breaking out in whelps after she gets out of her bath. Pt states she had to purchase a cheaper detergent and is not sure if that is the cause. She states she would like a rx for prednisone. Please advise.      ----- Message -----  From: Talita Castro  Sent: 11/28/2017   9:26 AM  To: Fracisco Amanda Staff    Patient would like for you to call her at [phone].  This is all she would tell me.                                            shea

## 2017-11-28 NOTE — TELEPHONE ENCOUNTER
Returned call to pt to reschedule appt on 12/13/17. Appt rescheduled 12/18/17 at 9:20a at the [facility] with Dr. Esteban DPM

## 2017-11-28 NOTE — TELEPHONE ENCOUNTER
Informed pt that Dr. Yap sent levocetirizine (XYZAL) 5 MG tablet to her pharmacy to help with hives, she voiced understanding.

## 2017-12-03 DIAGNOSIS — T14.8XXA EXCORIATION: ICD-10-CM

## 2017-12-04 RX ORDER — MUPIROCIN 20 MG/G
OINTMENT TOPICAL
Qty: 22 G | Refills: 1 | Status: SHIPPED | OUTPATIENT
Start: 2017-12-04 | End: 2017-12-27 | Stop reason: SDUPTHER

## 2017-12-13 DIAGNOSIS — F41.9 ANXIETY: ICD-10-CM

## 2017-12-13 NOTE — TELEPHONE ENCOUNTER
----- Message from Malka Weinberg sent at 12/13/2017  1:05 PM CST -----  Please call pt back at [phone].pt wants to ask you something.

## 2017-12-15 RX ORDER — HYDROXYZINE HYDROCHLORIDE 25 MG/1
25 TABLET, FILM COATED ORAL 3 TIMES DAILY PRN
Qty: 30 TABLET | Refills: 0 | Status: SHIPPED | OUTPATIENT
Start: 2017-12-15 | End: 2017-12-27

## 2017-12-18 ENCOUNTER — OFFICE VISIT (OUTPATIENT)
Dept: PODIATRY | Facility: CLINIC | Age: 72
End: 2017-12-18
Payer: MEDICARE

## 2017-12-18 VITALS
WEIGHT: 208.75 LBS | DIASTOLIC BLOOD PRESSURE: 86 MMHG | SYSTOLIC BLOOD PRESSURE: 139 MMHG | HEIGHT: 67 IN | BODY MASS INDEX: 32.76 KG/M2 | HEART RATE: 57 BPM

## 2017-12-18 DIAGNOSIS — L84 CORN OR CALLUS: ICD-10-CM

## 2017-12-18 DIAGNOSIS — B35.1 DERMATOPHYTOSIS OF NAIL: Primary | ICD-10-CM

## 2017-12-18 DIAGNOSIS — L93.0 DISCOID LUPUS: ICD-10-CM

## 2017-12-18 DIAGNOSIS — I73.9 PERIPHERAL VASCULAR DISEASE: ICD-10-CM

## 2017-12-18 PROCEDURE — 11055 PARING/CUTG B9 HYPRKER LES 1: CPT | Mod: Q8,S$PBB,, | Performed by: PODIATRIST

## 2017-12-18 PROCEDURE — 99999 PR PBB SHADOW E&M-EST. PATIENT-LVL IV: CPT | Mod: PBBFAC,,, | Performed by: PODIATRIST

## 2017-12-18 PROCEDURE — 11721 DEBRIDE NAIL 6 OR MORE: CPT | Mod: Q8,PBBFAC,PO | Performed by: PODIATRIST

## 2017-12-18 PROCEDURE — 99214 OFFICE O/P EST MOD 30 MIN: CPT | Mod: PBBFAC,PO,25 | Performed by: PODIATRIST

## 2017-12-18 PROCEDURE — 11055 PARING/CUTG B9 HYPRKER LES 1: CPT | Mod: Q8,PBBFAC,PO | Performed by: PODIATRIST

## 2017-12-18 PROCEDURE — 11721 DEBRIDE NAIL 6 OR MORE: CPT | Mod: 59,Q8,S$PBB, | Performed by: PODIATRIST

## 2017-12-18 PROCEDURE — 99213 OFFICE O/P EST LOW 20 MIN: CPT | Mod: 25,S$PBB,, | Performed by: PODIATRIST

## 2017-12-18 RX ORDER — UREA 40 %
CREAM (GRAM) TOPICAL 2 TIMES DAILY
Qty: 1 TUBE | Refills: 3 | Status: SHIPPED | OUTPATIENT
Start: 2017-12-18 | End: 2018-03-15

## 2017-12-18 RX ORDER — AMMONIUM LACTATE 12 G/100G
1 CREAM TOPICAL DAILY
Qty: 140 G | Refills: 1 | Status: SHIPPED | OUTPATIENT
Start: 2017-12-18 | End: 2020-04-17

## 2017-12-18 NOTE — PROGRESS NOTES
PODIATRY NOTE    CHIEF COMPLAINT  Chief Complaint   Patient presents with    Routine Foot Care     Last visit with PCP Dr. Ovalle ([facility]) 10/13/17     HPI  SUBJECTIVE: Flory Cam is a 72 y.o. female w/ PMH of PVD, Lupus, hx of intermittent leg claudifcation- s/p vascular intervention with much improvement in symptoms and other medical problems who presents to clinic for high risk  foot exam and care.Patient admits to painful toenails and calluses aggravated by increased weight bearing, shoe gear, and pressure. States pain is relieved with routine debridements. She denies any N/V/F. Patient has no other pedal complaints at this time.    HgA1c:   Hemoglobin A1C   Date Value Ref Range Status   08/04/2017 5.8 (H) 4.0 - 5.6 % Final     Comment:     According to ADA guidelines, hemoglobin A1c <7.0% represents  optimal control in non-pregnant diabetic patients. Different  metrics may apply to specific patient populations.   Standards of Medical Care in Diabetes-2016.  For the purpose of screening for the presence of diabetes:  <5.7%     Consistent with the absence of diabetes  5.7-6.4%  Consistent with increasing risk for diabetes   (prediabetes)  >or=6.5%  Consistent with diabetes  Currently, no consensus exists for use of hemoglobin A1c  for diagnosis of diabetes for children.  This Hemoglobin A1c assay has significant interference with fetal   hemoglobin   (HbF). The results are invalid for patients with abnormal amounts of   HbF,   including those with known Hereditary Persistence   of Fetal Hemoglobin. Heterozygous hemoglobin variants (HbAS, HbAC,   HbAD, HbAE, HbA2) do not significantly interfere with this assay;   however, presence of multiple variants in a sample may impact the %   interference.     09/14/2016 6.2 4.5 - 6.2 % Final     Comment:     According to ADA guidelines, hemoglobin A1C <7.0% represents  optimal control in non-pregnant diabetic patients.  Different  metrics may apply to specific  "populations.   Standards of Medical Care in Diabetes - 2016.  For the purpose of screening for the presence of diabetes:  <5.7%     Consistent with the absence of diabetes  5.7-6.4%  Consistent with increasing risk for diabetes   (prediabetes)  >or=6.5%  Consistent with diabetes  Currently no consensus exists for use of hemoglobin A1C  for diagnosis of diabetes for children.     04/13/2010 5.9 4.0 - 6.2 % Final       REVIEW OF SYSTEMS  General: Denies any fever or chills  Chest: Denies shortness of breath, wheezing, coughing, or sputum production  Heart: Denies chest pain.  As noted above and per history of current illness above, otherwise negative in the remainder of the 14 systems.     PHYSICAL EXAM  Vitals:    12/18/17 1042   BP: 139/86   Pulse: (!) 57   Weight: 94.7 kg (208 lb 12.4 oz)   Height: 5' 7" (1.702 m)   PainSc:   6   PainLoc: Foot       GEN:  This patient is well-developed, well-nourished and appears stated age, well-oriented to person, place and time, and cooperative and pleasant on today's visit.      LOWER EXTREMITY  Vascular:   · DP pedal pulse 0/4 b/l, PT pedal pulse 1/4 b/l  · Skin temperature warm to warm from prox to distally  · CFT <5 secs b/l  · There is LE edema noted b/l.     Dermatologic:   · Thickened, dystrophic, elongated toenails with subungal debris 1-5 b/l.   · Webspaces are C/D/I B/L.  · There is hyperkeratotic tissue noted plantar aspect of b/l feet at medial arch x 2  · Plantar incision site LEFT foot healed  · Skin texture and turgor dry with hyperkeratosis diffusely b/l plantar heel   · There is no pedal hair growth noted    Neurologic:  · Vibratory sensation diminished at level of Hallux IPJ b/l    · Protective sensation absent at 0/10 sites upon examination with [place] Weinsten 5.07 g monofilament.   · Propioception intact at 1st MTPJ b/l.   · Achilles and patellar deep tendon reflexes intact  · Babinski reflex absent b/l. Light touch and sharp/dull sensation intact " b/l.    Musculoskeletal/Orthopedic:  · No symptomatic structural abnormalities noted.   · Muscle strength is 5/5 for foot inverters, everters, plantarflexors, and dorsiflexors. Muscle tone is normal.  · Pain free range of motion in all four quadrants with stiffness and limitation b/l  · PAIN with palpation of callus plantar medial arch, LEFT      ASSESSMENT  Dermatophytosis of nail    Corn or callus    Peripheral vascular disease    Discoid lupus    Other orders  -     urea (CARMOL) 40 % Crea; Apply topically 2 (two) times daily. Apply to thickened areas of skin  Dispense: 1 Tube; Refill: 3  -     ammonium lactate 12 % Crea; Apply 1 application topically once daily.  Dispense: 140 g; Refill: 1        Plan:  -Discuss presenting problems, etiology, pathologic processes and management options with patient today.   -With patient's permission,Sharp excisional debridement of hyperkeratotic lesions deep to epidermal layer x1 on left foot  was performed with a #15 blade without incident.   -With patient's permission, nails were aggressively reduced and debrided x 10 to their soft tissue attachment mechanically and with electric , removing all offending nail and debris. Patient relates relief following the procedure.   -Rx UREA cream and/or amlactin   -RTC as scheduled    Future Appointments  Date Time Provider Department Center   12/20/2017 8:00 AM PULMONARY LAB, [facility]  Medical    12/20/2017 9:00 AM Cyndy Mccall NP ON [facility]  Medical C   12/20/2017 10:40 [facility]   2/7/2018 10:20 AM [facility]   3/21/2018 10:00 AM Melva Orellana DPM [facility] POD [place]         Report Electronically Signed By:  Melva Orellana DPM   Podiatric Medicine & Surgery  [facility]  12/18/2017

## 2017-12-20 ENCOUNTER — OFFICE VISIT (OUTPATIENT)
Dept: PULMONOLOGY | Facility: CLINIC | Age: 72
End: 2017-12-20
Payer: MEDICARE

## 2017-12-20 ENCOUNTER — PROCEDURE VISIT (OUTPATIENT)
Dept: PULMONOLOGY | Facility: CLINIC | Age: 72
End: 2017-12-20
Payer: MEDICARE

## 2017-12-20 VITALS
WEIGHT: 207 LBS | OXYGEN SATURATION: 95 % | HEART RATE: 70 BPM | DIASTOLIC BLOOD PRESSURE: 70 MMHG | SYSTOLIC BLOOD PRESSURE: 122 MMHG | RESPIRATION RATE: 20 BRPM | BODY MASS INDEX: 33.27 KG/M2 | HEIGHT: 66 IN

## 2017-12-20 DIAGNOSIS — Z72.0 TOBACCO USE: ICD-10-CM

## 2017-12-20 DIAGNOSIS — J44.9 CHRONIC OBSTRUCTIVE PULMONARY DISEASE, UNSPECIFIED COPD TYPE: Primary | ICD-10-CM

## 2017-12-20 DIAGNOSIS — J40 BRONCHITIS: ICD-10-CM

## 2017-12-20 PROCEDURE — 94729 DIFFUSING CAPACITY: CPT | Mod: 26,S$PBB,, | Performed by: INTERNAL MEDICINE

## 2017-12-20 PROCEDURE — 94060 EVALUATION OF WHEEZING: CPT | Mod: 26,S$PBB,, | Performed by: INTERNAL MEDICINE

## 2017-12-20 PROCEDURE — 94729 DIFFUSING CAPACITY: CPT | Mod: PBBFAC

## 2017-12-20 PROCEDURE — 99499 UNLISTED E&M SERVICE: CPT | Mod: S$PBB,,, | Performed by: NURSE PRACTITIONER

## 2017-12-20 PROCEDURE — 94060 EVALUATION OF WHEEZING: CPT | Mod: PBBFAC

## 2017-12-20 PROCEDURE — 94726 PLETHYSMOGRAPHY LUNG VOLUMES: CPT | Mod: 26,S$PBB,, | Performed by: INTERNAL MEDICINE

## 2017-12-20 PROCEDURE — 99999 PR PBB SHADOW E&M-EST. PATIENT-LVL V: CPT | Mod: PBBFAC,,, | Performed by: NURSE PRACTITIONER

## 2017-12-20 PROCEDURE — 99215 OFFICE O/P EST HI 40 MIN: CPT | Mod: PBBFAC | Performed by: NURSE PRACTITIONER

## 2017-12-20 PROCEDURE — 99214 OFFICE O/P EST MOD 30 MIN: CPT | Mod: 25,S$PBB,, | Performed by: NURSE PRACTITIONER

## 2017-12-20 PROCEDURE — 94726 PLETHYSMOGRAPHY LUNG VOLUMES: CPT | Mod: PBBFAC

## 2017-12-20 NOTE — PROGRESS NOTES
Chief Complaint   Patient presents with    Bronchitis     Subjective:      HPI:  Patient presents for evaluation of Bronchitis, referred by Edward HODGES.  Patient reports her bronchitis flare in October resolved completely.  She reports no exacerbation or symptoms at this presentation.  There is no cough, no wheezing, no shortness of breath, no hemoptysis, no sputum production, no weight loss,  no chest pain.   She has Flovent inhaler 110 mcg which she only uses for bronchitis flares, which occur about once or twice a year since about 7255-6735.  She is not on inhaler medication on a daily bases. She has had Flovent 110 mcg and Albuterol inhalers in past. She used Flovent in October 2017 for about 3-4 days. Albuterol has not been used since October 2017.  Some day smoker, she cut back since 9/2017, 10 pack year history, 40 years smoking about 1/4 pack/day. She is interested in complete cessation, referred to cessation program.     Family/Medical/Surgical/Social History  has No Known Allergies.  family history is not on file.  has a current medication list which includes the following prescription(s): albuterol, amlodipine, ammonium lactate, ammonium lactate, atorvastatin, cilostazol, clopidogrel, fluticasone-salmeterol 100-50 mcg/dose, hydrocodone-acetaminophen 10-325mg, hydroxychloroquine, levothyroxine, nexium, tacrolimus, trazodone, urea, valsartan, zetia, diclofenac sodium, hydroxyzine hcl, levocetirizine, and mupirocin.   has a past surgical history that includes Coronary artery bypass graft; Hysterectomy; Thyroidectomy; Coronary angioplasty; and Colonoscopy (N/A, 1/4/2017).   reports that she has quit smoking. Her smoking use included Cigarettes. She started smoking about 57 years ago. She has a 10.00 pack-year smoking history. She has never used smokeless tobacco. She reports that she does not drink alcohol or use drugs.    Review of Systems  Review of Systems   Constitutional: Negative for fever, chills,  "weight loss, weight gain, activity change, appetite change, fatigue and night sweats.   HENT: Negative for postnasal drip, rhinorrhea, sinus pressure, voice change and congestion.    Eyes: Negative for redness and itching.   Respiratory: Negative for snoring, cough, sputum production, chest tightness, shortness of breath, wheezing, orthopnea, asthma nighttime symptoms, dyspnea on extertion, use of rescue inhaler and somnolence.    Cardiovascular: Negative.  Negative for chest pain, palpitations and leg swelling.   Genitourinary: Negative for difficulty urinating and hematuria.   Endocrine: Negative for cold intolerance and heat intolerance.    Musculoskeletal: Negative for arthralgias, gait problem, joint swelling and myalgias.   Skin: Negative.    Gastrointestinal: Negative for nausea, vomiting, abdominal pain and acid reflux.   Neurological: Negative for dizziness, weakness, light-headedness and headaches.   Hematological: Negative for adenopathy. No excessive bruising.   All other systems reviewed and are negative.       Objective:     /70 (BP Location: Right arm, Patient Position: Sitting)   Pulse 70   Resp 20   Ht 5' 6" (1.676 m)   Wt 93.9 kg (207 lb)   SpO2 95%   BMI 33.41 kg/m²   Physical Exam   Constitutional: She is oriented to person, place, and time. She appears well-developed and well-nourished. She is active and cooperative.  Non-toxic appearance. She does not appear ill. No distress.   HENT:   Head: Normocephalic.   Right Ear: External ear normal.   Left Ear: External ear normal.   Nose: Nose normal.   Mouth/Throat: Oropharynx is clear and moist. No oropharyngeal exudate.   Eyes: Conjunctivae are normal.   Neck: Normal range of motion. Neck supple.   Cardiovascular: Normal rate, regular rhythm, normal heart sounds and intact distal pulses.    Pulmonary/Chest: Effort normal and breath sounds normal. No stridor.   Abdominal: Soft.   Musculoskeletal: Normal range of motion.   Lymphadenopathy: "     She has no cervical adenopathy.   Neurological: She is alert and oriented to person, place, and time.   Skin: Skin is warm and dry.   Psychiatric: She has a normal mood and affect. Her behavior is normal. Judgment and thought content normal.   Vitals reviewed.    Assessment:     1. Chronic obstructive pulmonary disease, unspecified COPD type    2. Tobacco use        Orders Placed This Encounter   Procedures    Ambulatory referral to Smoking Cessation Program     Referral Priority:   Routine     Referral Type:   Consultation     Referral Reason:   Specialty Services Required     Requested Specialty:   CTTS     Number of Visits Requested:   1    Spirometry with/without bronchodilator     Standing Status:   Future     Standing Expiration Date:   2018     Diagnostic Testing Reviewed  All relevant imaging and labs reviewed.  Pulmonary function tests: 2017 FEV1: 1.40  (72 % predicted), FVC:  1.97 (78 % predicted), FEV1/FVC:  71 (94 % predicted), T.03 (97 % predicted), RV/TLVC: 3.06 (144 % predicted), DLCO: 61 (148 % predicted)   Post bronchodilator: FEV1: 1.42  (73 % predicted), FVC:  2.04 (81 % predicted), FEV1/FVC:  69 (92 % predicted).   Mild to Moderate obstructive airflow defect. No bronchodilator response.  Normal Lung volumes, air trapping, however patient was unable to exhale to plateau.  Reduced DLCO at 43% predicted.     IMAGING:   Chest xray 10/13/2017  Findings: The lungs are clear and free of infiltrate.  No pleural effusion or pneumothorax is identified.    The heart is enlarged.  There is evidence for prior median sternotomy.  There is tortuosity of the descending thoracic aorta.  IMPRESSION: No acute cardiopulmonary process.    Plan:     Problem List Items Addressed This Visit     Chronic obstructive pulmonary disease - Primary     cpft 2017 mild to moderate airflow reduction.   Normal lung volumes  DLCO reduced at 43% predicted.   Has flovent 110 mcg sporadic use if has  bronchitis flare.  Has albuterol inhaler has not used in over 3 months.  No recommendation for daily inhaler use unless needed, there is no cough, no wheezing, no shortness of breath, no sputum production.   Follow up in 6 months w/review spirometry.  Inhaler instruction provided with education on Flovent controller and albuterol rescue.   Complete smoking cessation            Relevant Orders    Spirometry with/without bronchodilator    Tobacco use     Current some day smoker. Patient ready to quit. Referral to smoking cessation program.          Relevant Orders    Ambulatory referral to Smoking Cessation Program        Return in about 6 months (around 6/20/2018) for COPD follow up w/review kd.

## 2017-12-20 NOTE — ASSESSMENT & PLAN NOTE
cpft 12/20/2017 mild to moderate airflow reduction.   Normal lung volumes  DLCO reduced at 43% predicted.   Has flovent 110 mcg sporadic use if has bronchitis flare.  Has albuterol inhaler has not used in over 3 months.  No recommendation for daily inhaler use unless needed, there is no cough, no wheezing, no shortness of breath, no sputum production.   Follow up in 6 months w/review spirometry.  Inhaler instruction provided with education on Flovent controller and albuterol rescue.   Complete smoking cessation

## 2017-12-20 NOTE — LETTER
December 20, 2017      Edward Alexandre III, PA-C  [address] [place] [address] [address] [place] [id number]           [facility]  [address] [place] [address] [address] [place] [address]  Phone: [phone]  Fax: [phone]          Patient: Flory Cam   MR Number: [id number]   YOB: 1945   Date of Visit: 12/20/2017       Dear Edward Alexandre III:    Thank you for referring Flory Cam to me for evaluation. Attached you will find relevant portions of my assessment and plan of care.    If you have questions, please do not hesitate to call me. I look forward to following Flory Cam along with you.    Sincerely,    Cyndy Mccall, NP    Enclosure  CC:  No Recipients    If you would like to receive this communication electronically, please contact [email or web address] or [phone] to request more information on [employer] Link access.    For providers and/or their staff who would like to refer a patient to [facility], please contact us through our one-stop-shop provider referral line, [facility], at [phone].    If you feel you have received this communication in error or would no longer like to receive these types of communications, please e-mail [email or web address]

## 2017-12-21 DIAGNOSIS — M05.771 RHEUMATOID ARTHRITIS INVOLVING BOTH FEET WITH POSITIVE RHEUMATOID FACTOR: ICD-10-CM

## 2017-12-21 DIAGNOSIS — L93.0 DISCOID LUPUS ERYTHEMATOSUS: ICD-10-CM

## 2017-12-21 DIAGNOSIS — M05.772 RHEUMATOID ARTHRITIS INVOLVING BOTH FEET WITH POSITIVE RHEUMATOID FACTOR: ICD-10-CM

## 2017-12-21 LAB
POST FEF 25 75: 0.85 L/S (ref 1–2.47)
POST FET 100: 8.87 S
POST FEV1 FVC: 70 %
POST FEV1: 1.42 L (ref 1.63–2.28)
POST FIF 50: 1.51 L/S
POST FVC: 2.04 L (ref 2.14–2.89)
POST PEF: 3.4 L/S (ref 3.87–6.01)
PRE DLCO: 10.11 ML/MMHG/MIN (ref 19.33–27.62)
PRE ERV: 0.25 L
PRE FEF 25 75: 0.93 L/S (ref 1–2.47)
PRE FET 100: 8.82 S
PRE FEV1 FVC: 71 %
PRE FEV1: 1.4 L (ref 1.63–2.28)
PRE FIF 50: 1.34 L/S
PRE FRC PL: 3.32 L (ref 2.1–3.05)
PRE FVC: 1.97 L (ref 2.14–2.89)
PRE KROGHS K: 3.7 1/MIN
PRE PEF: 3.58 L/S (ref 3.87–6.01)
PRE RV: 3.07 L (ref 1.77–2.48)
PRE SVC: 1.97 L
PRE TLC: 5.03 L (ref 4.81–5.58)
PREDICTED DLCO: 23.47 ML/MMHG/MIN (ref 19.33–27.62)
PREDICTED FEV1 FVC: 75.3 % (ref 70.4–80.19)
PREDICTED FEV1: 1.95 L (ref 1.63–2.28)
PREDICTED FRC N2: 2.58 L (ref 2.1–3.05)
PREDICTED FRC PL: 2.58 L (ref 2.1–3.05)
PREDICTED FVC: 2.52 L (ref 2.14–2.89)
PREDICTED RV: 2.12 L (ref 1.77–2.48)
PREDICTED SVC: 2.91 L
PREDICTED TLC: 5.19 L (ref 4.81–5.58)
PROVOCATION PROTOCOL: ABNORMAL

## 2017-12-21 RX ORDER — HYDROXYCHLOROQUINE SULFATE 200 MG/1
TABLET, FILM COATED ORAL
Qty: 180 TABLET | Refills: 0 | Status: SHIPPED | OUTPATIENT
Start: 2017-12-21 | End: 2018-10-08

## 2017-12-22 NOTE — TELEPHONE ENCOUNTER
Patient notified of results and recommendations with verbalized understanding. Also let her know meds were sent to pharmacy for her.

## 2017-12-27 ENCOUNTER — OFFICE VISIT (OUTPATIENT)
Dept: INTERNAL MEDICINE | Facility: CLINIC | Age: 72
End: 2017-12-27
Payer: MEDICARE

## 2017-12-27 VITALS
SYSTOLIC BLOOD PRESSURE: 138 MMHG | HEART RATE: 68 BPM | BODY MASS INDEX: 33.31 KG/M2 | HEIGHT: 66 IN | OXYGEN SATURATION: 96 % | DIASTOLIC BLOOD PRESSURE: 74 MMHG | WEIGHT: 207.25 LBS | TEMPERATURE: 99 F

## 2017-12-27 DIAGNOSIS — T14.8XXA EXCORIATION: ICD-10-CM

## 2017-12-27 DIAGNOSIS — R59.9 LYMPH NODES ENLARGED: Primary | ICD-10-CM

## 2017-12-27 PROCEDURE — 99213 OFFICE O/P EST LOW 20 MIN: CPT | Mod: S$PBB,,, | Performed by: FAMILY MEDICINE

## 2017-12-27 PROCEDURE — 99214 OFFICE O/P EST MOD 30 MIN: CPT | Mod: PBBFAC | Performed by: FAMILY MEDICINE

## 2017-12-27 PROCEDURE — 99999 PR PBB SHADOW E&M-EST. PATIENT-LVL IV: CPT | Mod: PBBFAC,,, | Performed by: FAMILY MEDICINE

## 2017-12-27 RX ORDER — METHYLPREDNISOLONE 4 MG/1
TABLET ORAL
Qty: 1 PACKAGE | Refills: 0 | Status: SHIPPED | OUTPATIENT
Start: 2017-12-27 | End: 2018-01-17

## 2017-12-27 RX ORDER — CLOBETASOL PROPIONATE 0.5 MG/G
AEROSOL, FOAM TOPICAL 2 TIMES DAILY
COMMUNITY
End: 2018-03-15

## 2017-12-27 NOTE — PROGRESS NOTES
Subjective:       Patient ID: Flory Cam is a 72 y.o. female.    Chief Complaint: lump on neck hairline    [facility] Ms. Cam presents today with complaint of knot/bump on her neck.   She noticed this a couple weeks ago.  Woke up yesterday and it seemed to spread  Got cream a couple weeks ago from Dermatologist and that helped. She reports the initial spot that was treated was thought to be a bite.     She put a rinse in her hair and then conditioner and didn't wash it right out.     Review of Systems    Pertinent ROS listed in HPI    Family History   Problem Relation Age of Onset    Melanoma Neg Hx     Psoriasis Neg Hx     Lupus Neg Hx     Eczema Neg Hx      Objective:        Physical Exam   Constitutional: She appears well-developed and well-nourished.   HENT:   Head: Normocephalic and atraumatic.       Vitals reviewed.        Assessment/Plan:     Lymph nodes enlarged  -     methylPREDNISolone (MEDROL DOSEPACK) 4 mg tablet; use as directed  Dispense: 1 Package; Refill: 0    Discussed with patient what could have caused her symptoms. Will treat with a steroid and consider antibiotics if they do not decrease in size appropriately over next 2-5 days.       Return if symptoms worsen or fail to improve.    Gale Rico MD  [facility]   Family Medicine

## 2017-12-29 ENCOUNTER — TELEPHONE (OUTPATIENT)
Dept: INTERNAL MEDICINE | Facility: CLINIC | Age: 72
End: 2017-12-29

## 2017-12-29 DIAGNOSIS — R59.1 LYMPHADENOPATHY: Primary | ICD-10-CM

## 2017-12-29 RX ORDER — CLINDAMYCIN HYDROCHLORIDE 300 MG/1
300 CAPSULE ORAL 2 TIMES DAILY
Qty: 20 CAPSULE | Refills: 0 | Status: SHIPPED | OUTPATIENT
Start: 2017-12-29 | End: 2018-03-15

## 2017-12-29 RX ORDER — MUPIROCIN 20 MG/G
OINTMENT TOPICAL
Qty: 22 G | Refills: 1 | Status: SHIPPED | OUTPATIENT
Start: 2017-12-29 | End: 2018-06-15 | Stop reason: SDUPTHER

## 2017-12-29 NOTE — TELEPHONE ENCOUNTER
Patient states she would like to have the antibiotic sent to [facility] on [address]. Patient was seen for lumps to scalp a few days ago. Please advise.

## 2017-12-29 NOTE — TELEPHONE ENCOUNTER
----- Message from Mireya Nieves sent at 12/29/2017 11:35 AM CST -----  Contact: pt  Please call pt @ [phone], pt have some questions for doctor, will discuss with nurse.

## 2018-01-30 ENCOUNTER — TELEPHONE (OUTPATIENT)
Dept: SMOKING CESSATION | Facility: CLINIC | Age: 73
End: 2018-01-30

## 2018-01-30 NOTE — TELEPHONE ENCOUNTER
Contacted patient regarding missed intake. Patient cannot she is unable to participate in the program at this time  due to a family crisis.

## 2018-02-14 ENCOUNTER — PATIENT OUTREACH (OUTPATIENT)
Dept: ADMINISTRATIVE | Facility: HOSPITAL | Age: 73
End: 2018-02-14

## 2018-02-14 NOTE — PROGRESS NOTES
Schedule patient for blood pressure recheck on 06/21/18 at 11:20 am with Dr. Ovalle. Labs schedule the same day at 09:20 am. Patient in agreement and vocalize understanding.  I will send appointment reminder in mail today.

## 2018-02-21 RX ORDER — ATORVASTATIN CALCIUM 80 MG/1
TABLET, FILM COATED ORAL
Qty: 90 TABLET | Refills: 0 | Status: SHIPPED | OUTPATIENT
Start: 2018-02-21 | End: 2018-08-08 | Stop reason: SDUPTHER

## 2018-02-22 RX ORDER — TRAZODONE HYDROCHLORIDE 50 MG/1
TABLET ORAL
Qty: 30 TABLET | Refills: 3 | OUTPATIENT
Start: 2018-02-22

## 2018-02-22 RX ORDER — TRAZODONE HYDROCHLORIDE 50 MG/1
TABLET ORAL
Qty: 30 TABLET | Refills: 3 | Status: SHIPPED | OUTPATIENT
Start: 2018-02-22 | End: 2019-06-25 | Stop reason: SDUPTHER

## 2018-02-22 RX ORDER — TRAZODONE HYDROCHLORIDE 50 MG/1
50 TABLET ORAL NIGHTLY PRN
Qty: 30 TABLET | Refills: 3 | Status: SHIPPED | OUTPATIENT
Start: 2018-02-22 | End: 2018-02-22 | Stop reason: SDUPTHER

## 2018-02-28 RX ORDER — EZETIMIBE 10 MG
TABLET ORAL
Qty: 90 TABLET | Refills: 3 | Status: SHIPPED | OUTPATIENT
Start: 2018-02-28 | End: 2019-10-16 | Stop reason: SDUPTHER

## 2018-03-15 ENCOUNTER — OFFICE VISIT (OUTPATIENT)
Dept: INTERNAL MEDICINE | Facility: CLINIC | Age: 73
End: 2018-03-15
Payer: MEDICARE

## 2018-03-15 ENCOUNTER — HOSPITAL ENCOUNTER (OUTPATIENT)
Dept: RADIOLOGY | Facility: HOSPITAL | Age: 73
Discharge: HOME | End: 2018-03-15
Attending: PHYSICIAN ASSISTANT
Payer: MEDICARE

## 2018-03-15 ENCOUNTER — TELEPHONE (OUTPATIENT)
Dept: INTERNAL MEDICINE | Facility: CLINIC | Age: 73
End: 2018-03-15

## 2018-03-15 VITALS
BODY MASS INDEX: 33.77 KG/M2 | TEMPERATURE: 99 F | SYSTOLIC BLOOD PRESSURE: 134 MMHG | WEIGHT: 210.13 LBS | OXYGEN SATURATION: 99 % | HEIGHT: 66 IN | HEART RATE: 63 BPM | DIASTOLIC BLOOD PRESSURE: 70 MMHG

## 2018-03-15 DIAGNOSIS — H61.20 EXCESSIVE CERUMEN IN EAR CANAL, UNSPECIFIED LATERALITY: ICD-10-CM

## 2018-03-15 DIAGNOSIS — L30.9 DERMATITIS: Primary | ICD-10-CM

## 2018-03-15 DIAGNOSIS — R10.9 ABDOMINAL PAIN, ACUTE: ICD-10-CM

## 2018-03-15 LAB
BACTERIA #/AREA URNS HPF: ABNORMAL /HPF
BILIRUB UR QL STRIP: NEGATIVE
CLARITY UR: ABNORMAL
COLOR UR: YELLOW
GLUCOSE UR QL STRIP: NEGATIVE
HGB UR QL STRIP: NEGATIVE
KETONES UR QL STRIP: NEGATIVE
LEUKOCYTE ESTERASE UR QL STRIP: ABNORMAL
MICROSCOPIC COMMENT: ABNORMAL
NITRITE UR QL STRIP: POSITIVE
NON-SQ EPI CELLS #/AREA URNS HPF: 1 /HPF
PH UR STRIP: 6 [PH] (ref 5–8)
PROT UR QL STRIP: NEGATIVE
SP GR UR STRIP: 1.01 (ref 1–1.03)
SQUAMOUS #/AREA URNS HPF: 14 /HPF
URN SPEC COLLECT METH UR: ABNORMAL
WBC #/AREA URNS HPF: 15 /HPF (ref 0–5)

## 2018-03-15 PROCEDURE — 87086 URINE CULTURE/COLONY COUNT: CPT

## 2018-03-15 PROCEDURE — 87077 CULTURE AEROBIC IDENTIFY: CPT | Mod: 59

## 2018-03-15 PROCEDURE — 87186 SC STD MICRODIL/AGAR DIL: CPT

## 2018-03-15 PROCEDURE — 99999 PR PBB SHADOW E&M-EST. PATIENT-LVL V: CPT | Mod: PBBFAC,,, | Performed by: PHYSICIAN ASSISTANT

## 2018-03-15 PROCEDURE — 87088 URINE BACTERIA CULTURE: CPT

## 2018-03-15 PROCEDURE — 81000 URINALYSIS NONAUTO W/SCOPE: CPT

## 2018-03-15 PROCEDURE — 99214 OFFICE O/P EST MOD 30 MIN: CPT | Mod: S$PBB,,, | Performed by: PHYSICIAN ASSISTANT

## 2018-03-15 PROCEDURE — 74019 RADEX ABDOMEN 2 VIEWS: CPT | Mod: 26,,, | Performed by: RADIOLOGY

## 2018-03-15 PROCEDURE — 74019 RADEX ABDOMEN 2 VIEWS: CPT | Mod: TC

## 2018-03-15 PROCEDURE — 99215 OFFICE O/P EST HI 40 MIN: CPT | Mod: PBBFAC,25 | Performed by: PHYSICIAN ASSISTANT

## 2018-03-15 RX ORDER — SULFAMETHOXAZOLE AND TRIMETHOPRIM 800; 160 MG/1; MG/1
1 TABLET ORAL 2 TIMES DAILY
Qty: 20 TABLET | Refills: 0 | Status: SHIPPED | OUTPATIENT
Start: 2018-03-15 | End: 2018-03-25

## 2018-03-15 RX ORDER — TRIAMCINOLONE ACETONIDE 1 MG/G
CREAM TOPICAL 2 TIMES DAILY
Qty: 80 G | Refills: 0 | Status: SHIPPED | OUTPATIENT
Start: 2018-03-15 | End: 2019-06-25 | Stop reason: SDUPTHER

## 2018-03-15 NOTE — PROGRESS NOTES
"Patient ID: Flory Cam is a 72 y.o. female.     Chief Complaint: Abdominal Pain and Rash     71 yo. Female with PMHx of SLE presents to clinic with complaints of rash, right ear, and abdominal pain.      Rash: Patient states the rash has been there "for a while". She has seen dermatology for it; however, it remains pruritic. The area is excoriated and has scabs in areas secondary to scratching. Patient cannot remember what creams she has and is still using. The rash is on her left upper extremity.      Right Ear: patient has noticed a hearing loss in her right ear for more than a month. She denies discharge, fever, otalgia, pressure.     Abdominal Pain: suprapubic area was described as intermittent "annoying" "discomfort" "feels like gas". However, she admits no pain. It is not related to food intake. She has normal bowel movements one time per day that are not hard. Her last BM was yesterday. Denies hematuria, dysuria, hematochezia, melena, change in stool.            Review of Systems   Constitutional: Negative for activity change, appetite change, chills, diaphoresis, fatigue and fever.   HENT: Positive for hearing loss (right ear). Negative for congestion, ear discharge, ear pain, postnasal drip, rhinorrhea, sinus pain, sinus pressure and sore throat.    Eyes: Negative for pain, redness, itching and visual disturbance.   Respiratory: Negative for cough, shortness of breath and wheezing.    Cardiovascular: Negative for chest pain, palpitations and leg swelling.   Gastrointestinal: Negative for abdominal distention, abdominal pain, blood in stool, constipation, diarrhea, nausea and vomiting.   Genitourinary: Negative for difficulty urinating, dysuria, flank pain and hematuria.   Skin: Positive for rash (excoriated on left upper extremity).   Hematological: Negative for adenopathy.            Past Medical History:   Diagnosis Date    Acute coronary syndrome      Anxiety      Bilateral carotid artery " stenosis 11/17/2015    Chronic pain      Coronary artery disease      GERD (gastroesophageal reflux disease)      Hyperlipidemia      Hypertension      Hypothyroidism      Low back pain      Lupus      Osteoporosis      Poor circulation      PVD (peripheral vascular disease)      S/P peripheral artery angioplasty 02/13/2014    Skin disease                 Past Surgical History:   Procedure Laterality Date    COLONOSCOPY N/A 1/4/2017     Procedure: COLONOSCOPY;  Surgeon: Sylvester Arboleda III, MD;  Location: [facility];  Service: Endoscopy;  Laterality: N/A;    CORONARY ANGIOPLASTY        CORONARY ARTERY BYPASS GRAFT        HYSTERECTOMY        THYROIDECTOMY                   Family History   Problem Relation Age of Onset    Melanoma Neg Hx      Psoriasis Neg Hx      Lupus Neg Hx      Eczema Neg Hx           Social History   Social History            Social History    Marital status:        Spouse name: N/A    Number of children: N/A    Years of education: N/A            Occupational History    Retired         Dispatcher            Social History Main Topics    Smoking status: Current Every Day Smoker       Packs/day: 0.25       Years: 40.00       Types: Cigarettes       Start date: 1961    Smokeless tobacco: Never Used    Alcohol use No    Drug use: No    Sexual activity: Not on file           Other Topics Concern    Are You Pregnant Or Think You May Be? No    Breast-Feeding No          Social History Narrative     Patient is retired dispatcher.            Review of patient's allergies indicates:  No Known Allergies        Current Outpatient Prescriptions:     amlodipine (NORVASC) 10 MG tablet, take 1 tablet by mouth once daily, Disp: 30 tablet, Rfl: 6    atorvastatin (LIPITOR) 80 MG tablet, take 1 tablet by mouth once daily, Disp: 90 tablet, Rfl: 0    cilostazol (PLETAL) 100 MG Tab, Take 1 tablet (100 mg total) by mouth 2 (two) times daily. (Patient taking differently: Take  "100 mg by mouth once daily at 6am. ), Disp: 60 tablet, Rfl: 6    clopidogrel (PLAVIX) 75 mg tablet, take 1 tablet by mouth once daily, Disp: 90 tablet, Rfl: 0    hydrocodone-acetaminophen 10-325mg (NORCO)  mg Tab, Take  mg by mouth. 1 Tablet Oral Four times a day.  narcotic warning, contains tylenol, Disp: , Rfl:     hydroxychloroquine (PLAQUENIL) 200 mg tablet, take 1 tablet by mouth twice a day, Disp: 180 tablet, Rfl: 0    levothyroxine (SYNTHROID) 137 MCG Tab tablet, TAKE 1 TABLET BY MOUTH ONCE DAILY BEFORE BREAKFAST, Disp: 30 tablet, Rfl: 3    NEXIUM 40 mg capsule, take 1 capsule by mouth once daily BEFORE BREAKFAST, Disp: 90 capsule, Rfl: 1    traZODone (DESYREL) 50 MG tablet, take 1 tablet by mouth once daily AT NIGHT if needed for insomnia, Disp: 30 tablet, Rfl: 3    valsartan (DIOVAN) 320 MG tablet, take 1 tablet by mouth once daily, Disp: 90 tablet, Rfl: 2    ZETIA 10 mg tablet, take 1 tablet by mouth once daily, Disp: 90 tablet, Rfl: 3    albuterol 90 mcg/actuation inhaler, Inhale 2 puffs into the lungs every 6 (six) hours as needed for Wheezing. Rescue, Disp: 1 Inhaler, Rfl: 1    ammonium lactate (LAC-HYDRIN) 12 % lotion, , Disp: , Rfl:     ammonium lactate 12 % Crea, Apply 1 application topically once daily., Disp: 140 g, Rfl: 1    fluticasone-salmeterol 100-50 mcg/dose (ADVAIR) 100-50 mcg/dose diskus inhaler, Inhale 1 puff into the lungs 2 (two) times daily. Controller, Disp: 60 each, Rfl: 5    levocetirizine (XYZAL) 5 MG tablet, Take 1 tablet each morning. For hives., Disp: 30 tablet, Rfl: 3    mupirocin (BACTROBAN) 2 % ointment, apply to affected area twice a day for 10 days FOR SORES ON SKIN, Disp: 22 g, Rfl: 1    triamcinolone acetonide 0.1% (KENALOG) 0.1 % cream, Apply topically 2 (two) times daily., Disp: 80 g, Rfl: 0     /70 (BP Location: Right arm, Patient Position: Sitting, BP Method: Large (Manual))   Pulse 63   Temp 98.7 °F (37.1 °C) (Tympanic)   Ht 5' 6" " (1.676 m)   Wt 95.3 kg (210 lb 1.6 oz)   SpO2 99%   BMI 33.91 kg/m²         Objective:   Physical Exam   Constitutional: She is oriented to person, place, and time. She appears well-developed and well-nourished. No distress.   HENT:   Head: Normocephalic and atraumatic.   Left Ear: External ear normal.   Nose: Nose normal.   Mouth/Throat: Oropharynx is clear and moist. No oropharyngeal exudate.   Cerumen impaction in right ear. TM pearly grey without erythema in left ear   Eyes: Conjunctivae and EOM are normal. Right eye exhibits no discharge. Left eye exhibits no discharge. No scleral icterus.   Neck: Normal range of motion. Neck supple.   Cardiovascular: Normal rate, regular rhythm and normal heart sounds.  Exam reveals no gallop and no friction rub.    No murmur heard.  No carotid bruit auscultated   Pulmonary/Chest: Effort normal and breath sounds normal. No respiratory distress. She has no wheezes.   Abdominal: Soft. Bowel sounds are normal. She exhibits no distension and no mass. There is no tenderness. There is no rebound and no guarding.   No CVA tenderness or suprapubic tenderness   Lymphadenopathy:     She has no cervical adenopathy.   Neurological: She is alert and oriented to person, place, and time.   Skin: Skin is warm and dry. Rash (patchy and excoriated rash on left upper extremity) noted. She is not diaphoretic.   Psychiatric: She has a normal mood and affect. Her behavior is normal. Judgment and thought content normal.       Assessment:       1. Dermatitis    2. Excessive cerumen in ear canal, unspecified laterality    3. Abdominal pain, acute        Plan:       Dermatitis  - triamcinolone acetonide 0.1% cream BID     Cerumen impaction  -ENT referral for ear flushing     Abdominal pain  - urinalysis/urine culture  - flat and erect abdominal XR to r/o constipation or partial obstruction

## 2018-03-15 NOTE — TELEPHONE ENCOUNTER
----- Message from Edward Alexandre III, PA-C sent at 3/15/2018 12:15 PM CDT -----  The x-ray looks ok. But I suggest Drink 8 glasses of water daily. Suggest over the counter stool softener. Go to the ER if worse. Otherwise  follow up with your primary care as suggested.

## 2018-03-15 NOTE — TELEPHONE ENCOUNTER
----- Message from Edward Alexandre III, PA-C sent at 3/15/2018 12:46 PM CDT -----  The urine was infected. I sent in bactrim to the pharmacy.

## 2018-03-15 NOTE — PATIENT INSTRUCTIONS
Drink 8 to 12 glasses of water daily. Suggest over the counter Clearlax 2 scoops at night with water. Suggest over the counter stool softener. Go to the ER if worse. Otherwise  follow up with your primary care as suggested.

## 2018-03-15 NOTE — MEDICAL/APP STUDENT
"Subjective:       Patient ID: Flory Cam is a 72 y.o. female.    Chief Complaint: Abdominal Pain and Rash    71 yo. Female with PMHx of SLE presents to clinic with complaints of rash, right ear, and abdominal pain.     Rash: Patient states the rash has been there "for a while". She has seen dermatology for it; however, it remains pruritic. The area is excoriated and has scabs in areas secondary to scratching. Patient cannot remember what creams she has and is still using. The rash is on her left upper extremity.     Right Ear: patient has noticed a hearing loss in her right ear for more than a month. She denies discharge, fever, otalgia, pressure.    Abdominal Pain: suprapubic area was described as intermittent "annoying" "discomfort" "feels like gas". However, she admits no pain. It is not related to food intake. She has normal bowel movements one time per day that are not hard. Her last BM was yesterday. Denies hematuria, dysuria, hematochezia, melena, change in stool.            Review of Systems   Constitutional: Negative for activity change, appetite change, chills, diaphoresis, fatigue and fever.   HENT: Positive for hearing loss (right ear). Negative for congestion, ear discharge, ear pain, postnasal drip, rhinorrhea, sinus pain, sinus pressure and sore throat.    Eyes: Negative for pain, redness, itching and visual disturbance.   Respiratory: Negative for cough, shortness of breath and wheezing.    Cardiovascular: Negative for chest pain, palpitations and leg swelling.   Gastrointestinal: Negative for abdominal distention, abdominal pain, blood in stool, constipation, diarrhea, nausea and vomiting.   Genitourinary: Negative for difficulty urinating, dysuria, flank pain and hematuria.   Skin: Positive for rash (excoriated on left upper extremity).   Hematological: Negative for adenopathy.       Past Medical History:   Diagnosis Date    Acute coronary syndrome     Anxiety     Bilateral carotid " artery stenosis 11/17/2015    Chronic pain     Coronary artery disease     GERD (gastroesophageal reflux disease)     Hyperlipidemia     Hypertension     Hypothyroidism     Low back pain     Lupus     Osteoporosis     Poor circulation     PVD (peripheral vascular disease)     S/P peripheral artery angioplasty 02/13/2014    Skin disease        Past Surgical History:   Procedure Laterality Date    COLONOSCOPY N/A 1/4/2017    Procedure: COLONOSCOPY;  Surgeon: Sylvester Arboleda III, MD;  Location: [facility];  Service: Endoscopy;  Laterality: N/A;    CORONARY ANGIOPLASTY      CORONARY ARTERY BYPASS GRAFT      HYSTERECTOMY      THYROIDECTOMY         Family History   Problem Relation Age of Onset    Melanoma Neg Hx     Psoriasis Neg Hx     Lupus Neg Hx     Eczema Neg Hx        Social History     Social History    Marital status:      Spouse name: N/A    Number of children: N/A    Years of education: N/A     Occupational History    Retired      Dispatcher     Social History Main Topics    Smoking status: Current Every Day Smoker     Packs/day: 0.25     Years: 40.00     Types: Cigarettes     Start date: 1961    Smokeless tobacco: Never Used    Alcohol use No    Drug use: No    Sexual activity: Not on file     Other Topics Concern    Are You Pregnant Or Think You May Be? No    Breast-Feeding No     Social History Narrative    Patient is retired dispatcher.       Review of patient's allergies indicates:  No Known Allergies      Current Outpatient Prescriptions:     amlodipine (NORVASC) 10 MG tablet, take 1 tablet by mouth once daily, Disp: 30 tablet, Rfl: 6    atorvastatin (LIPITOR) 80 MG tablet, take 1 tablet by mouth once daily, Disp: 90 tablet, Rfl: 0    cilostazol (PLETAL) 100 MG Tab, Take 1 tablet (100 mg total) by mouth 2 (two) times daily. (Patient taking differently: Take 100 mg by mouth once daily at 6am. ), Disp: 60 tablet, Rfl: 6    clopidogrel (PLAVIX) 75 mg tablet, take 1  "tablet by mouth once daily, Disp: 90 tablet, Rfl: 0    hydrocodone-acetaminophen 10-325mg (NORCO)  mg Tab, Take  mg by mouth. 1 Tablet Oral Four times a day.  narcotic warning, contains tylenol, Disp: , Rfl:     hydroxychloroquine (PLAQUENIL) 200 mg tablet, take 1 tablet by mouth twice a day, Disp: 180 tablet, Rfl: 0    levothyroxine (SYNTHROID) 137 MCG Tab tablet, TAKE 1 TABLET BY MOUTH ONCE DAILY BEFORE BREAKFAST, Disp: 30 tablet, Rfl: 3    NEXIUM 40 mg capsule, take 1 capsule by mouth once daily BEFORE BREAKFAST, Disp: 90 capsule, Rfl: 1    traZODone (DESYREL) 50 MG tablet, take 1 tablet by mouth once daily AT NIGHT if needed for insomnia, Disp: 30 tablet, Rfl: 3    valsartan (DIOVAN) 320 MG tablet, take 1 tablet by mouth once daily, Disp: 90 tablet, Rfl: 2    ZETIA 10 mg tablet, take 1 tablet by mouth once daily, Disp: 90 tablet, Rfl: 3    albuterol 90 mcg/actuation inhaler, Inhale 2 puffs into the lungs every 6 (six) hours as needed for Wheezing. Rescue, Disp: 1 Inhaler, Rfl: 1    ammonium lactate (LAC-HYDRIN) 12 % lotion, , Disp: , Rfl:     ammonium lactate 12 % Crea, Apply 1 application topically once daily., Disp: 140 g, Rfl: 1    fluticasone-salmeterol 100-50 mcg/dose (ADVAIR) 100-50 mcg/dose diskus inhaler, Inhale 1 puff into the lungs 2 (two) times daily. Controller, Disp: 60 each, Rfl: 5    levocetirizine (XYZAL) 5 MG tablet, Take 1 tablet each morning. For hives., Disp: 30 tablet, Rfl: 3    mupirocin (BACTROBAN) 2 % ointment, apply to affected area twice a day for 10 days FOR SORES ON SKIN, Disp: 22 g, Rfl: 1    triamcinolone acetonide 0.1% (KENALOG) 0.1 % cream, Apply topically 2 (two) times daily., Disp: 80 g, Rfl: 0    /70 (BP Location: Right arm, Patient Position: Sitting, BP Method: Large (Manual))   Pulse 63   Temp 98.7 °F (37.1 °C) (Tympanic)   Ht 5' 6" (1.676 m)   Wt 95.3 kg (210 lb 1.6 oz)   SpO2 99%   BMI 33.91 kg/m²       Objective:      Physical Exam "   Constitutional: She is oriented to person, place, and time. She appears well-developed and well-nourished. No distress.   HENT:   Head: Normocephalic and atraumatic.   Left Ear: External ear normal.   Nose: Nose normal.   Mouth/Throat: Oropharynx is clear and moist. No oropharyngeal exudate.   Cerumen impaction in right ear. TM pearly grey without erythema in left ear   Eyes: Conjunctivae and EOM are normal. Right eye exhibits no discharge. Left eye exhibits no discharge. No scleral icterus.   Neck: Normal range of motion. Neck supple.   Cardiovascular: Normal rate, regular rhythm and normal heart sounds.  Exam reveals no gallop and no friction rub.    No murmur heard.  No carotid bruit auscultated   Pulmonary/Chest: Effort normal and breath sounds normal. No respiratory distress. She has no wheezes.   Abdominal: Soft. Bowel sounds are normal. She exhibits no distension and no mass. There is no tenderness. There is no rebound and no guarding.   No CVA tenderness or suprapubic tenderness   Lymphadenopathy:     She has no cervical adenopathy.   Neurological: She is alert and oriented to person, place, and time.   Skin: Skin is warm and dry. Rash (patchy and excoriated rash on left upper extremity) noted. She is not diaphoretic.   Psychiatric: She has a normal mood and affect. Her behavior is normal. Judgment and thought content normal.       Assessment:       1. Dermatitis    2. Excessive cerumen in ear canal, unspecified laterality    3. Abdominal pain, acute        Plan:       Dermatitis  - triamcinolone acetonide 0.1% cream BID    Cerumen impaction  -ENT referral for ear flushing    Abdominal pain  - urinalysis/urine culture  - flat and erect abdominal XR to r/o constipation or partial obstruction

## 2018-03-19 LAB
BACTERIA UR CULT: NORMAL
BACTERIA UR CULT: NORMAL

## 2018-03-21 ENCOUNTER — OFFICE VISIT (OUTPATIENT)
Dept: PODIATRY | Facility: CLINIC | Age: 73
End: 2018-03-21
Payer: MEDICARE

## 2018-03-21 VITALS — WEIGHT: 207.25 LBS | HEIGHT: 66 IN | BODY MASS INDEX: 33.31 KG/M2

## 2018-03-21 DIAGNOSIS — L84 CORN OR CALLUS: ICD-10-CM

## 2018-03-21 DIAGNOSIS — I73.9 PERIPHERAL VASCULAR DISEASE: ICD-10-CM

## 2018-03-21 DIAGNOSIS — L93.0 DISCOID LUPUS: ICD-10-CM

## 2018-03-21 DIAGNOSIS — B35.1 DERMATOPHYTOSIS OF NAIL: Primary | ICD-10-CM

## 2018-03-21 PROCEDURE — 11056 PARNG/CUTG B9 HYPRKR LES 2-4: CPT | Mod: PBBFAC,PO | Performed by: PODIATRIST

## 2018-03-21 PROCEDURE — 99999 PR PBB SHADOW E&M-EST. PATIENT-LVL III: CPT | Mod: PBBFAC,,, | Performed by: PODIATRIST

## 2018-03-21 PROCEDURE — 99213 OFFICE O/P EST LOW 20 MIN: CPT | Mod: 25,S$PBB,, | Performed by: PODIATRIST

## 2018-03-21 PROCEDURE — 11721 DEBRIDE NAIL 6 OR MORE: CPT | Mod: 59,Q8,S$PBB, | Performed by: PODIATRIST

## 2018-03-21 PROCEDURE — 99213 OFFICE O/P EST LOW 20 MIN: CPT | Mod: PBBFAC,PO,25 | Performed by: PODIATRIST

## 2018-03-21 PROCEDURE — 11056 PARNG/CUTG B9 HYPRKR LES 2-4: CPT | Mod: S$PBB,Q8,, | Performed by: PODIATRIST

## 2018-03-21 PROCEDURE — 11721 DEBRIDE NAIL 6 OR MORE: CPT | Performed by: PODIATRIST

## 2018-03-21 NOTE — PROGRESS NOTES
PODIATRY NOTE    CHIEF COMPLAINT  Chief Complaint   Patient presents with    Routine Foot Care     Last visit with PCP Dr. Ovalle ([facility]) 3/15/18     HPI  SUBJECTIVE: Flory Cam is a 72 y.o. female w/ PMH of PVD, Lupus, hx of intermittent leg claudifcation- s/p vascular intervention with much improvement in symptoms and other medical problems who presents to clinic for high risk  foot exam and care.Patient admits to painful toenails and calluses aggravated by increased weight bearing, shoe gear, and pressure. States pain is relieved with routine debridements. She denies any N/V/F. Patient has no other pedal complaints at this time.    HgA1c:   Hemoglobin A1C   Date Value Ref Range Status   08/04/2017 5.8 (H) 4.0 - 5.6 % Final     Comment:     According to ADA guidelines, hemoglobin A1c <7.0% represents  optimal control in non-pregnant diabetic patients. Different  metrics may apply to specific patient populations.   Standards of Medical Care in Diabetes-2016.  For the purpose of screening for the presence of diabetes:  <5.7%     Consistent with the absence of diabetes  5.7-6.4%  Consistent with increasing risk for diabetes   (prediabetes)  >or=6.5%  Consistent with diabetes  Currently, no consensus exists for use of hemoglobin A1c  for diagnosis of diabetes for children.  This Hemoglobin A1c assay has significant interference with fetal   hemoglobin   (HbF). The results are invalid for patients with abnormal amounts of   HbF,   including those with known Hereditary Persistence   of Fetal Hemoglobin. Heterozygous hemoglobin variants (HbAS, HbAC,   HbAD, HbAE, HbA2) do not significantly interfere with this assay;   however, presence of multiple variants in a sample may impact the %   interference.     09/14/2016 6.2 4.5 - 6.2 % Final     Comment:     According to ADA guidelines, hemoglobin A1C <7.0% represents  optimal control in non-pregnant diabetic patients.  Different  metrics may apply to specific  "populations.   Standards of Medical Care in Diabetes - 2016.  For the purpose of screening for the presence of diabetes:  <5.7%     Consistent with the absence of diabetes  5.7-6.4%  Consistent with increasing risk for diabetes   (prediabetes)  >or=6.5%  Consistent with diabetes  Currently no consensus exists for use of hemoglobin A1C  for diagnosis of diabetes for children.     04/13/2010 5.9 4.0 - 6.2 % Final       REVIEW OF SYSTEMS  General: Denies any fever or chills  Chest: Denies shortness of breath, wheezing, coughing, or sputum production  Heart: Denies chest pain.  As noted above and per history of current illness above, otherwise negative in the remainder of the 14 systems.     PHYSICAL EXAM  Vitals:    03/21/18 1013   Weight: 94 kg (207 lb 3.7 oz)   Height: 5' 6" (1.676 m)   PainSc: 0-No pain       GEN:  This patient is well-developed, well-nourished and appears stated age, well-oriented to person, place and time, and cooperative and pleasant on today's visit.      LOWER EXTREMITY  Vascular:   · DP pedal pulse 0/4 b/l, PT pedal pulse 1/4 b/l  · Skin temperature warm to warm from prox to distally  · CFT <5 secs b/l  · There is LE edema noted b/l.     Dermatologic:   · Thickened, dystrophic, elongated toenails with subungal debris 1-5 b/l.   · Webspaces are C/D/I B/L.  · There is hyperkeratotic tissue noted plantar aspect of b/l feet at medial arch x 2  · Skin texture and turgor dry with hyperkeratosis diffusely b/l plantar heel   · There is no pedal hair growth noted    Neurologic:  · Vibratory sensation diminished at level of Hallux IPJ b/l    · Protective sensation absent at 0/10 sites upon examination with [place] Weinsten 5.07 g monofilament.   · Propioception intact at 1st MTPJ b/l.   · Achilles and patellar deep tendon reflexes intact  · Babinski reflex absent b/l. Light touch and sharp/dull sensation intact b/l.    Musculoskeletal/Orthopedic:  · No symptomatic structural abnormalities noted. "   · Muscle strength is 5/5 for foot inverters, everters, plantarflexors, and dorsiflexors. Muscle tone is normal.  · Pain free range of motion in all four quadrants with stiffness and limitation b/l  · PAIN with palpation of callus plantar medial arch, LEFT      ASSESSMENT  Dermatophytosis of nail    Corn or callus    Peripheral vascular disease    Discoid lupus        Plan:  -Discuss presenting problems, etiology, pathologic processes and management options with patient today.   -With patient's permission,Sharp excisional debridement of hyperkeratotic lesions deep to epidermal layer x 2 on media l arch bilateral foot  was performed with a #15 blade without incident.   -With patient's permission, nails were aggressively reduced and debrided x 10 to their soft tissue attachment mechanically and with electric , removing all offending nail and debris. Patient relates relief following the procedure.   -Rx UREA cream- Prof arts  -RTC as scheduled    Future Appointments  Date Time Provider Department Center   6/6/2018 9:40 AM Melva Nettles DPM [facility]   6/21/2018 9:20 AM [facility]AT[facility], [facility]   6/21/2018 9:40 AM [facility]   6/21/2018 10:00 AM Cyndy Mccall NP [facility]   6/21/2018 11:20 AM Tayler Ovalle DO [facility]

## 2018-03-23 ENCOUNTER — TELEPHONE (OUTPATIENT)
Dept: PODIATRY | Facility: CLINIC | Age: 73
End: 2018-03-23

## 2018-03-23 NOTE — TELEPHONE ENCOUNTER
----- Message from Niya Velez MA sent at 3/22/2018  3:24 PM CDT -----  She's calling in regards to her Rx medication, pls advise, [phone] (home)

## 2018-03-23 NOTE — TELEPHONE ENCOUNTER
Returned pt's call, pt asked for over-the-counter alternative to urea cream. Pt was informed she can purchase AMLACTIN cream at local Health system. Pt expressed understanding, call ended pleasantly.    Niya Velez MA  Office of Dr. Melva Nettles, DPM   Podiatry Department, [facility]

## 2018-04-13 ENCOUNTER — OFFICE VISIT (OUTPATIENT)
Dept: CARDIOLOGY | Facility: CLINIC | Age: 73
End: 2018-04-13
Payer: MEDICARE

## 2018-04-13 VITALS
HEIGHT: 66 IN | SYSTOLIC BLOOD PRESSURE: 122 MMHG | BODY MASS INDEX: 33.94 KG/M2 | DIASTOLIC BLOOD PRESSURE: 68 MMHG | HEART RATE: 74 BPM | WEIGHT: 211.19 LBS

## 2018-04-13 DIAGNOSIS — R07.89 CHEST PAIN, ATYPICAL: Primary | ICD-10-CM

## 2018-04-13 DIAGNOSIS — I77.9 BILATERAL CAROTID ARTERY DISEASE: Chronic | ICD-10-CM

## 2018-04-13 DIAGNOSIS — Z95.1 HX OF CABG: Chronic | ICD-10-CM

## 2018-04-13 DIAGNOSIS — E78.5 HYPERLIPIDEMIA LDL GOAL <70: Chronic | ICD-10-CM

## 2018-04-13 DIAGNOSIS — Z72.0 TOBACCO USE: ICD-10-CM

## 2018-04-13 DIAGNOSIS — I73.9 PVD (PERIPHERAL VASCULAR DISEASE): Chronic | ICD-10-CM

## 2018-04-13 DIAGNOSIS — I77.89 OTHER SPECIFIED DISORDERS OF ARTERIES AND ARTERIOLES: ICD-10-CM

## 2018-04-13 PROCEDURE — 99213 OFFICE O/P EST LOW 20 MIN: CPT | Mod: PBBFAC | Performed by: INTERNAL MEDICINE

## 2018-04-13 PROCEDURE — 99999 PR PBB SHADOW E&M-EST. PATIENT-LVL III: CPT | Mod: PBBFAC,,, | Performed by: INTERNAL MEDICINE

## 2018-04-13 PROCEDURE — 99214 OFFICE O/P EST MOD 30 MIN: CPT | Mod: S$PBB,,, | Performed by: INTERNAL MEDICINE

## 2018-04-13 NOTE — PROGRESS NOTES
Subjective:   Patient ID:  Flory Cam is a 72 y.o. female who presents for follow up of Follow-up      71 yo female, came in for routine f/u  PMH CAD s/p CABG remote, PAD s/p bypass and stent, smoke, HTN, HLD.  Chest ingestion feeling/apin, once a week. Few days ago, had prolonged chest pain for hours. No associated symptoms and radiating pain. Nexium resolved the pain. Pt is concernng and made an ori.   No pain during walk.  Smokes 1/2 ppd.  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  No pain today,   MPI in  normal EF and no ischemia.          Past Medical History:   Diagnosis Date    Acute coronary syndrome     Anxiety     Bilateral carotid artery stenosis 11/17/2015    Chronic pain     Coronary artery disease     GERD (gastroesophageal reflux disease)     Hyperlipidemia     Hypertension     Hypothyroidism     Low back pain     Lupus     Osteoporosis     Poor circulation     PVD (peripheral vascular disease)     S/P peripheral artery angioplasty 02/13/2014    Skin disease        Past Surgical History:   Procedure Laterality Date    COLONOSCOPY N/A 1/4/2017    Procedure: COLONOSCOPY;  Surgeon: Sylvester Arboleda III, MD;  Location: [facility];  Service: Endoscopy;  Laterality: N/A;    CORONARY ANGIOPLASTY      CORONARY ARTERY BYPASS GRAFT      HYSTERECTOMY      THYROIDECTOMY         Social History   Substance Use Topics    Smoking status: Current Every Day Smoker     Packs/day: 0.25     Years: 40.00     Types: Cigarettes     Start date: 1961    Smokeless tobacco: Never Used    Alcohol use No       Family History   Problem Relation Age of Onset    Melanoma Neg Hx     Psoriasis Neg Hx     Lupus Neg Hx     Eczema Neg Hx          Review of Systems   Constitution: Negative for decreased appetite, diaphoresis, fever, weakness, malaise/fatigue and night sweats.   HENT: Negative for nosebleeds.    Eyes: Negative for blurred vision and double  vision.   Cardiovascular: Negative for chest pain, claudication, dyspnea on exertion, irregular heartbeat, leg swelling, near-syncope, orthopnea, palpitations, paroxysmal nocturnal dyspnea and syncope.   Respiratory: Negative for cough, shortness of breath, sleep disturbances due to breathing, snoring, sputum production and wheezing.    Endocrine: Negative for cold intolerance and polyuria.   Hematologic/Lymphatic: Does not bruise/bleed easily.   Skin: Negative for rash.   Musculoskeletal: Negative for back pain, falls, joint pain, joint swelling and neck pain.   Gastrointestinal: Negative for abdominal pain, heartburn, nausea and vomiting.   Genitourinary: Negative for dysuria, frequency and hematuria.   Neurological: Negative for difficulty with concentration, dizziness, focal weakness, headaches, light-headedness, numbness and seizures.   Psychiatric/Behavioral: Negative for depression, memory loss and substance abuse. The patient does not have insomnia.    Allergic/Immunologic: Negative for HIV exposure and hives.       Objective:   Physical Exam   Constitutional: She is oriented to person, place, and time. She appears well-nourished.   HENT:   Head: Normocephalic.   Eyes: Pupils are equal, round, and reactive to light.   Neck: Normal carotid pulses and no JVD present. Carotid bruit is not present. No thyromegaly present.   Cardiovascular: Normal rate, regular rhythm, normal heart sounds and normal pulses.   No extrasystoles are present. PMI is not displaced.  Exam reveals no gallop and no S3.    No murmur heard.  Pulmonary/Chest: Breath sounds normal. No stridor. No respiratory distress.   Abdominal: Soft. Bowel sounds are normal. There is no tenderness. There is no rebound.   Musculoskeletal: Normal range of motion.   Neurological: She is alert and oriented to person, place, and time.   Skin: Skin is intact. No rash noted.   Psychiatric: Her behavior is normal.       Lab Results   Component Value Date    CHOL  144 08/04/2017    CHOL 165 09/14/2016    CHOL 147 07/28/2015     Lab Results   Component Value Date    HDL 49 08/04/2017    HDL 51 09/14/2016    HDL 50 07/28/2015     Lab Results   Component Value Date    LDLCALC 80.0 08/04/2017    LDLCALC 93.0 09/14/2016    LDLCALC 87.0 07/28/2015     Lab Results   Component Value Date    TRIG 75 08/04/2017    TRIG 105 09/14/2016    TRIG 50 07/28/2015     Lab Results   Component Value Date    CHOLHDL 34.0 08/04/2017    CHOLHDL 30.9 09/14/2016    CHOLHDL 34.0 07/28/2015       Chemistry        Component Value Date/Time     08/04/2017 1003    K 4.6 08/04/2017 1003     08/04/2017 1003    CO2 26 08/04/2017 1003    BUN 14 08/04/2017 1003    CREATININE 0.8 08/04/2017 1003    GLU 89 08/04/2017 1003        Component Value Date/Time    CALCIUM 9.5 08/04/2017 1003    ALKPHOS 198 (H) 08/04/2017 1003    AST 15 08/04/2017 1003    ALT 10 08/04/2017 1003    BILITOT 0.4 08/04/2017 1003    ESTGFRAFRICA >60.0 08/04/2017 1003    EGFRNONAA >60.0 08/04/2017 1003          Lab Results   Component Value Date    TSH 3.113 10/17/2016     Lab Results   Component Value Date    INR 1.0 04/28/2017    INR 0.9 07/26/2007     Lab Results   Component Value Date    WBC 7.80 05/05/2017    HGB 14.1 05/05/2017    HCT 42.5 05/05/2017    MCV 93 05/05/2017     05/05/2017     BMP  Sodium   Date Value Ref Range Status   08/04/2017 142 136 - 145 mmol/L Final     Potassium   Date Value Ref Range Status   08/04/2017 4.6 3.5 - 5.1 mmol/L Final     Chloride   Date Value Ref Range Status   08/04/2017 106 95 - 110 mmol/L Final     CO2   Date Value Ref Range Status   08/04/2017 26 23 - 29 mmol/L Final     BUN, Bld   Date Value Ref Range Status   08/04/2017 14 8 - 23 mg/dL Final     Creatinine   Date Value Ref Range Status   08/04/2017 0.8 0.5 - 1.4 mg/dL Final     Calcium   Date Value Ref Range Status   08/04/2017 9.5 8.7 - 10.5 mg/dL Final     Anion Gap   Date Value Ref Range Status   08/04/2017 10 8 - 16 mmol/L  Final     eGFR if    Date Value Ref Range Status   08/04/2017 >60.0 >60 mL/min/1.73 m^2 Final     eGFR if non    Date Value Ref Range Status   08/04/2017 >60.0 >60 mL/min/1.73 m^2 Final     Comment:     Calculation used to obtain the estimated glomerular filtration  rate (eGFR) is the CKD-EPI equation. Since race is unknown   in our information system, the eGFR values for   -American and Non--American patients are given   for each creatinine result.       CrCl cannot be calculated (Patient's most recent lab result is older than the maximum 7 days allowed.).     Assessment:      1. CAD: Hx of CABG    2. Chest pain, atypical    3. Hyperlipidemia LDL goal <70    4. PVD (peripheral vascular disease)    5. Bilateral carotid artery disease    6. Tobacco use      CP, due to ingestion and GERD. Resolved after taking Nexium  BP and LD wnl    Plan:   Continue Nexium for 2 weeks and then PRN.  Check carotid and LE arterial US  Continue Plavix, Pletal, amlodipine, ARB, Lipitor and zetia  amoking cessation  F/u with Dr. Cohen in 6 months

## 2018-04-20 ENCOUNTER — CLINICAL SUPPORT (OUTPATIENT)
Dept: CARDIOLOGY | Facility: CLINIC | Age: 73
End: 2018-04-20
Attending: INTERNAL MEDICINE
Payer: MEDICARE

## 2018-04-20 DIAGNOSIS — Z72.0 TOBACCO USE: ICD-10-CM

## 2018-04-20 DIAGNOSIS — I73.9 PVD (PERIPHERAL VASCULAR DISEASE): Chronic | ICD-10-CM

## 2018-04-20 DIAGNOSIS — I77.89 OTHER SPECIFIED DISORDERS OF ARTERIES AND ARTERIOLES: ICD-10-CM

## 2018-04-20 LAB
INTERNAL CAROTID STENOSIS: NORMAL
VASCULAR ANKLE BRACHIAL INDEX (ABI) LEFT: 0.79 (ref 0.9–1.2)

## 2018-04-20 PROCEDURE — 93922 UPR/L XTREMITY ART 2 LEVELS: CPT | Mod: PBBFAC | Performed by: INTERNAL MEDICINE

## 2018-04-20 PROCEDURE — 93880 EXTRACRANIAL BILAT STUDY: CPT | Mod: PBBFAC | Performed by: INTERNAL MEDICINE

## 2018-04-20 PROCEDURE — 93925 LOWER EXTREMITY STUDY: CPT | Mod: PBBFAC | Performed by: INTERNAL MEDICINE

## 2018-04-23 ENCOUNTER — TELEPHONE (OUTPATIENT)
Dept: CARDIOLOGY | Facility: CLINIC | Age: 73
End: 2018-04-23

## 2018-04-25 ENCOUNTER — TELEPHONE (OUTPATIENT)
Dept: CARDIOLOGY | Facility: CLINIC | Age: 73
End: 2018-04-25

## 2018-04-25 NOTE — TELEPHONE ENCOUNTER
Spoke with pt with US and carotid results. Pt verbalized understanding.    ----- Message from Paul Bland MD sent at 4/23/2018  7:34 AM CDT -----  US showed stable moderate PAD. And mild carotid Dz.

## 2018-04-30 ENCOUNTER — OFFICE VISIT (OUTPATIENT)
Dept: RHEUMATOLOGY | Facility: CLINIC | Age: 73
End: 2018-04-30
Payer: MEDICARE

## 2018-04-30 VITALS
DIASTOLIC BLOOD PRESSURE: 70 MMHG | SYSTOLIC BLOOD PRESSURE: 134 MMHG | BODY MASS INDEX: 34.05 KG/M2 | HEIGHT: 66 IN | HEART RATE: 72 BPM | WEIGHT: 211.88 LBS

## 2018-04-30 DIAGNOSIS — Z51.81 MEDICATION MONITORING ENCOUNTER: ICD-10-CM

## 2018-04-30 DIAGNOSIS — M05.771 RHEUMATOID ARTHRITIS INVOLVING BOTH FEET WITH POSITIVE RHEUMATOID FACTOR: Chronic | ICD-10-CM

## 2018-04-30 DIAGNOSIS — L93.0 DISCOID LUPUS ERYTHEMATOSUS: Primary | ICD-10-CM

## 2018-04-30 DIAGNOSIS — M05.772 RHEUMATOID ARTHRITIS INVOLVING BOTH FEET WITH POSITIVE RHEUMATOID FACTOR: Chronic | ICD-10-CM

## 2018-04-30 DIAGNOSIS — R53.83 OTHER FATIGUE: ICD-10-CM

## 2018-04-30 PROCEDURE — 99999 PR PBB SHADOW E&M-EST. PATIENT-LVL IV: CPT | Mod: PBBFAC,,, | Performed by: INTERNAL MEDICINE

## 2018-04-30 PROCEDURE — 99214 OFFICE O/P EST MOD 30 MIN: CPT | Mod: PBBFAC,PO | Performed by: INTERNAL MEDICINE

## 2018-04-30 PROCEDURE — 99214 OFFICE O/P EST MOD 30 MIN: CPT | Mod: S$PBB,,, | Performed by: INTERNAL MEDICINE

## 2018-04-30 RX ORDER — METHOTREXATE 2.5 MG/1
10 TABLET ORAL
Qty: 20 TABLET | Refills: 3 | Status: SHIPPED | OUTPATIENT
Start: 2018-04-30 | End: 2018-10-08

## 2018-04-30 RX ORDER — FOLIC ACID 1 MG/1
1 TABLET ORAL DAILY
Qty: 90 TABLET | Refills: 3 | Status: SHIPPED | OUTPATIENT
Start: 2018-04-30 | End: 2019-08-09

## 2018-04-30 RX ORDER — HYDROCODONE BITARTRATE AND ACETAMINOPHEN 10; 325 MG/1; MG/1
1 TABLET ORAL 2 TIMES DAILY PRN
Refills: 0 | COMMUNITY
Start: 2018-04-19 | End: 2020-04-17

## 2018-04-30 ASSESSMENT — ROUTINE ASSESSMENT OF PATIENT INDEX DATA (RAPID3): MDHAQ FUNCTION SCORE: .1

## 2018-04-30 NOTE — PROGRESS NOTES
CC:  Chief Complaint   Patient presents with    Rheumatoid Arthritis   . Discoid lupus     History of Present Illness:  Flory Hodge a 72 y.o.yo female   Patient Active Problem List   Diagnosis    Hyperlipidemia LDL goal <70    Hypertension goal BP (blood pressure) < 140/90    Hypothyroid    PVD (peripheral vascular disease)    Eczema    Osteoporosis    Osteoarthritis    Tobacco use    CAD: Hx of CABG    Atherosclerotic PVD with intermittent claudication    Abnormal cardiovascular function study    Bilateral carotid artery disease    Discoid lupus erythematosus    Rheumatoid arthritis involving both feet with positive rheumatoid factor    Lichen planus    Hx of colonic polyps    Foot abscess, left    Chronic obstructive pulmonary disease    Chest pain, atypical    Medication monitoring encounter     Here for routine f/u of sero positive RA and lupus and lichen planus overlap   She has seen Dr URIARTE in the past   She is currently on plaquenil 200 mg bid   Last seen here in 10/2016   She is currently on plaquenil 200 mg bid , doing well   On it for > 10 years now   Denies any joint swelling'  No am stiffness   She was seen by opthalmologist Dr Gonsales , who recommended she be considered something else in place of plaquenil  Due to ongoing issues with vision   Rash inv face / hands/ forearms / feet , some on legs stable     She was seen by  in 2014 for Discoid lupus and was lost for follow up. In that visit , since she was having joint pains,  also checked for rheumatoid arthritis which came back highly positive for RF/CCP    Past Medical History:   Diagnosis Date    Acute coronary syndrome     Anxiety     Bilateral carotid artery stenosis 11/17/2015    Chronic pain     Coronary artery disease     GERD (gastroesophageal reflux disease)     Hyperlipidemia     Hypertension     Hypothyroidism     Low back pain     Lupus     Osteoporosis     Poor circulation     PVD  (peripheral vascular disease)     S/P peripheral artery angioplasty 02/13/2014    Skin disease          Past Surgical History:   Procedure Laterality Date    COLONOSCOPY N/A 1/4/2017    Procedure: COLONOSCOPY;  Surgeon: Sylvester Arboleda III, MD;  Location: [facility] ENDO;  Service: Endoscopy;  Laterality: N/A;    CORONARY ANGIOPLASTY      CORONARY ARTERY BYPASS GRAFT      HYSTERECTOMY      THYROIDECTOMY           Social History   Substance Use Topics    Smoking status: Current Every Day Smoker     Packs/day: 0.25     Years: 40.00     Types: Cigarettes     Start date: 1961    Smokeless tobacco: Never Used    Alcohol use No       Family History   Problem Relation Age of Onset    Melanoma Neg Hx     Psoriasis Neg Hx     Lupus Neg Hx     Eczema Neg Hx        Review of patient's allergies indicates:  No Known Allergies          Review of Systems:  Constitutional: Denies fever, chills. No recent weight changes.   Fatigue: no  Muscle weakness: no  Headaches: no new headaches  Eyes: No redness or dryness.  No recent visual changes.  ENT: Denies dry mouth. No oral or nasal ulcers.  Card: No chest pain.  Resp: No cough or sob.   Gastro: No nausea or vomiting.  No heartburn.  Constipation: no  Diarrhea: no  Genito:  No dysuria.  No genital ulcers.  Skin:per hpi   Raynauds:no  Neuro: No numbness / tingling.   Psych: No depression, anxiety  Endo:  no excess thirst.  Heme: no abnormal bleeding or bruising  Clots:none     OBJECTIVE:     Vital Signs   Vitals:    04/30/18 1246   BP: 134/70   Pulse: 72     Physical Exam:  General Appearance:  NAD.   Gait: not favoring.  HEENT: PERRL.  Eyes not dry or injected.  No nasal ulcers.  Mouth not dry, no oral lesions.  Lymph: cervical, supraclavicular or axillary nodes: none abnormal   Cardio: no irregularity of S1 or S2.  No gallops or rubs.   Resp: Normal respiratory motion. Clear to auscultation bilaterally.   No abnormal chest conformation.  Abd: Soft, non-tender, nondistended.   No masses.   Skin: Head and neck,  and extremities examined.   Warts on palmar aspect of rt hand   Old scars on arms, feet , some on legs   Face - hyperpigmentation   Neuro: Ox3.   Cranial nerves II-XII grossly intact.   Sensation intact  in both distal LE and upper extremities to light touch.  Musculoskeletal Exam:    Right Side Rheumatological Exam     Examination finds the shoulder, elbow, wrist, knee, 1st PIP, 1st MCP, 2nd PIP, 2nd MCP, 3rd PIP, 3rd MCP, 4th PIP, 4th MCP, 5th PIP and 5th MCP normal.    Others :  Hip: no synovitis   Ankle :no synovitis   Foot:no synovitis     Left Side Rheumatological Exam     Examination finds the shoulder, elbow, wrist, knee, 1st PIP, 1st MCP, 2nd PIP, 2nd MCP, 3rd PIP, 3rd MCP, 4th PIP, 4th MCP, 5th PIP and 5th MCP normal.    Others :  Hip:no synovitis   Ankle :no synovitis   Foot:no synovitis     Spine :  Occiput to wall :  Modified schobers :    Tender points:  Muscle strength:Equal and full in all mm groups of the upper and lower ext.    Laboratory:   Results for orders placed or performed in visit on 04/20/18   Cardiology Lab VIPIN Resting, Lower Extremities   Result Value Ref Range    Vascular VIPIN Left 0.79 (A) 0.9 - 1.2     CMP  Sodium   Date Value Ref Range Status   08/04/2017 142 136 - 145 mmol/L Final     Potassium   Date Value Ref Range Status   08/04/2017 4.6 3.5 - 5.1 mmol/L Final     Chloride   Date Value Ref Range Status   08/04/2017 106 95 - 110 mmol/L Final     CO2   Date Value Ref Range Status   08/04/2017 26 23 - 29 mmol/L Final     Glucose   Date Value Ref Range Status   08/04/2017 89 70 - 110 mg/dL Final     BUN, Bld   Date Value Ref Range Status   08/04/2017 14 8 - 23 mg/dL Final     Creatinine   Date Value Ref Range Status   08/04/2017 0.8 0.5 - 1.4 mg/dL Final     Calcium   Date Value Ref Range Status   08/04/2017 9.5 8.7 - 10.5 mg/dL Final     Total Protein   Date Value Ref Range Status   08/04/2017 7.1 6.0 - 8.4 g/dL Final     Albumin   Date Value Ref  Range Status   08/04/2017 3.5 3.5 - 5.2 g/dL Final     Total Bilirubin   Date Value Ref Range Status   08/04/2017 0.4 0.1 - 1.0 mg/dL Final     Comment:     For infants and newborns, interpretation of results should be based  on gestational age, weight and in agreement with clinical  observations.  Premature Infant recommended reference ranges:  Up to 24 hours.............<8.0 mg/dL  Up to 48 hours............<12.0 mg/dL  3-5 days..................<15.0 mg/dL  6-29 days.................<15.0 mg/dL       Alkaline Phosphatase   Date Value Ref Range Status   08/04/2017 198 (H) 55 - 135 U/L Final     AST   Date Value Ref Range Status   08/04/2017 15 10 - 40 U/L Final     ALT   Date Value Ref Range Status   08/04/2017 10 10 - 44 U/L Final     Anion Gap   Date Value Ref Range Status   08/04/2017 10 8 - 16 mmol/L Final     eGFR if    Date Value Ref Range Status   08/04/2017 >60.0 >60 mL/min/1.73 m^2 Final     eGFR if non    Date Value Ref Range Status   08/04/2017 >60.0 >60 mL/min/1.73 m^2 Final     Comment:     Calculation used to obtain the estimated glomerular filtration  rate (eGFR) is the CKD-EPI equation. Since race is unknown   in our information system, the eGFR values for   -American and Non--American patients are given   for each creatinine result.       Imaging : x ray foot :  Mild calcaneal spurring. No acute fracture. No dislocation. Minimal degenerative change first metatarsophalangeal joint.    Notes reviewed  Other procedures:    ASSESSMENT/PLAN:     Discoid lupus erythematosus  -     CBC auto differential; Standing  -     Comprehensive metabolic panel; Standing  -     C-reactive protein; Standing  -     Sedimentation rate, manual; Standing  -     Hepatitis panel, acute; Future  -     KYLE; Future; Expected date: 04/30/2018  -     C4 complement; Standing; Expected date: 04/30/2018  -     C3 complement; Standing; Expected date: 04/30/2018  -     Urinalysis;  Standing    Rheumatoid arthritis involving both feet with positive rheumatoid factor  -     CBC auto differential; Standing  -     Comprehensive metabolic panel; Standing  -     C-reactive protein; Standing  -     Sedimentation rate, manual; Standing  -     Hepatitis panel, acute; Future  -     KYLE; Future; Expected date: 04/30/2018  -     C4 complement; Standing; Expected date: 04/30/2018  -     C3 complement; Standing; Expected date: 04/30/2018  -     Urinalysis; Standing  -     Quantiferon Gold TB; Future; Expected date: 04/30/2018  -     methotrexate 2.5 MG Tab; Take 4 tablets (10 mg total) by mouth every 7 days.  Dispense: 20 tablet; Refill: 3    Medication monitoring encounter  -     Hepatitis panel, acute; Future    Other fatigue   -     Hepatitis panel, acute; Future    Other orders  -     folic acid (FOLVITE) 1 MG tablet; Take 1 tablet (1 mg total) by mouth once daily.  Dispense: 90 tablet; Refill: 3    RA/ discoid lupus/lichen planus overlap :     Neg KYLE   + RF/ CCP  No active synovitis , rash noted   Stable on plaquenil   But need to stop plaquenil per opthalmology recs   Will get her to resolve her warts - asked to f/u PCP/ Derm then   Start MTX 10 mg / week ( she has used in the past and tolerated well ) , FA 1 mg daily   She will wait till resolution of warts prior to starting MTX   Labs - all 4     Osteoporosis  :declined prolia   Use Vit d 1000 units/ day for now     3 month return   MTX - discussed mucositis, infection , malignancy risks   Hold if any infection

## 2018-05-02 DIAGNOSIS — I10 ESSENTIAL HYPERTENSION: ICD-10-CM

## 2018-05-02 RX ORDER — VALSARTAN 320 MG/1
TABLET ORAL
Qty: 90 TABLET | Refills: 2 | Status: SHIPPED | OUTPATIENT
Start: 2018-05-02 | End: 2019-05-07 | Stop reason: SDUPTHER

## 2018-05-03 RX ORDER — AMLODIPINE BESYLATE 10 MG/1
TABLET ORAL
Qty: 30 TABLET | Refills: 6 | Status: SHIPPED | OUTPATIENT
Start: 2018-05-03 | End: 2018-11-19 | Stop reason: SDUPTHER

## 2018-05-08 ENCOUNTER — DOCUMENTATION ONLY (OUTPATIENT)
Dept: RHEUMATOLOGY | Facility: CLINIC | Age: 73
End: 2018-05-08

## 2018-05-15 ENCOUNTER — TELEPHONE (OUTPATIENT)
Dept: RHEUMATOLOGY | Facility: CLINIC | Age: 73
End: 2018-05-15

## 2018-05-15 RX ORDER — LEVOTHYROXINE SODIUM 137 UG/1
TABLET ORAL
Qty: 30 TABLET | Refills: 3 | Status: SHIPPED | OUTPATIENT
Start: 2018-05-15 | End: 2018-11-03 | Stop reason: SDUPTHER

## 2018-05-15 NOTE — TELEPHONE ENCOUNTER
Spoke with patient . States that pharmacy will not fill Methotrexate. State I have to call them. Call to [facility]. Spoke with Che- Pharmacist. Both Methotrexate and Folic Acid has been filled and are ready for .

## 2018-05-15 NOTE — TELEPHONE ENCOUNTER
----- Message from Elaina Londono sent at 5/15/2018  9:19 AM CDT -----  Contact: pt  The pt request a return call, no additional info given, can be reached at [phone]///thxMW

## 2018-05-19 DIAGNOSIS — I73.9 PAD (PERIPHERAL ARTERY DISEASE): ICD-10-CM

## 2018-05-22 ENCOUNTER — TELEPHONE (OUTPATIENT)
Dept: INTERNAL MEDICINE | Facility: CLINIC | Age: 73
End: 2018-05-22

## 2018-05-22 RX ORDER — CLOPIDOGREL BISULFATE 75 MG/1
TABLET ORAL
Qty: 90 TABLET | Refills: 0 | Status: SHIPPED | OUTPATIENT
Start: 2018-05-22 | End: 2018-10-26 | Stop reason: SDUPTHER

## 2018-05-22 NOTE — TELEPHONE ENCOUNTER
----- Message from Shirin Kumar sent at 5/22/2018  2:17 PM CDT -----  Pt at [phone]///states she needs to ask the nurse a question//all information given//please call//thanks/catrachito

## 2018-06-01 ENCOUNTER — LAB VISIT (OUTPATIENT)
Dept: LAB | Facility: HOSPITAL | Age: 73
End: 2018-06-01
Attending: INTERNAL MEDICINE
Payer: MEDICARE

## 2018-06-01 DIAGNOSIS — M05.772 RHEUMATOID ARTHRITIS INVOLVING BOTH FEET WITH POSITIVE RHEUMATOID FACTOR: Chronic | ICD-10-CM

## 2018-06-01 DIAGNOSIS — Z51.81 MEDICATION MONITORING ENCOUNTER: ICD-10-CM

## 2018-06-01 DIAGNOSIS — R53.83 OTHER FATIGUE: ICD-10-CM

## 2018-06-01 DIAGNOSIS — M05.771 RHEUMATOID ARTHRITIS INVOLVING BOTH FEET WITH POSITIVE RHEUMATOID FACTOR: Chronic | ICD-10-CM

## 2018-06-01 DIAGNOSIS — L93.0 DISCOID LUPUS ERYTHEMATOSUS: ICD-10-CM

## 2018-06-01 LAB
ALBUMIN SERPL BCP-MCNC: 3.6 G/DL
ALP SERPL-CCNC: 188 U/L
ALT SERPL W/O P-5'-P-CCNC: 23 U/L
ANION GAP SERPL CALC-SCNC: 9 MMOL/L
AST SERPL-CCNC: 23 U/L
BASOPHILS # BLD AUTO: 0.03 K/UL
BASOPHILS NFR BLD: 0.4 %
BILIRUB SERPL-MCNC: 0.6 MG/DL
BUN SERPL-MCNC: 16 MG/DL
C3 SERPL-MCNC: 143 MG/DL
C4 SERPL-MCNC: 40 MG/DL
CALCIUM SERPL-MCNC: 9.6 MG/DL
CHLORIDE SERPL-SCNC: 105 MMOL/L
CO2 SERPL-SCNC: 27 MMOL/L
CREAT SERPL-MCNC: 0.8 MG/DL
CRP SERPL-MCNC: 5.3 MG/L
DIFFERENTIAL METHOD: ABNORMAL
EOSINOPHIL # BLD AUTO: 0.2 K/UL
EOSINOPHIL NFR BLD: 2.2 %
ERYTHROCYTE [DISTWIDTH] IN BLOOD BY AUTOMATED COUNT: 14.2 %
ERYTHROCYTE [SEDIMENTATION RATE] IN BLOOD BY WESTERGREN METHOD: 15 MM/HR
EST. GFR  (AFRICAN AMERICAN): >60 ML/MIN/1.73 M^2
EST. GFR  (NON AFRICAN AMERICAN): >60 ML/MIN/1.73 M^2
GLUCOSE SERPL-MCNC: 104 MG/DL
HCT VFR BLD AUTO: 43.8 %
HGB BLD-MCNC: 14.8 G/DL
LYMPHOCYTES # BLD AUTO: 2 K/UL
LYMPHOCYTES NFR BLD: 26.9 %
MCH RBC QN AUTO: 31.6 PG
MCHC RBC AUTO-ENTMCNC: 33.8 G/DL
MCV RBC AUTO: 94 FL
MONOCYTES # BLD AUTO: 0.4 K/UL
MONOCYTES NFR BLD: 6.1 %
NEUTROPHILS # BLD AUTO: 4.7 K/UL
NEUTROPHILS NFR BLD: 64.4 %
PLATELET # BLD AUTO: 266 K/UL
PMV BLD AUTO: 10 FL
POTASSIUM SERPL-SCNC: 3.9 MMOL/L
PROT SERPL-MCNC: 7 G/DL
RBC # BLD AUTO: 4.68 M/UL
SODIUM SERPL-SCNC: 141 MMOL/L
WBC # BLD AUTO: 7.24 K/UL

## 2018-06-01 PROCEDURE — 86140 C-REACTIVE PROTEIN: CPT

## 2018-06-01 PROCEDURE — 36415 COLL VENOUS BLD VENIPUNCTURE: CPT

## 2018-06-01 PROCEDURE — 86160 COMPLEMENT ANTIGEN: CPT | Mod: 59

## 2018-06-01 PROCEDURE — 80053 COMPREHEN METABOLIC PANEL: CPT

## 2018-06-01 PROCEDURE — 85651 RBC SED RATE NONAUTOMATED: CPT

## 2018-06-01 PROCEDURE — 85025 COMPLETE CBC W/AUTO DIFF WBC: CPT

## 2018-06-01 PROCEDURE — 80074 ACUTE HEPATITIS PANEL: CPT

## 2018-06-01 PROCEDURE — 86038 ANTINUCLEAR ANTIBODIES: CPT

## 2018-06-01 PROCEDURE — 86160 COMPLEMENT ANTIGEN: CPT

## 2018-06-04 ENCOUNTER — TELEPHONE (OUTPATIENT)
Dept: RHEUMATOLOGY | Facility: CLINIC | Age: 73
End: 2018-06-04

## 2018-06-04 DIAGNOSIS — N39.0 URINARY TRACT INFECTION WITHOUT HEMATURIA, SITE UNSPECIFIED: Primary | ICD-10-CM

## 2018-06-04 LAB
ANA SER QL IF: NORMAL
HAV IGM SERPL QL IA: NEGATIVE
HBV CORE IGM SERPL QL IA: NEGATIVE
HBV SURFACE AG SERPL QL IA: NEGATIVE
HCV AB SERPL QL IA: NEGATIVE

## 2018-06-04 NOTE — TELEPHONE ENCOUNTER
Denies any dysuria  Check culture sensitivity on urine  She will do it on Wednesday., please schedule.

## 2018-06-05 ENCOUNTER — TELEPHONE (OUTPATIENT)
Dept: INTERNAL MEDICINE | Facility: CLINIC | Age: 73
End: 2018-06-05

## 2018-06-05 NOTE — TELEPHONE ENCOUNTER
----- Message from Gilles Jeronimo sent at 6/5/2018  3:04 PM CDT -----  Pt is requesting a call from nurse.          Please call pt back at [phone]

## 2018-06-05 NOTE — TELEPHONE ENCOUNTER
----- Message from Jany Jackson sent at 6/5/2018 11:12 AM CDT -----  Contact: Pt   Pt requested a callback need to ask a question..[phone] (home)

## 2018-06-06 ENCOUNTER — OFFICE VISIT (OUTPATIENT)
Dept: PODIATRY | Facility: CLINIC | Age: 73
End: 2018-06-06
Payer: MEDICARE

## 2018-06-06 ENCOUNTER — LAB VISIT (OUTPATIENT)
Dept: LAB | Facility: HOSPITAL | Age: 73
End: 2018-06-06
Attending: INTERNAL MEDICINE
Payer: MEDICARE

## 2018-06-06 VITALS — HEIGHT: 66 IN | WEIGHT: 211.88 LBS | BODY MASS INDEX: 34.05 KG/M2

## 2018-06-06 DIAGNOSIS — L84 CORN OR CALLUS: ICD-10-CM

## 2018-06-06 DIAGNOSIS — B35.1 DERMATOPHYTOSIS OF NAIL: Primary | ICD-10-CM

## 2018-06-06 DIAGNOSIS — I73.9 PERIPHERAL VASCULAR DISEASE: ICD-10-CM

## 2018-06-06 DIAGNOSIS — N39.0 URINARY TRACT INFECTION WITHOUT HEMATURIA, SITE UNSPECIFIED: ICD-10-CM

## 2018-06-06 PROCEDURE — 99213 OFFICE O/P EST LOW 20 MIN: CPT | Mod: PBBFAC,PO | Performed by: PODIATRIST

## 2018-06-06 PROCEDURE — 99213 OFFICE O/P EST LOW 20 MIN: CPT | Mod: 25,S$PBB,, | Performed by: PODIATRIST

## 2018-06-06 PROCEDURE — 11721 DEBRIDE NAIL 6 OR MORE: CPT | Mod: 59,Q8,S$PBB, | Performed by: PODIATRIST

## 2018-06-06 PROCEDURE — 11056 PARNG/CUTG B9 HYPRKR LES 2-4: CPT | Mod: Q8,S$PBB,, | Performed by: PODIATRIST

## 2018-06-06 PROCEDURE — 11056 PARNG/CUTG B9 HYPRKR LES 2-4: CPT | Mod: Q8,PBBFAC,PO | Performed by: PODIATRIST

## 2018-06-06 PROCEDURE — 87088 URINE BACTERIA CULTURE: CPT

## 2018-06-06 PROCEDURE — 11721 DEBRIDE NAIL 6 OR MORE: CPT | Mod: Q8,PBBFAC,PO | Performed by: PODIATRIST

## 2018-06-06 PROCEDURE — 99999 PR PBB SHADOW E&M-EST. PATIENT-LVL III: CPT | Mod: PBBFAC,,, | Performed by: PODIATRIST

## 2018-06-06 PROCEDURE — 87086 URINE CULTURE/COLONY COUNT: CPT

## 2018-06-06 NOTE — PROGRESS NOTES
PODIATRY NOTE    CHIEF COMPLAINT  Chief Complaint   Patient presents with    Routine Foot Care     at risk foot/nail care PCP Dr. Ovalle(Bettie, [facility]) 3/15/18      HPI  SUBJECTIVE: Flory Cam is a 72 y.o. female w/ PMH of PVD, Lupus, hx of intermittent leg claudifcation- s/p vascular intervention with much improvement in symptoms and other medical problems who presents to clinic for high risk  foot exam and care.Patient admits to painful toenails and calluses aggravated by increased weight bearing, shoe gear, and pressure. States pain is relieved with routine debridements. She denies any N/V/F. Patient has no other pedal complaints at this time.    HgA1c:   Hemoglobin A1C   Date Value Ref Range Status   08/04/2017 5.8 (H) 4.0 - 5.6 % Final     Comment:     According to ADA guidelines, hemoglobin A1c <7.0% represents  optimal control in non-pregnant diabetic patients. Different  metrics may apply to specific patient populations.   Standards of Medical Care in Diabetes-2016.  For the purpose of screening for the presence of diabetes:  <5.7%     Consistent with the absence of diabetes  5.7-6.4%  Consistent with increasing risk for diabetes   (prediabetes)  >or=6.5%  Consistent with diabetes  Currently, no consensus exists for use of hemoglobin A1c  for diagnosis of diabetes for children.  This Hemoglobin A1c assay has significant interference with fetal   hemoglobin   (HbF). The results are invalid for patients with abnormal amounts of   HbF,   including those with known Hereditary Persistence   of Fetal Hemoglobin. Heterozygous hemoglobin variants (HbAS, HbAC,   HbAD, HbAE, HbA2) do not significantly interfere with this assay;   however, presence of multiple variants in a sample may impact the %   interference.     09/14/2016 6.2 4.5 - 6.2 % Final     Comment:     According to ADA guidelines, hemoglobin A1C <7.0% represents  optimal control in non-pregnant diabetic patients.  Different  metrics may apply to  "specific populations.   Standards of Medical Care in Diabetes - 2016.  For the purpose of screening for the presence of diabetes:  <5.7%     Consistent with the absence of diabetes  5.7-6.4%  Consistent with increasing risk for diabetes   (prediabetes)  >or=6.5%  Consistent with diabetes  Currently no consensus exists for use of hemoglobin A1C  for diagnosis of diabetes for children.     04/13/2010 5.9 4.0 - 6.2 % Final       REVIEW OF SYSTEMS  General: Denies any fever or chills  Chest: Denies shortness of breath, wheezing, coughing, or sputum production  Heart: Denies chest pain.  As noted above and per history of current illness above, otherwise negative in the remainder of the 14 systems.     PHYSICAL EXAM  Vitals:    06/06/18 0943   Weight: 96.1 kg (211 lb 13.8 oz)   Height: 5' 6" (1.676 m)       GEN:  This patient is well-developed, well-nourished and appears stated age, well-oriented to person, place and time, and cooperative and pleasant on today's visit.      LOWER EXTREMITY  Vascular:   · DP pedal pulse 0/4 b/l, PT pedal pulse 1/4 b/l  · Skin temperature warm to warm from prox to distally  · CFT <5 secs b/l  · There is LE edema noted b/l.     Dermatologic:   · Thickened, dystrophic, elongated toenails with subungal debris 1-5 b/l.   · Webspaces are C/D/I B/L.  · There is hyperkeratotic tissue noted plantar aspect of b/l feet at medial arch x 2  · Skin texture and turgor dry with hyperkeratosis diffusely b/l plantar heel   · There is no pedal hair growth noted    Neurologic:  · Vibratory sensation diminished at level of Hallux IPJ b/l    · Protective sensation absent at 0/10 sites upon examination with [place] Weinsten 5.07 g monofilament.   · Propioception intact at 1st MTPJ b/l.   · Achilles and patellar deep tendon reflexes intact  · Babinski reflex absent b/l. Light touch and sharp/dull sensation intact b/l.    Musculoskeletal/Orthopedic:  · No symptomatic structural abnormalities noted.   · Muscle " strength is 5/5 for foot inverters, everters, plantarflexors, and dorsiflexors. Muscle tone is normal.  · Pain free range of motion in all four quadrants with stiffness and limitation b/l  · PAIN with palpation of callus plantar medial arch, LEFT      ASSESSMENT  Dermatophytosis of nail    Corn or callus    Peripheral vascular disease        Plan:  -Discuss presenting problems, etiology, pathologic processes and management options with patient today.   -With patient's permission,Sharp excisional debridement of hyperkeratotic lesions deep to epidermal layer x 2 on media l arch bilateral foot  was performed with a #15 blade without incident.   -With patient's permission, nails were aggressively reduced and debrided x 10 to their soft tissue attachment mechanically and with electric , removing all offending nail and debris. Patient relates relief following the procedure.   -RTC as scheduled    Future Appointments  Date Time Provider Department Center   6/21/2018 9:20 AM [facility]   6/21/2018 9:40 AM PULMONARY LAB, [facility] ON PULMFS  [facility]   6/21/2018 10:00 AM Cyndy Mccall NP ON PULMSVC  [facility]   6/21/2018 11:20 AM Tayler Ovalle DO ON IM  [facility]   [facility]31/2018 9:55 [facility]   7/31/2018 10:30 AM Yeimy Monteiro MD [facility] RHEUM [facility]   8/29/2018 2:30 PM Treasure Yap MD [facility] DERM [facility]   9/5/2018 10:00 AM Melva Nettles DPM [facility] POD [facility]   10/18/2018 11:15 AM Christopher Cohen MD [facility] CARDIO  [facility]

## 2018-06-08 LAB — BACTERIA UR CULT: NORMAL

## 2018-06-11 ENCOUNTER — TELEPHONE (OUTPATIENT)
Dept: RHEUMATOLOGY | Facility: CLINIC | Age: 73
End: 2018-06-11

## 2018-06-11 NOTE — TELEPHONE ENCOUNTER
She only has some diarrhea   She has taken it in the past and just got worried   Told her to decrease to 3 tablets a week  It is still a major concern she might stop and call us back.    Denies any dysuria likely the urine culture was a contaminant

## 2018-06-11 NOTE — TELEPHONE ENCOUNTER
----- Message from Jany Jackson sent at 6/11/2018  1:49 PM CDT -----  Contact: Pt   Pt requested a callback in regards medication need advice..[phone] ([place])

## 2018-06-11 NOTE — TELEPHONE ENCOUNTER
Spoke with patient who states that the MTX is causing her to have diarrhea and she has lost her appetite. Also please review C/S of Urine. Please advise.

## 2018-06-12 ENCOUNTER — PATIENT OUTREACH (OUTPATIENT)
Dept: ADMINISTRATIVE | Facility: HOSPITAL | Age: 73
End: 2018-06-12

## 2018-06-12 ENCOUNTER — TELEPHONE (OUTPATIENT)
Dept: PODIATRY | Facility: CLINIC | Age: 73
End: 2018-06-12

## 2018-06-12 NOTE — TELEPHONE ENCOUNTER
----- Message from Jany Jackson sent at 6/11/2018  4:00 PM CDT -----  Contact: Pt  Pt requested a callback will explain during callback..[phone] (home)

## 2018-06-15 ENCOUNTER — OFFICE VISIT (OUTPATIENT)
Dept: INTERNAL MEDICINE | Facility: CLINIC | Age: 73
End: 2018-06-15
Payer: MEDICARE

## 2018-06-15 ENCOUNTER — TELEPHONE (OUTPATIENT)
Dept: INTERNAL MEDICINE | Facility: CLINIC | Age: 73
End: 2018-06-15

## 2018-06-15 VITALS
SYSTOLIC BLOOD PRESSURE: 118 MMHG | TEMPERATURE: 100 F | DIASTOLIC BLOOD PRESSURE: 72 MMHG | BODY MASS INDEX: 33.31 KG/M2 | OXYGEN SATURATION: 97 % | WEIGHT: 207.25 LBS | HEART RATE: 69 BPM | HEIGHT: 66 IN

## 2018-06-15 DIAGNOSIS — B95.61 STAPHYLOCOCCUS AUREUS SUPERFICIAL FOLLICULITIS: Primary | ICD-10-CM

## 2018-06-15 DIAGNOSIS — T14.8XXA EXCORIATION: ICD-10-CM

## 2018-06-15 DIAGNOSIS — L73.9 STAPHYLOCOCCUS AUREUS SUPERFICIAL FOLLICULITIS: Primary | ICD-10-CM

## 2018-06-15 DIAGNOSIS — L93.0 DISCOID LUPUS ERYTHEMATOSUS: Chronic | ICD-10-CM

## 2018-06-15 PROCEDURE — 99215 OFFICE O/P EST HI 40 MIN: CPT | Mod: PBBFAC | Performed by: PHYSICIAN ASSISTANT

## 2018-06-15 PROCEDURE — 99214 OFFICE O/P EST MOD 30 MIN: CPT | Mod: S$PBB,,, | Performed by: PHYSICIAN ASSISTANT

## 2018-06-15 PROCEDURE — 99999 PR PBB SHADOW E&M-EST. PATIENT-LVL V: CPT | Mod: PBBFAC,,, | Performed by: PHYSICIAN ASSISTANT

## 2018-06-15 RX ORDER — SULFAMETHOXAZOLE AND TRIMETHOPRIM 800; 160 MG/1; MG/1
1 TABLET ORAL 2 TIMES DAILY
Qty: 20 TABLET | Refills: 0 | Status: SHIPPED | OUTPATIENT
Start: 2018-06-15 | End: 2018-06-25

## 2018-06-15 RX ORDER — MUPIROCIN 20 MG/G
OINTMENT TOPICAL
Qty: 22 G | Refills: 1 | Status: SHIPPED | OUTPATIENT
Start: 2018-06-15 | End: 2020-04-17

## 2018-06-15 RX ORDER — KETOCONAZOLE 20 MG/G
CREAM TOPICAL
COMMUNITY
Start: 2018-06-14 | End: 2019-08-09

## 2018-06-15 NOTE — TELEPHONE ENCOUNTER
----- Message from Kalee Mcginnis sent at 6/15/2018  3:02 PM CDT -----  Contact: pt   Pt caller is requesting a call back from the nurse in regards to med and how many pt is suppose  to have.                               [phone] (home)

## 2018-06-15 NOTE — PROGRESS NOTES
Subjective:       Patient ID: Flory Cam is a 72 y.o. female.    Chief Complaint: Rash (on neck)    Patient is a 71 yo female with lupus comes in today for bumps forming over her left neck area      Rash   This is a new problem. The current episode started in the past 7 days. The problem is unchanged. The affected locations include the neck. The rash is characterized by redness. It is unknown if there was an exposure to a precipitant. Pertinent negatives include no fatigue, fever or shortness of breath. Past treatments include nothing.     Past Medical History:   Diagnosis Date    Acute coronary syndrome     Anxiety     Bilateral carotid artery stenosis 11/17/2015    Chronic pain     Coronary artery disease     GERD (gastroesophageal reflux disease)     Hyperlipidemia     Hypertension     Hypothyroidism     Low back pain     Lupus     Osteoporosis     Poor circulation     PVD (peripheral vascular disease)     S/P peripheral artery angioplasty 02/13/2014    Skin disease      Review of Systems   Constitutional: Negative for chills, fatigue and fever.   Respiratory: Negative for chest tightness and shortness of breath.    Cardiovascular: Negative for chest pain.   Gastrointestinal: Negative for abdominal pain.   Skin: Positive for rash.       Objective:      Physical Exam   Constitutional: She appears well-developed and well-nourished. No distress.   Cardiovascular: Normal rate and regular rhythm.  Exam reveals no gallop and no friction rub.    No murmur heard.  Pulmonary/Chest: Effort normal and breath sounds normal. No respiratory distress. She has no wheezes. She has no rales. She exhibits no tenderness.   Skin: She is not diaphoretic.   Nursing note and vitals reviewed.      Assessment:       1. Staphylococcus aureus superficial folliculitis    2. Discoid lupus erythematosus    3. Excoriation        Plan:       Staphylococcus aureus superficial folliculitis    Discoid lupus  erythematosus    Excoriation  -     mupirocin (BACTROBAN) 2 % ointment; apply to affected area twice a day for 10 days FOR SORES ON SKIN  Dispense: 22 g; Refill: 1    Other orders  -     sulfamethoxazole-trimethoprim 800-160mg (BACTRIM DS) 800-160 mg Tab; Take 1 tablet by mouth 2 (two) times daily.  Dispense: 20 tablet; Refill: 0

## 2018-06-15 NOTE — TELEPHONE ENCOUNTER
She wants to know if she can use the Nizoral 2% cream that she has on her face or should she get the mupirocin?

## 2018-06-15 NOTE — TELEPHONE ENCOUNTER
She can get it from the pharmacy with a discount card the pharmacist used for $16.98. It is to early to fill through her insurance. She already received a compound ointment from [facility]. She will call the pharmacist and get the ointment.

## 2018-07-13 ENCOUNTER — OFFICE VISIT (OUTPATIENT)
Dept: INTERNAL MEDICINE | Facility: CLINIC | Age: 73
End: 2018-07-13
Payer: MEDICARE

## 2018-07-13 VITALS
SYSTOLIC BLOOD PRESSURE: 110 MMHG | HEART RATE: 65 BPM | DIASTOLIC BLOOD PRESSURE: 70 MMHG | OXYGEN SATURATION: 98 % | HEIGHT: 66 IN | TEMPERATURE: 99 F | BODY MASS INDEX: 33.13 KG/M2 | WEIGHT: 206.13 LBS

## 2018-07-13 DIAGNOSIS — Z01.818 PREOP EXAMINATION: Primary | ICD-10-CM

## 2018-07-13 DIAGNOSIS — Z12.39 SCREENING FOR BREAST CANCER: ICD-10-CM

## 2018-07-13 DIAGNOSIS — H26.9 CATARACT, UNSPECIFIED CATARACT TYPE, UNSPECIFIED LATERALITY: ICD-10-CM

## 2018-07-13 DIAGNOSIS — Z72.0 TOBACCO USE: ICD-10-CM

## 2018-07-13 DIAGNOSIS — J44.9 CHRONIC OBSTRUCTIVE PULMONARY DISEASE, UNSPECIFIED COPD TYPE: ICD-10-CM

## 2018-07-13 DIAGNOSIS — I77.9 BILATERAL CAROTID ARTERY DISEASE: Chronic | ICD-10-CM

## 2018-07-13 DIAGNOSIS — I10 HYPERTENSION GOAL BP (BLOOD PRESSURE) < 140/90: Chronic | ICD-10-CM

## 2018-07-13 DIAGNOSIS — M89.9 BONE DISORDER: ICD-10-CM

## 2018-07-13 DIAGNOSIS — I70.219 ATHEROSCLEROTIC PVD WITH INTERMITTENT CLAUDICATION: Chronic | ICD-10-CM

## 2018-07-13 DIAGNOSIS — L93.0 DISCOID LUPUS ERYTHEMATOSUS: Chronic | ICD-10-CM

## 2018-07-13 DIAGNOSIS — Z95.1 HX OF CABG: Chronic | ICD-10-CM

## 2018-07-13 PROBLEM — R07.89 CHEST PAIN, ATYPICAL: Status: RESOLVED | Noted: 2018-04-13 | Resolved: 2018-07-13

## 2018-07-13 PROBLEM — L02.612 FOOT ABSCESS, LEFT: Status: RESOLVED | Noted: 2017-04-27 | Resolved: 2018-07-13

## 2018-07-13 PROCEDURE — 99215 OFFICE O/P EST HI 40 MIN: CPT | Mod: PBBFAC | Performed by: PHYSICIAN ASSISTANT

## 2018-07-13 PROCEDURE — 99214 OFFICE O/P EST MOD 30 MIN: CPT | Mod: S$PBB,,, | Performed by: PHYSICIAN ASSISTANT

## 2018-07-13 PROCEDURE — 99999 PR PBB SHADOW E&M-EST. PATIENT-LVL V: CPT | Mod: PBBFAC,,, | Performed by: PHYSICIAN ASSISTANT

## 2018-07-13 RX ORDER — CIPROFLOXACIN HYDROCHLORIDE 3 MG/ML
SOLUTION/ DROPS OPHTHALMIC
Refills: 0 | COMMUNITY
Start: 2018-07-12 | End: 2019-08-09

## 2018-07-13 RX ORDER — DUREZOL 0.5 MG/ML
EMULSION OPHTHALMIC
Refills: 0 | COMMUNITY
Start: 2018-07-12 | End: 2019-08-09

## 2018-07-13 NOTE — PATIENT INSTRUCTIONS
How to Quit Smoking  Smoking is one of the hardest habits to break. About half of all people who have ever smoked have been able to quit. Most people who still smoke want to quit. Here are some of the best ways to stop smoking.    Keep trying  Most smokers make many attempts at quitting before they are successful. Its important not to give up.  Go cold turkey  Most former smokers quit cold turkey (all at once). Trying to cut back gradually doesn't seem to work as well, perhaps because it continues the smoking habit. Also, it is possible to inhale more while smoking fewer cigarettes. This results in the same amount of nicotine in your body.  Get support  Support programs can be a big help, especially for heavy smokers. These groups offer lectures, ways to change behavior, and peer support. Here are some ways to find a support program:  · Free national quitline: 800-QUIT-NOW (818-190-3140).  · Hospital quit-smoking programs.  · American Lung Association: (453.373.8962).  · American Cancer Society (891-477-0841).  Support at home is important too. Nonsmokers can offer praise and encouragement. If the smoker in your life finds it hard to quit, encourage them to keep trying.  Over-the-counter medicines  Nicotine replacement therapy may make quitting easier. Certain aids, such as the nicotine patch, gum, and lozenges, are available without a prescription. It is best to use these under a doctors care, though. The skin patch provides a steady supply of nicotine. Nicotine gum and lozenges give temporary bursts of low levels of nicotine. Both methods reduce the craving for cigarettes. Warning: If you have nausea, vomiting, dizziness, weakness, or a fast heartbeat, stop using these products and see your doctor.  Prescription medicines  After reviewing your smoking patterns and past attempts to quit, your doctor may offer a prescription medicine such as bupropion, varenicline, a nicotine inhaler, or nasal spray. Each has  "advantages and side effects. Your doctor can review these with you.  Health benefits of quitting  The benefits of quitting start right away and keep improving the longer you go without smoking. These benefits occur at any age.  So whether you are 17 or 70, quitting is a good decision. Some of the benefits include:  · 20 minutes: Blood pressure and pulse return to normal.  · 8 hours: Oxygen levels return to normal.  · 2 days: Ability to smell and taste begin to improve as damaged nerves regrow.  · 2 to 3 weeks: Circulation and lung function improve.  · 1 to 9 months: Coughing, congestion, and shortness of breath decrease; tiredness decreases.  · 1 year: Risk of heart attack decreases by half.  · 5 years: Risk of lung cancer decreases by half; risk of stroke becomes the same as a nonsmokers.  For more on how to quit smoking, try these online resources:   · Smokefree.gov  · "Clearing the Air" booklet from the National Cancer Reading: smokefree.gov/sites/default/files/pdf/clearing-the-air-accessible.pdf  Date Last Reviewed: 3/1/2017  © 6792-8184 [employer]. [address] [place] [address]. All rights reserved. This information is not intended as a substitute for professional medical care. Always follow your healthcare professional's instructions.        "

## 2018-07-13 NOTE — PROGRESS NOTES
Subjective:       Patient ID: Flory Cam is a 73 y.o. female.    Chief Complaint: Pre-op Exam (eye surgery Monday  Dr. Flynn )    Patient is a 72 yo female coming in today for a pre op. She is scheduled to have cataract surgery next week. She has multiple medical problems which are currently stable. She voices no acute complaints.     Past Medical History:  No date: Acute coronary syndrome  No date: Anxiety  11/17/2015: Bilateral carotid artery stenosis  No date: Chronic pain  No date: Coronary artery disease  No date: GERD (gastroesophageal reflux disease)  No date: Hyperlipidemia  No date: Hypertension  No date: Hypothyroidism  No date: Low back pain  No date: Lupus  No date: Osteoporosis  No date: Poor circulation  No date: PVD (peripheral vascular disease)  02/13/2014: S/P peripheral artery angioplasty  No date: Skin disease    Past Surgical History:  1/4/2017: COLONOSCOPY N/A      Comment: Procedure: COLONOSCOPY;  Surgeon: Sylvester Arboleda III, MD;  Location: [facility];  Service:               Endoscopy;  Laterality: N/A;  No date: CORONARY ANGIOPLASTY  No date: CORONARY ARTERY BYPASS GRAFT  No date: HYSTERECTOMY  No date: THYROIDECTOMY    Review of patient's family history indicates:  Problem: Melanoma      Relation: Neg Hx       Age of Onset: (Not Specified)   Problem: Psoriasis      Relation: Neg Hx       Age of Onset: (Not Specified)   Problem: Lupus      Relation: Neg Hx       Age of Onset: (Not Specified)   Problem: Eczema      Relation: Neg Hx       Age of Onset: (Not Specified)       Social History    Marital status:              Spouse name:                       Years of education:                 Number of children:               Occupational History  Occupation          Employer            Comment               Retired                                 Dispatcher    Social History Main Topics    Smoking status: Current Every Day Smoker                                                      Packs/day: 0.25      Years: 40.00          Types: Cigarettes       Start date: 1961    Smokeless tobacco: Never Used                        Alcohol use: No              Drug use: No              Sexual activity: Not on file          Other Topics            Concern  Are you pregnant or th* No  Breast-feeding          No    Social History Narrative    Patient is retired dispatcher.        Review of patient's allergies indicates:  No Known Allergies    Current Outpatient Prescriptions:   amLODIPine (NORVASC) 10 MG tablet, take 1 tablet by mouth once daily, Disp: 30 tablet, Rfl: 6  atorvastatin (LIPITOR) 80 MG tablet, take 1 tablet by mouth once daily, Disp: 90 tablet, Rfl: 0  cilostazol (PLETAL) 100 MG Tab, Take 1 tablet (100 mg total) by mouth 2 (two) times daily. (Patient taking differently: Take 100 mg by mouth once daily at 6am. ), Disp: 60 tablet, Rfl: 6  clopidogrel (PLAVIX) 75 mg tablet, take 1 tablet by mouth once daily, Disp: 90 tablet, Rfl: 0  folic acid (FOLVITE) 1 MG tablet, Take 1 tablet (1 mg total) by mouth once daily., Disp: 90 tablet, Rfl: 3  hydrocodone-acetaminophen 10-325mg (NORCO)  mg Tab, 1 tablet 2 (two) times daily as needed., Disp: , Rfl: 0  levothyroxine (SYNTHROID) 137 MCG Tab tablet, take 1 tablet by mouth once daily before BREAKFAST, Disp: 30 tablet, Rfl: 3  methotrexate 2.5 MG Tab, Take 4 tablets (10 mg total) by mouth every 7 days., Disp: 20 tablet, Rfl: 3  valsartan (DIOVAN) 320 MG tablet, take 1 tablet by mouth once daily, Disp: 90 tablet, Rfl: 2  ZETIA 10 mg tablet, take 1 tablet by mouth once daily, Disp: 90 tablet, Rfl: 3  albuterol 90 mcg/actuation inhaler, Inhale 2 puffs into the lungs every 6 (six) hours as needed for Wheezing. Rescue, Disp: 1 Inhaler, Rfl: 1  ammonium lactate (LAC-HYDRIN) 12 % lotion, , Disp: , Rfl:   ammonium lactate 12 % Crea, Apply 1 application topically once daily., Disp: 140 g, Rfl: 1  ciprofloxacin HCl (CILOXAN) 0.3 %  "ophthalmic solution, instill 1 drop into both eyes every 2 hours FROM NOON UNTIL 8:00 ...  (REFER TO PRESCRIPTION NOTES)., Disp: , Rfl: 0  DUREZOL 0.05 % Drop ophthalmic solution, instill 1 drop into both eyes four times a day AFTER SURGERY FOR 14 DAYS, Disp: , Rfl: 0  fluticasone-salmeterol 100-50 mcg/dose (ADVAIR) 100-50 mcg/dose diskus inhaler, Inhale 1 puff into the lungs 2 (two) times daily. Controller, Disp: 60 each, Rfl: 5  hydroxychloroquine (PLAQUENIL) 200 mg tablet, take 1 tablet by mouth twice a day, Disp: 180 tablet, Rfl: 0  ketoconazole (NIZORAL) 2 % cream, , Disp: , Rfl:   mupirocin (BACTROBAN) 2 % ointment, apply to affected area twice a day for 10 days FOR SORES ON SKIN, Disp: 22 g, Rfl: 1  NEXIUM 40 mg capsule, take 1 capsule by mouth once daily BEFORE BREAKFAST, Disp: 90 capsule, Rfl: 1  traZODone (DESYREL) 50 MG tablet, take 1 tablet by mouth once daily AT NIGHT if needed for insomnia, Disp: 30 tablet, Rfl: 3  triamcinolone acetonide 0.1% (KENALOG) 0.1 % cream, Apply topically 2 (two) times daily., Disp: 80 g, Rfl: 0    /70 (BP Location: Left arm, Patient Position: Sitting, BP Method: Large (Manual))   Pulse 65   Temp 98.9 °F (37.2 °C) (Tympanic)   Ht 5' 6" (1.676 m)   Wt 93.5 kg (206 lb 2.1 oz)   SpO2 98%   BMI 33.27 kg/m²       Review of Systems   Constitutional: Negative for chills, fatigue and fever.   HENT: Negative.    Respiratory: Negative for chest tightness and shortness of breath.    Cardiovascular: Negative for chest pain.   Gastrointestinal: Negative for abdominal pain, diarrhea and nausea.   Genitourinary: Negative.    Neurological: Negative for dizziness, seizures, light-headedness and headaches.       Objective:      Physical Exam   Constitutional: She is oriented to person, place, and time. She appears well-developed and well-nourished. No distress.   HENT:   Head: Normocephalic and atraumatic.   Right Ear: External ear normal.   Left Ear: External ear normal. "   Nose: Nose normal.   Mouth/Throat: Oropharynx is clear and moist. No oropharyngeal exudate.   Neck: Neck supple. No thyromegaly present.   Cardiovascular: Normal rate and regular rhythm.  Exam reveals no gallop and no friction rub.    No murmur heard.  Pulmonary/Chest: Effort normal and breath sounds normal. No respiratory distress. She has no wheezes. She has no rales. She exhibits no tenderness.   Abdominal: Soft. Bowel sounds are normal. She exhibits no distension and no mass. There is no tenderness. There is no rebound and no guarding. No hernia.   Lymphadenopathy:     She has no cervical adenopathy.   Neurological: She is alert and oriented to person, place, and time.   Skin: Skin is warm and dry. She is not diaphoretic.   Psychiatric: She has a normal mood and affect.   Nursing note and vitals reviewed.      Assessment:       1. Preop examination    2. Cataract, unspecified cataract type, unspecified laterality    3. Bone disorder    4. Screening for breast cancer    5. Chronic obstructive pulmonary disease, unspecified COPD type    6. Discoid lupus erythematosus    7. Atherosclerotic PVD with intermittent claudication    8. Bilateral carotid artery disease    9. CAD: Hx of CABG    10. Hypertension goal BP (blood pressure) < 140/90    11. Tobacco use        Plan:       Preop examination  Patient is cleared at a low risk for mild sedation with anesthesia. She may stop Plavix temporarily and proceed as scheduled.      Cataract, unspecified cataract type, unspecified laterality    Bone disorder  -     DXA Bone Density Spine And Hip; Future; Expected date: 07/13/2018    Screening for breast cancer  -     Mammo Digital Screening Bilateral With CAD; Future; Expected date: 07/13/2018    Chronic obstructive pulmonary disease, unspecified COPD type  Stable and controlled. Continue current treatment plan as previously prescribed with your PCP.       Discoid lupus erythematosus  The current medical regimen is  effective;  continue present plan and medications.    Atherosclerotic PVD with intermittent claudication  The current medical regimen is effective;  continue present plan and medications.    Bilateral carotid artery disease  Stable and controlled. Continue current treatment plan as previously prescribed with your PCP.       CAD: Hx of CABG  Stable and controlled. Continue current treatment plan as previously prescribed with your PCP.       Hypertension goal BP (blood pressure) < 140/90  Stable and controlled. Continue current treatment plan as previously prescribed with your PCP.       Tobacco use  Counseled on stopping smoking. She is not ready

## 2018-07-20 ENCOUNTER — TELEPHONE (OUTPATIENT)
Dept: INTERNAL MEDICINE | Facility: CLINIC | Age: 73
End: 2018-07-20

## 2018-07-20 RX ORDER — SULFAMETHOXAZOLE AND TRIMETHOPRIM 800; 160 MG/1; MG/1
1 TABLET ORAL 2 TIMES DAILY
Qty: 20 TABLET | Refills: 0 | Status: SHIPPED | OUTPATIENT
Start: 2018-07-20 | End: 2018-07-30

## 2018-07-20 NOTE — TELEPHONE ENCOUNTER
----- Message from Mellisa Ovalle sent at 7/20/2018  9:13 AM CDT -----  Contact: Pt   Pt request call back to ask a question about her medication.[phone] (gnyp)

## 2018-07-20 NOTE — TELEPHONE ENCOUNTER
Patient would like for you to refill the bactrim for her. She says the staph is coming back. On her scalp and around her ear going down her neck.    ok

## 2018-07-23 DIAGNOSIS — F41.9 ANXIETY: ICD-10-CM

## 2018-07-24 RX ORDER — HYDROXYZINE HYDROCHLORIDE 25 MG/1
TABLET, FILM COATED ORAL
Qty: 30 TABLET | Refills: 11 | Status: SHIPPED | OUTPATIENT
Start: 2018-07-24 | End: 2019-08-09

## 2018-07-30 ENCOUNTER — TELEPHONE (OUTPATIENT)
Dept: CARDIOLOGY | Facility: CLINIC | Age: 73
End: 2018-07-30

## 2018-07-30 NOTE — TELEPHONE ENCOUNTER
Received phone call from patient voicing concern and apprehension regarding recall of Diovan 320 mg tablets and the way her Pharmacy [facility] has handled the matter.  Supplied new prescription from different  but advised patient to call old/recalled  and secure material to send back.    Dr. Cohen -please advise if patient needs substitute drug for Diovan 320 mg

## 2018-07-30 NOTE — TELEPHONE ENCOUNTER
----- Message from Cadence Jones sent at 7/30/2018  2:20 PM CDT -----  Pt needs call back rg medication that was recalled..[phone]

## 2018-07-31 ENCOUNTER — OFFICE VISIT (OUTPATIENT)
Dept: RHEUMATOLOGY | Facility: CLINIC | Age: 73
End: 2018-07-31
Payer: MEDICARE

## 2018-07-31 VITALS
WEIGHT: 208.31 LBS | HEART RATE: 83 BPM | SYSTOLIC BLOOD PRESSURE: 130 MMHG | BODY MASS INDEX: 33.63 KG/M2 | DIASTOLIC BLOOD PRESSURE: 80 MMHG

## 2018-07-31 DIAGNOSIS — M05.771 RHEUMATOID ARTHRITIS INVOLVING BOTH FEET WITH POSITIVE RHEUMATOID FACTOR: Primary | ICD-10-CM

## 2018-07-31 DIAGNOSIS — M05.772 RHEUMATOID ARTHRITIS INVOLVING BOTH FEET WITH POSITIVE RHEUMATOID FACTOR: Primary | ICD-10-CM

## 2018-07-31 PROCEDURE — 99213 OFFICE O/P EST LOW 20 MIN: CPT | Mod: PBBFAC,PO | Performed by: INTERNAL MEDICINE

## 2018-07-31 PROCEDURE — 99214 OFFICE O/P EST MOD 30 MIN: CPT | Mod: S$PBB,,, | Performed by: INTERNAL MEDICINE

## 2018-07-31 PROCEDURE — 99999 PR PBB SHADOW E&M-EST. PATIENT-LVL III: CPT | Mod: PBBFAC,,, | Performed by: INTERNAL MEDICINE

## 2018-07-31 RX ORDER — PREDNISONE 5 MG/1
5 TABLET ORAL DAILY
Qty: 40 TABLET | Refills: 0 | Status: SHIPPED | OUTPATIENT
Start: 2018-07-31 | End: 2018-09-11

## 2018-07-31 NOTE — PROGRESS NOTES
CC:  Chief Complaint   Patient presents with    Lupus    Rheumatoid Arthritis   . Discoid lupus     History of Present Illness:  Flory Hodge a 73 y.o.yo female   Patient Active Problem List   Diagnosis    Hyperlipidemia LDL goal <70    Hypertension goal BP (blood pressure) < 140/90    Hypothyroid    Eczema    Osteoporosis    Osteoarthritis    Tobacco use    CAD: Hx of CABG    Atherosclerotic PVD with intermittent claudication    Bilateral carotid artery disease    Discoid lupus erythematosus    Rheumatoid arthritis involving both feet with positive rheumatoid factor    Lichen planus    Hx of colonic polyps    Chronic obstructive pulmonary disease    Medication monitoring encounter     Here for routine f/u of sero positive RA and ? Discoid  lupus and lichen planus overlap   Since last visit Plaquenil had to be stopped per Ophthalmology recommendations  Then she was started on methotrexate 10 mg a week with daily folic acid, she was advised to start methotrexate after resolution of warts  but she started without getting the warts treated  She developed a rash on her face , she is currently on some antibiotics per Dr Alexandre , she is on Bactrim now and mupirocin ointment   No improvement noted in the rash  She still has warts on the right hand, she is due to see Dr. Yap end of the month    Looks like she was diagnosed with discoid lupus vs lichen planus at [facility], from review of records looks like she has been on prednisone, Plaquenil, methotrexate in the past   Looks like the regions were unresponsive to methotrexate and she was started on CellCept and later Imuran.  She had a rash on both CellCept and Imuran and they would discontinued  More recently she had a biopsy of right palm lesion by Dermatology which was suggestive of lichen planus , she is on topicals per Dermatology  She was last seen by Dr. Yap in November 2017  Today she is still complains of lesions on the right palm and a new  lesion on the left thigh, which she states are warts , not sure if these are w or lichen planus lesions    she denies any joint swelling  A.m. stiffness noted occasionally  She states what bothers her is the stiffness in her hands, which is severe a times  Prednisone helped her in the past    Otherwise denies any fever, chills, , oral ulcers   no history of cough or dysuria    Lab review:  Most recent labs reveal negative KYLE, normal ESR/CRP,     Initial visit  :  She has seen Dr URIARTE in the past   She is currently on plaquenil 200 mg bid   Last seen here in 10/2016   She is currently on plaquenil 200 mg bid , doing well   On it for > 10 years now   Denies any joint swelling'  No am stiffness   She was seen by opthalmologist Dr Gonsales , who recommended she be considered something else in place of plaquenil  Due to ongoing issues with vision   Rash inv face / hands/ forearms / feet , some on legs stable     She was seen by  in 2014 for Discoid lupus and was lost for follow up. In that visit , since she was having joint pains,  also checked for rheumatoid arthritis which came back highly positive for RF/CCP    Past Medical History:   Diagnosis Date    Acute coronary syndrome     Anxiety     Bilateral carotid artery stenosis 11/17/2015    Chronic pain     Coronary artery disease     GERD (gastroesophageal reflux disease)     Hyperlipidemia     Hypertension     Hypothyroidism     Low back pain     Lupus     Osteoporosis     Poor circulation     PVD (peripheral vascular disease)     S/P peripheral artery angioplasty 02/13/2014    Skin disease          Past Surgical History:   Procedure Laterality Date    COLONOSCOPY N/A 1/4/2017    Procedure: COLONOSCOPY;  Surgeon: Sylvester Arboleda III, MD;  Location: [facility];  Service: Endoscopy;  Laterality: N/A;    CORONARY ANGIOPLASTY      CORONARY ARTERY BYPASS GRAFT      HYSTERECTOMY      THYROIDECTOMY           Social History   Substance Use  Topics    Smoking status: Current Every Day Smoker     Packs/day: 0.25     Years: 40.00     Types: Cigarettes     Start date: 1961    Smokeless tobacco: Never Used    Alcohol use No       Family History   Problem Relation Age of Onset    Melanoma Neg Hx     Psoriasis Neg Hx     Lupus Neg Hx     Eczema Neg Hx        Review of patient's allergies indicates:  No Known Allergies          Review of Systems:  Constitutional: Denies fever, chills. No recent weight changes.   Fatigue: no  Muscle weakness: no  Headaches: no new headaches  Eyes: No redness or dryness.  No recent visual changes.  ENT: Denies dry mouth. No oral or nasal ulcers.  Card: No chest pain.  Resp: No cough or sob.   Gastro: No nausea or vomiting.  No heartburn.  Constipation: no  Diarrhea: no  Genito:  No dysuria.  No genital ulcers.  Skin:per hpi   Raynauds:no  Neuro: No numbness / tingling.   Psych: No depression, anxiety  Endo:  no excess thirst.  Heme: no abnormal bleeding or bruising  Clots:none     OBJECTIVE:     Vital Signs   Vitals:    07/31/18 1042   BP: 130/80   Pulse: 83     Physical Exam:  General Appearance:  NAD.   Gait: not favoring.  HEENT: PERRL.  Eyes not dry or injected.  No nasal ulcers.  Mouth not dry, no oral lesions.  Lymph: cervical, supraclavicular or axillary nodes: none abnormal   Cardio: no irregularity of S1 or S2.  No gallops or rubs.   Resp: Normal respiratory motion. Clear to auscultation bilaterally.   No abnormal chest conformation.  Abd: Soft, non-tender, nondistended.  No masses.   Skin: Head and neck,  and extremities examined.   Dry scaly lesions on  palmar aspect of rt hand , dryness noted on left palm as well   Old scars on arms, feet , some on legs   Face - hyperpigmentation ,   Neuro: Ox3.   Cranial nerves II-XII grossly intact.   Sensation intact  in both distal LE and upper extremities to light touch.  Musculoskeletal Exam:    Right Side Rheumatological Exam     2,3 MCP fullness , no tenderness noted,  ulnar deviation   Multiple dry scaly lesions noted on the right palm      Others :  Hip: no synovitis   Ankle :no synovitis   Foot: edematous , with callouses on ventral aspect , + ve MTP squeeze    Left Side Rheumatological Exam     2,3 MCP fullness , no tenderness noted     Others :  Hip:no synovitis   Ankle :no synovitis   Foot: edematous , + ve MTP squueze     Spine :TTP lower lumbar region      Tender points:  Muscle strength:Equal and full in all mm groups of the upper and lower ext.    Results for NO JARVIS (MRN [id number]) as of 7/31/2018 12:54   Ref. Range 9/11/2014 13:46 10/26/2016 10:38   CCP Antibodies Latest Ref Range: <5.0 U/mL 194.4 (H) 387.2 (H)   Rheumatoid Factor Latest Ref Range: 0.0 - 15.0 IU/mL 56.0 (H) 123.0 (H)     KYLE - negative   CMP  Sodium   Date Value Ref Range Status   06/01/2018 141 136 - 145 mmol/L Final     Potassium   Date Value Ref Range Status   06/01/2018 3.9 3.5 - 5.1 mmol/L Final     Chloride   Date Value Ref Range Status   06/01/2018 105 95 - 110 mmol/L Final     CO2   Date Value Ref Range Status   06/01/2018 27 23 - 29 mmol/L Final     Glucose   Date Value Ref Range Status   06/01/2018 104 70 - 110 mg/dL Final     BUN, Bld   Date Value Ref Range Status   06/01/2018 16 8 - 23 mg/dL Final     Creatinine   Date Value Ref Range Status   06/01/2018 0.8 0.5 - 1.4 mg/dL Final     Calcium   Date Value Ref Range Status   06/01/2018 9.6 8.7 - 10.5 mg/dL Final     Total Protein   Date Value Ref Range Status   06/01/2018 7.0 6.0 - 8.4 g/dL Final     Albumin   Date Value Ref Range Status   06/01/2018 3.6 3.5 - 5.2 g/dL Final     Total Bilirubin   Date Value Ref Range Status   06/01/2018 0.6 0.1 - 1.0 mg/dL Final     Comment:     For infants and newborns, interpretation of results should be based  on gestational age, weight and in agreement with clinical  observations.  Premature Infant recommended reference ranges:  Up to 24 hours.............<8.0 mg/dL  Up to 48  hours............<12.0 mg/dL  3-5 days..................<15.0 mg/dL  6-29 days.................<15.0 mg/dL       Alkaline Phosphatase   Date Value Ref Range Status   06/01/2018 188 (H) 55 - 135 U/L Final     AST   Date Value Ref Range Status   06/01/2018 23 10 - 40 U/L Final     ALT   Date Value Ref Range Status   06/01/2018 23 10 - 44 U/L Final     Anion Gap   Date Value Ref Range Status   06/01/2018 9 8 - 16 mmol/L Final     eGFR if    Date Value Ref Range Status   06/01/2018 >60 >60 mL/min/1.73 m^2 Final     eGFR if non    Date Value Ref Range Status   06/01/2018 >60 >60 mL/min/1.73 m^2 Final     Comment:     Calculation used to obtain the estimated glomerular filtration  rate (eGFR) is the CKD-EPI equation.        Imaging : x ray foot :  Mild calcaneal spurring. No acute fracture. No dislocation. Minimal degenerative change first metatarsophalangeal joint.    Notes reviewed  Other procedures:    ASSESSMENT/PLAN:     Rheumatoid arthritis involving both feet with positive rheumatoid factor  -     predniSONE (DELTASONE) 5 MG tablet; Take 1 tablet (5 mg total) by mouth once daily. X 2 weeks then 2.5 mg daily until seen  Dispense: 40 tablet; Refill: 0    RA/ discoid lupus/lichen planus overlap :     Neg KYLE   + RF/ CCP      rash with Imuran, CellCept in the past    Had been previously stable on Plaquenil 200 b.i.d., then on methotrexate 10 mg a week  Plaquenil had to be discontinued per Ophthalmology recommendation  methotrexate to be stop now due to worsening of facial rash    Unclear etiology of the lesions on the palm- lichen planus versus warts   Due to see Dermatology next few weeks  Until then start prednisone 5 mg daily for 2 weeks then 2.5 mg daily   return in 6 weeks- will consider sulfasalazine then    H/o discoid lupus:  Do not see any lesions suggestive of discoid lupus at this point  KYLE negative  Normal complements and UA negative for protein  Will monitor for now off  Plaquenil    Osteoporosis  :declined prolia   Use Vit d 1000 units/ day for now     Multiple calluses in feet:  Follow-up Podiatry    Chronic mild elevation in alkaline phosphatase:  Monitor     6 week return   Went over uptodate information /literature on the meds prescribed today   Steroids- weight gain, cataracts, worsening bone mineral density discussed

## 2018-08-08 NOTE — TELEPHONE ENCOUNTER
Received fax from [address] [place] [address] tel [phone] fax [phone] to request a refill for Atorvastatin 80 mg tablets take 1 tablet by mouth once daily

## 2018-08-09 RX ORDER — ATORVASTATIN CALCIUM 80 MG/1
80 TABLET, FILM COATED ORAL DAILY
Qty: 90 TABLET | Refills: 1 | Status: SHIPPED | OUTPATIENT
Start: 2018-08-09 | End: 2019-10-22 | Stop reason: SDUPTHER

## 2018-08-16 ENCOUNTER — PATIENT OUTREACH (OUTPATIENT)
Dept: ADMINISTRATIVE | Facility: HOSPITAL | Age: 73
End: 2018-08-16

## 2018-08-23 ENCOUNTER — TELEPHONE (OUTPATIENT)
Dept: DERMATOLOGY | Facility: CLINIC | Age: 73
End: 2018-08-23

## 2018-08-23 NOTE — TELEPHONE ENCOUNTER
----- Message from Jennifer Lemos sent at 8/23/2018  8:50 AM CDT -----  Contact: Flory [phone]  Patient mistakenly hit the cancel button on her confirmation page for the 29th appt. Per patient she really needs this appt, can she please get this appt date and time back.

## 2018-09-11 ENCOUNTER — OFFICE VISIT (OUTPATIENT)
Dept: PODIATRY | Facility: CLINIC | Age: 73
End: 2018-09-11
Payer: MEDICARE

## 2018-09-11 ENCOUNTER — OFFICE VISIT (OUTPATIENT)
Dept: RHEUMATOLOGY | Facility: CLINIC | Age: 73
End: 2018-09-11
Payer: MEDICARE

## 2018-09-11 VITALS
HEART RATE: 54 BPM | SYSTOLIC BLOOD PRESSURE: 153 MMHG | HEIGHT: 66 IN | WEIGHT: 207 LBS | DIASTOLIC BLOOD PRESSURE: 89 MMHG | BODY MASS INDEX: 33.27 KG/M2

## 2018-09-11 VITALS
HEART RATE: 53 BPM | DIASTOLIC BLOOD PRESSURE: 77 MMHG | WEIGHT: 208.31 LBS | BODY MASS INDEX: 33.48 KG/M2 | HEIGHT: 66 IN | SYSTOLIC BLOOD PRESSURE: 130 MMHG

## 2018-09-11 DIAGNOSIS — L84 CORN OR CALLUS: ICD-10-CM

## 2018-09-11 DIAGNOSIS — M05.79 RHEUMATOID ARTHRITIS INVOLVING MULTIPLE SITES WITH POSITIVE RHEUMATOID FACTOR: Primary | ICD-10-CM

## 2018-09-11 DIAGNOSIS — I73.9 PERIPHERAL VASCULAR DISEASE: ICD-10-CM

## 2018-09-11 DIAGNOSIS — M81.0 AGE-RELATED OSTEOPOROSIS WITHOUT CURRENT PATHOLOGICAL FRACTURE: ICD-10-CM

## 2018-09-11 DIAGNOSIS — L93.0 DISCOID LUPUS ERYTHEMATOSUS: ICD-10-CM

## 2018-09-11 DIAGNOSIS — B35.1 DERMATOPHYTOSIS OF NAIL: Primary | ICD-10-CM

## 2018-09-11 DIAGNOSIS — N39.0 URINARY TRACT INFECTION WITHOUT HEMATURIA, SITE UNSPECIFIED: ICD-10-CM

## 2018-09-11 PROCEDURE — 11056 PARNG/CUTG B9 HYPRKR LES 2-4: CPT | Mod: Q8,S$PBB,, | Performed by: PODIATRIST

## 2018-09-11 PROCEDURE — 11056 PARNG/CUTG B9 HYPRKR LES 2-4: CPT | Mod: Q8,PBBFAC,PO | Performed by: PODIATRIST

## 2018-09-11 PROCEDURE — 11721 DEBRIDE NAIL 6 OR MORE: CPT | Performed by: PODIATRIST

## 2018-09-11 PROCEDURE — 99499 UNLISTED E&M SERVICE: CPT | Mod: S$PBB,,, | Performed by: PODIATRIST

## 2018-09-11 PROCEDURE — 99999 PR PBB SHADOW E&M-EST. PATIENT-LVL IV: CPT | Mod: PBBFAC,,, | Performed by: PODIATRIST

## 2018-09-11 PROCEDURE — 99214 OFFICE O/P EST MOD 30 MIN: CPT | Mod: S$PBB,,, | Performed by: INTERNAL MEDICINE

## 2018-09-11 PROCEDURE — 99214 OFFICE O/P EST MOD 30 MIN: CPT | Mod: PBBFAC,PO | Performed by: PODIATRIST

## 2018-09-11 PROCEDURE — 11721 DEBRIDE NAIL 6 OR MORE: CPT | Mod: 59,Q8,S$PBB, | Performed by: PODIATRIST

## 2018-09-11 PROCEDURE — 99999 PR PBB SHADOW E&M-EST. PATIENT-LVL III: CPT | Mod: PBBFAC,,, | Performed by: INTERNAL MEDICINE

## 2018-09-11 PROCEDURE — 99213 OFFICE O/P EST LOW 20 MIN: CPT | Mod: PBBFAC,25,27,PO | Performed by: INTERNAL MEDICINE

## 2018-09-11 RX ORDER — PREDNISONE 2.5 MG/1
2.5 TABLET ORAL DAILY
Qty: 60 TABLET | Refills: 1 | Status: SHIPPED | OUTPATIENT
Start: 2018-09-11 | End: 2018-12-21 | Stop reason: SDUPTHER

## 2018-09-11 ASSESSMENT — ROUTINE ASSESSMENT OF PATIENT INDEX DATA (RAPID3): MDHAQ FUNCTION SCORE: .8

## 2018-09-11 NOTE — PROGRESS NOTES
PODIATRY NOTE    CHIEF COMPLAINT  Chief Complaint   Patient presents with    Routine Foot Care     PCP Vasquez 07/13/18, [facility]     HPI  SUBJECTIVE: Flory Cam is a 73 y.o. female w/ PMH of PVD, Lupus, hx of intermittent leg claudifcation- s/p vascular intervention with much improvement in symptoms and other medical problems who presents to clinic for high risk  foot exam and care.Patient admits to painful toenails and calluses aggravated by increased weight bearing, shoe gear, and pressure. States pain is relieved with routine debridements. She denies any N/V/F. Patient has no other pedal complaints at this time.    HgA1c:   Hemoglobin A1C   Date Value Ref Range Status   08/04/2017 5.8 (H) 4.0 - 5.6 % Final     Comment:     According to ADA guidelines, hemoglobin A1c <7.0% represents  optimal control in non-pregnant diabetic patients. Different  metrics may apply to specific patient populations.   Standards of Medical Care in Diabetes-2016.  For the purpose of screening for the presence of diabetes:  <5.7%     Consistent with the absence of diabetes  5.7-6.4%  Consistent with increasing risk for diabetes   (prediabetes)  >or=6.5%  Consistent with diabetes  Currently, no consensus exists for use of hemoglobin A1c  for diagnosis of diabetes for children.  This Hemoglobin A1c assay has significant interference with fetal   hemoglobin   (HbF). The results are invalid for patients with abnormal amounts of   HbF,   including those with known Hereditary Persistence   of Fetal Hemoglobin. Heterozygous hemoglobin variants (HbAS, HbAC,   HbAD, HbAE, HbA2) do not significantly interfere with this assay;   however, presence of multiple variants in a sample may impact the %   interference.     09/14/2016 6.2 4.5 - 6.2 % Final     Comment:     According to ADA guidelines, hemoglobin A1C <7.0% represents  optimal control in non-pregnant diabetic patients.  Different  metrics may apply to specific populations.   Standards  "of Medical Care in Diabetes - 2016.  For the purpose of screening for the presence of diabetes:  <5.7%     Consistent with the absence of diabetes  5.7-6.4%  Consistent with increasing risk for diabetes   (prediabetes)  >or=6.5%  Consistent with diabetes  Currently no consensus exists for use of hemoglobin A1C  for diagnosis of diabetes for children.     04/13/2010 5.9 4.0 - 6.2 % Final       REVIEW OF SYSTEMS  General: Denies any fever or chills  Chest: Denies shortness of breath, wheezing, coughing, or sputum production  Heart: Denies chest pain.  As noted above and per history of current illness above, otherwise negative in the remainder of the 14 systems.     PHYSICAL EXAM  Vitals:    09/11/18 0853   BP: 130/77   Pulse: (!) 53   Weight: 94.5 kg (208 lb 5.4 oz)   Height: 5' 6" (1.676 m)       GEN:  This patient is well-developed, well-nourished and appears stated age, well-oriented to person, place and time, and cooperative and pleasant on today's visit.      LOWER EXTREMITY  Vascular:   · DP pedal pulse 0/4 b/l, PT pedal pulse 1/4 b/l  · Skin temperature warm to warm from prox to distally  · CFT <5 secs b/l  · There is LE edema noted b/l.     Dermatologic:   · Thickened, dystrophic, elongated toenails with subungal debris 1-5 b/l.   · Webspaces are C/D/I B/L.  · There is hyperkeratotic tissue noted plantar aspect of b/l feet at medial arch x 2  · Skin texture and turgor dry with hyperkeratosis diffusely b/l plantar heel   · There is no pedal hair growth noted    Neurologic:  · Vibratory sensation diminished at level of Hallux IPJ b/l    · Protective sensation absent at 0/10 sites upon examination with Holliday Weinsten 5.07 g monofilament.   · Propioception intact at 1st MTPJ b/l.   · Achilles and patellar deep tendon reflexes intact  · Babinski reflex absent b/l. Light touch and sharp/dull sensation intact b/l.    Musculoskeletal/Orthopedic:  · No symptomatic structural abnormalities noted.   · Muscle strength is " 5/5 for foot inverters, everters, plantarflexors, and dorsiflexors. Muscle tone is normal.  · Pain free range of motion in all four quadrants with stiffness and limitation b/l  · PAIN with palpation of callus plantar medial arch, LEFT    ASSESSMENT  Encounter Diagnoses   Name Primary?    Dermatophytosis of nail Yes    Corn or callus     Discoid lupus erythematosus     Peripheral vascular disease        Plan:  -Discuss presenting problems, etiology, pathologic processes and management options with patient today.   -With patient's permission,Sharp excisional debridement of hyperkeratotic lesions deep to epidermal layer x 2 on medial arch bilateral foot  was performed with a #15 blade without incident.   -With patient's permission, nails were aggressively reduced and debrided x 10 to their soft tissue attachment mechanically and with electric , removing all offending nail and debris. Patient relates relief following the procedure.   -RTC as scheduled    Patient has above noted diagnosis and/or medical co-morbidities.They are being treated and managed by their  Primary Care Physician for management of comorbid states which are deemed to be currently stable.          Future Appointments   Date Time Provider Department Center   10/4/2018  9:30 AM [facility] XR2 [facility] XRAY [place]   10/4/2018 10:00 AM Treasure Yap MD [facility] DERM [place]   11/1/2018 11:00 AM Christopher Cohen MD [facility]ARD[facility]   11/15/2018 10:00 [facility] LAB [place]   11/15/2018 10:30 AM Yeimy Monteiro MD [facility] RHEUM [place]   12/27/2018  9:20 AM Melva Nettles DPM [facility] POD [place]

## 2018-09-11 NOTE — PATIENT INSTRUCTIONS
Denosumab injection  What is this medicine?  DENOSUMAB (den oh sherry mab) slows bone breakdown. Prolia is used to treat osteoporosis in women after menopause and in men. Xgeva is used to prevent bone fractures and other bone problems caused by cancer bone metastases. Xgeva is also used to treat giant cell tumor of the bone.  How should I use this medicine?  This medicine is for injection under the skin. It is given by a health care professional in a hospital or clinic setting.  If you are getting Prolia, a special MedGuide will be given to you by the pharmacist with each prescription and refill. Be sure to read this information carefully each time.  For Prolia, talk to your pediatrician regarding the use of this medicine in children. Special care may be needed. For Xgeva, talk to your pediatrician regarding the use of this medicine in children. While this drug may be prescribed for children as young as 13 years for selected conditions, precautions do apply.  What side effects may I notice from receiving this medicine?  Side effects that you should report to your doctor or health care professional as soon as possible:  · allergic reactions like skin rash, itching or hives, swelling of the face, lips, or tongue  · breathing problems  · chest pain  · fast, irregular heartbeat  · feeling faint or lightheaded, falls  · fever, chills, or any other sign of infection  · muscle spasms, tightening, or twitches  · numbness or tingling  · skin blisters or bumps, or is dry, peels, or red  · slow healing or unexplained pain in the mouth or jaw  · unusual bleeding or bruising  Side effects that usually do not require medical attention (Report these to your doctor or health care professional if they continue or are bothersome.):  · muscle pain  · stomach upset, gas  What may interact with this medicine?  Do not take this medicine with any of the following medications:  · other medicines containing denosumab  This medicine may also  interact with the following medications:  · medicines that suppress the immune system  · medicines that treat cancer  · steroid medicines like prednisone or cortisone  What if I miss a dose?  It is important not to miss your dose. Call your doctor or health care professional if you are unable to keep an appointment.  Where should I keep my medicine?  This medicine is only given in a clinic, doctor's office, or other health care setting and will not be stored at home.  What should I tell my health care provider before I take this medicine?  They need to know if you have any of these conditions:  · dental disease  · eczema  · infection or history of infections  · kidney disease or on dialysis  · low blood calcium or vitamin D  · malabsorption syndrome  · scheduled to have surgery or tooth extraction  · taking medicine that contains denosumab  · thyroid or parathyroid disease  · an unusual reaction to denosumab, other medicines, foods, dyes, or preservatives  · pregnant or trying to get pregnant  · breast-feeding  What should I watch for while using this medicine?  Visit your doctor or health care professional for regular checks on your progress. Your doctor or health care professional may order blood tests and other tests to see how you are doing.  Call your doctor or health care professional if you get a cold or other infection while receiving this medicine. Do not treat yourself. This medicine may decrease your body's ability to fight infection.  You should make sure you get enough calcium and vitamin D while you are taking this medicine, unless your doctor tells you not to. Discuss the foods you eat and the vitamins you take with your health care professional.  See your dentist regularly. Brush and floss your teeth as directed. Before you have any dental work done, tell your dentist you are receiving this medicine.  Do not become pregnant while taking this medicine or for 5 months after stopping it. Women should  inform their doctor if they wish to become pregnant or think they might be pregnant. There is a potential for serious side effects to an unborn child. Talk to your health care professional or pharmacist for more information.  NOTE:This sheet is a summary. It may not cover all possible information. If you have questions about this medicine, talk to your doctor, pharmacist, or health care provider. Copyright© 2017 [employer]

## 2018-09-11 NOTE — PROGRESS NOTES
CC:  Chief Complaint   Patient presents with    Rheumatoid Arthritis   . 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 Discoid  lupus and lichen planus overlap   Plaquenil had to be stopped per Ophthalmology recommendations  Then she was started on methotrexate 10 mg a week with daily folic acid, she was advised to start methotrexate after resolution of warts  but she started without getting the warts treated  She however developed a rash on her face , while on methotrexate and stopped  She is currently on prednisone 2.5 mg daily, denies any joint pain or swelling    She was also evaluated by PCP who placed her on some topicals along with Bactrim  Rash on the face resolved now  She still has warts on the right hand, and she has also noticed scaly lesion on the left thigh, she is due to see Dr. Yap soon       Denies any fever, chills, weight loss, dysuria, oral ulcers, chest pain, shortness of breath    Looks like she was diagnosed with discoid lupus vs lichen planus at [facility], from review of records looks like she has been on prednisone, Plaquenil, methotrexate in the past   Looks like the lesions were unresponsive to methotrexate and she was started on CellCept and later Imuran.  She had a rash on both CellCept and Imuran and they were discontinued  More  recently she had a biopsy of right palm lesion by Dermatology which was suggestive of lichen planus , she is on topicals per Dermatology  She was last seen by Dr. Yap in November 2017  Today she is still complains of lesions on the right palm and a new lesion on the left thigh, which she states are warts , not sure if these are w or lichen planus lesions        Lab review:  Most recent labs reveal negative KYLE, normal ESR/CRP,     Initial visit  :  She has seen Dr URIARTE in the past   She is currently on plaquenil 200 mg bid   Last seen here in 10/2016   She is currently on plaquenil 200 mg bid , doing well   On it for > 10 years now   Denies any joint swelling'  No am stiffness   She was seen by opthalmologist Dr Gonsales , who recommended she be considered something else in place of plaquenil  Due to ongoing issues with vision   Rash inv face / hands/ forearms / feet , some on legs stable     She was seen by  in 2014 for Discoid lupus and was lost for follow up. In that visit , since she was having joint pains,  also checked for rheumatoid arthritis which came back highly positive for RF/CCP    Past Medical History:   Diagnosis Date    Acute coronary syndrome     Anxiety     Bilateral carotid artery stenosis 11/17/2015    Chronic pain     Coronary artery disease     GERD (gastroesophageal reflux disease)     Hyperlipidemia     Hypertension     Hypothyroidism     Low back pain     Lupus     Osteoporosis     Poor circulation     PVD (peripheral vascular disease)     S/P peripheral artery angioplasty 02/13/2014    Skin disease          Past Surgical History:   Procedure Laterality Date    AORTOGRAM WITH RUNOFF/PTA Right 5/2/2017    Performed by [facility] Cohen [facility] at [facility] CATH LAB    COLONOSCOPY N/A 1/4/2017    Procedure: COLONOSCOPY;  Surgeon: Sylvester Arboleda III, MD;  Location: [facility];  Service: Endoscopy;  Laterality: N/A;    COLONOSCOPY N/A 1/4/2017    Performed by Sylvester OBWIE  Robles SWAN MD at [facility] ENDO    CORONARY ANGIOPLASTY      CORONARY ARTERY BYPASS GRAFT      HYSTERECTOMY      INCISION AND DRAINAGE (I&D), FOOT Left 5/5/2017    Performed by Melva Orellana DPM at [facility] OR    PTA-PERIPHERAL Right 5/2/2017    Performed by Christopher Cohen MD at [facility] CATH LAB    THYROIDECTOMY           Social History     Tobacco Use    Smoking status: Current Every Day Smoker     Packs/day: 0.25     Years: 40.00     Pack years: 10.00     Types: Cigarettes     Start date: 1961    Smokeless tobacco: Never Used   Substance Use Topics    Alcohol use: No    Drug use: No       Family History   Problem Relation Age of Onset    Melanoma Neg Hx     Psoriasis Neg Hx     Lupus Neg Hx     Eczema Neg Hx        Review of patient's allergies indicates:  No Known Allergies          Review of Systems:  Constitutional: Denies fever, chills. No recent weight changes.   Fatigue: no  Muscle weakness: no  Headaches: no new headaches  Eyes: No redness or dryness.  No recent visual changes.  ENT: Denies dry mouth. No oral or nasal ulcers.  Card: No chest pain.  Resp: No cough or sob.   Gastro: No nausea or vomiting.  No heartburn.  Constipation: no  Diarrhea: no  Genito:  No dysuria.  No genital ulcers.  Skin:per hpi   Raynauds:no  Neuro: No numbness / tingling.   Psych: No depression, anxiety  Endo:  no excess thirst.  Heme: no abnormal bleeding or bruising  Clots:none     OBJECTIVE:     Vital Signs   Vitals:    09/11/18 0942   BP: (!) 153/89   Pulse: (!) 54     Physical Exam:  General Appearance:  NAD.   Gait: not favoring.  HEENT: PERRL.  Eyes not dry or injected.  No nasal ulcers.  Mouth not dry, no oral lesions.  Lymph: cervical, supraclavicular or axillary nodes: none abnormal   Cardio: no irregularity of S1 or S2.  No gallops or rubs.   Resp: Normal respiratory motion. Clear to auscultation bilaterally.   No abnormal chest conformation.  Abd: Soft, non-tender, nondistended.  No masses.   Skin: Head and neck,   and extremities examined.   Dry scaly lesions on  palmar aspect of rt hand , dryness noted on left palm as well   Old scars on arms, feet , some on legs   Face - hyperpigmentation ,   Neuro: Ox3.   Cranial nerves II-XII grossly intact.   Sensation intact  in both distal LE and upper extremities to light touch.  Musculoskeletal Exam:    Right Side Rheumatological Exam     2,3 MCP fullness , no tenderness noted, ulnar deviation   Multiple dry scaly lesions noted on the right palm      Others :  Hip: no synovitis   Ankle :no synovitis   Foot: edematous , with callouses on ventral aspect ,     Left Side Rheumatological Exam     2,3 MCP fullness , no tenderness noted    1 cm , dry ,scaly lesion noted on left thigh     Others :  Hip:no synovitis   Ankle :no synovitis   Foot: edematous     Spine :TTP lower lumbar region      Tender points:  Muscle strength:Equal and full in all mm groups of the upper and lower ext.    Results for NO JARVIS (MRN [id number]) as of 7/31/2018 12:54   Ref. Range 9/11/2014 13:46 10/26/2016 10:38   CCP Antibodies Latest Ref Range: <5.0 U/mL 194.4 (H) 387.2 (H)   Rheumatoid Factor Latest Ref Range: 0.0 - 15.0 IU/mL 56.0 (H) 123.0 (H)     KYLE - negative   CMP  Sodium   Date Value Ref Range Status   06/01/2018 141 136 - 145 mmol/L Final     Potassium   Date Value Ref Range Status   06/01/2018 3.9 3.5 - 5.1 mmol/L Final     Chloride   Date Value Ref Range Status   06/01/2018 105 95 - 110 mmol/L Final     CO2   Date Value Ref Range Status   06/01/2018 27 23 - 29 mmol/L Final     Glucose   Date Value Ref Range Status   06/01/2018 104 70 - 110 mg/dL Final     BUN, Bld   Date Value Ref Range Status   06/01/2018 16 8 - 23 mg/dL Final     Creatinine   Date Value Ref Range Status   06/01/2018 0.8 0.5 - 1.4 mg/dL Final     Calcium   Date Value Ref Range Status   06/01/2018 9.6 8.7 - 10.5 mg/dL Final     Total Protein   Date Value Ref Range Status   06/01/2018 7.0 6.0 - 8.4 g/dL Final     Albumin    Date Value Ref Range Status   06/01/2018 3.6 3.5 - 5.2 g/dL Final     Total Bilirubin   Date Value Ref Range Status   06/01/2018 0.6 0.1 - 1.0 mg/dL Final     Comment:     For infants and newborns, interpretation of results should be based  on gestational age, weight and in agreement with clinical  observations.  Premature Infant recommended reference ranges:  Up to 24 hours.............<8.0 mg/dL  Up to 48 hours............<12.0 mg/dL  3-5 days..................<15.0 mg/dL  6-29 days.................<15.0 mg/dL       Alkaline Phosphatase   Date Value Ref Range Status   06/01/2018 188 (H) 55 - 135 U/L Final     AST   Date Value Ref Range Status   06/01/2018 23 10 - 40 U/L Final     ALT   Date Value Ref Range Status   06/01/2018 23 10 - 44 U/L Final     Anion Gap   Date Value Ref Range Status   06/01/2018 9 8 - 16 mmol/L Final     eGFR if    Date Value Ref Range Status   06/01/2018 >60 >60 mL/min/1.73 m^2 Final     eGFR if non    Date Value Ref Range Status   06/01/2018 >60 >60 mL/min/1.73 m^2 Final     Comment:     Calculation used to obtain the estimated glomerular filtration  rate (eGFR) is the CKD-EPI equation.        Imaging : x ray foot :  Mild calcaneal spurring. No acute fracture. No dislocation. Minimal degenerative change first metatarsophalangeal joint.    Notes reviewed  Other procedures:    ASSESSMENT/PLAN:     Rheumatoid arthritis involving multiple sites with positive rheumatoid factor  -     Urinalysis; Standing  -     X-Ray Foot Complete Bilateral; Future; Expected date: 09/11/2018  -     X-Ray Hand 3 View Bilateral; Future; Expected date: 09/11/2018  -     Urine culture; Future; Expected date: 09/11/2018    Urinary tract infection without hematuria, site unspecified  -     Urine culture; Future; Expected date: 09/11/2018    Age-related osteoporosis without current pathological fracture  -     denosumab (PROLIA) injection 60 mg; Inject 1 mL (60 mg total) into the  skin every 6 (six) months.  -     Prior Authorization Order  -     Comprehensive metabolic panel; Standing  -     Vitamin D; Standing  -     PTH, intact; Future; Expected date: 09/11/2018    Other orders  -     predniSONE (DELTASONE) 2.5 MG tablet; Take 1 tablet (2.5 mg total) by mouth once daily.  Dispense: 60 tablet; Refill: 1    RA/ discoid lupus/lichen planus overlap :     Neg KYLE   + RF/ CCP      rash with Imuran, CellCept in the past, and more recently rash on methotrexate as well when it was restarted  Had been previously stable on Plaquenil 200 b.i.d., then on methotrexate 10 mg a week  Plaquenil had to be discontinued per Ophthalmology recommendation  methotrexate to be stopped now due to worsening of facial rash    Unclear etiology of the lesions on the palm- lichen planus versus warts   Due to see Dermatology next few weeks  Will wait for Derm recommendations before we consider further immunosuppression,   Sulfasalazine likely next choice  Continue with prednisone 2.5 mg daily until then      H/o discoid lupus:  Do not see any lesions suggestive of discoid lupus at this point  KYLE negative  Normal complements and UA negative for protein  Will monitor for now off Plaquenil    Osteoporosis  :declined prolia   Use Vit d 1000 units/ day for now     Multiple calluses in feet:  Follow-up Podiatry    Chronic mild elevation in alkaline phosphatase:  Monitor     2 month return   Went over uptodate information /literature on the meds prescribed today   Steroids- weight gain, cataracts, worsening bone mineral density discussed    Disclaimer: This note was prepared using voice recognition system and is likely to have sound alike errors and is not proof read.  Please call me with any questions.

## 2018-10-03 ENCOUNTER — TELEPHONE (OUTPATIENT)
Dept: INTERNAL MEDICINE | Facility: CLINIC | Age: 73
End: 2018-10-03

## 2018-10-03 NOTE — TELEPHONE ENCOUNTER
----- Message from Leticia Flores sent at 10/3/2018 12:26 PM CDT -----  Contact: pt  States she needs to speak to Ms Negrete, she wouldn't say what it was regarding. Please call pt at [phone]. Thank you

## 2018-10-03 NOTE — TELEPHONE ENCOUNTER
Edward Alexandre III, JOSÉ MIGUEL Ruth MA   Caller: Unspecified (Today,  2:05 PM)             ok    Previous Messages

## 2018-10-03 NOTE — TELEPHONE ENCOUNTER
Patient has diarrhea after eating something she thinks now may not have been good. She took some pepto bismol that did not help. She will try some imodium and if no help she will let us know.

## 2018-10-04 ENCOUNTER — HOSPITAL ENCOUNTER (OUTPATIENT)
Dept: RADIOLOGY | Facility: HOSPITAL | Age: 73
Discharge: HOME | End: 2018-10-04
Attending: INTERNAL MEDICINE
Payer: MEDICARE

## 2018-10-04 ENCOUNTER — LAB VISIT (OUTPATIENT)
Dept: LAB | Facility: HOSPITAL | Age: 73
End: 2018-10-04
Attending: INTERNAL MEDICINE
Payer: MEDICARE

## 2018-10-04 ENCOUNTER — OFFICE VISIT (OUTPATIENT)
Dept: DERMATOLOGY | Facility: CLINIC | Age: 73
End: 2018-10-04
Payer: MEDICARE

## 2018-10-04 DIAGNOSIS — M05.79 RHEUMATOID ARTHRITIS INVOLVING MULTIPLE SITES WITH POSITIVE RHEUMATOID FACTOR: Primary | ICD-10-CM

## 2018-10-04 DIAGNOSIS — L93.0 DISCOID LUPUS: ICD-10-CM

## 2018-10-04 DIAGNOSIS — L43.0 LICHEN PLANUS HYPERTROPHICUS: Primary | ICD-10-CM

## 2018-10-04 DIAGNOSIS — M81.0 AGE-RELATED OSTEOPOROSIS WITHOUT CURRENT PATHOLOGICAL FRACTURE: ICD-10-CM

## 2018-10-04 DIAGNOSIS — M05.79 RHEUMATOID ARTHRITIS INVOLVING MULTIPLE SITES WITH POSITIVE RHEUMATOID FACTOR: ICD-10-CM

## 2018-10-04 LAB
25(OH)D3+25(OH)D2 SERPL-MCNC: 4 NG/ML
ALBUMIN SERPL BCP-MCNC: 3.7 G/DL
ALP SERPL-CCNC: 185 U/L
ALT SERPL W/O P-5'-P-CCNC: 15 U/L
ANION GAP SERPL CALC-SCNC: 11 MMOL/L
AST SERPL-CCNC: 16 U/L
BILIRUB SERPL-MCNC: 0.6 MG/DL
BUN SERPL-MCNC: 13 MG/DL
CALCIUM SERPL-MCNC: 9.6 MG/DL
CHLORIDE SERPL-SCNC: 105 MMOL/L
CO2 SERPL-SCNC: 27 MMOL/L
CREAT SERPL-MCNC: 0.8 MG/DL
EST. GFR  (AFRICAN AMERICAN): >60 ML/MIN/1.73 M^2
EST. GFR  (NON AFRICAN AMERICAN): >60 ML/MIN/1.73 M^2
GLUCOSE SERPL-MCNC: 103 MG/DL
POTASSIUM SERPL-SCNC: 4.2 MMOL/L
PROT SERPL-MCNC: 7.1 G/DL
SODIUM SERPL-SCNC: 143 MMOL/L

## 2018-10-04 PROCEDURE — 99213 OFFICE O/P EST LOW 20 MIN: CPT | Mod: S$PBB,,, | Performed by: DERMATOLOGY

## 2018-10-04 PROCEDURE — 99999 PR PBB SHADOW E&M-EST. PATIENT-LVL II: CPT | Mod: PBBFAC,,, | Performed by: DERMATOLOGY

## 2018-10-04 PROCEDURE — 73130 X-RAY EXAM OF HAND: CPT | Mod: 26,50,, | Performed by: RADIOLOGY

## 2018-10-04 PROCEDURE — 73630 X-RAY EXAM OF FOOT: CPT | Mod: 26,50,, | Performed by: RADIOLOGY

## 2018-10-04 PROCEDURE — 80053 COMPREHEN METABOLIC PANEL: CPT | Mod: PO

## 2018-10-04 PROCEDURE — 82306 VITAMIN D 25 HYDROXY: CPT

## 2018-10-04 PROCEDURE — 73630 X-RAY EXAM OF FOOT: CPT | Mod: 50,TC,FY,PO

## 2018-10-04 PROCEDURE — 36415 COLL VENOUS BLD VENIPUNCTURE: CPT | Mod: PO

## 2018-10-04 PROCEDURE — 73130 X-RAY EXAM OF HAND: CPT | Mod: 50,TC,FY,PO

## 2018-10-04 PROCEDURE — 99212 OFFICE O/P EST SF 10 MIN: CPT | Mod: PBBFAC,25,PO | Performed by: DERMATOLOGY

## 2018-10-05 ENCOUNTER — TELEPHONE (OUTPATIENT)
Dept: RHEUMATOLOGY | Facility: CLINIC | Age: 73
End: 2018-10-05

## 2018-10-05 RX ORDER — SULFASALAZINE 500 MG/1
1000 TABLET, DELAYED RELEASE ORAL 2 TIMES DAILY
Qty: 120 TABLET | Refills: 3 | Status: SHIPPED | OUTPATIENT
Start: 2018-10-05 | End: 2019-05-02 | Stop reason: SDUPTHER

## 2018-10-05 NOTE — TELEPHONE ENCOUNTER
----- Message from Yeimy Monteiro MD sent at 10/5/2018 12:08 PM CDT -----  Also let her know to start sulfasalazine 1 tablet twice a day for a week then increase to 2 tablets twice daily

## 2018-10-08 ENCOUNTER — OFFICE VISIT (OUTPATIENT)
Dept: INTERNAL MEDICINE | Facility: CLINIC | Age: 73
End: 2018-10-08
Payer: MEDICARE

## 2018-10-08 VITALS
HEIGHT: 66 IN | BODY MASS INDEX: 32.88 KG/M2 | WEIGHT: 204.56 LBS | HEART RATE: 72 BPM | DIASTOLIC BLOOD PRESSURE: 74 MMHG | SYSTOLIC BLOOD PRESSURE: 124 MMHG | TEMPERATURE: 99 F

## 2018-10-08 DIAGNOSIS — I10 HYPERTENSION GOAL BP (BLOOD PRESSURE) < 140/90: Primary | Chronic | ICD-10-CM

## 2018-10-08 DIAGNOSIS — M89.9 BONE DISORDER: ICD-10-CM

## 2018-10-08 DIAGNOSIS — R26.89 IMBALANCE: ICD-10-CM

## 2018-10-08 DIAGNOSIS — M54.50 CHRONIC LOW BACK PAIN WITHOUT SCIATICA, UNSPECIFIED BACK PAIN LATERALITY: ICD-10-CM

## 2018-10-08 DIAGNOSIS — S29.011A MUSCLE STRAIN OF CHEST WALL, INITIAL ENCOUNTER: ICD-10-CM

## 2018-10-08 DIAGNOSIS — G89.29 CHRONIC KNEE PAIN, UNSPECIFIED LATERALITY: ICD-10-CM

## 2018-10-08 DIAGNOSIS — E78.5 HYPERLIPIDEMIA LDL GOAL <70: Chronic | ICD-10-CM

## 2018-10-08 DIAGNOSIS — Z12.39 SCREENING FOR BREAST CANCER: ICD-10-CM

## 2018-10-08 DIAGNOSIS — M25.569 CHRONIC KNEE PAIN, UNSPECIFIED LATERALITY: ICD-10-CM

## 2018-10-08 DIAGNOSIS — M05.772 RHEUMATOID ARTHRITIS INVOLVING BOTH FEET WITH POSITIVE RHEUMATOID FACTOR: Chronic | ICD-10-CM

## 2018-10-08 DIAGNOSIS — G89.29 CHRONIC LOW BACK PAIN WITHOUT SCIATICA, UNSPECIFIED BACK PAIN LATERALITY: ICD-10-CM

## 2018-10-08 DIAGNOSIS — M05.771 RHEUMATOID ARTHRITIS INVOLVING BOTH FEET WITH POSITIVE RHEUMATOID FACTOR: Chronic | ICD-10-CM

## 2018-10-08 PROCEDURE — 99214 OFFICE O/P EST MOD 30 MIN: CPT | Mod: S$PBB,,, | Performed by: PHYSICIAN ASSISTANT

## 2018-10-08 PROCEDURE — 99215 OFFICE O/P EST HI 40 MIN: CPT | Mod: PBBFAC | Performed by: PHYSICIAN ASSISTANT

## 2018-10-08 PROCEDURE — 99999 PR PBB SHADOW E&M-EST. PATIENT-LVL V: CPT | Mod: PBBFAC,,, | Performed by: PHYSICIAN ASSISTANT

## 2018-10-08 RX ORDER — TIZANIDINE 4 MG/1
4 TABLET ORAL EVERY 8 HOURS
Qty: 180 TABLET | Refills: 0 | Status: SHIPPED | OUTPATIENT
Start: 2018-10-08 | End: 2019-08-09

## 2018-10-08 RX ORDER — TIZANIDINE 4 MG/1
4 TABLET ORAL EVERY 6 HOURS PRN
Qty: 30 TABLET | Refills: 0 | Status: SHIPPED | OUTPATIENT
Start: 2018-10-08 | End: 2018-10-08 | Stop reason: SDUPTHER

## 2018-10-08 RX ORDER — TIZANIDINE 4 MG/1
TABLET ORAL
Qty: 385 TABLET | Refills: 0 | Status: SHIPPED | OUTPATIENT
Start: 2018-10-08 | End: 2018-10-08

## 2018-10-08 NOTE — PROGRESS NOTES
Subjective:       Patient ID: Flory Cam is a 73 y.o. female.    Chief Complaint: Annual Exam and left rib pain    Hypertension   This is a chronic problem. The current episode started more than 1 year ago. The problem is unchanged. The problem is controlled. Associated symptoms include anxiety and malaise/fatigue. Pertinent negatives include no chest pain, headaches or shortness of breath. There are no associated agents to hypertension. Risk factors for coronary artery disease include dyslipidemia, post-menopausal state, sedentary lifestyle, smoking/tobacco exposure and stress. Past treatments include calcium channel blockers. The current treatment provides moderate improvement. Compliance problems include diet and exercise.    Hyperlipidemia   This is a chronic problem. The current episode started more than 1 year ago. Factors aggravating her hyperlipidemia include fatty foods and smoking. Pertinent negatives include no chest pain or shortness of breath. Current antihyperlipidemic treatment includes statins. Compliance problems include adherence to diet and adherence to exercise.  Risk factors for coronary artery disease include dyslipidemia, family history, hypertension, post-menopausal, a sedentary lifestyle and stress.   Chest Pain    This is a new problem. The current episode started in the past 7 days. The onset quality is undetermined. The problem occurs intermittently. The problem has been waxing and waning. Pain location: left lower chest wall. The pain is moderate. The quality of the pain is described as sharp and squeezing. The pain does not radiate. Associated symptoms include back pain and malaise/fatigue. Pertinent negatives include no abdominal pain, fever, headaches, nausea, shortness of breath or vomiting. Associated with: twisting her torso. She has tried nothing for the symptoms.   Her past medical history is significant for hyperlipidemia.   Arthritis   Presents for follow-up visit.  She complains of pain, stiffness and joint swelling. The symptoms have been worsening. Affected location: both knees and lower back. Pertinent negatives include no diarrhea, fatigue or fever.     Past Medical History:   Diagnosis Date    Acute coronary syndrome     Anxiety     Bilateral carotid artery stenosis 11/17/2015    Chronic pain     Coronary artery disease     GERD (gastroesophageal reflux disease)     Hyperlipidemia     Hypertension     Hypothyroidism     Low back pain     Lupus     Osteoporosis     Poor circulation     PVD (peripheral vascular disease)     S/P peripheral artery angioplasty 02/13/2014    Skin disease        Review of Systems   Constitutional: Positive for malaise/fatigue. Negative for chills, fatigue and fever.   Respiratory: Positive for wheezing. Negative for chest tightness and shortness of breath.    Cardiovascular: Negative for chest pain.   Gastrointestinal: Negative for abdominal pain, diarrhea, nausea and vomiting.   Musculoskeletal: Positive for arthralgias, arthritis, back pain, joint swelling and stiffness.   Neurological: Negative for headaches.       Objective:      Physical Exam   Constitutional: She appears well-developed and well-nourished. No distress.   HENT:   Head: Normocephalic and atraumatic.   Neck: Neck supple.   Cardiovascular: Normal rate and regular rhythm. Exam reveals no gallop and no friction rub.   No murmur heard.  Pulmonary/Chest: Effort normal and breath sounds normal. No stridor. No respiratory distress. She has no wheezes. She has no rales. She exhibits no tenderness.   Abdominal: Soft. There is no tenderness.   Lymphadenopathy:     She has no cervical adenopathy.   Skin: She is not diaphoretic.   Nursing note and vitals reviewed.      Assessment:       1. Hypertension goal BP (blood pressure) < 140/90    2. Bone disorder    3. Screening for breast cancer    4. Hyperlipidemia LDL goal <70    5. Muscle strain of chest wall, initial  encounter    6. Chronic low back pain without sciatica, unspecified back pain laterality    7. Chronic knee pain, unspecified laterality    8. Rheumatoid arthritis involving both feet with positive rheumatoid factor    9. Imbalance        Plan:       Hypertension goal BP (blood pressure) < 140/90  The current medical regimen is effective;  continue present plan and medications.      Bone disorder  -     DXA Bone Density Spine And Hip; Future; Expected date: 10/08/2018    Screening for breast cancer  -     Mammo Digital Screening Bilateral With CAD; Future; Expected date: 10/08/2018    Hyperlipidemia LDL goal <70  -     Lipid panel; Future; Expected date: 10/08/2018    Muscle strain of chest wall, initial encounter  -     Ambulatory Referral to Physical/Occupational Therapy   tiZANidine (ZANAFLEX) 4 MG tablet; Take 1 tablet (4 mg total) by mouth every 6 (six) hours as needed.      Chronic low back pain without sciatica, unspecified back pain laterality  -     Ambulatory Referral to Physical/Occupational Therapy    Chronic knee pain, unspecified laterality  -     Ambulatory Referral to Physical/Occupational Therapy    Rheumatoid arthritis involving both feet with positive rheumatoid factor  -     Ambulatory Referral to Physical/Occupational Therapy    Imbalance  -     Ambulatory Referral to Physical/Occupational Therapy    Other orders  -

## 2018-10-25 ENCOUNTER — HOSPITAL ENCOUNTER (OUTPATIENT)
Dept: RADIOLOGY | Facility: HOSPITAL | Age: 73
Discharge: HOME | End: 2018-10-25
Attending: PHYSICIAN ASSISTANT
Payer: MEDICARE

## 2018-10-25 ENCOUNTER — TELEPHONE (OUTPATIENT)
Dept: INTERNAL MEDICINE | Facility: CLINIC | Age: 73
End: 2018-10-25

## 2018-10-25 DIAGNOSIS — Z12.39 SCREENING FOR BREAST CANCER: ICD-10-CM

## 2018-10-25 PROCEDURE — 77063 BREAST TOMOSYNTHESIS BI: CPT | Mod: TC

## 2018-10-25 PROCEDURE — 77063 BREAST TOMOSYNTHESIS BI: CPT | Mod: 26,,, | Performed by: RADIOLOGY

## 2018-10-25 PROCEDURE — 77067 SCR MAMMO BI INCL CAD: CPT | Mod: TC

## 2018-10-25 PROCEDURE — 77067 SCR MAMMO BI INCL CAD: CPT | Mod: 26,,, | Performed by: RADIOLOGY

## 2018-10-26 ENCOUNTER — TELEPHONE (OUTPATIENT)
Dept: RHEUMATOLOGY | Facility: CLINIC | Age: 73
End: 2018-10-26

## 2018-10-26 ENCOUNTER — TELEPHONE (OUTPATIENT)
Dept: INTERNAL MEDICINE | Facility: CLINIC | Age: 73
End: 2018-10-26

## 2018-10-26 DIAGNOSIS — I73.9 PAD (PERIPHERAL ARTERY DISEASE): ICD-10-CM

## 2018-10-26 DIAGNOSIS — E55.9 VITAMIN D DEFICIENCY: Primary | ICD-10-CM

## 2018-10-26 RX ORDER — CLOPIDOGREL BISULFATE 75 MG/1
TABLET ORAL
Qty: 90 TABLET | Refills: 1 | Status: SHIPPED | OUTPATIENT
Start: 2018-10-26 | End: 2019-10-19 | Stop reason: SDUPTHER

## 2018-10-26 NOTE — TELEPHONE ENCOUNTER
She sees Dr. Monteiro in rheumatology. I sent a msg to her staff to make sure she sees the report.

## 2018-10-26 NOTE — TELEPHONE ENCOUNTER
----- Message from Edward Alexandre III, PA-C sent at 10/25/2018  1:53 PM CDT -----  Flory Cam    The bone density scan shows osteoporosis. I suggest that you follow up with Rheumatology on this.

## 2018-10-26 NOTE — TELEPHONE ENCOUNTER
----- Message from Yuridia Ruth MA sent at 10/26/2018  4:02 PM CDT -----  Can you please see that Dr. Monteiro sees Ms. Ruth's dexa scan from 10/08/18.  Thank you

## 2018-10-29 ENCOUNTER — TELEPHONE (OUTPATIENT)
Dept: RHEUMATOLOGY | Facility: CLINIC | Age: 73
End: 2018-10-29

## 2018-10-29 RX ORDER — ASPIRIN 325 MG
50000 TABLET, DELAYED RELEASE (ENTERIC COATED) ORAL
Qty: 12 CAPSULE | Refills: 1 | Status: SHIPPED | OUTPATIENT
Start: 2018-10-29 | End: 2019-05-02 | Stop reason: SDUPTHER

## 2018-10-29 NOTE — TELEPHONE ENCOUNTER
Discussed DEXA scan with her this is suggestive of osteoporosis.  Explain options IV Reclast versus subcu Prolia  She has not started vitamin-D supplements yet  She has significantly low vitamin-D so would like to correct it before starting further medications    We will discuss further in her next visit in November

## 2018-10-29 NOTE — TELEPHONE ENCOUNTER
----- Message from Jt Dubois sent at 10/29/2018  3:05 PM CDT -----  Contact: self   pt wants to know can she get a chair for bathtub. Please call back at [phone].    Thanks,  Jt Dubois

## 2018-10-30 ENCOUNTER — TELEPHONE (OUTPATIENT)
Dept: RHEUMATOLOGY | Facility: CLINIC | Age: 73
End: 2018-10-30

## 2018-10-30 DIAGNOSIS — M05.771 RHEUMATOID ARTHRITIS INVOLVING BOTH FEET WITH POSITIVE RHEUMATOID FACTOR: Primary | Chronic | ICD-10-CM

## 2018-10-30 DIAGNOSIS — M05.772 RHEUMATOID ARTHRITIS INVOLVING BOTH FEET WITH POSITIVE RHEUMATOID FACTOR: Primary | Chronic | ICD-10-CM

## 2018-10-30 NOTE — TELEPHONE ENCOUNTER
Called pt and let her know I have her order for her shower chair pt requested that it be mailed to her told her I will put order in the mail to her pt verbalized understanding.

## 2018-10-30 NOTE — TELEPHONE ENCOUNTER
----- Message from Yeimy Montiero MD sent at 10/30/2018  9:04 AM CDT -----  Contact: self  Ordered , please fax the order to store of her choice   Let her know     ----- Message -----  From: Zuleima Roach LPN  Sent: 10/29/2018   3:30 PM  To: Yeimy Monteiro MD    Pt wanting to know if she can get a bath chair please advise thanks  ----- Message -----  From: Jt Dubois  Sent: 10/29/2018   3:05 PM  To: Thania Gaffney Staff     pt wants to know can she get a chair for bathtub. Please call back at [phone].    Thanks,  Jt Dubois

## 2018-11-01 ENCOUNTER — OFFICE VISIT (OUTPATIENT)
Dept: CARDIOLOGY | Facility: CLINIC | Age: 73
End: 2018-11-01
Payer: MEDICARE

## 2018-11-01 VITALS
SYSTOLIC BLOOD PRESSURE: 134 MMHG | DIASTOLIC BLOOD PRESSURE: 78 MMHG | WEIGHT: 206.56 LBS | HEART RATE: 64 BPM | BODY MASS INDEX: 33.2 KG/M2 | HEIGHT: 66 IN

## 2018-11-01 DIAGNOSIS — I73.9 PERIPHERAL VASCULAR DISEASE: Chronic | ICD-10-CM

## 2018-11-01 DIAGNOSIS — I65.23 BILATERAL CAROTID ARTERY STENOSIS: Chronic | ICD-10-CM

## 2018-11-01 DIAGNOSIS — Z95.1 HX OF CABG: Primary | Chronic | ICD-10-CM

## 2018-11-01 DIAGNOSIS — L93.0 DISCOID LUPUS ERYTHEMATOSUS: Chronic | ICD-10-CM

## 2018-11-01 DIAGNOSIS — M81.0 OSTEOPOROSIS, UNSPECIFIED OSTEOPOROSIS TYPE, UNSPECIFIED PATHOLOGICAL FRACTURE PRESENCE: Chronic | ICD-10-CM

## 2018-11-01 DIAGNOSIS — J44.9 CHRONIC OBSTRUCTIVE PULMONARY DISEASE, UNSPECIFIED COPD TYPE: ICD-10-CM

## 2018-11-01 DIAGNOSIS — M05.771 RHEUMATOID ARTHRITIS INVOLVING BOTH FEET WITH POSITIVE RHEUMATOID FACTOR: Chronic | ICD-10-CM

## 2018-11-01 DIAGNOSIS — E78.5 HYPERLIPIDEMIA LDL GOAL <70: Chronic | ICD-10-CM

## 2018-11-01 DIAGNOSIS — E03.9 HYPOTHYROIDISM, UNSPECIFIED TYPE: Chronic | ICD-10-CM

## 2018-11-01 DIAGNOSIS — I10 HYPERTENSION GOAL BP (BLOOD PRESSURE) < 140/90: Chronic | ICD-10-CM

## 2018-11-01 DIAGNOSIS — I70.219 ATHEROSCLEROTIC PVD WITH INTERMITTENT CLAUDICATION: Chronic | ICD-10-CM

## 2018-11-01 DIAGNOSIS — M05.772 RHEUMATOID ARTHRITIS INVOLVING BOTH FEET WITH POSITIVE RHEUMATOID FACTOR: Chronic | ICD-10-CM

## 2018-11-01 PROCEDURE — 99213 OFFICE O/P EST LOW 20 MIN: CPT | Mod: PBBFAC | Performed by: INTERNAL MEDICINE

## 2018-11-01 PROCEDURE — 99214 OFFICE O/P EST MOD 30 MIN: CPT | Mod: S$PBB,,, | Performed by: INTERNAL MEDICINE

## 2018-11-01 PROCEDURE — 99999 PR PBB SHADOW E&M-EST. PATIENT-LVL III: CPT | Mod: PBBFAC,,, | Performed by: INTERNAL MEDICINE

## 2018-11-01 NOTE — PROGRESS NOTES
Subjective:   Patient ID:  Flory Cam is a 73 y.o. female who presents for follow up of Hyperlipidemia (f/u)      HPI  A 72 yo female with pvd cad s/p cabg  htn hlp is here for f/u cad. She is still smoking has no chest pain shortness of breath orthopnea pnd syncope near syncope palpitation. She follows with her rheumatologist on regular basis. Her lipids are out of control it is increased on same dose of meds form last year. She was counseled about diet compliance smoking cessation and meds intake.  Past Medical History:   Diagnosis Date    Acute coronary syndrome     Anxiety     Bilateral carotid artery stenosis 11/17/2015    Chronic pain     Coronary artery disease     GERD (gastroesophageal reflux disease)     Hyperlipidemia     Hypertension     Hypothyroidism     Low back pain     Lupus     Osteoporosis     Poor circulation     PVD (peripheral vascular disease)     S/P peripheral artery angioplasty 02/13/2014    Skin disease        Past Surgical History:   Procedure Laterality Date    AORTOGRAM WITH RUNOFF/PTA Right 5/2/2017    Performed by Christopher Cohen MD at [facility] CATH LAB    COLONOSCOPY N/A 1/4/2017    Procedure: COLONOSCOPY;  Surgeon: Sylvester Arboleda III, MD;  Location: [facility] ENDO;  Service: Endoscopy;  Laterality: N/A;    COLONOSCOPY N/A 1/4/2017    Performed by Sylvester Arboleda III, MD at [facility] ENDO    CORONARY ANGIOPLASTY      CORONARY ARTERY BYPASS GRAFT      HYSTERECTOMY      INCISION AND DRAINAGE (I&D), FOOT Left 5/5/2017    Performed by Melva Orellana DPM at [facility] OR    OOPHORECTOMY      PTA-PERIPHERAL Right 5/2/2017    Performed by Christopher Cohen MD at [facility] CATH LAB    THYROIDECTOMY         Social History     Tobacco Use    Smoking status: Current Every Day Smoker     Packs/day: 0.25     Years: 40.00     Pack years: 10.00     Types: Cigarettes     Start date: 1961    Smokeless tobacco: Never Used   Substance Use Topics    Alcohol use: No    Drug use: No        Family History   Problem Relation Age of Onset    Melanoma Neg Hx     Psoriasis Neg Hx     Lupus Neg Hx     Eczema Neg Hx        Current Outpatient Medications   Medication Sig    albuterol 90 mcg/actuation inhaler Inhale 2 puffs into the lungs every 6 (six) hours as needed for Wheezing. Rescue    amLODIPine (NORVASC) 10 MG tablet take 1 tablet by mouth once daily    atorvastatin (LIPITOR) 80 MG tablet Take 1 tablet (80 mg total) by mouth once daily.    cholecalciferol, vitamin D3, 50,000 unit capsule Take 1 capsule (50,000 Units total) by mouth every 7 days.    cilostazol (PLETAL) 100 MG Tab Take 1 tablet (100 mg total) by mouth 2 (two) times daily. (Patient taking differently: Take 100 mg by mouth once daily at 6am. )    clopidogrel (PLAVIX) 75 mg tablet TAKE 1 TABLET BY MOUTH DAILY    fluticasone-salmeterol 100-50 mcg/dose (ADVAIR) 100-50 mcg/dose diskus inhaler Inhale 1 puff into the lungs 2 (two) times daily. Controller    hydrocodone-acetaminophen 10-325mg (NORCO)  mg Tab 1 tablet 2 (two) times daily as needed.    hydrOXYzine HCl (ATARAX) 25 MG tablet take 1 tablet by mouth three times a day if needed for anxiety    levothyroxine (SYNTHROID) 137 MCG Tab tablet take 1 tablet by mouth once daily before BREAKFAST    NEXIUM 40 mg capsule take 1 capsule by mouth once daily BEFORE BREAKFAST    predniSONE (DELTASONE) 2.5 MG tablet Take 1 tablet (2.5 mg total) by mouth once daily.    sulfaSALAzine (AZULFIDINE) 500 MG TbEC Take 2 tablets (1,000 mg total) by mouth 2 (two) times daily. Start 1 tablet twice daily x week then increase to 2 tabs twice daily    tiZANidine (ZANAFLEX) 4 MG tablet Take 1 tablet (4 mg total) by mouth every 8 (eight) hours.    traZODone (DESYREL) 50 MG tablet take 1 tablet by mouth once daily AT NIGHT if needed for insomnia    valsartan (DIOVAN) 320 MG tablet take 1 tablet by mouth once daily    ZETIA 10 mg tablet take 1 tablet by mouth once daily    ammonium  lactate (LAC-HYDRIN) 12 % lotion     ammonium lactate 12 % Crea Apply 1 application topically once daily.    ciprofloxacin HCl (CILOXAN) 0.3 % ophthalmic solution instill 1 drop into both eyes every 2 hours FROM NOON UNTIL 8:00 ...  (REFER TO PRESCRIPTION NOTES).    DUREZOL 0.05 % Drop ophthalmic solution instill 1 drop into both eyes four times a day AFTER SURGERY FOR 14 DAYS    folic acid (FOLVITE) 1 MG tablet Take 1 tablet (1 mg total) by mouth once daily.    ketoconazole (NIZORAL) 2 % cream     mupirocin (BACTROBAN) 2 % ointment apply to affected area twice a day for 10 days FOR SORES ON SKIN    triamcinolone acetonide 0.1% (KENALOG) 0.1 % cream Apply topically 2 (two) times daily.     Current Facility-Administered Medications   Medication    denosumab (PROLIA) injection 60 mg     Current Outpatient Medications on File Prior to Visit   Medication Sig    albuterol 90 mcg/actuation inhaler Inhale 2 puffs into the lungs every 6 (six) hours as needed for Wheezing. Rescue    amLODIPine (NORVASC) 10 MG tablet take 1 tablet by mouth once daily    atorvastatin (LIPITOR) 80 MG tablet Take 1 tablet (80 mg total) by mouth once daily.    cholecalciferol, vitamin D3, 50,000 unit capsule Take 1 capsule (50,000 Units total) by mouth every 7 days.    cilostazol (PLETAL) 100 MG Tab Take 1 tablet (100 mg total) by mouth 2 (two) times daily. (Patient taking differently: Take 100 mg by mouth once daily at 6am. )    clopidogrel (PLAVIX) 75 mg tablet TAKE 1 TABLET BY MOUTH DAILY    fluticasone-salmeterol 100-50 mcg/dose (ADVAIR) 100-50 mcg/dose diskus inhaler Inhale 1 puff into the lungs 2 (two) times daily. Controller    hydrocodone-acetaminophen 10-325mg (NORCO)  mg Tab 1 tablet 2 (two) times daily as needed.    hydrOXYzine HCl (ATARAX) 25 MG tablet take 1 tablet by mouth three times a day if needed for anxiety    levothyroxine (SYNTHROID) 137 MCG Tab tablet take 1 tablet by mouth once daily before BREAKFAST     NEXIUM 40 mg capsule take 1 capsule by mouth once daily BEFORE BREAKFAST    predniSONE (DELTASONE) 2.5 MG tablet Take 1 tablet (2.5 mg total) by mouth once daily.    sulfaSALAzine (AZULFIDINE) 500 MG TbEC Take 2 tablets (1,000 mg total) by mouth 2 (two) times daily. Start 1 tablet twice daily x week then increase to 2 tabs twice daily    tiZANidine (ZANAFLEX) 4 MG tablet Take 1 tablet (4 mg total) by mouth every 8 (eight) hours.    traZODone (DESYREL) 50 MG tablet take 1 tablet by mouth once daily AT NIGHT if needed for insomnia    valsartan (DIOVAN) 320 MG tablet take 1 tablet by mouth once daily    ZETIA 10 mg tablet take 1 tablet by mouth once daily    ammonium lactate (LAC-HYDRIN) 12 % lotion     ammonium lactate 12 % Crea Apply 1 application topically once daily.    ciprofloxacin HCl (CILOXAN) 0.3 % ophthalmic solution instill 1 drop into both eyes every 2 hours FROM NOON UNTIL 8:00 ...  (REFER TO PRESCRIPTION NOTES).    DUREZOL 0.05 % Drop ophthalmic solution instill 1 drop into both eyes four times a day AFTER SURGERY FOR 14 DAYS    folic acid (FOLVITE) 1 MG tablet Take 1 tablet (1 mg total) by mouth once daily.    ketoconazole (NIZORAL) 2 % cream     mupirocin (BACTROBAN) 2 % ointment apply to affected area twice a day for 10 days FOR SORES ON SKIN    triamcinolone acetonide 0.1% (KENALOG) 0.1 % cream Apply topically 2 (two) times daily.     Current Facility-Administered Medications on File Prior to Visit   Medication    denosumab (PROLIA) injection 60 mg     Review of patient's allergies indicates:  No Known Allergies  Review of Systems   Constitution: Negative for diaphoresis, weakness, malaise/fatigue and weight gain.   HENT: Negative for hoarse voice.    Eyes: Negative for double vision and visual disturbance.   Cardiovascular: Negative for chest pain, claudication, cyanosis, dyspnea on exertion, irregular heartbeat, leg swelling, near-syncope, orthopnea, palpitations, paroxysmal  nocturnal dyspnea and syncope.   Respiratory: Negative for cough, hemoptysis, shortness of breath and snoring.    Hematologic/Lymphatic: Negative for bleeding problem. Does not bruise/bleed easily.   Skin: Negative for color change and poor wound healing.   Musculoskeletal: Positive for arthritis, back pain and joint pain. Negative for muscle cramps, muscle weakness and myalgias.   Gastrointestinal: Negative for bloating, abdominal pain, change in bowel habit, diarrhea, heartburn, hematemesis, hematochezia, melena and nausea.   Neurological: Negative for excessive daytime sleepiness, dizziness, headaches, light-headedness, loss of balance and numbness.   Psychiatric/Behavioral: Negative for memory loss. The patient does not have insomnia.    Allergic/Immunologic: Negative for hives.       Objective:   Physical Exam   Constitutional: She is oriented to person, place, and time. She appears well-developed and well-nourished. She does not appear ill. No distress.   HENT:   Head: Normocephalic and atraumatic.   Eyes: EOM are normal. Pupils are equal, round, and reactive to light. No scleral icterus.   Neck: Normal range of motion. Neck supple. Normal carotid pulses, no hepatojugular reflux and no JVD present. Carotid bruit is not present. No tracheal deviation present. No thyromegaly present.   Cardiovascular: Normal rate, regular rhythm and normal heart sounds. Exam reveals no gallop and no friction rub.   No murmur heard.  Pulses:       Carotid pulses are 2+ on the right side with bruit, and 2+ on the left side with bruit.       Radial pulses are 2+ on the right side, and 2+ on the left side.        Femoral pulses are 2+ on the right side with bruit, and 2+ on the left side with bruit.       Popliteal pulses are 2+ on the right side, and 1+ on the left side.        Dorsalis pedis pulses are 1+ on the right side, and 0 on the left side.        Posterior tibial pulses are 1+ on the right side, and 0 on the left side.  "  Scars femoral surgery well healed.    Pulmonary/Chest: Effort normal and breath sounds normal. No respiratory distress. She has no wheezes. She has no rhonchi. She has no rales. She exhibits no tenderness.   Scar cabg well healed   Abdominal: Soft. Normal appearance, normal aorta and bowel sounds are normal. She exhibits no distension, no abdominal bruit, no ascites and no pulsatile midline mass. There is no hepatomegaly. There is no tenderness.   Obese  Scar abdominal surgery well healed.    Musculoskeletal: She exhibits no edema.        Right shoulder: She exhibits no deformity.   Neurological: She is alert and oriented to person, place, and time. She has normal strength. No cranial nerve deficit. Coordination normal.   Skin: Skin is warm and dry. No rash noted. She is not diaphoretic. No cyanosis or erythema. Nails show no clubbing.   Scarring in both feet and hands from discoid lupus no drainage no gangrene. Healed callouses noted.   Psychiatric: She has a normal mood and affect. Her speech is normal and behavior is normal.   Nursing note and vitals reviewed.    Vitals:    11/01/18 1130   BP: 134/78   Patient Position: Sitting   BP Method: Small (Manual)   Pulse: 64   Weight: 93.7 kg (206 lb 9.1 oz)   Height: 5' 6" (1.676 m)     Lab Results   Component Value Date    CHOL 213 (H) 10/25/2018    CHOL 144 08/04/2017    CHOL 165 09/14/2016     Lab Results   Component Value Date    HDL 53 10/25/2018    HDL 49 08/04/2017    HDL 51 09/14/2016     Lab Results   Component Value Date    LDLCALC 141.8 10/25/2018    LDLCALC 80.0 08/04/2017    LDLCALC 93.0 09/14/2016     Lab Results   Component Value Date    TRIG 91 10/25/2018    TRIG 75 08/04/2017    TRIG 105 09/14/2016     Lab Results   Component Value Date    CHOLHDL 24.9 10/25/2018    CHOLHDL 34.0 08/04/2017    CHOLHDL 30.9 09/14/2016       Chemistry        Component Value Date/Time     10/04/2018 0933    K 4.2 10/04/2018 0933     10/04/2018 0933    CO2 27 " 10/04/2018 0933    BUN 13 10/04/2018 0933    CREATININE 0.8 10/04/2018 0933     10/04/2018 0933        Component Value Date/Time    CALCIUM 9.6 10/04/2018 0933    ALKPHOS 185 (H) 10/04/2018 0933    AST 16 10/04/2018 0933    ALT 15 10/04/2018 0933    BILITOT 0.6 10/04/2018 0933    ESTGFRAFRICA >60 10/04/2018 0933    EGFRNONAA >60 10/04/2018 0933          Lab Results   Component Value Date    TSH 3.113 10/17/2016     Lab Results   Component Value Date    INR 1.0 04/28/2017    INR 0.9 07/26/2007     Lab Results   Component Value Date    WBC 7.24 06/01/2018    HGB 14.8 06/01/2018    HCT 43.8 06/01/2018    MCV 94 06/01/2018     06/01/2018     BMP  Sodium   Date Value Ref Range Status   10/04/2018 143 136 - 145 mmol/L Final     Potassium   Date Value Ref Range Status   10/04/2018 4.2 3.5 - 5.1 mmol/L Final     Chloride   Date Value Ref Range Status   10/04/2018 105 95 - 110 mmol/L Final     CO2   Date Value Ref Range Status   10/04/2018 27 23 - 29 mmol/L Final     BUN, Bld   Date Value Ref Range Status   10/04/2018 13 8 - 23 mg/dL Final     Creatinine   Date Value Ref Range Status   10/04/2018 0.8 0.5 - 1.4 mg/dL Final     Calcium   Date Value Ref Range Status   10/04/2018 9.6 8.7 - 10.5 mg/dL Final     Anion Gap   Date Value Ref Range Status   10/04/2018 11 8 - 16 mmol/L Final     eGFR if    Date Value Ref Range Status   10/04/2018 >60 >60 mL/min/1.73 m^2 Final     eGFR if non    Date Value Ref Range Status   10/04/2018 >60 >60 mL/min/1.73 m^2 Final     Comment:     Calculation used to obtain the estimated glomerular filtration  rate (eGFR) is the CKD-EPI equation.        CrCl cannot be calculated (Patient's most recent lab result is older than the maximum 7 days allowed.).    Assessment:     1. CAD: Hx of CABG    2. Hyperlipidemia LDL goal <70    3. Hypertension goal BP (blood pressure) < 140/90    4. Hypothyroidism, unspecified type    5. Peripheral vascular disease    6.  Osteoporosis, unspecified osteoporosis type, unspecified pathological fracture presence    7. Atherosclerotic PVD with intermittent claudication    8. Bilateral carotid artery stenosis    9. Discoid lupus erythematosus    10. Rheumatoid arthritis involving both feet with positive rheumatoid factor    11. Chronic obstructive pulmonary disease, unspecified COPD type      satble clinically needs smoking cessation diet compliance and exercise     Plan:     Continue current therapy  Cardiac low salt diet.  Risk factor modification and excercise program.  Smoking cessation counseling  F/u in 6 months with lipid cmp

## 2018-11-05 ENCOUNTER — TELEPHONE (OUTPATIENT)
Dept: RHEUMATOLOGY | Facility: CLINIC | Age: 73
End: 2018-11-05

## 2018-11-05 RX ORDER — LEVOTHYROXINE SODIUM 137 UG/1
TABLET ORAL
Qty: 90 TABLET | Refills: 1 | Status: SHIPPED | OUTPATIENT
Start: 2018-11-05 | End: 2019-10-22 | Stop reason: SDUPTHER

## 2018-11-05 NOTE — TELEPHONE ENCOUNTER
----- Message from Emelia Mendoza sent at 11/5/2018  1:48 PM CST -----  Contact: self/[phone]  Would like to consult with nurse regarding a bench for the tub, patient states nurse was sending out a prescription but she has not receive it. Please call back at [phone] . Thanks/ar

## 2018-11-05 NOTE — TELEPHONE ENCOUNTER
Returned pt call told pt that the order for her tub bench is in the mail to be sent out. Pt verbalized understanding.

## 2018-11-12 ENCOUNTER — TELEPHONE (OUTPATIENT)
Dept: RHEUMATOLOGY | Facility: CLINIC | Age: 73
End: 2018-11-12

## 2018-11-12 NOTE — TELEPHONE ENCOUNTER
Spoke with Ms. Tutu who states that Medicare will not cover the bath and shower chair she requested a referral for. She states that the chair itself is $45 but if the back is included it will cost $55. I informed patient to contact Medicare to see if they cover the bath and shower chairs and if so, find out which chair and company that sells that chair. Ms. Tutu states understanding.

## 2018-11-12 NOTE — TELEPHONE ENCOUNTER
----- Message from Irish Zuniga sent at 11/12/2018 12:42 PM CST -----  Contact: pt  Calling in regards to please give her a call back and please advise. [phone] (home)

## 2018-11-12 NOTE — TELEPHONE ENCOUNTER
Returned pt call pt was asking where to get her chair from I told her she can try like [employer] or [employer] etc.. Or she can go to A [employer] company and get it pt verbalized understanidng.

## 2018-11-12 NOTE — TELEPHONE ENCOUNTER
----- Message from Raudel Haq sent at 11/12/2018 11:42 AM CST -----  Contact: Pt   States she's calling to get a bath chair to go into the bathtub and there is no where for her to pick it up or is it going to be delivered to her and can be reached at [phone]//tram/nicole

## 2018-11-15 ENCOUNTER — OFFICE VISIT (OUTPATIENT)
Dept: RHEUMATOLOGY | Facility: CLINIC | Age: 73
End: 2018-11-15
Payer: MEDICARE

## 2018-11-15 ENCOUNTER — LAB VISIT (OUTPATIENT)
Dept: LAB | Facility: HOSPITAL | Age: 73
End: 2018-11-15
Attending: INTERNAL MEDICINE
Payer: MEDICARE

## 2018-11-15 VITALS
HEART RATE: 75 BPM | SYSTOLIC BLOOD PRESSURE: 165 MMHG | DIASTOLIC BLOOD PRESSURE: 90 MMHG | WEIGHT: 205.25 LBS | BODY MASS INDEX: 32.99 KG/M2 | HEIGHT: 66 IN

## 2018-11-15 DIAGNOSIS — L93.0 DISCOID LUPUS ERYTHEMATOSUS: ICD-10-CM

## 2018-11-15 DIAGNOSIS — E55.9 VITAMIN D DEFICIENCY: ICD-10-CM

## 2018-11-15 DIAGNOSIS — L43.9 LICHEN PLANUS: ICD-10-CM

## 2018-11-15 DIAGNOSIS — M81.0 AGE-RELATED OSTEOPOROSIS WITHOUT CURRENT PATHOLOGICAL FRACTURE: ICD-10-CM

## 2018-11-15 DIAGNOSIS — M05.742 RHEUMATOID ARTHRITIS INVOLVING BOTH HANDS WITH POSITIVE RHEUMATOID FACTOR: Primary | ICD-10-CM

## 2018-11-15 DIAGNOSIS — M05.741 RHEUMATOID ARTHRITIS INVOLVING BOTH HANDS WITH POSITIVE RHEUMATOID FACTOR: Primary | ICD-10-CM

## 2018-11-15 LAB
25(OH)D3+25(OH)D2 SERPL-MCNC: 20 NG/ML
ALBUMIN SERPL BCP-MCNC: 3.6 G/DL
ALP SERPL-CCNC: 176 U/L
ALT SERPL W/O P-5'-P-CCNC: 19 U/L
ANION GAP SERPL CALC-SCNC: 6 MMOL/L
AST SERPL-CCNC: 20 U/L
BILIRUB SERPL-MCNC: 0.4 MG/DL
BUN SERPL-MCNC: 15 MG/DL
CALCIUM SERPL-MCNC: 9.8 MG/DL
CHLORIDE SERPL-SCNC: 104 MMOL/L
CO2 SERPL-SCNC: 31 MMOL/L
CREAT SERPL-MCNC: 0.9 MG/DL
EST. GFR  (AFRICAN AMERICAN): >60 ML/MIN/1.73 M^2
EST. GFR  (NON AFRICAN AMERICAN): >60 ML/MIN/1.73 M^2
GLUCOSE SERPL-MCNC: 117 MG/DL
POTASSIUM SERPL-SCNC: 4.1 MMOL/L
PROT SERPL-MCNC: 7 G/DL
PTH-INTACT SERPL-MCNC: 110 PG/ML
SODIUM SERPL-SCNC: 141 MMOL/L

## 2018-11-15 PROCEDURE — 82306 VITAMIN D 25 HYDROXY: CPT

## 2018-11-15 PROCEDURE — 80053 COMPREHEN METABOLIC PANEL: CPT | Mod: PO

## 2018-11-15 PROCEDURE — 99214 OFFICE O/P EST MOD 30 MIN: CPT | Mod: S$PBB,,, | Performed by: INTERNAL MEDICINE

## 2018-11-15 PROCEDURE — 83970 ASSAY OF PARATHORMONE: CPT

## 2018-11-15 PROCEDURE — 36415 COLL VENOUS BLD VENIPUNCTURE: CPT | Mod: PO

## 2018-11-15 PROCEDURE — 99999 PR PBB SHADOW E&M-EST. PATIENT-LVL III: CPT | Mod: PBBFAC,,, | Performed by: INTERNAL MEDICINE

## 2018-11-15 PROCEDURE — 99213 OFFICE O/P EST LOW 20 MIN: CPT | Mod: PBBFAC,PO | Performed by: INTERNAL MEDICINE

## 2018-11-15 NOTE — PROGRESS NOTES
CC:  Chief Complaint   Patient presents with    Rheumatoid Arthritis   . 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 Discoid  lupus and lichen planus overlap   Since last visit she is currently on sulfasalazine 1000 mg p.o. b.i.d..  She is tolerating it well  She was also noted to have low vitamin-D and started on 67076 units a week  She has been to dermatology for evaluation of skin lesions and the lesion on the left thigh which were thought to be lichen planus hypertrophicus   She was given topicals which she has not started yet    Plaquenil had to be stopped per Ophthalmology recommendations  Then she was started on methotrexate 10 mg a week with daily folic acid, she was advised to start methotrexate after resolution of warts  but she started without getting the warts treated  She however developed a rash on her face , while on methotrexate and stopped    She is currently on prednisone 2.5 mg daily, denies any joint pain or swelling  No other concerns at this point      Denies any fever, chills, weight loss, dysuria, oral ulcers, chest pain, shortness of breath    History  Looks like she was diagnosed with discoid lupus vs lichen planus at [facility], from review of records looks like she has been on prednisone, Plaquenil, methotrexate in the past   Looks  like the lesions were unresponsive to methotrexate and she was started on CellCept and later Imuran.  She had a rash on both CellCept and Imuran and they were discontinued  More recently she had a biopsy of right palm lesion by Dermatology which was suggestive of lichen planus , she is on topicals per Dermatology          Lab review:  Most recent labs reveal negative KYLE, normal ESR/CRP,     Initial visit  :  She has seen Dr URIARTE in the past   She is currently on plaquenil 200 mg bid   Last seen here in 10/2016   She is currently on plaquenil 200 mg bid , doing well   On it for > 10 years now   Denies any joint swelling'  No am stiffness   She was seen by opthalmologist Dr Gonsales , who recommended she be considered something else in place of plaquenil  Due to ongoing issues with vision   Rash inv face / hands/ forearms / feet , some on legs stable     She was seen by  in 2014 for Discoid lupus and was lost for follow up. In that visit , since she was having joint pains,  also checked for rheumatoid arthritis which came back highly positive for RF/CCP    Past Medical History:   Diagnosis Date    Acute coronary syndrome     Anxiety     Bilateral carotid artery stenosis 11/17/2015    Chronic pain     Coronary artery disease     GERD (gastroesophageal reflux disease)     Hyperlipidemia     Hypertension     Hypothyroidism     Low back pain     Lupus     Osteoporosis     Poor circulation     PVD (peripheral vascular disease)     S/P peripheral artery angioplasty 02/13/2014    Skin disease          Past Surgical History:   Procedure Laterality Date    AORTOGRAM WITH RUNOFF/PTA Right 5/2/2017    Performed by Christopher Cohen [facility] at [facility] CATH LAB    COLONOSCOPY N/A 1/4/2017    Procedure: COLONOSCOPY;  Surgeon: Sylvester Arboleda III, MD;  Location: [facility];  Service: Endoscopy;  Laterality: N/A;    COLONOSCOPY N/A 1/4/2017    Performed by Sylvester Abroleda III, MD at [facility] ENDO    CORONARY  ANGIOPLASTY      CORONARY ARTERY BYPASS GRAFT      HYSTERECTOMY      INCISION AND DRAINAGE (I&D), FOOT Left 5/5/2017    Per[facility]rell[facility] DPM at [facility] OR    OOPHORECTOMY      PTA-PERIPHERAL Right 5/2/2017    Performed by [facility] at [facility] CATH LAB    THYROIDECTOMY           Social History     Tobacco Use    Smoking status: Current Every Day Smoker     Packs/day: 0.25     Years: 40.00     Pack years: 10.00     Types: Cigarettes     Start date: 1961    Smokeless tobacco: Never Used   Substance Use Topics    Alcohol use: No    Drug use: No       Family History   Problem Relation Age of Onset    Melanoma Neg Hx     Psoriasis Neg Hx     Lupus Neg Hx     Eczema Neg Hx        Review of patient's allergies indicates:  No Known Allergies          Review of Systems:  Constitutional: Denies fever, chills. No recent weight changes.   Fatigue: no  Muscle weakness: no  Headaches: no new headaches  Eyes: No redness or dryness.  No recent visual changes.  ENT: Denies dry mouth. No oral or nasal ulcers.  Card: No chest pain.  Resp: No cough or sob.   Gastro: No nausea or vomiting.  No heartburn.  Constipation: no  Diarrhea: no  Genito:  No dysuria.  No genital ulcers.  Skin:per hpi   Raynauds:no  Neuro: No numbness / tingling.   Psych: No depression, anxiety  Endo:  no excess thirst.  Heme: no abnormal bleeding or bruising  Clots:none     OBJECTIVE:     Vital Signs   Vitals:    11/15/18 1029   BP: (!) 165/90   Pulse: 75     Physical Exam:  General Appearance:  NAD.   Gait: not favoring.  HEENT: PERRL.  Eyes not dry or injected.  No nasal ulcers.  Mouth not dry, no oral lesions.  Lymph: cervical, supraclavicular or axillary nodes: none abnormal   Cardio: no irregularity of S1 or S2.  No gallops or rubs.   Resp: Normal respiratory motion. Clear to auscultation bilaterally.   No abnormal chest conformation.  Abd: Soft, non-tender, nondistended.  No masses.   Skin: Head and neck,  and extremities  examined.   Dry scaly lesions on  palmar aspect of rt hand , dryness noted on left palm as well   Scaly hyperkeratotic lesion noted on the left thigh  Old scars on arms, feet , some on legs   Face - hyperpigmentation ,   Neuro: Ox3.   Cranial nerves II-XII grossly intact.   Sensation intact  in both distal LE and upper extremities to light touch.  Musculoskeletal Exam:    Right Side Rheumatological Exam     2,3 MCP fullness , no tenderness noted, ulnar deviation   Multiple dry scaly lesions/hyperkeratotic lesions  noted on the right palm      Others :  Hip: no synovitis   Ankle :no synovitis   Foot: edematous , with callouses on ventral aspect ,     Left Side Rheumatological Exam     2,3 MCP fullness , no tenderness noted    1 cm , dry ,scaly lesion noted on left thigh     Others :  Hip:no synovitis   Ankle :no synovitis   Foot: edematous     Spine :TTP lower lumbar region      Tender points:  Muscle strength:Equal and full in all mm groups of the upper and lower ext.    Results for NO JARVIS (MRN [id number]) as of 7/31/2018 12:54   Ref. Range 9/11/2014 13:46 10/26/2016 10:38   CCP Antibodies Latest Ref Range: <5.0 U/mL 194.4 (H) 387.2 (H)   Rheumatoid Factor Latest Ref Range: 0.0 - 15.0 IU/mL 56.0 (H) 123.0 (H)     KYLE - negative   CMP  Sodium   Date Value Ref Range Status   11/15/2018 141 136 - 145 mmol/L Final     Potassium   Date Value Ref Range Status   11/15/2018 4.1 3.5 - 5.1 mmol/L Final     Chloride   Date Value Ref Range Status   11/15/2018 104 95 - 110 mmol/L Final     CO2   Date Value Ref Range Status   11/15/2018 31 (H) 23 - 29 mmol/L Final     Glucose   Date Value Ref Range Status   11/15/2018 117 (H) 70 - 110 mg/dL Final     BUN, Bld   Date Value Ref Range Status   11/15/2018 15 8 - 23 mg/dL Final     Creatinine   Date Value Ref Range Status   11/15/2018 0.9 0.5 - 1.4 mg/dL Final     Calcium   Date Value Ref Range Status   11/15/2018 9.8 8.7 - 10.5 mg/dL Final     Total Protein   Date Value  Ref Range Status   11/15/2018 7.0 6.0 - 8.4 g/dL Final     Albumin   Date Value Ref Range Status   11/15/2018 3.6 3.5 - 5.2 g/dL Final     Total Bilirubin   Date Value Ref Range Status   11/15/2018 0.4 0.1 - 1.0 mg/dL Final     Comment:     For infants and newborns, interpretation of results should be based  on gestational age, weight and in agreement with clinical  observations.  Premature Infant recommended reference ranges:  Up to 24 hours.............<8.0 mg/dL  Up to 48 hours............<12.0 mg/dL  3-5 days..................<15.0 mg/dL  6-29 days.................<15.0 mg/dL       Alkaline Phosphatase   Date Value Ref Range Status   11/15/2018 176 (H) 55 - 135 U/L Final     AST   Date Value Ref Range Status   11/15/2018 20 10 - 40 U/L Final     ALT   Date Value Ref Range Status   11/15/2018 19 10 - 44 U/L Final     Anion Gap   Date Value Ref Range Status   11/15/2018 6 (L) 8 - 16 mmol/L Final     eGFR if    Date Value Ref Range Status   11/15/2018 >60 >60 mL/min/1.73 m^2 Final     eGFR if non    Date Value Ref Range Status   11/15/2018 >60 >60 mL/min/1.73 m^2 Final     Comment:     Calculation used to obtain the estimated glomerular filtration  rate (eGFR) is the CKD-EPI equation.        Imaging : x ray foot :  Mild calcaneal spurring. No acute fracture. No dislocation. Minimal degenerative change first metatarsophalangeal joint.    Notes reviewed  Other procedures:    ASSESSMENT/PLAN:     Rheumatoid arthritis involving both hands with positive rheumatoid factor    Age-related osteoporosis without current pathological fracture    Vitamin D deficiency    Lichen planus    Discoid lupus erythematosus    RA/ discoid lupus/lichen planus overlap :     Neg KYLE   + RF/ CCP      rash with Imuran, CellCept in the past, and more recently rash on methotrexate as well when it was restarted  Had been previously stable on Plaquenil 200 b.i.d., then on methotrexate 10 mg a week  Plaquenil had to  be discontinued per Ophthalmology recommendation  methotrexate to be stopped now due to worsening of facial rash    No synovitis noted today, stable on sulfasalazine  Continues sulfasalazine 1000 mg p.o. B.i.d.  Taper prednisone 2.5 mg q.o.d. for 1-2 weeks then stop      H/o discoid lupus:  Do not see any lesions suggestive of discoid lupus at this point  KYLE negative  Normal complements and UA negative for protein  Will monitor for now off Plaquenil    Lichen planus involving right hand ,  left thigh    Osteoporosis  :  Deferred decision on Prolia again today  Stay on vitamin-D 86337 units a week    Multiple calluses in feet:  Follow-up Podiatry    Chronic mild elevation in alkaline phosphatase:  Monitor     4 month return with all 4   Went over uptodate information /literature on the meds prescribed today   Steroids- weight gain, cataracts, worsening bone mineral density discussed  Sulfasalazine risk profile discussed, hold during infection or prior to surgery    Disclaimer: This note was prepared using voice recognition system and is likely to have sound alike errors and is not proof read.  Please call me with any questions.

## 2018-11-20 RX ORDER — AMLODIPINE BESYLATE 10 MG/1
10 TABLET ORAL DAILY
Qty: 90 TABLET | Refills: 3 | Status: SHIPPED | OUTPATIENT
Start: 2018-11-20 | End: 2019-10-19 | Stop reason: SDUPTHER

## 2018-12-12 ENCOUNTER — TELEPHONE (OUTPATIENT)
Dept: RHEUMATOLOGY | Facility: CLINIC | Age: 73
End: 2018-12-12

## 2018-12-12 NOTE — TELEPHONE ENCOUNTER
Patient called to see if she can take her oral Prednisone and her prednisone eye drops.  Advised patient per Dr. URIARTE since she is only taking 2.5 oral Prednisone she will be ok to take eye drops as well.  Patient verbalized understanding.

## 2018-12-12 NOTE — TELEPHONE ENCOUNTER
----- Message from Irish Zuniga sent at 12/12/2018  2:21 PM CST -----  Contact: pt   Calling in regards to please give her a call back and please advise. [phone] (home)

## 2018-12-17 ENCOUNTER — TELEPHONE (OUTPATIENT)
Dept: INTERNAL MEDICINE | Facility: CLINIC | Age: 73
End: 2018-12-17

## 2018-12-17 NOTE — TELEPHONE ENCOUNTER
----- Message from Emelia Mendoza sent at 12/17/2018  2:55 PM CST -----  Contact: self/[phone]  Would like to consult with nurse regarding personal issues, please call back at [phone]. Thanks/ar

## 2018-12-21 RX ORDER — PREDNISONE 2.5 MG/1
2.5 TABLET ORAL DAILY
Qty: 60 TABLET | Refills: 1 | Status: SHIPPED | OUTPATIENT
Start: 2018-12-21 | End: 2019-05-02 | Stop reason: ALTCHOICE

## 2019-01-07 ENCOUNTER — TELEPHONE (OUTPATIENT)
Dept: INTERNAL MEDICINE | Facility: CLINIC | Age: 74
End: 2019-01-07

## 2019-01-07 NOTE — TELEPHONE ENCOUNTER
----- Message from Aye Nava sent at 1/7/2019  2:35 PM CST -----  Contact: pt  Pt stated she will like a call back she has a question, she can be reached at [phone] Thanks

## 2019-01-07 NOTE — TELEPHONE ENCOUNTER
Spoke with pt about what was going on, states she is depress and needed to see someone today or tomorrow  to see if they can prescribe her something for her symptoms

## 2019-01-08 ENCOUNTER — OFFICE VISIT (OUTPATIENT)
Dept: INTERNAL MEDICINE | Facility: CLINIC | Age: 74
End: 2019-01-08
Payer: MEDICARE

## 2019-01-08 VITALS
DIASTOLIC BLOOD PRESSURE: 74 MMHG | BODY MASS INDEX: 31.82 KG/M2 | OXYGEN SATURATION: 97 % | HEIGHT: 66 IN | HEART RATE: 88 BPM | TEMPERATURE: 100 F | WEIGHT: 198 LBS | SYSTOLIC BLOOD PRESSURE: 126 MMHG

## 2019-01-08 DIAGNOSIS — F32.A DEPRESSION, UNSPECIFIED DEPRESSION TYPE: Primary | ICD-10-CM

## 2019-01-08 PROCEDURE — 99214 PR OFFICE/OUTPT VISIT, EST, LEVL IV, 30-39 MIN: ICD-10-PCS | Mod: S$PBB,,, | Performed by: PHYSICIAN ASSISTANT

## 2019-01-08 PROCEDURE — 99214 OFFICE O/P EST MOD 30 MIN: CPT | Mod: S$PBB,,, | Performed by: PHYSICIAN ASSISTANT

## 2019-01-08 PROCEDURE — 99999 PR PBB SHADOW E&M-EST. PATIENT-LVL V: ICD-10-PCS | Mod: PBBFAC,,, | Performed by: PHYSICIAN ASSISTANT

## 2019-01-08 PROCEDURE — 99999 PR PBB SHADOW E&M-EST. PATIENT-LVL V: CPT | Mod: PBBFAC,,, | Performed by: PHYSICIAN ASSISTANT

## 2019-01-08 PROCEDURE — 99215 OFFICE O/P EST HI 40 MIN: CPT | Mod: PBBFAC | Performed by: PHYSICIAN ASSISTANT

## 2019-01-08 RX ORDER — LEVOCETIRIZINE DIHYDROCHLORIDE 5 MG/1
TABLET, FILM COATED ORAL
Qty: 90 TABLET | Refills: 0 | Status: SHIPPED | OUTPATIENT
Start: 2019-01-08 | End: 2019-01-10 | Stop reason: SDUPTHER

## 2019-01-08 RX ORDER — PROMETHAZINE HYDROCHLORIDE AND DEXTROMETHORPHAN HYDROBROMIDE 6.25; 15 MG/5ML; MG/5ML
5 SYRUP ORAL 3 TIMES DAILY PRN
Qty: 240 ML | Refills: 0 | Status: SHIPPED | OUTPATIENT
Start: 2019-01-08 | End: 2019-01-18

## 2019-01-08 RX ORDER — BUPROPION HYDROCHLORIDE 150 MG/1
150 TABLET, EXTENDED RELEASE ORAL 2 TIMES DAILY
Qty: 60 TABLET | Refills: 11 | Status: SHIPPED | OUTPATIENT
Start: 2019-01-08 | End: 2019-08-09

## 2019-01-08 RX ORDER — LEVOCETIRIZINE DIHYDROCHLORIDE 5 MG/1
5 TABLET, FILM COATED ORAL NIGHTLY
Qty: 30 TABLET | Refills: 0 | Status: SHIPPED | OUTPATIENT
Start: 2019-01-08 | End: 2019-01-08 | Stop reason: SDUPTHER

## 2019-01-08 NOTE — PROGRESS NOTES
Subjective:       Patient ID: Flory Cam is a 73 y.o. female.    Chief Complaint: Depression (PCP - Vasquez)    Depression   Visit Type: initial  Onset of symptoms: more than 6 months ago  Progression since onset: gradually worsening  Patient presents with the following symptoms: decreased concentration, depressed mood, excessive worry, insomnia, irritability and nervousness/anxiety.  Patient is not experiencing: confusion, shortness of breath and suicidal ideas.  Frequency of symptoms: most days   Severity: causing significant distress   Aggravated by: family issues  Sleep quality: poor  Patient has a history of: chronic lung disease and depression      Past Medical History:   Diagnosis Date    Acute coronary syndrome     Anxiety     Bilateral carotid artery stenosis 11/17/2015    Chronic pain     Coronary artery disease     GERD (gastroesophageal reflux disease)     Hyperlipidemia     Hypertension     Hypothyroidism     Low back pain     Lupus     Osteoporosis     Poor circulation     PVD (peripheral vascular disease)     S/P peripheral artery angioplasty 02/13/2014    Skin disease      Review of Systems   Constitutional: Positive for irritability. Negative for chills, fatigue and fever.   HENT: Positive for congestion, postnasal drip and sore throat.    Respiratory: Negative for chest tightness and shortness of breath.    Cardiovascular: Negative for chest pain.   Gastrointestinal: Negative for abdominal pain.   Psychiatric/Behavioral: Positive for decreased concentration, depression, dysphoric mood and sleep disturbance. Negative for agitation, behavioral problems, confusion, hallucinations, self-injury and suicidal ideas. The patient is nervous/anxious and has insomnia. The patient is not hyperactive.        Objective:      Physical Exam   Constitutional: She appears well-developed and well-nourished. No distress.   HENT:   Head: Normocephalic and atraumatic.   Neck: Neck supple.    Cardiovascular: Normal rate and regular rhythm. Exam reveals no gallop and no friction rub.   No murmur heard.  Pulmonary/Chest: Effort normal and breath sounds normal. No stridor. No respiratory distress. She has no wheezes. She has no rales. She exhibits no tenderness.   Abdominal: Soft. There is no tenderness.   Lymphadenopathy:     She has no cervical adenopathy.   Skin: She is not diaphoretic.   Nursing note and vitals reviewed.      Assessment:       1. Depression, unspecified depression type      uri  Plan:       Depression, unspecified depression type    buPROPion (WELLBUTRIN SR) 150 MG TBSR 12 hr tablet; Take 1 tablet (150 mg total) by mouth 2 (two) times daily.  Dispense: 60 tablet; Refill: 11    Other orders for URI  -   -     levocetirizine (XYZAL) 5 MG tablet; Take 1 tablet (5 mg total) by mouth every evening.  Dispense: 30 tablet; Refill: 0  -     promethazine-dextromethorphan (PROMETHAZINE-DM) 6.25-15 mg/5 mL Syrp; Take 5 mLs by mouth 3 (three) times daily as needed.  Dispense: 240 mL; Refill: 0

## 2019-01-08 NOTE — PATIENT INSTRUCTIONS
Depression  Depression is one of the most common mental health problems today. It is not just a state of unhappiness or sadness. It is a true disease. The cause seems to be related to a decrease in chemicals that transmit signals in the brain. Having a family history of depression, alcoholism, or suicide increases the risk. Chronic illness, chronic pain, migraine headaches and high emotional stress also increase the risk.  Depression is something we tend to recognize in others, but may have a hard time seeing in ourselves. It can show in many physical and emotional ways:  · Loss of appetite  · Over-eating  · Not being able to sleep  · Sleeping too much  · Tiredness not related to physical exertion  · Restlessness or irritability  · Slowness of movement or speech  · Feeling depressed or withdrawn  · Loss of interest in things you once enjoyed  · Trouble concentrating, poor memory, trouble making decisions  · Thoughts of harming or killing oneself, or thoughts that life is not worth living  · Low self-esteem  The treatment for depression may include both medicine and psychotherapy. Antidepressants can reduce suffering and can improve the ability to function during the depressed period. Therapy can offer emotional support and help you understand emotional factors that may be causing the depression.  Home care  · On-going care and support helps people manage this disease.  Find a healthcare provider and therapist who meet your needs. Seek help when you feel like you may be getting ill.  · Be kind to yourself. Make it a point to do things that you enjoy (gardening, walking in nature, going to a movie, etc.). Reward yourself for small successes.  · Take care of your physical body. Eat a balanced diet (low in saturated fat and high in fruits and vegetables). Exercise at least 3 times a week for 30 minutes. Even mild-moderate exercise (like brisk walking) can make you feel better.  · Avoid alcohol, which can make  depression worse.  · Take medicine as prescribed.  · Tell each of your healthcare providers about all of the prescription drugs, over-the-counter medicines, vitamins, and supplements you take. Certain supplements interact with medicines and can result in dangerous side effects. Ask your pharmacist when you have questions about drug interactions.  · Talk with your family and trusted friends about your feelings and thoughts. Ask them to help you recognize behavior changes early so you can get help and, if needed, medicine can be adjusted.  Follow-up care  Follow up with your healthcare provider, or as advised.  Call 911  Call 911 if you:  · Have suicidal thoughts, a suicide plan, and the means to carry out the plan  · Have trouble breathing  · Are very confused  · Feel very drowsy or have trouble awakening  · Faint or lose consciousness  · Have new chest pain that becomes more severe, lasts longer, or spreads into your shoulder, arm, neck, jaw or back  When to seek medical advice  Call your healthcare provider right away if any of these occur:  · Feeling extreme depression, fear, anxiety, or anger toward yourself or others  · Feeling out of control  · Feeling that you may try to harm yourself or another  · Hearing voices that others do not hear  · Seeing things that others do not see  · Cant sleep or eat for 3 days in a row  · Friends or family express concern over your behavior and ask you to seek help  Date Last Reviewed: 9/29/2015  © 3215-1898 [employer]. [address] [place] [address], [place] [address]. All rights reserved. This information is not intended as a substitute for professional medical care. Always follow your healthcare professional's instructions.

## 2019-01-10 RX ORDER — LEVOCETIRIZINE DIHYDROCHLORIDE 5 MG/1
5 TABLET, FILM COATED ORAL NIGHTLY
Qty: 90 TABLET | Refills: 0 | Status: SHIPPED | OUTPATIENT
Start: 2019-01-10 | End: 2019-08-09

## 2019-01-20 ENCOUNTER — NURSE TRIAGE (OUTPATIENT)
Dept: ADMINISTRATIVE | Facility: CLINIC | Age: 74
End: 2019-01-20

## 2019-01-20 RX ORDER — ONDANSETRON 4 MG/1
4 TABLET, ORALLY DISINTEGRATING ORAL EVERY 8 HOURS PRN
Qty: 15 TABLET | Refills: 0 | Status: SHIPPED | OUTPATIENT
Start: 2019-01-20 | End: 2019-07-11 | Stop reason: SDUPTHER

## 2019-01-20 RX ORDER — ONDANSETRON 4 MG/1
4 TABLET, ORALLY DISINTEGRATING ORAL EVERY 8 HOURS PRN
Qty: 1 TABLET | Refills: 0 | Status: SHIPPED | OUTPATIENT
Start: 2019-01-20 | End: 2019-01-20

## 2019-01-20 NOTE — TELEPHONE ENCOUNTER
"  Reason for Disposition   [1] MODERATE vomiting (e.g., 3 - 5 times/day) AND [2] age > 60    Answer Assessment - Initial Assessment Questions  1. VOMITING SEVERITY: "How many times have you vomited in the past 24 hours?"      - MILD:  1 - 2 times/day     - MODERATE: 3 - 5 times/day, decreased oral intake without significant weight loss or symptoms of dehydration     - SEVERE: 6 or more times/day, vomits everything or nearly everything, with significant weight loss, symptoms of dehydration      Vx2, Dx 2, last uop clear yellow, cramping before stool   2. ONSET: "When did the vomiting begin?"      This am   3. FLUIDS: "What fluids or food have you vomited up today?" "Have you been able to keep any fluids down?"     Tea and water -16-20 oz   4. ABDOMINAL PAIN: "Are your having any abdominal pain?" If yes : "How bad is it and what does it feel like?" (e.g., crampy, dull, intermittent, constant)      Cramping   5. DIARRHEA: "Is there any diarrhea?" If so, ask: "How many times today?"      dx2   6. CONTACTS: "Is there anyone else in the family with the same symptoms?"      Grandson   7. CAUSE: "What do you think is causing your vomiting?"     Virus   8. HYDRATION STATUS: "Any signs of dehydration?" (e.g., dry mouth [not only dry lips], too weak to stand) "When did you last urinate?"     Mouth a little dry   9. OTHER SYMPTOMS: "Do you have any other symptoms?" (e.g., fever, headache, vertigo, vomiting blood or coffee grounds)    Afeb, no HA, no rash, no stiff neck , not too weak to stand    Protocols used: ST VOMITING-A-  Pt called re nausea, diarrhea, vomiting, cramping. Pt wants meds calling in   Grandson ill yest   Pharm: [facility]   Spoke with dr Mayra Bosch ODT 4 mg q 8 hours prn N/V #15 rec to stay Hydrated, no Imodium at present time. ED warning given. Call back with questions.   "

## 2019-02-05 ENCOUNTER — OFFICE VISIT (OUTPATIENT)
Dept: INTERNAL MEDICINE | Facility: CLINIC | Age: 74
End: 2019-02-05
Payer: MEDICARE

## 2019-02-05 ENCOUNTER — LAB VISIT (OUTPATIENT)
Dept: LAB | Facility: HOSPITAL | Age: 74
End: 2019-02-05
Payer: MEDICARE

## 2019-02-05 VITALS
DIASTOLIC BLOOD PRESSURE: 70 MMHG | BODY MASS INDEX: 31.5 KG/M2 | OXYGEN SATURATION: 97 % | HEART RATE: 64 BPM | SYSTOLIC BLOOD PRESSURE: 142 MMHG | TEMPERATURE: 98 F | HEIGHT: 66 IN | WEIGHT: 196 LBS

## 2019-02-05 DIAGNOSIS — I10 HYPERTENSION GOAL BP (BLOOD PRESSURE) < 140/90: Chronic | ICD-10-CM

## 2019-02-05 DIAGNOSIS — F33.1 MODERATE RECURRENT MAJOR DEPRESSION: Primary | ICD-10-CM

## 2019-02-05 DIAGNOSIS — E03.9 HYPOTHYROIDISM (ACQUIRED): ICD-10-CM

## 2019-02-05 DIAGNOSIS — M54.50 CHRONIC BILATERAL LOW BACK PAIN WITHOUT SCIATICA: ICD-10-CM

## 2019-02-05 DIAGNOSIS — G89.29 CHRONIC BILATERAL LOW BACK PAIN WITHOUT SCIATICA: ICD-10-CM

## 2019-02-05 LAB — TSH SERPL DL<=0.005 MIU/L-ACNC: 3.03 UIU/ML

## 2019-02-05 PROCEDURE — 99499 RISK ADDL DX/OHS AUDIT: ICD-10-PCS | Mod: ,,, | Performed by: INTERNAL MEDICINE

## 2019-02-05 PROCEDURE — 99214 OFFICE O/P EST MOD 30 MIN: CPT | Mod: S$PBB,,, | Performed by: INTERNAL MEDICINE

## 2019-02-05 PROCEDURE — 84443 ASSAY THYROID STIM HORMONE: CPT

## 2019-02-05 PROCEDURE — 99999 PR PBB SHADOW E&M-EST. PATIENT-LVL IV: CPT | Mod: PBBFAC,,, | Performed by: INTERNAL MEDICINE

## 2019-02-05 PROCEDURE — 99499 UNLISTED E&M SERVICE: CPT | Mod: ,,, | Performed by: INTERNAL MEDICINE

## 2019-02-05 PROCEDURE — 36415 COLL VENOUS BLD VENIPUNCTURE: CPT

## 2019-02-05 PROCEDURE — 99214 PR OFFICE/OUTPT VISIT, EST, LEVL IV, 30-39 MIN: ICD-10-PCS | Mod: S$PBB,,, | Performed by: INTERNAL MEDICINE

## 2019-02-05 PROCEDURE — 99499 UNLISTED E&M SERVICE: CPT | Mod: S$PBB,,, | Performed by: INTERNAL MEDICINE

## 2019-02-05 PROCEDURE — 99999 PR PBB SHADOW E&M-EST. PATIENT-LVL IV: ICD-10-PCS | Mod: PBBFAC,,, | Performed by: INTERNAL MEDICINE

## 2019-02-05 PROCEDURE — 99499 RISK ADDL DX/OHS AUDIT: ICD-10-PCS | Mod: S$PBB,,, | Performed by: INTERNAL MEDICINE

## 2019-02-05 PROCEDURE — 99214 OFFICE O/P EST MOD 30 MIN: CPT | Mod: PBBFAC | Performed by: INTERNAL MEDICINE

## 2019-02-06 NOTE — PROGRESS NOTES
Flory Soria Blauvelt  73 y.o. Black or  female    Chief Complaint   Patient presents with    Follow-up     HPI: Presents to the clinic to follow up on depression.   Depression--she was prescribed bupropion but she never took it. She does not want to take medication. She is interested in seeing a counselor/psychiatrist.   HTN--slightly elevated today. She has been compliant with amlodipine and valsartan. She denies symptoms.   Hypothyroidism--she has not had her thyroid checked in a while. She is compliant with levothyroxine.   She has chronic low back pain. She was seeing pain management but was dismissed because she was not taking her medication the way it was prescribed. She would like to see another pain management provider.     Past Medical History:   Diagnosis Date    Acute coronary syndrome     Anxiety     Bilateral carotid artery stenosis 11/17/2015    Chronic pain     Coronary artery disease     GERD (gastroesophageal reflux disease)     Hyperlipidemia     Hypertension     Hypothyroidism     Low back pain     Lupus     Osteoporosis     Poor circulation     PVD (peripheral vascular disease)     S/P peripheral artery angioplasty 02/13/2014    Skin disease        Current Outpatient Medications on File Prior to Visit   Medication Sig Dispense Refill    albuterol 90 mcg/actuation inhaler Inhale 2 puffs into the lungs every 6 (six) hours as needed for Wheezing. Rescue 1 Inhaler 1    amLODIPine (NORVASC) 10 MG tablet Take 1 tablet (10 mg total) by mouth once daily. 90 tablet 3    ammonium lactate (LAC-HYDRIN) 12 % lotion       ammonium lactate 12 % Crea Apply 1 application topically once daily. 140 g 1    atorvastatin (LIPITOR) 80 MG tablet Take 1 tablet (80 mg total) by mouth once daily. 90 tablet 1    buPROPion (WELLBUTRIN SR) 150 MG TBSR 12 hr tablet Take 1 tablet (150 mg total) by mouth 2 (two) times daily. 60 tablet 11    cholecalciferol, vitamin D3, 50,000 unit capsule  Take 1 capsule (50,000 Units total) by mouth every 7 days. 12 capsule 1    cilostazol (PLETAL) 100 MG Tab Take 1 tablet (100 mg total) by mouth 2 (two) times daily. (Patient taking differently: Take 100 mg by mouth once daily at 6am. ) 60 tablet 6    ciprofloxacin HCl (CILOXAN) 0.3 % ophthalmic solution instill 1 drop into both eyes every 2 hours FROM NOON UNTIL 8:00 ...  (REFER TO PRESCRIPTION NOTES).  0    clopidogrel (PLAVIX) 75 mg tablet TAKE 1 TABLET BY MOUTH DAILY 90 tablet 1    DUREZOL 0.05 % Drop ophthalmic solution instill 1 drop into both eyes four times a day AFTER SURGERY FOR 14 DAYS  0    folic acid (FOLVITE) 1 MG tablet Take 1 tablet (1 mg total) by mouth once daily. 90 tablet 3    hydrocodone-acetaminophen 10-325mg (NORCO)  mg Tab 1 tablet 2 (two) times daily as needed.  0    hydrOXYzine HCl (ATARAX) 25 MG tablet take 1 tablet by mouth three times a day if needed for anxiety 30 tablet 11    ketoconazole (NIZORAL) 2 % cream       levocetirizine (XYZAL) 5 MG tablet Take 1 tablet (5 mg total) by mouth every evening. ICD 10   J06.9 90 tablet 0    levothyroxine (SYNTHROID) 137 MCG Tab tablet TAKE 1 TABLET BY MOUTH ONCE DAILY BEFORE BREAKFAST 90 tablet 1    mupirocin (BACTROBAN) 2 % ointment apply to affected area twice a day for 10 days FOR SORES ON SKIN 22 g 1    NEXIUM 40 mg capsule take 1 capsule by mouth once daily BEFORE BREAKFAST 90 capsule 1    ondansetron (ZOFRAN-ODT) 4 MG TbDL Take 1 tablet (4 mg total) by mouth every 8 (eight) hours as needed. 15 tablet 0    predniSONE (DELTASONE) 2.5 MG tablet Take 1 tablet (2.5 mg total) by mouth once daily. 60 tablet 1    sulfaSALAzine (AZULFIDINE) 500 MG TbEC Take 2 tablets (1,000 mg total) by mouth 2 (two) times daily. Start 1 tablet twice daily x week then increase to 2 tabs twice daily 120 tablet 3    tiZANidine (ZANAFLEX) 4 MG tablet Take 1 tablet (4 mg total) by mouth every 8 (eight) hours. 180 tablet 0    traZODone (DESYREL) 50 MG  tablet take 1 tablet by mouth once daily AT NIGHT if needed for insomnia 30 tablet 3    valsartan (DIOVAN) 320 MG tablet take 1 tablet by mouth once daily 90 tablet 2    ZETIA 10 mg tablet take 1 tablet by mouth once daily 90 tablet 3    fluticasone-salmeterol 100-50 mcg/dose (ADVAIR) 100-50 mcg/dose diskus inhaler Inhale 1 puff into the lungs 2 (two) times daily. Controller 60 each 5    triamcinolone acetonide 0.1% (KENALOG) 0.1 % cream Apply topically 2 (two) times daily. 80 g 0     Current Facility-Administered Medications on File Prior to Visit   Medication Dose Route Frequency Provider Last Rate Last Dose    denosumab (PROLIA) injection 60 mg  60 mg Subcutaneous Q6 Months Yeimy Monteiro MD           Allergies:  Review of patient's allergies indicates:  No Known Allergies    ROS:  Denies headache, dizziness, chest pain or shortness of breath    PHYSICAL EXAM:  VITAL SIGNS: Reviewed  GENERAL: Alert and oriented, no acute distress  HEART: Regular rate   LUNGS: Respirations unlabored    ASSESSMENT/PLAN:  Flory was seen today for follow-up.    Diagnoses and all orders for this visit:    Moderate recurrent major depression  -     Ambulatory consult to Psychiatry    Hypertension goal BP (blood pressure) < 140/90  -     Lifestyle modifications discussed  -     Continue amlodipine and valsartan  -     Monitor     Hypothyroidism (acquired)  -     TSH; Standing    Chronic bilateral low back pain without sciatica  -     Ambulatory Referral to Pain Clinic    RTC in 2 weeks for b/p recheck.

## 2019-02-07 ENCOUNTER — TELEPHONE (OUTPATIENT)
Dept: INTERNAL MEDICINE | Facility: CLINIC | Age: 74
End: 2019-02-07

## 2019-02-07 NOTE — TELEPHONE ENCOUNTER
----- Message from Tayler Ovalle DO sent at 2/7/2019  2:05 PM CST -----  Notify patient TSH is normal. Continue current dose of levothyroxine.

## 2019-02-20 ENCOUNTER — TELEPHONE (OUTPATIENT)
Dept: DERMATOLOGY | Facility: CLINIC | Age: 74
End: 2019-02-20

## 2019-02-20 NOTE — TELEPHONE ENCOUNTER
Returned call to pt and rescheduled appt per pt request.  Pt verbalized understanding to all information given and had no further questions.

## 2019-02-20 NOTE — TELEPHONE ENCOUNTER
----- Message from Jt Dubois sent at 2/20/2019  9:15 AM CST -----  Contact: self  Type:  Needs Medical Advice    Who Called: pt  Symptoms (please be specific): n/a   How long has patient had these symptoms:  n/a  Pharmacy name and phone #:  n/a  Would the patient rather a call back or a response via [facility]? Call back  Best Call Back Number: [phone]  Additional Information: pt states transportation has not made it yet and she wants to know if she can be worked in today after she sees her podiatrist.      Thanks,  Jt Dubois

## 2019-02-25 ENCOUNTER — TELEPHONE (OUTPATIENT)
Dept: PODIATRY | Facility: CLINIC | Age: 74
End: 2019-02-25

## 2019-02-25 NOTE — TELEPHONE ENCOUNTER
Left message requesting call back.     ----- Message from Malka Bhatia sent at 2/25/2019 11:03 AM CST -----  Contact: pt  Please call pt. Would give no information.

## 2019-02-26 ENCOUNTER — OFFICE VISIT (OUTPATIENT)
Dept: PODIATRY | Facility: CLINIC | Age: 74
End: 2019-02-26
Payer: MEDICARE

## 2019-02-26 VITALS
HEART RATE: 76 BPM | SYSTOLIC BLOOD PRESSURE: 150 MMHG | WEIGHT: 192.88 LBS | HEIGHT: 66 IN | DIASTOLIC BLOOD PRESSURE: 85 MMHG | BODY MASS INDEX: 31 KG/M2

## 2019-02-26 DIAGNOSIS — L93.0 DISCOID LUPUS ERYTHEMATOSUS: ICD-10-CM

## 2019-02-26 DIAGNOSIS — L84 CORN OR CALLUS: ICD-10-CM

## 2019-02-26 DIAGNOSIS — I73.9 PERIPHERAL VASCULAR DISEASE: ICD-10-CM

## 2019-02-26 DIAGNOSIS — B35.1 DERMATOPHYTOSIS OF NAIL: Primary | ICD-10-CM

## 2019-02-26 PROCEDURE — 99213 OFFICE O/P EST LOW 20 MIN: CPT | Mod: PBBFAC,PN,25 | Performed by: PODIATRIST

## 2019-02-26 PROCEDURE — 11056 PARNG/CUTG B9 HYPRKR LES 2-4: CPT | Mod: Q8,PBBFAC,PN | Performed by: PODIATRIST

## 2019-02-26 PROCEDURE — 99999 PR PBB SHADOW E&M-EST. PATIENT-LVL III: ICD-10-PCS | Mod: PBBFAC,,, | Performed by: PODIATRIST

## 2019-02-26 PROCEDURE — 11056 PARNG/CUTG B9 HYPRKR LES 2-4: CPT | Mod: Q8,S$PBB,, | Performed by: PODIATRIST

## 2019-02-26 PROCEDURE — 99499 NO LOS: ICD-10-PCS | Mod: S$PBB,,, | Performed by: PODIATRIST

## 2019-02-26 PROCEDURE — 99499 UNLISTED E&M SERVICE: CPT | Mod: S$PBB,,, | Performed by: PODIATRIST

## 2019-02-26 PROCEDURE — 11721 DEBRIDE NAIL 6 OR MORE: CPT | Mod: Q8,PBBFAC,PN,59 | Performed by: PODIATRIST

## 2019-02-26 PROCEDURE — 11056 PR TRIM BENIGN HYPERKERATOTIC SKIN LESION,2-4: ICD-10-PCS | Mod: Q8,S$PBB,, | Performed by: PODIATRIST

## 2019-02-26 PROCEDURE — 11721 DEBRIDE NAIL 6 OR MORE: CPT | Mod: 59,Q8,S$PBB, | Performed by: PODIATRIST

## 2019-02-26 PROCEDURE — 11721 PR DEBRIDEMENT OF NAILS, 6 OR MORE: ICD-10-PCS | Mod: 59,Q8,S$PBB, | Performed by: PODIATRIST

## 2019-02-26 PROCEDURE — 99999 PR PBB SHADOW E&M-EST. PATIENT-LVL III: CPT | Mod: PBBFAC,,, | Performed by: PODIATRIST

## 2019-02-26 RX ORDER — UREA 40 %
CREAM (GRAM) TOPICAL 2 TIMES DAILY
Qty: 1 TUBE | Refills: 3 | Status: SHIPPED | OUTPATIENT
Start: 2019-02-26 | End: 2020-04-17

## 2019-02-26 RX ORDER — DICLOFENAC SODIUM 10 MG/G
2 GEL TOPICAL 2 TIMES DAILY
Qty: 60 G | Refills: 3 | Status: SHIPPED | OUTPATIENT
Start: 2019-02-26 | End: 2019-08-09

## 2019-02-26 NOTE — PROGRESS NOTES
PODIATRY NOTE    CHIEF COMPLAINT  Chief Complaint   Patient presents with    Routine Foot Care     [facility]   PCP: Dr. Ovalle, Last Visit: 2/5/19    HPI  SUBJECTIVE: Flory Cam is a 73 y.o. female w/ PMH of PVD, Lupus, hx of intermittent leg claudifcation- s/p vascular intervention with much improvement in symptoms and other medical problems who presents to clinic for high risk  foot exam and care.Patient admits to painful toenails and calluses aggravated by increased weight bearing, shoe gear, and pressure. States pain is relieved with routine debridements. She denies any N/V/F. Patient has no other pedal complaints at this time.    HgA1c:   Hemoglobin A1C   Date Value Ref Range Status   08/04/2017 5.8 (H) 4.0 - 5.6 % Final     Comment:     According to ADA guidelines, hemoglobin A1c <7.0% represents  optimal control in non-pregnant diabetic patients. Different  metrics may apply to specific patient populations.   Standards of Medical Care in Diabetes-2016.  For the purpose of screening for the presence of diabetes:  <5.7%     Consistent with the absence of diabetes  5.7-6.4%  Consistent with increasing risk for diabetes   (prediabetes)  >or=6.5%  Consistent with diabetes  Currently, no consensus exists for use of hemoglobin A1c  for diagnosis of diabetes for children.  This Hemoglobin A1c assay has significant interference with fetal   hemoglobin   (HbF). The results are invalid for patients with abnormal amounts of   HbF,   including those with known Hereditary Persistence   of Fetal Hemoglobin. Heterozygous hemoglobin variants (HbAS, HbAC,   HbAD, HbAE, HbA2) do not significantly interfere with this assay;   however, presence of multiple variants in a sample may impact the %   interference.     09/14/2016 6.2 4.5 - 6.2 % Final     Comment:     According to ADA guidelines, hemoglobin A1C <7.0% represents  optimal control in non-pregnant diabetic patients.  Different  metrics may apply to specific  "populations.   Standards of Medical Care in Diabetes - 2016.  For the purpose of screening for the presence of diabetes:  <5.7%     Consistent with the absence of diabetes  5.7-6.4%  Consistent with increasing risk for diabetes   (prediabetes)  >or=6.5%  Consistent with diabetes  Currently no consensus exists for use of hemoglobin A1C  for diagnosis of diabetes for children.     04/13/2010 5.9 4.0 - 6.2 % Final       REVIEW OF SYSTEMS  General: Denies any fever or chills  Chest: Denies shortness of breath, wheezing, coughing, or sputum production  Heart: Denies chest pain.  As noted above and per history of current illness above, otherwise negative in the remainder of the 14 systems.     PHYSICAL EXAM  Vitals:    02/26/19 1321   BP: (!) 150/85   Pulse: 76   Weight: 87.5 kg (192 lb 14.4 oz)   Height: 5' 6" (1.676 m)       GEN:  This patient is well-developed, well-nourished and appears stated age, well-oriented to person, place and time, and cooperative and pleasant on today's visit.      LOWER EXTREMITY  Vascular:   · DP pedal pulse 0/4 b/l, PT pedal pulse 1/4 b/l  · Skin temperature warm to warm from prox to distally  · CFT <5 secs b/l  · There is LE edema noted b/l.     Dermatologic:   · Thickened, dystrophic, elongated toenails with subungal debris 1-5 b/l.   · Webspaces are C/D/I B/L.  · There is hyperkeratotic tissue noted plantar aspect of b/l feet at medial arch x 2  · Skin texture and turgor dry with hyperkeratosis diffusely b/l plantar heel   · There is no pedal hair growth noted    Neurologic:  · Vibratory sensation diminished at level of Hallux IPJ b/l    · Protective sensation absent at 0/10 sites upon examination with [place] Weinsten 5.07 g monofilament.   · Propioception intact at 1st MTPJ b/l.   · Achilles and patellar deep tendon reflexes intact  · Babinski reflex absent b/l. Light touch and sharp/dull sensation intact b/l.    Musculoskeletal/Orthopedic:  · No symptomatic structural abnormalities " noted.   · Muscle strength is 5/5 for foot inverters, everters, plantarflexors, and dorsiflexors. Muscle tone is normal.  · Pain free range of motion in all four quadrants with stiffness and limitation b/l  · PAIN with palpation of callus plantar medial arch, LEFT    ASSESSMENT  Encounter Diagnoses   Name Primary?    Dermatophytosis of nail Yes    Corn or callus     Discoid lupus erythematosus     Peripheral vascular disease        Plan:  -Discuss presenting problems, etiology, pathologic processes and management options with patient today.   -With patient's permission,Sharp excisional debridement of hyperkeratotic lesions deep to epidermal layer x 2 on medial arch bilateral foot  was performed with a #15 blade without incident.   -With patient's permission, nails were aggressively reduced and debrided x 10 to their soft tissue attachment mechanically and with electric , removing all offending nail and debris. Patient relates relief following the procedure.     Rx UREA, Rx Voltaren pain topical     -RTC as scheduled    Patient has above noted diagnosis and/or medical co-morbidities.They are being treated and managed by their  Primary Care Physician for management of comorbid states which are deemed to be currently stable.          Future Appointments   Date Time Provider Department Center   3/13/2019 10:00 AM Cuba Jurado LCSW HG PSYCH [facility]   3/21/2019 10:00 AM Treasure Yap MD HG DERM [facility]   3/22/2019 10:00 AM LABORATORY, [facility] LAB [facility]   3/22/2019 10:30 AM Yeimy Monteiro MD HG RHEUM [facility]   5/29/2019  1:40 PM Melva Nettles DPM HG POD [facility]

## 2019-03-13 ENCOUNTER — INITIAL CONSULT (OUTPATIENT)
Dept: PSYCHIATRY | Facility: CLINIC | Age: 74
End: 2019-03-13
Payer: MEDICARE

## 2019-03-13 DIAGNOSIS — F32.2 MAJOR DEPRESSIVE DISORDER, SINGLE EPISODE, SEVERE: Primary | ICD-10-CM

## 2019-03-13 PROCEDURE — 90791 PSYCH DIAGNOSTIC EVALUATION: CPT | Mod: PBBFAC,PN | Performed by: SOCIAL WORKER

## 2019-03-13 PROCEDURE — 90791 PSYCH DIAGNOSTIC EVALUATION: CPT | Mod: S$PBB,,, | Performed by: SOCIAL WORKER

## 2019-03-13 PROCEDURE — 90791 PR PSYCHIATRIC DIAGNOSTIC EVALUATION: ICD-10-PCS | Mod: S$PBB,,, | Performed by: SOCIAL WORKER
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
## 2019-03-13 NOTE — LETTER
March 13, 2019        Tayler Ovalle DO  [address] [place] [address]             [facility]  [address]  [place] [address]  Phone: [phone]  Fax: [phone]   Patient: Flory Cam   MR Number: [id number]   YOB: 1945   Date of Visit: 3/13/2019       Dear Dr. Ovalle:    Thank you for referring Flory Cam to me for evaluation. Please see the initial evaluation for the relevant portions of my assessment and plan of care.    If you have questions, please do not hesitate to call me. I look forward to following Flory along with you.    Sincerely,      Cuba Jurado, GEOFFREY           CC  No Recipients

## 2019-03-30 ENCOUNTER — HOSPITAL ENCOUNTER (EMERGENCY)
Facility: HOSPITAL | Age: 74
Discharge: HOME | End: 2019-03-31
Attending: EMERGENCY MEDICINE
Payer: MEDICARE

## 2019-03-30 DIAGNOSIS — R51.9 NONINTRACTABLE EPISODIC HEADACHE, UNSPECIFIED HEADACHE TYPE: Primary | ICD-10-CM

## 2019-03-30 DIAGNOSIS — R11.0 NAUSEA: ICD-10-CM

## 2019-03-30 LAB
BACTERIA #/AREA URNS HPF: NORMAL /HPF
BILIRUB UR QL STRIP: NEGATIVE
CLARITY UR: CLEAR
COLOR UR: YELLOW
GLUCOSE UR QL STRIP: NEGATIVE
HGB UR QL STRIP: ABNORMAL
HYALINE CASTS #/AREA URNS LPF: 0 /LPF
KETONES UR QL STRIP: NEGATIVE
LEUKOCYTE ESTERASE UR QL STRIP: NEGATIVE
MICROSCOPIC COMMENT: NORMAL
NITRITE UR QL STRIP: NEGATIVE
PH UR STRIP: 7 [PH] (ref 5–8)
PROT UR QL STRIP: ABNORMAL
RBC #/AREA URNS HPF: 2 /HPF (ref 0–4)
SP GR UR STRIP: 1.02 (ref 1–1.03)
URN SPEC COLLECT METH UR: ABNORMAL
UROBILINOGEN UR STRIP-ACNC: 1 EU/DL
WBC #/AREA URNS HPF: 3 /HPF (ref 0–5)

## 2019-03-30 PROCEDURE — 25000003 PHARM REV CODE 250: Performed by: EMERGENCY MEDICINE

## 2019-03-30 PROCEDURE — 81000 URINALYSIS NONAUTO W/SCOPE: CPT

## 2019-03-30 PROCEDURE — 99283 EMERGENCY DEPT VISIT LOW MDM: CPT

## 2019-03-30 RX ORDER — BUTALBITAL, ACETAMINOPHEN AND CAFFEINE 50; 325; 40 MG/1; MG/1; MG/1
1 TABLET ORAL
Status: COMPLETED | OUTPATIENT
Start: 2019-03-30 | End: 2019-03-30

## 2019-03-30 RX ADMIN — BUTALBITAL, ACETAMINOPHEN, AND CAFFEINE 1 TABLET: 50; 325; 40 TABLET ORAL at 11:03

## 2019-03-31 VITALS
WEIGHT: 200.69 LBS | TEMPERATURE: 98 F | SYSTOLIC BLOOD PRESSURE: 174 MMHG | RESPIRATION RATE: 18 BRPM | OXYGEN SATURATION: 96 % | HEIGHT: 66 IN | DIASTOLIC BLOOD PRESSURE: 86 MMHG | HEART RATE: 86 BPM | BODY MASS INDEX: 32.25 KG/M2

## 2019-03-31 RX ORDER — ONDANSETRON 4 MG/1
4 TABLET, FILM COATED ORAL EVERY 6 HOURS
Qty: 12 TABLET | Refills: 0 | Status: SHIPPED | OUTPATIENT
Start: 2019-03-31 | End: 2019-08-09

## 2019-03-31 NOTE — ED PROVIDER NOTES
"SCRIBE #1 NOTE: I, Kathe Weinberg, am scribing for, and in the presence of, Janet Santa MD. I have scribed the entire note.       History     Chief Complaint   Patient presents with    Headache     pt states sewage smell in apt causing headache, NV     Review of patient's allergies indicates:  No Known Allergies      History of Present Illness     HPI    3/30/2019, 11:12 PM  History obtained from the patient      History of Present Illness: Flory Cam is a 73 y.o. female patient with a PMHx of anxiety, HTN, HLD, CAD who presents to the Emergency Department for evaluation after having one episode of N/V today. She reports she vomited "water" since she has decreased appetite and has not been eating much. Sxs are episodic and moderate in severity, she is not nauseous currently. No mitigating or exacerbating factors reported. Patient reports there has been sewage backup outside her door for the past week and since then, she has not been feeling well. She c/o a HA and mild suprapubic abdominal tenderness. Patient denies any fever, chills, diarrhea, constipation, dysuria, hematuria, dizziness, and all other sxs at this time.    Arrival mode: Personal vehicle     PCP: Tayler Ovalle DO        Past Medical History:  Past Medical History:   Diagnosis Date    Acute coronary syndrome     Anxiety     Bilateral carotid artery stenosis 11/17/2015    Chronic pain     Coronary artery disease     GERD (gastroesophageal reflux disease)     Hyperlipidemia     Hypertension     Hypothyroidism     Low back pain     Lupus     Osteoporosis     Poor circulation     PVD (peripheral vascular disease)     S/P peripheral artery angioplasty 02/13/2014    Skin disease        Past Surgical History:  Past Surgical History:   Procedure Laterality Date    AORTOGRAM WITH RUNOFF/PTA Right 5/2/2017    Performed by [facility] C[facility] LAB    COLONOSCOPY N/A 1/4/2017    Performed by Sylvester Arboleda III, MD " at [facility] ENDO    CORONARY ANGIOPLASTY      CORONARY ARTERY BYPASS GRAFT      HYSTERECTOMY      INCISION AND DRAINAGE (I&D), FOOT Left 5/5/2017    Performed by Melva Orellana DPM at [facility] OR    OOPHORECTOMY      PTA-PERIPHERAL Right 5/2/2017    Performed by [facility] at [facility] CATH LAB    THYROIDECTOMY           Family History:  Family History   Problem Relation Age of Onset    Melanoma Neg Hx     Psoriasis Neg Hx     Lupus Neg Hx     Eczema Neg Hx        Social History:  Social History     Tobacco Use    Smoking status: Current Every Day Smoker     Packs/day: 0.25     Years: 40.00     Pack years: 10.00     Types: Cigarettes     Start date: 1961    Smokeless tobacco: Never Used   Substance and Sexual Activity    Alcohol use: No    Drug use: No    Sexual activity: Unknown        Review of Systems     Review of Systems   Constitutional: Positive for appetite change (decreased). Negative for chills and fever.   HENT: Negative for sore throat.    Respiratory: Negative for shortness of breath.    Cardiovascular: Negative for chest pain.   Gastrointestinal: Positive for abdominal pain (mild), nausea and vomiting (x1). Negative for blood in stool, constipation and diarrhea.   Genitourinary: Negative for dysuria, flank pain and hematuria.   Musculoskeletal: Negative for back pain.   Skin: Negative for rash.   Neurological: Positive for headaches. Negative for dizziness, syncope, weakness and light-headedness.   Hematological: Does not bruise/bleed easily.   All other systems reviewed and are negative.       Physical Exam     Initial Vitals [03/30/19 2314]   BP Pulse Resp Temp SpO2   (!) 184/98 97 18 98.2 °F (36.8 °C) 96 %      MAP       --          Physical Exam  Nursing Notes and Vital Signs Reviewed.  Constitutional: Patient is in no acute distress. Well-developed and well-nourished.  Head: Atraumatic. Normocephalic.  Eyes: PERRL. EOM intact. Conjunctivae are not pale. No scleral icterus.  ENT: Mucous  "membranes are moist. Oropharynx is clear and symmetric.    Neck: Supple. Full ROM. No lymphadenopathy.  Cardiovascular: Regular rate. Regular rhythm. No murmurs, rubs, or gallops. Distal pulses are 2+ and symmetric.  Pulmonary/Chest: No respiratory distress. Clear to auscultation bilaterally. No wheezing or rales.  Abdominal: Soft and non-distended.  There is mild suprapubic tenderness.  No rebound, guarding, or rigidity. Good bowel sounds.  Genitourinary: No CVA tenderness  Musculoskeletal: Moves all extremities. No obvious deformities. No edema.   Skin: Warm and dry.  Neurological:  Alert, awake, and appropriate.  Normal speech.  No acute focal neurological deficits are appreciated.  Psychiatric: Normal affect. Good eye contact. Appropriate in content.     ED Course   Procedures  ED Vital Signs:  Vitals:    03/30/19 2314 03/31/19 0003   BP: (!) 184/98 (!) 174/86   Pulse: 97 86   Resp: 18 18   Temp: 98.2 °F (36.8 °C) 98.1 °F (36.7 °C)   TempSrc: Oral Oral   SpO2: 96% 96%   Weight: 91 kg (200 lb 11.2 oz)    Height: 5' 6" (1.676 m)        Abnormal Lab Results:  Labs Reviewed   URINALYSIS, REFLEX TO URINE CULTURE - Abnormal; Notable for the following components:       Result Value    Protein, UA 2+ (*)     Occult Blood UA Trace (*)     All other components within normal limits    Narrative:     Preferred Collection Type->Urine, Clean Catch   URINALYSIS MICROSCOPIC    Narrative:     Preferred Collection Type->Urine, Clean Catch        All Lab Results:  Results for orders placed or performed during the hospital encounter of 03/30/19   Urinalysis, Reflex to Urine Culture Urine, Clean Catch   Result Value Ref Range    Specimen UA Urine, Clean Catch     Color, UA Yellow Yellow, Straw, Leyda    Appearance, UA Clear Clear    pH, UA 7.0 5.0 - 8.0    Specific Gravity, UA 1.020 1.005 - 1.030    Protein, UA 2+ (A) Negative    Glucose, UA Negative Negative    Ketones, UA Negative Negative    Bilirubin (UA) Negative Negative    " Occult Blood UA Trace (A) Negative    Nitrite, UA Negative Negative    Urobilinogen, UA 1.0 <2.0 EU/dL    Leukocytes, UA Negative Negative   Urinalysis Microscopic   Result Value Ref Range    RBC, UA 2 0 - 4 /hpf    WBC, UA 3 0 - 5 /hpf    Bacteria, UA Rare None-Occ /hpf    Hyaline Casts, UA 0 0-1/lpf /lpf    Microscopic Comment SEE COMMENT             The Emergency Provider reviewed the vital signs and test results, which are outlined above.     ED Discussion     12:16 AM: Reassessed pt at this time. Pt states her condition has improved at this time. She is feeling better and is playing a game on her phone. Discussed with pt all pertinent ED information and results. Discussed pt dx and plan to tx nausea with Zofran. Gave pt all f/u and return to the ED instructions. All questions and concerns were addressed at this time. Pt expresses understanding of information and instructions, and is comfortable with plan to discharge. Pt is stable for discharge.    I discussed with patient and/or family/caretaker that evaluation in the ED does not suggest any emergent or life threatening medical conditions requiring immediate intervention beyond what was provided in the ED, and I believe patient is safe for discharge.  Regardless, an unremarkable evaluation in the ED does not preclude the development or presence of a serious of life threatening condition. As such, patient was instructed to return immediately for any worsening or change in current symptoms.    Patient's headache is either consistent with previous headache and/or lacks features concerning for emergent or life threatening condition.  I do not suspect SAH, meningitis, increased IC pressure, infectious, toxic, vascular, CNS, or other EMC.  I have discussed this at length with patient and/or family/caretaker.  ED Medication(s):  Medications   butalbital-acetaminophen-caffeine -40 mg per tablet 1 tablet (1 tablet Oral Given 3/30/19 0334)       Discharge  Medication List as of 3/31/2019 12:16 AM      START taking these medications    Details   ondansetron (ZOFRAN) 4 MG tablet Take 1 tablet (4 mg total) by mouth every 6 (six) hours., Starting Sun 3/31/2019, Print             Follow-up Information     Tayler Ovalle DO. Schedule an appointment as soon as possible for a visit in 2 days.    Specialty:  Internal Medicine  Why:  Return to the Emergency Room, If symptoms worsen  Contact information:  [address] [place] [address] DR Jesi MCDOWELL [id number]  [phone]                Medical Decision Making:   Clinical Tests:   Lab Tests: Ordered and Reviewed           Scribe Attestation:   Scribe #1: I performed the above scribed service and the documentation accurately describes the services I performed. I attest to the accuracy of the note.     Attending:   Physician Attestation Statement for Scribe #1: I, Janet Santa MD, personally performed the services described in this documentation, as scribed by Kathe Weinberg, in my presence, and it is both accurate and complete.           Clinical Impression       ICD-10-CM ICD-9-CM   1. Nonintractable episodic headache, unspecified headache type R51 784.0   2. Nausea R11.0 787.02       Disposition:   Disposition: Discharged  Condition: Stable         Janet Santa MD  03/31/19 2333

## 2019-03-31 NOTE — ED NOTES
AAO x 3. NAD noted. RR e/u, airway open and patent. Ambulatory. Gait steady. MD Santa aware of BP. MD states to continue with d/c.

## 2019-04-01 ENCOUNTER — TELEPHONE (OUTPATIENT)
Dept: INTERNAL MEDICINE | Facility: CLINIC | Age: 74
End: 2019-04-01

## 2019-04-01 NOTE — TELEPHONE ENCOUNTER
Spoke to pt she states she went to ER on Saturday and was told to get a f/u appt with PCP, was able to schedule an appt for tomorrow

## 2019-04-01 NOTE — TELEPHONE ENCOUNTER
----- Message from Valdemar Nunez sent at 4/1/2019  9:45 AM CDT -----  Contact: Pt  Please give pt a call at ..[phone] (home) she has a few questions for the nurse and won't give any details only wants to let the nurse know

## 2019-04-02 ENCOUNTER — OFFICE VISIT (OUTPATIENT)
Dept: INTERNAL MEDICINE | Facility: CLINIC | Age: 74
End: 2019-04-02
Payer: MEDICARE

## 2019-04-02 ENCOUNTER — LAB VISIT (OUTPATIENT)
Dept: LAB | Facility: HOSPITAL | Age: 74
End: 2019-04-02
Attending: INTERNAL MEDICINE
Payer: MEDICARE

## 2019-04-02 VITALS
TEMPERATURE: 98 F | DIASTOLIC BLOOD PRESSURE: 68 MMHG | OXYGEN SATURATION: 97 % | BODY MASS INDEX: 31.5 KG/M2 | WEIGHT: 196 LBS | SYSTOLIC BLOOD PRESSURE: 118 MMHG | HEIGHT: 66 IN | HEART RATE: 64 BPM

## 2019-04-02 DIAGNOSIS — M05.772 RHEUMATOID ARTHRITIS INVOLVING BOTH FEET WITH POSITIVE RHEUMATOID FACTOR: Chronic | ICD-10-CM

## 2019-04-02 DIAGNOSIS — I10 HYPERTENSION GOAL BP (BLOOD PRESSURE) < 140/90: Primary | Chronic | ICD-10-CM

## 2019-04-02 DIAGNOSIS — L93.0 DISCOID LUPUS ERYTHEMATOSUS: ICD-10-CM

## 2019-04-02 DIAGNOSIS — M05.771 RHEUMATOID ARTHRITIS INVOLVING BOTH FEET WITH POSITIVE RHEUMATOID FACTOR: Chronic | ICD-10-CM

## 2019-04-02 LAB
ALBUMIN SERPL BCP-MCNC: 3.8 G/DL (ref 3.5–5.2)
ALP SERPL-CCNC: 182 U/L (ref 55–135)
ALT SERPL W/O P-5'-P-CCNC: 11 U/L (ref 10–44)
ANION GAP SERPL CALC-SCNC: 11 MMOL/L (ref 8–16)
AST SERPL-CCNC: 18 U/L (ref 10–40)
BASOPHILS # BLD AUTO: 0.05 K/UL (ref 0–0.2)
BASOPHILS NFR BLD: 0.6 % (ref 0–1.9)
BILIRUB SERPL-MCNC: 0.5 MG/DL (ref 0.1–1)
BUN SERPL-MCNC: 20 MG/DL (ref 8–23)
C3 SERPL-MCNC: 144 MG/DL (ref 50–180)
C4 SERPL-MCNC: 35 MG/DL (ref 11–44)
CALCIUM SERPL-MCNC: 9.6 MG/DL (ref 8.7–10.5)
CHLORIDE SERPL-SCNC: 103 MMOL/L (ref 95–110)
CO2 SERPL-SCNC: 27 MMOL/L (ref 23–29)
CREAT SERPL-MCNC: 0.9 MG/DL (ref 0.5–1.4)
CRP SERPL-MCNC: 4.2 MG/L (ref 0–8.2)
DIFFERENTIAL METHOD: ABNORMAL
EOSINOPHIL # BLD AUTO: 0.2 K/UL (ref 0–0.5)
EOSINOPHIL NFR BLD: 2.6 % (ref 0–8)
ERYTHROCYTE [DISTWIDTH] IN BLOOD BY AUTOMATED COUNT: 14.8 % (ref 11.5–14.5)
ERYTHROCYTE [SEDIMENTATION RATE] IN BLOOD BY WESTERGREN METHOD: 14 MM/HR (ref 0–20)
EST. GFR  (AFRICAN AMERICAN): >60 ML/MIN/1.73 M^2
EST. GFR  (NON AFRICAN AMERICAN): >60 ML/MIN/1.73 M^2
GLUCOSE SERPL-MCNC: 96 MG/DL (ref 70–110)
HCT VFR BLD AUTO: 44.9 % (ref 37–48.5)
HGB BLD-MCNC: 14.5 G/DL (ref 12–16)
LYMPHOCYTES # BLD AUTO: 3 K/UL (ref 1–4.8)
LYMPHOCYTES NFR BLD: 34 % (ref 18–48)
MCH RBC QN AUTO: 31.1 PG (ref 27–31)
MCHC RBC AUTO-ENTMCNC: 32.3 G/DL (ref 32–36)
MCV RBC AUTO: 96 FL (ref 82–98)
MONOCYTES # BLD AUTO: 0.6 K/UL (ref 0.3–1)
MONOCYTES NFR BLD: 6.8 % (ref 4–15)
NEUTROPHILS # BLD AUTO: 4.9 K/UL (ref 1.8–7.7)
NEUTROPHILS NFR BLD: 56 % (ref 38–73)
PLATELET # BLD AUTO: 276 K/UL (ref 150–350)
PMV BLD AUTO: 10.5 FL (ref 9.2–12.9)
POTASSIUM SERPL-SCNC: 4.1 MMOL/L (ref 3.5–5.1)
PROT SERPL-MCNC: 7.1 G/DL (ref 6–8.4)
RBC # BLD AUTO: 4.66 M/UL (ref 4–5.4)
SODIUM SERPL-SCNC: 141 MMOL/L (ref 136–145)
WBC # BLD AUTO: 8.76 K/UL (ref 3.9–12.7)

## 2019-04-02 PROCEDURE — 36415 COLL VENOUS BLD VENIPUNCTURE: CPT

## 2019-04-02 PROCEDURE — 99999 PR PBB SHADOW E&M-EST. PATIENT-LVL III: ICD-10-PCS | Mod: PBBFAC,,, | Performed by: INTERNAL MEDICINE

## 2019-04-02 PROCEDURE — 99213 OFFICE O/P EST LOW 20 MIN: CPT | Mod: S$PBB,,, | Performed by: INTERNAL MEDICINE

## 2019-04-02 PROCEDURE — 85025 COMPLETE CBC W/AUTO DIFF WBC: CPT

## 2019-04-02 PROCEDURE — 99213 OFFICE O/P EST LOW 20 MIN: CPT | Mod: PBBFAC | Performed by: INTERNAL MEDICINE

## 2019-04-02 PROCEDURE — 99213 PR OFFICE/OUTPT VISIT, EST, LEVL III, 20-29 MIN: ICD-10-PCS | Mod: S$PBB,,, | Performed by: INTERNAL MEDICINE

## 2019-04-02 PROCEDURE — 86160 COMPLEMENT ANTIGEN: CPT

## 2019-04-02 PROCEDURE — 86140 C-REACTIVE PROTEIN: CPT

## 2019-04-02 PROCEDURE — 86160 COMPLEMENT ANTIGEN: CPT | Mod: 59

## 2019-04-02 PROCEDURE — 85651 RBC SED RATE NONAUTOMATED: CPT

## 2019-04-02 PROCEDURE — 99999 PR PBB SHADOW E&M-EST. PATIENT-LVL III: CPT | Mod: PBBFAC,,, | Performed by: INTERNAL MEDICINE

## 2019-04-02 PROCEDURE — 80053 COMPREHEN METABOLIC PANEL: CPT

## 2019-04-02 NOTE — PROGRESS NOTES
Flory Soria Murray  73 y.o.  Black or  female    Chief Complaint   Patient presents with    Hospital Follow Up       HPI: Presents to the clinic for an ER visit follow up. She was seen on 3/30 for a headache and nausea. She attributes her symptoms to the smell of sewage outside of her apartment. Her symptoms have resolved with the removal of the bad smell.   She also had elevated b/p. She has a history of HTN. Her b/p is within normal range today.       PMH: Reviewed    MEDS: Reviewed med card    ALLERGIES: Reviewed allergy card    PE: Reviewed vitals  GENERAL: Alert and oriented, no acute distress  HEART: Regular rate  LUNGS: Unlabored respirations     ASSESSMENT/PLAN:    Flory was seen today for hospital follow up.    Diagnoses and all orders for this visit:    Hypertension goal BP (blood pressure) < 140/90  -     Continue current management  -     Lipid panel; Standing  -     Comprehensive metabolic panel; Standing    F/U in 3 months and as needed.

## 2019-04-09 ENCOUNTER — TELEPHONE (OUTPATIENT)
Dept: RHEUMATOLOGY | Facility: CLINIC | Age: 74
End: 2019-04-09

## 2019-04-18 ENCOUNTER — TELEPHONE (OUTPATIENT)
Dept: INTERNAL MEDICINE | Facility: CLINIC | Age: 74
End: 2019-04-18

## 2019-04-18 NOTE — TELEPHONE ENCOUNTER
Spoke to pt about concerns she states her head is hurting and she just don't feel good advised her that today Dr Ovalle doesn't have any spot available because it's her half day, she states that she can't get here today was trying to schedule for tomorrow advise her nobody will be here on tomorrow she states if she feel any worse she will go to ER

## 2019-04-18 NOTE — TELEPHONE ENCOUNTER
----- Message from Cindy Camacho sent at 4/18/2019  9:38 AM CDT -----  Contact: pt  She's calling in regards to speak with nurse     Pt stated she needs to ask the nurse a question /pt wasn't specific       pls call at [phone] (home)

## 2019-04-18 NOTE — TELEPHONE ENCOUNTER
----- Message from Cindy Camacho sent at 4/18/2019  9:38 AM CDT -----  Contact: pt  She's calling in regards to speak with nurse     Pt stated she needs to ask the nurse a question /pt wasn't specific       pls call at [phone] (home)

## 2019-04-23 ENCOUNTER — TELEPHONE (OUTPATIENT)
Dept: CARDIOLOGY | Facility: CLINIC | Age: 74
End: 2019-04-23

## 2019-04-23 NOTE — TELEPHONE ENCOUNTER
----- Message from Christopher Cohen MD sent at 4/2/2019  9:56 PM CDT -----  lipds better but still uncontrolled  Is she taking meds

## 2019-04-26 ENCOUNTER — OFFICE VISIT (OUTPATIENT)
Dept: INTERNAL MEDICINE | Facility: CLINIC | Age: 74
End: 2019-04-26
Payer: MEDICARE

## 2019-04-26 VITALS
SYSTOLIC BLOOD PRESSURE: 120 MMHG | DIASTOLIC BLOOD PRESSURE: 76 MMHG | BODY MASS INDEX: 31.43 KG/M2 | OXYGEN SATURATION: 98 % | TEMPERATURE: 99 F | WEIGHT: 195.56 LBS | HEIGHT: 66 IN | HEART RATE: 90 BPM

## 2019-04-26 DIAGNOSIS — J44.9 CHRONIC OBSTRUCTIVE PULMONARY DISEASE, UNSPECIFIED COPD TYPE: ICD-10-CM

## 2019-04-26 DIAGNOSIS — K21.9 GASTROESOPHAGEAL REFLUX DISEASE, ESOPHAGITIS PRESENCE NOT SPECIFIED: Primary | ICD-10-CM

## 2019-04-26 DIAGNOSIS — M05.742 RHEUMATOID ARTHRITIS INVOLVING BOTH HANDS WITH POSITIVE RHEUMATOID FACTOR: ICD-10-CM

## 2019-04-26 DIAGNOSIS — M05.741 RHEUMATOID ARTHRITIS INVOLVING BOTH HANDS WITH POSITIVE RHEUMATOID FACTOR: ICD-10-CM

## 2019-04-26 PROCEDURE — 99215 OFFICE O/P EST HI 40 MIN: CPT | Mod: PBBFAC | Performed by: PHYSICIAN ASSISTANT

## 2019-04-26 PROCEDURE — 99499 UNLISTED E&M SERVICE: CPT | Mod: S$PBB,,, | Performed by: PHYSICIAN ASSISTANT

## 2019-04-26 PROCEDURE — 99999 PR PBB SHADOW E&M-EST. PATIENT-LVL V: ICD-10-PCS | Mod: PBBFAC,,, | Performed by: PHYSICIAN ASSISTANT

## 2019-04-26 PROCEDURE — 99499 RISK ADDL DX/OHS AUDIT: ICD-10-PCS | Mod: S$PBB,,, | Performed by: PHYSICIAN ASSISTANT

## 2019-04-26 PROCEDURE — 99999 PR PBB SHADOW E&M-EST. PATIENT-LVL V: CPT | Mod: PBBFAC,,, | Performed by: PHYSICIAN ASSISTANT

## 2019-04-26 PROCEDURE — 99214 PR OFFICE/OUTPT VISIT, EST, LEVL IV, 30-39 MIN: ICD-10-PCS | Mod: S$PBB,,, | Performed by: PHYSICIAN ASSISTANT

## 2019-04-26 PROCEDURE — 99214 OFFICE O/P EST MOD 30 MIN: CPT | Mod: S$PBB,,, | Performed by: PHYSICIAN ASSISTANT

## 2019-04-26 RX ORDER — ALBUTEROL SULFATE 90 UG/1
2 AEROSOL, METERED RESPIRATORY (INHALATION) EVERY 6 HOURS PRN
Qty: 1 INHALER | Refills: 1 | Status: SHIPPED | OUTPATIENT
Start: 2019-04-26 | End: 2019-06-24 | Stop reason: SDUPTHER

## 2019-04-26 NOTE — PROGRESS NOTES
Subjective:       Patient ID: Flory Cam is a 73 y.o. female.    Chief Complaint: Hand Pain (PCP -Vasquez)    Patient is a 72 yo female with RA and COPD. She is followed by Rheumatology and pulmonary and her symptoms are currently stable. She comes in today with GERD symptoms.     Gastroesophageal Reflux   She complains of abdominal pain and heartburn. She reports no chest pain. This is a recurrent problem. The current episode started more than 1 month ago. The problem occurs occasionally. The problem has been unchanged. The heartburn is located in the substernum. The symptoms are aggravated by certain foods. Pertinent negatives include no fatigue. She has tried a PPI for the symptoms. The treatment provided mild relief.     Past Medical History:   Diagnosis Date    Acute coronary syndrome     Anxiety     Bilateral carotid artery stenosis 11/17/2015    Chronic pain     Coronary artery disease     GERD (gastroesophageal reflux disease)     Hyperlipidemia     Hypertension     Hypothyroidism     Low back pain     Lupus     Osteoporosis     Poor circulation     PVD (peripheral vascular disease)     S/P peripheral artery angioplasty 02/13/2014    Skin disease        Review of Systems   Constitutional: Negative for chills, fatigue and fever.   Respiratory: Negative for chest tightness and shortness of breath.    Cardiovascular: Negative for chest pain.   Gastrointestinal: Positive for abdominal pain and heartburn.       Objective:      Physical Exam   Constitutional: She appears well-developed and well-nourished. No distress.   Neck: Neck supple.   Cardiovascular: Normal rate and regular rhythm. Exam reveals no gallop and no friction rub.   No murmur heard.  Pulmonary/Chest: Effort normal and breath sounds normal. No stridor. No respiratory distress. She has no wheezes. She has no rales. She exhibits no tenderness.   Abdominal: Soft. She exhibits no distension and no mass. There is no tenderness.  There is no rebound and no guarding. No hernia.   Lymphadenopathy:     She has no cervical adenopathy.   Skin: She is not diaphoretic.   Nursing note and vitals reviewed.      Assessment:       1. Gastroesophageal reflux disease, esophagitis presence not specified    2. Rheumatoid arthritis involving both hands with positive rheumatoid factor    3. Chronic obstructive pulmonary disease, unspecified COPD type        Plan:       Gastroesophageal reflux disease, esophagitis presence not specified  ranitidine (ZANTAC) 150 MG tablet; Take 1 tablet (150 mg total) by mouth nightly.  Dispense: 90 tablet; Refill: 3    Rheumatoid arthritis involving both hands with positive rheumatoid factor  Keep scheduled appoints    Chronic obstructive pulmonary disease, unspecified COPD type    albuterol (PROVENTIL/VENTOLIN HFA) 90 mcg/actuation inhaler; Inhale 2 puffs into the lungs every 6 (six) hours as needed for Wheezing. Rescue  Dispense: 1 Inhaler; Refill: 1

## 2019-04-29 ENCOUNTER — TELEPHONE (OUTPATIENT)
Dept: DERMATOLOGY | Facility: CLINIC | Age: 74
End: 2019-04-29

## 2019-04-29 NOTE — TELEPHONE ENCOUNTER
----- Message from Cara Ramos sent at 4/29/2019 10:45 AM CDT -----  Contact: self [phone]  Pt would like return call from nurse; pt states she needs to ask nurse a question.   Please call back at [phone].  Md Charity

## 2019-04-29 NOTE — TELEPHONE ENCOUNTER
Advised pt that Dr. Yap does not have any openings but we will give her a call if someone cancels.

## 2019-05-02 ENCOUNTER — OFFICE VISIT (OUTPATIENT)
Dept: RHEUMATOLOGY | Facility: CLINIC | Age: 74
End: 2019-05-02
Payer: MEDICARE

## 2019-05-02 VITALS
SYSTOLIC BLOOD PRESSURE: 155 MMHG | HEART RATE: 68 BPM | HEIGHT: 66 IN | WEIGHT: 194.44 LBS | DIASTOLIC BLOOD PRESSURE: 83 MMHG | BODY MASS INDEX: 31.25 KG/M2

## 2019-05-02 DIAGNOSIS — M05.79 RHEUMATOID ARTHRITIS INVOLVING MULTIPLE SITES WITH POSITIVE RHEUMATOID FACTOR: ICD-10-CM

## 2019-05-02 DIAGNOSIS — E55.9 VITAMIN D DEFICIENCY: ICD-10-CM

## 2019-05-02 PROCEDURE — 99214 OFFICE O/P EST MOD 30 MIN: CPT | Mod: S$PBB,,, | Performed by: INTERNAL MEDICINE

## 2019-05-02 PROCEDURE — 99214 PR OFFICE/OUTPT VISIT, EST, LEVL IV, 30-39 MIN: ICD-10-PCS | Mod: S$PBB,,, | Performed by: INTERNAL MEDICINE

## 2019-05-02 PROCEDURE — 99999 PR PBB SHADOW E&M-EST. PATIENT-LVL II: ICD-10-PCS | Mod: PBBFAC,,, | Performed by: INTERNAL MEDICINE

## 2019-05-02 PROCEDURE — 99212 OFFICE O/P EST SF 10 MIN: CPT | Mod: PBBFAC,PN | Performed by: INTERNAL MEDICINE

## 2019-05-02 PROCEDURE — 99999 PR PBB SHADOW E&M-EST. PATIENT-LVL II: CPT | Mod: PBBFAC,,, | Performed by: INTERNAL MEDICINE

## 2019-05-02 RX ORDER — SULFASALAZINE 500 MG/1
1000 TABLET, DELAYED RELEASE ORAL 2 TIMES DAILY
Qty: 120 TABLET | Refills: 3 | Status: SHIPPED | OUTPATIENT
Start: 2019-05-02 | End: 2019-08-09

## 2019-05-02 RX ORDER — CYCLOBENZAPRINE HCL 10 MG
TABLET ORAL
Refills: 0 | COMMUNITY
Start: 2019-04-19 | End: 2020-06-17

## 2019-05-02 RX ORDER — ASPIRIN 325 MG
50000 TABLET, DELAYED RELEASE (ENTERIC COATED) ORAL
Qty: 12 CAPSULE | Refills: 1 | Status: SHIPPED | OUTPATIENT
Start: 2019-05-02 | End: 2020-05-25 | Stop reason: SDUPTHER

## 2019-05-02 NOTE — PROGRESS NOTES
CC:  Chief Complaint   Patient presents with    Disease Management   . 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 Discoid  lupus and lichen planus overlap    she is currently supposed to be on sulfasalazine 1000 mg p.o. b.i.d..   She was taking it when last seen, but now she is not absolutely sure if she continued to take them   She completed HD vit d supp, levels good now   Recommended she stop prednisone 2.5 mg daily Last visit but she continue to take them  She denies any pain or swelling or stiffness in the hand      Her main concern seems to be the hand lesions  She has been to dermatology for evaluation of skin lesions and the lesion on the left thigh which were thought to be lichen planus hypertrophicus   She was given topicals but she still has persistent lesion    Plaquenil had to be stopped per Ophthalmology recommendations  Then she was started on methotrexate 10 mg a week with daily folic acid, she was advised to start methotrexate after resolution of warts  but she started without getting the warts treated  She however developed a rash on her face , while on methotrexate and stopped    She also has chronic pain in the back  She sees pain management gets Norco, takes it as needed and this is helpful      Denies any fever, chills, weight  loss, dysuria, oral ulcers, chest pain, shortness of breath    History  Looks like she was diagnosed with discoid lupus vs lichen planus at [facility], from review of records looks like she has been on prednisone, Plaquenil, methotrexate in the past   Looks like the lesions were unresponsive to methotrexate and she was started on CellCept and later Imuran.  She had a rash on both CellCept and Imuran and they were discontinued  More recently she had a biopsy of right palm lesion by Dermatology which was suggestive of lichen planus , she is on topicals per Dermatology          Lab review:  Most recent labs reveal negative KYLE, normal ESR/CRP,     Initial visit  :  She has seen Dr URIARTE in the past   She is currently on plaquenil 200 mg bid   Last seen here in 10/2016   She is currently on plaquenil 200 mg bid , doing well   On it for > 10 years now   Denies any joint swelling'  No am stiffness   She was seen by opthalmologist Dr Gonsales , who recommended she be considered something else in place of plaquenil  Due to ongoing issues with vision   Rash inv face / hands/ forearms / feet , some on legs stable     She was seen by  in 2014 for Discoid lupus and was lost for follow up. In that visit , since she was having joint pains,  also checked for rheumatoid arthritis which came back highly positive for RF/CCP    Past Medical History:   Diagnosis Date    Acute coronary syndrome     Anxiety     Bilateral carotid artery stenosis 11/17/2015    Chronic pain     Coronary artery disease     GERD (gastroesophageal reflux disease)     Hyperlipidemia     Hypertension     Hypothyroidism     Low back pain     Lupus     Osteoporosis     Poor circulation     PVD (peripheral vascular disease)     S/P peripheral artery angioplasty 02/13/2014    Skin disease          Past Surgical History:   Procedure Laterality Date    AORTOGRAM WITH RUNOFF/PTA Right 5/2/2017    [facility] LAB     COLONOSCOPY N/A 1/4/2017    Per[facility] by Sylvester [facility]boleda III, [facility] at [facility] ENDO    CORONARY ANGIOPLASTY      CORONARY ARTERY BYPASS GRAFT      HYSTERECTOMY      INCISION AND DRAINAGE (I&D), FOOT Left 5/5/2017    Performed by Melva Orellana DPM at [facility] OR    OOPHORECTOMY      PTA-PERIPHERAL Right 5/2/2017    Performed by [facility] at [facility] CATH LAB    THYROIDECTOMY           Social History     Tobacco Use    Smoking status: Current Every Day Smoker     Packs/day: 0.25     Years: 40.00     Pack years: 10.00     Types: Cigarettes     Start date: 1961    Smokeless tobacco: Never Used   Substance Use Topics    Alcohol use: No    Drug use: No       Family History   Problem Relation Age of Onset    Melanoma Neg Hx     Psoriasis Neg Hx     Lupus Neg Hx     Eczema Neg Hx        Review of patient's allergies indicates:  No Known Allergies          Review of Systems:  Constitutional: Denies fever, chills. No recent weight changes.   Fatigue: no  Muscle weakness: no  Headaches: no new headaches  Eyes: No redness or dryness.  No recent visual changes.  ENT: Denies dry mouth. No oral or nasal ulcers.  Card: No chest pain.  Resp: No cough or sob.   Gastro: No nausea or vomiting.  No heartburn.  Constipation: no  Diarrhea: no  Genito:  No dysuria.  No genital ulcers.  Skin:per hpi   Raynauds:no  Neuro: No numbness / tingling.   Psych: No depression, anxiety  Endo:  no excess thirst.  Heme: no abnormal bleeding or bruising  Clots:none     OBJECTIVE:     Vital Signs   Vitals:    05/02/19 1320   BP: (!) 155/83   Pulse: 68     Physical Exam:  General Appearance:  NAD.   Gait: not favoring.  HEENT: PERRL.  Eyes not dry or injected.  No nasal ulcers.  Mouth not dry, no oral lesions.  Lymph: cervical, supraclavicular or axillary nodes: none abnormal   Cardio: no irregularity of S1 or S2.  No gallops or rubs.   Resp: Normal respiratory motion. Clear to auscultation bilaterally.   No abnormal chest  conformation.  Abd: Soft, non-tender, nondistended.  No masses.   Skin: Head and neck,  and extremities examined.   Dry scaly lesions on  palmar aspect of rt hand , dryness noted on left palm as well     Old scars on arms, feet , some on legs   Face - hyperpigmentation ,   Neuro: Ox3.   Cranial nerves II-XII grossly intact.   Sensation intact  in both distal LE and upper extremities to light touch.  Musculoskeletal Exam:    Right Side Rheumatological Exam     2,3 MCP fullness , no tenderness noted, ulnar deviation   Multiple dry scaly lesions/hyperkeratotic lesions  noted on the right palm      Others :    Ankle :no synovitis   Foot: edematous , with callouses on ventral aspect ,     Left Side Rheumatological Exam     2,3 MCP fullness , no tenderness noted        Others :    Ankle :no synovitis   Foot: edematous     Spine :TTP lower lumbar region      Tender points:  Muscle strength:Equal and full in all mm groups of the upper and lower ext.    Results for NO JARVIS (MRN [id number]) as of 7/31/2018 12:54   Ref. Range 9/11/2014 13:46 10/26/2016 10:38   CCP Antibodies Latest Ref Range: <5.0 U/mL 194.4 (H) 387.2 (H)   Rheumatoid Factor Latest Ref Range: 0.0 - 15.0 IU/mL 56.0 (H) 123.0 (H)     KYLE - negative   CMP  Sodium   Date Value Ref Range Status   04/02/2019 141 136 - 145 mmol/L Final     Potassium   Date Value Ref Range Status   04/02/2019 4.1 3.5 - 5.1 mmol/L Final     Chloride   Date Value Ref Range Status   04/02/2019 103 95 - 110 mmol/L Final     CO2   Date Value Ref Range Status   04/02/2019 27 23 - 29 mmol/L Final     Glucose   Date Value Ref Range Status   04/02/2019 96 70 - 110 mg/dL Final     BUN, Bld   Date Value Ref Range Status   04/02/2019 20 8 - 23 mg/dL Final     Creatinine   Date Value Ref Range Status   04/02/2019 0.9 0.5 - 1.4 mg/dL Final     Calcium   Date Value Ref Range Status   04/02/2019 9.6 8.7 - 10.5 mg/dL Final     Total Protein   Date Value Ref Range Status   04/02/2019 7.1 6.0  - 8.4 g/dL Final     Albumin   Date Value Ref Range Status   04/02/2019 3.8 3.5 - 5.2 g/dL Final     Total Bilirubin   Date Value Ref Range Status   04/02/2019 0.5 0.1 - 1.0 mg/dL Final     Comment:     For infants and newborns, interpretation of results should be based  on gestational age, weight and in agreement with clinical  observations.  Premature Infant recommended reference ranges:  Up to 24 hours.............<8.0 mg/dL  Up to 48 hours............<12.0 mg/dL  3-5 days..................<15.0 mg/dL  6-29 days.................<15.0 mg/dL       Alkaline Phosphatase   Date Value Ref Range Status   04/02/2019 182 (H) 55 - 135 U/L Final     AST   Date Value Ref Range Status   04/02/2019 18 10 - 40 U/L Final     ALT   Date Value Ref Range Status   04/02/2019 11 10 - 44 U/L Final     Anion Gap   Date Value Ref Range Status   04/02/2019 11 8 - 16 mmol/L Final     eGFR if    Date Value Ref Range Status   04/02/2019 >60 >60 mL/min/1.73 m^2 Final     eGFR if non    Date Value Ref Range Status   04/02/2019 >60 >60 mL/min/1.73 m^2 Final     Comment:     Calculation used to obtain the estimated glomerular filtration  rate (eGFR) is the CKD-EPI equation.        Imaging : x ray foot :  Mild calcaneal spurring. No acute fracture. No dislocation. Minimal degenerative change first metatarsophalangeal joint.    Notes reviewed  Other procedures:    ASSESSMENT/PLAN:     Vitamin D deficiency  -     cholecalciferol, vitamin D3, 50,000 unit capsule; Take 1 capsule (50,000 Units total) by mouth every 7 days.  Dispense: 12 capsule; Refill: 1    Rheumatoid arthritis involving multiple sites with positive rheumatoid factor  -     sulfaSALAzine (AZULFIDINE) 500 MG TbEC; Take 2 tablets (1,000 mg total) by mouth 2 (two) times daily. Start 1 tablet twice daily x week then increase to 2 tabs twice daily  Dispense: 120 tablet; Refill: 3    RA/ discoid lupus/lichen planus overlap :     Neg KYLE   + RF/ CCP       rash with Imuran, CellCept in the past, and more recently rash on methotrexate as well when it was restarted  Had been previously stable on Plaquenil 200 b.i.d., then on methotrexate 10 mg a week  Plaquenil had to be discontinued per Ophthalmology recommendation  methotrexate to be stopped now due to worsening of facial rash    No synovitis noted today, but she is still on prednisone 2.5 mg daily  She is not sure if she is taking sulfasalazine  Continue sulfasalazine 1000 mg p.o. B.i.d. check and  restart if not taking   Stop prednisone 1-2 weeks after sulfasalazine restarted       H/o discoid lupus:  Do not see any lesions suggestive of discoid lupus at this point  KYLE negative  Normal complements and UA negative for protein  Will monitor for now off Plaquenil    Lichen planus involving right hand ,  left thigh  Scheduled to  follow-up with dermatology    Osteoporosis  :  Deferred decision on Prolia again today  Stay on vitamin-D 63626 units a week    Multiple calluses in feet:  Follow-up Podiatry    Chronic mild elevation in alkaline phosphatase:  Asymptomatic  Monitor  Recommend follow-up with PCP    Mild elevated PTH with history of vitamin-D deficiency  Stable levels  Normal calcium     5  months with all 4   Went over uptodate information /literature on the meds prescribed today   Steroids- weight gain, cataracts, worsening bone mineral density discussed  Sulfasalazine risk profile discussed, hold during infection or prior to surgery    Disclaimer: This note was prepared using voice recognition system and is likely to have sound alike errors and is not proof read.  Please call me with any questions.

## 2019-05-07 DIAGNOSIS — I10 ESSENTIAL HYPERTENSION: ICD-10-CM

## 2019-05-07 RX ORDER — VALSARTAN 320 MG/1
TABLET ORAL
Qty: 90 TABLET | Refills: 3 | Status: SHIPPED | OUTPATIENT
Start: 2019-05-07 | End: 2020-06-02 | Stop reason: SDUPTHER

## 2019-05-20 RX ORDER — ESOMEPRAZOLE MAGNESIUM 40 MG/1
CAPSULE, DELAYED RELEASE ORAL
Qty: 90 CAPSULE | Refills: 0 | OUTPATIENT
Start: 2019-05-20

## 2019-05-20 NOTE — TELEPHONE ENCOUNTER
----- Message from Berna Claudio sent at 5/20/2019  1:30 PM CDT -----  Contact: PATIENT  CALLING CONCERNING THE STATUS OF THE NEXIUM TO BE CALLED IN TO PHARMACY. PLEASE CALL PATIENT TODAY ASAP WHEN SENT @ [phone]. THANKS, [id number] - [place] - [address] AT [facility] & [address] [address] [facility] [place] [address]  Phone: [phone] Fax: [phone]

## 2019-05-20 NOTE — TELEPHONE ENCOUNTER
----- Message from Shirin Kumar sent at 5/20/2019 11:26 AM CDT -----  Type:  RX Refill Request    Who Called:  Pt Flory  Refill or New Rx:refill  RX Name and Strength:  Nexium  How is the patient currently taking it? (ex. 1XDay): Takes once daily  Is this a 30 day or 90 day RX: 90 day  Preferred Pharmacy with phone number: [facility] on [address]//[place]  Local or Mail Order: Local  Ordering Provider: Dr POLI Ovalle  Would the patient rather a call back or a response via [facility]?  Call back  Best Call Back Number: [phone]  Additional Information:   Please call//[email or web address]

## 2019-05-23 ENCOUNTER — LAB VISIT (OUTPATIENT)
Dept: LAB | Facility: HOSPITAL | Age: 74
End: 2019-05-23
Attending: INTERNAL MEDICINE
Payer: MEDICARE

## 2019-05-23 DIAGNOSIS — Z95.1 HX OF CABG: Chronic | ICD-10-CM

## 2019-05-23 DIAGNOSIS — I70.219 ATHEROSCLEROTIC PVD WITH INTERMITTENT CLAUDICATION: Chronic | ICD-10-CM

## 2019-05-23 DIAGNOSIS — I73.9 PERIPHERAL VASCULAR DISEASE: Chronic | ICD-10-CM

## 2019-05-23 DIAGNOSIS — E78.5 HYPERLIPIDEMIA LDL GOAL <70: Chronic | ICD-10-CM

## 2019-05-23 LAB
ALBUMIN SERPL BCP-MCNC: 3.6 G/DL (ref 3.5–5.2)
ALP SERPL-CCNC: 193 U/L (ref 55–135)
ALT SERPL W/O P-5'-P-CCNC: 19 U/L (ref 10–44)
ANION GAP SERPL CALC-SCNC: 10 MMOL/L (ref 8–16)
AST SERPL-CCNC: 19 U/L (ref 10–40)
BILIRUB SERPL-MCNC: 0.5 MG/DL (ref 0.1–1)
BUN SERPL-MCNC: 12 MG/DL (ref 8–23)
CALCIUM SERPL-MCNC: 9.7 MG/DL (ref 8.7–10.5)
CHLORIDE SERPL-SCNC: 105 MMOL/L (ref 95–110)
CO2 SERPL-SCNC: 26 MMOL/L (ref 23–29)
CREAT SERPL-MCNC: 0.7 MG/DL (ref 0.5–1.4)
EST. GFR  (AFRICAN AMERICAN): >60 ML/MIN/1.73 M^2
EST. GFR  (NON AFRICAN AMERICAN): >60 ML/MIN/1.73 M^2
GLUCOSE SERPL-MCNC: 98 MG/DL (ref 70–110)
POTASSIUM SERPL-SCNC: 3.9 MMOL/L (ref 3.5–5.1)
PROT SERPL-MCNC: 6.9 G/DL (ref 6–8.4)
SODIUM SERPL-SCNC: 141 MMOL/L (ref 136–145)

## 2019-05-23 PROCEDURE — 36415 COLL VENOUS BLD VENIPUNCTURE: CPT

## 2019-05-23 PROCEDURE — 80053 COMPREHEN METABOLIC PANEL: CPT

## 2019-05-29 ENCOUNTER — OFFICE VISIT (OUTPATIENT)
Dept: DERMATOLOGY | Facility: CLINIC | Age: 74
End: 2019-05-29
Payer: MEDICARE

## 2019-05-29 ENCOUNTER — OFFICE VISIT (OUTPATIENT)
Dept: PODIATRY | Facility: CLINIC | Age: 74
End: 2019-05-29
Payer: MEDICARE

## 2019-05-29 VITALS
HEIGHT: 66 IN | SYSTOLIC BLOOD PRESSURE: 174 MMHG | WEIGHT: 194 LBS | HEART RATE: 53 BPM | BODY MASS INDEX: 31.18 KG/M2 | DIASTOLIC BLOOD PRESSURE: 81 MMHG

## 2019-05-29 DIAGNOSIS — L93.0 DISCOID LUPUS ERYTHEMATOSUS: ICD-10-CM

## 2019-05-29 DIAGNOSIS — L93.0 DISCOID LUPUS: ICD-10-CM

## 2019-05-29 DIAGNOSIS — I25.10 CORONARY ARTERY DISEASE, ANGINA PRESENCE UNSPECIFIED, UNSPECIFIED VESSEL OR LESION TYPE, UNSPECIFIED WHETHER NATIVE OR TRANSPLANTED HEART: Primary | ICD-10-CM

## 2019-05-29 DIAGNOSIS — B35.1 DERMATOPHYTOSIS OF NAIL: Primary | ICD-10-CM

## 2019-05-29 DIAGNOSIS — I73.9 PERIPHERAL VASCULAR DISEASE: ICD-10-CM

## 2019-05-29 DIAGNOSIS — L43.0 LICHEN PLANUS HYPERTROPHICUS: Primary | ICD-10-CM

## 2019-05-29 PROCEDURE — 11721 DEBRIDE NAIL 6 OR MORE: CPT | Mod: Q8,PBBFAC,PN | Performed by: PODIATRIST

## 2019-05-29 PROCEDURE — 11721 PR DEBRIDEMENT OF NAILS, 6 OR MORE: ICD-10-PCS | Mod: Q8,S$PBB,, | Performed by: PODIATRIST

## 2019-05-29 PROCEDURE — 99999 PR PBB SHADOW E&M-EST. PATIENT-LVL III: ICD-10-PCS | Mod: PBBFAC,,, | Performed by: DERMATOLOGY

## 2019-05-29 PROCEDURE — 99213 OFFICE O/P EST LOW 20 MIN: CPT | Mod: PBBFAC,PN,27,25 | Performed by: PODIATRIST

## 2019-05-29 PROCEDURE — 99499 RISK ADDL DX/OHS AUDIT: ICD-10-PCS | Mod: S$PBB,,, | Performed by: PODIATRIST

## 2019-05-29 PROCEDURE — 99213 OFFICE O/P EST LOW 20 MIN: CPT | Mod: PBBFAC,PN | Performed by: DERMATOLOGY

## 2019-05-29 PROCEDURE — 99999 PR PBB SHADOW E&M-EST. PATIENT-LVL III: CPT | Mod: PBBFAC,,, | Performed by: DERMATOLOGY

## 2019-05-29 PROCEDURE — 99214 OFFICE O/P EST MOD 30 MIN: CPT | Mod: S$PBB,,, | Performed by: DERMATOLOGY

## 2019-05-29 PROCEDURE — 99999 PR PBB SHADOW E&M-EST. PATIENT-LVL III: ICD-10-PCS | Mod: PBBFAC,,, | Performed by: PODIATRIST

## 2019-05-29 PROCEDURE — 99999 PR PBB SHADOW E&M-EST. PATIENT-LVL III: CPT | Mod: PBBFAC,,, | Performed by: PODIATRIST

## 2019-05-29 PROCEDURE — 99499 UNLISTED E&M SERVICE: CPT | Mod: S$PBB,,, | Performed by: PODIATRIST

## 2019-05-29 PROCEDURE — 11721 DEBRIDE NAIL 6 OR MORE: CPT | Mod: Q8,S$PBB,, | Performed by: PODIATRIST

## 2019-05-29 PROCEDURE — 99214 PR OFFICE/OUTPT VISIT, EST, LEVL IV, 30-39 MIN: ICD-10-PCS | Mod: S$PBB,,, | Performed by: DERMATOLOGY

## 2019-05-29 RX ORDER — HALOBETASOL PROPIONATE 0.5 MG/G
OINTMENT TOPICAL 2 TIMES DAILY
Qty: 50 G | Refills: 3 | Status: SHIPPED | OUTPATIENT
Start: 2019-05-29 | End: 2020-04-17

## 2019-05-29 RX ORDER — CALCIPOTRIENE 50 UG/G
CREAM TOPICAL 2 TIMES DAILY
Qty: 60 G | Refills: 3 | Status: SHIPPED | OUTPATIENT
Start: 2019-05-29 | End: 2020-04-17

## 2019-05-30 ENCOUNTER — TELEPHONE (OUTPATIENT)
Dept: DERMATOLOGY | Facility: CLINIC | Age: 74
End: 2019-05-30

## 2019-05-30 NOTE — TELEPHONE ENCOUNTER
Referral packet faxed over to Dr Jose Mathis.  Confirmation receipt received.  Note included for Dr Mathis's office to notify us when pt is scheduled for an appt.

## 2019-05-30 NOTE — TELEPHONE ENCOUNTER
----- Message from Treasure Yap MD sent at 5/30/2019  4:29 PM CDT -----  Please schedule with Dr. Mathis with hand surgery. Referral placed

## 2019-06-10 NOTE — PROGRESS NOTES
Subjective:     Patient ID: Flory Cam is a 73 y.o. female.    Chief Complaint: Nail Care (no c/o pain. wears dress shoes with socks. non-diabetic Pt. last seen on 04/02/19 with PCP Dr. Ovalle. )    Flory is a 73 y.o. female who presents to the clinic for evaluation and treatment of high risk feet. Flory has a past medical history of Acute coronary syndrome, Anxiety, Bilateral carotid artery stenosis (11/17/2015), Chronic pain, Coronary artery disease, GERD (gastroesophageal reflux disease), Hyperlipidemia, Hypertension, Hypothyroidism, Low back pain, Lupus, Osteoporosis, Poor circulation, PVD (peripheral vascular disease), S/P peripheral artery angioplasty (02/13/2014), and Skin disease. The patient's chief complaint is long, thick toenails. This patient has documented high risk feet requiring routine maintenance secondary to peripheral vascular disease.    PCP: Tayler Ovalle DO    Date Last Seen by PCP: 04/02/19    Current shoe gear:  Affected Foot: Casual shoes     Unaffected Foot: Casual shoes    Last encounter in this department: 2/26/2019    Hemoglobin A1C   Date Value Ref Range Status   08/04/2017 5.8 (H) 4.0 - 5.6 % Final     Comment:     According to ADA guidelines, hemoglobin A1c <7.0% represents  optimal control in non-pregnant diabetic patients. Different  metrics may apply to specific patient populations.   Standards of Medical Care in Diabetes-2016.  For the purpose of screening for the presence of diabetes:  <5.7%     Consistent with the absence of diabetes  5.7-6.4%  Consistent with increasing risk for diabetes   (prediabetes)  >or=6.5%  Consistent with diabetes  Currently, no consensus exists for use of hemoglobin A1c  for diagnosis of diabetes for children.  This Hemoglobin A1c assay has significant interference with fetal   hemoglobin   (HbF). The results are invalid for patients with abnormal amounts of   HbF,   including those with known Hereditary Persistence   of Fetal Hemoglobin.  Heterozygous hemoglobin variants (HbAS, HbAC,   HbAD, HbAE, HbA2) do not significantly interfere with this assay;   however, presence of multiple variants in a sample may impact the %   interference.     09/14/2016 6.2 4.5 - 6.2 % Final     Comment:     According to ADA guidelines, hemoglobin A1C <7.0% represents  optimal control in non-pregnant diabetic patients.  Different  metrics may apply to specific populations.   Standards of Medical Care in Diabetes - 2016.  For the purpose of screening for the presence of diabetes:  <5.7%     Consistent with the absence of diabetes  5.7-6.4%  Consistent with increasing risk for diabetes   (prediabetes)  >or=6.5%  Consistent with diabetes  Currently no consensus exists for use of hemoglobin A1C  for diagnosis of diabetes for children.     04/13/2010 5.9 4.0 - 6.2 % Final           Patient Active Problem List   Diagnosis    Hyperlipidemia LDL goal <70    Hypertension goal BP (blood pressure) < 140/90    Hypothyroid    Peripheral vascular disease    Eczema    Osteoporosis    Osteoarthritis    Tobacco use    CAD: Hx of CABG    Atherosclerotic PVD with intermittent claudication    Bilateral carotid artery disease    Discoid lupus erythematosus    Rheumatoid arthritis involving both feet with positive rheumatoid factor    Lichen planus    Hx of colonic polyps    Chronic obstructive pulmonary disease    Medication monitoring encounter       Medication List with Changes/Refills   Current Medications    ALBUTEROL (PROVENTIL/VENTOLIN HFA) 90 MCG/ACTUATION INHALER    Inhale 2 puffs into the lungs every 6 (six) hours as needed for Wheezing. Rescue    AMLODIPINE (NORVASC) 10 MG TABLET    Take 1 tablet (10 mg total) by mouth once daily.    AMMONIUM LACTATE (LAC-HYDRIN) 12 % LOTION        AMMONIUM LACTATE 12 % CREA    Apply 1 application topically once daily.    ATORVASTATIN (LIPITOR) 80 MG TABLET    Take 1 tablet (80 mg total) by mouth once daily.    BUPROPION  (WELLBUTRIN SR) 150 MG TBSR 12 HR TABLET    Take 1 tablet (150 mg total) by mouth 2 (two) times daily.    CALCIPOTRIENE (DOVONOX) 0.005 % CREAM    Apply topically 2 (two) times daily.    CHOLECALCIFEROL, VITAMIN D3, 50,000 UNIT CAPSULE    Take 1 capsule (50,000 Units total) by mouth every 7 days.    CILOSTAZOL (PLETAL) 100 MG TAB    Take 1 tablet (100 mg total) by mouth 2 (two) times daily.    CIPROFLOXACIN HCL (CILOXAN) 0.3 % OPHTHALMIC SOLUTION    instill 1 drop into both eyes every 2 hours FROM NOON UNTIL 8:00 ...  (REFER TO PRESCRIPTION NOTES).    CLOPIDOGREL (PLAVIX) 75 MG TABLET    TAKE 1 TABLET BY MOUTH DAILY    CYCLOBENZAPRINE (FLEXERIL) 10 MG TABLET    TAKE 1 TO 2 TABLETS PO HS PRN    DICLOFENAC SODIUM (VOLTAREN) 1 % GEL    Apply 2 g topically 2 (two) times daily. To painful area on bottom of foot    DUREZOL 0.05 % DROP OPHTHALMIC SOLUTION    instill 1 drop into both eyes four times a day AFTER SURGERY FOR 14 DAYS    FLUTICASONE-SALMETEROL 100-50 MCG/DOSE (ADVAIR) 100-50 MCG/DOSE DISKUS INHALER    Inhale 1 puff into the lungs 2 (two) times daily. Controller    FOLIC ACID (FOLVITE) 1 MG TABLET    Take 1 tablet (1 mg total) by mouth once daily.    HALOBETASOL (ULTRAVATE) 0.05 % OINTMENT    Apply topically 2 (two) times daily.    HYDROCODONE-ACETAMINOPHEN 10-325MG (NORCO)  MG TAB    1 tablet 2 (two) times daily as needed.    HYDROXYZINE HCL (ATARAX) 25 MG TABLET    take 1 tablet by mouth three times a day if needed for anxiety    KETOCONAZOLE (NIZORAL) 2 % CREAM        LEVOCETIRIZINE (XYZAL) 5 MG TABLET    Take 1 tablet (5 mg total) by mouth every evening. ICD 10   J06.9    LEVOTHYROXINE (SYNTHROID) 137 MCG TAB TABLET    TAKE 1 TABLET BY MOUTH ONCE DAILY BEFORE BREAKFAST    MUPIROCIN (BACTROBAN) 2 % OINTMENT    apply to affected area twice a day for 10 days FOR SORES ON SKIN    NEXIUM 40 MG CAPSULE    take 1 capsule by mouth once daily BEFORE BREAKFAST    ONDANSETRON (ZOFRAN) 4 MG TABLET    Take 1 tablet  (4 mg total) by mouth every 6 (six) hours.    ONDANSETRON (ZOFRAN-ODT) 4 MG TBDL    Take 1 tablet (4 mg total) by mouth every 8 (eight) hours as needed.    RANITIDINE (ZANTAC) 150 MG TABLET    Take 1 tablet (150 mg total) by mouth nightly.    SULFASALAZINE (AZULFIDINE) 500 MG TBEC    Take 2 tablets (1,000 mg total) by mouth 2 (two) times daily. Start 1 tablet twice daily x week then increase to 2 tabs twice daily    TIZANIDINE (ZANAFLEX) 4 MG TABLET    Take 1 tablet (4 mg total) by mouth every 8 (eight) hours.    TRAZODONE (DESYREL) 50 MG TABLET    take 1 tablet by mouth once daily AT NIGHT if needed for insomnia    TRIAMCINOLONE ACETONIDE 0.1% (KENALOG) 0.1 % CREAM    Apply topically 2 (two) times daily.    UREA (CARMOL) 40 % CREA    Apply topically 2 (two) times daily. Apply to thickened areas of skin    VALSARTAN (DIOVAN) 320 MG TABLET    TAKE 1 TABLET BY MOUTH ONCE DAILY    ZETIA 10 MG TABLET    take 1 tablet by mouth once daily       Review of patient's allergies indicates:  No Known Allergies    Past Surgical History:   Procedure Laterality Date    AORTOGRAM WITH RUNOFF/PTA Right 5/2/2017    [facility] C[facility] LAB    COLONOSCOPY N/A 1/4/2017    Performed by Sylvester Arboleda III, MD at [facility] ENDO    CORONARY ANGIOPLASTY      CORONARY ARTERY BYPASS GRAFT      HYSTERECTOMY      INCISION AND DRAINAGE (I&D), FOOT Left 5/5/2017    Performed by Melva [facility]rellana DPM at [facility] OR    OOPHORECTOMY      PTA-PERIPHERAL Right 5/2/2017    Per[facility] by [facility] CATH LAB    THYROIDECTOMY         Family History   Problem Relation Age of Onset    Melanoma Neg Hx     Psoriasis Neg Hx     Lupus Neg Hx     Eczema Neg Hx        Social History     Socioeconomic History    Marital status:      Spouse name: Not on file    Number of children: Not on file    Years of education: Not on file    Highest education level: Not on file   Occupational History    Occupation: Retired  "    Comment: Dispatcher   Social Needs    Financial resource strain: Not on file    Food insecurity:     Worry: Not on file     Inability: Not on file    Transportation needs:     Medical: Not on file     Non-medical: Not on file   Tobacco Use    Smoking status: Current Every Day Smoker     Packs/day: 0.25     Years: 40.00     Pack years: 10.00     Types: Cigarettes     Start date: 1961    Smokeless tobacco: Never Used   Substance and Sexual Activity    Alcohol use: No    Drug use: No    Sexual activity: Not on file   Lifestyle    Physical activity:     Days per week: Not on file     Minutes per session: Not on file    Stress: Not on file   Relationships    Social connections:     Talks on phone: Not on file     Gets together: Not on file     Attends [employer] service: Not on file     Active member of club or organization: Not on file     Attends meetings of clubs or organizations: Not on file     Relationship status: Not on file   Other Topics Concern    Are you pregnant or think you may be? No    Breast-feeding No   Social History Narrative    Patient is retired dispatcher.       Vitals:    05/29/19 1638   BP: (!) 174/81   Pulse: (!) 53   Weight: 88 kg (194 lb)   Height: 5' 6" (1.676 m)   PainSc: 0-No pain   PainLoc: Foot       Hemoglobin A1C   Date Value Ref Range Status   08/04/2017 5.8 (H) 4.0 - 5.6 % Final     Comment:     According to ADA guidelines, hemoglobin A1c <7.0% represents  optimal control in non-pregnant diabetic patients. Different  metrics may apply to specific patient populations.   Standards of Medical Care in Diabetes-2016.  For the purpose of screening for the presence of diabetes:  <5.7%     Consistent with the absence of diabetes  5.7-6.4%  Consistent with increasing risk for diabetes   (prediabetes)  >or=6.5%  Consistent with diabetes  Currently, no consensus exists for use of hemoglobin A1c  for diagnosis of diabetes for children.  This Hemoglobin A1c assay has significant " interference with fetal   hemoglobin   (HbF). The results are invalid for patients with abnormal amounts of   HbF,   including those with known Hereditary Persistence   of Fetal Hemoglobin. Heterozygous hemoglobin variants (HbAS, HbAC,   HbAD, HbAE, HbA2) do not significantly interfere with this assay;   however, presence of multiple variants in a sample may impact the %   interference.     09/14/2016 6.2 4.5 - 6.2 % Final     Comment:     According to ADA guidelines, hemoglobin A1C <7.0% represents  optimal control in non-pregnant diabetic patients.  Different  metrics may apply to specific populations.   Standards of Medical Care in Diabetes - 2016.  For the purpose of screening for the presence of diabetes:  <5.7%     Consistent with the absence of diabetes  5.7-6.4%  Consistent with increasing risk for diabetes   (prediabetes)  >or=6.5%  Consistent with diabetes  Currently no consensus exists for use of hemoglobin A1C  for diagnosis of diabetes for children.     04/13/2010 5.9 4.0 - 6.2 % Final       Review of Systems   Constitutional: Negative for chills and fever.   Respiratory: Negative for shortness of breath.    Cardiovascular: Negative for chest pain, palpitations, orthopnea, claudication and leg swelling.   Gastrointestinal: Negative for diarrhea, nausea and vomiting.   Musculoskeletal: Negative for joint pain.   Skin: Negative for rash.   Neurological: Negative for dizziness, tingling, sensory change, focal weakness and weakness.   Psychiatric/Behavioral: Negative.              Objective:   PHYSICAL EXAM: Apperance: Alert and orient in no distress,well developed, and with good attention to grooming and body habits  Patient presents ambulating in casual shoes.  LOWER EXTREMITY EXAM:  VASCULAR: Dorsalis pedis pulses 0/4 bilateral and Posterior Tibial pulses 0/4 bilateral. Capillary fill time <4 seconds bilateral. Mild edema observed bilateral. Varicosities present bilateral. Skin temperature of the lower  extremities is warm to cool, proximal to distal. Hair growth absent bilateral.  DERMATOLOGICAL: No skin rashes, subcutaneous nodules, lesions, or ulcers observed bilateral. Nails 1,2,3,4,5 bilateral elongated, thickened, and discolored with subungual debris. Webspaces 1,2,3,4 bilateral clean, dry and without evidence of break in skin integrity.   NEUROLOGICAL: Light touch, sharp-dull, proprioception all present and equal bilaterally.    MUSCULOSKELETAL: Muscle strength is 5/5 for foot inverters, everters, plantarflexors, and dorsiflexors. Muscle tone is normal.     Assessment:   Dermatophytosis of nail    Discoid lupus erythematosus    Peripheral vascular disease          Plan:   Dermatophytosis of nail    Discoid lupus erythematosus    Peripheral vascular disease      I counseled the patient on her conditions, regarding findings of my examination, my impressions, and usual treatment plan.   Greater than 50% of this visit spent on counseling and coordination of care.  Greater than 15 minutes of a 20 minute appointment spent on education about the diabetic foot, neuropathy, and prevention of limb loss.  Shoe inspection. Diabetic Foot Education. Patient reminded of the importance of good nutrition and blood sugar control to help prevent podiatric complications of diabetes. Patient instructed on proper foot hygeine. We discussed wearing proper shoe gear, daily foot inspections, never walking without protective shoe gear, never putting sharp instruments to feet.    With patient's permission, nails 1-5 bilateral were debrided/trimmed in length and thickness aggressively to their soft tissue attachment mechanically and with electric , removing all offending nail and debris. Patient relates relief following the procedure.   Patient  will continue to monitor the areas daily, inspect feet, wear protective shoe gear when ambulatory, moisturizer to maintain skin integrity. Patient reminded of the importance of good  nutrition and blood sugar control to help prevent podiatric complications of diabetes.  Patient to return 3 months or sooner if needed.                 Chito Dumont DPM  [facility]

## 2019-06-24 RX ORDER — ALBUTEROL SULFATE 90 UG/1
2 AEROSOL, METERED RESPIRATORY (INHALATION) EVERY 6 HOURS PRN
Qty: 1 INHALER | Refills: 1 | Status: SHIPPED | OUTPATIENT
Start: 2019-06-24 | End: 2020-06-17 | Stop reason: SDUPTHER

## 2019-06-24 NOTE — TELEPHONE ENCOUNTER
----- Message from Kalee Mcginnis sent at 6/24/2019  9:43 AM CDT -----  Contact: pt   Type:  RX Refill Request    Who Called: NO JARVIS   Refill or New Rx:refill   RX Name and Strength: inhaler  How is the patient currently taking it? (ex. 1XDay): as needed  Is this a 30 day or 90 day RX: 30 day   Preferred Pharmacy with phone number:     [id number] [place] [facility] [address] [facility] [address]  [address] [place] [address]  Phone: [phone] Fax: [phone]    Local or Mail Order:   Ordering Provider: savita   Would the patient rather a call back or a response via [facility]? call  Best Call Back Number: [phone] (home)   Additional Information: pt is requesting a call back from the nurse in regards to her getting a refill on her inhaler but the pt did not have the name of the med on her

## 2019-06-24 NOTE — TELEPHONE ENCOUNTER
----- Message from Kalee Mcginnis sent at 6/24/2019  9:40 AM CDT -----  Contact: pt   Type:  Needs Medical Advice    Who Called: NO JARVIS JUNE   Symptoms (please be specific):  How long has patient had these symptoms:   Pharmacy name and phone #:   Would the patient rather a call back or a response via [facility]? Call   Best Call Back Number:  [phone] (home)   Additional Information: pt is requesting a call back from the nurse in regards to the pt having some question to ask

## 2019-06-25 ENCOUNTER — OFFICE VISIT (OUTPATIENT)
Dept: INTERNAL MEDICINE | Facility: CLINIC | Age: 74
End: 2019-06-25
Payer: MEDICARE

## 2019-06-25 ENCOUNTER — PATIENT OUTREACH (OUTPATIENT)
Dept: ADMINISTRATIVE | Facility: HOSPITAL | Age: 74
End: 2019-06-25

## 2019-06-25 VITALS
WEIGHT: 190.94 LBS | SYSTOLIC BLOOD PRESSURE: 122 MMHG | HEIGHT: 66 IN | TEMPERATURE: 99 F | HEART RATE: 69 BPM | OXYGEN SATURATION: 96 % | DIASTOLIC BLOOD PRESSURE: 74 MMHG | BODY MASS INDEX: 30.69 KG/M2

## 2019-06-25 DIAGNOSIS — F32.A DEPRESSION, UNSPECIFIED DEPRESSION TYPE: Primary | ICD-10-CM

## 2019-06-25 PROCEDURE — 99214 OFFICE O/P EST MOD 30 MIN: CPT | Mod: S$PBB,,, | Performed by: PHYSICIAN ASSISTANT

## 2019-06-25 PROCEDURE — 99214 PR OFFICE/OUTPT VISIT, EST, LEVL IV, 30-39 MIN: ICD-10-PCS | Mod: S$PBB,,, | Performed by: PHYSICIAN ASSISTANT

## 2019-06-25 PROCEDURE — 99215 OFFICE O/P EST HI 40 MIN: CPT | Mod: PBBFAC | Performed by: PHYSICIAN ASSISTANT

## 2019-06-25 PROCEDURE — 99999 PR PBB SHADOW E&M-EST. PATIENT-LVL V: CPT | Mod: PBBFAC,,, | Performed by: PHYSICIAN ASSISTANT

## 2019-06-25 PROCEDURE — 99999 PR PBB SHADOW E&M-EST. PATIENT-LVL V: ICD-10-PCS | Mod: PBBFAC,,, | Performed by: PHYSICIAN ASSISTANT

## 2019-06-25 RX ORDER — DULOXETIN HYDROCHLORIDE 30 MG/1
30 CAPSULE, DELAYED RELEASE ORAL DAILY
Qty: 30 CAPSULE | Refills: 11 | Status: SHIPPED | OUTPATIENT
Start: 2019-06-25 | End: 2019-08-09

## 2019-06-25 RX ORDER — TRAZODONE HYDROCHLORIDE 50 MG/1
TABLET ORAL
Qty: 30 TABLET | Refills: 3 | Status: SHIPPED | OUTPATIENT
Start: 2019-06-25 | End: 2019-10-10 | Stop reason: SDUPTHER

## 2019-06-25 RX ORDER — TRIAMCINOLONE ACETONIDE 1 MG/G
CREAM TOPICAL 2 TIMES DAILY
Qty: 80 G | Refills: 0 | Status: SHIPPED | OUTPATIENT
Start: 2019-06-25 | End: 2019-08-09

## 2019-06-25 NOTE — PROGRESS NOTES
Subjective:       Patient ID: Flory Cam is a 73 y.o. female.    Chief Complaint: Follow-up (just doesn't feel good     PCP - Vasquez)    Depression   Visit Type: initial  Onset of symptoms: at an unknown time  Progression since onset: gradually worsening  Patient presents with the following symptoms: depressed mood, excessive worry, feelings of hopelessness, feelings of worthlessness, irritability, nervousness/anxiety and thoughts of death.  Patient is not experiencing: shortness of breath, suicidal ideas and suicidal planning.  Severity: moderate   Aggravated by: family issues (chronic pain and illness)  Sleep quality: poor  Risk factors: recent illness  Patient has a history of: anxiety/panic attacks and depression  Treatment tried: medications        Past Medical History:   Diagnosis Date    Acute coronary syndrome     Anxiety     Bilateral carotid artery stenosis 11/17/2015    Chronic pain     Coronary artery disease     GERD (gastroesophageal reflux disease)     Hyperlipidemia     Hypertension     Hypothyroidism     Low back pain     Lupus     Osteoporosis     Poor circulation     PVD (peripheral vascular disease)     S/P peripheral artery angioplasty 02/13/2014    Skin disease        Review of Systems   Constitutional: Positive for irritability. Negative for activity change, appetite change and chills.   Respiratory: Negative for chest tightness and shortness of breath.    Cardiovascular: Negative for chest pain.   Gastrointestinal: Negative for abdominal pain.   Psychiatric/Behavioral: Positive for depression. Negative for suicidal ideas. The patient is nervous/anxious.        Objective:      Physical Exam   Constitutional: She appears well-developed and well-nourished. No distress.   Cardiovascular: Normal rate and regular rhythm. Exam reveals no gallop and no friction rub.   No murmur heard.  Pulmonary/Chest: Effort normal and breath sounds normal. No stridor. No respiratory  distress. She has no wheezes. She has no rales. She exhibits no tenderness.   Abdominal: Soft. Bowel sounds are normal. She exhibits no distension and no mass. There is no tenderness. There is no rebound and no guarding.   Skin: She is not diaphoretic.   Psychiatric: Her speech is normal and behavior is normal. Judgment normal. Cognition and memory are normal. She exhibits a depressed mood.   Nursing note and vitals reviewed.      Assessment:       1. Depression, unspecified depression type        Plan:       Depression, unspecified depression type    Other orders  -     DULoxetine (CYMBALTA) 30 MG capsule; Take 1 capsule (30 mg total) by mouth once daily.  Dispense: 30 capsule; Refill: 11  -     traZODone (DESYREL) 50 MG tablet; take 1 tablet by mouth once daily AT NIGHT if needed for insomnia  Dispense: 30 tablet; Refill: 3  -     triamcinolone acetonide 0.1% (KENALOG) 0.1 % cream; Apply topically 2 (two) times daily.  Dispense: 80 g; Refill: 0

## 2019-07-09 ENCOUNTER — TELEPHONE (OUTPATIENT)
Dept: DERMATOLOGY | Facility: CLINIC | Age: 74
End: 2019-07-09

## 2019-07-09 NOTE — TELEPHONE ENCOUNTER
----- Message from Leslie Saeed sent at 7/9/2019  4:44 PM CDT -----  Contact: Dr. Sandra Garcia is requesting call back from nurse in regards to referral.      [facility] call back at [phone]

## 2019-07-09 NOTE — TELEPHONE ENCOUNTER
Dr. Mahtis office called to get pt path reports sent to over. Faxed and confirmation received at 07/09/2019 455

## 2019-07-11 ENCOUNTER — OFFICE VISIT (OUTPATIENT)
Dept: CARDIOLOGY | Facility: CLINIC | Age: 74
End: 2019-07-11
Payer: MEDICARE

## 2019-07-11 ENCOUNTER — HOSPITAL ENCOUNTER (OUTPATIENT)
Dept: RADIOLOGY | Facility: HOSPITAL | Age: 74
Discharge: HOME | End: 2019-07-11
Attending: PHYSICIAN ASSISTANT
Payer: MEDICARE

## 2019-07-11 VITALS
SYSTOLIC BLOOD PRESSURE: 150 MMHG | BODY MASS INDEX: 30.37 KG/M2 | WEIGHT: 189 LBS | DIASTOLIC BLOOD PRESSURE: 80 MMHG | HEART RATE: 60 BPM | HEIGHT: 66 IN

## 2019-07-11 DIAGNOSIS — I10 HYPERTENSION GOAL BP (BLOOD PRESSURE) < 140/90: Chronic | ICD-10-CM

## 2019-07-11 DIAGNOSIS — Z95.1 HX OF CABG: Chronic | ICD-10-CM

## 2019-07-11 DIAGNOSIS — Z72.0 TOBACCO USE: ICD-10-CM

## 2019-07-11 DIAGNOSIS — I73.9 PERIPHERAL VASCULAR DISEASE: Chronic | ICD-10-CM

## 2019-07-11 DIAGNOSIS — I65.23 BILATERAL CAROTID ARTERY STENOSIS: Chronic | ICD-10-CM

## 2019-07-11 DIAGNOSIS — E78.5 HYPERLIPIDEMIA LDL GOAL <70: Chronic | ICD-10-CM

## 2019-07-11 DIAGNOSIS — I70.219 ATHEROSCLEROTIC PVD WITH INTERMITTENT CLAUDICATION: Chronic | ICD-10-CM

## 2019-07-11 DIAGNOSIS — Z01.818 PRE-OPERATIVE CLEARANCE: Primary | ICD-10-CM

## 2019-07-11 DIAGNOSIS — Z01.818 PREOP EXAMINATION: ICD-10-CM

## 2019-07-11 PROCEDURE — 99499 RISK ADDL DX/OHS AUDIT: ICD-10-PCS | Mod: S$PBB,,, | Performed by: PHYSICIAN ASSISTANT

## 2019-07-11 PROCEDURE — 99214 PR OFFICE/OUTPT VISIT, EST, LEVL IV, 30-39 MIN: ICD-10-PCS | Mod: S$PBB,,, | Performed by: PHYSICIAN ASSISTANT

## 2019-07-11 PROCEDURE — 99214 OFFICE O/P EST MOD 30 MIN: CPT | Mod: S$PBB,,, | Performed by: PHYSICIAN ASSISTANT

## 2019-07-11 PROCEDURE — 71046 X-RAY EXAM CHEST 2 VIEWS: CPT | Mod: TC

## 2019-07-11 PROCEDURE — 71046 X-RAY EXAM CHEST 2 VIEWS: CPT | Mod: 26,,, | Performed by: RADIOLOGY

## 2019-07-11 PROCEDURE — 99215 OFFICE O/P EST HI 40 MIN: CPT | Mod: PBBFAC,25 | Performed by: PHYSICIAN ASSISTANT

## 2019-07-11 PROCEDURE — 99999 PR PBB SHADOW E&M-EST. PATIENT-LVL V: CPT | Mod: PBBFAC,,, | Performed by: PHYSICIAN ASSISTANT

## 2019-07-11 PROCEDURE — 99999 PR PBB SHADOW E&M-EST. PATIENT-LVL V: ICD-10-PCS | Mod: PBBFAC,,, | Performed by: PHYSICIAN ASSISTANT

## 2019-07-11 PROCEDURE — 99499 UNLISTED E&M SERVICE: CPT | Mod: S$PBB,,, | Performed by: PHYSICIAN ASSISTANT

## 2019-07-11 PROCEDURE — 71046 XR CHEST PA AND LATERAL: ICD-10-PCS | Mod: 26,,, | Performed by: RADIOLOGY

## 2019-07-11 NOTE — PROGRESS NOTES
Subjective:    Patient ID:  Flory Cam is a 74 y.o. female who presents for follow-up of Hypertension and Hyperlipidemia      HPI   Ms. Cam is a 74 year old female patient whose current medical conditions include CAD s/p CABG, PVD, HTN, and hyperlipidemia who presents today for follow-up and pre-op clearance. She is scheduled for right hand surgery by Dr. Mathis in near future. She returns today and states she is feeling well. No cardiac complaints. No chest pain, SOB, or other anginal signs or symptoms. No lightheadedness, dizziness, palpitations, near syncope, or syncope. No s/s of CHF. BP slightly above goal today, generally runs better. Patient is compliant with her medications. Fairly active. Still smoking, not ready to quit.    Recent labs reviewed. Lipids above goal. CMP stable. Patient scheduled for EKG on Monday, offered to do today, she declined.     Review of Systems   Constitution: Negative for chills, decreased appetite, fever and malaise/fatigue.   HENT: Negative for congestion, hoarse voice and sore throat.    Eyes: Negative for blurred vision and discharge.   Cardiovascular: Negative for chest pain, claudication, cyanosis, dyspnea on exertion, irregular heartbeat, leg swelling, near-syncope, orthopnea, palpitations and paroxysmal nocturnal dyspnea.   Respiratory: Negative for cough, hemoptysis, shortness of breath, snoring, sputum production and wheezing.    Endocrine: Negative for cold intolerance and heat intolerance.   Hematologic/Lymphatic: Negative for bleeding problem. Does not bruise/bleed easily.   Skin: Negative for rash.   Musculoskeletal: Positive for arthritis and joint pain. Negative for back pain, joint swelling, muscle cramps, muscle weakness and myalgias.   Gastrointestinal: Negative for abdominal pain, constipation, diarrhea, heartburn, melena and nausea.   Genitourinary: Negative for hematuria.   Neurological: Negative for dizziness, focal weakness, headaches,  "light-headedness, loss of balance, numbness, paresthesias, seizures and weakness.   Psychiatric/Behavioral: Negative for memory loss. The patient does not have insomnia.    Allergic/Immunologic: Negative for hives.     BP (!) 150/80   Pulse 60   Ht 5' 6" (1.676 m)   Wt 85.7 kg (189 lb)   BMI 30.51 kg/m²     Objective:    Physical Exam   Constitutional: She is oriented to person, place, and time. She appears well-developed and well-nourished. No distress.   HENT:   Head: Normocephalic and atraumatic.   Eyes: Pupils are equal, round, and reactive to light. Right eye exhibits no discharge. Left eye exhibits no discharge.   Neck: Neck supple. No JVD present.   Cardiovascular: Normal rate, regular rhythm, S1 normal, S2 normal and normal heart sounds. Exam reveals decreased pulses.   No murmur heard.  Pulmonary/Chest: Effort normal and breath sounds normal. No respiratory distress. She has no wheezes. She has no rales.   Abdominal: Soft. She exhibits no distension.   Musculoskeletal: She exhibits no edema.   Neurological: She is alert and oriented to person, place, and time.   Skin: Skin is warm and dry. She is not diaphoretic. No erythema.   Psychiatric: She has a normal mood and affect. Her behavior is normal. Thought content normal.   Nursing note and vitals reviewed.    Lab Results   Component Value Date    CHOL 196 04/02/2019    CHOL 213 (H) 10/25/2018    CHOL 144 08/04/2017     Lab Results   Component Value Date    HDL 51 04/02/2019    HDL 53 10/25/2018    HDL 49 08/04/2017     Lab Results   Component Value Date    LDLCALC 128.0 04/02/2019    LDLCALC 141.8 10/25/2018    LDLCALC 80.0 08/04/2017     Lab Results   Component Value Date    TRIG 85 04/02/2019    TRIG 91 10/25/2018    TRIG 75 08/04/2017     Lab Results   Component Value Date    CHOLHDL 26.0 04/02/2019    CHOLHDL 24.9 10/25/2018    CHOLHDL 34.0 08/04/2017       Chemistry        Component Value Date/Time     07/11/2019 1020    K 4.0 07/11/2019 1020 "     07/11/2019 1020    CO2 29 07/11/2019 1020    BUN 14 07/11/2019 1020    CREATININE 0.8 07/11/2019 1020    GLU 95 07/11/2019 1020        Component Value Date/Time    CALCIUM 9.7 07/11/2019 1020    ALKPHOS 194 (H) 07/11/2019 1020    AST 18 07/11/2019 1020    ALT 13 07/11/2019 1020    BILITOT 0.4 07/11/2019 1020    ESTGFRAFRICA >60.0 07/11/2019 1020    EGFRNONAA >60.0 07/11/2019 1020        Lab Results   Component Value Date    HGBA1C 5.8 (H) 08/04/2017     PRE-TEST DATA   Resting VIPIN:    The right ankle brachial index was 0.96 which suggests minimal right lower extremity arterial disease.   The left ankle brachial index was 0.79 which suggests moderate left lower extremity arterial disease.   The right TBI is 0.51.   The left TBI is 0.40.     TEST DESCRIPTION   RIGHT  CFA 78 cm/sec   cm/sec  SFAo 148 cm/sec  SFAp 202 cm/sec  SFAm 189 cm/sec  SFAd 124 cm/sec   cm/sec  TIB TRNK 73 cm/sec  PER 67 cm/sec  AT 33 cm/sec  PT 41 cm/sec   cm/sec    The right VIPIN is .96    LEFT   cm/sec   cm/sec  SFAo 85 cm/sec  SFAp  cm/sec,  occlusion  SFAm  cm/sec,  occlusion  SFAd 42 cm/sec  POP 36 cm/sec  TIB TRNK 54 cm/sec  PER 44 cm/sec  AT 33 cm/sec  PT 24 cm/sec   cm/sec    SFAm stent veloci    The left VIPIN is .79      50-99% SFA occlusion on right with monophasic waveform and mildly decreased VIPIN suggesting PAD on right LE.    Complete occusion of proximal/mid SDA on left with monophasic waveform with collaterals with moderately reduced VIPIN on left suggesting moderate LE extremity disease    Carotid U/S results  TEST DESCRIPTION     RIGHT  The right Mid Common Carotid Artery is visualized.   The right carotid bulb artery is visualized, associated with homogeneous plaque.   The right Mid Internal Carotid Artery has 20 - 39% stenosis, associated with tortuosity.   The right external carotid artery is visualized.   The right vertebral artery is visualized, associated with anterograde  flow.   The right ICA/CCA ratio is: 1.19    LEFT  There is acceleration in the left Proximal Common Carotid Artery.   The left carotid bulb artery is visualized, associated with homogeneous plaque.   The left Mid Internal Carotid Artery has 20 - 39% stenosis, associated with tortuosity.   There is acceleration in the left external carotid artery.   The left vertebral artery is visualized, associated with anterograde flow.   The left ICA/CCA ratio is: .78      CONCLUSIONS   There is 20 - 39% right Internal Carotid stenosis.  There is 20 - 39% left Internal Carotid stenosis.  Assessment:       1. Pre-operative clearance    2. Atherosclerotic PVD with intermittent claudication    3. Bilateral carotid artery stenosis    4. CAD: Hx of CABG    5. Hyperlipidemia LDL goal <70    6. Hypertension goal BP (blood pressure) < 140/90    7. Peripheral vascular disease    8. Tobacco use      Patient presents for f/u. Doing well. Stable CV wise. No angina/equivalent. No s/s of CHF. BP above goal, generally better controlled. Needs leg studies. Counseled on dietary compliance for improved lipids. Final clearance pending review of EKG.  Plan:   -Await EKG, clearance pending results  -VIPIN, arterial U/S with phone review  -Continue current medical management and risk factor modification  -Cardiac, low salt diet  -Smoking cessation advised  -RTC 6 months with Dr. Cohen with lipid, cmp        Chart reviewed. Dr. Lagos agrees with plan as outlined above.       ADDENDUM:    EKG today reviewed, shows SR with chronic RBBB, T wave inversions. No acute changes. Patient has no complaints. No angina/equivalent. Ok to proceed with surgery at low to moderate risk of carroll and post OP CV complications.    May hold Pletal and Plavix 5-7 days prior to surgery. Resume post-operatively ASAP.

## 2019-07-12 PROBLEM — Z01.818 PRE-OPERATIVE CLEARANCE: Status: ACTIVE | Noted: 2019-07-12

## 2019-07-16 ENCOUNTER — TELEPHONE (OUTPATIENT)
Dept: INTERNAL MEDICINE | Facility: CLINIC | Age: 74
End: 2019-07-16

## 2019-07-16 NOTE — TELEPHONE ENCOUNTER
----- Message from Valdemar Nunez sent at 7/16/2019  2:06 PM CDT -----  Contact: Pt  Please give pt a call at .[phone] (home) she has a few questions aboput some appt she missed on Monday.

## 2019-07-22 ENCOUNTER — OUTPATIENT CASE MANAGEMENT (OUTPATIENT)
Dept: ADMINISTRATIVE | Facility: OTHER | Age: 74
End: 2019-07-22

## 2019-07-22 DIAGNOSIS — E78.5 HYPERLIPIDEMIA LDL GOAL <70: Primary | Chronic | ICD-10-CM

## 2019-07-22 DIAGNOSIS — I10 HYPERTENSION GOAL BP (BLOOD PRESSURE) < 140/90: Chronic | ICD-10-CM

## 2019-07-22 NOTE — PROGRESS NOTES
Please note, patient's information has been sent to Outpatient Care Management for high risk screening.    Reason for Referral:   Hypertension  Hyperlipidemia  Risk score 84    Please contact [facility] with any questions (ext. [phone]).    Thank you,    Rosanna Akers RN,CCM

## 2019-07-25 ENCOUNTER — TELEPHONE (OUTPATIENT)
Dept: CARDIOLOGY | Facility: CLINIC | Age: 74
End: 2019-07-25

## 2019-07-25 NOTE — TELEPHONE ENCOUNTER
Patient previously seen by me earlier this month for pre-op clearance. She does not need this appt but she does need appt for EKG.    Thanks

## 2019-08-06 ENCOUNTER — OUTPATIENT CASE MANAGEMENT (OUTPATIENT)
Dept: ADMINISTRATIVE | Facility: OTHER | Age: 74
End: 2019-08-06

## 2019-08-06 NOTE — LETTER
August 9, 2019    Flory Cam  [address]  [address]  [place] [address]             [facility]  [address]  [place] [address] Welcome to [facility]s Complex Care Management Program.  It was a pleasure talking with you today.  My name is Rita Bansal and I look forward to being your Care Manager.  My goal is to help you function at the healthiest and highest level possible.  You can contact me directly at [phone].    As an [facility] patient, some of the services we may be able to provide include:      Development of an individualized care plan with a Registered Nurse    Connection with a    Connection with available resources and services     Coordinate communication among your care team members    Provide coaching and education    Help you understand your doctors treatment plan   Help you obtain information about your insurance coverage.     All services provided by [facility]s Complex Care Managers and other care team members are coordinated with and communicated to your primary care team.    As part of your enrollment, you will be receiving education materials and more information about these services in your [facility] account, by phone or through the mail.  If you do not wish to participate or receive information, please contact our office at [phone].      Sincerely,        Rita Bnasal RN, CCM [facility]   Out-patient RN Complex Care Manager

## 2019-08-08 ENCOUNTER — CLINICAL SUPPORT (OUTPATIENT)
Dept: PULMONOLOGY | Facility: CLINIC | Age: 74
End: 2019-08-08
Payer: MEDICARE

## 2019-08-08 DIAGNOSIS — J44.9 CHRONIC OBSTRUCTIVE PULMONARY DISEASE, UNSPECIFIED COPD TYPE: ICD-10-CM

## 2019-08-08 LAB
BRPFT: ABNORMAL
DLCO ADJ PRE: 5.37 ML/(MIN*MMHG) (ref 15.66–27.12)
DLCO SINGLE BREATH LLN: 15.66
DLCO SINGLE BREATH PRE REF: 26 %
DLCO SINGLE BREATH REF: 21.39
DLCOC SBVA LLN: 2.78
DLCOC SBVA PRE REF: 67.2 %
DLCOC SBVA REF: 4.14
DLCOC SINGLE BREATH LLN: 15.66
DLCOC SINGLE BREATH PRE REF: 25.1 %
DLCOC SINGLE BREATH REF: 21.39
DLCOVA LLN: 2.78
DLCOVA PRE REF: 69.7 %
DLCOVA PRE: 2.89 ML/(MIN*MMHG*L) (ref 2.78–5.51)
DLCOVA REF: 4.14
DLVAADJ PRE: 2.78 ML/(MIN*MMHG*L) (ref 2.78–5.51)
ERV LLN: 0.59
ERV PRE REF: 50.1 %
ERV REF: 0.59
FEF 25 75 CHG: -2.6 %
FEF 25 75 LLN: 0.55
FEF 25 75 POST REF: 40.8 %
FEF 25 75 PRE REF: 41.9 %
FEF 25 75 REF: 1.55
FET100 CHG: -1.3 %
FEV1 CHG: -3.4 %
FEV1 FVC CHG: -5.2 %
FEV1 FVC LLN: 64
FEV1 FVC POST REF: 80.8 %
FEV1 FVC PRE REF: 85.2 %
FEV1 FVC REF: 78
FEV1 LLN: 1.27
FEV1 POST REF: 57.1 %
FEV1 PRE REF: 59.1 %
FEV1 REF: 1.86
FRCPLETH LLN: 1.97
FRCPLETH PREREF: 104.8 %
FRCPLETH REF: 2.79
FVC CHG: 1.9 %
FVC LLN: 1.67
FVC POST REF: 70 %
FVC PRE REF: 68.7 %
FVC REF: 2.41
IVC PRE: 0.9 L (ref 1.67–3.16)
IVC SINGLE BREATH LLN: 1.67
IVC SINGLE BREATH PRE REF: 37.3 %
IVC SINGLE BREATH REF: 2.41
MVV LLN: 71
MVV PRE REF: 45.6 %
MVV REF: 83
PEF CHG: -22.3 %
PEF LLN: 2.54
PEF POST REF: 42 %
PEF PRE REF: 54.1 %
PEF REF: 4.63
POST FEF 25 75: 0.63 L/S (ref 0.55–2.54)
POST FET 100: 8.44 SEC
POST FEV1 FVC: 62.82 % (ref 64.23–91.33)
POST FEV1: 1.06 L (ref 1.27–2.45)
POST FVC: 1.69 L (ref 1.67–3.16)
POST PEF: 1.95 L/S (ref 2.54–6.72)
PRE DLCO: 5.57 ML/(MIN*MMHG) (ref 15.66–27.12)
PRE ERV: 0.29 L (ref 0.59–0.59)
PRE FEF 25 75: 0.65 L/S (ref 0.55–2.54)
PRE FET 100: 8.55 SEC
PRE FEV1 FVC: 66.26 % (ref 64.23–91.33)
PRE FEV1: 1.1 L (ref 1.27–2.45)
PRE FRC PL: 2.93 L
PRE FVC: 1.66 L (ref 1.67–3.16)
PRE MVV: 38 L/MIN (ref 70.79–95.77)
PRE PEF: 2.5 L/S (ref 2.54–6.72)
PRE RV: 2.22 L (ref 1.63–2.78)
PRE TLC: 3.88 L (ref 4.18–6.15)
RAW LLN: 3.06
RAW PRE REF: 157.5 %
RAW PRE: 4.82 CMH2O*S/L (ref 3.06–3.06)
RAW REF: 3.06
RV LLN: 1.63
RV PRE REF: 100.7 %
RV REF: 2.2
RVTLC LLN: 35
RVTLC PRE REF: 128.8 %
RVTLC PRE: 57.26 % (ref 34.87–54.05)
RVTLC REF: 44
TLC LLN: 4.18
TLC PRE REF: 75 %
TLC REF: 5.17
VA PRE: 1.93 L (ref 5.02–5.02)
VA SINGLE BREATH LLN: 5.02
VA SINGLE BREATH PRE REF: 38.4 %
VA SINGLE BREATH REF: 5.02
VC LLN: 1.67
VC PRE REF: 68.7 %
VC PRE: 1.66 L (ref 1.67–3.16)
VC REF: 2.41
VTGRAWPRE: 2.97 L

## 2019-08-08 PROCEDURE — 94060 EVALUATION OF WHEEZING: CPT | Mod: PBBFAC

## 2019-08-08 PROCEDURE — 94060 PR EVAL OF BRONCHOSPASM: ICD-10-PCS | Mod: 26,S$PBB,, | Performed by: INTERNAL MEDICINE

## 2019-08-08 PROCEDURE — 94726 PULM FUNCT TST PLETHYSMOGRAP: ICD-10-PCS | Mod: 26,S$PBB,, | Performed by: INTERNAL MEDICINE

## 2019-08-08 PROCEDURE — 94060 EVALUATION OF WHEEZING: CPT | Mod: 26,S$PBB,, | Performed by: INTERNAL MEDICINE

## 2019-08-08 PROCEDURE — 94726 PLETHYSMOGRAPHY LUNG VOLUMES: CPT | Mod: 26,S$PBB,, | Performed by: INTERNAL MEDICINE

## 2019-08-08 PROCEDURE — 94726 PLETHYSMOGRAPHY LUNG VOLUMES: CPT | Mod: PBBFAC

## 2019-08-08 PROCEDURE — 94729 DIFFUSING CAPACITY: CPT | Mod: PBBFAC

## 2019-08-08 PROCEDURE — 94729 PR C02/MEMBANE DIFFUSE CAPACITY: ICD-10-PCS | Mod: 26,S$PBB,, | Performed by: INTERNAL MEDICINE

## 2019-08-08 PROCEDURE — 94729 DIFFUSING CAPACITY: CPT | Mod: 26,S$PBB,, | Performed by: INTERNAL MEDICINE

## 2019-08-09 ENCOUNTER — TELEPHONE (OUTPATIENT)
Dept: INTERNAL MEDICINE | Facility: CLINIC | Age: 74
End: 2019-08-09

## 2019-08-09 ENCOUNTER — OFFICE VISIT (OUTPATIENT)
Dept: PULMONOLOGY | Facility: CLINIC | Age: 74
End: 2019-08-09
Payer: MEDICARE

## 2019-08-09 VITALS
DIASTOLIC BLOOD PRESSURE: 78 MMHG | RESPIRATION RATE: 18 BRPM | HEART RATE: 66 BPM | OXYGEN SATURATION: 98 % | HEIGHT: 66 IN | WEIGHT: 192.25 LBS | BODY MASS INDEX: 30.9 KG/M2 | SYSTOLIC BLOOD PRESSURE: 140 MMHG

## 2019-08-09 DIAGNOSIS — R06.00 DYSPNEA, UNSPECIFIED TYPE: Primary | ICD-10-CM

## 2019-08-09 DIAGNOSIS — J44.9 CHRONIC OBSTRUCTIVE PULMONARY DISEASE, UNSPECIFIED COPD TYPE: Primary | ICD-10-CM

## 2019-08-09 PROCEDURE — 99215 OFFICE O/P EST HI 40 MIN: CPT | Mod: S$PBB,,, | Performed by: INTERNAL MEDICINE

## 2019-08-09 PROCEDURE — 99213 OFFICE O/P EST LOW 20 MIN: CPT | Mod: PBBFAC | Performed by: INTERNAL MEDICINE

## 2019-08-09 PROCEDURE — 99999 PR PBB SHADOW E&M-EST. PATIENT-LVL III: ICD-10-PCS | Mod: PBBFAC,,, | Performed by: INTERNAL MEDICINE

## 2019-08-09 PROCEDURE — 99999 PR PBB SHADOW E&M-EST. PATIENT-LVL III: CPT | Mod: PBBFAC,,, | Performed by: INTERNAL MEDICINE

## 2019-08-09 PROCEDURE — 99499 RISK ADDL DX/OHS AUDIT: ICD-10-PCS | Mod: S$PBB,,, | Performed by: INTERNAL MEDICINE

## 2019-08-09 PROCEDURE — 99215 PR OFFICE/OUTPT VISIT, EST, LEVL V, 40-54 MIN: ICD-10-PCS | Mod: S$PBB,,, | Performed by: INTERNAL MEDICINE

## 2019-08-09 PROCEDURE — 99499 UNLISTED E&M SERVICE: CPT | Mod: S$PBB,,, | Performed by: INTERNAL MEDICINE

## 2019-08-09 NOTE — PROGRESS NOTES
8/9/19 - Summary: Phone contact made with pt today for completion of Initial Screen for [facility]. She is a 75 y/o female with diagnoses of COPD, HTN, PVD, CAD, carotid artery disease, RA, OA and current every day smoker. She lives in an apt with her adult grandson. She does not use any type of assistive device for ambulation and reports she has had no falls in the past 12 months. She remains independent with all ADL's and most IADL's, requiring assistance from her family with housekeeping, shopping and driving. She reports need for surgery to her R hand related to growth of what she describes as a wart on it. She states it is related to her Lupus and she has had to have one removed already in the past. She saw her pulm provider this AM and got cleared for surgery from him and is scheduled to see her cardio provider next week. She reports some financial difficulties with paying bills, stating she had to get an extension on her electricity bill for this month. She accesses the food bank at her [employer] every month and sometimes at the [facility]. She is not always able to go to the COA related to not always having transportation. She reports she accesses [employer] transportation for getting to her provider appts. She voices a desire to change her insurance from Medicare to [employer], stating her son has [employer] and she wants to be able to get the transportation and OTC benefits he gets. She does not need any emergency preparedness info as her children will assist her in any type of disaster event. She does not currently have an advanced directive and reports she would like for this CM to mail her a packet related to this.     Interventions: Completed Initial Screen, Nursing Assessment, PHQ 2 and POC. Medication Reconciliation was not completed today as she reports Dr Hernandez's nurse went thru all her meds with her this AM. Observed numerous changes made to med list and requested to review meds with her on next contact to ensure  understanding and compliance. Discussed most concerning health issue and she advised currently it is her need for surgery to her R hand. Advised this CM will:  - Mail educational literature related to lupus and verrucous keratosis(if available). Include an Advanced Directive packet in mailout.   - Refer to Leslie Hammond, [facility] LCSW, to assist with accessing any available resources from which she can benefit, including food bank info and how to change from Medicare to [employer] insurance.   - Follow up in two weeks on Friday, 8/23/19 at any time of day, per pt's report that any day she doesn't have a scheduled appt is OK to call her.     Plan: Follow up on 8/23/19. Has she rec'd the literature mailed by this CM? If so, has she read it and have any questions? Has she had surgery to hand or has it been scheduled? How are her symptoms?    Today[facility] Self-Management Care Plan was developed with the patients/caregivers input and was based on identified barriers from todays assessment.  Goals were written today with the patient/caregiver and the patient has agreed to work towards these goals to improve his/her overall well-being. Patient verbalized understanding of the care plan, goals, and all of today's instructions. Encouraged patient/caregiver to communicate with his/her physician and health care team about health conditions and the treatment plan.  Provided my contact information today and encouraged patient/caregiver to call me with any questions as needed. Rita Bansal RN

## 2019-08-09 NOTE — PATIENT INSTRUCTIONS
Lung Anatomy  Your lungs take air in to give your body oxygen, which the body needs to work. Your lungs, like all the tissues in your body, are made up of billions of tiny specialized cells. Old lung cells die and are replaced by new, identical lung cells. This natural process helps ensure healthy lungs.    Date Last Reviewed: 11/1/2016  © 9816-0046 [employer]. [address] [place] [address], [place] [address]. All rights reserved. This information is not intended as a substitute for professional medical care. Always follow your healthcare professional's instructions.

## 2019-08-09 NOTE — LETTER
August 9, 2019      Edward Alexandre III, PA-C  [address] [place] [address] [place] [address]           [facility]  [address] [place] [address] [place] [address]  Phone: [phone]  Fax: [phone]          Patient: Flory Cam   MR Number: [id number]   YOB: 1945   Date of Visit: 8/9/2019       Dear Edward Alexandre III:    Thank you for referring Flory Cam to me for evaluation. Attached you will find relevant portions of my assessment and plan of care.    If you have questions, please do not hesitate to call me. I look forward to following Flory Cam along with you.    Sincerely,    Jaskaran Hernandez MD    Enclosure  CC:  No Recipients    If you would like to receive this communication electronically, please contact [email or web address] or [phone] to request more information on Need Link access.    For providers and/or their staff who would like to refer a patient to [facility], please contact us through our one-stop-shop provider referral line, [facility], at [phone].    If you feel you have received this communication in error or would no longer like to receive these types of communications, please e-mail [email or web address]

## 2019-08-09 NOTE — PATIENT INSTRUCTIONS
Understanding Actinic Keratosis     Wear a hat that protects your face and ears from the sun.     Actinic keratosis is a skin growth caused by sun damage. These growths are not cancer, but they may develop into skin cancer (precancerous). Many people get these growths, especially as they age. This is because of cumulative sun exposure over years. Multiple growths are called actinic keratoses.  What causes actinic keratosis?  Sun damage causes actinic keratosis. These growths usually appear on skin that is most often exposed to the sun, such as the face, ears, or back of the hands. People who easily sunburn are likely to develop actinic keratoses. Actinic keratoses often appear later in life from cumulative, extensive sun exposure.  What are the symptoms of actinic keratosis?  Actinic keratosis growths may be described as:  · Rough, like sandpaper  · Wart-like  · Scaly or scabby  · More easily felt than seen  They may appear singly or in clusters. They may start out as red patches, and the skin around them may be discolored.  Actinic keratoses usually are not painful. For some people they may make the skin feel tender.  How is actinic keratosis treated?  Because actinic keratoses may develop into skin cancer, a healthcare provider should look at them. Your healthcare provider may choose to remove one or more of these growths. It may be examined under a microscope. This is called a biopsy. You may also wish to have actinic keratoses removed if they bother you. They can be removed in several ways:  · By using a medicine on the skin such as 5 fluorouracil or imiquimod  · By removing them with a scalpel  · By freezing them with liquid nitrogen  How can I prevent actinic keratoses?  Avoiding sun damage to your skin is the best way to prevent actinic keratoses. Here are some ways to protect your skin:  · Use sunscreen with an SPF of 30 or higher on exposed skin when you are outside.  · Wear a hat to protect your face and  scalp. Consider wearing clothing that covers your arms and legs.  · Avoid the sun in the middle of the day, when sunlight is most direct.  · Be aware of how long you have been out in the sun. Reapply sunscreen according to package directions.  · Do not use tanning beds.  What are the complications of actinic keratosis?  Actinic keratoses are a sign of skin damage. They may become cancerous. Its a good idea to ask your healthcare provider to check new skin growths. Report any skin problem that concerns you.  When should I call my healthcare provider?  Call your healthcare provider right away if any of these occur:  · You have an actinic keratosis sore that does not respond to treatment  · You have an actinic keratosis sore that does not heal within a few weeks, or heals and then comes back  · You have a skin growth that is changing in size, shape, or color  · You have a skin growth that looks different on one side from the other  · You have a skin growth that is not the same color throughout   Date Last Reviewed: 5/1/2016  © 1236-2344 [employer]. [address] [place] [address]. All rights reserved. This information is not intended as a substitute for professional medical care. Always follow your healthcare professional's instructions.        Understanding Seborrheic Keratosis  Seborrheic keratoses are wart-like growths on the skin. These growths are not cancer. Many people get them, especially after age 30.  How to say it  [id number]   What causes seborrheic keratoses?  Doctors do not know what causes seborrheic keratoses. They may run in families. In addition, they become more common as people get older.  What are the symptoms of seborrheic keratoses?  Here is what seborrheic keratoses often look like:  · They tend to be round or oval in shape. They can be very small or about as big across as a quarter.  · They can appear singly or in clusters.  · They tend to be tan,  brown, or black in color.  · The edges may be scalloped or uneven-looking.  · They can have a waxy or scaly look.  · They can be a bit raised, appearing to be stuck on the skin.  · They may become red and irritated if scratched or rubbed by clothing  Sebhorreic keratoses are not painful, but they may be itchy. They can become irritated if they are continually rubbed by skin or clothing. Seborrheic keratoses most often appear on the face, arms, chest, back, or belly.  How are seborrheic keratoses treated?  Seborrheic keratoses dont need to be treated unless they bother you. You may choose to have them removed because you find them unattractive. You may also want them removed because they get irritated and uncomfortable. A healthcare provider can remove them in an office visit. Ways that seborrheic keratoses can be removed include:  · Freezing them off with liquid nitrogen  · Cutting them off with a scalpel  · Burning them off with electricity  What are the complications of seborrheic keratoses?  Seborrheic keratoses are not cancer, but they can look like some types of skin cancer. So its a good idea to ask your healthcare provider to check any new skin growths. Ask your healthcare provider about any skin problem that concerns you.  When should I call my healthcare provider?  Call your healthcare provider right away if any of these occur:  · You develop a lot of seborrheic keratoses very quickly  · You have a sore that does not heal within a few weeks, or heals and then comes back  · You have a mole or skin growth that is changing in size, shape, or color  · You have a mole or skin growth that looks different on one side from the other  · You have a mole or skin growth that is not the same color throughout   Date Last Reviewed: 5/1/2016  © 2391-5595 [employer]. [address] [place] [address], [place] [address]. All rights reserved. This information is not intended as a substitute for professional medical  care. Always follow your healthcare professional's instructions.        Understanding Lichen Planus  Lichen planus is a long-term (chronic) skin disease. Its a rash that can develop anywhere on the body. But it most often erupts on the wrists, arms, legs, scalp, and genitals. It may also appear on the nails and in the mouth. Peoples ages 30 to 60 are more likely to get it.  How to say it  LY-grant play-nuhs   What causes lichen planus?  The cause of lichen planus is often not known. But metals such as gold, and certain medicines may trigger it. These include non-steroidal anti-inflammatory medicines and penicillin. Some research suggests the disease may also be linked to hepatitis C.  Symptoms of lichen planus  Lichen planus causes flat-topped bumps or patches to form on the skin. These bumps may hurt and be very itchy. They are usually shiny and violet in color. But they may be dark red or purple. They may also have fine white lines on them. In the mouth, the condition may look like patches of white lace.  Treatment for lichen planus  Lichen planus often goes away within a few years. It may do so without treatment. But to speed up healing, treatment options include:  · Steroids. These medicines can be put directly onto the skin or injected into the affected area. They can also be taken by mouth.  · Other medicines. Your healthcare provider may give you other medicines, such as an antihistamine or retinoid, to ease itching and pain. Anti-itch creams or ointments may also work. Your provider might also prescribe other medicines that suppress the immune system.  · Phototherapy. This treatment directs ultraviolet light on the skin to help clear it.  Self-care tips for lichen planus  · Dont scratch any affected areas.  · Use mild soap and moisturizing lotion after bathing.  When to call your healthcare provider  Call your healthcare provider right away if you have any of these:  · Fever of 100.4°F (38°C) or higher, or  as directed  · New symptoms  · Pain that gets worse  · Symptoms that dont get better, or get worse  · Ulcers in the mouth   Date Last Reviewed: 5/1/2016  © 2561-5687 [employer]. [address] [place] [address], [place] [address]. All rights reserved. This information is not intended as a substitute for professional medical care. Always follow your healthcare professional's instructions.

## 2019-08-09 NOTE — PROGRESS NOTES
Preoperative pulmonary risk assessment:      Flory Cam is a 74 y.o. female who presents to the office today for a preoperative pulmonary risk assessment.     I have reviewed patients chart, relevant imaging, lab tests and pulmonary function testing.        SURGEON: Dr. Fallon    PROCEDURE: ? Hand surgery - ? Keratoacanthoma removal.    ANESTHESIA: general    DURATION: <  hours     Pulmonary Risk Estimation:     74 y.o. female with planned surgery as above.      Flory Cam is at increased risk for perioperative pulmonary complications due to Age, COPD and active smoker status.    Difficulty with intubation is not anticipated.    Risks and possible pulmonary complications of surgery discussed in detail including risk of respiratory failure requiring mechanical ventilation, post operative pneumonia, post operative atelectasis, deep venous thrombosis, pulmonary embolism and death.     Patient expressed and voiced understanding.        Calculator: ARISCAT (Leticia) preoperative pulmonary risk index          Age       [] ?50 years old (0 points)      [x] 51 to 80 years old (3 points)      [] >80 years old (16 points)   Preoperative oxygen saturation       [] ?96% (0 points)      [x] 91 to 95% (8 points)      [] ?90% (24 points)   Other clinical risk factors      [] Respiratory infection in the last month (17 points)      [] Preoperative anemia with hemoglobin ?10 g/dL (11 points)      [] Emergency surgery (8 points)   Surgical incision       [] Upper abdominal (15 points)      [] Intrathoracic (24 points)   Duration of surgery       [] ?2 hours (0 points)      [] 2 to 3 hours (16 points)      [] >3 hours (23 points)         Total criteria point count: 11             ARISCAT risk index interpretation         0 to 25 points: Low risk: 1.6% pulmonary complication rate            Per the ARISCAT INDEX , the patients risk of POST OPERATIVE PULMONARY COMPLICATIONS is 1.6% [Leticia et al. Prediction of post operative  pulmonary complications in a population - based surgical cohort. Anesthesiology 2010; 113: 1338] .    Estimated risk of postoperative PNEUMONIA is  2.6%  [Jory et al. Development and validation of a risk calculator for predicting postoperative pneumonia:  [facility]. 2013 Nov;88(11):1241-9. doi: 10.1016/j.[facility].2013.06.027.]    Estimated risk of  post operative RESPIRATORY FAILURE  is 1.0%  [Jory et al. Development and validation of a risk calculator predicting postoperative respiratory failure. Chest 2011 Nov;140(5):1207-15].       Overall, this puts the patient in a low risk class with general anesthesia    Risk has been discussed with patient who expressed and verbalized understanding.     Based on my assessment there are no absolute contraindications to planned surgery.     Current medications which may produce withdrawal symptoms if withheld perioperatively: None         Preoperative Recommendations:     1.   Begin patient education regarding lung-expansion maneuvers like deep breathing and incentive spirometry.   2.   Limit duration of surgery to less than 3 hr if possible.  3.   Use spinal or epidural anesthesia if possible.  4.   Avoid use of pancuronium or long acting neuromuscular blockers.  5.   Use laparoscopic procedures when possible.  6.   Ensure adequate post operative analgesia and pain relief.  7.   Change in medication regimen before surgery: none, continue medication regimen including morning of surgery, with sip of water.  8.   Prophylaxis for cardiac events with perioperative beta-blockers: should be considered, specific regimen per anesthesia.  9.   Invasive hemodynamic monitoring perioperatively: should be considered.  10.   Deep vein thrombosis prophylaxis postoperatively:regimen to be chosen by surgical team.  11. Surveillance for postoperative MI with ECG immediately postoperatively and on postoperative days 1 and 2 AND troponin levels 24 hours postoperatively and on day 4 or  hospital discharge (whichever comes first): should be considered.  12. Other measures: Preoperative tobacco abstention x 60 days.         TIME SPENT WITH PATIENT: Time spent: 40 minutes in face to face  discussion concerning diagnosis, prognosis, review of lab and test results, benefits of treatment as well as management of disease, counseling of patient and coordination of care between various health  care providers . Greater than half the time spent was used for coordination of care and counseling of patient.     Follow up in about 2 months (around 10/9/2019) for COPD.

## 2019-08-12 ENCOUNTER — PES CALL (OUTPATIENT)
Dept: ADMINISTRATIVE | Facility: CLINIC | Age: 74
End: 2019-08-12

## 2019-08-13 ENCOUNTER — TELEPHONE (OUTPATIENT)
Dept: INTERNAL MEDICINE | Facility: CLINIC | Age: 74
End: 2019-08-13

## 2019-08-13 DIAGNOSIS — L29.9 ITCHING: Primary | ICD-10-CM

## 2019-08-13 NOTE — TELEPHONE ENCOUNTER
Pt is requesting hydroxyzine for itching from her allergies. She has had this medication prescribed to her in the past, but not recently and she feels she needs it again.

## 2019-08-13 NOTE — TELEPHONE ENCOUNTER
----- Message from Aye Nava sent at 8/13/2019 10:23 AM CDT -----  Contact: pt   Pt stated she needs a refill on allergy medication, she can be reached at [phone] Thanks       [id number] - [place] - [address] [address] [place] [address]  Phone: [phone] Fax: [phone]

## 2019-08-14 RX ORDER — HYDROXYZINE HYDROCHLORIDE 25 MG/1
25 TABLET, FILM COATED ORAL 3 TIMES DAILY PRN
Qty: 30 TABLET | Refills: 0 | Status: SHIPPED | OUTPATIENT
Start: 2019-08-14 | End: 2019-11-15 | Stop reason: SDUPTHER

## 2019-08-15 ENCOUNTER — OFFICE VISIT (OUTPATIENT)
Dept: CARDIOLOGY | Facility: CLINIC | Age: 74
End: 2019-08-15
Payer: MEDICARE

## 2019-08-15 ENCOUNTER — CLINICAL SUPPORT (OUTPATIENT)
Dept: CARDIOLOGY | Facility: CLINIC | Age: 74
End: 2019-08-15
Payer: MEDICARE

## 2019-08-15 DIAGNOSIS — Z01.818 PREOP EXAMINATION: ICD-10-CM

## 2019-08-15 DIAGNOSIS — Z01.818 PRE-OP EVALUATION: Primary | ICD-10-CM

## 2019-08-15 PROCEDURE — 99499 NO LOS: ICD-10-PCS | Mod: S$PBB,,, | Performed by: PHYSICIAN ASSISTANT

## 2019-08-15 PROCEDURE — 93005 ELECTROCARDIOGRAM TRACING: CPT | Mod: PBBFAC | Performed by: INTERNAL MEDICINE

## 2019-08-15 PROCEDURE — 99499 UNLISTED E&M SERVICE: CPT | Mod: S$PBB,,, | Performed by: PHYSICIAN ASSISTANT

## 2019-08-15 PROCEDURE — 93010 ELECTROCARDIOGRAM REPORT: CPT | Mod: S$PBB,,, | Performed by: INTERNAL MEDICINE

## 2019-08-15 PROCEDURE — 93010 EKG 12-LEAD: ICD-10-PCS | Mod: S$PBB,,, | Performed by: INTERNAL MEDICINE

## 2019-08-19 ENCOUNTER — TELEPHONE (OUTPATIENT)
Dept: INTERNAL MEDICINE | Facility: CLINIC | Age: 74
End: 2019-08-19

## 2019-08-19 NOTE — TELEPHONE ENCOUNTER
Called Dr. Mathis's office at [facility] to have them fax over the paperwork for clearance. Pt has a preop appt on the 22 with Dr. Ovalle , Called pt to notify, left message of the upcoming appt.

## 2019-08-19 NOTE — TELEPHONE ENCOUNTER
----- Message from Cadence Jones sent at 8/19/2019  9:09 AM CDT -----  Patient needs call back rg pre op clearance..[phone] (home)

## 2019-08-20 ENCOUNTER — TELEPHONE (OUTPATIENT)
Dept: INTERNAL MEDICINE | Facility: CLINIC | Age: 74
End: 2019-08-20

## 2019-08-20 NOTE — TELEPHONE ENCOUNTER
----- Message from Cara Ramos sent at 8/20/2019  3:32 PM CDT -----  Contact: self [phone]  Pt would like return call from nurse;pt states she has question about test results being sent to her Orthopedic provider. Please call back at [phone].  Md Charity

## 2019-08-22 ENCOUNTER — OFFICE VISIT (OUTPATIENT)
Dept: INTERNAL MEDICINE | Facility: CLINIC | Age: 74
End: 2019-08-22
Payer: MEDICARE

## 2019-08-22 ENCOUNTER — LAB VISIT (OUTPATIENT)
Dept: LAB | Facility: HOSPITAL | Age: 74
End: 2019-08-22
Attending: INTERNAL MEDICINE
Payer: MEDICARE

## 2019-08-22 VITALS
BODY MASS INDEX: 30.54 KG/M2 | DIASTOLIC BLOOD PRESSURE: 76 MMHG | TEMPERATURE: 98 F | HEART RATE: 68 BPM | SYSTOLIC BLOOD PRESSURE: 144 MMHG | WEIGHT: 190.06 LBS | OXYGEN SATURATION: 99 % | HEIGHT: 66 IN

## 2019-08-22 DIAGNOSIS — I25.10 CORONARY ARTERY DISEASE INVOLVING NATIVE CORONARY ARTERY OF NATIVE HEART WITHOUT ANGINA PECTORIS: ICD-10-CM

## 2019-08-22 DIAGNOSIS — Z01.818 PREOP EXAMINATION: ICD-10-CM

## 2019-08-22 DIAGNOSIS — Z01.818 PREOP EXAMINATION: Primary | ICD-10-CM

## 2019-08-22 DIAGNOSIS — I10 HYPERTENSION GOAL BP (BLOOD PRESSURE) < 140/90: ICD-10-CM

## 2019-08-22 DIAGNOSIS — J44.9 CHRONIC OBSTRUCTIVE PULMONARY DISEASE, UNSPECIFIED COPD TYPE: ICD-10-CM

## 2019-08-22 DIAGNOSIS — F17.200 TOBACCO USE DISORDER: ICD-10-CM

## 2019-08-22 LAB
ANION GAP SERPL CALC-SCNC: 9 MMOL/L (ref 8–16)
BASOPHILS # BLD AUTO: 0.07 K/UL (ref 0–0.2)
BASOPHILS NFR BLD: 0.7 % (ref 0–1.9)
BUN SERPL-MCNC: 20 MG/DL (ref 8–23)
CALCIUM SERPL-MCNC: 9.3 MG/DL (ref 8.7–10.5)
CHLORIDE SERPL-SCNC: 105 MMOL/L (ref 95–110)
CO2 SERPL-SCNC: 28 MMOL/L (ref 23–29)
CREAT SERPL-MCNC: 0.8 MG/DL (ref 0.5–1.4)
DIFFERENTIAL METHOD: ABNORMAL
EOSINOPHIL # BLD AUTO: 0.2 K/UL (ref 0–0.5)
EOSINOPHIL NFR BLD: 1.9 % (ref 0–8)
ERYTHROCYTE [DISTWIDTH] IN BLOOD BY AUTOMATED COUNT: 14 % (ref 11.5–14.5)
EST. GFR  (AFRICAN AMERICAN): >60 ML/MIN/1.73 M^2
EST. GFR  (NON AFRICAN AMERICAN): >60 ML/MIN/1.73 M^2
GLUCOSE SERPL-MCNC: 95 MG/DL (ref 70–110)
HCT VFR BLD AUTO: 45 % (ref 37–48.5)
HGB BLD-MCNC: 14.3 G/DL (ref 12–16)
IMM GRANULOCYTES # BLD AUTO: 0.03 K/UL (ref 0–0.04)
IMM GRANULOCYTES NFR BLD AUTO: 0.3 % (ref 0–0.5)
LYMPHOCYTES # BLD AUTO: 2.6 K/UL (ref 1–4.8)
LYMPHOCYTES NFR BLD: 27 % (ref 18–48)
MCH RBC QN AUTO: 30.8 PG (ref 27–31)
MCHC RBC AUTO-ENTMCNC: 31.8 G/DL (ref 32–36)
MCV RBC AUTO: 97 FL (ref 82–98)
MONOCYTES # BLD AUTO: 0.8 K/UL (ref 0.3–1)
MONOCYTES NFR BLD: 8.3 % (ref 4–15)
NEUTROPHILS # BLD AUTO: 6 K/UL (ref 1.8–7.7)
NEUTROPHILS NFR BLD: 61.8 % (ref 38–73)
NRBC BLD-RTO: 0 /100 WBC
PLATELET # BLD AUTO: 249 K/UL (ref 150–350)
PMV BLD AUTO: 11.2 FL (ref 9.2–12.9)
POTASSIUM SERPL-SCNC: 4 MMOL/L (ref 3.5–5.1)
RBC # BLD AUTO: 4.65 M/UL (ref 4–5.4)
SODIUM SERPL-SCNC: 142 MMOL/L (ref 136–145)
WBC # BLD AUTO: 9.67 K/UL (ref 3.9–12.7)

## 2019-08-22 PROCEDURE — 36415 COLL VENOUS BLD VENIPUNCTURE: CPT

## 2019-08-22 PROCEDURE — 85025 COMPLETE CBC W/AUTO DIFF WBC: CPT

## 2019-08-22 PROCEDURE — 99999 PR PBB SHADOW E&M-EST. PATIENT-LVL III: CPT | Mod: PBBFAC,,, | Performed by: INTERNAL MEDICINE

## 2019-08-22 PROCEDURE — 99214 OFFICE O/P EST MOD 30 MIN: CPT | Mod: S$PBB,,, | Performed by: INTERNAL MEDICINE

## 2019-08-22 PROCEDURE — 80048 BASIC METABOLIC PNL TOTAL CA: CPT

## 2019-08-22 PROCEDURE — 99214 PR OFFICE/OUTPT VISIT, EST, LEVL IV, 30-39 MIN: ICD-10-PCS | Mod: S$PBB,,, | Performed by: INTERNAL MEDICINE

## 2019-08-22 PROCEDURE — 99999 PR PBB SHADOW E&M-EST. PATIENT-LVL III: ICD-10-PCS | Mod: PBBFAC,,, | Performed by: INTERNAL MEDICINE

## 2019-08-22 PROCEDURE — 99213 OFFICE O/P EST LOW 20 MIN: CPT | Mod: PBBFAC | Performed by: INTERNAL MEDICINE

## 2019-08-22 NOTE — PROGRESS NOTES
Subjective:       Patient ID: Flory Cam is a 74 y.o. female.    Chief Complaint: Pre-op Exam    Flory Cam  74 y.o. Black or  female     Patient presents with:  Pre-op Exam    HPI: Here today for a preoperative evaluation. She will be having an excision of a right wrist and right elbow mass by DR. Mathis at the [facility].   She denies prior history of surgical or anesthetic complications.  HTN--mildly elevated today. She has been compliant with amlodipine and valsartan.   CAD--asymptomatic. She has been cleared by cardiology.   COPD--she continues to smoke. She has been cleared by pulmonology.     Past Medical History:  Acute coronary syndrome  Anxiety  11/17/2015: Bilateral carotid artery stenosis  Chronic pain  Coronary artery disease  GERD (gastroesophageal reflux disease)  Hyperlipidemia  Hypertension  Hypothyroidism  Low back pain  Lupus  Osteoporosis  Poor circulation  PVD (peripheral vascular disease)  02/13/2014: S/P peripheral artery angioplasty  Skin disease    Past Surgical History:  5/2/2017: AORTOGRAM WITH RUNOFF/PTA; Right      Comment:  Performed by Christopher Cohen MD at [facility] CATH LAB  1/4/2017: COLONOSCOPY; N/A      Comment:  Performed by Sylvester Arboleda III, MD at [facility] ENDO  CORONARY ANGIOPLASTY  CORONARY ARTERY BYPASS GRAFT  HYSTERECTOMY  5/5/2017: INCISION AND DRAINAGE (I&D), FOOT; Left      Comment:  Performed by Melva Orellana DPM at [facility] OR  OOPHORECTOMY  5/2/2017: PTA-PERIPHERAL; Right      Comment:  Performed by Christopher Cohen MD at [facility] CATH LAB  THYROIDECTOMY    Review of patient's family history indicates:  Problem: Melanoma      Relation: Neg Hx          Age of Onset: (Not Specified)  Problem: Psoriasis      Relation: Neg Hx          Age of Onset: (Not Specified)  Problem: Lupus      Relation: Neg Hx          Age of Onset: (Not Specified)  Problem: Eczema      Relation: Neg Hx          Age of Onset: (Not Specified)    Current  Outpatient Medications on File Prior to Visit:  albuterol (PROVENTIL/VENTOLIN HFA) 90 mcg/actuation inhaler, Inhale 2 puffs into the lungs every 6 (six) hours as needed for Wheezing. Rescue, Disp: 1 Inhaler, Rfl: 1  amLODIPine (NORVASC) 10 MG tablet, Take 1 tablet (10 mg total) by mouth once daily., Disp: 90 tablet, Rfl: 3  ammonium lactate (LAC-HYDRIN) 12 % lotion, , Disp: , Rfl:   ammonium lactate 12 % Crea, Apply 1 application topically once daily., Disp: 140 g, Rfl: 1  atorvastatin (LIPITOR) 80 MG tablet, Take 1 tablet (80 mg total) by mouth once daily., Disp: 90 tablet, Rfl: 1  calcipotriene (DOVONOX) 0.005 % cream, Apply topically 2 (two) times daily., Disp: 60 g, Rfl: 3  cholecalciferol, vitamin D3, 50,000 unit capsule, Take 1 capsule (50,000 Units total) by mouth every 7 days., Disp: 12 capsule, Rfl: 1  cilostazol (PLETAL) 100 MG Tab, Take 1 tablet (100 mg total) by mouth 2 (two) times daily. (Patient taking differently: Take 100 mg by mouth once daily at 6am. ), Disp: 60 tablet, Rfl: 6  clopidogrel (PLAVIX) 75 mg tablet, TAKE 1 TABLET BY MOUTH DAILY, Disp: 90 tablet, Rfl: 1  cyclobenzaprine (FLEXERIL) 10 MG tablet, TAKE 1 TO 2 TABLETS PO HS PRN, Disp: , Rfl: 0  halobetasol (ULTRAVATE) 0.05 % ointment, Apply topically 2 (two) times daily., Disp: 50 g, Rfl: 3  hydrocodone-acetaminophen 10-325mg (NORCO)  mg Tab, 1 tablet 2 (two) times daily as needed., Disp: , Rfl: 0  hydrOXYzine HCl (ATARAX) 25 MG tablet, Take 1 tablet (25 mg total) by mouth 3 (three) times daily as needed for Itching., Disp: 30 tablet, Rfl: 0  levothyroxine (SYNTHROID) 137 MCG Tab tablet, TAKE 1 TABLET BY MOUTH ONCE DAILY BEFORE BREAKFAST, Disp: 90 tablet, Rfl: 1  mupirocin (BACTROBAN) 2 % ointment, apply to affected area twice a day for 10 days FOR SORES ON SKIN, Disp: 22 g, Rfl: 1  NEXIUM 40 mg capsule, take 1 capsule by mouth once daily BEFORE BREAKFAST, Disp: 90 capsule, Rfl: 1  ranitidine (ZANTAC) 150 MG tablet, Take 1 tablet (150  mg total) by mouth nightly., Disp: 90 tablet, Rfl: 3  traZODone (DESYREL) 50 MG tablet, take 1 tablet by mouth once daily AT NIGHT if needed for insomnia, Disp: 30 tablet, Rfl: 3  urea (CARMOL) 40 % Crea, Apply topically 2 (two) times daily. Apply to thickened areas of skin, Disp: 1 Tube, Rfl: 3  valsartan (DIOVAN) 320 MG tablet, TAKE 1 TABLET BY MOUTH ONCE DAILY, Disp: 90 tablet, Rfl: 3  ZETIA 10 mg tablet, take 1 tablet by mouth once daily, Disp: 90 tablet, Rfl: 3    Allergies:  Review of patient's allergies indicates:  No Known Allergies      Review of Systems   Constitutional: Negative for fever and unexpected weight change.   HENT: Negative for sinus pain and sore throat.    Respiratory: Positive for cough (productive of white/clear mucous). Negative for shortness of breath.    Cardiovascular: Negative for chest pain.   Gastrointestinal: Negative for abdominal pain and diarrhea.   Genitourinary: Negative for difficulty urinating and dysuria.   Musculoskeletal: Positive for arthralgias.   Neurological: Negative for dizziness and headaches.       Objective:      Physical Exam   Constitutional: She is oriented to person, place, and time. She appears well-developed and well-nourished. No distress.   HENT:   Mouth/Throat: No oropharyngeal exudate.   Eyes: No scleral icterus.   Neck: No tracheal deviation present. No thyromegaly present.   Cardiovascular: Normal rate, regular rhythm and normal heart sounds.   Pulmonary/Chest: Effort normal and breath sounds normal. No respiratory distress. She has no wheezes. She has no rales.   Abdominal: Soft. Bowel sounds are normal.   Lymphadenopathy:     She has no cervical adenopathy.   Neurological: She is alert and oriented to person, place, and time.   Skin: Skin is warm and dry.   Psychiatric: She has a normal mood and affect.   Vitals reviewed.      Assessment:       1. Preop examination    2. Hypertension goal BP (blood pressure) < 140/90    3. Coronary artery disease  involving native coronary artery of native heart without angina pectoris    4. Chronic obstructive pulmonary disease, unspecified COPD type    5. Tobacco use disorder        Plan:       Flory was seen today for pre-op exam.    Diagnoses and all orders for this visit:    Preop examination  -     CBC auto differential; Future  -     Basic metabolic panel; Future    Hypertension goal BP (blood pressure) < 140/90  -     Lifestyle modifications discussed  -     Continue current medication  -     CBC auto differential; Future  -     Basic metabolic panel; Future    Coronary artery disease involving native coronary artery of native heart without angina pectoris    Chronic obstructive pulmonary disease, unspecified COPD type    Tobacco use disorder  -     Recommend cessation    Recommendations to follow.     RTC in 4 weeks for HTN.     Addendum:   CBC and BMP are acceptable.  Cleared by cardiology and pulmonology.    Okay to proceed with surgery as planned.

## 2019-08-23 ENCOUNTER — TELEPHONE (OUTPATIENT)
Dept: INTERNAL MEDICINE | Facility: CLINIC | Age: 74
End: 2019-08-23

## 2019-08-23 ENCOUNTER — OUTPATIENT CASE MANAGEMENT (OUTPATIENT)
Dept: ADMINISTRATIVE | Facility: OTHER | Age: 74
End: 2019-08-23

## 2019-08-23 NOTE — PROGRESS NOTES
8/23/19 - SUMMARY: Phone contact made with pt today for follow up with OPCM. She has rec'd mailed literature, but not yet reviewed all of it. She has seen all her providers,cardio,pulm and PCP, to get pre-op clearance to have surgery for removal of lesion on her R hand/wrist. She did lab yesterday and Dr Ovalle staff is to fax it to her surgeon's office today.  INTERVENTION: Questioned whether lesion is symptomatic and she advised it is achy at times and painful if she bumps it. She has had a similar lesion to the same hand that required surgical removal about 3 years ago. Encouraged her to call her surgeon's office to see if lab results have been rec'd and request scheduling of procedure as soon as possible, now that she has had all pre-op clearances done as she states she is anxious to get the procedure over. Advised this CM will assist with notifying her PCP if labs have not been sent when she calls to check. Encouraged her to call this CM if assistance is needed. Advised this CM will follow up with her in 2 weeks on a day she has no scheduled appts, per her request.  PLAN: Has she had surgery? Review symptoms of infection. Does she want to receive information about smoking cessation? Do med rec.    Todays OPCM Self-Management Care Plan was reviewed with the patients/caregivers input based on identified barriers from todays and previous assessments.  Goals were reviewed today with the patient/caregiver and the patient has agreed to continue to work towards these goals to improve his/her overall well-being. Patient verbalized understanding of the care plan, goals, and all of today's instructions. Encouraged patient/caregiver to communicate with his/her physician and health care team about health conditions and the treatment plan.  Provided my contact information today and encouraged patient/caregiver to call me with any questions as needed. Rita Bansal RN

## 2019-08-23 NOTE — TELEPHONE ENCOUNTER
----- Message from Guerita Kemp sent at 8/23/2019  1:09 PM CDT -----  Contact: Patient  Patient needs to know if the orthopedic referral has been sent, please call her back at [phone]. Thank you

## 2019-08-23 NOTE — TELEPHONE ENCOUNTER
"Pt stated," Please fax the referral (preop paperwork) to Dr. Jose Mathis at [facility] fax number [phone], so I cna make an appt with them."  "

## 2019-08-26 ENCOUNTER — OUTPATIENT CASE MANAGEMENT (OUTPATIENT)
Dept: ADMINISTRATIVE | Facility: OTHER | Age: 74
End: 2019-08-26

## 2019-08-26 ENCOUNTER — TELEPHONE (OUTPATIENT)
Dept: INTERNAL MEDICINE | Facility: CLINIC | Age: 74
End: 2019-08-26

## 2019-08-26 NOTE — LETTER
August 30, 2019    Flory Cam  [address]  [address]  [place] [address]             [facility] [address] [place] [address] Dear Mrs. Cam:    I am writing from the Outpatient Complex Care Management Department at [facility].  I received a referral from Rita Bansal RN, to contact you regarding any needs you may have.  I have been unable to reach you by phone.   Please contact me if you would like to discuss any needs you may have.    I can be reached at [phone] Monday thru Friday from 8:00am to 4:30pm.  [facility] also has a program with a nurse available 24/7 to answer questions or provide medical advice.  [facility] can be reached at [phone].    Sincerely,         Leslie Hammond LCSW

## 2019-08-26 NOTE — TELEPHONE ENCOUNTER
----- Message from Melani Villafuerte sent at 8/26/2019  9:54 AM CDT -----  Contact: Patient   Patient would like a call back at [phone], Regards to Dr. Dumont has not received her clearance.    Thanks  Td

## 2019-08-26 NOTE — LETTER
September 6, 2019    Flory Cam  [address]  [address]  [place] [address]             [facility]  [address] [address] Dear Mrs. Cam:    I have enclosed disaster planning and local Lupus support group as discussed via telephone today.  I hope this will be helpful.    If any additional needs arise, please contact me at [phone].    Sincerely,      AMIE VillanuevaW    Outpatient Complex Care Management

## 2019-08-29 ENCOUNTER — OFFICE VISIT (OUTPATIENT)
Dept: PODIATRY | Facility: CLINIC | Age: 74
End: 2019-08-29
Payer: MEDICARE

## 2019-08-29 VITALS
SYSTOLIC BLOOD PRESSURE: 133 MMHG | BODY MASS INDEX: 30.18 KG/M2 | WEIGHT: 187.81 LBS | HEIGHT: 66 IN | HEART RATE: 80 BPM | DIASTOLIC BLOOD PRESSURE: 65 MMHG

## 2019-08-29 DIAGNOSIS — B35.1 DERMATOPHYTOSIS OF NAIL: Primary | ICD-10-CM

## 2019-08-29 DIAGNOSIS — I73.9 PERIPHERAL VASCULAR DISEASE: ICD-10-CM

## 2019-08-29 DIAGNOSIS — L93.0 DISCOID LUPUS ERYTHEMATOSUS: ICD-10-CM

## 2019-08-29 DIAGNOSIS — L84 CORN OR CALLUS: ICD-10-CM

## 2019-08-29 PROCEDURE — 99499 NO LOS: ICD-10-PCS | Mod: S$PBB,,, | Performed by: PODIATRIST

## 2019-08-29 PROCEDURE — 11721 PR DEBRIDEMENT OF NAILS, 6 OR MORE: ICD-10-PCS | Mod: 59,Q8,S$PBB, | Performed by: PODIATRIST

## 2019-08-29 PROCEDURE — 11056 PR TRIM BENIGN HYPERKERATOTIC SKIN LESION,2-4: ICD-10-PCS | Mod: Q8,S$PBB,, | Performed by: PODIATRIST

## 2019-08-29 PROCEDURE — 11721 DEBRIDE NAIL 6 OR MORE: CPT | Mod: 59,Q8,S$PBB, | Performed by: PODIATRIST

## 2019-08-29 PROCEDURE — 99213 OFFICE O/P EST LOW 20 MIN: CPT | Mod: PBBFAC,25 | Performed by: PODIATRIST

## 2019-08-29 PROCEDURE — 99999 PR PBB SHADOW E&M-EST. PATIENT-LVL III: ICD-10-PCS | Mod: PBBFAC,,, | Performed by: PODIATRIST

## 2019-08-29 PROCEDURE — 99499 UNLISTED E&M SERVICE: CPT | Mod: S$PBB,,, | Performed by: PODIATRIST

## 2019-08-29 PROCEDURE — 11056 PARNG/CUTG B9 HYPRKR LES 2-4: CPT | Mod: Q8,PBBFAC | Performed by: PODIATRIST

## 2019-08-29 PROCEDURE — 99999 PR PBB SHADOW E&M-EST. PATIENT-LVL III: CPT | Mod: PBBFAC,,, | Performed by: PODIATRIST

## 2019-08-29 PROCEDURE — 11056 PARNG/CUTG B9 HYPRKR LES 2-4: CPT | Mod: Q8,S$PBB,, | Performed by: PODIATRIST

## 2019-08-29 PROCEDURE — 11721 DEBRIDE NAIL 6 OR MORE: CPT | Mod: Q8,PBBFAC,59 | Performed by: PODIATRIST

## 2019-08-29 NOTE — PROGRESS NOTES
Subjective:     Patient ID: Flory Cam is a 74 y.o. female.    Chief Complaint: Nail Care (RNC. B/L plantar pain. Rates pain 5/10. Non diabetic pt. Wears casual shoes. PCP DR Ovalle.)    Flory is a 74 y.o. female who presents to the clinic for evaluation and treatment of high risk feet. Flory has a past medical history of Acute coronary syndrome, Anxiety, Bilateral carotid artery stenosis (11/17/2015), Chronic pain, Coronary artery disease, GERD (gastroesophageal reflux disease), Hyperlipidemia, Hypertension, Hypothyroidism, Low back pain, Lupus, Osteoporosis, Poor circulation, PVD (peripheral vascular disease), S/P peripheral artery angioplasty (02/13/2014), and Skin disease. The patient's chief complaint is long, thick toenails. This patient has documented high risk feet requiring routine maintenance secondary to peripheral vascular disease. Patient complains of pain at the bottom of bot feet. Patient rates pain in feet 5/10.     PCP: Tayler Ovalle, DO    Date Last Seen by PCP: 08/22/19    Current shoe gear:  Affected Foot: Casual shoes     Unaffected Foot: Casual shoes    Last encounter in this department: 2/26/2019    Hemoglobin A1C   Date Value Ref Range Status   08/04/2017 5.8 (H) 4.0 - 5.6 % Final     Comment:     According to ADA guidelines, hemoglobin A1c <7.0% represents  optimal control in non-pregnant diabetic patients. Different  metrics may apply to specific patient populations.   Standards of Medical Care in Diabetes-2016.  For the purpose of screening for the presence of diabetes:  <5.7%     Consistent with the absence of diabetes  5.7-6.4%  Consistent with increasing risk for diabetes   (prediabetes)  >or=6.5%  Consistent with diabetes  Currently, no consensus exists for use of hemoglobin A1c  for diagnosis of diabetes for children.  This Hemoglobin A1c assay has significant interference with fetal   hemoglobin   (HbF). The results are invalid for patients with abnormal amounts of   HbF,    including those with known Hereditary Persistence   of Fetal Hemoglobin. Heterozygous hemoglobin variants (HbAS, HbAC,   HbAD, HbAE, HbA2) do not significantly interfere with this assay;   however, presence of multiple variants in a sample may impact the %   interference.     09/14/2016 6.2 4.5 - 6.2 % Final     Comment:     According to ADA guidelines, hemoglobin A1C <7.0% represents  optimal control in non-pregnant diabetic patients.  Different  metrics may apply to specific populations.   Standards of Medical Care in Diabetes - 2016.  For the purpose of screening for the presence of diabetes:  <5.7%     Consistent with the absence of diabetes  5.7-6.4%  Consistent with increasing risk for diabetes   (prediabetes)  >or=6.5%  Consistent with diabetes  Currently no consensus exists for use of hemoglobin A1C  for diagnosis of diabetes for children.     04/13/2010 5.9 4.0 - 6.2 % Final           Patient Active Problem List   Diagnosis    Hyperlipidemia LDL goal <70    Hypertension goal BP (blood pressure) < 140/90    Hypothyroid    Peripheral vascular disease    Eczema    Osteoporosis    Osteoarthritis    Tobacco use    CAD: Hx of CABG    Atherosclerotic PVD with intermittent claudication    Bilateral carotid artery disease    Discoid lupus erythematosus    Rheumatoid arthritis involving both feet with positive rheumatoid factor    Lichen planus    Hx of colonic polyps    Chronic obstructive pulmonary disease    Medication monitoring encounter    Pre-operative clearance       Medication List with Changes/Refills   Current Medications    ALBUTEROL (PROVENTIL/VENTOLIN HFA) 90 MCG/ACTUATION INHALER    Inhale 2 puffs into the lungs every 6 (six) hours as needed for Wheezing. Rescue    AMLODIPINE (NORVASC) 10 MG TABLET    Take 1 tablet (10 mg total) by mouth once daily.    AMMONIUM LACTATE (LAC-HYDRIN) 12 % LOTION        AMMONIUM LACTATE 12 % CREA    Apply 1 application topically once daily.     ATORVASTATIN (LIPITOR) 80 MG TABLET    Take 1 tablet (80 mg total) by mouth once daily.    CALCIPOTRIENE (DOVONOX) 0.005 % CREAM    Apply topically 2 (two) times daily.    CHOLECALCIFEROL, VITAMIN D3, 50,000 UNIT CAPSULE    Take 1 capsule (50,000 Units total) by mouth every 7 days.    CILOSTAZOL (PLETAL) 100 MG TAB    Take 1 tablet (100 mg total) by mouth 2 (two) times daily.    CLOPIDOGREL (PLAVIX) 75 MG TABLET    TAKE 1 TABLET BY MOUTH DAILY    CYCLOBENZAPRINE (FLEXERIL) 10 MG TABLET    TAKE 1 TO 2 TABLETS PO HS PRN    HALOBETASOL (ULTRAVATE) 0.05 % OINTMENT    Apply topically 2 (two) times daily.    HYDROCODONE-ACETAMINOPHEN 10-325MG (NORCO)  MG TAB    1 tablet 2 (two) times daily as needed.    HYDROXYZINE HCL (ATARAX) 25 MG TABLET    Take 1 tablet (25 mg total) by mouth 3 (three) times daily as needed for Itching.    LEVOTHYROXINE (SYNTHROID) 137 MCG TAB TABLET    TAKE 1 TABLET BY MOUTH ONCE DAILY BEFORE BREAKFAST    MUPIROCIN (BACTROBAN) 2 % OINTMENT    apply to affected area twice a day for 10 days FOR SORES ON SKIN    NEXIUM 40 MG CAPSULE    take 1 capsule by mouth once daily BEFORE BREAKFAST    RANITIDINE (ZANTAC) 150 MG TABLET    Take 1 tablet (150 mg total) by mouth nightly.    TRAZODONE (DESYREL) 50 MG TABLET    take 1 tablet by mouth once daily AT NIGHT if needed for insomnia    UREA (CARMOL) 40 % CREA    Apply topically 2 (two) times daily. Apply to thickened areas of skin    VALSARTAN (DIOVAN) 320 MG TABLET    TAKE 1 TABLET BY MOUTH ONCE DAILY    ZETIA 10 MG TABLET    take 1 tablet by mouth once daily       Review of patient's allergies indicates:  No Known Allergies    Past Surgical History:   Procedure Laterality Date    AORTOGRAM WITH RUNOFF/PTA Right 5/2/2017    [facility] CATH LAB    COLONOSCOPY N/A 1/4/2017    Performed by Sylvester Arboleda III, [facility] at [facility] ENDO    CORONARY ANGIOPLASTY      CORONARY ARTERY BYPASS GRAFT      HYSTERECTOMY      INCISION AND  "DRAINAGE (I&D), FOOT Left 5/5/2017    [facility]    OOPHORECTOMY      PTA-PERIPHERAL Right 5/2/2017    [facility] CATH LAB    THYROIDECTOMY         Family History   Problem Relation Age of Onset    Melanoma Neg Hx     Psoriasis Neg Hx     Lupus Neg Hx     Eczema Neg Hx        Social History     Socioeconomic History    Marital status:      Spouse name: Not on file    Number of children: Not on file    Years of education: Not on file    Highest education level: Not on file   Occupational History    Occupation: Retired     Comment: Dispatcher   Social Needs    Financial resource strain: Not on file    Food insecurity:     Worry: Not on file     Inability: Not on file    Transportation needs:     Medical: Not on file     Non-medical: Not on file   Tobacco Use    Smoking status: Current Every Day Smoker     Packs/day: 0.25     Years: 40.00     Pack years: 10.00     Types: Cigarettes     Start date: 1961    Smokeless tobacco: Never Used   Substance and Sexual Activity    Alcohol use: No    Drug use: No    Sexual activity: Not on file   Lifestyle    Physical activity:     Days per week: Not on file     Minutes per session: Not on file    Stress: Not on file   Relationships    Social connections:     Talks on phone: Not on file     Gets together: Not on file     Attends Jewish service: Not on file     Active member of club or organization: Not on file     Attends meetings of clubs or organizations: Not on file     Relationship status: Not on file   Other Topics Concern    Are you pregnant or think you may be? No    Breast-feeding No   Social History Narrative    Patient is retired dispatcher.       Vitals:    08/29/19 1103   BP: 133/65   Pulse: 80   Weight: 85.2 kg (187 lb 13.3 oz)   Height: 5' 6" (1.676 m)   PainSc:   5   PainLoc: Foot       Hemoglobin A1C   Date Value Ref Range Status   08/04/2017 5.8 (H) 4.0 - 5.6 % Final     " Comment:     According to ADA guidelines, hemoglobin A1c <7.0% represents  optimal control in non-pregnant diabetic patients. Different  metrics may apply to specific patient populations.   Standards of Medical Care in Diabetes-2016.  For the purpose of screening for the presence of diabetes:  <5.7%     Consistent with the absence of diabetes  5.7-6.4%  Consistent with increasing risk for diabetes   (prediabetes)  >or=6.5%  Consistent with diabetes  Currently, no consensus exists for use of hemoglobin A1c  for diagnosis of diabetes for children.  This Hemoglobin A1c assay has significant interference with fetal   hemoglobin   (HbF). The results are invalid for patients with abnormal amounts of   HbF,   including those with known Hereditary Persistence   of Fetal Hemoglobin. Heterozygous hemoglobin variants (HbAS, HbAC,   HbAD, HbAE, HbA2) do not significantly interfere with this assay;   however, presence of multiple variants in a sample may impact the %   interference.     09/14/2016 6.2 4.5 - 6.2 % Final     Comment:     According to ADA guidelines, hemoglobin A1C <7.0% represents  optimal control in non-pregnant diabetic patients.  Different  metrics may apply to specific populations.   Standards of Medical Care in Diabetes - 2016.  For the purpose of screening for the presence of diabetes:  <5.7%     Consistent with the absence of diabetes  5.7-6.4%  Consistent with increasing risk for diabetes   (prediabetes)  >or=6.5%  Consistent with diabetes  Currently no consensus exists for use of hemoglobin A1C  for diagnosis of diabetes for children.     04/13/2010 5.9 4.0 - 6.2 % Final       Review of Systems   Constitutional: Negative for chills and fever.   Respiratory: Negative for shortness of breath.    Cardiovascular: Negative for chest pain, palpitations, orthopnea, claudication and leg swelling.   Gastrointestinal: Negative for diarrhea, nausea and vomiting.   Musculoskeletal: Negative for joint pain.   Skin:  Negative for rash.   Neurological: Negative for dizziness, tingling, sensory change, focal weakness and weakness.   Psychiatric/Behavioral: Negative.            Objective:   PHYSICAL EXAM: Apperance: Alert and orient in no distress,well developed, and with good attention to grooming and body habits  Patient presents ambulating in casual shoes.  LOWER EXTREMITY EXAM:  VASCULAR: Dorsalis pedis pulses 0/4 bilateral and Posterior Tibial pulses 0/4 bilateral. Capillary fill time <4 seconds bilateral. Mild edema observed bilateral. Varicosities present bilateral. Skin temperature of the lower extremities is warm to cool, proximal to distal. Hair growth absent bilateral.  DERMATOLOGICAL: No skin rashes, subcutaneous nodules, lesions, or ulcers observed bilateral. Nails 1,2,3,4,5 bilateral elongated, thickened, and discolored with subungual debris. Webspaces 1,2,3,4 bilateral clean, dry and without evidence of break in skin integrity. Multiple hyperkeratotic lesions noted to bilateral plantar feet.   NEUROLOGICAL: Light touch, sharp-dull, proprioception all present and equal bilaterally.    MUSCULOSKELETAL: Muscle strength is 5/5 for foot inverters, everters, plantarflexors, and dorsiflexors. Muscle tone is normal.     Assessment:   Dermatophytosis of nail    Discoid lupus erythematosus    Peripheral vascular disease    Corn or callus          Plan:   Dermatophytosis of nail    Discoid lupus erythematosus    Peripheral vascular disease    Corn or callus      I counseled the patient on her conditions, regarding findings of my examination, my impressions, and usual treatment plan.   Greater than 50% of this visit spent on counseling and coordination of care.  Greater than 20 minutes spent on education about the PVD, and prevention of limb loss.  Shoe inspection. Patient instructed on proper foot hygeine. We discussed wearing proper shoe gear, daily foot inspections, never walking without protective shoe gear, never putting  sharp instruments to feet.    With patient's permission, nails 1-5 bilateral were trimmed in length and thickness aggressively to their soft tissue attachment mechanically and with electric , removing all offending nail and debris. Patient relates relief following the procedure.  With patient's permission, bilateral callus trimmed in thickness with #15 blade in thickness without incident.   Patient  will continue to monitor the areas daily, inspect feet, wear protective shoe gear when ambulatory, moisturizer to maintain skin integrity.   Patient to return 3 months or sooner if needed.                Chito Dumont DPM  [facility]

## 2019-08-30 NOTE — PROGRESS NOTES
2nd attempt to complete Social Work Assessment for OPCM; phoned both mobile and home numbers, unable to leave messages. LCSW will mail a letter with contact information requesting a return call.  OPCM RN notified.

## 2019-09-06 ENCOUNTER — OUTPATIENT CASE MANAGEMENT (OUTPATIENT)
Dept: ADMINISTRATIVE | Facility: OTHER | Age: 74
End: 2019-09-06

## 2019-09-06 NOTE — PROGRESS NOTES
"Summary: LCSW received referral from OPCM RN for assistance with insurance, request to enroll in [employer] program. Phoned patient, completed social assessment and plan of care. Patient is a 75 yo  female. She has multiple co-morbidities including CAD, COPD, and lupus. Patient lives alone with support from her family. She stated that her grandchildren are staying with her now while she is preparing to have hand surgery next week and will support her in her recovery. Patient reports that she is independent with ADLs and iADLs. Patient has a monthly income of $821 and received $51 in Food [place] (Food Stamp benefit recently lapsed but her grandchild is assisting her in reapplying). Patient stated that she utilizes [employer] for assistance with utilities and commodities. She has an appointment with [employer] on Monday to access their "pantry". Patient stated that she also uses a local food pantry to assist with her food needs.  Patient utilizes [employer] for her transportation. Patient has an appointment at 11 am today with [employer] to discuss enrolling in a Medicare replacement plan.   Patient does not have a disaster plan. She expressed interest in receiving information about planning for a disaster.   Patient expressed struggles with dealing with Lupus. Patient expressed interest in disease specific support.     Interventions:  · Completed social assessment and plan of care.   · Collaborated with OPCM RN for delivery of services.   · Provided disaster planning information including contact numbers for OEP.   · Provided disease specific support group information.     Plan: Follow up in two weeks to ensure receipt of resources.     [facility] Self-Management Care Plan was developed with the patients/caregivers input and was based on identified barriers from todays assessment.  Goals were written today with the patient/caregiver and the patient has agreed to work towards these goals to improve his/her overall " well-being. Patient verbalized understanding of the care plan, goals, and all of today's instructions. Encouraged patient/caregiver to communicate with his/her physician and health care team about health conditions and the treatment plan.  Provided my contact information today and encouraged patient/caregiver to call me with any questions as needed.

## 2019-09-06 NOTE — PROGRESS NOTES
9/6/19 - SUMMARY: Phone contact made with pt today for follow up with OPCM. She forgot about the appts she has scheduled for this afternoon and will call to reschedule them after this call. She has another appt scheduled for a [employer] rep to come talk to her about changing insurance from Medicare to [employer]. The surgery to remove the lesion from her R hand is scheduled for 9/11/19.   INTERVENTION: Discussed Smoking Cessation Program and she would like to receive information about enrolling in the program. Advised this CM will send info in the mail and will follow up in two weeks to see if she has rec'd it and if she has had hand surgery completed.   PLAN: Contact in two weeks at any time of day, per pt OK. Has she completed hand surgery? Has she rec'd smoking cessation info? Do Med Rec.      Todays OPCM Self-Management Care Plan was reviewed with the patients/caregivers input based on identified barriers from todays and previous assessments.  Goals were reviewed today with the patient/caregiver and the patient has agreed to continue to work towards these goals to improve his/her overall well-being. Patient verbalized understanding of the care plan, goals, and all of today's instructions. Encouraged patient/caregiver to communicate with his/her physician and health care team about health conditions and the treatment plan.  Provided my contact information today and encouraged patient/caregiver to call me with any questions as needed. Rita Bansal RN

## 2019-09-13 ENCOUNTER — PES CALL (OUTPATIENT)
Dept: ADMINISTRATIVE | Facility: CLINIC | Age: 74
End: 2019-09-13

## 2019-09-20 ENCOUNTER — OUTPATIENT CASE MANAGEMENT (OUTPATIENT)
Dept: ADMINISTRATIVE | Facility: OTHER | Age: 74
End: 2019-09-20

## 2019-09-20 NOTE — PROGRESS NOTES
1st Attempt to complete SW follow-up for Outpatient Care Management;mailbox was full and could not accept message.  LCSW will reattempt at a later date.

## 2019-09-27 NOTE — PROGRESS NOTES
9/ 27/19 - SUMMARY: Phone contact made with pt today for follow up with OPCM. She has had surgery on her hand and had the stitches removed. She has been referred to an oncologist as she was told the lesion that continues to recur may be cancerous. She has not rec'd the smoking cessation literature sent by this CM, but states she has not been to her mail box in a long time because it is too hot to walk across her apt complex to it. She has met with a [employer] rep who is supposed to be checking on her Medicaid status to determine what type of coverage she is eligible for.   INTERVENTION:Advised the open enrollment period is occurring soon and she needs to make sure she gets her insurance changed during the allowed time, if she does decide to change. She advised she wants to get a dental benefit and that is why she wants to change to [employer]. Encouraged her to discuss this with Leslie Hammond, OPCM LCSW, on her next contact. Also encouraged her to check her mail for the Smoking Cessation literature sent by this CM so she can initiate that benefit. Advised this CM will follow up with her in two weeks to see if she has rec'd it. Discussed recurring lesion to hand and she advised she has been told it is related to her Lupus. She is compliant with following up with providers routinely for management of Lupus and taking meds as prescribed. She is agreeable to a follow up in two weeks.   PLAN: Does she have any questions about the advanced directive packet that was mailed? Advise of eligibility of Digital HTN program. Plan to d/c if no new problems or place on monitoring status if LCSW is still following her.     Today[facility] Self-Management Care Plan was reviewed with the patients/caregivers input based on identified barriers from todays and previous assessments.  Goals were reviewed today with the patient/caregiver and the patient has agreed to continue to work towards these goals to improve his/her overall well-being.  Patient verbalized understanding of the care plan, goals, and all of today's instructions. Encouraged patient/caregiver to communicate with his/her physician and health care team about health conditions and the treatment plan.  Provided my contact information today and encouraged patient/caregiver to call me with any questions as needed. Riat Bansal RN

## 2019-10-02 ENCOUNTER — TELEPHONE (OUTPATIENT)
Dept: RHEUMATOLOGY | Facility: CLINIC | Age: 74
End: 2019-10-02

## 2019-10-02 NOTE — PROGRESS NOTES
Summary: Phoned patient. Patient stated that she had met regarding Medicare replacement program. She stated that she was informed that her Medicaid was inactive and she has re-applied. She stated that she was informed that she will know in 4 weeks if she is re-approved for Medicaid. She verified receipt of resources. She stated that she has not been able to think about anything since her hand surgery and learning that she may have cancer. She stated that she has an appointment on 10/9/19 and will find out the prognosis and treatment. She requested call back on 10/10/19.     Interventions: Provided supportive counseling.     Plan:  Follow up on 10/10/19 per patient request.    Todays OPCM Self-Management Care Plan was developed with the patients/caregivers input and was based on identified barriers from todays assessment.  Goals were written today with the patient/caregiver and the patient has agreed to work towards these goals to improve his/her overall well-being. Patient verbalized understanding of the care plan, goals, and all of today's instructions. Encouraged patient/caregiver to communicate with his/her physician and health care team about health conditions and the treatment plan.  Provided my contact information today and encouraged patient/caregiver to call me with any questions as needed.

## 2019-10-10 ENCOUNTER — OUTPATIENT CASE MANAGEMENT (OUTPATIENT)
Dept: ADMINISTRATIVE | Facility: OTHER | Age: 74
End: 2019-10-10

## 2019-10-10 ENCOUNTER — OFFICE VISIT (OUTPATIENT)
Dept: INTERNAL MEDICINE | Facility: CLINIC | Age: 74
End: 2019-10-10
Payer: MEDICARE

## 2019-10-10 VITALS
SYSTOLIC BLOOD PRESSURE: 126 MMHG | OXYGEN SATURATION: 96 % | WEIGHT: 187.19 LBS | DIASTOLIC BLOOD PRESSURE: 82 MMHG | HEIGHT: 66 IN | TEMPERATURE: 98 F | HEART RATE: 66 BPM | BODY MASS INDEX: 30.08 KG/M2

## 2019-10-10 DIAGNOSIS — R45.89 ANXIETY ABOUT HEALTH: Primary | ICD-10-CM

## 2019-10-10 PROCEDURE — 99213 OFFICE O/P EST LOW 20 MIN: CPT | Mod: PBBFAC | Performed by: PHYSICIAN ASSISTANT

## 2019-10-10 PROCEDURE — 99214 PR OFFICE/OUTPT VISIT, EST, LEVL IV, 30-39 MIN: ICD-10-PCS | Mod: S$PBB,,, | Performed by: PHYSICIAN ASSISTANT

## 2019-10-10 PROCEDURE — 99214 OFFICE O/P EST MOD 30 MIN: CPT | Mod: S$PBB,,, | Performed by: PHYSICIAN ASSISTANT

## 2019-10-10 PROCEDURE — 99999 PR PBB SHADOW E&M-EST. PATIENT-LVL III: CPT | Mod: PBBFAC,,, | Performed by: PHYSICIAN ASSISTANT

## 2019-10-10 PROCEDURE — 99999 PR PBB SHADOW E&M-EST. PATIENT-LVL III: ICD-10-PCS | Mod: PBBFAC,,, | Performed by: PHYSICIAN ASSISTANT

## 2019-10-10 RX ORDER — CLONAZEPAM 0.5 MG/1
0.5 TABLET ORAL 2 TIMES DAILY PRN
Qty: 60 TABLET | Refills: 2 | Status: SHIPPED | OUTPATIENT
Start: 2019-10-10 | End: 2019-10-11

## 2019-10-10 RX ORDER — TRAZODONE HYDROCHLORIDE 50 MG/1
TABLET ORAL
Qty: 30 TABLET | Refills: 3 | Status: SHIPPED | OUTPATIENT
Start: 2019-10-10 | End: 2020-04-17

## 2019-10-10 RX ORDER — ESOMEPRAZOLE MAGNESIUM 40 MG/1
CAPSULE, DELAYED RELEASE ORAL
Qty: 90 CAPSULE | Refills: 1 | Status: SHIPPED | OUTPATIENT
Start: 2019-10-10 | End: 2019-10-11

## 2019-10-10 NOTE — PROGRESS NOTES
Summary: Met with patient in clinic after her appointment. Patient discussed her new diagnosis of CA and the treatment plan. She identified that she is right handed and is concerned about any loss of her hand function. She stated that she relies on her carloz in [employer] for her support.     Interventions: Provided supportive counseling.     Plan: Fpllow up in 2 weeks, review lupus support group, other CA resources if indicated.     Todays OPCM Self-Management Care Plan was developed with the patients/caregivers input and was based on identified barriers from todays assessment.  Goals were written today with the patient/caregiver and the patient has agreed to work towards these goals to improve his/her overall well-being. Patient verbalized understanding of the care plan, goals, and all of today's instructions. Encouraged patient/caregiver to communicate with his/her physician and health care team about health conditions and the treatment plan.  Provided my contact information today and encouraged patient/caregiver to call me with any questions as needed.

## 2019-10-10 NOTE — PROGRESS NOTES
Subjective:       Patient ID: Flory Cam is a 74 y.o. female.    Chief Complaint: Anxiety (PCP -Vasquez) and Medication Refill    Anxiety   Presents for initial visit. Onset was 1 to 4 weeks ago. The problem has been rapidly worsening. Symptoms include depressed mood, excessive worry, insomnia, irritability, muscle tension, nervous/anxious behavior, panic and restlessness. Patient reports no chest pain or shortness of breath. Symptoms occur most days. The severity of symptoms is causing significant distress. Exacerbated by: current health problems. The quality of sleep is poor.     Risk factors include recent illness. Her past medical history is significant for anxiety/panic attacks and depression. (Lupus) Past treatments include non-SSRI antidepressants. Compliance with prior treatments has been good.     Past Medical History:   Diagnosis Date    Acute coronary syndrome     Anxiety     Bilateral carotid artery stenosis 11/17/2015    Chronic pain     Coronary artery disease     GERD (gastroesophageal reflux disease)     Hyperlipidemia     Hypertension     Hypothyroidism     Low back pain     Lupus     Osteoporosis     Poor circulation     PVD (peripheral vascular disease)     S/P peripheral artery angioplasty 02/13/2014    Skin disease        Review of Systems   Constitutional: Positive for irritability. Negative for chills, fatigue and fever.   Respiratory: Negative for chest tightness and shortness of breath.    Cardiovascular: Negative for chest pain.   Gastrointestinal: Negative for abdominal pain.   Psychiatric/Behavioral: The patient is nervous/anxious and has insomnia.        Objective:      Physical Exam   Constitutional: She appears well-developed and well-nourished. No distress.   Cardiovascular: Normal rate and regular rhythm. Exam reveals no gallop and no friction rub.   No murmur heard.  Pulmonary/Chest: Effort normal and breath sounds normal. No stridor. No respiratory  distress. She has no wheezes. She has no rales. She exhibits no tenderness.   Abdominal: Soft. Bowel sounds are normal.   Skin: She is not diaphoretic.   Nursing note and vitals reviewed.      Assessment:       1. Anxiety about health        Plan:       Anxiety about health    Other orders  -     clonazePAM (KLONOPIN) 0.5 MG tablet; Take 1 tablet (0.5 mg total) by mouth 2 (two) times daily as needed for Anxiety.  Dispense: 60 tablet; Refill: 2  -     esomeprazole (NEXIUM) 40 MG capsule; take 1 capsule by mouth once daily BEFORE BREAKFAST  Dispense: 90 capsule; Refill: 1  -     traZODone (DESYREL) 50 MG tablet; take 1 tablet by mouth once daily AT NIGHT if needed for insomnia  Dispense: 30 tablet; Refill: 3

## 2019-10-10 NOTE — TELEPHONE ENCOUNTER
Patient will need to get this from her rheumatologist. I do not prescribe Plaquenil.    No complaints

## 2019-10-11 ENCOUNTER — OUTPATIENT CASE MANAGEMENT (OUTPATIENT)
Dept: ADMINISTRATIVE | Facility: OTHER | Age: 74
End: 2019-10-11

## 2019-10-11 ENCOUNTER — TELEPHONE (OUTPATIENT)
Dept: INTERNAL MEDICINE | Facility: CLINIC | Age: 74
End: 2019-10-11

## 2019-10-11 RX ORDER — PANTOPRAZOLE SODIUM 40 MG/1
40 TABLET, DELAYED RELEASE ORAL DAILY
Qty: 90 TABLET | Refills: 1 | Status: SHIPPED | OUTPATIENT
Start: 2019-10-11 | End: 2020-05-04

## 2019-10-11 RX ORDER — DIAZEPAM 2 MG/1
2 TABLET ORAL EVERY 12 HOURS PRN
Qty: 60 TABLET | Refills: 2 | Status: SHIPPED | OUTPATIENT
Start: 2019-10-11 | End: 2020-04-17

## 2019-10-11 NOTE — TELEPHONE ENCOUNTER
----- Message from Linda Jeronimo sent at 10/11/2019  1:31 PM CDT -----  Contact: [phone]   Pt would like a call form nurse in regards to a a prescription that was called in on yesterday. Please call back at [phone].         Thank You,   Linda Jeronimo

## 2019-10-11 NOTE — TELEPHONE ENCOUNTER
----- Message from Yuridia Ruth MA sent at 10/10/2019  3:11 PM CDT -----  [facility] request med change  Esomeprazole Magnesium 40 mg not covered    Alternatives  Nexium, pantoprazole sodium, omeprazole    Clonanzepam 0.5 mg  Not covered

## 2019-10-11 NOTE — TELEPHONE ENCOUNTER
The medication sent in yesterday wasn't covered but the pharmacist got the cost down to $13.00 for her.

## 2019-10-14 ENCOUNTER — TELEPHONE (OUTPATIENT)
Dept: INTERNAL MEDICINE | Facility: CLINIC | Age: 74
End: 2019-10-14

## 2019-10-14 NOTE — LETTER
November 14, 2019    Flory Cam  [address]  [address]  [facility] [address]             [facility]  [address] [place] [id number]  Phone: [phone]  Fax: [phone] To whom it may concern:     Flory Cam is currently a patient under my care. She is well known to me. I have been her primary care physician since 2012.   Due to a medical condition she cannot tolerate the heat.  If you have any questions or concerns, do not hesitate to contact my office.     Sincerely,       Tayler Ovalle D.O.  Internal Medicine Physician

## 2019-10-14 NOTE — TELEPHONE ENCOUNTER
Spoke with pt, she is requesting a letter r/t her dx of LUPUS and cannot tolerate the heat. Please call pt once the letter is printed and pt will come pick it up.

## 2019-10-14 NOTE — TELEPHONE ENCOUNTER
----- Message from Cara Ramos sent at 10/14/2019  9:03 AM CDT -----  Contact: self [phone]  Pt is requesting return call from nursePenelope; pt did not want to provide reason for call. Please call back at [phone].  Md Charity

## 2019-10-16 RX ORDER — EZETIMIBE 10 MG/1
TABLET ORAL
Qty: 90 TABLET | Refills: 0 | Status: SHIPPED | OUTPATIENT
Start: 2019-10-16 | End: 2020-03-24 | Stop reason: SDUPTHER

## 2019-10-19 DIAGNOSIS — I73.9 PAD (PERIPHERAL ARTERY DISEASE): ICD-10-CM

## 2019-10-21 RX ORDER — AMLODIPINE BESYLATE 10 MG/1
TABLET ORAL
Qty: 30 TABLET | Refills: 0 | Status: SHIPPED | OUTPATIENT
Start: 2019-10-21 | End: 2020-04-28

## 2019-10-21 RX ORDER — CLOPIDOGREL BISULFATE 75 MG/1
TABLET ORAL
Qty: 90 TABLET | Refills: 0 | Status: SHIPPED | OUTPATIENT
Start: 2019-10-21 | End: 2020-01-20 | Stop reason: SDUPTHER

## 2019-10-22 ENCOUNTER — TELEPHONE (OUTPATIENT)
Dept: DERMATOLOGY | Facility: CLINIC | Age: 74
End: 2019-10-22

## 2019-10-22 RX ORDER — ATORVASTATIN CALCIUM 80 MG/1
80 TABLET, FILM COATED ORAL DAILY
Qty: 90 TABLET | Refills: 1 | Status: SHIPPED | OUTPATIENT
Start: 2019-10-22 | End: 2020-08-20

## 2019-10-22 RX ORDER — LEVOTHYROXINE SODIUM 137 UG/1
137 TABLET ORAL DAILY
Qty: 90 TABLET | Refills: 1 | Status: SHIPPED | OUTPATIENT
Start: 2019-10-22 | End: 2020-08-28

## 2019-10-22 NOTE — TELEPHONE ENCOUNTER
Informed pt that appt has been cancelled with Dr. Gonzalez tomorrow. Pt verbalized understanding and has no further questions at this time.

## 2019-10-23 ENCOUNTER — OFFICE VISIT (OUTPATIENT)
Dept: RHEUMATOLOGY | Facility: CLINIC | Age: 74
End: 2019-10-23
Payer: MEDICARE

## 2019-10-23 VITALS
HEART RATE: 59 BPM | SYSTOLIC BLOOD PRESSURE: 146 MMHG | DIASTOLIC BLOOD PRESSURE: 77 MMHG | BODY MASS INDEX: 30.07 KG/M2 | WEIGHT: 186.31 LBS

## 2019-10-23 DIAGNOSIS — L43.9 LICHEN PLANUS: ICD-10-CM

## 2019-10-23 DIAGNOSIS — M05.79 RHEUMATOID ARTHRITIS INVOLVING MULTIPLE SITES WITH POSITIVE RHEUMATOID FACTOR: Primary | ICD-10-CM

## 2019-10-23 DIAGNOSIS — M81.0 AGE-RELATED OSTEOPOROSIS WITHOUT CURRENT PATHOLOGICAL FRACTURE: ICD-10-CM

## 2019-10-23 PROCEDURE — 99999 PR PBB SHADOW E&M-EST. PATIENT-LVL II: ICD-10-PCS | Mod: PBBFAC,,, | Performed by: INTERNAL MEDICINE

## 2019-10-23 PROCEDURE — 99214 PR OFFICE/OUTPT VISIT, EST, LEVL IV, 30-39 MIN: ICD-10-PCS | Mod: S$PBB,,, | Performed by: INTERNAL MEDICINE

## 2019-10-23 PROCEDURE — 99214 OFFICE O/P EST MOD 30 MIN: CPT | Mod: S$PBB,,, | Performed by: INTERNAL MEDICINE

## 2019-10-23 PROCEDURE — 99999 PR PBB SHADOW E&M-EST. PATIENT-LVL II: CPT | Mod: PBBFAC,,, | Performed by: INTERNAL MEDICINE

## 2019-10-23 PROCEDURE — 99212 OFFICE O/P EST SF 10 MIN: CPT | Mod: PBBFAC | Performed by: INTERNAL MEDICINE

## 2019-10-23 RX ORDER — HYDROXYCHLOROQUINE SULFATE 200 MG/1
TABLET, FILM COATED ORAL
COMMUNITY
End: 2019-10-23

## 2019-10-23 NOTE — PROGRESS NOTES
CC:  Chief Complaint   Patient presents with    Rheumatoid Arthritis     no pain today   . Discoid lupus     History of Present Illness:    Here for routine f/u of sero positive RA and ? Discoid  lupus and lichen planus overlap   Since last visit she has been tapered off prednisone  She was supposed to start sulfasalazine but she was anxious about the side effects and never re started  She is not clear if she has ever started to begin with  Any ways she denies any hand pains swelling or stiffness  Last visit she was noted to have  Lichen planus  lesions in the right hand and right wrist and she was seen by Dermatology  She was referred to Hand surgery for excision  Per her lesions came back positive for malignancy, she cannot say what kind of malignancy  She is due to follow up back with her oncologist Dr. Rivas, this week  She was told since the surgery would be extensive if they have to excise all the lesions they would recommend some kind of chemo, which she is going to start soon    She completed HD vit d supp, levels good now   She is advised to take vitamin-D 2000 units a day, further    Plaquenil had to be stopped per Ophthalmology recommendations  Then she was started on methotrexate but however she developed allergy reaction with rash and stopped    She also has chronic pain in the back, which is stable  She sees pain management gets Norco, takes it as needed and this is helpful    Denies any fever, chills, weight loss, dysuria, oral ulcers, chest pain, shortness of breath    History  Looks like she was diagnosed with discoid lupus vs lichen planus at [facility], from review of records looks like she has been on prednisone, Plaquenil, methotrexate in the past   Looks like the lesions were unresponsive to methotrexate and she was started on CellCept and later Imuran.  She had a rash on both CellCept and Imuran and they were discontinued  More recently she had a biopsy of right palm lesion by Dermatology which  was suggestive of lichen planus , she is on topicals per Dermatology          Lab review:  Most recent labs reveal negative KYLE, normal ESR/CRP,     Initial visit  :  She has seen Dr URIARTE in the past   She is currently on plaquenil 200 mg bid   Last seen here in 10/2016   She is currently on plaquenil 200 mg bid , doing well   On it for > 10 years now   Denies any joint swelling'  No am stiffness   She was seen by opthalmologist Dr Gonsales , who recommended she be considered something else in place of plaquenil  Due to ongoing issues with vision   Rash inv face / hands/ forearms / feet , some on legs stable     She was seen by  in 2014 for Discoid lupus and was lost for follow up. In that visit , since she was having joint pains,  also checked for rheumatoid arthritis which came back highly positive for RF/CCP    Past Medical History:   Diagnosis Date    Acute coronary syndrome     Anxiety     Bilateral carotid artery stenosis 11/17/2015    Chronic pain     Coronary artery disease     GERD (gastroesophageal reflux disease)     Hyperlipidemia     Hypertension     Hypothyroidism     Low back pain     Lupus     Osteoporosis     Poor circulation     PVD (peripheral vascular disease)     S/P peripheral artery angioplasty 02/13/2014    Skin disease          Past Surgical History:   Procedure Laterality Date    COLONOSCOPY N/A 1/4/2017    Procedure: COLONOSCOPY;  Surgeon: Sylvester Arboleda III, MD;  Location: [facility];  Service: Endoscopy;  Laterality: N/A;    CORONARY ANGIOPLASTY      CORONARY ARTERY BYPASS GRAFT      HYSTERECTOMY      OOPHORECTOMY      THYROIDECTOMY           Social History     Tobacco Use    Smoking status: Current Every Day Smoker     Packs/day: 0.25     Years: 40.00     Pack years: 10.00     Types: Cigarettes     Start date: 1961    Smokeless tobacco: Never Used   Substance Use Topics    Alcohol use: No    Drug use: No       Family History   Problem Relation Age  of Onset    Melanoma Neg Hx     Psoriasis Neg Hx     Lupus Neg Hx     Eczema Neg Hx        Review of patient's allergies indicates:  No Known Allergies          Review of Systems:  Constitutional: Denies fever, chills. No recent weight changes.   Fatigue: no  Muscle weakness: no  Headaches: no new headaches  Eyes: No redness or dryness.  No recent visual changes.  ENT: Denies dry mouth. No oral or nasal ulcers.  Card: No chest pain.  Resp: No cough or sob.   Gastro: No nausea or vomiting.  No heartburn.  Constipation: no  Diarrhea: no  Genito:  No dysuria.  No genital ulcers.  Skin:per hpi   Raynauds:no  Neuro: No numbness / tingling.   Psych: No depression, anxiety  Endo:  no excess thirst.  Heme: no abnormal bleeding or bruising  Clots:none     OBJECTIVE:     Vital Signs   Vitals:    10/23/19 0852   BP: (!) 146/77   Pulse: (!) 59     Physical Exam:  General Appearance:  NAD.   Gait: not favoring.  HEENT: PERRL.  Eyes not dry or injected.  No nasal ulcers.  Mouth not dry, no oral lesions.  Lymph: cervical, supraclavicular or axillary nodes: none abnormal   Cardio: no irregularity of S1 or S2.  No gallops or rubs.   Resp: Normal respiratory motion. Clear to auscultation bilaterally.   No abnormal chest conformation.  Abd: Soft, non-tender, nondistended.  No masses.   Skin: Head and neck,  and extremities examined.   Dry scaly lesions on  palmar aspect of rt hand , dryness noted on left palm as well     Old scars on arms, feet , some on legs   Face - hyperpigmentation ,   Neuro: Ox3.   Cranial nerves II-XII grossly intact.   Sensation intact  in both distal LE and upper extremities to light touch.  Musculoskeletal Exam:    Right Side Rheumatological Exam     2,3 MCP fullness , no tenderness noted, ulnar deviation   Multiple dry scaly lesions/hyperkeratotic lesions  noted on the right palm    Others :    Ankle :no synovitis   Foot: edematous , with callouses on ventral aspect ,     Left Side Rheumatological Exam      2,3 MCP fullness , no tenderness noted      Others :    Ankle :no synovitis   Foot: edematous     Spine :TTP lower lumbar region      Muscle strength:Equal and full in all mm groups of the upper and lower ext.    Results for NO JARVIS (MRN [id number]) as of 7/31/2018 12:54   Ref. Range 9/11/2014 13:46 10/26/2016 10:38   CCP Antibodies Latest Ref Range: <5.0 U/mL 194.4 (H) 387.2 (H)   Rheumatoid Factor Latest Ref Range: 0.0 - 15.0 IU/mL 56.0 (H) 123.0 (H)     KYLE - negative   CMP  Sodium   Date Value Ref Range Status   10/23/2019 142 136 - 145 mmol/L Final     Potassium   Date Value Ref Range Status   10/23/2019 4.5 3.5 - 5.1 mmol/L Final     Chloride   Date Value Ref Range Status   10/23/2019 104 95 - 110 mmol/L Final     CO2   Date Value Ref Range Status   10/23/2019 30 (H) 23 - 29 mmol/L Final     Glucose   Date Value Ref Range Status   10/23/2019 100 70 - 110 mg/dL Final     BUN, Bld   Date Value Ref Range Status   10/23/2019 17 8 - 23 mg/dL Final     Creatinine   Date Value Ref Range Status   10/23/2019 0.8 0.5 - 1.4 mg/dL Final     Calcium   Date Value Ref Range Status   10/23/2019 9.6 8.7 - 10.5 mg/dL Final     Total Protein   Date Value Ref Range Status   10/23/2019 7.0 6.0 - 8.4 g/dL Final     Albumin   Date Value Ref Range Status   10/23/2019 3.8 3.5 - 5.2 g/dL Final     Total Bilirubin   Date Value Ref Range Status   10/23/2019 0.6 0.1 - 1.0 mg/dL Final     Comment:     For infants and newborns, interpretation of results should be based  on gestational age, weight and in agreement with clinical  observations.  Premature Infant recommended reference ranges:  Up to 24 hours.............<8.0 mg/dL  Up to 48 hours............<12.0 mg/dL  3-5 days..................<15.0 mg/dL  6-29 days.................<15.0 mg/dL       Alkaline Phosphatase   Date Value Ref Range Status   10/23/2019 198 (H) 55 - 135 U/L Final     AST   Date Value Ref Range Status   10/23/2019 22 10 - 40 U/L Final     ALT   Date  Value Ref Range Status   10/23/2019 24 10 - 44 U/L Final     Anion Gap   Date Value Ref Range Status   10/23/2019 8 8 - 16 mmol/L Final     eGFR if    Date Value Ref Range Status   10/23/2019 >60 >60 mL/min/1.73 m^2 Final     eGFR if non    Date Value Ref Range Status   10/23/2019 >60 >60 mL/min/1.73 m^2 Final     Comment:     Calculation used to obtain the estimated glomerular filtration  rate (eGFR) is the CKD-EPI equation.        Imaging : x ray foot :  Mild calcaneal spurring. No acute fracture. No dislocation. Minimal degenerative change first metatarsophalangeal joint.    Notes reviewed  Other procedures:    ASSESSMENT/PLAN:     Rheumatoid arthritis involving multiple sites with positive rheumatoid factor    Lichen planus    Age-related osteoporosis without current pathological fracture    RA/ discoid lupus/lichen planus overlap :     Neg KYLE   + RF/ CCP     Plaquenil had to be discontinued per Ophthalmology recommendation  methotrexate to be stopped now due to worsening of facial rash  Rash with CellCept and Imuran in the past    No synovitis noted today, only chronic swelling  Asymptomatic denies any pain or stiffness  Sulfasalazine recommended in the past but she never started as she was worried about side effects  With newly diagnosed skin cancer, not sure what type  Will hold off on any new immunosuppressants at this point    H/o discoid lupus:  Do not see any lesions suggestive of discoid lupus at this point  KYLE negative  Normal complements and UA negative for protein  Will monitor for now off Plaquenil  Plaquenil stopped due to ophthalmology recs     Lichen planus involving right hand :  With underlying malignancy status post recent excision biopsy  Follow-up per Dr. Rivas for further chemo    Osteoporosis  :  Deferred decision on Prolia again today  Stay on vitamin-D 66769 units a week    Multiple calluses in feet:  Follow-up Podiatry    Chronic mild elevation in  alkaline phosphatase:  Asymptomatic, stable  Recommend follow-up with PCP    Mild elevated PTH with history of vitamin-D deficiency  Stable levels  Normal calcium  Vitamin-D levels normal now  Completed high-dose supplementation, recommend she stays on 2000 units a day     3 month return no labs      Went over uptodate information /literature on the meds prescribed today   Steroids- weight gain, cataracts, worsening bone mineral density discussed  Sulfasalazine risk profile discussed, hold during infection or prior to surgery    Disclaimer: This note was prepared using voice recognition system and is likely to have sound alike errors and is not proof read.  Please call me with any questions.

## 2019-10-24 ENCOUNTER — OUTPATIENT CASE MANAGEMENT (OUTPATIENT)
Dept: ADMINISTRATIVE | Facility: OTHER | Age: 74
End: 2019-10-24

## 2019-10-24 NOTE — PROGRESS NOTES
Phoned patient. Patient stated that she is beginning her chemotherapy on her hand on 10/28/19. She shared that it is to be a mild chemotherapy but she stated that she is anxious about the service. Discussed services that might benefit her and encouraged her to ask her oncology social worker at [facility] about Cancer Services. She agreed and added that she had been informed that she will need to make diet adjustments. Patient stated that she is encouraged that she will have no further surgeries on her hand and anticipates not losing any function of her hand.   Discussed the Lupus support group, encouraged attending after her chemotherapy. She stated that she had been thinking about the group and plans to get involved soon.   Patient stated that she will call LCSW after her first chemo on 10/28.

## 2019-10-25 ENCOUNTER — OUTPATIENT CASE MANAGEMENT (OUTPATIENT)
Dept: ADMINISTRATIVE | Facility: OTHER | Age: 74
End: 2019-10-25

## 2019-10-25 NOTE — PROGRESS NOTES
"10/25/19 - SUMMARY: Phone contact made with pt today for follow up with OPCM. She advised she has had PET scan done that shows no cancer other than her hand and she is scheduled to have "some light chemotherapy". She has not completed an advanced directive or contacted the smoking cessation program and states she will address these after she has completed the treatments she has coming up for her hand.   INTERVENTION: Discussed her new insurance and she advised she is glad she made the change to [employer]. Advised this CM will mail her some literature about the programs she is eligible for and will discuss each with her on my next contact.   PLAN: Follow up in two weeks. Discuss [employer] programs. D/C from OPCM or transfer to Monitoring status if still being followed by LCSW.     Todays OPCM Self-Management Care Plan was reviewed with the patients/caregivers input based on identified barriers from todays and previous assessments.  Goals were reviewed today with the patient/caregiver and the patient has agreed to continue to work towards these goals to improve his/her overall well-being. Patient verbalized understanding of the care plan, goals, and all of today's instructions. Encouraged patient/caregiver to communicate with his/her physician and health care team about health conditions and the treatment plan.  Provided my contact information today and encouraged patient/caregiver to call me with any questions as needed. Rita Bansal RN      "

## 2019-11-08 ENCOUNTER — OUTPATIENT CASE MANAGEMENT (OUTPATIENT)
Dept: ADMINISTRATIVE | Facility: OTHER | Age: 74
End: 2019-11-08

## 2019-11-12 ENCOUNTER — OUTPATIENT CASE MANAGEMENT (OUTPATIENT)
Dept: ADMINISTRATIVE | Facility: OTHER | Age: 74
End: 2019-11-12

## 2019-11-12 NOTE — PROGRESS NOTES
Summary: Phoned patient. Patient stated that she has started the chemo and is doing well. She stated that she has a break until 12/2/19. Patient less anxious.   Informed OPCM RN of closure.     Patient/Caregiver agrees to case closure by  11/12/19.    Outpatient Care Management   - Care Plan Follow Up    Patient: Flory Cam  MRN:  [id number]  Date of Service:  11/12/2019  Completed by:  Leslie Hammond LCSW  Referral Date: 07/22/2019  Program: Case Management (High Risk)    Reason for Visit   Patient presents with    Update Plan Of Care    Case Closure       Complex Care Plan     Care plan was discussed and completed today with input from patient and/or caregiver.    Goals      Patient/caregiver will have knowledge of resources available in order to obtain the services that are needed prior to discharge from [facility]. - Priority: Med      Overall Time to Completion  2 months from 08/09/2019    OPCM Identified Patient Barriers:    Health Literacy -Care Plan Created  Financial Support -Referred to OPCM     RN Identified Patient Barriers:    Advanced Care Planning -Care Plan Created    Short Term Goals  Patient/caregiver will have improved understanding of advanced care planning, understanding of financial resources and understanding of support resources within 1 month.  Interventions   · Mail Advanced Directives packet.  · Mail [employer] OTC Benefit Contact Information.  · Mail [employer] Transportation Contact Information.  · Refer to Community Resource: Mail [employer] Well Dine brochure. .  · Refer to [facility] Smoking Cessation department.  · Refer to Outpatient Case Management Social Worker.     Status  · Partially met - 8/23/19 - Has rec'd mailed literature, but not yet reviewed it all.                                9/6/19 - Will mail Smoking Cessation info.                                            9/27/19 - Has been screened by [facility]OFFRE[facility]. Smoking Cessation literature mailed after last  contact.                                            10/11/9 - Has rec'd literature, but has not yet initiated either.                                            10/25/19 - Discussed [employer] programs.          Clinical Reference Documents Added to Patient Instructions       Document    ACTINIC KERATOSIS, UNDERSTANDING (ENGLISH)    LICHEN PLANUS, UNDERSTANDING (ENGLISH)    SEBORRHEIC KERATOSIS, UNDERSTANDING (ENGLISH)                  Patient Instructions     Instructions were provided via the [employer] patient resources and are available for the patient to view on the patient portal.    No follow-ups on file.    Todays OPCM Self-Management Care Plan was developed with the patients/caregivers input and was based on identified barriers from todays assessment.  Goals were written today with the patient/caregiver and the patient has agreed to work towards these goals to improve his/her overall well-being. Patient verbalized understanding of the care plan, goals, and all of today's instructions. Encouraged patient/caregiver to communicate with his/her physician and health care team about health conditions and the treatment plan.  Provided my contact information today and encouraged patient/caregiver to call me with any questions as needed.

## 2019-11-15 DIAGNOSIS — L29.9 ITCHING: ICD-10-CM

## 2019-11-15 RX ORDER — HYDROXYZINE HYDROCHLORIDE 25 MG/1
25 TABLET, FILM COATED ORAL 3 TIMES DAILY PRN
Qty: 30 TABLET | Refills: 0 | Status: SHIPPED | OUTPATIENT
Start: 2019-11-15 | End: 2020-04-17

## 2019-11-21 ENCOUNTER — OFFICE VISIT (OUTPATIENT)
Dept: PODIATRY | Facility: CLINIC | Age: 74
End: 2019-11-21
Payer: MEDICARE

## 2019-11-21 VITALS — BODY MASS INDEX: 28.74 KG/M2 | HEIGHT: 66 IN | WEIGHT: 178.81 LBS

## 2019-11-21 DIAGNOSIS — B35.1 DERMATOPHYTOSIS OF NAIL: ICD-10-CM

## 2019-11-21 DIAGNOSIS — I73.9 PERIPHERAL VASCULAR DISEASE: Primary | ICD-10-CM

## 2019-11-21 DIAGNOSIS — L93.0 DISCOID LUPUS ERYTHEMATOSUS: ICD-10-CM

## 2019-11-21 PROCEDURE — 99499 RISK ADDL DX/OHS AUDIT: ICD-10-PCS | Mod: HCNC,S$GLB,, | Performed by: PODIATRIST

## 2019-11-21 PROCEDURE — 99999 PR PBB SHADOW E&M-EST. PATIENT-LVL IV: CPT | Mod: PBBFAC,HCNC,, | Performed by: PODIATRIST

## 2019-11-21 PROCEDURE — 11721 PR DEBRIDEMENT OF NAILS, 6 OR MORE: ICD-10-PCS | Mod: Q8,HCNC,S$GLB, | Performed by: PODIATRIST

## 2019-11-21 PROCEDURE — 99214 OFFICE O/P EST MOD 30 MIN: CPT | Mod: PBBFAC | Performed by: PODIATRIST

## 2019-11-21 PROCEDURE — 99499 UNLISTED E&M SERVICE: CPT | Mod: HCNC,S$GLB,, | Performed by: PODIATRIST

## 2019-11-21 PROCEDURE — 99999 PR PBB SHADOW E&M-EST. PATIENT-LVL IV: ICD-10-PCS | Mod: PBBFAC,HCNC,, | Performed by: PODIATRIST

## 2019-11-21 PROCEDURE — 11721 DEBRIDE NAIL 6 OR MORE: CPT | Mod: Q8,PBBFAC | Performed by: PODIATRIST

## 2019-11-21 PROCEDURE — 11721 DEBRIDE NAIL 6 OR MORE: CPT | Mod: Q8,HCNC,S$GLB, | Performed by: PODIATRIST

## 2019-11-21 NOTE — PROGRESS NOTES
PODIATRY NOTE    CHIEF COMPLAINT  Chief Complaint   Patient presents with    Routine Foot Care     PCP Dr. Ovalle 10/10/19, 3 mo [facility]       HPI  SUBJECTIVE: Flory Cam is a 74 y.o. female w/ PMH of PVD, Lupus, hx of intermittent leg claudifcation- s/p vascular intervention with much improvement in symptoms and other medical problems who presents to clinic for high risk  foot exam and care.Patient admits to painful toenails and calluses aggravated by increased weight bearing, shoe gear, and pressure. States pain is relieved with routine debridements. She denies any N/V/F. Patient has no other pedal complaints at this time.    HgA1c:   Hemoglobin A1C   Date Value Ref Range Status   08/04/2017 5.8 (H) 4.0 - 5.6 % Final     Comment:     According to ADA guidelines, hemoglobin A1c <7.0% represents  optimal control in non-pregnant diabetic patients. Different  metrics may apply to specific patient populations.   Standards of Medical Care in Diabetes-2016.  For the purpose of screening for the presence of diabetes:  <5.7%     Consistent with the absence of diabetes  5.7-6.4%  Consistent with increasing risk for diabetes   (prediabetes)  >or=6.5%  Consistent with diabetes  Currently, no consensus exists for use of hemoglobin A1c  for diagnosis of diabetes for children.  This Hemoglobin A1c assay has significant interference with fetal   hemoglobin   (HbF). The results are invalid for patients with abnormal amounts of   HbF,   including those with known Hereditary Persistence   of Fetal Hemoglobin. Heterozygous hemoglobin variants (HbAS, HbAC,   HbAD, HbAE, HbA2) do not significantly interfere with this assay;   however, presence of multiple variants in a sample may impact the %   interference.     09/14/2016 6.2 4.5 - 6.2 % Final     Comment:     According to ADA guidelines, hemoglobin A1C <7.0% represents  optimal control in non-pregnant diabetic patients.  Different  metrics may apply to specific populations.  "  Standards of Medical Care in Diabetes - 2016.  For the purpose of screening for the presence of diabetes:  <5.7%     Consistent with the absence of diabetes  5.7-6.4%  Consistent with increasing risk for diabetes   (prediabetes)  >or=6.5%  Consistent with diabetes  Currently no consensus exists for use of hemoglobin A1C  for diagnosis of diabetes for children.     04/13/2010 5.9 4.0 - 6.2 % Final       REVIEW OF SYSTEMS  General: Denies any fever or chills  Chest: Denies shortness of breath, wheezing, coughing, or sputum production  Heart: Denies chest pain.  As noted above and per history of current illness above, otherwise negative in the remainder of the 14 systems.     PHYSICAL EXAM  Vitals:    11/21/19 0938   Weight: 81.1 kg (178 lb 12.7 oz)   Height: 5' 6" (1.676 m)       GEN:  This patient is well-developed, well-nourished and appears stated age, well-oriented to person, place and time, and cooperative and pleasant on today's visit.      LOWER EXTREMITY  Vascular:   · DP pedal pulse 0/4 b/l, PT pedal pulse 1/4 b/l  · Skin temperature warm to warm from prox to distally  · CFT <5 secs b/l  · There is LE edema noted b/l.     Dermatologic:   · Thickened, dystrophic, elongated toenails with subungal debris 1-5 b/l.   · Webspaces are C/D/I B/L.  · There is hyperkeratotic tissue noted plantar aspect of b/l feet at medial arch x 2  · Skin texture and turgor dry with hyperkeratosis diffusely b/l plantar heel   · There is no pedal hair growth noted    Neurologic:  · Vibratory sensation diminished at level of Hallux IPJ b/l    · Protective sensation absent at 0/10 sites upon examination with Hinton Weinsten 5.07 g monofilament.   · Propioception intact at 1st MTPJ b/l.   · Achilles and patellar deep tendon reflexes intact  · Babinski reflex absent b/l. Light touch and sharp/dull sensation intact b/l.    Musculoskeletal/Orthopedic:  · No symptomatic structural abnormalities noted.   · Muscle strength is 5/5 for foot " inverters, everters, plantarflexors, and dorsiflexors. Muscle tone is normal.  · Pain free range of motion in all four quadrants with stiffness and limitation b/l  · PAIN with palpation of callus plantar medial arch, LEFT    ASSESSMENT  Encounter Diagnoses   Name Primary?    Peripheral vascular disease Yes    Dermatophytosis of nail     Discoid lupus erythematosus        Plan:  -Discuss presenting problems, etiology, pathologic processes and management options with patient today.     -With patient's permission, nails were aggressively reduced and debrided x 10 to their soft tissue attachment mechanically and with electric , removing all offending nail and debris. Patient relates relief following the procedure.     Patient has above noted diagnosis and/or medical co-morbidities.They are being treated and managed by their  Primary Care Physician for management of comorbid states which are deemed to be currently stable.          Future Appointments   Date Time Provider Department Center   1/23/2020 10:30 AM Yeimy Monteiro MD [facility] RHEUM [facility]   2/21/2020  9:40 AM Melva Nettles DPM [facility] POD [facility]   2/26/2020  9:30 AM LABORATORY, [facility] ON LAB [facility]   3/5/2020 10:30 AM Christopher Cohen MD ON CARDIO  [facility]

## 2019-11-21 NOTE — PROGRESS NOTES
Outpatient Care Management  Plan of Care Follow Up Visit    Patient: Flory Cam  MRN: [id number]  Date of Service: 11/21/2019  Completed by: Rita Bansal RN  Referral Date: 07/22/2019  Program: Case Management (High Risk)    Reason for Visit   Patient presents with    Update Plan Of Care    Case Closure       Brief Summary: Pt reports she is doing well and has no concerning health issues at this time. She does not know if she has rec'd the [employer] literature sent by this CM because she has not been to her mailbox recently, but she will go check. She is aware of the [employer] programs available to her, stating the agent that signed her up explained them and gave her literature about them. She is already accessing the transportation benefit. Advised this CM will d/c from [facility] today related to goals met. Encouraged her to follow up with the appropriate provider for any symptoms causing concern in the future.     Patient Summary     Involvement of Care:  Do I have permission to speak with other family members about your care?       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    Patient Reported Labs & Vitals:  1.  Any Patient Reported Labs & Vitals?     2.  Patient Reported Blood Pressure:     3.  Patient Reported Pulse:     4.  Patient Reported Weight (Kg):     5.  Patient  Reported Blood Glucose (mg/dl):       Medical History:  Reviewed medical history with patient and/or caregiver    Social History:  Reviewed social history with patient and/or caregiver    Clinical Assessment     Reviewed and provided basic information on available community resources for mental health, transportation, wellness resources, and palliative care programs with patient and/or caregiver.    Complex Care Plan     Care plan was discussed and completed today with input from patient and/or caregiver.    Goals    None         Patient Instructions     Instructions were provided via the [facility] patient [employer] and are available for the patient to view on the patient portal.    Next Steps: D/C from [facility] today related to goals met. Rita Bansal RN    Follow up in about 13 days (around 11/21/2019).      Todays [facility] Self-Management Care Plan was developed with the patients/caregivers input and was based on identified barriers from todays assessment.  Goals were written today with the patient/caregiver and the patient has agreed to work towards these goals to improve his/her overall well-being. Patient verbalized understanding of the care plan, goals, and all of today's instructions. Encouraged patient/caregiver to communicate with his/her physician and health care team about health conditions and the treatment plan.  Provided my contact information today and encouraged patient/caregiver to call me with any questions as needed.

## 2019-11-22 ENCOUNTER — TELEPHONE (OUTPATIENT)
Dept: INTERNAL MEDICINE | Facility: CLINIC | Age: 74
End: 2019-11-22

## 2019-11-22 DIAGNOSIS — R69 MULTIPLE CHRONIC DISEASES: Primary | ICD-10-CM

## 2019-11-22 NOTE — TELEPHONE ENCOUNTER
----- Message from Melva Nettles DPM sent at 11/21/2019  3:35 PM CST -----  Quirino Lester,    I saw our patient today for at risk foot exam and care. She really appears very depressed. She expressed some things that are of concern to me in regards to her mental well being. She told me she has been seen by [facility] but he no longer works here. Can you get her in to be seen? Perhaps she will benefit from pysch or palliative care. She is currently undergoing chemo. I feel that social can get involved. I just wanted to contact you about this so someone can reach out to her.     Thank you

## 2019-11-22 NOTE — TELEPHONE ENCOUNTER
----- Message from Tayler Ovalle DO sent at 11/22/2019  1:28 PM CST -----  Please reach out to patient and schedule an appointment with me for depression. Also give her the information for psychiatry/counseling.   She will benefit from a case management referral as well. I will order.   ----- Message -----  From: Melva Nettles DPM  Sent: 11/21/2019   3:35 PM CST  To: DO Quirino Mayorga,    I saw our patient today for at risk foot exam and care. She really appears very depressed. She expressed some things that are of concern to me in regards to her mental well being. She told me she has been seen by Bettie but he no longer works here. Can you get her in to be seen? Perhaps she will benefit from pysch or palliative care. She is currently undergoing chemo. I feel that social can get involved. I just wanted to contact you about this so someone can reach out to her.     Thank you

## 2019-11-26 ENCOUNTER — OFFICE VISIT (OUTPATIENT)
Dept: INTERNAL MEDICINE | Facility: CLINIC | Age: 74
End: 2019-11-26
Payer: MEDICARE

## 2019-11-26 VITALS
TEMPERATURE: 97 F | HEIGHT: 66 IN | DIASTOLIC BLOOD PRESSURE: 82 MMHG | HEART RATE: 62 BPM | BODY MASS INDEX: 29.2 KG/M2 | SYSTOLIC BLOOD PRESSURE: 126 MMHG | OXYGEN SATURATION: 98 % | WEIGHT: 181.69 LBS

## 2019-11-26 DIAGNOSIS — F33.9 RECURRENT DEPRESSION: Primary | ICD-10-CM

## 2019-11-26 DIAGNOSIS — F41.9 ANXIETY: ICD-10-CM

## 2019-11-26 PROCEDURE — 1159F MED LIST DOCD IN RCRD: CPT | Mod: HCNC,S$GLB,, | Performed by: INTERNAL MEDICINE

## 2019-11-26 PROCEDURE — 1126F AMNT PAIN NOTED NONE PRSNT: CPT | Mod: HCNC,S$GLB,, | Performed by: INTERNAL MEDICINE

## 2019-11-26 PROCEDURE — 1159F PR MEDICATION LIST DOCUMENTED IN MEDICAL RECORD: ICD-10-PCS | Mod: HCNC,S$GLB,, | Performed by: INTERNAL MEDICINE

## 2019-11-26 PROCEDURE — 99213 OFFICE O/P EST LOW 20 MIN: CPT | Mod: HCNC,S$GLB,, | Performed by: INTERNAL MEDICINE

## 2019-11-26 PROCEDURE — 99213 OFFICE O/P EST LOW 20 MIN: CPT | Mod: PBBFAC | Performed by: INTERNAL MEDICINE

## 2019-11-26 PROCEDURE — 99999 PR PBB SHADOW E&M-EST. PATIENT-LVL III: CPT | Mod: PBBFAC,HCNC,, | Performed by: INTERNAL MEDICINE

## 2019-11-26 PROCEDURE — 1126F PR PAIN SEVERITY QUANTIFIED, NO PAIN PRESENT: ICD-10-PCS | Mod: HCNC,S$GLB,, | Performed by: INTERNAL MEDICINE

## 2019-11-26 PROCEDURE — 99999 PR PBB SHADOW E&M-EST. PATIENT-LVL III: ICD-10-PCS | Mod: PBBFAC,HCNC,, | Performed by: INTERNAL MEDICINE

## 2019-11-26 PROCEDURE — 99213 PR OFFICE/OUTPT VISIT, EST, LEVL III, 20-29 MIN: ICD-10-PCS | Mod: HCNC,S$GLB,, | Performed by: INTERNAL MEDICINE

## 2019-11-26 NOTE — PROGRESS NOTES
Subjective:       Patient ID: Flory Cam is a 74 y.o. female.    Chief Complaint: Depression    Depression   Visit Type: follow-up  Patient presents with the following symptoms: depressed mood, excessive worry and nervousness/anxiety.  Patient is not experiencing: suicidal ideas, suicidal planning and thoughts of death.  Severity: mild       Review of Systems   Psychiatric/Behavioral: Positive for depression and dysphoric mood. Negative for suicidal ideas. The patient is nervous/anxious.        Objective:      Physical Exam   Constitutional: She is oriented to person, place, and time. She appears well-developed and well-nourished. No distress.   Eyes: No scleral icterus.   Cardiovascular: Normal rate.   Pulmonary/Chest: Effort normal. No respiratory distress.   Neurological: She is alert and oriented to person, place, and time.   Skin: Skin is dry.   Psychiatric: Her speech is normal and behavior is normal. Thought content normal. She exhibits a depressed mood.   Vitals reviewed.      Assessment:       1. Recurrent depression    2. Anxiety        Plan:       Flory was seen today for depression.    Diagnoses and all orders for this visit:    Recurrent depression  -     Ambulatory consult to Psychiatry    Anxiety  -     Ambulatory consult to Psychiatry    RTC as previously scheduled and as needed.

## 2019-12-03 ENCOUNTER — OUTPATIENT CASE MANAGEMENT (OUTPATIENT)
Dept: ADMINISTRATIVE | Facility: OTHER | Age: 74
End: 2019-12-03

## 2019-12-03 NOTE — PATIENT INSTRUCTIONS
Lupus (Systemic Lupus Erythematosus, SLE)  Lupus is a chronic (long-term) disease. It causes inflammation in the body. It mainly affects the joints, skin, and muscles. Lupus can affect almost any part of the body, and other common sites affected by lupus include the kidneys, blood cells, lungs, brain, nerves, intestines, eyes, mouth, and heart. Lupus is an autoimmune disease. This means that immune cells in the body begin attacking normal body cells. The cause of this is not known.  Common symptoms include:  · A butterfly-shaped rash across the bridge of the nose and cheeks or a disk-shaped rash on the face, neck, or chest  · Sun sensitivity (a short time in the sun may lead to severe sunburn or rash)  · Stiff, painful, or swollen joints (arthritis)  · Fatigue or depression  · Fever  Your healthcare provider may prescribe medicines such as oral steroids or medicines to suppress the immune system. Some people benefit from anti-malarial medicines as well. People with lupus are more likely to have heart disease. So, it is vital to manage other risk factors for heart disease. These include high blood pressure, smoking, and unhealthy cholesterol.   There is no cure for lupus. With good care, though, most people with the condition lead normal, active lives.   Home care  · If you were prescribed a medicine, take it as directed.  · Unless another pain medicine was prescribed, take an over-the-counter pain medicine such as acetaminophen or ibuprofen for pain. Do not take ibuprofen or other NSAID (non-steroidal anti-inflammatory) medicine if you were prescribed prednisone.  · Avoid sun exposure. Cover up with clothing. Wear sunglasses. Use sun screen (at least SPF 15).  · Get enough rest and reduce stress to help your immune system.  · Get some physical activity every day. This will help you feel your best.  · If you have high blood pressure, consider buying an automatic blood pressure machine (available at most  pharmacies). Use this to monitor your blood pressure and report to your doctor.  · Limit alcohol intake. Eat a healthy, balanced diet low in fat and cholesterol.  · If you smoke, quit. Smoking increases the risk of lupus-related complications.  Follow-up care  Follow up with your healthcare provider or as advised by our staff.  For more information contact the Lupus Foundation at [phone]  www.lupus.org  When to seek medical advice  Call your healthcare provider for any of the following:  · Increasing weakness, fainting  · Chest pain or shortness of breath or pain with breathing  · Severe headache with fever  · Seizures  · Leg swelling, redness or tenderness (sign of blood clot)  · Unusual bruising or bleeding anywhere on your body  · Blood in your stool (black or red color)  · Abdominal pain, repeated vomiting  · Blood or development of significant bubbles in the urine  · Swelling in the legs and arms  · Development of ulcers in the mouth  · New rash  · Rashes, discoloration or ulcerations on the finger tips or toes  · You become pregnant or are planning to become pregnant  Date Last Reviewed: 3/1/2017  © 4300-4272 [employer]. [address] [place] [address]. All rights reserved. This information is not intended as a substitute for professional medical care. Always follow your healthcare professional's instructions.        Understanding Systemic Lupus Erythematosus (Lupus)  Systemic lupus erythematosus is a disease that causes your bodys immune system to attack its own cells and tissues. It can affect your joints and nervous system. It can affect blood vessels. And it can affect organs such as the skin, kidneys, lungs, and brain. It can cause rashes, fatigue, pain, and fever. Severe lupus can cause harm to organs and other serious problems, including death.  Lupus is an ongoing (chronic) disease. It can be mild to severe. It is most common in young adult women. Lupus has no cure, but  medicines can help symptoms. And you can help manage lupus by living a healthy lifestyle and working with your healthcare provider.  What causes lupus?  Your body protects itself with the immune system. The immune system makes proteins called antibodies. These protect against bacteria, viruses, foreign bodies, and cancer cells. In some people, the immune system makes antibodies that attack the bodys own cells. This leads to inflammation and tissue damage in the body. This is what happens in lupus as well as some other diseases.   Healthcare providers dont know why this happens. Experts think it may be caused by a mix of genes and other factors. The other factors may include certain viruses and allergies. Genetics do play a role in lupus.   Symptoms  Lupus can appear in many parts of the body. Because of this, it can affect people in different ways. You may have only some of these symptoms. You are not likely to have all of these symptoms. Some of the common symptoms of lupus are:  · Fatigue  · Fever  · Sores in the mouth or nose  · Patchy hair loss  · Butterfly-shaped rash on the cheeks of the face (malar rash)  · Rashes caused by sunlight  · Swollen or painful joints (arthritis)  · Muscle pain  · Weight loss  · Heartburn (acid reflux)  · Pain in the stomach  · Less blood flow in the fingers and toes  · Headaches  · Feeling dizzy  · Abnormal amounts of blood cells  · Bruising or bleeding  · Depression  · Confusion  Severe symptoms can include:  · Swelling in legs and ankles (edema)  · Inflammation of tissue around the lungs that causes chest pain when breathing (pleurisy)  · Inflammation of the lining of the heart (pericarditis)  · Seizures  · Kidney problems  · Miscarriage  · Abnormal amounts of blood cells      What is remission?  Remission is when symptoms go away for a period of time. Lupus can go into remission. If lupus flares up again, your symptoms may return the same as before.   Possible complications of  lupus  Lupus can cause harm to the body over time. This can lead to serious problems, such as:  · Lupus nephritis (kidney disease). Over time, this can lead to kidney failure. Kidney failure is treated with dialysis or kidney transplant. Kidney disease can cause certain symptoms. Call your healthcare provider if you have swelling in your hands, feet, or around your eyes. Also tell your healthcare provider if you have changes in urination. These include needing to go more often, pain when you urinate, or dark urine.  · Clogged arteries (atherosclerosis). This raises a persons risk for heart attack, heart failure, and stroke. To help lower your risk for these problems, you can make certain lifestyle changes. Stop smoking, and get your blood pressure, diabetes, and cholesterol under control. Stay active and healthy.  Diagnosing lupus  Lupus is hard to diagnose. This is because it has many possible symptoms that could have other causes. And, the symptoms can happen slowly over time.  To diagnose lupus, your healthcare provider will ask about your medical history. He or she will ask about your symptoms. Your healthcare provider may suspect you have lupus if you have four or more symptoms and he or she can find no specific cause. You may have tests to help confirm the diagnosis. The tests may include:  · Antibody blood tests. These tests are done to look for certain kinds of antibodies in your blood. The main test for lupus is the antinuclear antibodies (KYLE) test. Most people (97%) with lupus will have a positive KYLE test result. Other tests check for other kinds of antibodies.  · CBC blood test. This test checks for low counts of red blood cells, white blood cells, and platelets.  · Other blood tests. More blood tests may be done to look for other problems. Some look for signs of inflammation in the body. Some tests look for certain kinds of proteins. Other tests check how fast your blood clots.  · Urine tests. These  are to look for blood or protein in the urine. This can mean your kidneys are not working normally.  · Biopsies. A biopsy is when tiny pieces of tissue are taken from the body to be checked with a microscope. To look for signs of lupus, biopsies may be done of the skin and kidneys. The test looks for damage to these organs.  Treating lupus  Lupus is treated in many ways. You may work with a rheumatologist. This is a healthcare provider who specializes in lupus, arthritis, and other related diseases. You may also work with other kinds of healthcare providers. These include primary healthcare providers and specialists in kidney disease, blood disorders, immune disorders, and heart problems. You may also meet with a  to help you manage your treatment plan. The goals of treatment include treating symptoms, preventing flare-ups of lupus, and helping reduce damage to the body.  Your healthcare provider may give you medicine to help treat symptoms. Medicines cant cure lupus, but they can help prevent organ damage or suppress the disease. Your healthcare provider will prescribe one or more medicines to help you feel better. Be sure to take them as directed. You may be given medicines such as:  · Nonsteroidal anti-inflammatory drugs (NSAIDS). These can be used to help relieve swelling, pain, and fever.  · Antimalarial medicine. A medicine used to prevent and treat malaria can help ease some lupus symptoms. It can treat fatigue, rashes, joint pain, and mouth sores. The medicine may also help prevent blood clots.  · Corticosteroid (steroids) medicines. These can help people when lupus affects the kidneys, lungs, or heart, or nervous system.  · Medicines that suppress the immune system. These can help treat severe symptoms of lupus that has attacked organs.                  · Other medicines. A medicine called a biologic may be a choice. Clinical trials are also being done to test other medicines that may help  people with lupus.     Talk with your healthcare providers about the risks, benefits, and possible side effects of all medicines.  Managing your health  Lupus can also be managed by keeping a healthy lifestyle. Here are ways to take care of yourself:  · Find the right balance of rest and activity.  · Eat plenty of vegetables, fruits, and grains.  · Maintain a healthy weight.  · Exercise a few times a week, at least. Try walking, swimming, or biking.  · Learn ways to reduce or manage stress.  · Stay out of the sun as much as you can. Use sunscreen with SPF 15 or higher.  · Quit smoking if you currently smoke.  · Stay educated and current on lupus information.   Work with your healthcare provider to manage your lupus. Be sure to see your healthcare provider for regular checkups and tests.  Pregnancy and lupus  If you are a woman of child-bearing age, talk with your healthcare provider about the risks of pregnancy and lupus. Lupus symptoms can flare during pregnancy. Pregnancy with lupus is high-risk, so you will need extra care from your healthcare team. You may need to see your healthcare provider more often.  Getting support  Lupus is a chronic disease. It can put special demands on your life. Family and friends can be a good source of help and support. You may also want to join a support group for lupus patients. By talking with other people who have lupus, you may learn new ways to cope. You may also feel less alone. For more information, contact the following:  · The Arthritis Foundation  233.935.1770  www.arthritis.org  · [facility]up[facility] [place]  220.688.8736  www.lupus.org  · The Lupus Initiative, American College [employer] Rheumatology 514-708-5147 www.thelupusinitiative.org  Date Last Reviewed: 2/1/2016  © 9857-4194 [employer]. [address] [place] [address], [place] [address]. All rights reserved. This information is not intended as a substitute for professional medical care. Always follow your  healthcare professional's instructions.        Understanding Lichen Planus  Lichen planus is a long-term (chronic) skin disease. Its a rash that can develop anywhere on the body. But it most often erupts on the wrists, arms, legs, scalp, and genitals. It may also appear on the nails and in the mouth. Peoples ages 30 to 60 are more likely to get it.  How to say it  LY-grant play-nuhs   What causes lichen planus?  The cause of lichen planus is often not known. But metals such as gold, and certain medicines may trigger it. These include non-steroidal anti-inflammatory medicines and penicillin. Some research suggests the disease may also be linked to hepatitis C.  Symptoms of lichen planus  Lichen planus causes flat-topped bumps or patches to form on the skin. These bumps may hurt and be very itchy. They are usually shiny and violet in color. But they may be dark red or purple. They may also have fine white lines on them. In the mouth, the condition may look like patches of white lace.  Treatment for lichen planus  Lichen planus often goes away within a few years. It may do so without treatment. But to speed up healing, treatment options include:  · Steroids. These medicines can be put directly onto the skin or injected into the affected area. They can also be taken by mouth.  · Other medicines. Your healthcare provider may give you other medicines, such as an antihistamine or retinoid, to ease itching and pain. Anti-itch creams or ointments may also work. Your provider might also prescribe other medicines that suppress the immune system.  · Phototherapy. This treatment directs ultraviolet light on the skin to help clear it.  Self-care tips for lichen planus  · Dont scratch any affected areas.  · Use mild soap and moisturizing lotion after bathing.  When to call your healthcare provider  Call your healthcare provider right away if you have any of these:  · Fever of 100.4°F (38°C) or higher, or as directed  · New  symptoms  · Pain that gets worse  · Symptoms that dont get better, or get worse  · Ulcers in the mouth   Date Last Reviewed: 5/1/2016 © 2000-2017 [facility]. [address] [place] [address], [place] [address]. All rights reserved. This information is not intended as a substitute for professional medical care. Always follow your healthcare professional's instructions.        Depression  Depression is one of the most common mental health problems today. It is not just a state of unhappiness or sadness. It is a true disease. The cause seems to be related to a decrease in chemicals that transmit signals in the brain. Having a family history of depression, alcoholism, or suicide increases the risk. Chronic illness, chronic pain, migraine headaches and high emotional stress also increase the risk.  Depression is something we tend to recognize in others, but may have a hard time seeing in ourselves. It can show in many physical and emotional ways:  · Loss of appetite  · Over-eating  · Not being able to sleep  · Sleeping too much  · Tiredness not related to physical exertion  · Restlessness or irritability  · Slowness of movement or speech  · Feeling depressed or withdrawn  · Loss of interest in things you once enjoyed  · Trouble concentrating, poor memory, trouble making decisions  · Thoughts of harming or killing oneself, or thoughts that life is not worth living  · Low self-esteem  The treatment for depression may include both medicine and psychotherapy. Antidepressants can reduce suffering and can improve the ability to function during the depressed period. Therapy can offer emotional support and help you understand emotional factors that may be causing the depression.  Home care  · On-going care and support helps people manage this disease.  Find a healthcare provider and therapist who meet your needs. Seek help when you feel like you may be getting ill.  · Be kind to yourself. Make it a point to do things that  you enjoy (gardening, walking in nature, going to a movie, etc.). Reward yourself for small successes.  · Take care of your physical body. Eat a balanced diet (low in saturated fat and high in fruits and vegetables). Exercise at least 3 times a week for 30 minutes. Even mild-moderate exercise (like brisk walking) can make you feel better.  · Avoid alcohol, which can make depression worse.  · Take medicine as prescribed.  · Tell each of your healthcare providers about all of the prescription drugs, over-the-counter medicines, vitamins, and supplements you take. Certain supplements interact with medicines and can result in dangerous side effects. Ask your pharmacist when you have questions about drug interactions.  · Talk with your family and trusted friends about your feelings and thoughts. Ask them to help you recognize behavior changes early so you can get help and, if needed, medicine can be adjusted.  Follow-up care  Follow up with your healthcare provider, or as advised.  Call 911  Call 911 if you:  · Have suicidal thoughts, a suicide plan, and the means to carry out the plan  · Have trouble breathing  · Are very confused  · Feel very drowsy or have trouble awakening  · Faint or lose consciousness  · Have new chest pain that becomes more severe, lasts longer, or spreads into your shoulder, arm, neck, jaw or back  When to seek medical advice  Call your healthcare provider right away if any of these occur:  · Feeling extreme depression, fear, anxiety, or anger toward yourself or others  · Feeling out of control  · Feeling that you may try to harm yourself or another  · Hearing voices that others do not hear  · Seeing things that others do not see  · Cant sleep or eat for 3 days in a row  · Friends or family express concern over your behavior and ask you to seek help  Date Last Reviewed: 9/29/2015  © 5102-2405 [employer]. [address] [place] [address], [place] [address]. All rights reserved. This  "information is not intended as a substitute for professional medical care. Always follow your healthcare professional's instructions.        Know the Signs and Symptoms of Depression  Everyone feels down at times. The blues are a natural part of life. But an unhappy period thats intense or lasts for more than a couple of weeks can be a sign of depression.  Depression is a serious illness. It is not a sign of weakness or a "character flaw," and it is not something you can "snap out of." In fact, most people with depression need treatment to get better. Depression can disrupt the lives of family and friends. If you know someone you think may be depressed, find out what you can do to help.    Recognizing signs of depression  People who are depressed may:  · Feel unhappy, sad, blue, down, or miserable nearly every day  · Feel helpless, hopeless, or worthless  · Lose interest in hobbies, friends, and activities that used to give pleasure  · Not sleep well or sleep too much  · Gain or lose weight  · Feel low on energy or constantly tired  · Have a hard time concentrating or making decisions  · Lose interest in sex  · Have physical symptoms, such as stomachaches, headaches, or backaches  Know the serious signals  Never ignore a person's comments about suicide or behaviors that can lead to self-harm. Warning signals for suicide include:  · Threats or talk of suicide  · Statements such as I wont be a problem much longer or Nothing matters  · Giving away possessions or making a will or  arrangements  · Buying a gun or other weapon  · Sudden, unexplained cheerfulness or calm after a period of depression  If you notice any of these signs, get help right away. Call a healthcare professional, mental health clinic, or suicide hotline and ask what action to take. In an emergency, dont hesitate to call the police.  Resources:  · National Institutes of Mental Uwibqm511-085-1761dul.[facility] Hospitalh.nih.gov  · National [place] on " Mental Gbsenoe917-999-4844hyv.pita.org   · Mental Health Rmjtjzo492-700-9533xux.[facility].org  · National Suicide Haecyxb251-737-6837 (800-SUICIDE)  · National Suicide Prevention Ylvfdggw758-336-7665 (914-938-RSAF)www.suicidepreventionlifeline.org   Date Last Reviewed: 1/1/2017  © 3608-1396 [employer]. [address] [place] [address], [place] [address]. All rights reserved. This information is not intended as a substitute for professional medical care. Always follow your healthcare professional's instructions.        What Can Cause Depression?  Depression is a real illness and certain factors have been known to trigger it. Below are some common known causes. Any of these factors, or a combination of them, can make depression more likely. Sometimes, depression occurs for no one clear reason. But no matter what the cause, depression can be treated.    Loss or stress  Depression can occur in children and adults, but it often starts in adulthood. Normal grief over a death, breakup, or other loss may become depression. Life stresses such as physical abuse, job loss, or sudden change in finances can also trigger depression. In some cases, years go by before the depression sets in.  Family history  The tendency to develop depression seems to run in families. If one or more of your close relatives (parents, grandparents, or siblings) have had an episode of depression, you may be more likely to develop the illness, too.  Drugs or alcohol  Drugs and alcohol can upset the chemical balance in the brain. This can lead to an episode of depression. Some depressed people turn to drugs or alcohol to numb the pain. But in the long run, doing so just makes depression worse.  Medicines  Depression can be a side effect of some medicines for high blood pressure, cancer, pain, and other health problems. So tell your doctor about all medicines you take. But never stop taking one without your doctors OK.  Physical illness  Being sick can  make anyone feel frustrated and sad. But some health problems may cause actual changes in your brain that lead to depression. Other health problems, such as an underactive thyroid, may be mistaken for depression.  Hormones  Hormones carry messages in the bloodstream. They may affect brain chemicals, leading to depression. Women may get depressed when their hormone levels change quickly, such as just before their period, after giving birth, or during menopause.  Date Last Reviewed: 1/1/2017 © 2000-2017 [employer]. [address] [place] [address]. All rights reserved. This information is not intended as a substitute for professional medical care. Always follow your healthcare professional's instructions.        Depression: Tips to Help Yourself  As your healthcare providers help treat your depression, you can also help yourself. Keep in mind that your illness affects you emotionally, physically, mentally, and socially. So full recovery will take time. Take care of your body and your soul, and be patient with yourself as you get better.    Self-care  · Educate yourself. Read about treatment and medicine options. If you have the energy, attend local conferences or support groups. Keep a list of useful websites and helpful books and use them as needed. This illness is not your fault. Dont blame yourself for your depression.  · Manage early symptoms. If you notice symptoms returning, experience triggers, or identify other factors that may lead to a depressive episode, get help as soon as possible. Ask trusted friends and family to monitor your behavior and let you know if they see anything of concern.  · Work with your provider. Find a provider you can trust. Communicate honestly with that person and share information on your treatment for depression and your reaction to medicines.  · Be prepared for a crisis. Know what to do if you experience a crisis. Keep the phone number of a crisis hotline  and know the location of your community's urgent care centers and the closest emergency department.  · Hold off on big decisions. Depression can cloud your judgment. So wait until you feel better before making major life decisions, such as changing jobs, moving, or getting  or .  · Be patient. Recovering from depression is a process. Dont be discouraged if it takes some time to feel better.  · Keep it simple. Depression saps your energy and concentration. So you wont be able to do all the things you used to do. Set small goals and do what you can.  · Be with others. Dont isolate yourself--youll only feel worse. Try to be with other people. And take part in fun activities when you can. Go to a movie, [employer], Hol[employer] service, or social event. Talk openly with people you can trust. And accept help when its offered.  Take care of your body  People with depression often lose the desire to take care of themselves. That only makes their problems worse. During treatment and afterward, make a point to:  · Exercise. Its a great way to take care of your body. And studies have shown that exercise helps fight depression.  · Avoid drugs and alcohol. These may ease the pain in the short term. But theyll only make your problems worse in the long run.  · Get relief from stress. Ask your healthcare provider for relaxation exercises and techniques to help relieve stress.  · Eat right. A balanced and healthy diet helps keep your body healthy.  Date Last Reviewed: 1/1/2017  © 4538-7657 [employer]. [address] [place] [address], [place] [address]. All rights reserved. This information is not intended as a substitute for professional medical care. Always follow your healthcare professional's instructions.        Counseling for Depression  For some people, counseling, also called talk therapy, has been found to be as effective as medicine for mild to moderate depression. When done by a trained  professional, this treatment is a powerful way to better understand your thoughts and feelings. Like medicine, it may take time before you notice how much counseling is helping.    Kinds of talk therapy  Different counselors use different methods for talk therapy. But all therapy aims to help change how you think about your problem. Most therapy for depression is often done one-on-one. But it may also be done in a group setting. You and your healthcare provider can discuss the type of therapy you think would work best for you. You can also discuss who the best person is to provide the therapy.  How therapy helps  Talking about your problems can help them seem less overwhelming. It can help work through problems you have with your life and your relationships. It can also help you understand how depression is clouding your thinking, not letting you see the world the way it really is. Therapy can give you:  · Insight about your emotions  · New tools for dealing with your problems  · Emotional support for making progress  Getting better takes time  Talk therapy can help you feel better. But change doesnt happen right away. Depression takes away your energy and motivation. So it can be hard to feel like going to therapy and sticking with it. But therapy has been proven to be very valuable in the treatment of depression. Therapy for depression is often done for a set number of sessions. In other cases, you and your therapist decide together at what point you no longer need therapy.  Additional sources of help  In addition to a professional counselor, it may help to talk to other people in your life. You may find support and insight from:  · A close friend or family member  · A  trained in counseling  · A local support group or community group  · A 12-step program (such as Alcoholics Anonymous) for dealing with problems that can contribute to depression, such as alcohol or drug addiction  Date Last Reviewed:  1/1/2017  © 7981-9437 [employer]. [address] [place] [address], [place] [address]. All rights reserved. This information is not intended as a substitute for professional medical care. Always follow your healthcare professional's instructions.

## 2019-12-03 NOTE — LETTER
December 3, 2019    Flory Cam  [address]  [address]  [place] [address]             [facility]  [address]  [place] [address] Dear Ms. Tutu,            Welcome to [facility]s Complex Care Management Program. It was a pleasure talking with you and I look forward to being your Care Manager. My name is Destiney Maravilla and my goal is to help you function at the healthiest and highest level possible. You can contact me directly at [phone].    As an [facility] patient with [employer], some of the services we may be able to provide include:      Development of an individualized care plan with a Registered Nurse    Connection with a    Connection with available resources and services     Coordinate communication among your care team members    Provide coaching and education    Help you understand your doctors treatment plan   Help you obtain information about your insurance coverage.     All services provided by [facility]s Complex Care Managers and other care team members are coordinated with and communicated to your primary care team.    As part of your enrollment, you will be receiving education materials and more information about these services in your [facility] account, by phone or through the mail.  If you do not wish to participate or receive information, please contact our office at [phone].      Sincerely,    Destiney Maravilla, RN   [facility]   Out-patient RN Complex Care Manager

## 2019-12-04 NOTE — PROGRESS NOTES
Outpatient Care Management  Initial Patient Assessment    Patient: Flory Cam  MRN: [id number]  Date of Service: 12/4/2019  Completed by: Destiney Maravilla RN  Referral Date: 11/22/2019  Program: Case Management (High Risk)    Reason for Visit   Patient presents with    Initial Assessment    Plan Of Care    OPCM Enrollment Call       Brief Summary:   Initial and RN Assessments completed with patient on the phone today. Patient has PMH of Discoid Lupus Erythematosus, Lichen Planus, COPD, CAD, HTN, HLP, PVD, Osteoporosis and Rheumatoid Arthritis. Patient was re referred to OPCM per Dr. Tayler Ovalle after she was seen by Podiatrist and reported severe Depression. Dr. Ovalle sent in Ambulatory Referral to Psychiatry. Patient states she has not been contacted by Psychiatry to schedule appointment.Patient is agreeable to outreach by [facility] for Mental Health, IOP progam Resources in the Community. Patient elena current Suicidal Ideations. OPCM discussed Smoking Cessation program with patient and she is not interested in program at this time. Encouraged patient to communicate with physician and call this RN if having any questions/concerns. OPCM will continue to follow. Patient is agreeable to follow up call in 1 week in am. XOCHITL Edouard OPCM     In basket message to Dr. Ovalle regarding PHQ assessment score and Psych referral  Referral to [facility] for [facility] Health Resources      Assessment Documentation     OPCM Initial Assessment    Involvement of Care  Do I have permission to speak with other family members about your care?:  Yes (Comment: Granddaughter Lidia Cam)  Assessment completed by:  Patient  Patient Reported Insurance  Verified current insurance plan:  [employer] Advantage  [employer] benefits discussed:  OTC prescription discounts, Transportation, Mail Order Pharmacy, Well [employer], Silver Sneakers  Current Health Status  Patient Health Rating  Compared to other people your age, how would you  rate your health?:  Fair  Patient Reported Labs & Vitals  Any patient reported labs and/or vitals?:  Yes  Patient reported blood glucose (mg/dL):  130/80  Social Determinants  Advanced Care Planning  Do you have any of the following?:  None  If yes, do we have a copy?:  N/A  If no, would the patient like Advance Directive resources?:  Yes  Advanced Care Planning resources provided?:  Yes  Is Advanced Care Planning an area of need?:  Yes  Support  Caregiver presence?:  No  Present activity level:  not limited in any of these ways  Who takes you to your medical appointments?:  friend, family member, other (see comment) (Comment: [employer], public bus system)  Housing  Living arrangements:  alone  Number of people in home:  2  Type of residence:  apartment  Own or rent?:  rent  Permanent residence?:  yes  Does the patient's residence have any of the following?:  handrails on the stairs  Is housing an area of need?:  no  Access to [employer]  Does the patient have access to any of the following devices or technologies?:  none  Clinical Assessment  Medication Adherence  How does the patient obtain their medications?:  family picks up  How many days a week do you miss medications?:  never  Do you use a pill box or medication chart to help you manage your medications?:  no  Do you sometimes have difficulty refilling your medications?:  no  Medication reconciliation completed?:  Yes  Is medication adherence an area of need?:  No  *Active medication list was reviewed and reconciled with patient and/or caregiver:    Nutritional Status  Diet:  low sodium  Change in appetite?:  no  Recent changes in weight?:  no  Dentition:  poor  Is nutrition an area of need?:  no  Labs  Do you have regular lab work to monitor your medications?:  yes  Type of lab work:  A1c  Where do you get your lab work done?:  [facility]  Is lab adherence an area of need?:  no  PHQ Depression Screen  Does patient's PHQ Depression Screening  indicate a barrier to meeting self-care needs?:  No  Cognitive/Behavioral Health  Alert and oriented?:  yes  Difficulty thinking?:  no  Requires prompting?:  no  Requires assistance for routine expression?:  no  Is Cognitive/Behavioral health an area of need?:  no  Culture/Gnosticist  Does patient have cultural or Presybeterian beliefs that may impact ability to access healthcare?:  no  Communication  Language preference:  English   needed?:  no  Hearing problems?:  no  Decreased vision?:  yes  Legally blind?:  no  Vision assistance:  glasses  Is Communication an area of need?:  no  Health Literacy  Preferred learning method:  face to face, reading materials  How often do you need to have someone help you read instructions, pamphlets, or other written material from your doctor or pharmacy?:  never  Is there a Health Literacy need?:  no  Activities of Daily Living  Ambulation:  independent  Dressing:  independent  Bathing:  independent  Transfers:  independent  Toileting:  independent  Feeding:  independent  Cleaning home/chores:  independent  Telephone use:  independent  Shopping/attending doctors' appointments:  independent  Paying bills:  independent  Taking meds:  independent  Climbing stairs:  independent  Fall Risk  Patient mobility status:  Ambulatory  Number of falls in the past 12 months:  0  Fall risk?:  No  Equipment/Current Services  Equipment/supplies used in home:  glucometer  Current services:  transportation service  Is Equipment/Supplies/Services an area of need?:  no  Community & Government Programs  Support:  none  Government suppot assistance:  N/A  [facility] Office of Aging and Adult Services:  N/A  Community Resource Assessment  Based on the assessment of needs:  Patient is in need of community resources  OPCM  consulted to assist with the following:  mental health resources  Completion of Initial Assessment  Is the Initial Assessment Complete at this time?:  yes         Reviewed and  provided basic information on available community resources for mental health, transportation, wellness resources, and palliative care programs with patient and/or caregiver.    Problem List and History     Patient Active Problem List      Depression   ACTIVE   Diagnosis    Hyperlipidemia LDL goal <70    Hypertension goal BP (blood pressure) < 140/90    Hypothyroid    Peripheral vascular disease    Eczema    Osteoporosis    Osteoarthritis    Tobacco use    CAD: Hx of CABG    Atherosclerotic PVD with intermittent claudication    Bilateral carotid artery disease    Discoid lupus erythematosus    Rheumatoid arthritis involving both feet with positive rheumatoid factor    Lichen planus    Hx of colonic polyps    Chronic obstructive pulmonary disease    Medication monitoring encounter    Pre-operative clearance       Reviewed Active Problem List with patient and/or Caregiver.    Medical History:  Reviewed medical history with patient and/or caregiver    Social History:  Reviewed social history with patient and/or caregiver    Complex Care Plan     Care plan was discussed and completed today with input from patient and/or caregiver.    Goals      Patient/caregiver will have an action plan in place to manage and prevent complications of Depression prior to discharge from OPCM. - Priority:       Overall Time to Completion  2 months from 12/04/2019     OPCM Identified Patient Barriers:  Advanced Care Planning:  Yes Mailed information packet    OPCM Identified Disease Education Barriers:  Depression:  Care Plan Created     Short Term Goals  Patient/caregiver will verbalize 3 red flags of Depression and know when to contact Physician within 1 week.  Interventions   · Assess for retention of the signs and symptoms of disease specific exacerbation.  · Collaborate with Physician as appropriate to meet patient needs.  · Complete medication reconciliation.  · Empower patient/caregiver to discuss treatment plan  with Physician/care team.  · Encourage compliance with Physician follow-ups.  · Encourage Exercise.  · Encourage Medication Compliance.  · Encourage Smoking Cessation.  · In basket message sent to Physician regarding Psychiatry referral.  · Refer to Outpatient Case Management Social Worker.     Status  · Partially met           Clinical Reference Documents Added to Patient Instructions       Document    DEPRESSION (ENGLISH)    DEPRESSION, KNOW THE SIGNS AND SYMPTOMS (ENGLISH)    DEPRESSION, WHAT CAN CAUSE (ENGLISH)    DEPRESSION, COUNSELING FOR (ENGLISH)    DEPRESSION: TIPS TO HELP YOURSELF  (ENGLISH)    LICHEN PLANUS, UNDERSTANDING (ENGLISH)    LUPUS (SYSTEMIC LUPUS ERYTHEMATOSUS) (ENGLISH)    SYSTEMIC LUPUS ERYTHEMATOSUS (LUPUS), UNDERSTANDING (ENGLISH)             Patient/caregiver will have knowledge of resources available in order to obtain the services that are needed prior to discharge from [facility]. - Priority: High      Overall Time to Completion  2 months from 12/04/2019     [facility] [place] [facility] Identified Patient Barriers:  Advanced Care Planning:  Yes Mailed information packet    [facility] [place] [facility] Identified Disease Education Barriers:  Depression:  Care Plan Created     Short Term Goals  Patient/caregiver will notify RN CCM if patient does not hear from OPCM  within 1 week.  Interventions   · Collaborate with Physician as appropriate to meet patient needs.  · Facilitate referral to mental health treatment programs.  · In basket message sent to Physician regarding Psychiatry Referral.  · Provide contact information for 24 hour Crisis Line number- [facility].  · Refer to Outpatient Case Management Social Worker.     Status  · Partially met           Clinical Reference Documents Added to Patient Instructions       Document    DEPRESSION (ENGLISH)    DEPRESSION, KNOW THE SIGNS AND SYMPTOMS (ENGLISH)    DEPRESSION, WHAT CAN CAUSE (ENGLISH)    DEPRESSION, COUNSELING FOR (ENGLISH)    DEPRESSION: TIPS TO HELP YOURSELF  (ENGLISH)     LICHEN PLANUS, UNDERSTANDING (ENGLISH)    LUPUS (SYSTEMIC LUPUS ERYTHEMATOSUS) (ENGLISH)    SYSTEMIC LUPUS ERYTHEMATOSUS (LUPUS), UNDERSTANDING (ENGLISH)                  Patient Instructions     Instructions were provided via the [employer] patient resources and are available for the patient to view on the patient portal.    Next steps:    Follow up in about 1 week (around 12/10/2019) for RN OPCM f/u.    Todays OPCM Self-Management Care Plan was developed with the patients/caregivers input and was based on identified barriers from todays assessment.  Goals were written today with the patient/caregiver and the patient has agreed to work towards these goals to improve his/her overall well-being. Patient verbalized understanding of the care plan, goals, and all of today's instructions. Encouraged patient/caregiver to communicate with his/her physician and health care team about health conditions and the treatment plan.  Provided my contact information today and encouraged patient/caregiver to call me with any questions as needed.

## 2019-12-10 ENCOUNTER — OUTPATIENT CASE MANAGEMENT (OUTPATIENT)
Dept: ADMINISTRATIVE | Facility: OTHER | Age: 74
End: 2019-12-10

## 2019-12-10 ENCOUNTER — TELEPHONE (OUTPATIENT)
Dept: INTERNAL MEDICINE | Facility: CLINIC | Age: 74
End: 2019-12-10

## 2019-12-10 DIAGNOSIS — I70.219 ATHEROSCLEROTIC PVD WITH INTERMITTENT CLAUDICATION: Primary | Chronic | ICD-10-CM

## 2019-12-10 NOTE — TELEPHONE ENCOUNTER
----- Message from Tayler Ovalle DO sent at 12/10/2019  9:51 AM CST -----  Please have patient contact the psychiatry department to schedule an appointment if she has not heard from anyone.     ----- Message -----  From: Giovana Reyes LPN  Sent: 12/4/2019  11:59 AM CST  To: Tayler Ovalle DO        ----- Message -----  From: Destiney Maravilla RN  Sent: 12/4/2019  11:26 AM CST  To: Leslei Hammond, [facility], Vasquez Lester [facility], this is Destiney Maravilla RN with [facility] Out[facility] Case Management team. We received another referral on 11/26/19 for the following patient. I spoke with patient today on the telephone.    Patient saw you last week for reported Depression. Patient states she has not been contacted by anyone from Psychiatry Dept to schedule her an appointment. Will they reach out to her or does she need to call them. Patient had PHQ assessment score of 10.  I have also referred to our LCSW Leslie Hammond to reach out to patient for possible IOP resources.     Please notify patient of your recommendations.     Thank you,  Destiney Maravilla RN

## 2019-12-19 ENCOUNTER — OUTPATIENT CASE MANAGEMENT (OUTPATIENT)
Dept: ADMINISTRATIVE | Facility: OTHER | Age: 74
End: 2019-12-19

## 2019-12-19 NOTE — PROGRESS NOTES
Summary: Received referral from OPCM RN due to increased depression symptoms. Patient is a 73 yo  with multiple co-morbidities including CAD, COPD, and Lupus. She is currently receiving chemotherapy for melanoma. Phoned patient. Patient stated that she feels much better, she stated that her depression and anxiety were surrounding her chemotherapy. She denied any SI/HI or current symptoms of depression. Her next treatments are January 6, 7 and 8. She stated that she does plan to see psychiatry and wants to wait until the next set of chemo appointment. Discussed holiday depression. She acknowledged holiday stressors but verbalized that her mood issues were all directly related to the chemotherapy.   Provided phone contact for [facility] and encouraged her to call for an appointment. Discussed resource of Dr. Sierra, with Cancer Services and social work with [facility] services. Patient expressed interest in both and plans to pursue in January. Phoned patient, noted that patient is not an [facility] patient. Patient agreed to [facility] pursuing support services through [facility].     Outpatient Care Management   - High Risk Patient Assessment    Patient: Flory Cam  MRN:  [id number]  Date of Service:  12/19/2019  Completed by:  Leslie Hammond [facility]  Referral Date: 11/22/2019  Program: Case Management (High Risk)    Reason for Visit   Patient presents with    Social Work Assessment - High Risk    Plan Of Care       Patient Summary     \[facility]\"" Social Work Assessment (High Risk)    Involvement of Care  Do I have permission to speak with other family members about your care?:  Yes (Comment: Lidia Cam, granddaughter)  Assessment completed by:  Patient  Cognitive  Which of the following can you state?:  Name, Date of birth, Address, Year, President  Cognitive barriers?:  No  General  Marital status:    Current employment status:  Retired and not working  Support  Level  of Caregiver support:  Member independent and does not need caregiver assistance  Support system:  Family, [employer] organization  Is the caregiver reporting burnout?:  No  Support Barriers?:  No  Advanced Care Planning  Do you have any of the following?:  None  If yes, do we have a copy?:  N/A  If no, would you like Advance Directive resources?:  No (Comment: Previously received)  Advance Care Planning resources provided?:  No  Is Advance Care Planning an area of need?:  No  Financial  Current medical coverage:  Medicare  Have you applied for government assistance programs?:  Yes  Are you unable to pay any of the following?:  Food  Gross monthly income:  821  Other assets:  SNAP  Financial Support Barriers?:  No  Safety  Significant change in functioning?:  Disease progression  Safety barriers?:  No   History  Do you or your spouse currently or formerly serve in the ?:  No  Disaster Plan  Established evacuation plan?:  Yes  [place] residence:  [facility]  Evacuation location:  shelter  Registered for evacuation?:  No  Disaster plan:   (Comment: received information)  Ability to evacuate:  Able to ride bus  Mental Health Status  Emotional status:  Sad  Have you recenetly lost a loved one?:  No  Psychiatric diagnosis:  Depression  Current mental health treatment:  No  Would you like mental health resources?:  Yes  Current symptoms:  None  Mental/Behavioral/Environmental risk:  None  Mental Health Barriers?:  Yes  Addictive Behaviors  Current alcohol consumption?:  No  Current substance abuse?:  No  Gambling habits?:  No  Was the PHQ depression screening completed?:  Yes  Was the HERNAN-7 completed?:  No  Resources         Complex Care Plan     Care plan was discussed and completed today with input from patient and/or caregiver.    Goals      Patient/caregiver will have adequate mental health support/resources prior to discharge from OPCM. - Priority: high      Overall Time to Completion  2 months from  12/19/2019     OPCM Identified Patient Barriers:  Advanced Care Planning:  Yes Mailed information packet    Social Work Identified Patient Barriers:   Cognitive:  No  Support:  No  Advanced Care Planning:  No  Safety:  No  Mental Health:  Yes, Care Plan Created  Transportation: Yes, Barrier resolved, patient utilizes [employer].  Financial: Yes, Barrier resolved, patient utilizes community resources and has strong family support.       Short Term Goals  Patient/caregiver will establish appointment with Physician within 1 month.  Interventions   · Collaborate with OPCM RN as appropriate to meet patient needs.  · Encourage compliance with medical/mental health appointments.     Patient/Caregiver agrees to call for psychiatry appointment by  1/6/19.  Patient/Caregiver agrees to OPCM follow up within 3 weeks to assess progress to goal.        Status  · Partially met             Patient/caregiver will have an action plan in place to manage and prevent complications of Depression prior to discharge from OPC. - Priority:       Overall Time to Completion  2 months from 12/04/2019     OPCM Identified Patient Barriers:  Advanced Care Planning:  Yes Mailed information packet    OPCM Identified Disease Education Barriers:  Depression:  Care Plan Created     Short Term Goals  Patient/caregiver will verbalize 3 red flags of Depression and know when to contact Physician within 1 week.  Interventions   · Assess for retention of the signs and symptoms of disease specific exacerbation.  · Collaborate with Physician as appropriate to meet patient needs.  · Complete medication reconciliation.  · Empower patient/caregiver to discuss treatment plan with Physician/care team.  · Encourage compliance with Physician follow-ups.  · Encourage Exercise.  · Encourage Medication Compliance.  · Encourage Smoking Cessation.  · In basket message sent to Physician regarding Psychiatry referral.  · Refer to Outpatient Case Management Social  Worker.     Status  · Partially met           Clinical Reference Documents Added to Patient Instructions       Document    DEPRESSION (ENGLISH)    DEPRESSION, KNOW THE SIGNS AND SYMPTOMS (ENGLISH)    DEPRESSION, WHAT CAN CAUSE (ENGLISH)    DEPRESSION, COUNSELING FOR (ENGLISH)    DEPRESSION: TIPS TO HELP YOURSELF  (ENGLISH)    LICHEN PLANUS, UNDERSTANDING (ENGLISH)    LUPUS (SYSTEMIC LUPUS ERYTHEMATOSUS) (ENGLISH)    SYSTEMIC LUPUS ERYTHEMATOSUS (LUPUS), UNDERSTANDING (ENGLISH)             Patient/caregiver will have knowledge of resources available in order to obtain the services that are needed prior to discharge from [facility]. - Priority: High      Overall Time to Completion  2 months from 12/04/2019     [facility] Identified Patient Barriers:  Advanced Care Planning:  Yes Mailed information packet    [facility] Identified Disease Education Barriers:  Depression:  Care Plan Created     Short Term Goals  Patient/caregiver will notify RN CCM if patient does not hear from [facility]  within 1 week.  Interventions   · Collaborate with Physician as appropriate to meet patient needs.  · Facilitate referral to mental health treatment programs.  · In basket message sent to Physician regarding Psychiatry Referral.  · Provide contact information for 24 hour Crisis Line number- [facility].  · Refer to Outpatient Case Management Social Worker.     Status  · Partially met           Clinical Reference Documents Added to Patient Instructions       Document    DEPRESSION (ENGLISH)    DEPRESSION, KNOW THE SIGNS AND SYMPTOMS (ENGLISH)    DEPRESSION, WHAT CAN CAUSE (ENGLISH)    DEPRESSION, COUNSELING FOR (ENGLISH)    DEPRESSION: TIPS TO HELP YOURSELF  (ENGLISH)    LICHEN PLANUS, UNDERSTANDING (ENGLISH)    LUPUS (SYSTEMIC LUPUS ERYTHEMATOSUS) (ENGLISH)    SYSTEMIC LUPUS ERYTHEMATOSUS (LUPUS), UNDERSTANDING (ENGLISH)                  Patient Instructions     Follow up in about 3 weeks (around 1/9/2020) for Follow up to encourage psychiatry  appointment. Refer to [facility] support services. .    Todays OPCM Self-Management Care Plan was developed with the patients/caregivers input and was based on identified barriers from todays assessment.  Goals were written today with the patient/caregiver and the patient has agreed to work towards these goals to improve his/her overall well-being. Patient verbalized understanding of the care plan, goals, and all of today's instructions. Encouraged patient/caregiver to communicate with his/her physician and health care team about health conditions and the treatment plan.  Provided my contact information today and encouraged patient/caregiver to call me with any questions as needed.

## 2020-01-02 NOTE — PROGRESS NOTES
Outpatient Care Management  Plan of Care Follow Up Visit    Patient: Flory Cam  MRN: [id number]  Date of Service: 12/10/2019  Completed by: Destiney Maravilla RN  Referral Date: 11/22/2019  Program: Case Management (High Risk)    Reason for Visit   Patient presents with    Update Plan Of Care       Brief Summary:   Contacted patient by phone today for follow up visit. Patient reports she is feeling much better mentally. Patient has appt with Dr. Rivas next week and plans to discuss getting back on Wellbutrin for Depression as well as Smoking Cessation. Patient states Chemo to resume next week as well. Patient is agreeable to referral to Smoking Cessation program. (Referral placed) Reminded patient of upcoming appointment with Dr. Monteiro on 1/23 at 1030 and she is aware. Encouraged patient to communicate with physician and call this RN if having any questions/concerns. OPCM will continue to follow. Patient is agreeable to follow up call in 2 weeks in am. XOCHITL Edouard OPCM       Patient Summary     Involvement of Care:  Do I have permission to speak with other family members about your care?       Patient Reported Labs & Vitals:  1.  Any Patient Reported Labs & Vitals?     2.  Patient Reported Blood Pressure:     3.  Patient Reported Pulse:     4.  Patient Reported Weight (Kg):     5.  Patient Reported Blood Glucose (mg/dl):       Medical History:  Reviewed medical history with patient and/or caregiver    Social History:  Reviewed social history with patient and/or caregiver    Clinical Assessment     Reviewed and provided basic information on available community resources for mental health, transportation, wellness resources, and palliative care programs with patient and/or caregiver.    Complex Care Plan     Care plan was discussed and completed today with input from patient and/or caregiver.    Goals      Patient/caregiver will have adequate mental health support/resources prior to discharge from OPCM. -  Priority: high      Overall Time to Completion  2 months from 12/19/2019     OPC Identified Patient Barriers:  Advanced Care Planning:  Yes Mailed information packet    Social Work Identified Patient Barriers:   Cognitive:  No  Support:  No  Advanced Care Planning:  No  Safety:  No  Mental Health:  Yes, Care Plan Created  Transportation: Yes, Barrier resolved, patient utilizes [employer].  Financial: Yes, Barrier resolved, patient utilizes community resources and has strong family support.       Short Term Goals  Patient/caregiver will establish appointment with Physician within 1 month.  Interventions   · Collaborate with OPCM RN as appropriate to meet patient needs.  · Encourage compliance with medical/mental health appointments.     Patient/Caregiver agrees to call for psychiatry appointment by  1/6/19.  Patient/Caregiver agrees to OPCM follow up within 3 weeks to assess progress to goal.        Status  · Partially met             Patient/caregiver will have an action plan in place to manage and prevent complications of Depression prior to discharge from hospitals. - Priority:       Overall Time to Completion  2 months from 12/04/2019     OPCM Identified Patient Barriers:  Advanced Care Planning:  Yes Mailed information packet    OPCM Identified Disease Education Barriers:  Depression:  Care Plan Created     Short Term Goals  Patient/caregiver will verbalize 3 red flags of Depression and know when to contact Physician within 1 week.  Interventions   · Assess for retention of the signs and symptoms of disease specific exacerbation.  · Collaborate with Physician as appropriate to meet patient needs.  · Complete medication reconciliation.  · Empower patient/caregiver to discuss treatment plan with Physician/care team.  · Encourage compliance with Physician follow-ups.  · Encourage Exercise.  · Encourage Medication Compliance.  · Encourage Smoking Cessation.  · In basket message sent to Physician regarding  Psychiatry referral.  · Refer to Outpatient Case Management Social Worker.     Status  · Partially met   · 1/2 Patient states she is feeling better since she had a nice get away with family for the holiday. Patient states she starts back on chemotherapy on 1/6 and has appointment with Dr. Rivas next week as well. Patient states she is going to speak to Dr. Rivas about an anti depressant she can take while on the chemo. She mentioned she sill ask about Wellbutrin to help her stop smoking as well.   Patient denies feeling down, depressed or hopeless but still wants to get on a Depression medication as those feelings come up intermittently.          Clinical Reference Documents Added to Patient Instructions       Document    DEPRESSION (ENGLISH)    DEPRESSION, KNOW THE SIGNS AND SYMPTOMS (ENGLISH)    DEPRESSION, WHAT CAN CAUSE (ENGLISH)    DEPRESSION, COUNSELING FOR (ENGLISH)    DEPRESSION: TIPS TO HELP YOURSELF  (ENGLISH)    LICHEN PLANUS, UNDERSTANDING (ENGLISH)    LUPUS (SYSTEMIC LUPUS ERYTHEMATOSUS) (ENGLISH)    SYSTEMIC LUPUS ERYTHEMATOSUS (LUPUS), UNDERSTANDING (ENGLISH)                  Patient Instructions     Instructions were provided via the [employer] patient resources and are available for the patient to view on the patient portal.    Next Steps: OPCM f/u scheduled for 1/13/20      Todays OPCM Self-Management Care Plan was developed with the patients/caregivers input and was based on identified barriers from todays assessment.  Goals were written today with the patient/caregiver and the patient has agreed to work towards these goals to improve his/her overall well-being. Patient verbalized understanding of the care plan, goals, and all of today's instructions. Encouraged patient/caregiver to communicate with his/her physician and health care team about health conditions and the treatment plan.  Provided my contact information today and encouraged patient/caregiver to call me with any questions as  needed.

## 2020-01-09 ENCOUNTER — OUTPATIENT CASE MANAGEMENT (OUTPATIENT)
Dept: ADMINISTRATIVE | Facility: OTHER | Age: 75
End: 2020-01-09

## 2020-01-10 NOTE — PROGRESS NOTES
Summary: Phoned patient. Patient stated that her treatments went well. She reported to be in good spirits, was out running errands with her daughter.  Informed her of call to [facility] , no return call.     Outpatient Care Management   - Care Plan Follow Up    Patient: Flory Cam  MRN:  [id number]  Date of Service:  1/10/2020  Completed by:  Leslie Hammond LCSW  Referral Date: 11/22/2019  Program: Case Management (High Risk)    Reason for Visit   Patient presents with    Update Plan Of Care     1/10/20       Complex Care Plan     Care plan was discussed and completed today with input from patient and/or caregiver.    Goals      Patient/caregiver will have adequate mental health support/resources prior to discharge from OPCM. - Priority: high      Overall Time to Completion  2 months from 12/19/2019     OPCM Identified Patient Barriers:  Advanced Care Planning:  Yes Mailed information packet    Social Work Identified Patient Barriers:   Cognitive:  No  Support:  No  Advanced Care Planning:  No  Safety:  No  Mental Health:  Yes, Care Plan Created  Transportation: Yes, Barrier resolved, patient utilizes [employer].  Financial: Yes, Barrier resolved, patient utilizes community resources and has strong family support.       Short Term Goals  Patient/caregiver will establish appointment with Physician within 1 month.  Interventions   · Collaborate with OPCM RN as appropriate to meet patient needs.  · Encourage compliance with medical/mental health appointments. 1/10/20, patient reports improvement in mental health.     Patient/Caregiver agrees to continued support by 1/17/20.  Patient/Caregiver agrees to OPCM follow up within 1 week to assess progress to goal.        Status  · Partially met             Patient/caregiver will have an action plan in place to manage and prevent complications of Depression prior to discharge from OPCM. - Priority:       Overall Time to  Completion  2 months from 12/04/2019     OPCM Identified Patient Barriers:  Advanced Care Planning:  Yes Mailed information packet    OPCM Identified Disease Education Barriers:  Depression:  Care Plan Created     Short Term Goals  Patient/caregiver will verbalize 3 red flags of Depression and know when to contact Physician within 1 week.  Interventions   · Assess for retention of the signs and symptoms of disease specific exacerbation.  · Collaborate with Physician as appropriate to meet patient needs.  · Complete medication reconciliation.  · Empower patient/caregiver to discuss treatment plan with Physician/care team.  · Encourage compliance with Physician follow-ups.  · Encourage Exercise.  · Encourage Medication Compliance.  · Encourage Smoking Cessation.  · In basket message sent to Physician regarding Psychiatry referral.  · Refer to Outpatient Case Management Social Worker.     Status  · Partially met   · 1/2 Patient states she is feeling better since she had a nice get away with family for the holiday. Patient states she starts back on chemotherapy on 1/6 and has appointment with Dr. Rivas next week as well. Patient states she is going to speak to Dr. Rivas about an anti depressant she can take while on the chemo. She mentioned she sill ask about Wellbutrin to help her stop smoking as well.   Patient denies feeling down, depressed or hopeless but still wants to get on a Depression medication as those feelings come up intermittently.          Clinical Reference Documents Added to Patient Instructions       Document    DEPRESSION (ENGLISH)    DEPRESSION, KNOW THE SIGNS AND SYMPTOMS (ENGLISH)    DEPRESSION, WHAT CAN CAUSE (ENGLISH)    DEPRESSION, COUNSELING FOR (ENGLISH)    DEPRESSION: TIPS TO HELP YOURSELF  (ENGLISH)    LICHEN PLANUS, UNDERSTANDING (ENGLISH)    LUPUS (SYSTEMIC LUPUS ERYTHEMATOSUS) (ENGLISH)    SYSTEMIC LUPUS ERYTHEMATOSUS (LUPUS), UNDERSTANDING (ENGLISH)             Patient/caregiver  will have an action plan in place to manage and prevent complications of Smoking prior to discharge from OPCM. - Priority: High      Overall Time to Completion  2 months from 01/02/2020     OPCM Identified Patient Barriers:    Transportation: Yes  Financial: Yes       OPCM Identified Disease Education Barriers:       Short Term Goals  Patient/caregiver will verbalize 3 benefits of smoking cessation within 1 month.  Interventions   · Collaborate with Physician as appropriate to meet patient needs.  · Complete medication reconciliation.  · Empower patient/caregiver to discuss treatment plan with Physician/care team.  · Encourage compliance with Physician follow-ups.  · Encourage Medication Compliance.  · Refer to [facility] Smoking Cessation department.     Status  · Partially met                  Patient Instructions     Instructions were provided via the [employer] [facility] [employer] and are available for the patient to view on the patient portal.    Follow up in about 4 days (around 1/13/2020) for Met with patient in clinic, assess need/agreement to appointment with psychiatry/counselor. .    Todays OPCM Self-Management Care Plan was developed with the patients/caregivers input and was based on identified barriers from todays assessment.  Goals were written today with the patient/caregiver and the patient has agreed to work towards these goals to improve his/her overall well-being. Patient verbalized understanding of the care plan, goals, and all of today's instructions. Encouraged patient/caregiver to communicate with his/her physician and health care team about health conditions and the treatment plan.  Provided my contact information today and encouraged patient/caregiver to call me with any questions as needed.

## 2020-01-13 ENCOUNTER — CLINICAL SUPPORT (OUTPATIENT)
Dept: SMOKING CESSATION | Facility: CLINIC | Age: 75
End: 2020-01-13
Payer: COMMERCIAL

## 2020-01-13 ENCOUNTER — OUTPATIENT CASE MANAGEMENT (OUTPATIENT)
Dept: ADMINISTRATIVE | Facility: OTHER | Age: 75
End: 2020-01-13

## 2020-01-13 DIAGNOSIS — F17.210 LIGHT CIGARETTE SMOKER (1-9 CIGS/DAY): Primary | ICD-10-CM

## 2020-01-13 PROCEDURE — 99404 PREV MED CNSL INDIV APPRX 60: CPT | Mod: S$GLB,,,

## 2020-01-13 PROCEDURE — 99404 PR PREVENT COUNSEL,INDIV,60 MIN: ICD-10-PCS | Mod: S$GLB,,,

## 2020-01-13 PROCEDURE — 99999 PR PBB SHADOW E&M-EST. PATIENT-LVL I: ICD-10-PCS | Mod: PBBFAC,,,

## 2020-01-13 PROCEDURE — 99999 PR PBB SHADOW E&M-EST. PATIENT-LVL I: CPT | Mod: PBBFAC,,,

## 2020-01-13 RX ORDER — IBUPROFEN 200 MG
1 TABLET ORAL DAILY
Qty: 28 PATCH | Refills: 0 | Status: SHIPPED | OUTPATIENT
Start: 2020-01-13 | End: 2020-04-17

## 2020-01-13 NOTE — PROGRESS NOTES
1st time in program. Smokes 7-8 /day.  Quit for 1 year cold turkey to take care of her father. Has cancer and wants to quit to heal and feel better. Does take will use 21 mg nicotine patch and will watch for dizziness etc, does take Trazadone only PRN pain, but may like !50 mg Wellbutrin BID later. The patient will continue individual  and/or group sessions and medication monitoring by CTTS. Prescribed medication management will be by [facility] Medical Practitioner.

## 2020-01-13 NOTE — LETTER
January 22, 2020    Flory Cam  [address]  [address]  [place] [address]             [facility] [address] [address] Dear Mrs. Cam:    I am writing from the Outpatient Complex Care Management Department at [facility].  I have been unsuccessful at reaching you to follow up with you to see how you have been doing. I hope you find the resources previously provided to you helpful. Please contact me for further assistance.    I can be reached at [phone] Monday thru Friday from 8:00am to 4:30pm.  [facility] also has a program with a nurse available 24/7 to answer questions or provide medical advice.  [facility] can be reached at [phone].    Sincerely,           Leslie Hammond LCSW

## 2020-01-20 ENCOUNTER — CLINICAL SUPPORT (OUTPATIENT)
Dept: SMOKING CESSATION | Facility: CLINIC | Age: 75
End: 2020-01-20
Payer: COMMERCIAL

## 2020-01-20 DIAGNOSIS — F17.210 LIGHT CIGARETTE SMOKER (1-9 CIGS/DAY): Primary | ICD-10-CM

## 2020-01-20 DIAGNOSIS — I73.9 PAD (PERIPHERAL ARTERY DISEASE): ICD-10-CM

## 2020-01-20 PROCEDURE — 99999 PR PBB SHADOW E&M-EST. PATIENT-LVL I: CPT | Mod: PBBFAC,,,

## 2020-01-20 PROCEDURE — 99402 PREV MED CNSL INDIV APPRX 30: CPT | Mod: S$GLB,,,

## 2020-01-20 PROCEDURE — 99402 PR PREVENT COUNSEL,INDIV,30 MIN: ICD-10-PCS | Mod: S$GLB,,,

## 2020-01-20 PROCEDURE — 99999 PR PBB SHADOW E&M-EST. PATIENT-LVL I: ICD-10-PCS | Mod: PBBFAC,,,

## 2020-01-20 NOTE — PROGRESS NOTES
Individual Follow-Up Form    2020    Quit Date: TBD    Clinical Status of Patient: Outpatient    Length of Service: 30 minutes    Continuing Medication: yes  Patches    Other Medications: none     Target Symptoms: Withdrawal and medication side effects. The following were  rated moderate (3) to severe (4) on TCRS:  · Moderate (3): none  · Severe (4): nonone    Comments: The patient is smoking 3 cigarettes/day  from 7-8 cigarettes/day. She forgot about coming for appt. Because he daughter-in-law had  and she is preparing for the . She states she is only smoking 3 cigarettes/day and is feeling better. She states the patches are really working and she is ready to quit because she knows it will help get her cancer better. Discussed how to be prepared for tough times such as the  and how smoking is not going to help, but may be gum, mints, might. She stated she knows and will keep all of that in mind. Discussed and find a good time for her visit with transportation issues and will come after blood work is drawn. The patient was praised for her progress. The patient denies any abnormal behavioral or mental changes at this time.The patient will continue individual  and/or group sessions and medication monitoring by CTTS. Prescribed medication management will be by [facility] Medical Practitioner.      Diagnosis: F17.210    Next Visit: 1 week

## 2020-01-21 RX ORDER — CLOPIDOGREL BISULFATE 75 MG/1
75 TABLET ORAL DAILY
Qty: 90 TABLET | Refills: 1 | Status: SHIPPED | OUTPATIENT
Start: 2020-01-21 | End: 2020-07-28

## 2020-01-22 NOTE — PROGRESS NOTES
Outpatient Care Management  Plan of Care Follow Up Visit    Patient: Flory Cam  MRN: [id number]  Date of Service: 01/13/2020  Completed by: Destiney Maravilla RN  Referral Date: 11/22/2019  Program: Case Management (High Risk)    Reason for Visit   Patient presents with    Update Plan Of Care       Brief Summary:   Contacted patient by phone today for follow up visit. Patient reprots feeling much [id number] mentally, Patient had 1st session with Smoking Cessation program and received Nicotine patches. Reminded patient of upcoming appointments on tomorrow with Dr. Monteiro at 1030 and Dr. Nettles at 1145 and patient is aware. Encouraged patient to communicate with physician and call this RN if having any questions/concerns. OPCM will continue to follow. Patient is agreeable to follow up call in 2 weeks in am. XOCHITL Edouard OPCM       Patient Summary     Involvement of Care:  Do I have permission to speak with other family members about your care?       Patient Reported Labs & Vitals:  1.  Any Patient Reported Labs & Vitals?  No  2.  Patient Reported Blood Pressure:     3.  Patient Reported Pulse:     4.  Patient Reported Weight (Kg):     5.  Patient Reported Blood Glucose (mg/dl):       Medical History:  Reviewed medical history with patient and/or caregiver    Social History:  Reviewed social history with patient and/or caregiver    Clinical Assessment     Reviewed and provided basic information on available community resources for mental health, transportation, wellness resources, and palliative care programs with patient and/or caregiver.    Complex Care Plan     Care plan was discussed and completed today with input from patient and/or caregiver.    Goals      Patient/caregiver will have adequate mental health support/resources prior to discharge from OPCM. - Priority: high      Overall Time to Completion  2 months from 12/19/2019     OPCM Identified Patient Barriers:  Advanced Care Planning:  Yes Mailed  information packet    Social Work Identified Patient Barriers:   Cognitive:  No  Support:  No  Advanced Care Planning:  No  Safety:  No  Mental Health:  Yes, Care Plan Created  Transportation: Yes, Barrier resolved, patient utilizes [employer].  Financial: Yes, Barrier resolved, patient utilizes community resources and has strong family support.       Short Term Goals  Patient/caregiver will establish appointment with Physician within 1 month.  Interventions   · Collaborate with OPCM RN as appropriate to meet patient needs.  · Encourage compliance with medical/mental health appointments. 1/10/20, patient reports improvement in mental health.     Patient/Caregiver agrees to continued support by 1/17/20.  Patient/Caregiver agrees to OPCM follow up within 1 week to assess progress to goal.        Status  · Partially met             Patient/caregiver will have an action plan in place to manage and prevent complications of Depression prior to discharge from OPCM. - Priority:       Overall Time to Completion  2 months from 12/04/2019     OPCM Identified Patient Barriers:  Advanced Care Planning:  Yes Mailed information packet    OPCM Identified Disease Education Barriers:  Depression:  Care Plan Created     Short Term Goals  Patient/caregiver will verbalize 3 red flags of Depression and know when to contact Physician within 1 week.  Interventions   · Assess for retention of the signs and symptoms of disease specific exacerbation.  · Collaborate with Physician as appropriate to meet patient needs.  · Complete medication reconciliation.  · Empower patient/caregiver to discuss treatment plan with Physician/care team.  · Encourage compliance with Physician follow-ups.  · Encourage Exercise.  · Encourage Medication Compliance.  · Encourage Smoking Cessation.  · In basket message sent to Physician regarding Psychiatry referral.  · Refer to Outpatient Case Management Social Worker.     Status  · Partially met   1/2 Patient  states she is feeling better since she had a nice get away with family for the holiday. Patient states she starts back on chemotherapy on 1/6 and has appointment with Dr. Rivas next week as well. Patient states she is going to speak to Dr. Rivas about an anti depressant she can take while on the chemo. She mentioned she sill ask about Wellbutrin to help her stop smoking as well.     Patient denies feeling down, depressed or hopeless but still wants to get on a Depression medication as those feelings come up intermittently.   1/22 patient reports she is feeling a lot better mentally. Patient continues with her Chemotherapy but all is going well. Patient does not want to start taking Wellbutrin until she sees Cardiologist at [facility] on 1/30.              Clinical Reference Documents Added to Patient Instructions       Document    DEPRESSION (ENGLISH)    DEPRESSION, KNOW THE SIGNS AND SYMPTOMS (ENGLISH)    DEPRESSION, WHAT CAN CAUSE (ENGLISH)    DEPRESSION, COUNSELING FOR (ENGLISH)    DEPRESSION: TIPS TO HELP YOURSELF  (ENGLISH)    LICHEN PLANUS, UNDERSTANDING (ENGLISH)    LUPUS (SYSTEMIC LUPUS ERYTHEMATOSUS) (ENGLISH)    SYSTEMIC LUPUS ERYTHEMATOSUS (LUPUS), UNDERSTANDING (ENGLISH)             Patient/caregiver will have an action plan in place to manage and prevent complications of Smoking prior to discharge from OPCM. - Priority: High      Overall Time to Completion  2 months from 01/02/2020     OPCM Identified Patient Barriers:    Transportation: Yes  Financial: Yes       OPCM Identified Disease Education Barriers:       Short Term Goals  Patient/caregiver will verbalize 3 benefits of smoking cessation within 1 month.  Interventions   · Collaborate with Physician as appropriate to meet patient needs.  · Complete medication reconciliation.  · Empower patient/caregiver to discuss treatment plan with Physician/care team.  · Encourage compliance with Physician follow-ups.  · Encourage Medication Compliance.  · Refer  to [facility] Smoking Cessation department.     Status  · Partially met   · 1/22 patient attended 1st session of Smoking Cessation program last week and received Nicotine patches.                   Patient Instructions     Instructions were provided via the [employer] patient resources and are available for the patient to view on the patient portal.    Next Steps:    Follow up in about 23 days (around 2/5/2020) for RN OPCM f/u.    Patient verbalized understanding of the care plan, goals, and all of today's instructions. Encouraged patient/caregiver to communicate with his/her physician and health care team about health conditions and the treatment plan.  Provided my contact information today and encouraged patient/caregiver to call me with any questions as needed.

## 2020-01-23 ENCOUNTER — OFFICE VISIT (OUTPATIENT)
Dept: PODIATRY | Facility: CLINIC | Age: 75
End: 2020-01-23
Payer: MEDICARE

## 2020-01-23 ENCOUNTER — OFFICE VISIT (OUTPATIENT)
Dept: RHEUMATOLOGY | Facility: CLINIC | Age: 75
End: 2020-01-23
Payer: MEDICARE

## 2020-01-23 VITALS
SYSTOLIC BLOOD PRESSURE: 133 MMHG | BODY MASS INDEX: 29.57 KG/M2 | DIASTOLIC BLOOD PRESSURE: 70 MMHG | WEIGHT: 183.19 LBS | HEART RATE: 58 BPM

## 2020-01-23 VITALS
WEIGHT: 183.44 LBS | HEIGHT: 66 IN | DIASTOLIC BLOOD PRESSURE: 81 MMHG | SYSTOLIC BLOOD PRESSURE: 136 MMHG | BODY MASS INDEX: 29.48 KG/M2 | HEART RATE: 62 BPM

## 2020-01-23 DIAGNOSIS — Z72.0 TOBACCO USE: ICD-10-CM

## 2020-01-23 DIAGNOSIS — B35.1 DERMATOPHYTOSIS OF NAIL: Primary | ICD-10-CM

## 2020-01-23 DIAGNOSIS — M05.772 RHEUMATOID ARTHRITIS INVOLVING BOTH FEET WITH POSITIVE RHEUMATOID FACTOR: Primary | Chronic | ICD-10-CM

## 2020-01-23 DIAGNOSIS — L84 CORN OR CALLUS: ICD-10-CM

## 2020-01-23 DIAGNOSIS — L93.0 DISCOID LUPUS ERYTHEMATOSUS: ICD-10-CM

## 2020-01-23 DIAGNOSIS — M05.771 RHEUMATOID ARTHRITIS INVOLVING BOTH FEET WITH POSITIVE RHEUMATOID FACTOR: Primary | Chronic | ICD-10-CM

## 2020-01-23 DIAGNOSIS — I73.9 PERIPHERAL VASCULAR DISEASE: ICD-10-CM

## 2020-01-23 PROCEDURE — 99999 PR PBB SHADOW E&M-EST. PATIENT-LVL IV: CPT | Mod: PBBFAC,HCNC,, | Performed by: INTERNAL MEDICINE

## 2020-01-23 PROCEDURE — 99214 OFFICE O/P EST MOD 30 MIN: CPT | Mod: HCNC,S$GLB,, | Performed by: INTERNAL MEDICINE

## 2020-01-23 PROCEDURE — 1101F PT FALLS ASSESS-DOCD LE1/YR: CPT | Mod: HCNC,CPTII,S$GLB, | Performed by: INTERNAL MEDICINE

## 2020-01-23 PROCEDURE — 99999 PR PBB SHADOW E&M-EST. PATIENT-LVL IV: ICD-10-PCS | Mod: PBBFAC,HCNC,, | Performed by: PODIATRIST

## 2020-01-23 PROCEDURE — 99499 RISK ADDL DX/OHS AUDIT: ICD-10-PCS | Mod: HCNC,S$GLB,, | Performed by: INTERNAL MEDICINE

## 2020-01-23 PROCEDURE — 99499 UNLISTED E&M SERVICE: CPT | Mod: HCNC,S$GLB,, | Performed by: INTERNAL MEDICINE

## 2020-01-23 PROCEDURE — 11721 DEBRIDE NAIL 6 OR MORE: CPT | Mod: Q8,HCNC,S$GLB, | Performed by: PODIATRIST

## 2020-01-23 PROCEDURE — 99999 PR PBB SHADOW E&M-EST. PATIENT-LVL IV: ICD-10-PCS | Mod: PBBFAC,HCNC,, | Performed by: INTERNAL MEDICINE

## 2020-01-23 PROCEDURE — 1101F PR PT FALLS ASSESS DOC 0-1 FALLS W/OUT INJ PAST YR: ICD-10-PCS | Mod: HCNC,CPTII,S$GLB, | Performed by: INTERNAL MEDICINE

## 2020-01-23 PROCEDURE — 11721 PR DEBRIDEMENT OF NAILS, 6 OR MORE: ICD-10-PCS | Mod: Q8,HCNC,S$GLB, | Performed by: PODIATRIST

## 2020-01-23 PROCEDURE — 99499 NO LOS: ICD-10-PCS | Mod: HCNC,S$GLB,, | Performed by: PODIATRIST

## 2020-01-23 PROCEDURE — 3075F SYST BP GE 130 - 139MM HG: CPT | Mod: HCNC,CPTII,S$GLB, | Performed by: INTERNAL MEDICINE

## 2020-01-23 PROCEDURE — 99999 PR PBB SHADOW E&M-EST. PATIENT-LVL IV: CPT | Mod: PBBFAC,HCNC,, | Performed by: PODIATRIST

## 2020-01-23 PROCEDURE — 99499 UNLISTED E&M SERVICE: CPT | Mod: HCNC,S$GLB,, | Performed by: PODIATRIST

## 2020-01-23 PROCEDURE — 1126F AMNT PAIN NOTED NONE PRSNT: CPT | Mod: HCNC,S$GLB,, | Performed by: INTERNAL MEDICINE

## 2020-01-23 PROCEDURE — 1159F MED LIST DOCD IN RCRD: CPT | Mod: HCNC,S$GLB,, | Performed by: INTERNAL MEDICINE

## 2020-01-23 PROCEDURE — 99214 PR OFFICE/OUTPT VISIT, EST, LEVL IV, 30-39 MIN: ICD-10-PCS | Mod: HCNC,S$GLB,, | Performed by: INTERNAL MEDICINE

## 2020-01-23 PROCEDURE — 3075F PR MOST RECENT SYSTOLIC BLOOD PRESS GE 130-139MM HG: ICD-10-PCS | Mod: HCNC,CPTII,S$GLB, | Performed by: INTERNAL MEDICINE

## 2020-01-23 PROCEDURE — 3078F PR MOST RECENT DIASTOLIC BLOOD PRESSURE < 80 MM HG: ICD-10-PCS | Mod: HCNC,CPTII,S$GLB, | Performed by: INTERNAL MEDICINE

## 2020-01-23 PROCEDURE — 3078F DIAST BP <80 MM HG: CPT | Mod: HCNC,CPTII,S$GLB, | Performed by: INTERNAL MEDICINE

## 2020-01-23 PROCEDURE — 1159F PR MEDICATION LIST DOCUMENTED IN MEDICAL RECORD: ICD-10-PCS | Mod: HCNC,S$GLB,, | Performed by: INTERNAL MEDICINE

## 2020-01-23 PROCEDURE — 1126F PR PAIN SEVERITY QUANTIFIED, NO PAIN PRESENT: ICD-10-PCS | Mod: HCNC,S$GLB,, | Performed by: INTERNAL MEDICINE

## 2020-01-23 NOTE — PROGRESS NOTES
CC:  Chief Complaint   Patient presents with    Rheumatoid Arthritis     3 mo f/u- no pain today    Vitamin D Deficiency    Discoid lupus     History of Present Illness:    Here for routine f/u of sero positive RA and ? Discoid  lupus and lichen planus overlap    KYLE negative  In the past  she was noted to have  Lichen planus  lesions in the right hand and right wrist and she was seen by Dermatology  She was referred to Hand surgery for excision  Per her lesions came back positive for malignancy, she cannot say what kind of malignancy   she sees Dr. Rivas at [facility] and is currently on chemo   denies any new joint pain or stiffness or joint swelling    She completed HD vit d supp, levels good now   She is advised to take vitamin-D 2000 units a day, further    Plaquenil had to be stopped per Ophthalmology recommendations  Then she was started on methotrexate but however she developed allergy reaction with rash and stopped   then was advised sulfasalazine which she never started as she was anxious,  Then diagnosed with skin cance  She also has chronic pain in the back, which is stable  She sees pain management gets Norco, takes it as needed and this is helpful  Denies any fever, chills, weight loss, dysuria, oral ulcers, chest pain, shortness of breath    History  Looks like she was diagnosed with discoid lupus vs lichen planus at [facility], from review of records looks like she has been on prednisone, Plaquenil, methotrexate in the past   Looks like the lesions were unresponsive to methotrexate and she was started on CellCept and later Imuran.  She had a rash on both CellCept and Imuran and they were discontinued  More recently she had a biopsy of right palm lesion by Dermatology which was suggestive of lichen planus , she is on topicals per Dermatology  Most recent labs reveal negative KYLE, normal ESR/CRP,     Initial visit  :  She has seen Dr URIARTE in the past   She is currently on plaquenil 200 mg bid   Last  seen here in 10/2016   She is currently on plaquenil 200 mg bid , doing well   On it for > 10 years now   Denies any joint swelling'  No am stiffness   She was seen by opthalmologist Dr Gonsales , who recommended she be considered something else in place of plaquenil  Due to ongoing issues with vision   Rash inv face / hands/ forearms / feet , some on legs stable     She was seen by  in 2014 for Discoid lupus and was lost for follow up. In that visit , since she was having joint pains,  also checked for rheumatoid arthritis which came back highly positive for RF/CCP    Past Medical History:   Diagnosis Date    Acute coronary syndrome     Anxiety     Bilateral carotid artery stenosis 11/17/2015    Chronic pain     Coronary artery disease     GERD (gastroesophageal reflux disease)     Hyperlipidemia     Hypertension     Hypothyroidism     Low back pain     Lupus     Osteoporosis     Poor circulation     PVD (peripheral vascular disease)     S/P peripheral artery angioplasty 02/13/2014    SCCA (squamous cell carcinoma) of skin 10/2019    Dr. ZANDRA Rivas--oncology    Skin disease          Past Surgical History:   Procedure Laterality Date    COLONOSCOPY N/A 1/4/2017    Procedure: COLONOSCOPY;  Surgeon: Sylvester Arboleda III, MD;  Location: [facility];  Service: Endoscopy;  Laterality: N/A;    CORONARY ANGIOPLASTY      CORONARY ARTERY BYPASS GRAFT      HYSTERECTOMY      OOPHORECTOMY      THYROIDECTOMY           Social History     Tobacco Use    Smoking status: Current Every Day Smoker     Packs/day: 0.25     Years: 40.00     Pack years: 10.00     Types: Cigarettes     Start date: 1961    Smokeless tobacco: Never Used    Tobacco comment: entered smoking cessation program 1/13/20    Substance Use Topics    Alcohol use: No    Drug use: No       Family History   Problem Relation Age of Onset    Melanoma Neg Hx     Psoriasis Neg Hx     Lupus Neg Hx     Eczema Neg Hx        Review  of patient's allergies indicates:  No Known Allergies          Review of Systems:  Constitutional: Denies fever, chills. No recent weight changes.   Fatigue: no  Muscle weakness: no  Headaches: no new headaches  Eyes: No redness or dryness.  No recent visual changes.  ENT: Denies dry mouth. No oral or nasal ulcers.  Card: No chest pain.  Resp: No cough or sob.   Gastro: No nausea or vomiting.  No heartburn.  Constipation: no  Diarrhea: no  Genito:  No dysuria.  No genital ulcers.  Skin:per hpi   Raynauds:no  Neuro: No numbness / tingling.   Psych: No depression, anxiety  Endo:  no excess thirst.  Heme: no abnormal bleeding or bruising  Clots:none     OBJECTIVE:     Vital Signs   Vitals:    01/23/20 1015   BP: 133/70   Pulse: (!) 58     Physical Exam:  General Appearance:  NAD.   Gait: not favoring.  HEENT: PERRL.  Eyes not dry or injected.  No nasal ulcers.  Mouth not dry, no oral lesions.  Lymph: cervical, supraclavicular or axillary nodes: none abnormal   Cardio: no irregularity of S1 or S2.  No gallops or rubs.   Resp: Normal respiratory motion. Clear to auscultation bilaterally.   No abnormal chest conformation.  Abd: Soft, non-tender, nondistended.  No masses.   Skin: Head and neck,  and extremities examined.   Dry scaly lesions on  palmar aspect of rt hand , dryness noted on left palm as well     Old scars on arms, feet , some on legs   Face - hyperpigmentation ,   Neuro: Ox3.   Cranial nerves II-XII grossly intact.   Sensation intact  in both distal LE and upper extremities to light touch.  Musculoskeletal Exam:    Right Side Rheumatological Exam     2,3 MCP fullness , no tenderness noted, ulnar deviation   Multiple dry scaly lesions/hyperkeratotic lesions  noted on the right palm    Others :    Ankle :no synovitis   Foot: edematous , with callouses on ventral aspect ,     Left Side Rheumatological Exam     2,3 MCP fullness , no tenderness noted      Others :    Ankle :no synovitis   Foot: edematous     Spine  :TTP lower lumbar region      Muscle strength:Equal and full in all mm groups of the upper and lower ext.    Results for NO JARVIS JOSUÉ (MRN [id number]) as of 7/31/2018 12:54   Ref. Range 9/11/2014 13:46 10/26/2016 10:38   CCP Antibodies Latest Ref Range: <5.0 U/mL 194.4 (H) 387.2 (H)   Rheumatoid Factor Latest Ref Range: 0.0 - 15.0 IU/mL 56.0 (H) 123.0 (H)     KYLE - negative   CMP  Sodium   Date Value Ref Range Status   10/23/2019 142 136 - 145 mmol/L Final     Potassium   Date Value Ref Range Status   10/23/2019 4.5 3.5 - 5.1 mmol/L Final     Chloride   Date Value Ref Range Status   10/23/2019 104 95 - 110 mmol/L Final     CO2   Date Value Ref Range Status   10/23/2019 30 (H) 23 - 29 mmol/L Final     Glucose   Date Value Ref Range Status   10/23/2019 100 70 - 110 mg/dL Final     BUN, Bld   Date Value Ref Range Status   10/23/2019 17 8 - 23 mg/dL Final     Creatinine   Date Value Ref Range Status   10/23/2019 0.8 0.5 - 1.4 mg/dL Final     Calcium   Date Value Ref Range Status   10/23/2019 9.6 8.7 - 10.5 mg/dL Final     Total Protein   Date Value Ref Range Status   10/23/2019 7.0 6.0 - 8.4 g/dL Final     Albumin   Date Value Ref Range Status   10/23/2019 3.8 3.5 - 5.2 g/dL Final     Total Bilirubin   Date Value Ref Range Status   10/23/2019 0.6 0.1 - 1.0 mg/dL Final     Comment:     For infants and newborns, interpretation of results should be based  on gestational age, weight and in agreement with clinical  observations.  Premature Infant recommended reference ranges:  Up to 24 hours.............<8.0 mg/dL  Up to 48 hours............<12.0 mg/dL  3-5 days..................<15.0 mg/dL  6-29 days.................<15.0 mg/dL       Alkaline Phosphatase   Date Value Ref Range Status   10/23/2019 198 (H) 55 - 135 U/L Final     AST   Date Value Ref Range Status   10/23/2019 22 10 - 40 U/L Final     ALT   Date Value Ref Range Status   10/23/2019 24 10 - 44 U/L Final     Anion Gap   Date Value Ref Range Status    10/23/2019 8 8 - 16 mmol/L Final     eGFR if    Date Value Ref Range Status   10/23/2019 >60 >60 mL/min/1.73 m^2 Final     eGFR if non    Date Value Ref Range Status   10/23/2019 >60 >60 mL/min/1.73 m^2 Final     Comment:     Calculation used to obtain the estimated glomerular filtration  rate (eGFR) is the CKD-EPI equation.        Imaging : x ray foot :  Mild calcaneal spurring. No acute fracture. No dislocation. Minimal degenerative change first metatarsophalangeal joint.    Notes reviewed  Other procedures:    ASSESSMENT/PLAN:     Rheumatoid arthritis involving both feet with positive rheumatoid factor    Discoid lupus erythematosus    Tobacco use    RA/ discoid lupus/lichen planus overlap :     Neg KYLE   + RF/ +CCP    in remission now.  On chemo for skin cancer    Plaquenil had to be discontinued per Ophthalmology recommendation  methotrexate to be stopped now due to worsening of facial rash  Rash with CellCept and Imuran in the past   never started sulfasalazine as recommended and she was anxious about the side effects again  No synovitis noted today, only chronic swelling  Asymptomatic denies any pain or stiffness    With newly diagnosed skin cancer, not sure what type and is on chemo now  Will hold off on any new immunosuppressants at this point   advised her to get me records from Dr. Rivas    H/o discoid lupus:  Do not see any lesions suggestive of discoid lupus at this point  KYLE negative  Normal complements and UA negative for protein  Will monitor for now off Plaquenil  Plaquenil stopped due to ophthalmology recs     Lichen planus involving right hand :  With underlying malignancy status post recent excision biopsy  Follow-up per Dr. Rivas for further chemo    Osteoporosis  :  Deferred decision on Prolia again today  Stay on vitamin-D 00664 units a week    Chronic mild elevation in alkaline phosphatase:  Asymptomatic, stable  Recommend follow-up with PCP    Mild  elevated PTH with history of vitamin-D deficiency  Stable levels  Normal calcium  Vitamin-D levels normal now  Completed high-dose supplementation, recommend she stays on 2000 units a day    No smoking    4  Month return    Went over uptodate information /literature on the meds prescribed today     Disclaimer: This note was prepared using voice recognition system and is likely to have sound alike errors and is not proof read.  Please call me with any questions.

## 2020-01-23 NOTE — PROGRESS NOTES
PODIATRY NOTE    CHIEF COMPLAINT  Chief Complaint   Patient presents with    Routine Foot Care     PCP Dr. Ovalle 3 mo [facility]   LAST VISIT: 11/26/19    HPI  SUBJECTIVE: Flory Cam is a 74 y.o. female w/ PMH of PVD, Lupus, hx of intermittent leg claudifcation- s/p vascular intervention with much improvement in symptoms and other medical problems who presents to clinic for high risk  foot exam and care.Patient admits to painful toenails and calluses aggravated by increased weight bearing, shoe gear, and pressure. States pain is relieved with routine debridements. She is currently undergoing chemo therapy. no new complaints. She notes marked improvement in calluses since starting chemotherapy. She denies any N/V/F. Patient has no other pedal complaints at this time.    HgA1c:   Hemoglobin A1C   Date Value Ref Range Status   08/04/2017 5.8 (H) 4.0 - 5.6 % Final     Comment:     According to ADA guidelines, hemoglobin A1c <7.0% represents  optimal control in non-pregnant diabetic patients. Different  metrics may apply to specific patient populations.   Standards of Medical Care in Diabetes-2016.  For the purpose of screening for the presence of diabetes:  <5.7%     Consistent with the absence of diabetes  5.7-6.4%  Consistent with increasing risk for diabetes   (prediabetes)  >or=6.5%  Consistent with diabetes  Currently, no consensus exists for use of hemoglobin A1c  for diagnosis of diabetes for children.  This Hemoglobin A1c assay has significant interference with fetal   hemoglobin   (HbF). The results are invalid for patients with abnormal amounts of   HbF,   including those with known Hereditary Persistence   of Fetal Hemoglobin. Heterozygous hemoglobin variants (HbAS, HbAC,   HbAD, HbAE, HbA2) do not significantly interfere with this assay;   however, presence of multiple variants in a sample may impact the %   interference.     09/14/2016 6.2 4.5 - 6.2 % Final     Comment:     According to ADA guidelines,  "hemoglobin A1C <7.0% represents  optimal control in non-pregnant diabetic patients.  Different  metrics may apply to specific populations.   Standards of Medical Care in Diabetes - 2016.  For the purpose of screening for the presence of diabetes:  <5.7%     Consistent with the absence of diabetes  5.7-6.4%  Consistent with increasing risk for diabetes   (prediabetes)  >or=6.5%  Consistent with diabetes  Currently no consensus exists for use of hemoglobin A1C  for diagnosis of diabetes for children.     04/13/2010 5.9 4.0 - 6.2 % Final       REVIEW OF SYSTEMS  General: Denies any fever or chills  Chest: Denies shortness of breath, wheezing, coughing, or sputum production  Heart: Denies chest pain.  As noted above and per history of current illness above, otherwise negative in the remainder of the 14 systems.     PHYSICAL EXAM  Vitals:    01/23/20 1152   BP: 136/81   Pulse: 62   Weight: 83.2 kg (183 lb 6.8 oz)   Height: 5' 6" (1.676 m)       GEN:  This patient is well-developed, well-nourished and appears stated age, well-oriented to person, place and time, and cooperative and pleasant on today's visit.      LOWER EXTREMITY  Vascular:   · DP pedal pulse 0/4 b/l, PT pedal pulse 1/4 b/l  · Skin temperature warm to warm from prox to distally  · CFT <5 secs b/l  · There is LE edema noted b/l.     Dermatologic:   · Thickened, dystrophic, elongated toenails with subungal debris 1-5 b/l.   · Webspaces are C/D/I B/L.  · There is hyperkeratotic tissue noted plantar aspect of b/l feet at medial arch x 2  · Skin texture and turgor dry with hyperkeratosis diffusely b/l plantar heel   · There is no pedal hair growth noted    Neurologic:  · Vibratory sensation diminished at level of Hallux IPJ b/l    · Protective sensation absent at 0/10 sites upon examination with Monroe Weinsten 5.07 g monofilament.   · Propioception intact at 1st MTPJ b/l.   · Achilles and patellar deep tendon reflexes intact  · Babinski reflex absent b/l. Light " touch and sharp/dull sensation intact b/l.    Musculoskeletal/Orthopedic:  · No symptomatic structural abnormalities noted.   · Muscle strength is 5/5 for foot inverters, everters, plantarflexors, and dorsiflexors. Muscle tone is normal.  · Pain free range of motion in all four quadrants with stiffness and limitation b/l  · PAIN with palpation of callus plantar medial arch, LEFT    ASSESSMENT  Encounter Diagnoses   Name Primary?    Dermatophytosis of nail Yes    Peripheral vascular disease     Discoid lupus erythematosus     Corn or callus        Plan:  -Discuss presenting problems, etiology, pathologic processes and management options with patient today.     -With patient's permission, the elongated onychomycotic toenails, as outlined in the physical examination, were sharply debrided with a double action nail nipper to their soft tissue attachment. If indicated, the nails were then smoothed down in thickness with an sotero board or dremmel to facilitate in further debridement removing all offending nail and subungual debris.  The elongated nails, as outlined in the objective portion of this note, were trimmed to appropriate length, with a double action nail nipper, for alleviation/reduction of pains as well. Patient relates relief following the procedure.       Patient has above noted diagnosis and/or medical co-morbidities.They are being treated and managed by their  Primary Care Physician for management of comorbid states which are deemed to be currently stable.          Future Appointments   Date Time Provider Department Center   1/27/2020  8:30 AM ROBERT Brown [facility]   1/30/2020  9:00 AM Christopher Cohen MD [facility]   2/26/2020  9:30 AM [facility]ORAT[facility], [facility] [place]   4/23/2020  9:00 AM Melva Nettles DPM [facility] [place]   5/25/2020 11:30 AM Yeimy Monteiro MD [facility]

## 2020-01-27 ENCOUNTER — TELEPHONE (OUTPATIENT)
Dept: SMOKING CESSATION | Facility: CLINIC | Age: 75
End: 2020-01-27

## 2020-01-30 ENCOUNTER — CLINICAL SUPPORT (OUTPATIENT)
Dept: CARDIOLOGY | Facility: CLINIC | Age: 75
End: 2020-01-30
Payer: MEDICARE

## 2020-01-30 ENCOUNTER — OFFICE VISIT (OUTPATIENT)
Dept: CARDIOLOGY | Facility: CLINIC | Age: 75
End: 2020-01-30
Payer: MEDICARE

## 2020-01-30 VITALS
BODY MASS INDEX: 29.46 KG/M2 | SYSTOLIC BLOOD PRESSURE: 140 MMHG | HEART RATE: 59 BPM | OXYGEN SATURATION: 97 % | WEIGHT: 182.56 LBS | DIASTOLIC BLOOD PRESSURE: 71 MMHG

## 2020-01-30 DIAGNOSIS — M05.772 RHEUMATOID ARTHRITIS INVOLVING BOTH FEET WITH POSITIVE RHEUMATOID FACTOR: Chronic | ICD-10-CM

## 2020-01-30 DIAGNOSIS — Z86.010 HX OF COLONIC POLYPS: ICD-10-CM

## 2020-01-30 DIAGNOSIS — Z95.1 HX OF CABG: Chronic | ICD-10-CM

## 2020-01-30 DIAGNOSIS — E78.5 HYPERLIPIDEMIA LDL GOAL <70: Primary | Chronic | ICD-10-CM

## 2020-01-30 DIAGNOSIS — I73.9 PERIPHERAL VASCULAR DISEASE: Chronic | ICD-10-CM

## 2020-01-30 DIAGNOSIS — M05.771 RHEUMATOID ARTHRITIS INVOLVING BOTH FEET WITH POSITIVE RHEUMATOID FACTOR: Chronic | ICD-10-CM

## 2020-01-30 DIAGNOSIS — I65.23 BILATERAL CAROTID ARTERY STENOSIS: Chronic | ICD-10-CM

## 2020-01-30 DIAGNOSIS — L93.0 DISCOID LUPUS ERYTHEMATOSUS: Chronic | ICD-10-CM

## 2020-01-30 DIAGNOSIS — Z72.0 TOBACCO USE: ICD-10-CM

## 2020-01-30 DIAGNOSIS — L43.9 LICHEN PLANUS: ICD-10-CM

## 2020-01-30 DIAGNOSIS — J44.9 CHRONIC OBSTRUCTIVE PULMONARY DISEASE, UNSPECIFIED COPD TYPE: ICD-10-CM

## 2020-01-30 DIAGNOSIS — I25.10 CORONARY ARTERY DISEASE, ANGINA PRESENCE UNSPECIFIED, UNSPECIFIED VESSEL OR LESION TYPE, UNSPECIFIED WHETHER NATIVE OR TRANSPLANTED HEART: ICD-10-CM

## 2020-01-30 DIAGNOSIS — I70.219 ATHEROSCLEROTIC PVD WITH INTERMITTENT CLAUDICATION: Chronic | ICD-10-CM

## 2020-01-30 DIAGNOSIS — I10 HYPERTENSION GOAL BP (BLOOD PRESSURE) < 140/90: Chronic | ICD-10-CM

## 2020-01-30 PROCEDURE — 3074F PR MOST RECENT SYSTOLIC BLOOD PRESSURE < 130 MM HG: ICD-10-PCS | Mod: HCNC,CPTII,S$GLB, | Performed by: INTERNAL MEDICINE

## 2020-01-30 PROCEDURE — 93000 EKG 12-LEAD: ICD-10-PCS | Mod: HCNC,S$GLB,, | Performed by: INTERNAL MEDICINE

## 2020-01-30 PROCEDURE — 1101F PR PT FALLS ASSESS DOC 0-1 FALLS W/OUT INJ PAST YR: ICD-10-PCS | Mod: HCNC,CPTII,S$GLB, | Performed by: INTERNAL MEDICINE

## 2020-01-30 PROCEDURE — 99999 PR PBB SHADOW E&M-EST. PATIENT-LVL III: ICD-10-PCS | Mod: PBBFAC,HCNC,, | Performed by: INTERNAL MEDICINE

## 2020-01-30 PROCEDURE — 1101F PT FALLS ASSESS-DOCD LE1/YR: CPT | Mod: HCNC,CPTII,S$GLB, | Performed by: INTERNAL MEDICINE

## 2020-01-30 PROCEDURE — 99999 PR PBB SHADOW E&M-EST. PATIENT-LVL III: CPT | Mod: PBBFAC,HCNC,, | Performed by: INTERNAL MEDICINE

## 2020-01-30 PROCEDURE — 99499 UNLISTED E&M SERVICE: CPT | Mod: HCNC,S$GLB,, | Performed by: INTERNAL MEDICINE

## 2020-01-30 PROCEDURE — 1159F MED LIST DOCD IN RCRD: CPT | Mod: HCNC,S$GLB,, | Performed by: INTERNAL MEDICINE

## 2020-01-30 PROCEDURE — 99214 PR OFFICE/OUTPT VISIT, EST, LEVL IV, 30-39 MIN: ICD-10-PCS | Mod: HCNC,S$GLB,, | Performed by: INTERNAL MEDICINE

## 2020-01-30 PROCEDURE — 1159F PR MEDICATION LIST DOCUMENTED IN MEDICAL RECORD: ICD-10-PCS | Mod: HCNC,S$GLB,, | Performed by: INTERNAL MEDICINE

## 2020-01-30 PROCEDURE — 99499 RISK ADDL DX/OHS AUDIT: ICD-10-PCS | Mod: HCNC,S$GLB,, | Performed by: INTERNAL MEDICINE

## 2020-01-30 PROCEDURE — 93000 ELECTROCARDIOGRAM COMPLETE: CPT | Mod: HCNC,S$GLB,, | Performed by: INTERNAL MEDICINE

## 2020-01-30 PROCEDURE — 3078F PR MOST RECENT DIASTOLIC BLOOD PRESSURE < 80 MM HG: ICD-10-PCS | Mod: HCNC,CPTII,S$GLB, | Performed by: INTERNAL MEDICINE

## 2020-01-30 PROCEDURE — 99214 OFFICE O/P EST MOD 30 MIN: CPT | Mod: HCNC,S$GLB,, | Performed by: INTERNAL MEDICINE

## 2020-01-30 PROCEDURE — 3078F DIAST BP <80 MM HG: CPT | Mod: HCNC,CPTII,S$GLB, | Performed by: INTERNAL MEDICINE

## 2020-01-30 PROCEDURE — 3074F SYST BP LT 130 MM HG: CPT | Mod: HCNC,CPTII,S$GLB, | Performed by: INTERNAL MEDICINE

## 2020-01-30 RX ORDER — OXYCODONE AND ACETAMINOPHEN 10; 325 MG/1; MG/1
1 TABLET ORAL
COMMUNITY
Start: 2020-01-08 | End: 2023-12-19

## 2020-01-30 RX ORDER — ONDANSETRON HYDROCHLORIDE 8 MG/1
TABLET, FILM COATED ORAL
COMMUNITY
Start: 2020-01-08 | End: 2020-04-17 | Stop reason: SDUPTHER

## 2020-01-30 RX ORDER — ESOMEPRAZOLE MAGNESIUM 40 MG/1
CAPSULE, DELAYED RELEASE ORAL
Refills: 1 | COMMUNITY
Start: 2019-11-26 | End: 2020-05-04 | Stop reason: ALTCHOICE

## 2020-01-30 NOTE — PROGRESS NOTES
Subjective:   Patient ID:  Flory Cam is a 74 y.o. female who presents for follow up of Dizziness (6 month f/u)      HPI  A 73 yo female with copd tobacco use pvd s/p multiple intervention cad s/p cabg lupus lichen planuys now on chemiotherapy still smoking has no claudication has some gait issues no falls. Has no angina no shortness of breath no claudication she can go to [facility] [employer] no limitation. Has no chf symptoms palpitation tia. 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 ZANDRA Rivas--oncology    Skin disease        Past Surgical History:   Procedure Laterality Date    COLONOSCOPY N/A 1/4/2017    Procedure: COLONOSCOPY;  Surgeon: Sylvester Arboleda III, MD;  Location: [facility];  Service: Endoscopy;  Laterality: N/A;    CORONARY ANGIOPLASTY      CORONARY ARTERY BYPASS GRAFT      HYSTERECTOMY      OOPHORECTOMY      THYROIDECTOMY         Social History     Tobacco Use    Smoking status: Current Every Day Smoker     Packs/day: 0.25     Years: 40.00     Pack years: 10.00     Types: Cigarettes     Start date: 1961    Smokeless tobacco: Never Used    Tobacco comment: entered smoking cessation program 1/13/20    Substance Use Topics    Alcohol use: No    Drug use: No       Family History   Problem Relation Age of Onset    Melanoma Neg Hx     Psoriasis Neg Hx     Lupus Neg Hx     Eczema Neg Hx        Current Outpatient Medications   Medication Sig    albuterol (PROVENTIL/VENTOLIN HFA) 90  mcg/actuation inhaler Inhale 2 puffs into the lungs every 6 (six) hours as needed for Wheezing. Rescue    amLODIPine (NORVASC) 10 MG tablet TAKE 1 TABLET BY MOUTH ONCE DAILY    atorvastatin (LIPITOR) 80 MG tablet Take 1 tablet (80 mg total) by mouth once daily.    cholecalciferol, vitamin D3, 50,000 unit capsule Take 1 capsule (50,000 Units total) by mouth every 7 days.    cilostazol (PLETAL) 100 MG Tab Take 1 tablet (100 mg total) by mouth 2 (two) times daily. (Patient taking differently: Take 100 mg by mouth once daily at 6am. )    clopidogrel (PLAVIX) 75 mg tablet Take 1 tablet (75 mg total) by mouth once daily.    cyclobenzaprine (FLEXERIL) 10 MG tablet TAKE 1 TO 2 TABLETS PO HS PRN    esomeprazole (NEXIUM) 40 MG capsule TAKE 1 C PO ONCE DAILY BEFORE BREAKFAST    ezetimibe (ZETIA) 10 mg tablet TAKE 1 TABLET BY MOUTH ONCE DAILY    ondansetron (ZOFRAN) 8 MG tablet     oxyCODONE-acetaminophen (PERCOCET)  mg per tablet     pantoprazole (PROTONIX) 40 MG tablet Take 1 tablet (40 mg total) by mouth once daily.    valsartan (DIOVAN) 320 MG tablet TAKE 1 TABLET BY MOUTH ONCE DAILY    ammonium lactate (LAC-HYDRIN) 12 % lotion     ammonium lactate 12 % Crea Apply 1 application topically once daily. (Patient not taking: Reported on 11/26/2019)    calcipotriene (DOVONOX) 0.005 % cream Apply topically 2 (two) times daily. (Patient not taking: Reported on 1/23/2020)    diazePAM (VALIUM) 2 MG tablet Take 1 tablet (2 mg total) by mouth every 12 (twelve) hours as needed for Anxiety. (Patient not taking: Reported on 1/23/2020)    halobetasol (ULTRAVATE) 0.05 % ointment Apply topically 2 (two) times daily. (Patient not taking: Reported on 11/26/2019)    hydrocodone-acetaminophen 10-325mg (NORCO)  mg Tab 1 tablet 2 (two) times daily as needed.    hydrOXYzine HCl (ATARAX) 25 MG tablet Take 1 tablet (25 mg total) by mouth 3 (three) times daily as needed for Itching. (Patient not taking: Reported on  1/23/2020)    levothyroxine (SYNTHROID) 137 MCG Tab tablet Take 1 tablet (137 mcg total) by mouth once daily.    mupirocin (BACTROBAN) 2 % ointment apply to affected area twice a day for 10 days FOR SORES ON SKIN (Patient not taking: Reported on 1/23/2020)    nicotine (NICODERM CQ) 21 mg/24 hr Place 1 patch onto the skin once daily. (Patient not taking: Reported on 1/23/2020)    ranitidine (ZANTAC) 150 MG tablet     traZODone (DESYREL) 50 MG tablet take 1 tablet by mouth once daily AT NIGHT if needed for insomnia (Patient not taking: Reported on 1/2/2020)    urea (CARMOL) 40 % Crea Apply topically 2 (two) times daily. Apply to thickened areas of skin (Patient not taking: Reported on 1/23/2020)     No current facility-administered medications for this visit.      Current Outpatient Medications on File Prior to Visit   Medication Sig    albuterol (PROVENTIL/VENTOLIN HFA) 90 mcg/actuation inhaler Inhale 2 puffs into the lungs every 6 (six) hours as needed for Wheezing. Rescue    amLODIPine (NORVASC) 10 MG tablet TAKE 1 TABLET BY MOUTH ONCE DAILY    atorvastatin (LIPITOR) 80 MG tablet Take 1 tablet (80 mg total) by mouth once daily.    cholecalciferol, vitamin D3, 50,000 unit capsule Take 1 capsule (50,000 Units total) by mouth every 7 days.    cilostazol (PLETAL) 100 MG Tab Take 1 tablet (100 mg total) by mouth 2 (two) times daily. (Patient taking differently: Take 100 mg by mouth once daily at 6am. )    clopidogrel (PLAVIX) 75 mg tablet Take 1 tablet (75 mg total) by mouth once daily.    cyclobenzaprine (FLEXERIL) 10 MG tablet TAKE 1 TO 2 TABLETS PO HS PRN    esomeprazole (NEXIUM) 40 MG capsule TAKE 1 C PO ONCE DAILY BEFORE BREAKFAST    ezetimibe (ZETIA) 10 mg tablet TAKE 1 TABLET BY MOUTH ONCE DAILY    ondansetron (ZOFRAN) 8 MG tablet     oxyCODONE-acetaminophen (PERCOCET)  mg per tablet     pantoprazole (PROTONIX) 40 MG tablet Take 1 tablet (40 mg total) by mouth once daily.    valsartan  (DIOVAN) 320 MG tablet TAKE 1 TABLET BY MOUTH ONCE DAILY    ammonium lactate (LAC-HYDRIN) 12 % lotion     ammonium lactate 12 % Crea Apply 1 application topically once daily. (Patient not taking: Reported on 11/26/2019)    calcipotriene (DOVONOX) 0.005 % cream Apply topically 2 (two) times daily. (Patient not taking: Reported on 1/23/2020)    diazePAM (VALIUM) 2 MG tablet Take 1 tablet (2 mg total) by mouth every 12 (twelve) hours as needed for Anxiety. (Patient not taking: Reported on 1/23/2020)    halobetasol (ULTRAVATE) 0.05 % ointment Apply topically 2 (two) times daily. (Patient not taking: Reported on 11/26/2019)    hydrocodone-acetaminophen 10-325mg (NORCO)  mg Tab 1 tablet 2 (two) times daily as needed.    hydrOXYzine HCl (ATARAX) 25 MG tablet Take 1 tablet (25 mg total) by mouth 3 (three) times daily as needed for Itching. (Patient not taking: Reported on 1/23/2020)    levothyroxine (SYNTHROID) 137 MCG Tab tablet Take 1 tablet (137 mcg total) by mouth once daily.    mupirocin (BACTROBAN) 2 % ointment apply to affected area twice a day for 10 days FOR SORES ON SKIN (Patient not taking: Reported on 1/23/2020)    nicotine (NICODERM CQ) 21 mg/24 hr Place 1 patch onto the skin once daily. (Patient not taking: Reported on 1/23/2020)    ranitidine (ZANTAC) 150 MG tablet     traZODone (DESYREL) 50 MG tablet take 1 tablet by mouth once daily AT NIGHT if needed for insomnia (Patient not taking: Reported on 1/2/2020)    urea (CARMOL) 40 % Crea Apply topically 2 (two) times daily. Apply to thickened areas of skin (Patient not taking: Reported on 1/23/2020)     No current facility-administered medications on file prior to visit.      Review of patient's allergies indicates:  No Known Allergies  Review of Systems   Constitution: Negative for diaphoresis, malaise/fatigue and weight gain.   HENT: Negative for hoarse voice.    Eyes: Negative for double vision and visual disturbance.   Cardiovascular:  Negative for chest pain, claudication, cyanosis, dyspnea on exertion, irregular heartbeat, leg swelling, near-syncope, orthopnea, palpitations, paroxysmal nocturnal dyspnea and syncope.   Respiratory: Negative for cough, hemoptysis, shortness of breath and snoring.    Hematologic/Lymphatic: Negative for bleeding problem. Does not bruise/bleed easily.   Skin: Negative for color change and poor wound healing.   Musculoskeletal: Positive for arthritis, joint pain and stiffness. Negative for muscle cramps, muscle weakness and myalgias.   Gastrointestinal: Negative for bloating, abdominal pain, change in bowel habit, diarrhea, heartburn, hematemesis, hematochezia, melena and nausea.   Neurological: Positive for loss of balance. Negative for excessive daytime sleepiness, dizziness, headaches, light-headedness, numbness and weakness.   Psychiatric/Behavioral: Negative for memory loss. The patient does not have insomnia.    Allergic/Immunologic: Negative for hives.       Objective:   Physical Exam  Vitals:    01/30/20 0935 01/30/20 0939   BP: 108/82 (!) 140/71   BP Location: Right arm Left arm   Patient Position: Sitting Sitting   BP Method: Medium (Manual) Medium (Manual)   Pulse: (!) 59    SpO2: 97%    Weight: 82.8 kg (182 lb 8.7 oz)    Constitutional: She is oriented to person, place, and time. She appears well-developed and well-nourished. She does not appear ill. No distress.   HENT:   Head: Normocephalic and atraumatic.   Eyes: EOM are normal. Pupils are equal, round, and reactive to light. No scleral icterus.   Neck: Normal range of motion. Neck supple. Normal carotid pulses, no hepatojugular reflux and no JVD present. Carotid bruit is not present. No tracheal deviation present. No thyromegaly present.   Cardiovascular: Normal rate, regular rhythm and normal heart sounds. Exam reveals no gallop and no friction rub.   No murmur heard.  Pulses:       Carotid pulses are 2+ on the right side with bruit, and 2+ on the  left side with bruit.       Radial pulses are 2+ on the right side, and 2+ on the left side.        Femoral pulses are 2+ on the right side with bruit, and 2+ on the left side with bruit.       Popliteal pulses are 1+ on the right side, and 1+ on the left side.        Dorsalis pedis pulses are 0 on the right side, and 0 on the left side.        Posterior tibial pulses are 0 on the right side, and 0 on the left side.   Scars femoral surgery well healed.    Pulmonary/Chest: Effort normal and breath sounds normal. No respiratory distress. She has no wheezes. She has no rhonchi. She has no rales. She exhibits no tenderness.   Scar cabg well healed   Abdominal: Soft. Normal appearance, normal aorta and bowel sounds are normal. She exhibits no distension, no abdominal bruit, no ascites and no pulsatile midline mass. There is no hepatomegaly. There is no tenderness.   Obese  Scar abdominal surgery well healed.    Musculoskeletal: She exhibits no edema.        Right shoulder: She exhibits no deformity.   Neurological: She is alert and oriented to person, place, and time. She has normal strength. No cranial nerve deficit. Coordination normal.   Skin: Skin is warm and dry. No rash noted. She is not diaphoretic. No cyanosis or erythema. Nails show no clubbing.   Scarring in both feet and hands from discoid lupus no drainage no gangrene. Healed callouses noted.   Psychiatric: She has a normal mood and affect. Her speech is normal and behavior is normal.   Nursing note and vitals reviewed.     Lab Results   Component Value Date    CHOL 196 04/02/2019    CHOL 213 (H) 10/25/2018    CHOL 144 08/04/2017     Lab Results   Component Value Date    HDL 51 04/02/2019    HDL 53 10/25/2018    HDL 49 08/04/2017     Lab Results   Component Value Date    LDLCALC 128.0 04/02/2019    LDLCALC 141.8 10/25/2018    LDLCALC 80.0 08/04/2017     Lab Results   Component Value Date    TRIG 85 04/02/2019    TRIG 91 10/25/2018    TRIG 75 08/04/2017      Lab Results   Component Value Date    CHOLHDL 26.0 04/02/2019    CHOLHDL 24.9 10/25/2018    CHOLHDL 34.0 08/04/2017       Chemistry        Component Value Date/Time     10/23/2019 0820    K 4.5 10/23/2019 0820     10/23/2019 0820    CO2 30 (H) 10/23/2019 0820    BUN 17 10/23/2019 0820    CREATININE 0.8 10/23/2019 0820     10/23/2019 0820        Component Value Date/Time    CALCIUM 9.6 10/23/2019 0820    ALKPHOS 198 (H) 10/23/2019 0820    AST 22 10/23/2019 0820    ALT 24 10/23/2019 0820    BILITOT 0.6 10/23/2019 0820    ESTGFRAFRICA >60 10/23/2019 0820    EGFRNONAA >60 10/23/2019 0820          Lab Results   Component Value Date    TSH 3.030 02/05/2019     Lab Results   Component Value Date    INR 1.0 04/28/2017    INR 0.9 07/26/2007     Lab Results   Component Value Date    WBC 9.45 10/23/2019    HGB 14.8 10/23/2019    HCT 45.0 10/23/2019    MCV 94 10/23/2019     10/23/2019     BMP  Sodium   Date Value Ref Range Status   10/23/2019 142 136 - 145 mmol/L Final     Potassium   Date Value Ref Range Status   10/23/2019 4.5 3.5 - 5.1 mmol/L Final     Chloride   Date Value Ref Range Status   10/23/2019 104 95 - 110 mmol/L Final     CO2   Date Value Ref Range Status   10/23/2019 30 (H) 23 - 29 mmol/L Final     BUN, Bld   Date Value Ref Range Status   10/23/2019 17 8 - 23 mg/dL Final     Creatinine   Date Value Ref Range Status   10/23/2019 0.8 0.5 - 1.4 mg/dL Final     Calcium   Date Value Ref Range Status   10/23/2019 9.6 8.7 - 10.5 mg/dL Final     Anion Gap   Date Value Ref Range Status   10/23/2019 8 8 - 16 mmol/L Final     eGFR if    Date Value Ref Range Status   10/23/2019 >60 >60 mL/min/1.73 m^2 Final     eGFR if non    Date Value Ref Range Status   10/23/2019 >60 >60 mL/min/1.73 m^2 Final     Comment:     Calculation used to obtain the estimated glomerular filtration  rate (eGFR) is the CKD-EPI equation.        CrCl cannot be calculated (Patient's most  recent lab result is older than the maximum 7 days allowed.).    Assessment:     1. Hyperlipidemia LDL goal <70    2. Hypertension goal BP (blood pressure) < 140/90    3. Peripheral vascular disease    4. CAD: Hx of CABG    5. Atherosclerotic PVD with intermittent claudication    6. Bilateral carotid artery stenosis    7. Discoid lupus erythematosus    8. Rheumatoid arthritis involving both feet with positive rheumatoid factor    9. Tobacco use    10. Lichen planus    11. Hx of colonic polyps    12. Chronic obstructive pulmonary disease, unspecified COPD type    ASYMPTOMATIC CARDIOVASCULAR WISE.   MAIN ISSUE IS SMOKING CESSATION COUNSELED SHE WILL BE ENROLLED IN OUR SMOKING CESSATION PROGRAM  NEEDS TO STAY ACTIVE AND BE COMPLIANT WITH RISK FACTOR MODIFICATION. WILL NEED LABS.    Plan:   LIPID CMP   Continue current therapy  Cardiac low salt diet.  Risk factor modification and excercise program.  Smoking cessation counseling  F/u in 6 months with lipid cmp

## 2020-02-03 ENCOUNTER — TELEPHONE (OUTPATIENT)
Dept: SMOKING CESSATION | Facility: CLINIC | Age: 75
End: 2020-02-03

## 2020-02-03 NOTE — TELEPHONE ENCOUNTER
Spoke with patient. Could not come- doing chemo at this time. Stated she would call back. Has [phone] number and name Guerita Reggie.

## 2020-02-05 ENCOUNTER — OUTPATIENT CASE MANAGEMENT (OUTPATIENT)
Dept: ADMINISTRATIVE | Facility: OTHER | Age: 75
End: 2020-02-05

## 2020-02-05 NOTE — LETTER
February 28, 2020    Flory Cam  [address]  [address]  [place] [address]             [facility] [address] [address] Dear Ms. Cam,             I have been unsuccessful at reaching you to follow-up to see how you have been doing. I work with [facility]'s Outpatient Case Management Department.Please call me back at your earliest convenience to discuss your health care needs.      I can be reached at [phone] from 8:00AM to 4:30 PM on Monday thru Friday. [facility] is a program offered through [facility] where a nurse is available 24/7 to answer questions or provide medical advice, their number is [phone].    Thanks,    Destiney Maravilla RN   Outpatient Case Management

## 2020-02-18 ENCOUNTER — HOSPITAL ENCOUNTER (EMERGENCY)
Facility: HOSPITAL | Age: 75
Discharge: HOME | End: 2020-02-18
Attending: EMERGENCY MEDICINE
Payer: MEDICARE

## 2020-02-18 VITALS
SYSTOLIC BLOOD PRESSURE: 168 MMHG | WEIGHT: 184.94 LBS | DIASTOLIC BLOOD PRESSURE: 77 MMHG | OXYGEN SATURATION: 95 % | BODY MASS INDEX: 29.72 KG/M2 | HEART RATE: 53 BPM | RESPIRATION RATE: 18 BRPM | HEIGHT: 66 IN | TEMPERATURE: 99 F

## 2020-02-18 DIAGNOSIS — A08.4 VIRAL GASTROENTERITIS: Primary | ICD-10-CM

## 2020-02-18 LAB
ALBUMIN SERPL BCP-MCNC: 3.6 G/DL (ref 3.5–5.2)
ALP SERPL-CCNC: 158 U/L (ref 55–135)
ALT SERPL W/O P-5'-P-CCNC: 16 U/L (ref 10–44)
AMYLASE SERPL-CCNC: 46 U/L (ref 20–110)
ANION GAP SERPL CALC-SCNC: 8 MMOL/L (ref 8–16)
AST SERPL-CCNC: 18 U/L (ref 10–40)
BACTERIA #/AREA URNS HPF: NORMAL /HPF
BASOPHILS # BLD AUTO: 0.03 K/UL (ref 0–0.2)
BASOPHILS NFR BLD: 0.3 % (ref 0–1.9)
BILIRUB SERPL-MCNC: 0.3 MG/DL (ref 0.1–1)
BILIRUB UR QL STRIP: NEGATIVE
BUN SERPL-MCNC: 18 MG/DL (ref 8–23)
CALCIUM SERPL-MCNC: 9 MG/DL (ref 8.7–10.5)
CHLORIDE SERPL-SCNC: 104 MMOL/L (ref 95–110)
CLARITY UR: CLEAR
CO2 SERPL-SCNC: 27 MMOL/L (ref 23–29)
COLOR UR: YELLOW
CREAT SERPL-MCNC: 0.8 MG/DL (ref 0.5–1.4)
DIFFERENTIAL METHOD: ABNORMAL
EOSINOPHIL # BLD AUTO: 0.2 K/UL (ref 0–0.5)
EOSINOPHIL NFR BLD: 1.8 % (ref 0–8)
ERYTHROCYTE [DISTWIDTH] IN BLOOD BY AUTOMATED COUNT: 14.2 % (ref 11.5–14.5)
EST. GFR  (AFRICAN AMERICAN): >60 ML/MIN/1.73 M^2
EST. GFR  (NON AFRICAN AMERICAN): >60 ML/MIN/1.73 M^2
GLUCOSE SERPL-MCNC: 115 MG/DL (ref 70–110)
GLUCOSE UR QL STRIP: NEGATIVE
HCT VFR BLD AUTO: 35.7 % (ref 37–48.5)
HGB BLD-MCNC: 11.7 G/DL (ref 12–16)
HGB UR QL STRIP: NEGATIVE
IMM GRANULOCYTES # BLD AUTO: 0.03 K/UL (ref 0–0.04)
IMM GRANULOCYTES NFR BLD AUTO: 0.3 % (ref 0–0.5)
KETONES UR QL STRIP: NEGATIVE
LEUKOCYTE ESTERASE UR QL STRIP: ABNORMAL
LIPASE SERPL-CCNC: 23 U/L (ref 4–60)
LYMPHOCYTES # BLD AUTO: 2.2 K/UL (ref 1–4.8)
LYMPHOCYTES NFR BLD: 24.1 % (ref 18–48)
MCH RBC QN AUTO: 31.5 PG (ref 27–31)
MCHC RBC AUTO-ENTMCNC: 32.8 G/DL (ref 32–36)
MCV RBC AUTO: 96 FL (ref 82–98)
MICROSCOPIC COMMENT: NORMAL
MONOCYTES # BLD AUTO: 0.5 K/UL (ref 0.3–1)
MONOCYTES NFR BLD: 5.2 % (ref 4–15)
NEUTROPHILS # BLD AUTO: 6.2 K/UL (ref 1.8–7.7)
NEUTROPHILS NFR BLD: 68.3 % (ref 38–73)
NITRITE UR QL STRIP: NEGATIVE
NRBC BLD-RTO: 0 /100 WBC
PH UR STRIP: 6 [PH] (ref 5–8)
PLATELET # BLD AUTO: 226 K/UL (ref 150–350)
PMV BLD AUTO: 9.5 FL (ref 9.2–12.9)
POTASSIUM SERPL-SCNC: 4 MMOL/L (ref 3.5–5.1)
PROT SERPL-MCNC: 6.7 G/DL (ref 6–8.4)
PROT UR QL STRIP: NEGATIVE
RBC # BLD AUTO: 3.71 M/UL (ref 4–5.4)
RBC #/AREA URNS HPF: 2 /HPF (ref 0–4)
SODIUM SERPL-SCNC: 139 MMOL/L (ref 136–145)
SP GR UR STRIP: 1.02 (ref 1–1.03)
SQUAMOUS #/AREA URNS HPF: 3 /HPF
URN SPEC COLLECT METH UR: ABNORMAL
UROBILINOGEN UR STRIP-ACNC: NEGATIVE EU/DL
WBC # BLD AUTO: 9.04 K/UL (ref 3.9–12.7)
WBC #/AREA URNS HPF: 5 /HPF (ref 0–5)

## 2020-02-18 PROCEDURE — 63600175 PHARM REV CODE 636 W HCPCS: Mod: HCNC | Performed by: EMERGENCY MEDICINE

## 2020-02-18 PROCEDURE — 99284 EMERGENCY DEPT VISIT MOD MDM: CPT | Mod: 25,HCNC

## 2020-02-18 PROCEDURE — 85025 COMPLETE CBC W/AUTO DIFF WBC: CPT | Mod: HCNC

## 2020-02-18 PROCEDURE — 81000 URINALYSIS NONAUTO W/SCOPE: CPT | Mod: HCNC

## 2020-02-18 PROCEDURE — 82150 ASSAY OF AMYLASE: CPT | Mod: HCNC

## 2020-02-18 PROCEDURE — 80053 COMPREHEN METABOLIC PANEL: CPT | Mod: HCNC

## 2020-02-18 PROCEDURE — 83690 ASSAY OF LIPASE: CPT | Mod: HCNC

## 2020-02-18 PROCEDURE — 96375 TX/PRO/DX INJ NEW DRUG ADDON: CPT | Mod: HCNC

## 2020-02-18 PROCEDURE — 96374 THER/PROPH/DIAG INJ IV PUSH: CPT | Mod: HCNC

## 2020-02-18 PROCEDURE — 36415 COLL VENOUS BLD VENIPUNCTURE: CPT | Mod: HCNC

## 2020-02-18 RX ORDER — MORPHINE SULFATE 4 MG/ML
4 INJECTION, SOLUTION INTRAMUSCULAR; INTRAVENOUS
Status: COMPLETED | OUTPATIENT
Start: 2020-02-18 | End: 2020-02-18

## 2020-02-18 RX ORDER — DICYCLOMINE HYDROCHLORIDE 20 MG/1
20 TABLET ORAL 3 TIMES DAILY
Qty: 15 TABLET | Refills: 0 | Status: SHIPPED | OUTPATIENT
Start: 2020-02-18 | End: 2020-02-23

## 2020-02-18 RX ORDER — ONDANSETRON 4 MG/1
4 TABLET, FILM COATED ORAL EVERY 6 HOURS PRN
Qty: 12 TABLET | Refills: 0 | Status: SHIPPED | OUTPATIENT
Start: 2020-02-18 | End: 2020-09-04 | Stop reason: SDUPTHER

## 2020-02-18 RX ORDER — ONDANSETRON 2 MG/ML
4 INJECTION INTRAMUSCULAR; INTRAVENOUS
Status: COMPLETED | OUTPATIENT
Start: 2020-02-18 | End: 2020-02-18

## 2020-02-18 RX ADMIN — MORPHINE SULFATE 4 MG: 4 INJECTION, SOLUTION INTRAMUSCULAR; INTRAVENOUS at 08:02

## 2020-02-18 RX ADMIN — ONDANSETRON 4 MG: 2 INJECTION INTRAMUSCULAR; INTRAVENOUS at 08:02

## 2020-02-19 ENCOUNTER — TELEPHONE (OUTPATIENT)
Dept: SMOKING CESSATION | Facility: CLINIC | Age: 75
End: 2020-02-19

## 2020-02-19 NOTE — ED NOTES
Pt stable for discharge at this time. Pt verbalizes understanding of discharge information and agrees with discharge plan. Pt discharged without incident.

## 2020-02-19 NOTE — ED PROVIDER NOTES
SCRIBE #1 NOTE: I, Tyler Benítez, am scribing for, and in the presence of, Axel Alexander Jr., MD. I have scribed the entire note.       History     Chief Complaint   Patient presents with    Abdominal Pain     chemo pt for lupus, c/o bilateral lower abdomen pain x 1 day. denies n/v/d. LBM today.      Review of patient's allergies indicates:  No Known Allergies      History of Present Illness     HPI    2/18/2020, 8:03 PM  History obtained from the patient      History of Present Illness: Flory Cam is a 74 y.o. female patient (current smoker, 0.25 ppd) with a PMHx of acute coronary syndrome, anxiety, bilateral carotid artery stenosis, chronic pain, CAD, GERD, HLD, HTN, hypothyroidism, lupus, osteoporosis, and peripheral vascular disease who presents to the Emergency Department for evaluation of sharp lower abdominal pain which onset gradually earlier today. Symptoms are constant and moderate in severity. No mitigating or exacerbating factors reported. No associated sxs reported. Patient denies any fever, chills, constipation, hematochezia, dysuria, hematuria, urinary frequency, N/V/D, and all other sxs at this time. No prior Tx reported. No further complaints or concerns at this time.       Arrival mode: Personal vehicle    PCP: Tayler Ovalle DO        Past Medical History:  Past Medical History:   Diagnosis Date    Acute coronary syndrome     Anxiety     Bilateral carotid artery stenosis 11/17/2015    Chronic pain     Coronary artery disease     GERD (gastroesophageal reflux disease)     Hyperlipidemia     Hypertension     Hypothyroidism     Low back pain     Lupus     Osteoporosis     Poor circulation     PVD (peripheral vascular disease)     S/P peripheral artery angioplasty 02/13/2014    SCCA (squamous cell carcinoma) of skin 10/2019    Dr. ZANDRA Rivas--oncology    Skin disease        Past Surgical History:  Past Surgical History:   Procedure Laterality Date    COLONOSCOPY N/A  1/4/2017    Procedure: COLONOSCOPY;  Surgeon: Sylvester Arboleda III, MD;  Location: [facility];  Service: Endoscopy;  Laterality: N/A;    CORONARY ANGIOPLASTY      CORONARY ARTERY BYPASS GRAFT      HYSTERECTOMY      OOPHORECTOMY      THYROIDECTOMY           Family History:  Family History   Problem Relation Age of Onset    Melanoma Neg Hx     Psoriasis Neg Hx     Lupus Neg Hx     Eczema Neg Hx        Social History:  Social History     Tobacco Use    Smoking status: Current Every Day Smoker     Packs/day: 0.25     Years: 40.00     Pack years: 10.00     Types: Cigarettes     Start date: 1961    Smokeless tobacco: Never Used    Tobacco comment: entered smoking cessation program 1/13/20    Substance and Sexual Activity    Alcohol use: No    Drug use: No    Sexual activity: Unknown        Review of Systems     Review of Systems   Constitutional: Negative for chills and fever.   HENT: Negative for sore throat.    Respiratory: Negative for cough and shortness of breath.    Cardiovascular: Negative for chest pain and leg swelling.   Gastrointestinal: Positive for abdominal pain (Lower; sharp). Negative for blood in stool, constipation, diarrhea, nausea and vomiting.   Genitourinary: Negative for dysuria, frequency and hematuria.   Musculoskeletal: Negative for back pain, neck pain and neck stiffness.   Skin: Negative for rash and wound.   Neurological: Negative for dizziness, weakness, light-headedness, numbness and headaches.   Hematological: Does not bruise/bleed easily.   All other systems reviewed and are negative.     Physical Exam     Initial Vitals [02/18/20 1933]   BP Pulse Resp Temp SpO2   (!) 192/84 69 18 99.3 °F (37.4 °C) 100 %      MAP       --          Physical Exam  Nursing Notes and Vital Signs Reviewed.  Constitutional: Patient is in no acute distress. Well-developed and well-nourished.  Head: Atraumatic. Normocephalic.  Eyes: PERRL. EOM intact. Conjunctivae are not pale. No scleral  "icterus.  ENT: Mucous membranes are moist. Oropharynx is clear and symmetric.    Neck: Supple. Full ROM. No lymphadenopathy.  Cardiovascular: Regular rate. Regular rhythm. No murmurs, rubs, or gallops. Distal pulses are 2+ and symmetric.  Pulmonary/Chest: No respiratory distress. Clear to auscultation bilaterally. No wheezing or rales.  Abdominal: Soft and non-distended. There is suprapubic and bilateral lower quadrant tenderness to palpation. No rebound or guarding. Good bowel sounds.  Genitourinary: No CVA tenderness  Musculoskeletal: Moves all extremities. No obvious deformities. No edema. No calf tenderness.  Skin: Warm and dry.  Neurological:  Alert, awake, and appropriate.  Normal speech.  No acute focal neurological deficits are appreciated.  Psychiatric: Normal affect. Good eye contact. Appropriate in content.     ED Course   Procedures  ED Vital Signs:  Vitals:    02/18/20 1933 02/18/20 2015 02/18/20 2016 02/18/20 2017   BP: (!) 192/84  (!) 159/76    Pulse: 69 73  (!) 56   Resp: 18 18   Temp: 99.3 °F (37.4 °C)      TempSrc: Oral      SpO2: 100%   97%   Weight: 83.9 kg (184 lb 15.5 oz)      Height: 5' 6" (1.676 m)       02/18/20 2053 02/18/20 2101 02/18/20 2133   BP: (!) 157/70 (!) 166/74 (!) 170/74   Pulse: (!) 57 (!) 54 (!) 55   Resp: 20 20    Temp:      TempSrc:      SpO2: 95% 95% 97%   Weight:      Height:          Abnormal Lab Results:  Labs Reviewed   CBC W/ AUTO DIFFERENTIAL - Abnormal; Notable for the following components:       Result Value    RBC 3.71 (*)     Hemoglobin 11.7 (*)     Hematocrit 35.7 (*)     Mean Corpuscular Hemoglobin 31.5 (*)     All other components within normal limits   COMPREHENSIVE METABOLIC PANEL - Abnormal; Notable for the following components:    Glucose 115 (*)     Alkaline Phosphatase 158 (*)     All other components within normal limits   URINALYSIS, REFLEX TO URINE CULTURE - Abnormal; Notable for the following components:    Leukocytes, UA Trace (*)     All other " components within normal limits    Narrative:     Preferred Collection Type->Urine, Clean Catch   AMYLASE   LIPASE   URINALYSIS MICROSCOPIC    Narrative:     Preferred Collection Type->Urine, Clean Catch        All Lab Results:  Results for orders placed or performed during the hospital encounter of 02/18/20   CBC auto differential   Result Value Ref Range    WBC 9.04 3.90 - 12.70 K/uL    RBC 3.71 (L) 4.00 - 5.40 M/uL    Hemoglobin 11.7 (L) 12.0 - 16.0 g/dL    Hematocrit 35.7 (L) 37.0 - 48.5 %    Mean Corpuscular Volume 96 82 - 98 fL    Mean Corpuscular Hemoglobin 31.5 (H) 27.0 - 31.0 pg    Mean Corpuscular Hemoglobin Conc 32.8 32.0 - 36.0 g/dL    RDW 14.2 11.5 - 14.5 %    Platelets 226 150 - 350 K/uL    MPV 9.5 9.2 - 12.9 fL    Immature Granulocytes 0.3 0.0 - 0.5 %    Gran # (ANC) 6.2 1.8 - 7.7 K/uL    Immature Grans (Abs) 0.03 0.00 - 0.04 K/uL    Lymph # 2.2 1.0 - 4.8 K/uL    Mono # 0.5 0.3 - 1.0 K/uL    Eos # 0.2 0.0 - 0.5 K/uL    Baso # 0.03 0.00 - 0.20 K/uL    nRBC 0 0 /100 WBC    Gran% 68.3 38.0 - 73.0 %    Lymph% 24.1 18.0 - 48.0 %    Mono% 5.2 4.0 - 15.0 %    Eosinophil% 1.8 0.0 - 8.0 %    Basophil% 0.3 0.0 - 1.9 %    Differential Method Automated    Comprehensive metabolic panel   Result Value Ref Range    Sodium 139 136 - 145 mmol/L    Potassium 4.0 3.5 - 5.1 mmol/L    Chloride 104 95 - 110 mmol/L    CO2 27 23 - 29 mmol/L    Glucose 115 (H) 70 - 110 mg/dL    BUN, Bld 18 8 - 23 mg/dL    Creatinine 0.8 0.5 - 1.4 mg/dL    Calcium 9.0 8.7 - 10.5 mg/dL    Total Protein 6.7 6.0 - 8.4 g/dL    Albumin 3.6 3.5 - 5.2 g/dL    Total Bilirubin 0.3 0.1 - 1.0 mg/dL    Alkaline Phosphatase 158 (H) 55 - 135 U/L    AST 18 10 - 40 U/L    ALT 16 10 - 44 U/L    Anion Gap 8 8 - 16 mmol/L    eGFR if African American >60 >60 mL/min/1.73 m^2    eGFR if non African American >60 >60 mL/min/1.73 m^2   Amylase   Result Value Ref Range    Amylase 46 20 - 110 U/L   Urinalysis, Reflex to Urine Culture Urine, Clean Catch   Result Value  Ref Range    Specimen UA Urine, Clean Catch     Color, UA Yellow Yellow, Straw, Leyda    Appearance, UA Clear Clear    pH, UA 6.0 5.0 - 8.0    Specific Gravity, UA 1.020 1.005 - 1.030    Protein, UA Negative Negative    Glucose, UA Negative Negative    Ketones, UA Negative Negative    Bilirubin (UA) Negative Negative    Occult Blood UA Negative Negative    Nitrite, UA Negative Negative    Urobilinogen, UA Negative <2.0 EU/dL    Leukocytes, UA Trace (A) Negative   Lipase   Result Value Ref Range    Lipase 23 4 - 60 U/L   Urinalysis Microscopic   Result Value Ref Range    RBC, UA 2 0 - 4 /hpf    WBC, UA 5 0 - 5 /hpf    Bacteria Occasional None-Occ /hpf    Squam Epithel, UA 3 /hpf    Microscopic Comment SEE COMMENT        Imaging Results:  Imaging Results          CT Abdomen Pelvis  Without Contrast (Final result)  Result time 02/18/20 21:06:33    Final result by Hang Elmore MD (02/18/20 21:06:33)                 Impression:      1.  Negative for acute process involving the abdomen or pelvis.  Negative for inflammatory changes.  Normal appendix.  Negative for renal stone disease or hydronephrosis.  Negative for intraperitoneal free air or abnormal fluid collections.    2. There is fluid within normal caliber and normal appearing small bowel, nonspecific finding.  Scattered colonic diverticula.  Bowel is otherwise normal.    3. interval development of a 2 cm low-attenuation focus within the lateral right hepatic lobe.  This is most likely a cyst.  Follow-up nonemergent abdominal ultrasound would be helpful to further evaluate.    4.  Small hiatal hernia.  Coronary artery calcifications.  Stable 4 mm middle lobe nodule.  Vascular calcifications without definite aneurysmal change at this time.  Absent uterus.  Degenerative disc changes of the lower thoracic and lower lumbar spine with degenerative facet arthropathy and grade 1 anterior spondylolisthesis of L4 on L5.  Numerous other stable and nonemergent findings as  noted above.    All CT scans at this facility are performed  using dose modulation techniques as appropriate to performed exam including the following:  automated exposure control; adjustment of mA and/or kV according to the patients size (this includes techniques or standardized protocols for targeted exams where dose is matched to indication/reason for exam: i.e. extremities or head);  iterative reconstruction technique.      Electronically signed by: Hang Elmore MD  Date:    02/18/2020  Time:    21:06             Narrative:    EXAMINATION:  CT ABDOMEN PELVIS WITHOUT CONTRAST    CLINICAL HISTORY:  Abd pain, fever, abscess suspected;    TECHNIQUE:  Axial images through the abdomen and pelvis were obtained with and without the use of IV contrast.  Sagittal and coronal reconstructions are provided for review.  Oral contrast was not utilized.    COMPARISON:  September 22, 2008    FINDINGS:  LUNG BASES: Stable 5 mm pleural based nodule in the lateral middle lobe on image 8 of series 2.  Reticular interstitial changes in the inferior middle lobe again seen.  Lung bases are free new pulmonary opacities.  Negative for pleural or pericardial effusions. The distal esophagus is normal.  Coronary artery calcifications.  Metallic radiodensity anterior to the right ventricle again seen.  Small hiatal hernia.    ABDOMEN: Interval development of a 2 cm cystic-appearing structure within the lateral right hepatic lobe.  Fatty infiltration of the pancreas.  The liver, spleen and gallbladder otherwise appear normal.  The pancreas is unremarkable.  Kidneys and adrenal glands are normal.  Negative for hydronephrosis or renal stone disease.    Negative for adenopathy, free fluid or inflammatory change noted within the abdomen or pelvis.  Vascular calcifications are present without aneurysmal changes.  The maximum diameter of the infrarenal abdominal aorta is 2.9 cm.  Portal vein is patent.    There is fluid throughout the otherwise  normal appearing small bowel.  The colon is normal.  Normal appendix.  Scattered colonic diverticula.    PELVIS: The urinary bladder is unremarkable.  The uterus is absent.  The rest of the female pelvic organs are normal. There are pelvic phleboliths.    No significant osseous abnormality is identified.  Negative for significant spinal canal stenosis. There is vacuum disc phenomenon with disc height reduction and marginal spondylosis at T10/T11, L4/L5 and L5/S1.  Degenerative facet arthropathy with grade 1 anterior spondylolisthesis of L4 on L5.  Osteopenia.  Mild degenerative changes of the pelvis.    Negative for groin adenopathy.    Fat filled umbilical hernia.  The abdominal wall is otherwise intact.  Stable postoperative changes in the left groin region.                                      The Emergency Provider reviewed the vital signs and test results, which are outlined above.     ED Discussion     10:11 PM: Reassessed pt at this time. Discussed with pt all pertinent ED information and results. Discussed pt dx and plan of tx. Gave pt all f/u and return to the ED instructions. All questions and concerns were addressed at this time. Pt expresses understanding of information and instructions, and is comfortable with plan to discharge. Pt is stable for discharge.    I discussed with patient and/or family/caretaker that evaluation in the ED does not suggest any emergent or life threatening medical conditions requiring immediate intervention beyond what was provided in the ED, and I believe patient is safe for discharge.  Regardless, an unremarkable evaluation in the ED does not preclude the development or presence of a serious of life threatening condition. As such, patient was instructed to return immediately for any worsening or change in current symptoms.     10:19 PM  Patient is stable nontoxic.  Clinically she has gastroenteritis.  She is feeling better.  She is safe for discharge in my opinion.  I will  treat symptomatically with close follow-up.  Patient and family verbalized understanding agreement with all instructions    Medical Decision Making:   Clinical Tests:   Lab Tests: Ordered and Reviewed  Radiological Study: Ordered and Reviewed           ED Medication(s):  Medications   morphine injection 4 mg (4 mg Intravenous Given 2/18/20 2033)   ondansetron injection 4 mg (4 mg Intravenous Given 2/18/20 2032)       New Prescriptions    No medications on file               Scribe Attestation:   Scribe #1: I performed the above scribed service and the documentation accurately describes the services I performed. I attest to the accuracy of the note.     Attending:   Physician Attestation Statement for Scribe #1: I, Axel Alexander Jr., MD, personally performed the services described in this documentation, as scribed by Tyler Benítez, in my presence, and it is both accurate and complete.           Clinical Impression     No diagnosis found.    Disposition:   Disposition: Discharged  Condition: Stable       Axel Alexander Jr., MD  02/18/20 7943

## 2020-02-19 NOTE — ED NOTES
Pt resting on cart in room, states abdominal pain has decreased to 6/10 since medication was given. Pt states she cannot give sample for UA at this time, instructed to hit call button when she can give sample. Call light within reach. Family at bedside, bed in low locked position, will continue to monitor.

## 2020-02-19 NOTE — TELEPHONE ENCOUNTER
2 attempt. Missed sessioon  Left message to  to call Guerita Paredes at [phone] to reschedule, give update and for any needs.

## 2020-02-19 NOTE — DISCHARGE INSTRUCTIONS
DrinkPlenty of fluids.  Bentyl for cramps.  Zofran for nausea.  Follow up with her doctor tomorrow for re-evaluation.  Return as needed for any worsening symptoms, problems, questions or concerns.

## 2020-02-25 NOTE — TELEPHONE ENCOUNTER
Spoke to patient who called to get an estimated time of arrival for Dr. Esteban DPM rounding on her today at the hospital. Patient states that she was already seen by Dr. Orellana and that she doesn't currently have any questions or concerns. Call ended pleasantly.   yes

## 2020-02-28 NOTE — PROGRESS NOTES
3rd Attempt to complete follow-up for Outpatient Care Management, left message requesting a return call. I will mail a letter with my contact information requesting a return call.  This RN will close case at this time. XOCHITL Edouard

## 2020-03-02 ENCOUNTER — TELEPHONE (OUTPATIENT)
Dept: SMOKING CESSATION | Facility: CLINIC | Age: 75
End: 2020-03-02

## 2020-03-02 NOTE — TELEPHONE ENCOUNTER
Spoke with patient. She was at the Oncologist office then and stated she had been feeling really under the weather. She had not been really focusing on the program due to health. She does have my number [phone] and name Guerita Reggie  to call if she needs anything. Her primary focus is on her immediate health and will support patient in any way when notified.

## 2020-03-19 ENCOUNTER — TELEPHONE (OUTPATIENT)
Dept: INTERNAL MEDICINE | Facility: CLINIC | Age: 75
End: 2020-03-19

## 2020-03-19 NOTE — TELEPHONE ENCOUNTER
----- Message from Jt Dubois sent at 3/19/2020 11:40 AM CDT -----  Contact: self  Type:  Patient Returning Call    Who Called:ot  Who Left Message for Patient:deniz  Does the patient know what this is regarding?:appt  Would the patient rather a call back or a response via [facility]? Call michelle  Best Call Back Number:[phone]  Additional Information: none    Thanks,  Jt Dubois

## 2020-03-22 ENCOUNTER — NURSE TRIAGE (OUTPATIENT)
Dept: ADMINISTRATIVE | Facility: CLINIC | Age: 75
End: 2020-03-22

## 2020-03-23 NOTE — TELEPHONE ENCOUNTER
Runny nose yesterday, but not today.   Denies fever, cough, sob, denies any other symptoms.  No one else in household is sick. She is wondering if her doctor wants her to be tested for new coronavirus.  Recommended to follow the states directive to stay at home except for things she has to get such as food, medication, etc. And utilize social distancing if she has to go out.  Advised her if any symptoms she can call back but no need for testing if she is asymptomatic.    Reason for Disposition   Health Information question, no triage required and triager able to answer question    Protocols used: INFORMATION ONLY CALL-A-AH

## 2020-03-24 ENCOUNTER — TELEPHONE (OUTPATIENT)
Dept: INTERNAL MEDICINE | Facility: CLINIC | Age: 75
End: 2020-03-24

## 2020-03-24 DIAGNOSIS — E78.49 OTHER HYPERLIPIDEMIA: Primary | ICD-10-CM

## 2020-03-24 RX ORDER — EZETIMIBE 10 MG/1
10 TABLET ORAL DAILY
Qty: 90 TABLET | Refills: 1 | Status: SHIPPED | OUTPATIENT
Start: 2020-03-24 | End: 2020-11-09 | Stop reason: SDUPTHER

## 2020-03-24 NOTE — TELEPHONE ENCOUNTER
----- Message from Angelika Gongora sent at 3/24/2020  8:25 AM CDT -----  Contact: Patient  Type: Needs Medical Advice    Who Called:  pt  Best Call Back Number: [phone]  Additional Information: patient is calling to see where she can find a face mask.Please call back and advise.

## 2020-03-28 ENCOUNTER — NURSE TRIAGE (OUTPATIENT)
Dept: ADMINISTRATIVE | Facility: CLINIC | Age: 75
End: 2020-03-28

## 2020-03-28 NOTE — TELEPHONE ENCOUNTER
Pt wanted advice to ensure she is remaining safe with the virus.     Reason for Disposition   Health Information question, no triage required and triager able to answer question    Protocols used: INFORMATION ONLY CALL-A-AH

## 2020-04-09 ENCOUNTER — TELEPHONE (OUTPATIENT)
Dept: RHEUMATOLOGY | Facility: CLINIC | Age: 75
End: 2020-04-09

## 2020-04-09 NOTE — TELEPHONE ENCOUNTER
----- Message from Judit Brantley sent at 4/8/2020  4:47 PM CDT -----  Contact: Pt   Pt called in regards to having several concerns about cov-19. Pt can be reached at [phone] (ezgt).

## 2020-04-17 ENCOUNTER — TELEPHONE (OUTPATIENT)
Dept: INTERNAL MEDICINE | Facility: CLINIC | Age: 75
End: 2020-04-17

## 2020-04-17 ENCOUNTER — OFFICE VISIT (OUTPATIENT)
Dept: INTERNAL MEDICINE | Facility: CLINIC | Age: 75
End: 2020-04-17
Payer: MEDICARE

## 2020-04-17 VITALS — DIASTOLIC BLOOD PRESSURE: 60 MMHG | TEMPERATURE: 98 F | SYSTOLIC BLOOD PRESSURE: 140 MMHG

## 2020-04-17 DIAGNOSIS — E03.9 HYPOTHYROIDISM (ACQUIRED): ICD-10-CM

## 2020-04-17 DIAGNOSIS — F17.200 TOBACCO USE DISORDER: ICD-10-CM

## 2020-04-17 DIAGNOSIS — I10 HYPERTENSION GOAL BP (BLOOD PRESSURE) < 140/90: Primary | ICD-10-CM

## 2020-04-17 DIAGNOSIS — F41.9 ANXIETY AND DEPRESSION: ICD-10-CM

## 2020-04-17 DIAGNOSIS — F32.A ANXIETY AND DEPRESSION: ICD-10-CM

## 2020-04-17 DIAGNOSIS — G47.00 INSOMNIA, UNSPECIFIED TYPE: ICD-10-CM

## 2020-04-17 PROCEDURE — 99214 PR OFFICE/OUTPT VISIT, EST, LEVL IV, 30-39 MIN: ICD-10-PCS | Mod: HCNC,95,, | Performed by: INTERNAL MEDICINE

## 2020-04-17 PROCEDURE — 3078F DIAST BP <80 MM HG: CPT | Mod: HCNC,CPTII,95, | Performed by: INTERNAL MEDICINE

## 2020-04-17 PROCEDURE — 99214 OFFICE O/P EST MOD 30 MIN: CPT | Mod: HCNC,95,, | Performed by: INTERNAL MEDICINE

## 2020-04-17 PROCEDURE — 1101F PR PT FALLS ASSESS DOC 0-1 FALLS W/OUT INJ PAST YR: ICD-10-PCS | Mod: HCNC,CPTII,95, | Performed by: INTERNAL MEDICINE

## 2020-04-17 PROCEDURE — 1159F PR MEDICATION LIST DOCUMENTED IN MEDICAL RECORD: ICD-10-PCS | Mod: HCNC,95,, | Performed by: INTERNAL MEDICINE

## 2020-04-17 PROCEDURE — 1101F PT FALLS ASSESS-DOCD LE1/YR: CPT | Mod: HCNC,CPTII,95, | Performed by: INTERNAL MEDICINE

## 2020-04-17 PROCEDURE — 3078F PR MOST RECENT DIASTOLIC BLOOD PRESSURE < 80 MM HG: ICD-10-PCS | Mod: HCNC,CPTII,95, | Performed by: INTERNAL MEDICINE

## 2020-04-17 PROCEDURE — 3077F PR MOST RECENT SYSTOLIC BLOOD PRESSURE >= 140 MM HG: ICD-10-PCS | Mod: HCNC,CPTII,95, | Performed by: INTERNAL MEDICINE

## 2020-04-17 PROCEDURE — 1159F MED LIST DOCD IN RCRD: CPT | Mod: HCNC,95,, | Performed by: INTERNAL MEDICINE

## 2020-04-17 PROCEDURE — 3077F SYST BP >= 140 MM HG: CPT | Mod: HCNC,CPTII,95, | Performed by: INTERNAL MEDICINE

## 2020-04-17 NOTE — PROGRESS NOTES
Subjective:       Patient ID: Flory Cam is a 74 y.o. female.    Chief Complaint: Follow-up    Flory Cam  74 y.o. Black or  female    Patient presents with:  Follow-up    HPI: Presents for a video visit.   The patient location is: home  The chief complaint leading to consultation is: follow up  Visit type: audiovisual  Total time spent with patient: 4523-2811  Each patient to whom he or she provides medical services by telemedicine is:  (1) informed of the relationship between the physician and patient and the respective role of any other health care provider with respect to management of the patient; and (2) notified that he or she may decline to receive medical services by telemedicine and may withdraw from such care at any time.    HTN--slightly elevated. She has been compliant with amlodipine and valsartan.   Anxiety/depression--she states it is not as bad as it was before despite the current COVID 19 crisis. At her last visit she was referred to psychiatry but she never went. She states she will consider going when the stay at home order is lifted. She is not currently on medication.   Insomnia--she was taking trazodone but states it was stopped by another provider. She has not tried anything else.   Hypothyroidism--stable on levothyroxine.                     TSH                      3.030               02/05/2019            She continues to smoke. She plans to work on it again when the current crisis is over.     Past Medical History:  Acute coronary syndrome  Anxiety  11/17/2015: Bilateral carotid artery stenosis  Chronic pain  Coronary artery disease  GERD (gastroesophageal reflux disease)  Hyperlipidemia  Hypertension  Hypothyroidism  No date: Low back pain  Lupus  Osteoporosis  Poor circulation  PVD (peripheral vascular disease)  02/13/2014: S/P peripheral artery angioplasty  10/2019: SCCA (squamous cell carcinoma) of skin      Comment:  Dr. ZANDRA Rivas--oncology  Skin  disease    Current Outpatient Medications on File Prior to Visit:  albuterol (PROVENTIL/VENTOLIN HFA) 90 mcg/actuation inhaler, Inhale 2 puffs into the lungs every 6 (six) hours as needed for Wheezing. Rescue, Disp: 1 Inhaler, Rfl: 1  amLODIPine (NORVASC) 10 MG tablet, TAKE 1 TABLET BY MOUTH ONCE DAILY, Disp: 30 tablet, Rfl: 0  atorvastatin (LIPITOR) 80 MG tablet, Take 1 tablet (80 mg total) by mouth once daily., Disp: 90 tablet, Rfl: 1  cholecalciferol, vitamin D3, 50,000 unit capsule, Take 1 capsule (50,000 Units total) by mouth every 7 days., Disp: 12 capsule, Rfl: 1  cilostazol (PLETAL) 100 MG Tab, Take 1 tablet (100 mg total) by mouth 2 (two) times daily. (Patient taking differently: Take 100 mg by mouth once daily at 6am. ), Disp: 60 tablet, Rfl: 6  clopidogrel (PLAVIX) 75 mg tablet, Take 1 tablet (75 mg total) by mouth once daily., Disp: 90 tablet, Rfl: 1  cyclobenzaprine (FLEXERIL) 10 MG tablet, TAKE 1 TO 2 TABLETS PO HS PRN, Disp: , Rfl: 0  esomeprazole (NEXIUM) 40 MG capsule, TAKE 1 C PO ONCE DAILY BEFORE BREAKFAST, Disp: , Rfl: 1  ezetimibe (ZETIA) 10 mg tablet, Take 1 tablet (10 mg total) by mouth once daily., Disp: 90 tablet, Rfl: 1  levothyroxine (SYNTHROID) 137 MCG Tab tablet, Take 1 tablet (137 mcg total) by mouth once daily., Disp: 90 tablet, Rfl: 1  ondansetron (ZOFRAN) 4 MG tablet, Take 1 tablet (4 mg total) by mouth every 6 (six) hours as needed for Nausea., Disp: 12 tablet, Rfl: 0  oxyCODONE-acetaminophen (PERCOCET)  mg per tablet, , Disp: , Rfl:   pantoprazole (PROTONIX) 40 MG tablet, Take 1 tablet (40 mg total) by mouth once daily., Disp: 90 tablet, Rfl: 1  ranitidine (ZANTAC) 150 MG tablet, , Disp: , Rfl: 3  valsartan (DIOVAN) 320 MG tablet, TAKE 1 TABLET BY MOUTH ONCE DAILY, Disp: 90 tablet, Rfl: 3    Allergies:  Review of patient's allergies indicates:  No Known Allergies        Review of Systems   Constitutional: Positive for activity change and unexpected weight change.   HENT:  Negative for hearing loss, rhinorrhea and trouble swallowing.    Eyes: Negative for discharge and visual disturbance.   Respiratory: Negative for chest tightness and wheezing.    Cardiovascular: Negative for chest pain and palpitations.   Gastrointestinal: Positive for diarrhea. Negative for blood in stool, constipation and vomiting.   Endocrine: Positive for polyuria. Negative for polydipsia.   Genitourinary: Negative for difficulty urinating, dysuria, hematuria and menstrual problem.   Musculoskeletal: Positive for arthralgias. Negative for joint swelling and neck pain.   Neurological: Positive for headaches. Negative for weakness.   Psychiatric/Behavioral: Positive for confusion, dysphoric mood and sleep disturbance. The patient is nervous/anxious.        Objective:      Physical Exam   Constitutional: She is oriented to person, place, and time. She appears well-developed and well-nourished. No distress.   Pulmonary/Chest: Effort normal. No respiratory distress.   Neurological: She is alert and oriented to person, place, and time.   Psychiatric: She has a normal mood and affect. Her behavior is normal. Judgment and thought content normal.       Assessment:       1. Hypertension goal BP (blood pressure) < 140/90    2. Anxiety and depression    3. Insomnia, unspecified type    4. Hypothyroidism (acquired)    5. Tobacco use disorder        Plan:       Flory was seen today for follow-up.    Diagnoses and all orders for this visit:    Hypertension goal BP (blood pressure) < 140/90  -     Continue current management  -     Lifestyle modifications discussed  -     Continue home b/p monitoring     Anxiety and depression  -     Monitor  -     Agree with counseling    Insomnia, unspecified type  -     Recommend Melatonin    Hypothyroidism (acquired)  -     TSH; Standing    Tobacco use disorder  -     Recommend cessation    Labs and f/u in 2 months.

## 2020-04-17 NOTE — TELEPHONE ENCOUNTER
----- Message from Ariane Zuniga sent at 4/17/2020  8:58 AM CDT -----  Contact: [phone]  Would like to consult with the nurse, patient state she has the  Virtual Video  Set up and would like to speak with the nurse concerning this, please call back at [phone], thanks sj

## 2020-04-17 NOTE — TELEPHONE ENCOUNTER
Spoke with patient she wanted to know the process of setting up MyChart and had some concerns about the process. I explain it to her and someone at her home who she had helping her with the virtual visit this morning. Pt verbalized understanding.

## 2020-04-17 NOTE — TELEPHONE ENCOUNTER
----- Message from Cara Ramos sent at 4/16/2020  1:55 PM CDT -----  Contact: self  [phone]  Pt would like return call regarding question about upcoming apppt.  Please call back at [phone].   Md Charity

## 2020-04-17 NOTE — TELEPHONE ENCOUNTER
Spoke with pt and advised her to stay logged into the appointment until Dr. Ovalle comes on for the virtual visit so that she could stay arrived. Pt verbalized understanding.

## 2020-04-28 RX ORDER — AMLODIPINE BESYLATE 10 MG/1
TABLET ORAL
Qty: 30 TABLET | Refills: 6 | Status: SHIPPED | OUTPATIENT
Start: 2020-04-28 | End: 2021-03-25

## 2020-05-01 ENCOUNTER — TELEPHONE (OUTPATIENT)
Dept: RHEUMATOLOGY | Facility: CLINIC | Age: 75
End: 2020-05-01

## 2020-05-01 NOTE — TELEPHONE ENCOUNTER
----- Message from Renae Levin sent at 5/1/2020  3:44 PM CDT -----  Contact: dakota Florez/ [employer] would like to receive a call back regarding a fax that was sent over for medication therapy. Please contact dakota to advise.  Fax was sent over    Contact info

## 2020-05-04 ENCOUNTER — TELEPHONE (OUTPATIENT)
Dept: RHEUMATOLOGY | Facility: CLINIC | Age: 75
End: 2020-05-04

## 2020-05-04 RX ORDER — TRAZODONE HYDROCHLORIDE 50 MG/1
TABLET ORAL
Qty: 30 TABLET | Refills: 3 | Status: SHIPPED | OUTPATIENT
Start: 2020-05-04 | End: 2022-06-17 | Stop reason: SDUPTHER

## 2020-05-04 RX ORDER — PANTOPRAZOLE SODIUM 40 MG/1
TABLET, DELAYED RELEASE ORAL
Qty: 90 TABLET | Refills: 1 | Status: SHIPPED | OUTPATIENT
Start: 2020-05-04 | End: 2020-09-04

## 2020-05-04 NOTE — TELEPHONE ENCOUNTER
----- Message from Lina Adan sent at 5/4/2020  9:39 AM CDT -----  Contact: rustam garcia is calling in regards to faxing medication that the pt is taking for rheum arthritis      Contact info- [phone]    Fax- [phone]   .

## 2020-05-04 NOTE — TELEPHONE ENCOUNTER
Returned call to [employer] as requested in below msg. No answer, left msg on voicemail to return call

## 2020-05-05 ENCOUNTER — TELEPHONE (OUTPATIENT)
Dept: INTERNAL MEDICINE | Facility: CLINIC | Age: 75
End: 2020-05-05

## 2020-05-05 NOTE — TELEPHONE ENCOUNTER
Spoke with pt she stated she had a scope done on her right knee a while ago and she stumbled and believed she hurt her leg. C/o swelling, pain, and unable to apply pressure to knee. Please advise.

## 2020-05-05 NOTE — TELEPHONE ENCOUNTER
Recommend she apply ice to her knee and she can take ibuprofen. If the symptoms persist, she will need to be evaluated.

## 2020-05-05 NOTE — TELEPHONE ENCOUNTER
----- Message from Lenard Gomez sent at 5/5/2020 12:08 PM CDT -----  Contact: pt  Type: Needs Medical Advice    Who Called:  Pt    Best Call Back Number: [phone]  Additional Information: please call pt. Would not give info about why.

## 2020-05-13 ENCOUNTER — TELEPHONE (OUTPATIENT)
Dept: RHEUMATOLOGY | Facility: CLINIC | Age: 75
End: 2020-05-13

## 2020-05-13 NOTE — TELEPHONE ENCOUNTER
----- Message from Genna Doss sent at 5/13/2020  2:08 PM CDT -----  Contact: Suri Florez  Calling need pt med list R/A. Asking for a call back.      Contact info  [phone]

## 2020-05-20 ENCOUNTER — TELEPHONE (OUTPATIENT)
Dept: RHEUMATOLOGY | Facility: CLINIC | Age: 75
End: 2020-05-20

## 2020-05-20 NOTE — TELEPHONE ENCOUNTER
----- Message from Annamarie Londono sent at 5/20/2020 10:38 AM CDT -----  Contact: [email or web address]  Please call [phone] from [email or web address] following up on a fax sent over on above patient thanks.

## 2020-05-22 ENCOUNTER — TELEPHONE (OUTPATIENT)
Dept: RHEUMATOLOGY | Facility: CLINIC | Age: 75
End: 2020-05-22

## 2020-05-22 NOTE — TELEPHONE ENCOUNTER
----- Message from Janine Victor sent at 5/22/2020  9:45 AM CDT -----  Contact: Patient  Patient would like the nurse to give her a call back at Ph .[phone], concerning her 05/25/2020 appointment.

## 2020-05-22 NOTE — TELEPHONE ENCOUNTER
Spoke with patient and she does not feel comfortable coming into the clinic and does not want to do any type of visit with a camera. Would like to do a telephone call visit only. Can her appt on 5.25.20 be changed to an audio only visit?

## 2020-05-25 ENCOUNTER — OFFICE VISIT (OUTPATIENT)
Dept: RHEUMATOLOGY | Facility: CLINIC | Age: 75
End: 2020-05-25
Payer: MEDICARE

## 2020-05-25 DIAGNOSIS — M05.771 RHEUMATOID ARTHRITIS INVOLVING BOTH FEET WITH POSITIVE RHEUMATOID FACTOR: Primary | ICD-10-CM

## 2020-05-25 DIAGNOSIS — L93.0 DISCOID LUPUS ERYTHEMATOSUS: ICD-10-CM

## 2020-05-25 DIAGNOSIS — M05.772 RHEUMATOID ARTHRITIS INVOLVING BOTH FEET WITH POSITIVE RHEUMATOID FACTOR: Primary | ICD-10-CM

## 2020-05-25 DIAGNOSIS — Z72.0 TOBACCO USE: ICD-10-CM

## 2020-05-25 DIAGNOSIS — E55.9 VITAMIN D DEFICIENCY: ICD-10-CM

## 2020-05-25 PROCEDURE — 1159F MED LIST DOCD IN RCRD: CPT | Mod: HCNC,S$GLB,, | Performed by: INTERNAL MEDICINE

## 2020-05-25 PROCEDURE — 99214 PR OFFICE/OUTPT VISIT, EST, LEVL IV, 30-39 MIN: ICD-10-PCS | Mod: HCNC,S$GLB,, | Performed by: INTERNAL MEDICINE

## 2020-05-25 PROCEDURE — 99214 OFFICE O/P EST MOD 30 MIN: CPT | Mod: HCNC,S$GLB,, | Performed by: INTERNAL MEDICINE

## 2020-05-25 PROCEDURE — 1101F PT FALLS ASSESS-DOCD LE1/YR: CPT | Mod: HCNC,CPTII,S$GLB, | Performed by: INTERNAL MEDICINE

## 2020-05-25 PROCEDURE — 1159F PR MEDICATION LIST DOCUMENTED IN MEDICAL RECORD: ICD-10-PCS | Mod: HCNC,S$GLB,, | Performed by: INTERNAL MEDICINE

## 2020-05-25 PROCEDURE — 1101F PR PT FALLS ASSESS DOC 0-1 FALLS W/OUT INJ PAST YR: ICD-10-PCS | Mod: HCNC,CPTII,S$GLB, | Performed by: INTERNAL MEDICINE

## 2020-05-25 RX ORDER — ASPIRIN 325 MG
50000 TABLET, DELAYED RELEASE (ENTERIC COATED) ORAL
Qty: 12 CAPSULE | Refills: 1 | Status: SHIPPED | OUTPATIENT
Start: 2020-05-25 | End: 2020-09-25

## 2020-05-25 NOTE — PROGRESS NOTES
The patient location is: home in [place]   The chief complaint leading to consultation is: f/u chronic RA, discoid lupus     Visit type: audio only    Face to Face time with patient: audio only 15 min   30  minutes of total time spent on the encounter, which includes face to face time and non-face to face time preparing to see the patient (eg, review of tests), Obtaining and/or reviewing separately obtained history, Documenting clinical information in the electronic or other health record, Independently interpreting results (not separately reported) and communicating results to the patient/family/caregiver, or Care coordination (not separately reported).         Each patient to whom he or she provides medical services by telemedicine is:  (1) informed of the relationship between the physician and patient and the respective role of any other health care provider with respect to management of the patient; and (2) notified that he or she may decline to receive medical services by telemedicine and may withdraw from such care at any time.        History of Present Illness:    Here for routine f/u of sero positive RA and ? Discoid  lupus and lichen planus overlap    KYLE negative  In the past  she was noted to have  Lichen planus  lesions in the right hand and right wrist and she was seen by Dermatology  She was referred to Hand surgery for excision  Per her lesions came back positive for malignancy, she cannot say what kind of malignancy, from records looks like squamous cell carcinoma    she sees Dr. Rivas at [facility] and is currently on chemo, she states  they gave her a break from chemo and would reassess again in August   denies any new joint pain or stiffness or joint swelling    She is taking vitamin-D 14586 units a week  She declined Prolia or other treatments for osteoporosis    Plaquenil had to be stopped per Ophthalmology recommendations  Then she was started on methotrexate but however she developed  allergy reaction with rash and stopped   then was advised sulfasalazine which she never started as she was anxious,  Then diagnosed with skin cancer  She also has chronic pain in the back, which is stable  She sees pain management gets Norco, takes it as needed and this is helpful  Denies any fever, chills, weight loss, dysuria, oral ulcers, chest pain, shortness of breath  Today she denies joint swelling    History  Looks like she was diagnosed with discoid lupus vs lichen planus at [facility], from review of records looks like she has been on prednisone, Plaquenil, methotrexate in the past   Looks like the lesions were unresponsive to methotrexate and she was started on CellCept and later Imuran.  She had a rash on both CellCept and Imuran and they were discontinued  More recently she had a biopsy of right palm lesion by Dermatology which was suggestive of lichen planus , she is on topicals per Dermatology  Most recent labs reveal negative KYLE, normal ESR/CRP,     Initial visit  :  She has seen Dr URIARTE in the past   She is currently on plaquenil 200 mg bid   Last seen here in 10/2016   She is currently on plaquenil 200 mg bid , doing well   On it for > 10 years now   Denies any joint swelling'  No am stiffness   She was seen by opthalmologist Dr Gonsales , who recommended she be considered something else in place of plaquenil  Due to ongoing issues with vision   Rash inv face / hands/ forearms / feet , some on legs stable     She was seen by  in 2014 for Discoid lupus and was lost for follow up. In that visit , since she was having joint pains,  also checked for rheumatoid arthritis which came back highly positive for RF/CCP    Past Medical History:   Diagnosis Date    Acute coronary syndrome     Anxiety     Bilateral carotid artery stenosis 11/17/2015    Chronic pain     Coronary artery disease     GERD (gastroesophageal reflux disease)     Hyperlipidemia     Hypertension     Hypothyroidism      Low back pain     Lupus     Osteoporosis     Poor circulation     PVD (peripheral vascular disease)     S/P peripheral artery angioplasty 02/13/2014    SCCA (squamous cell carcinoma) of skin 10/2019    Dr. ZANDRA Rivas--oncology    Skin disease          Past Surgical History:   Procedure Laterality Date    COLONOSCOPY N/A 1/4/2017    Procedure: COLONOSCOPY;  Surgeon: Sylvester Arboleda III, MD;  [facility];  Service: Endoscopy;  Laterality: N/A;    CORONARY ANGIOPLASTY      CORONARY ARTERY BYPASS GRAFT      HYSTERECTOMY      OOPHORECTOMY      THYROIDECTOMY           Social History     Tobacco Use    Smoking status: Current Every Day Smoker     Packs/day: 0.25     Years: 40.00     Pack years: 10.00     Types: Cigarettes     Start date: 1961    Smokeless tobacco: Never Used    Tobacco comment: entered smoking cessation program 1/13/20    Substance Use Topics    Alcohol use: No     Frequency: Never     Drinks per session: 1 or 2     Binge frequency: Never    Drug use: No       Family History   Problem Relation Age of Onset    Melanoma Neg Hx     Psoriasis Neg Hx     Lupus Neg Hx     Eczema Neg Hx        Review of patient's allergies indicates:  No Known Allergies          Review of Systems:  Constitutional: Denies fever, chills. No recent weight changes.   Fatigue: no  Muscle weakness: no  Headaches: no new headaches  Eyes: No redness or dryness.  No recent visual changes.  ENT: Denies dry mouth. No oral or nasal ulcers.  Card: No chest pain.  Resp: No cough or sob.   Gastro: No nausea or vomiting.  No heartburn.  Constipation: no  Diarrhea: no  Genito:  No dysuria.  No genital ulcers.  Skin:per hpi   Raynauds:no  Neuro: No numbness / tingling.   Psych: No depression, anxiety  Endo:  no excess thirst.  Heme: no abnormal bleeding or bruising  Clots:none     OBJECTIVE:     Vital Signs   There were no vitals filed for this visit.        Results for NO JARVIS JUNE (MRN [id number]) as of  7/31/2018 12:54   Ref. Range 9/11/2014 13:46 10/26/2016 10:38   CCP Antibodies Latest Ref Range: <5.0 U/mL 194.4 (H) 387.2 (H)   Rheumatoid Factor Latest Ref Range: 0.0 - 15.0 IU/mL 56.0 (H) 123.0 (H)     KYLE - negative   CMP  Sodium   Date Value Ref Range Status   02/18/2020 139 136 - 145 mmol/L Final     Potassium   Date Value Ref Range Status   02/18/2020 4.0 3.5 - 5.1 mmol/L Final     Chloride   Date Value Ref Range Status   02/18/2020 104 95 - 110 mmol/L Final     CO2   Date Value Ref Range Status   02/18/2020 27 23 - 29 mmol/L Final     Glucose   Date Value Ref Range Status   02/18/2020 115 (H) 70 - 110 mg/dL Final     BUN, Bld   Date Value Ref Range Status   02/18/2020 18 8 - 23 mg/dL Final     Creatinine   Date Value Ref Range Status   02/18/2020 0.8 0.5 - 1.4 mg/dL Final     Calcium   Date Value Ref Range Status   02/18/2020 9.0 8.7 - 10.5 mg/dL Final     Total Protein   Date Value Ref Range Status   02/18/2020 6.7 6.0 - 8.4 g/dL Final     Albumin   Date Value Ref Range Status   02/18/2020 3.6 3.5 - 5.2 g/dL Final     Total Bilirubin   Date Value Ref Range Status   02/18/2020 0.3 0.1 - 1.0 mg/dL Final     Comment:     For infants and newborns, interpretation of results should be based  on gestational age, weight and in agreement with clinical  observations.  Premature Infant recommended reference ranges:  Up to 24 hours.............<8.0 mg/dL  Up to 48 hours............<12.0 mg/dL  3-5 days..................<15.0 mg/dL  6-29 days.................<15.0 mg/dL       Alkaline Phosphatase   Date Value Ref Range Status   02/18/2020 158 (H) 55 - 135 U/L Final     AST   Date Value Ref Range Status   02/18/2020 18 10 - 40 U/L Final     ALT   Date Value Ref Range Status   02/18/2020 16 10 - 44 U/L Final     Anion Gap   Date Value Ref Range Status   02/18/2020 8 8 - 16 mmol/L Final     eGFR if    Date Value Ref Range Status   02/18/2020 >60 >60 mL/min/1.73 m^2 Final     eGFR if non African American    Date Value Ref Range Status   02/18/2020 >60 >60 mL/min/1.73 m^2 Final     Comment:     Calculation used to obtain the estimated glomerular filtration  rate (eGFR) is the CKD-EPI equation.        Imaging : x ray foot :  Mild calcaneal spurring. No acute fracture. No dislocation. Minimal degenerative change first metatarsophalangeal joint.    Notes reviewed  Other procedures:    ASSESSMENT/PLAN:     Rheumatoid arthritis involving both feet with positive rheumatoid factor    Discoid lupus erythematosus    Tobacco use    Vitamin D deficiency  -     Vitamin D; Future; Expected date: 05/25/2020    RA/ discoid lupus/lichen planus overlap :     Neg KYLE   + RF/ +CCP    in remission now.  On chemo for skin cancer    Plaquenil had to be discontinued per Ophthalmology recommendation  methotrexate to be stopped now due to worsening of facial rash  Rash with CellCept and Imuran in the past   never started sulfasalazine as recommended and she was anxious about the side effects again  Asymptomatic denies any worsening joint swelling or stiffness    ?  Squamous cell carcinoma of skin  Will hold off on any new immunosuppressants at this point  Again  advised her to get me records from Dr. Rivas/ [facility]    H/o discoid lupus:  Do not see any lesions suggestive of discoid lupus at this point  KYLE negative  Normal complements and UA negative for protein  Will monitor for now off Plaquenil  Plaquenil stopped due to ophthalmology recs     Lichen planus involving right hand :  With underlying malignancy status post recent excision biopsy  Follow-up per Dr. Rivas for further chemo    Osteoporosis  :  Deferred decision on Prolia again today  Stay on vitamin-D 93003 units a week    Chronic mild elevation in alkaline phosphatase:  Asymptomatic, stable  Recommend follow-up with PCP    Mild elevated PTH with history of vitamin-D deficiency  Stable levels  Normal calcium  Vitamin-D levels normal now  On 02186 units a week    No  smoking    4  Month return with vitamin-D levels  Will obtain records from Dr. Rivas office    Went over uptodate information /literature on the meds prescribed today     Disclaimer: This note was prepared using voice recognition system and is likely to have sound alike errors and is not proof read.  Please call me with any questions.

## 2020-06-01 ENCOUNTER — TELEPHONE (OUTPATIENT)
Dept: RHEUMATOLOGY | Facility: CLINIC | Age: 75
End: 2020-06-01

## 2020-06-01 ENCOUNTER — TELEPHONE (OUTPATIENT)
Dept: INTERNAL MEDICINE | Facility: CLINIC | Age: 75
End: 2020-06-01

## 2020-06-01 NOTE — TELEPHONE ENCOUNTER
Called pt, she stated she had a fall about a week ago and wanted to know when was her next appt. I gave pt her next appt date and her labs appt that was also coming up pt verbalized understanding. Offered pt a sooner appt to be evaluated for the recent fall with another provider since Dr. Ovalle is out of the office and has no sooner appts, however pt declined.

## 2020-06-01 NOTE — TELEPHONE ENCOUNTER
----- Message from Malka Bhatia sent at 6/1/2020  8:07 AM CDT -----  Contact: pt  Pt calling to ask a question. Pt would give no further information.    Well developed Well developed

## 2020-06-01 NOTE — TELEPHONE ENCOUNTER
----- Message from Cara Ramos sent at 6/1/2020  4:28 PM CDT -----  Contact: Tarun- [phone]  Would like to consult with nurse regarding pt's medications. Please call back at [phone].  Md Charity

## 2020-06-02 DIAGNOSIS — I10 ESSENTIAL HYPERTENSION: ICD-10-CM

## 2020-06-02 RX ORDER — VALSARTAN 320 MG/1
320 TABLET ORAL DAILY
Qty: 90 TABLET | Refills: 3 | Status: SHIPPED | OUTPATIENT
Start: 2020-06-02 | End: 2021-08-01

## 2020-06-11 ENCOUNTER — PATIENT OUTREACH (OUTPATIENT)
Dept: ADMINISTRATIVE | Facility: OTHER | Age: 75
End: 2020-06-11

## 2020-06-17 ENCOUNTER — LAB VISIT (OUTPATIENT)
Dept: LAB | Facility: HOSPITAL | Age: 75
End: 2020-06-17
Attending: INTERNAL MEDICINE
Payer: MEDICARE

## 2020-06-17 ENCOUNTER — OFFICE VISIT (OUTPATIENT)
Dept: INTERNAL MEDICINE | Facility: CLINIC | Age: 75
End: 2020-06-17
Payer: MEDICARE

## 2020-06-17 VITALS
BODY MASS INDEX: 28.88 KG/M2 | DIASTOLIC BLOOD PRESSURE: 80 MMHG | SYSTOLIC BLOOD PRESSURE: 138 MMHG | TEMPERATURE: 100 F | HEART RATE: 95 BPM | HEIGHT: 66 IN | OXYGEN SATURATION: 99 % | WEIGHT: 179.69 LBS

## 2020-06-17 DIAGNOSIS — F32.A ANXIETY AND DEPRESSION: ICD-10-CM

## 2020-06-17 DIAGNOSIS — I10 HYPERTENSION GOAL BP (BLOOD PRESSURE) < 140/90: Chronic | ICD-10-CM

## 2020-06-17 DIAGNOSIS — E03.9 HYPOTHYROIDISM (ACQUIRED): ICD-10-CM

## 2020-06-17 DIAGNOSIS — F41.9 ANXIETY AND DEPRESSION: ICD-10-CM

## 2020-06-17 DIAGNOSIS — R06.02 SHORTNESS OF BREATH: ICD-10-CM

## 2020-06-17 DIAGNOSIS — I10 HYPERTENSION GOAL BP (BLOOD PRESSURE) < 140/90: Primary | Chronic | ICD-10-CM

## 2020-06-17 DIAGNOSIS — E78.5 HYPERLIPIDEMIA LDL GOAL <70: Chronic | ICD-10-CM

## 2020-06-17 LAB
ALBUMIN SERPL BCP-MCNC: 3.8 G/DL (ref 3.5–5.2)
ALP SERPL-CCNC: 174 U/L (ref 55–135)
ALT SERPL W/O P-5'-P-CCNC: 17 U/L (ref 10–44)
ANION GAP SERPL CALC-SCNC: 10 MMOL/L (ref 8–16)
AST SERPL-CCNC: 19 U/L (ref 10–40)
BILIRUB SERPL-MCNC: 0.3 MG/DL (ref 0.1–1)
BUN SERPL-MCNC: 18 MG/DL (ref 8–23)
CALCIUM SERPL-MCNC: 9.6 MG/DL (ref 8.7–10.5)
CHLORIDE SERPL-SCNC: 105 MMOL/L (ref 95–110)
CHOLEST SERPL-MCNC: 155 MG/DL (ref 120–199)
CHOLEST/HDLC SERPL: 2.5 {RATIO} (ref 2–5)
CO2 SERPL-SCNC: 25 MMOL/L (ref 23–29)
CREAT SERPL-MCNC: 0.8 MG/DL (ref 0.5–1.4)
EST. GFR  (AFRICAN AMERICAN): >60 ML/MIN/1.73 M^2
EST. GFR  (NON AFRICAN AMERICAN): >60 ML/MIN/1.73 M^2
GLUCOSE SERPL-MCNC: 84 MG/DL (ref 70–110)
HDLC SERPL-MCNC: 61 MG/DL (ref 40–75)
HDLC SERPL: 39.4 % (ref 20–50)
LDLC SERPL CALC-MCNC: 81.6 MG/DL (ref 63–159)
NONHDLC SERPL-MCNC: 94 MG/DL
POTASSIUM SERPL-SCNC: 4.4 MMOL/L (ref 3.5–5.1)
PROT SERPL-MCNC: 7.3 G/DL (ref 6–8.4)
SODIUM SERPL-SCNC: 140 MMOL/L (ref 136–145)
TRIGL SERPL-MCNC: 62 MG/DL (ref 30–150)
TSH SERPL DL<=0.005 MIU/L-ACNC: 1.77 UIU/ML (ref 0.4–4)

## 2020-06-17 PROCEDURE — 1101F PR PT FALLS ASSESS DOC 0-1 FALLS W/OUT INJ PAST YR: ICD-10-PCS | Mod: HCNC,CPTII,S$GLB, | Performed by: INTERNAL MEDICINE

## 2020-06-17 PROCEDURE — 1126F PR PAIN SEVERITY QUANTIFIED, NO PAIN PRESENT: ICD-10-PCS | Mod: HCNC,S$GLB,, | Performed by: INTERNAL MEDICINE

## 2020-06-17 PROCEDURE — 80061 LIPID PANEL: CPT | Mod: HCNC

## 2020-06-17 PROCEDURE — 3075F SYST BP GE 130 - 139MM HG: CPT | Mod: HCNC,CPTII,S$GLB, | Performed by: INTERNAL MEDICINE

## 2020-06-17 PROCEDURE — 80053 COMPREHEN METABOLIC PANEL: CPT | Mod: HCNC

## 2020-06-17 PROCEDURE — 99214 PR OFFICE/OUTPT VISIT, EST, LEVL IV, 30-39 MIN: ICD-10-PCS | Mod: HCNC,S$GLB,, | Performed by: INTERNAL MEDICINE

## 2020-06-17 PROCEDURE — 1159F PR MEDICATION LIST DOCUMENTED IN MEDICAL RECORD: ICD-10-PCS | Mod: HCNC,S$GLB,, | Performed by: INTERNAL MEDICINE

## 2020-06-17 PROCEDURE — 84443 ASSAY THYROID STIM HORMONE: CPT | Mod: HCNC

## 2020-06-17 PROCEDURE — 1101F PT FALLS ASSESS-DOCD LE1/YR: CPT | Mod: HCNC,CPTII,S$GLB, | Performed by: INTERNAL MEDICINE

## 2020-06-17 PROCEDURE — 99999 PR PBB SHADOW E&M-EST. PATIENT-LVL IV: ICD-10-PCS | Mod: PBBFAC,HCNC,, | Performed by: INTERNAL MEDICINE

## 2020-06-17 PROCEDURE — 99999 PR PBB SHADOW E&M-EST. PATIENT-LVL IV: CPT | Mod: PBBFAC,HCNC,, | Performed by: INTERNAL MEDICINE

## 2020-06-17 PROCEDURE — 3079F PR MOST RECENT DIASTOLIC BLOOD PRESSURE 80-89 MM HG: ICD-10-PCS | Mod: HCNC,CPTII,S$GLB, | Performed by: INTERNAL MEDICINE

## 2020-06-17 PROCEDURE — 3075F PR MOST RECENT SYSTOLIC BLOOD PRESS GE 130-139MM HG: ICD-10-PCS | Mod: HCNC,CPTII,S$GLB, | Performed by: INTERNAL MEDICINE

## 2020-06-17 PROCEDURE — 3079F DIAST BP 80-89 MM HG: CPT | Mod: HCNC,CPTII,S$GLB, | Performed by: INTERNAL MEDICINE

## 2020-06-17 PROCEDURE — 36415 COLL VENOUS BLD VENIPUNCTURE: CPT | Mod: HCNC

## 2020-06-17 PROCEDURE — 99214 OFFICE O/P EST MOD 30 MIN: CPT | Mod: HCNC,S$GLB,, | Performed by: INTERNAL MEDICINE

## 2020-06-17 PROCEDURE — 1159F MED LIST DOCD IN RCRD: CPT | Mod: HCNC,S$GLB,, | Performed by: INTERNAL MEDICINE

## 2020-06-17 PROCEDURE — 1126F AMNT PAIN NOTED NONE PRSNT: CPT | Mod: HCNC,S$GLB,, | Performed by: INTERNAL MEDICINE

## 2020-06-17 RX ORDER — PROCHLORPERAZINE MALEATE 10 MG
TABLET ORAL
COMMUNITY
Start: 2020-03-10 | End: 2024-02-12

## 2020-06-17 RX ORDER — ALBUTEROL SULFATE 90 UG/1
2 AEROSOL, METERED RESPIRATORY (INHALATION) EVERY 6 HOURS PRN
Qty: 18 G | Refills: 3 | Status: SHIPPED | OUTPATIENT
Start: 2020-06-17 | End: 2021-10-26 | Stop reason: SDUPTHER

## 2020-06-17 NOTE — PROGRESS NOTES
Subjective:       Patient ID: Flory Cam is a 74 y.o. female.    Chief Complaint: 2 month followup    Flory Cam  74 y.o. Black or  female    Patient presents with:  2 month followup    HPI: Here today to follow up on chronic conditions.  HTN--stable on amlodipine and valsartan.   HLD--compliant with atorvastatin.                     LDLCALC                  128.0               04/02/2019            Hypothyroidism--compliant with levothyroxine.                      TSH                      3.030               02/05/2019            Anxiety/depression--she is not on medication. She does not desire to be on medication or see a specialist at this time. She states her symptoms are not any worse.   She needs a refill on albuterol. She uses it as needed if she gets short of breath.     Past Medical History:  Acute coronary syndrome  Anxiety  11/17/2015: Bilateral carotid artery stenosis  Chronic pain  Colon polyp  Coronary artery disease  GERD (gastroesophageal reflux disease)  Hyperlipidemia  Hypertension  Hypothyroidism  Low back pain  Lupus  Osteoporosis  Poor circulation  PVD (peripheral vascular disease)  02/13/2014: S/P peripheral artery angioplasty  10/2019: SCCA (squamous cell carcinoma) of skin      Comment:  Dr. ZANDRA Rivas--oncology  Skin disease    Current Outpatient Medications on File Prior to Visit:  amLODIPine (NORVASC) 10 MG tablet, TAKE 1 TABLET BY MOUTH EVERY DAY, Disp: 30 tablet, Rfl: 6  atorvastatin (LIPITOR) 80 MG tablet, Take 1 tablet (80 mg total) by mouth once daily., Disp: 90 tablet, Rfl: 1  cholecalciferol, vitamin D3, 1,250 mcg (50,000 unit) capsule, Take 1 capsule (50,000 Units total) by mouth every 7 days., Disp: 12 capsule, Rfl: 1  clopidogrel (PLAVIX) 75 mg tablet, Take 1 tablet (75 mg total) by mouth once daily., Disp: 90 tablet, Rfl: 1  ezetimibe (ZETIA) 10 mg tablet, Take 1 tablet (10 mg total) by mouth once daily., Disp: 90 tablet, Rfl: 1  levothyroxine  (SYNTHROID) 137 MCG Tab tablet, Take 1 tablet (137 mcg total) by mouth once daily., Disp: 90 tablet, Rfl: 1  ondansetron (ZOFRAN) 4 MG tablet, Take 1 tablet (4 mg total) by mouth every 6 (six) hours as needed for Nausea., Disp: 12 tablet, Rfl: 0  oxyCODONE-acetaminophen (PERCOCET)  mg per tablet, , Disp: , Rfl:   pantoprazole (PROTONIX) 40 MG tablet, TAKE 1 TABLET(40 MG) BY MOUTH EVERY DAY, Disp: 90 tablet, Rfl: 1  prochlorperazine (COMPAZINE) 10 MG tablet, , Disp: , Rfl:   traZODone (DESYREL) 50 MG tablet, TAKE 1 TABLET BY MOUTH EVERY DAY AT NIGHT AS NEEDED FOR INSOMNIA, Disp: 30 tablet, Rfl: 3  valsartan (DIOVAN) 320 MG tablet, Take 1 tablet (320 mg total) by mouth once daily., Disp: 90 tablet, Rfl: 3  albuterol (PROVENTIL/VENTOLIN HFA) 90 mcg/actuation inhaler, Inhale 2 puffs into the lungs every 6 (six) hours as needed for Wheezing. Rescue, Disp: 1 Inhaler, Rfl: 1      Allergies:  Review of patient's allergies indicates:  No Known Allergies        Review of Systems   Constitutional: Negative for fever and unexpected weight change.   Respiratory: Positive for shortness of breath (occasionally).    Cardiovascular: Negative for chest pain.   Psychiatric/Behavioral: Positive for dysphoric mood. The patient is nervous/anxious.          Objective:      Physical Exam  Constitutional:       General: She is not in acute distress.     Appearance: She is well-developed.   Pulmonary:      Effort: Pulmonary effort is normal. No respiratory distress.   Neurological:      Mental Status: She is alert and oriented to person, place, and time.   Psychiatric:         Behavior: Behavior normal.         Thought Content: Thought content normal.         Judgment: Judgment normal.         Assessment:       1. Hypertension goal BP (blood pressure) < 140/90    2. Hyperlipidemia LDL goal <70    3. Hypothyroidism (acquired)    4. Anxiety and depression    5. Shortness of breath        Plan:       Flory was seen today for 2 month  followup.    Diagnoses and all orders for this visit:    Hypertension goal BP (blood pressure) < 140/90  -     Continue current management    Hyperlipidemia LDL goal <70  -     Check lipid panel    Hypothyroidism (acquired)  -     Check TSH    Anxiety and depression  -     Continue to monitor    Shortness of breath  -     albuterol (PROVENTIL/VENTOLIN HFA) 90 mcg/actuation inhaler; Inhale 2 puffs into the lungs every 6 (six) hours as needed for Wheezing.    Labs today.     F/U in 6 months and as needed.

## 2020-07-07 ENCOUNTER — TELEPHONE (OUTPATIENT)
Dept: INTERNAL MEDICINE | Facility: CLINIC | Age: 75
End: 2020-07-07

## 2020-07-07 DIAGNOSIS — Z12.11 COLON CANCER SCREENING: ICD-10-CM

## 2020-07-07 DIAGNOSIS — Z12.31 ENCOUNTER FOR SCREENING MAMMOGRAM FOR MALIGNANT NEOPLASM OF BREAST: Primary | ICD-10-CM

## 2020-07-07 NOTE — TELEPHONE ENCOUNTER
Patient was seen last month and forgot to mention about getting an order for a mammogram and colonoscopy. Patient is requesting orders. Please advise.

## 2020-07-07 NOTE — TELEPHONE ENCOUNTER
----- Message from Rosa Liu sent at 7/7/2020  9:58 AM CDT -----  .Type:  Needs Medical Advice    Who Called: pt   Symptoms (please be specific):  n/a  How long has patient had these symptoms:   n/a  Pharmacy name and phone #:     Would the patient rather a call back or a response via [facility]? Call back   Best Call Back Number: [phone] (home)     Additional Information:  pt request call back from nurse, did not want to state nature of call

## 2020-07-08 NOTE — TELEPHONE ENCOUNTER
Patient notified and states she will call back to schedule the mammogram after the colonoscopy is scheduled. Advised to call the office as needed, patient verbalized understanding.

## 2020-07-09 ENCOUNTER — TELEPHONE (OUTPATIENT)
Dept: ENDOSCOPY | Facility: HOSPITAL | Age: 75
End: 2020-07-09

## 2020-07-09 DIAGNOSIS — Z12.11 COLON CANCER SCREENING: Primary | ICD-10-CM

## 2020-07-09 RX ORDER — SODIUM, POTASSIUM,MAG SULFATES 17.5-3.13G
1 SOLUTION, RECONSTITUTED, ORAL ORAL DAILY
Qty: 1 KIT | Refills: 0 | Status: SHIPPED | OUTPATIENT
Start: 2020-07-09 | End: 2020-07-11

## 2020-07-09 NOTE — TELEPHONE ENCOUNTER
COVID Screening     1. Have you had a fever in the last 7 days or have you used fever reducing medicines for a fever in the last 7 days?  no    2. Are you experiencing shortness of breath, cough, muscle aches, loss of taste or loss of smell?  no    3. Are you residing with anyone who has tested positive for Covid?  no    If answered yes to any of the above questions, the pt must be scheduled for an appointment with their PCP.    A message also needs to be sent to the endoscopist to ensure the patient gets rescheduled at a later date.     ENDO screening    1. Have you been admitted overnight to the hospital in the past 3 months? no   If yes, schedule an appointment with PCP before scheduling endoscopic procedure.     2. Have you had a stent placed in the last 12 months? no   If yes, for a screening visit, cancel and message the ordering provider.  The patient will need a new order when the time is appropriate.     3. Have you had a stroke or heart attack in the past 6 months? no   If yes, cancel and refer patient to ordering provider for clearance, also message ordering provider to inform.     4. Have you had any chest pain in the past 3 months? no   If yes, Have you been evaluated by your PCP and/or cardiologist and it was determined to not be heart related? not applicable   If No, Pt needs to be seen by PCP or Cardiologist .  Pt can be scheduled once clearance obtained by either of those providers.     5. Do you take prescription weight loss medications?  no   If yes, must stop for 2 weeks prior to procedure.     6. Have you been diagnosed with diverticulitis within the past 3 months? no   If yes, must have been seen by GI within the last 3 months, if not schedule with [facility].    If pt has been seen by GI, schedule procedure 8-12 weeks post antibiotic treatment.     7. Are you on Dialysis? no  If yes, schedule procedure for the day AFTER dialysis.  Appt time should be 9am or later, patient arrival time is 2 hours  "prior.  Nulytely or    miralax prep for all patients with kidney disease.     8. Are you diabetic?  no   If yes, schedule morning appt. Advise pt to hold all diabetic meds day of procedure.     9. If pt is older than 80 years of age and HAS NOT been seen by GI or PCP within the last 6 months, needs appt with [facility].   If pt has been seen by the GI provider or PCP within the past 6  months AND meets criteria, schedule procedure AND send message to the endoscopist.     10. Is patient on a "high risk" medication (blood thinner/antiplatelet agent)?  yes   If yes, has cardiac clearance been obtained within the last 60 days? Yes   If no, a new clearance needs to be obtained.       I have reviewed the last colonoscopy for recommendations regarding next procedure bowel prep.  yes  I have reviewed medications and allergies.  yes  I have verified the pharmacy information and appropriate prep sent if needed. yes  Prep instructions have been mailed or sent to portal per patient request. yes    If answers yes to any of the following, schedule at [place] ONLY. If No, OK for either location.     Is BMI over 45?   Any complaints of chest pain, new onset or at rest?  Does pt have an AICD?  Is there a diagnosis of heart failure?  Does patient have an insulin pump?  If procedure for esophageal banding?  "

## 2020-07-09 NOTE — TELEPHONE ENCOUNTER
Spoke with patient regarding blood thinner approval.  Patient verbalized understanding to hold Plavix 5 days before procedure on 7/31/2020 per the approval of dr. Cohen.

## 2020-07-21 ENCOUNTER — CLINICAL SUPPORT (OUTPATIENT)
Dept: SMOKING CESSATION | Facility: CLINIC | Age: 75
End: 2020-07-21
Payer: COMMERCIAL

## 2020-07-21 ENCOUNTER — TELEPHONE (OUTPATIENT)
Dept: SMOKING CESSATION | Facility: CLINIC | Age: 75
End: 2020-07-21

## 2020-07-21 ENCOUNTER — TELEPHONE (OUTPATIENT)
Dept: ENDOSCOPY | Facility: HOSPITAL | Age: 75
End: 2020-07-21

## 2020-07-21 DIAGNOSIS — F17.200 NICOTINE DEPENDENCE: Primary | ICD-10-CM

## 2020-07-21 PROCEDURE — 99407 PR TOBACCO USE CESSATION INTENSIVE >10 MINUTES: ICD-10-PCS | Mod: S$GLB,,, | Performed by: INTERNAL MEDICINE

## 2020-07-21 PROCEDURE — 99407 BEHAV CHNG SMOKING > 10 MIN: CPT | Mod: S$GLB,,, | Performed by: INTERNAL MEDICINE

## 2020-07-21 NOTE — PROGRESS NOTES
Spoke with patient today in regard to smoking cessation progress for 3/6 month telephone follow up, she states not tobacco free. Patient states having a lot going on and not interested in returning to the program at this time. Informed patient of benefit period, future follow up, and contact information if any further help or support is needed. Will complete smart form for 3 and 6 month follow up on Quit attempt #1.

## 2020-07-21 NOTE — TELEPHONE ENCOUNTER
Spoke with patient about letters received for upcoming procedure.  Patient verbalized understanding of both- instructions and blood thinner reminder.

## 2020-07-28 ENCOUNTER — LAB VISIT (OUTPATIENT)
Dept: OTOLARYNGOLOGY | Facility: CLINIC | Age: 75
End: 2020-07-28
Payer: MEDICARE

## 2020-07-28 DIAGNOSIS — Z12.11 COLON CANCER SCREENING: ICD-10-CM

## 2020-07-28 PROCEDURE — U0003 INFECTIOUS AGENT DETECTION BY NUCLEIC ACID (DNA OR RNA); SEVERE ACUTE RESPIRATORY SYNDROME CORONAVIRUS 2 (SARS-COV-2) (CORONAVIRUS DISEASE [COVID-19]), AMPLIFIED PROBE TECHNIQUE, MAKING USE OF HIGH THROUGHPUT TECHNOLOGIES AS DESCRIBED BY CMS-2020-01-R: HCPCS | Mod: HCNC

## 2020-07-29 ENCOUNTER — TELEPHONE (OUTPATIENT)
Dept: ENDOSCOPY | Facility: HOSPITAL | Age: 75
End: 2020-07-29

## 2020-07-29 DIAGNOSIS — Z12.11 COLON CANCER SCREENING: Primary | ICD-10-CM

## 2020-07-29 LAB — SARS-COV-2 RNA RESP QL NAA+PROBE: NOT DETECTED

## 2020-08-01 ENCOUNTER — LAB VISIT (OUTPATIENT)
Dept: URGENT CARE | Facility: CLINIC | Age: 75
End: 2020-08-01
Payer: MEDICARE

## 2020-08-01 DIAGNOSIS — Z12.11 COLON CANCER SCREENING: ICD-10-CM

## 2020-08-01 PROCEDURE — U0003 INFECTIOUS AGENT DETECTION BY NUCLEIC ACID (DNA OR RNA); SEVERE ACUTE RESPIRATORY SYNDROME CORONAVIRUS 2 (SARS-COV-2) (CORONAVIRUS DISEASE [COVID-19]), AMPLIFIED PROBE TECHNIQUE, MAKING USE OF HIGH THROUGHPUT TECHNOLOGIES AS DESCRIBED BY CMS-2020-01-R: HCPCS | Mod: HCNC

## 2020-08-02 LAB — SARS-COV-2 RNA RESP QL NAA+PROBE: NOT DETECTED

## 2020-08-03 NOTE — H&P
Short Stay Endoscopy History and Physical    PCP - Tayler Ovalle, DO    Procedure - Colonoscopy  ASA - 3  Mallampati - per anesthesia  History of Anesthesia problems - no  Family history Anesthesia problems -  no     HPI:  This is a 75 y.o.female here for evaluation of : Hx of Colon Polyps    Reflux - no  Dysphagia - no  Abdominal pain - no  Diarrhea - no  Anemia - no  GI bleeding - no  Nausea and vomiting-no  Early satiety-no  aversion to sight or smell of food-no    ROS:  Constitutional: No fevers, chills, No weight loss  ENT: No allergies  CV: No chest pain  Pulm: No cough, No shortness of breath  Ophtho: No vision changes  GI: see HPI  Derm: No rash  Heme: No lymphadenopathy, No bruising  MSK: No arthritis  : No dysuria, No hematuria  Endo: No hot or cold intolerance  Neuro: No syncope, No seizure  Psych: No anxiety, No depression    Medical History:  Past Medical History:   Diagnosis Date    Acute coronary syndrome     Anxiety     Bilateral carotid artery stenosis 11/17/2015    Chronic pain     Colon polyp     Coronary artery disease     GERD (gastroesophageal reflux disease)     Hyperlipidemia     Hypertension     Hypothyroidism     Low back pain     Lupus     Osteoporosis     Poor circulation     PVD (peripheral vascular disease)     S/P peripheral artery angioplasty 02/13/2014    SCCA (squamous cell carcinoma) of skin 10/2019    Dr. ZANDRA Rivas--oncology    Skin disease        Surgical History:  Past Surgical History:   Procedure Laterality Date    COLONOSCOPY N/A 1/4/2017    Procedure: COLONOSCOPY;  Surgeon: Sylvester Arboleda III, MD;  Location: [facility];  Service: Endoscopy;  Laterality: N/A;    CORONARY ANGIOPLASTY      CORONARY ARTERY BYPASS GRAFT      HYSTERECTOMY      OOPHORECTOMY      THYROIDECTOMY         Family History:  Family History   Problem Relation Age of Onset    Melanoma Neg Hx     Psoriasis Neg Hx     Lupus Neg Hx     Eczema Neg Hx        Social  History:  Social History     Socioeconomic History    Marital status:      Spouse name: Not on file    Number of children: Not on file    Years of education: Not on file    Highest education level: Not on file   Occupational History    Occupation: Retired     Comment: Dispatcher   Social Needs    Financial resource strain: Somewhat hard    Food insecurity     Worry: Sometimes true     Inability: Sometimes true    Transportation needs     Medical: Yes     Non-medical: Yes   Tobacco Use    Smoking status: Current Every Day Smoker     Packs/day: 0.25     Years: 40.00     Pack years: 10.00     Types: Cigarettes     Start date: 1961    Smokeless tobacco: Never Used    Tobacco comment: entered smoking cessation program 1/13/20    Substance and Sexual Activity    Alcohol use: No     Frequency: Never     Drinks per session: 1 or 2     Binge frequency: Never    Drug use: No    Sexual activity: Not on file   Lifestyle    Physical activity     Days per week: 0 days     Minutes per session: 0 min    Stress: To some extent   Relationships    Social connections     Talks on phone: More than three times a week     Gets together: Once a week     Attends Sabianism service: 1 to 4 times per year     Active member of club or organization: No     Attends meetings of clubs or organizations: Never     Relationship status:    Other Topics Concern    Are you pregnant or think you may be? No    Breast-feeding No   Social History Narrative    Patient is retired dispatcher.       Allergies: Review of patient's allergies indicates:  No Known Allergies    Medications:   No current facility-administered medications on file prior to encounter.      Current Outpatient Medications on File Prior to Encounter   Medication Sig Dispense Refill    albuterol (PROVENTIL/VENTOLIN HFA) 90 mcg/actuation inhaler Inhale 2 puffs into the lungs every 6 (six) hours as needed for Wheezing. 18 g 3    amLODIPine (NORVASC) 10 MG  tablet TAKE 1 TABLET BY MOUTH EVERY DAY 30 tablet 6    atorvastatin (LIPITOR) 80 MG tablet Take 1 tablet (80 mg total) by mouth once daily. 90 tablet 1    cholecalciferol, vitamin D3, 1,250 mcg (50,000 unit) capsule Take 1 capsule (50,000 Units total) by mouth every 7 days. 12 capsule 1    ezetimibe (ZETIA) 10 mg tablet Take 1 tablet (10 mg total) by mouth once daily. 90 tablet 1    levothyroxine (SYNTHROID) 137 MCG Tab tablet Take 1 tablet (137 mcg total) by mouth once daily. 90 tablet 1    ondansetron (ZOFRAN) 4 MG tablet Take 1 tablet (4 mg total) by mouth every 6 (six) hours as needed for Nausea. 12 tablet 0    oxyCODONE-acetaminophen (PERCOCET)  mg per tablet       pantoprazole (PROTONIX) 40 MG tablet TAKE 1 TABLET(40 MG) BY MOUTH EVERY DAY 90 tablet 1    prochlorperazine (COMPAZINE) 10 MG tablet       traZODone (DESYREL) 50 MG tablet TAKE 1 TABLET BY MOUTH EVERY DAY AT NIGHT AS NEEDED FOR INSOMNIA 30 tablet 3    valsartan (DIOVAN) 320 MG tablet Take 1 tablet (320 mg total) by mouth once daily. 90 tablet 3       Objective Findings:    Vital Signs:There were no vitals filed for this visit.        Physical Exam:  General Appearance: Well appearing in no acute distress  Eyes:    No scleral icterus  ENT: Neck supple, Lips, mucosa, and tongue normal; teeth and gums normal  Lungs: CTA bilaterally in anterior and posterior fields, no wheezes, no crackles.  Heart:  Regular rate, S1, S2 normal, no murmurs heard.  Abdomen: Soft, non tender, non distended with normal bowel sounds. No hepatosplenomegaly, ascites, or mass.  Extremities: No clubbing, cyanosis or edema  Skin: No rash    Labs:  Reviewed    Plan: Colonoscopy  I have explained the risks and benefits of endoscopy procedures to the patient including but not limited to bleeding, perforation, infection, and death. The patient wishes to proceed.

## 2020-08-04 ENCOUNTER — HOSPITAL ENCOUNTER (OUTPATIENT)
Facility: HOSPITAL | Age: 75
Discharge: HOME | End: 2020-08-04
Attending: INTERNAL MEDICINE | Admitting: INTERNAL MEDICINE
Payer: MEDICARE

## 2020-08-04 ENCOUNTER — ANESTHESIA (OUTPATIENT)
Dept: ENDOSCOPY | Facility: HOSPITAL | Age: 75
End: 2020-08-04
Payer: MEDICARE

## 2020-08-04 ENCOUNTER — ANESTHESIA EVENT (OUTPATIENT)
Dept: ENDOSCOPY | Facility: HOSPITAL | Age: 75
End: 2020-08-04
Payer: MEDICARE

## 2020-08-04 DIAGNOSIS — K57.30 DIVERTICULOSIS OF COLON: ICD-10-CM

## 2020-08-04 DIAGNOSIS — K63.5 POLYP OF SIGMOID COLON, UNSPECIFIED TYPE: ICD-10-CM

## 2020-08-04 DIAGNOSIS — Z86.010 HX OF COLONIC POLYPS: Primary | ICD-10-CM

## 2020-08-04 PROCEDURE — 45380 COLONOSCOPY AND BIOPSY: CPT | Mod: 59,HCNC,, | Performed by: INTERNAL MEDICINE

## 2020-08-04 PROCEDURE — 88305 TISSUE EXAM BY PATHOLOGIST: CPT | Mod: HCNC | Performed by: PATHOLOGY

## 2020-08-04 PROCEDURE — 63600175 PHARM REV CODE 636 W HCPCS: Mod: HCNC | Performed by: NURSE ANESTHETIST, CERTIFIED REGISTERED

## 2020-08-04 PROCEDURE — 88305 TISSUE EXAM BY PATHOLOGIST: CPT | Mod: 26,HCNC,, | Performed by: PATHOLOGY

## 2020-08-04 PROCEDURE — 45385 PR COLONOSCOPY,REMV LESN,SNARE: ICD-10-PCS | Mod: PT,HCNC,, | Performed by: INTERNAL MEDICINE

## 2020-08-04 PROCEDURE — 37000008 HC ANESTHESIA 1ST 15 MINUTES: Mod: HCNC | Performed by: INTERNAL MEDICINE

## 2020-08-04 PROCEDURE — 45385 COLONOSCOPY W/LESION REMOVAL: CPT | Mod: PT,HCNC,, | Performed by: INTERNAL MEDICINE

## 2020-08-04 PROCEDURE — 27201089 HC SNARE, DISP (ANY): Mod: HCNC | Performed by: INTERNAL MEDICINE

## 2020-08-04 PROCEDURE — 37000009 HC ANESTHESIA EA ADD 15 MINS: Mod: HCNC | Performed by: INTERNAL MEDICINE

## 2020-08-04 PROCEDURE — 27201012 HC FORCEPS, HOT/COLD, DISP: Mod: HCNC | Performed by: INTERNAL MEDICINE

## 2020-08-04 PROCEDURE — 88305 TISSUE EXAM BY PATHOLOGIST: ICD-10-PCS | Mod: 26,HCNC,, | Performed by: PATHOLOGY

## 2020-08-04 PROCEDURE — 25000003 PHARM REV CODE 250: Mod: HCNC | Performed by: NURSE ANESTHETIST, CERTIFIED REGISTERED

## 2020-08-04 PROCEDURE — 45385 COLONOSCOPY W/LESION REMOVAL: CPT | Mod: HCNC | Performed by: INTERNAL MEDICINE

## 2020-08-04 PROCEDURE — 45380 COLONOSCOPY AND BIOPSY: CPT | Mod: HCNC | Performed by: INTERNAL MEDICINE

## 2020-08-04 PROCEDURE — 45380 PR COLONOSCOPY,BIOPSY: ICD-10-PCS | Mod: 59,HCNC,, | Performed by: INTERNAL MEDICINE

## 2020-08-04 RX ORDER — SODIUM CHLORIDE, SODIUM LACTATE, POTASSIUM CHLORIDE, CALCIUM CHLORIDE 600; 310; 30; 20 MG/100ML; MG/100ML; MG/100ML; MG/100ML
INJECTION, SOLUTION INTRAVENOUS CONTINUOUS
Status: DISCONTINUED | OUTPATIENT
Start: 2020-08-04 | End: 2020-08-04 | Stop reason: HOSPADM

## 2020-08-04 RX ORDER — PROPOFOL 10 MG/ML
VIAL (ML) INTRAVENOUS
Status: DISCONTINUED | OUTPATIENT
Start: 2020-08-04 | End: 2020-08-04

## 2020-08-04 RX ORDER — SODIUM CHLORIDE 0.9 % (FLUSH) 0.9 %
10 SYRINGE (ML) INJECTION
Status: DISCONTINUED | OUTPATIENT
Start: 2020-08-04 | End: 2020-08-04 | Stop reason: HOSPADM

## 2020-08-04 RX ORDER — GLYCOPYRROLATE 0.2 MG/ML
INJECTION INTRAMUSCULAR; INTRAVENOUS
Status: DISCONTINUED | OUTPATIENT
Start: 2020-08-04 | End: 2020-08-04

## 2020-08-04 RX ORDER — SODIUM CHLORIDE, SODIUM LACTATE, POTASSIUM CHLORIDE, CALCIUM CHLORIDE 600; 310; 30; 20 MG/100ML; MG/100ML; MG/100ML; MG/100ML
INJECTION, SOLUTION INTRAVENOUS CONTINUOUS PRN
Status: DISCONTINUED | OUTPATIENT
Start: 2020-08-04 | End: 2020-08-04

## 2020-08-04 RX ADMIN — PROPOFOL 20 MG: 10 INJECTION, EMULSION INTRAVENOUS at 07:08

## 2020-08-04 RX ADMIN — SODIUM CHLORIDE, SODIUM LACTATE, POTASSIUM CHLORIDE, AND CALCIUM CHLORIDE: .6; .31; .03; .02 INJECTION, SOLUTION INTRAVENOUS at 07:08

## 2020-08-04 RX ADMIN — PROPOFOL 50 MG: 10 INJECTION, EMULSION INTRAVENOUS at 07:08

## 2020-08-04 RX ADMIN — GLYCOPYRROLATE 0.4 MG: 0.2 INJECTION INTRAMUSCULAR; INTRAVENOUS at 07:08

## 2020-08-04 NOTE — ANESTHESIA PREPROCEDURE EVALUATION
08/04/2020  Flory Cam is a 75 y.o., female.    Past Medical History:  Acute coronary syndrome  Anxiety  11/17/2015: Bilateral carotid artery stenosis  Chronic pain  Coronary artery disease  GERD (gastroesophageal reflux disease)  Hyperlipidemia  Hypertension  Hypothyroidism  Low back pain  Lupus  Osteoporosis  Poor circulation  PVD (peripheral vascular disease)  02/13/2014: S/P peripheral artery angioplasty  Skin disease    Pre-op Assessment    I have reviewed the Patient Summary Reports.    I have reviewed the NPO Status.   I have reviewed the Medications.     Review of Systems  Anesthesia Hx:  No problems with previous Anesthesia  Denies Family Hx of Anesthesia complications.   Denies Personal Hx of Anesthesia complications.   Social:  Smoker, No Alcohol Use 1 ppd for for over 50 year.   Hematology/Oncology:  Hematology Normal   Oncology Normal     Cardiovascular:   Exercise tolerance: good Hypertension, well controlled CAD asymptomatic CABG/stent  PVD hyperlipidemia Carotid disease   Pulmonary:   Shortness of breath Clear productive cough.  snore   Renal/:  Renal/ Normal     Hepatic/GI:   Bowel Prep. PUD, GERD, well controlled 0430 last drink of fluid.   Musculoskeletal:   Arthritis  Osteoporosis  Left foot abscess   Neurological:  Neurology Normal    Endocrine:   Hypothyroidism Pre diabetes   Dermatological:  Skin Normal Discoid lupus   Psych:   anxiety          Physical Exam  General:  Morbid Obesity    Airway/Jaw/Neck:  Airway Findings: Mouth Opening: Normal Tongue: Normal  General Airway Assessment: Adult  Mallampati: III  TM Distance: Normal, at least 6 cm      Dental:  Dental Findings: In tact   Chest/Lungs:  Chest/Lungs Findings: Expiratory Wheezes, Mild     Heart/Vascular:  Heart Findings: Rate: Normal  Rhythm: Regular Rhythm             Anesthesia Plan  Type of Anesthesia, risks &  benefits discussed:  Anesthesia Type:  MAC  Patient's Preference:   Intra-op Monitoring Plan: standard ASA monitors  Intra-op Monitoring Plan Comments:   Post Op Pain Control Plan: multimodal analgesia  Post Op Pain Control Plan Comments:   Induction:   IV  Beta Blocker:  Patient is not currently on a Beta-Blocker (No further documentation required).       Informed Consent: Patient understands risks and agrees with Anesthesia plan.  Questions answered. Anesthesia consent signed with patient.  ASA Score: 3     Day of Surgery Review of History & Physical: I have interviewed and examined the patient. I have reviewed the patient's H&P dated: 4/2/17.           Ready For Surgery From Anesthesia Perspective.

## 2020-08-04 NOTE — PLAN OF CARE
Pt had returned to [facility] to have IV removed at 0900. Pt's IV  was removed and bandaged with 2x2s and coban wrap.  Pt was not in any distress.  Pt said she was only tired from not resting well last night.  Pt is able to eat now and finish resting at home.  Pt's son drove her home.

## 2020-08-04 NOTE — INTERVAL H&P NOTE
The patient has been examined and the H&P has been reviewed:I have reviewed this note and I agree with this assessment. The patient remains stable for endoscopy at the time of this present evaluation. GH         risks, benefits and alternative options discussed and understood by patient/family.          Active Hospital Problems    Diagnosis  POA    Hx of colonic polyps [Z86.010]  Not Applicable      Resolved Hospital Problems   No resolved problems to display.

## 2020-08-04 NOTE — PLAN OF CARE
Dr Arboleda came to bedside and discussed findings. NO N/V,  no abdominal pain, no GI bleeding, and vitals stable.  Pt discharged from unit.

## 2020-08-04 NOTE — ANESTHESIA POSTPROCEDURE EVALUATION
Anesthesia Post Evaluation    Patient: Flory Cam    Procedure(s) Performed: Procedure(s) (LRB):  COLONOSCOPY (N/A)    Final Anesthesia Type: MAC    Patient location during evaluation: PACU  Patient participation: No - Unable to Participate, Sedation  Level of consciousness: sedated  Post-procedure vital signs: reviewed and stable  Pain management: adequate  Airway patency: patent    PONV status at discharge: No PONV  Anesthetic complications: no      Cardiovascular status: blood pressure returned to baseline and hemodynamically stable  Respiratory status: unassisted and spontaneous ventilation  Hydration status: euvolemic  Follow-up not needed.          Vitals Value Taken Time   BP  08/04/20 0806   Temp  08/04/20 0806   Pulse  08/04/20 0806   Resp  08/04/20 0806   SpO2  08/04/20 0806         No case tracking events are documented in the log.      Pain/Ángel Score: No data recorded

## 2020-08-04 NOTE — DISCHARGE INSTRUCTIONS
Understanding Colon and Rectal Polyps    The colon (also called the large intestine) is a muscular tube that forms the last part of the digestive tract. It absorbs water and stores food waste. The colon is about 4 to 6 feet long. The rectum is the last 6 inches of the colon. The colon and rectum have a smooth lining composed of millions of cells. Changes in these cells can lead to growths in the colon that can become cancerous and should be removed. Multiple tests are available to screen for colon cancer, but the colonoscopy is the most recommended test. During colonoscopy, these polyps can be removed. How often you need this test depends on many things including your condition, your family history, symptoms, and what the findings were at the previous colonoscopy.   When the colon lining changes  Changes that happen in the cells that line the colon or rectum can lead to growths called polyps. Over a period of years, polyps can turn cancerous. Removing polyps early may prevent cancer from ever forming.  Polyps  Polyps are fleshy clumps of tissue that form on the lining of the colon or rectum. Small polyps are usually benign (not cancerous). However, over time, cells in a polyp can change and become cancerous. Certain types of polyps known as adenomatous polyps are premalignant. The risk for invasive cancer increases with the size of the polyp and certain cell and gene features. This means that they can become cancerous if they're not removed. Hyperplastic polyps are benign. They can grow quite large and not turn cancerous.   Cancer  Almost all colorectal cancers start when polyp cells begin growing abnormally. As a cancerous tumor grows, it may involve more and more of the colon or rectum. In time, cancer can also grow beyond the colon or rectum and spread to nearby organs or to glands called lymph nodes. The cells can also travel to other parts of the body. This is known as metastasis. The earlier a cancerous  tumor is removed, the better the chance of preventing its spread.    Date Last Reviewed: 8/1/2016  © 4983-3509 [employer]. [address] [place] [address], [place] [address]. All rights reserved. This information is not intended as a substitute for professional medical care. Always follow your healthcare professional's instructions.        Colonoscopy     A camera attached to a flexible tube with a viewing lens is used to take video pictures.     Colonoscopy is a test to view the inside of your lower digestive tract (colon and rectum). Sometimes it can show the last part of the small intestine (ileum). During the test, small pieces of tissue may be removed for testing. This is called a biopsy. Small growths, such as polyps, may also be removed.   Why is colonoscopy done?  The test is done to help look for colon cancer. And it can help find the source of abdominal pain, bleeding, and changes in bowel habits. It may be needed once a year, depending on factors such as your:  · Age  · Health history  · Family health history  · Symptoms  · Results from any prior colonoscopy  Risks and possible complications  These include:  · Bleeding               · A puncture or tear in the colon   · Risks of anesthesia  · A cancer lesion not being seen  Getting ready   To prepare for the test:  · Talk with your healthcare provider about the risks of the test (see below). Also ask your healthcare provider about alternatives to the test.  · Tell your healthcare provider about any medicines you take. Also tell him or her about any health conditions you may have.  · Make sure your rectum and colon are empty for the test. Follow the diet and bowel prep instructions exactly. If you dont, the test may need to be rescheduled.  · Plan for a friend or family member to drive you home after the test.     Colonoscopy provides an inside view of the entire colon.     You may discuss the results with your doctor right away or at a future  visit.  During the test   The test is usually done in the hospital on an outpatient basis. This means you go home the same day. The procedure takes about 30 minutes. During that time:  · You are given relaxing (sedating) medicine through an IV line. You may be drowsy, or fully asleep.  · The healthcare provider will first give you a physical exam to check for anal and rectal problems.  · Then the anus is lubricated and the scope inserted.  · If you are awake, you may have a feeling similar to needing to have a bowel movement. You may also feel pressure as air is pumped into the colon. Its OK to pass gas during the procedure.  · Biopsy, polyp removal, or other treatments may be done during the test.  After the test   You may have gas right after the test. It can help to try to pass it to help prevent later bloating. Your healthcare provider may discuss the results with you right away. Or you may need to schedule a follow-up visit to talk about the results. After the test, you can go back to your normal eating and other activities. You may be tired from the sedation and need to rest for a few hours.  Date Last Reviewed: 11/1/2016  © 7698-4090 [employer]. [address] [place] [address], [place] [address]. All rights reserved. This information is not intended as a substitute for professional medical care. Always follow your healthcare professional's instructions.

## 2020-08-04 NOTE — TRANSFER OF CARE
"Anesthesia Transfer of Care Note    Patient: Flory Cam    Procedure(s) Performed: Procedure(s) (LRB):  COLONOSCOPY (N/A)    Patient location: PACU    Anesthesia Type: MAC    Transport from OR: Transported from OR on room air with adequate spontaneous ventilation    Post pain: adequate analgesia    Post assessment: no apparent anesthetic complications and tolerated procedure well    Post vital signs: stable    Level of consciousness: sedated    Nausea/Vomiting: no nausea/vomiting    Complications: none    Transfer of care protocol was followed      Last vitals:   Visit Vitals  BP (!) 141/55 (BP Location: Left arm, Patient Position: Sitting)   Pulse 75   Temp 36.6 °C (97.9 °F) (Temporal)   Resp 16   Ht 5' 6" (1.676 m)   Wt 80 kg (176 lb 5.9 oz)   SpO2 (!) 94%   Breastfeeding No   BMI 28.47 kg/m²     "

## 2020-08-04 NOTE — [employer] PATIENT INSTRUCTIONS
Discharge Summary/Instructions after an Endoscopic Procedure  Patient Name: Flory Cam  Patient MRN: [id number]  Patient YOB: 1945 Tuesday, August 4, 2020 Sylvester Arboleda III, MD  RESTRICTIONS:  During your procedure today, you received medications for sedation.  These   medications may affect your judgment, balance and coordination.  Therefore,   for 24 hours, you have the following restrictions:   - DO NOT drive a car, operate machinery, make legal/financial decisions,   sign important papers or drink alcohol.    ACTIVITY:  Today: no heavy lifting, straining or running due to procedural   sedation/anesthesia.  The following day: return to full activity including work.  DIET:  Eat and drink normally unless instructed otherwise.     TREATMENT FOR COMMON SIDE EFFECTS:  - Mild abdominal pain, nausea, belching, bloating or excessive gas:  rest,   eat lightly and use a heating pad.  - Sore Throat: treat with throat lozenges and/or gargle with warm salt   water.  - Because air was used during the procedure, expelling large amounts of air   from your rectum or belching is normal.  - If a bowel prep was taken, you may not have a bowel movement for 1-3 days.    This is normal.  SYMPTOMS TO WATCH FOR AND REPORT TO YOUR PHYSICIAN:  1. Abdominal pain or bloating, other than gas cramps.  2. Chest pain.  3. Back pain.  4. Signs of infection such as: chills or fever occurring within 24 hours   after the procedure.  5. Rectal bleeding, which would show as bright red, maroon, or black stools.   (A tablespoon of blood from the rectum is not serious, especially if   hemorrhoids are present.)  6. Vomiting.  7. Weakness or dizziness.  GO DIRECTLY TO THE NEAREST EMERGENCY ROOM IF YOU HAVE ANY OF THE FOLLOWING:      Difficulty breathing              Chills and/or fever over 101 F   Persistent vomiting and/or vomiting blood   Severe abdominal pain   Severe chest pain   Black, tarry stools   Bleeding- more than one  tablespoon   Any other symptom or condition that you feel may need urgent attention  Your doctor recommends these additional instructions:  If any biopsies were taken, your doctors clinic will contact you in 1 to 2   weeks with any results.  - Discharge patient to home (via wheelchair).   - High fiber diet.   - Continue present medications.   - Await pathology results.   - Repeat colonoscopy in 5 years for surveillance based on pathology results.     - Return to primary care physician as previously scheduled.   - Consider not repeating colonoscopy because of age and medical history.  - Discharge patient to home (via wheelchair).   - High fiber diet.   - Continue present medications.   - Await pathology results.   - Repeat colonoscopy in 5 years for surveillance based on pathology results.     - Consider not repeating colonoscopy because of age and medical history.  For questions, problems or results please call your physician Sylvester Arboleda III, MD at Work:  [phone]  If you have any questions about the above instructions, call the GI   department at [phone] or call the endoscopy unit at [phone]   from 7am until 3 pm.  [facility], EMERGENCY ROOM PHONE NUMBER:   [phone]  IF A COMPLICATION OR EMERGENCY SITUATION ARISES AND YOU ARE UNABLE TO REACH   YOUR PHYSICIAN - GO DIRECTLY TO THE EMERGENCY ROOM.  I have read or have had read to me these discharge instructions for my   procedure and have received a written copy.  I understand these   instructions and will follow-up with my physician if I have any questions.     __________________________________       _____________________________________  Nurse Signature                                          Patient/Designated   Responsible Party Signature  Sylvester Arboleda III, MD  8/4/2020 8:23:29 AM  This report has been verified and signed electronically.  [employer]

## 2020-08-04 NOTE — DISCHARGE SUMMARY
[facility]  Brief Operative Note     SUMMARY     Surgery Date: 8/4/2020     Surgeon(s) and Role:     * Sylvester Arboleda III, MD - Primary    Assisting Surgeon: None    Pre-op Diagnosis:  Screening [Z13.9]    Post-op Diagnosis:  Post-Op Diagnosis Codes:     * Screening [Z13.9]     - Colon Polyps     - Diverticulosis  Procedure(s) (LRB):  COLONOSCOPY (N/A)    Anesthesia: Choice    Description of the findings of the procedure: Procedures completed. See Procedure note for full details.    Findings/Key Components: Procedures completed. See Procedure note for full details.    Prosthetics/Devices: None    Estimated Blood Loss: * No values recorded between 8/4/2020 12:00 AM and 8/4/2020  8:01 AM *         Specimens:   Specimen (12h ago, onward)    None          Discharge Note    SUMMARY     Admit Date: 8/4/2020    Discharge Date and Time: 8/4/2020    Hospital Course (synopsis of major diagnoses, care, treatment, and services provided during the course of the hospital stay):  Procedures completed. See Procedure note for full details. Discharge patient when discharge criteria met.    Final Diagnosis: Post-Op Diagnosis Codes:     * Screening [Z13.9]     - Colon Polyps     - Diverticulosis  Disposition: Discharge patient when discharge criteria met.    Follow Up/Patient Instructions:       Medications:  Reconciled Home Medications: Current Discharge Medication List      CONTINUE these medications which have NOT CHANGED    Details   amLODIPine (NORVASC) 10 MG tablet TAKE 1 TABLET BY MOUTH EVERY DAY  Qty: 30 tablet, Refills: 6      atorvastatin (LIPITOR) 80 MG tablet Take 1 tablet (80 mg total) by mouth once daily.  Qty: 90 tablet, Refills: 1    Comments: **Patient requests 90 days supply**      cholecalciferol, vitamin D3, 1,250 mcg (50,000 unit) capsule Take 1 capsule (50,000 Units total) by mouth every 7 days.  Qty: 12 capsule, Refills: 1    Associated Diagnoses: Vitamin D deficiency      levothyroxine  (SYNTHROID) 137 MCG Tab tablet Take 1 tablet (137 mcg total) by mouth once daily.  Qty: 90 tablet, Refills: 1    Comments: **Patient requests 90 days supply**      ondansetron (ZOFRAN) 4 MG tablet Take 1 tablet (4 mg total) by mouth every 6 (six) hours as needed for Nausea.  Qty: 12 tablet, Refills: 0      oxyCODONE-acetaminophen (PERCOCET)  mg per tablet     Comments: Quantity prescribed more than 7 day supply? Press F2 and select one:[id number]        pantoprazole (PROTONIX) 40 MG tablet TAKE 1 TABLET(40 MG) BY MOUTH EVERY DAY  Qty: 90 tablet, Refills: 1      prochlorperazine (COMPAZINE) 10 MG tablet       traZODone (DESYREL) 50 MG tablet TAKE 1 TABLET BY MOUTH EVERY DAY AT NIGHT AS NEEDED FOR INSOMNIA  Qty: 30 tablet, Refills: 3      valsartan (DIOVAN) 320 MG tablet Take 1 tablet (320 mg total) by mouth once daily.  Qty: 90 tablet, Refills: 3    Comments: .  Associated Diagnoses: Essential hypertension      albuterol (PROVENTIL/VENTOLIN HFA) 90 mcg/actuation inhaler Inhale 2 puffs into the lungs every 6 (six) hours as needed for Wheezing.  Qty: 18 g, Refills: 3    Associated Diagnoses: Shortness of breath      clopidogreL (PLAVIX) 75 mg tablet TAKE 1 TABLET(75 MG) BY MOUTH EVERY DAY  Qty: 90 tablet, Refills: 1    Associated Diagnoses: PAD (peripheral artery disease)      ezetimibe (ZETIA) 10 mg tablet Take 1 tablet (10 mg total) by mouth once daily.  Qty: 90 tablet, Refills: 1    Associated Diagnoses: Other hyperlipidemia            Discharge Procedure Orders   Diet general     Activity as tolerated

## 2020-08-05 VITALS
BODY MASS INDEX: 28.34 KG/M2 | TEMPERATURE: 98 F | HEIGHT: 66 IN | DIASTOLIC BLOOD PRESSURE: 80 MMHG | OXYGEN SATURATION: 97 % | HEART RATE: 72 BPM | RESPIRATION RATE: 17 BRPM | SYSTOLIC BLOOD PRESSURE: 132 MMHG | WEIGHT: 176.38 LBS

## 2020-08-06 LAB
FINAL PATHOLOGIC DIAGNOSIS: NORMAL
GROSS: NORMAL

## 2020-08-17 ENCOUNTER — TELEPHONE (OUTPATIENT)
Dept: INTERNAL MEDICINE | Facility: CLINIC | Age: 75
End: 2020-08-17

## 2020-08-17 NOTE — TELEPHONE ENCOUNTER
Spoke to patient and she has been experiencing some dizziness off and on and wanted to let Dr. Ovalle know.

## 2020-08-17 NOTE — TELEPHONE ENCOUNTER
----- Message from Kizzy Geller sent at 8/17/2020  9:40 AM CDT -----  Regarding: personal question  Contact: pt  Caller is requesting a call back regarding a personal question.  Please call back at [phone] (home).  Thanks.

## 2020-08-20 ENCOUNTER — LAB VISIT (OUTPATIENT)
Dept: LAB | Facility: HOSPITAL | Age: 75
End: 2020-08-20
Attending: INTERNAL MEDICINE
Payer: MEDICARE

## 2020-08-20 ENCOUNTER — OFFICE VISIT (OUTPATIENT)
Dept: INTERNAL MEDICINE | Facility: CLINIC | Age: 75
End: 2020-08-20
Payer: MEDICARE

## 2020-08-20 VITALS
TEMPERATURE: 100 F | BODY MASS INDEX: 28.98 KG/M2 | OXYGEN SATURATION: 96 % | HEIGHT: 66 IN | WEIGHT: 180.31 LBS | SYSTOLIC BLOOD PRESSURE: 138 MMHG | HEART RATE: 63 BPM | DIASTOLIC BLOOD PRESSURE: 80 MMHG

## 2020-08-20 DIAGNOSIS — R42 DIZZINESS: ICD-10-CM

## 2020-08-20 DIAGNOSIS — R42 DIZZINESS: Primary | ICD-10-CM

## 2020-08-20 LAB
ANION GAP SERPL CALC-SCNC: 9 MMOL/L (ref 8–16)
BASOPHILS # BLD AUTO: 0.05 K/UL (ref 0–0.2)
BASOPHILS NFR BLD: 0.5 % (ref 0–1.9)
BUN SERPL-MCNC: 15 MG/DL (ref 8–23)
CALCIUM SERPL-MCNC: 9.7 MG/DL (ref 8.7–10.5)
CHLORIDE SERPL-SCNC: 105 MMOL/L (ref 95–110)
CO2 SERPL-SCNC: 28 MMOL/L (ref 23–29)
CREAT SERPL-MCNC: 0.8 MG/DL (ref 0.5–1.4)
DIFFERENTIAL METHOD: ABNORMAL
EOSINOPHIL # BLD AUTO: 0.2 K/UL (ref 0–0.5)
EOSINOPHIL NFR BLD: 1.8 % (ref 0–8)
ERYTHROCYTE [DISTWIDTH] IN BLOOD BY AUTOMATED COUNT: 13.8 % (ref 11.5–14.5)
EST. GFR  (AFRICAN AMERICAN): >60 ML/MIN/1.73 M^2
EST. GFR  (NON AFRICAN AMERICAN): >60 ML/MIN/1.73 M^2
GLUCOSE SERPL-MCNC: 83 MG/DL (ref 70–110)
HCT VFR BLD AUTO: 48.6 % (ref 37–48.5)
HGB BLD-MCNC: 15.1 G/DL (ref 12–16)
IMM GRANULOCYTES # BLD AUTO: 0.02 K/UL (ref 0–0.04)
IMM GRANULOCYTES NFR BLD AUTO: 0.2 % (ref 0–0.5)
LYMPHOCYTES # BLD AUTO: 2.9 K/UL (ref 1–4.8)
LYMPHOCYTES NFR BLD: 29 % (ref 18–48)
MCH RBC QN AUTO: 30.7 PG (ref 27–31)
MCHC RBC AUTO-ENTMCNC: 31.1 G/DL (ref 32–36)
MCV RBC AUTO: 99 FL (ref 82–98)
MONOCYTES # BLD AUTO: 0.9 K/UL (ref 0.3–1)
MONOCYTES NFR BLD: 9.5 % (ref 4–15)
NEUTROPHILS # BLD AUTO: 5.9 K/UL (ref 1.8–7.7)
NEUTROPHILS NFR BLD: 59 % (ref 38–73)
NRBC BLD-RTO: 0 /100 WBC
PLATELET # BLD AUTO: 276 K/UL (ref 150–350)
PMV BLD AUTO: 10.6 FL (ref 9.2–12.9)
POTASSIUM SERPL-SCNC: 4.2 MMOL/L (ref 3.5–5.1)
RBC # BLD AUTO: 4.92 M/UL (ref 4–5.4)
SODIUM SERPL-SCNC: 142 MMOL/L (ref 136–145)
WBC # BLD AUTO: 9.92 K/UL (ref 3.9–12.7)

## 2020-08-20 PROCEDURE — 1159F PR MEDICATION LIST DOCUMENTED IN MEDICAL RECORD: ICD-10-PCS | Mod: HCNC,S$GLB,, | Performed by: INTERNAL MEDICINE

## 2020-08-20 PROCEDURE — 99999 PR PBB SHADOW E&M-EST. PATIENT-LVL IV: CPT | Mod: PBBFAC,HCNC,, | Performed by: INTERNAL MEDICINE

## 2020-08-20 PROCEDURE — 99214 PR OFFICE/OUTPT VISIT, EST, LEVL IV, 30-39 MIN: ICD-10-PCS | Mod: HCNC,S$GLB,, | Performed by: INTERNAL MEDICINE

## 2020-08-20 PROCEDURE — 3075F SYST BP GE 130 - 139MM HG: CPT | Mod: HCNC,CPTII,S$GLB, | Performed by: INTERNAL MEDICINE

## 2020-08-20 PROCEDURE — 99999 PR PBB SHADOW E&M-EST. PATIENT-LVL IV: ICD-10-PCS | Mod: PBBFAC,HCNC,, | Performed by: INTERNAL MEDICINE

## 2020-08-20 PROCEDURE — 3288F FALL RISK ASSESSMENT DOCD: CPT | Mod: HCNC,CPTII,S$GLB, | Performed by: INTERNAL MEDICINE

## 2020-08-20 PROCEDURE — 1126F PR PAIN SEVERITY QUANTIFIED, NO PAIN PRESENT: ICD-10-PCS | Mod: HCNC,S$GLB,, | Performed by: INTERNAL MEDICINE

## 2020-08-20 PROCEDURE — 1100F PR PT FALLS ASSESS DOC 2+ FALLS/FALL W/INJURY/YR: ICD-10-PCS | Mod: HCNC,CPTII,S$GLB, | Performed by: INTERNAL MEDICINE

## 2020-08-20 PROCEDURE — 80048 BASIC METABOLIC PNL TOTAL CA: CPT | Mod: HCNC

## 2020-08-20 PROCEDURE — 3288F PR FALLS RISK ASSESSMENT DOCUMENTED: ICD-10-PCS | Mod: HCNC,CPTII,S$GLB, | Performed by: INTERNAL MEDICINE

## 2020-08-20 PROCEDURE — 85025 COMPLETE CBC W/AUTO DIFF WBC: CPT | Mod: HCNC

## 2020-08-20 PROCEDURE — 99214 OFFICE O/P EST MOD 30 MIN: CPT | Mod: HCNC,S$GLB,, | Performed by: INTERNAL MEDICINE

## 2020-08-20 PROCEDURE — 1159F MED LIST DOCD IN RCRD: CPT | Mod: HCNC,S$GLB,, | Performed by: INTERNAL MEDICINE

## 2020-08-20 PROCEDURE — 3075F PR MOST RECENT SYSTOLIC BLOOD PRESS GE 130-139MM HG: ICD-10-PCS | Mod: HCNC,CPTII,S$GLB, | Performed by: INTERNAL MEDICINE

## 2020-08-20 PROCEDURE — 3079F DIAST BP 80-89 MM HG: CPT | Mod: HCNC,CPTII,S$GLB, | Performed by: INTERNAL MEDICINE

## 2020-08-20 PROCEDURE — 36415 COLL VENOUS BLD VENIPUNCTURE: CPT | Mod: HCNC

## 2020-08-20 PROCEDURE — 3079F PR MOST RECENT DIASTOLIC BLOOD PRESSURE 80-89 MM HG: ICD-10-PCS | Mod: HCNC,CPTII,S$GLB, | Performed by: INTERNAL MEDICINE

## 2020-08-20 PROCEDURE — 1126F AMNT PAIN NOTED NONE PRSNT: CPT | Mod: HCNC,S$GLB,, | Performed by: INTERNAL MEDICINE

## 2020-08-20 PROCEDURE — 1100F PTFALLS ASSESS-DOCD GE2>/YR: CPT | Mod: HCNC,CPTII,S$GLB, | Performed by: INTERNAL MEDICINE

## 2020-08-20 NOTE — PROGRESS NOTES
Subjective:       Patient ID: Flory Cam is a 75 y.o. female.    Chief Complaint: Dizziness    Dizziness:   Chronicity:  New  Onset:  1 to 4 weeks ago  Progression since onset:  Waxing and waning  Severity:  Moderate  Dizziness characteristics:  Lightheaded/impending faint   Associated symptoms: weakness and light-headedness.no hearing loss, no ear congestion, no ear pain, no fever, no headaches, no tinnitus, no nausea, no vomiting, no syncope, no palpitations, no panic and no chest pain.  Aggravated by:  Exertion  Treatments tried:  Rest  Improvements on treatment:  Mild   PMH includes: anxiety.no head trauma and no head trauma.    Review of Systems   Constitutional: Negative for fever.   HENT: Negative for ear pain, hearing loss and tinnitus.    Respiratory: Negative for shortness of breath.    Cardiovascular: Negative for chest pain, palpitations and syncope.   Gastrointestinal: Negative for nausea and vomiting.   Neurological: Positive for dizziness, weakness and light-headedness. Negative for syncope and headaches.   Psychiatric/Behavioral: The patient is nervous/anxious.          Objective:      Physical Exam  Vitals signs reviewed.   Constitutional:       General: She is not in acute distress.     Appearance: She is well-developed. She is not ill-appearing.   Pulmonary:      Effort: Pulmonary effort is normal. No respiratory distress.   Neurological:      Mental Status: She is alert and oriented to person, place, and time.   Psychiatric:         Behavior: Behavior normal.         Thought Content: Thought content normal.         Judgment: Judgment normal.         Assessment:       1. Dizziness        Plan:       Flory was seen today for dizziness.    Diagnoses and all orders for this visit:    Dizziness  -     CBC auto differential; Future  -     Basic metabolic panel; Future  -     Recommend maintaining adequate hydration and nutrition  -     Fall precautions     Labs today.

## 2020-08-25 ENCOUNTER — TELEPHONE (OUTPATIENT)
Dept: INTERNAL MEDICINE | Facility: CLINIC | Age: 75
End: 2020-08-25

## 2020-08-25 DIAGNOSIS — M54.50 CHRONIC LOW BACK PAIN, UNSPECIFIED BACK PAIN LATERALITY, UNSPECIFIED WHETHER SCIATICA PRESENT: Primary | ICD-10-CM

## 2020-08-25 DIAGNOSIS — G89.29 CHRONIC LOW BACK PAIN, UNSPECIFIED BACK PAIN LATERALITY, UNSPECIFIED WHETHER SCIATICA PRESENT: Primary | ICD-10-CM

## 2020-08-25 NOTE — TELEPHONE ENCOUNTER
Spoke to patient and she states that her Pain management doctor is moving to [place] and she does not have transportation to [place]. She is wondering if you could refer her to the Pain Management at [facility].

## 2020-08-25 NOTE — TELEPHONE ENCOUNTER
----- Message from Lanny De La Rosa sent at 8/25/2020  9:25 AM CDT -----  Would like to consult with nurse regarding a question she has, will not give any additional information. Please give a call back at [phone].

## 2020-08-25 NOTE — TELEPHONE ENCOUNTER
Please find out what pain location (s) is being treated. If back pain, need to know where and if it is in her legs as well.

## 2020-08-26 NOTE — TELEPHONE ENCOUNTER
Please explain to patient that pain management here at [facility] is interventional meaning back injections/nerve blocks and not narcotic pain medication.

## 2020-08-26 NOTE — TELEPHONE ENCOUNTER
Spoke to patient and she would like to hold off on scheduleding an appointment with Pain Management. She is going to see about working out something with the one she has. Explained to her that [facility] is  pain and only interventional meaning back injections/nerve blocks and not narcotic pain medication.

## 2020-09-02 NOTE — TELEPHONE ENCOUNTER
Returned call to pt, states she has taken Plaquenil in the past & wants to know if Dr. Monteiro thinks she should get back on. Not any any symptoms or flares at this time. Please advise.   KLAUS

## 2020-09-04 ENCOUNTER — OFFICE VISIT (OUTPATIENT)
Dept: INTERNAL MEDICINE | Facility: CLINIC | Age: 75
End: 2020-09-04
Payer: MEDICARE

## 2020-09-04 VITALS
TEMPERATURE: 99 F | OXYGEN SATURATION: 96 % | SYSTOLIC BLOOD PRESSURE: 130 MMHG | HEIGHT: 66 IN | HEART RATE: 70 BPM | BODY MASS INDEX: 28.95 KG/M2 | DIASTOLIC BLOOD PRESSURE: 56 MMHG | WEIGHT: 180.13 LBS

## 2020-09-04 DIAGNOSIS — R51.9 NEW ONSET HEADACHE: Primary | ICD-10-CM

## 2020-09-04 DIAGNOSIS — R11.0 NAUSEA: ICD-10-CM

## 2020-09-04 PROCEDURE — 3078F PR MOST RECENT DIASTOLIC BLOOD PRESSURE < 80 MM HG: ICD-10-PCS | Mod: HCNC,CPTII,S$GLB, | Performed by: INTERNAL MEDICINE

## 2020-09-04 PROCEDURE — 1101F PT FALLS ASSESS-DOCD LE1/YR: CPT | Mod: HCNC,CPTII,S$GLB, | Performed by: INTERNAL MEDICINE

## 2020-09-04 PROCEDURE — 1159F PR MEDICATION LIST DOCUMENTED IN MEDICAL RECORD: ICD-10-PCS | Mod: HCNC,S$GLB,, | Performed by: INTERNAL MEDICINE

## 2020-09-04 PROCEDURE — 3078F DIAST BP <80 MM HG: CPT | Mod: HCNC,CPTII,S$GLB, | Performed by: INTERNAL MEDICINE

## 2020-09-04 PROCEDURE — 99999 PR PBB SHADOW E&M-EST. PATIENT-LVL IV: ICD-10-PCS | Mod: PBBFAC,HCNC,, | Performed by: INTERNAL MEDICINE

## 2020-09-04 PROCEDURE — 99999 PR PBB SHADOW E&M-EST. PATIENT-LVL IV: CPT | Mod: PBBFAC,HCNC,, | Performed by: INTERNAL MEDICINE

## 2020-09-04 PROCEDURE — 1125F AMNT PAIN NOTED PAIN PRSNT: CPT | Mod: HCNC,S$GLB,, | Performed by: INTERNAL MEDICINE

## 2020-09-04 PROCEDURE — 3075F PR MOST RECENT SYSTOLIC BLOOD PRESS GE 130-139MM HG: ICD-10-PCS | Mod: HCNC,CPTII,S$GLB, | Performed by: INTERNAL MEDICINE

## 2020-09-04 PROCEDURE — 1159F MED LIST DOCD IN RCRD: CPT | Mod: HCNC,S$GLB,, | Performed by: INTERNAL MEDICINE

## 2020-09-04 PROCEDURE — 1125F PR PAIN SEVERITY QUANTIFIED, PAIN PRESENT: ICD-10-PCS | Mod: HCNC,S$GLB,, | Performed by: INTERNAL MEDICINE

## 2020-09-04 PROCEDURE — 3075F SYST BP GE 130 - 139MM HG: CPT | Mod: HCNC,CPTII,S$GLB, | Performed by: INTERNAL MEDICINE

## 2020-09-04 PROCEDURE — 99213 OFFICE O/P EST LOW 20 MIN: CPT | Mod: HCNC,S$GLB,, | Performed by: INTERNAL MEDICINE

## 2020-09-04 PROCEDURE — 1101F PR PT FALLS ASSESS DOC 0-1 FALLS W/OUT INJ PAST YR: ICD-10-PCS | Mod: HCNC,CPTII,S$GLB, | Performed by: INTERNAL MEDICINE

## 2020-09-04 PROCEDURE — 99213 PR OFFICE/OUTPT VISIT, EST, LEVL III, 20-29 MIN: ICD-10-PCS | Mod: HCNC,S$GLB,, | Performed by: INTERNAL MEDICINE

## 2020-09-04 RX ORDER — ONDANSETRON 4 MG/1
4 TABLET, FILM COATED ORAL EVERY 8 HOURS PRN
Qty: 15 TABLET | Refills: 0 | Status: SHIPPED | OUTPATIENT
Start: 2020-09-04 | End: 2020-10-06 | Stop reason: SDUPTHER

## 2020-09-04 NOTE — PROGRESS NOTES
Subjective:       Patient ID: Flory Cam is a 75 y.o. female.    Chief Complaint: Headache and Nausea    Headache   This is a new problem. The current episode started in the past 7 days. The problem occurs intermittently. The problem has been waxing and waning. The pain is located in the occipital region. The pain does not radiate. The pain quality is not similar to prior headaches. The quality of the pain is described as aching. The pain is moderate. Associated symptoms include nausea. Pertinent negatives include no abnormal behavior, blurred vision, dizziness, fever, numbness, visual change, vomiting, weakness or weight loss. She has tried oral narcotics for the symptoms. The treatment provided significant relief. Her past medical history is significant for cancer (skin) and hypertension. There is no history of migraine headaches, obesity or recent head traumas.     Review of Systems   Constitutional: Negative for fever, unexpected weight change and weight loss.   Eyes: Negative for blurred vision and visual disturbance.   Gastrointestinal: Positive for diarrhea (x 1 day]) and nausea. Negative for vomiting.   Neurological: Positive for headaches. Negative for dizziness, syncope, weakness and numbness.         Objective:      Physical Exam  Vitals signs reviewed.   Constitutional:       General: She is not in acute distress.     Appearance: She is well-developed. She is not ill-appearing.   Cardiovascular:      Rate and Rhythm: Normal rate.   Pulmonary:      Effort: Pulmonary effort is normal. No respiratory distress.   Neurological:      Mental Status: She is alert and oriented to person, place, and time.   Psychiatric:         Behavior: Behavior normal.         Thought Content: Thought content normal.         Judgment: Judgment normal.         Assessment:       1. New onset headache    2. Nausea        Plan:       Flory was seen today for headache and nausea.    Diagnoses and all orders for this  visit:    New onset headache  -     No red flags  -     Recommend Tylenol as needed  -     Handout provided    Nausea  -     ondansetron (ZOFRAN) 4 MG tablet; Take 1 tablet (4 mg total) by mouth every 8 (eight) hours as needed for Nausea.    RTC if symptoms worsen or fail to resolve with treatment.

## 2020-09-04 NOTE — PATIENT INSTRUCTIONS
"  Headache, Unspecified    A number of things can cause headaches. The cause of your headache isnt clear. But it doesnt seem to be a sign of any serious illness.  You could have a tension headache or a migraine headache.  Stress can cause a tension headache. This can happen if you tense the muscles of your shoulders, neck, and scalp without knowing it. If this stress lasts long enough, you may develop a tension headache.  It is not clear why migraines occur, but certain things called" triggers" can raise the risk of having a migraine attack. Migraine triggers may include emotional stress or depression, or by hormone changes during the menstrual cycle. Other triggers include birth control pills and other medicines, alcohol or caffeine, foods with tyramine (such as aged cheese, wine), eyestrain, weather changes, missed meals, and lack of sleep or oversleeping.  Other causes of headache include:  · Viral illness with high fever  · Head injury with concussion  · Sinus, ear, or throat infection  · Dental pain and jaw joint (TMJ) pain  More serious but less common causes of headache include stroke, brain hemorrhage, brain tumor, meningitis, and encephalitis.  Home care  Follow these tips when taking care of yourself at home:  · Dont drive yourself home if you were given pain medicine for your headache. Instead, have someone else drive you home. Try to sleep when you get home. You should feel much better when you wake up.  · Apply heat to the back of your neck to ease a neck muscle spasm. Take care of a migraine headache by putting an ice pack on your forehead or at the base of your skull.  · If you have nausea or vomiting, eat a light diet until your headache eases.  · If you have a migraine headache, use sunglasses when in the daylight or around bright indoor lighting until your symptoms get better. Bright glaring light can make this type of headache worse.  Follow-up care  Follow up with your healthcare provider, or " as advised. Talk with your provider if you have frequent headaches. He or she can help figure out a treatment plan. By knowing the earliest signs of headache, and starting treatment right away, you may be able to stop the pain yourself.  When to seek medical advice  Call your healthcare provider right away if any of these occur:  · Your head pain suddenly gets worse after sexual intercourse or strenuous activity  · Your head pain doesnt get better within 24 hours  · You arent able to keep liquids down (repeated vomiting)  · Fever of 100.4ºF (38ºC) or higher, or as directed by your healthcare provider  · Stiff neck  · Extreme drowsiness, confusion, or fainting  · Dizziness or dizziness with spinning sensation (vertigo)  · Weakness in an arm or leg or one side of your face  · You have trouble talking or seeing  Date Last Reviewed: 8/1/2016  © 0118-7972 [employer]. [address] [place] [address], [place] [address]. All rights reserved. This information is not intended as a substitute for professional medical care. Always follow your healthcare professional's instructions.

## 2020-09-15 ENCOUNTER — TELEPHONE (OUTPATIENT)
Dept: CARDIOLOGY | Facility: CLINIC | Age: 75
End: 2020-09-15

## 2020-09-22 ENCOUNTER — OFFICE VISIT (OUTPATIENT)
Dept: CARDIOLOGY | Facility: CLINIC | Age: 75
End: 2020-09-22
Payer: MEDICARE

## 2020-09-22 VITALS
SYSTOLIC BLOOD PRESSURE: 140 MMHG | DIASTOLIC BLOOD PRESSURE: 70 MMHG | OXYGEN SATURATION: 96 % | WEIGHT: 181.19 LBS | HEART RATE: 71 BPM | HEIGHT: 66 IN | BODY MASS INDEX: 29.12 KG/M2

## 2020-09-22 DIAGNOSIS — Z95.1 HX OF CABG: Primary | Chronic | ICD-10-CM

## 2020-09-22 DIAGNOSIS — E03.9 HYPOTHYROIDISM (ACQUIRED): ICD-10-CM

## 2020-09-22 DIAGNOSIS — Z72.0 TOBACCO USE: ICD-10-CM

## 2020-09-22 DIAGNOSIS — M05.771 RHEUMATOID ARTHRITIS INVOLVING BOTH FEET WITH POSITIVE RHEUMATOID FACTOR: Chronic | ICD-10-CM

## 2020-09-22 DIAGNOSIS — J44.9 CHRONIC OBSTRUCTIVE PULMONARY DISEASE, UNSPECIFIED COPD TYPE: ICD-10-CM

## 2020-09-22 DIAGNOSIS — L93.0 DISCOID LUPUS ERYTHEMATOSUS: Chronic | ICD-10-CM

## 2020-09-22 DIAGNOSIS — L30.9 ECZEMA, UNSPECIFIED TYPE: ICD-10-CM

## 2020-09-22 DIAGNOSIS — M05.772 RHEUMATOID ARTHRITIS INVOLVING BOTH FEET WITH POSITIVE RHEUMATOID FACTOR: Chronic | ICD-10-CM

## 2020-09-22 DIAGNOSIS — I65.23 BILATERAL CAROTID ARTERY STENOSIS: Chronic | ICD-10-CM

## 2020-09-22 DIAGNOSIS — E78.5 HYPERLIPIDEMIA LDL GOAL <70: Chronic | ICD-10-CM

## 2020-09-22 DIAGNOSIS — I10 HYPERTENSION GOAL BP (BLOOD PRESSURE) < 140/90: Chronic | ICD-10-CM

## 2020-09-22 DIAGNOSIS — I73.9 PERIPHERAL VASCULAR DISEASE: Chronic | ICD-10-CM

## 2020-09-22 PROCEDURE — 99999 PR PBB SHADOW E&M-EST. PATIENT-LVL IV: ICD-10-PCS | Mod: PBBFAC,,, | Performed by: INTERNAL MEDICINE

## 2020-09-22 PROCEDURE — 99214 OFFICE O/P EST MOD 30 MIN: CPT | Mod: S$GLB,,, | Performed by: INTERNAL MEDICINE

## 2020-09-22 PROCEDURE — 99214 PR OFFICE/OUTPT VISIT, EST, LEVL IV, 30-39 MIN: ICD-10-PCS | Mod: S$GLB,,, | Performed by: INTERNAL MEDICINE

## 2020-09-22 PROCEDURE — 99214 OFFICE O/P EST MOD 30 MIN: CPT | Mod: PBBFAC,HCNC | Performed by: INTERNAL MEDICINE

## 2020-09-22 PROCEDURE — 99999 PR PBB SHADOW E&M-EST. PATIENT-LVL IV: CPT | Mod: PBBFAC,,, | Performed by: INTERNAL MEDICINE

## 2020-09-22 NOTE — PROGRESS NOTES
Subjective:   Patient ID:  Flory Cam is a 75 y.o. female who presents for follow up of Hyperlipidemia LDL goal < 70      HPI 1/30 20  73 yo female with copd tobacco use pvd s/p multiple intervention cad s/p cabg lupus lichen planus now on chemiotherapy still smoking has no claudication has some gait issues no falls. Has no angina no shortness of breath no claudication she can go to [employer] no limitation. Has no chf symptoms palpitation tia. 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 Has no chest pain or shortness of breath no claudication. She stopped smoking for a while she started again she will go back to smoking cessation. No claudication tia.  Past Medical History:   Diagnosis Date    Acute coronary syndrome     Anxiety     Bilateral carotid artery stenosis 11/17/2015    Chronic pain     Colon polyp     Coronary artery disease     GERD (gastroesophageal reflux disease)     Hyperlipidemia     Hypertension     Hypothyroidism     Low back pain     Lupus     Osteoporosis     Poor circulation     PVD (peripheral vascular disease)     S/P peripheral artery angioplasty 02/13/2014    SCCA (squamous cell carcinoma) of skin 10/2019    Dr. ZANDRA Rivas--oncology    Skin disease        Past Surgical History:   Procedure Laterality Date    COLONOSCOPY N/A 1/4/2017    Procedure: COLONOSCOPY;  Surgeon: Sylvester Arboleda III, MD;  Location: [facility] ENDO;  Service: Endoscopy;  Laterality: N/A;    COLONOSCOPY N/A 8/4/2020    Procedure: COLONOSCOPY;  Surgeon: Sylvester Arboleda III, MD;  Location: [facility] ENDO;  Service: Endoscopy;  Laterality: N/A;    CORONARY ANGIOPLASTY      CORONARY ARTERY BYPASS GRAFT      HYSTERECTOMY      OOPHORECTOMY      THYROIDECTOMY         Social History     Tobacco Use    Smoking status: Current Every Day Smoker     Packs/day: 0.25      Years: 40.00     Pack years: 10.00     Types: Cigarettes     Start date: 1961    Smokeless tobacco: Never Used    Tobacco comment: entered smoking cessation program 1/13/20    Substance Use Topics    Alcohol use: No     Frequency: Never     Drinks per session: 1 or 2     Binge frequency: Never    Drug use: No       Family History   Problem Relation Age of Onset    Melanoma Neg Hx     Psoriasis Neg Hx     Lupus Neg Hx     Eczema Neg Hx        Current Outpatient Medications   Medication Sig    albuterol (PROVENTIL/VENTOLIN HFA) 90 mcg/actuation inhaler Inhale 2 puffs into the lungs every 6 (six) hours as needed for Wheezing.    amLODIPine (NORVASC) 10 MG tablet TAKE 1 TABLET BY MOUTH EVERY DAY    atorvastatin (LIPITOR) 80 MG tablet TAKE ONE TABLET BY MOUTH DAILY    cholecalciferol, vitamin D3, 1,250 mcg (50,000 unit) capsule Take 1 capsule (50,000 Units total) by mouth every 7 days.    clopidogreL (PLAVIX) 75 mg tablet TAKE 1 TABLET(75 MG) BY MOUTH EVERY DAY    ezetimibe (ZETIA) 10 mg tablet Take 1 tablet (10 mg total) by mouth once daily.    levothyroxine (SYNTHROID) 137 MCG Tab tablet TAKE ONE TABLET BY MOUTH ONCE DAILY    ondansetron (ZOFRAN) 4 MG tablet Take 1 tablet (4 mg total) by mouth every 8 (eight) hours as needed for Nausea.    oxyCODONE-acetaminophen (PERCOCET)  mg per tablet     prochlorperazine (COMPAZINE) 10 MG tablet     valsartan (DIOVAN) 320 MG tablet Take 1 tablet (320 mg total) by mouth once daily.    traZODone (DESYREL) 50 MG tablet TAKE 1 TABLET BY MOUTH EVERY DAY AT NIGHT AS NEEDED FOR INSOMNIA (Patient not taking: Reported on 9/22/2020)     No current facility-administered medications for this visit.      Current Outpatient Medications on File Prior to Visit   Medication Sig    albuterol (PROVENTIL/VENTOLIN HFA) 90 mcg/actuation inhaler Inhale 2 puffs into the lungs every 6 (six) hours as needed for Wheezing.    amLODIPine (NORVASC) 10 MG tablet TAKE 1 TABLET BY  MOUTH EVERY DAY    atorvastatin (LIPITOR) 80 MG tablet TAKE ONE TABLET BY MOUTH DAILY    cholecalciferol, vitamin D3, 1,250 mcg (50,000 unit) capsule Take 1 capsule (50,000 Units total) by mouth every 7 days.    clopidogreL (PLAVIX) 75 mg tablet TAKE 1 TABLET(75 MG) BY MOUTH EVERY DAY    ezetimibe (ZETIA) 10 mg tablet Take 1 tablet (10 mg total) by mouth once daily.    levothyroxine (SYNTHROID) 137 MCG Tab tablet TAKE ONE TABLET BY MOUTH ONCE DAILY    ondansetron (ZOFRAN) 4 MG tablet Take 1 tablet (4 mg total) by mouth every 8 (eight) hours as needed for Nausea.    oxyCODONE-acetaminophen (PERCOCET)  mg per tablet     prochlorperazine (COMPAZINE) 10 MG tablet     valsartan (DIOVAN) 320 MG tablet Take 1 tablet (320 mg total) by mouth once daily.    traZODone (DESYREL) 50 MG tablet TAKE 1 TABLET BY MOUTH EVERY DAY AT NIGHT AS NEEDED FOR INSOMNIA (Patient not taking: Reported on 9/22/2020)     No current facility-administered medications on file prior to visit.        Review of Systems   Constitution: Positive for weight loss. Negative for diaphoresis, malaise/fatigue and weight gain.   HENT: Negative for hoarse voice.    Eyes: Negative for double vision and visual disturbance.   Cardiovascular: Negative for chest pain, claudication, cyanosis, dyspnea on exertion, irregular heartbeat, leg swelling, near-syncope, orthopnea, palpitations, paroxysmal nocturnal dyspnea and syncope.   Respiratory: Negative for cough, hemoptysis, shortness of breath and snoring.    Hematologic/Lymphatic: Negative for bleeding problem. Does not bruise/bleed easily.   Skin: Positive for skin cancer and suspicious lesions. Negative for color change and poor wound healing.   Musculoskeletal: Positive for arthritis, back pain and joint pain. Negative for muscle cramps, muscle weakness and myalgias.   Gastrointestinal: Negative for bloating, abdominal pain, change in bowel habit, diarrhea, heartburn, hematemesis, hematochezia,  "melena and nausea.   Neurological: Negative for excessive daytime sleepiness, dizziness, headaches, light-headedness, loss of balance, numbness and weakness.   Psychiatric/Behavioral: Negative for memory loss. The patient does not have insomnia.    Allergic/Immunologic: Negative for hives.       Objective:   Physical Exam  Vitals:    09/22/20 1522 09/22/20 1526   BP: 136/80 (!) 140/70   BP Location: Left arm Right arm   Patient Position: Sitting Sitting   BP Method: Large (Manual) Large (Manual)   Pulse: 71    SpO2: 96%    Weight: 82.2 kg (181 lb 3.5 oz)    Height: 5' 6" (1.676 m)    Constitutional: She is oriented to person, place, and time. She appears well-developed and well-nourished. She does not appear ill. No distress.   HENT:   Head: Normocephalic and atraumatic.   Eyes: EOM are normal. Pupils are equal, round, and reactive to light. No scleral icterus.   Neck: Normal range of motion. Neck supple. Normal carotid pulses, no hepatojugular reflux and no JVD present. Carotid bruit is not present. No tracheal deviation present. No thyromegaly present.   Cardiovascular: Normal rate, regular rhythm and normal heart sounds. Exam reveals no gallop and no friction rub.   No murmur heard.  Pulses:       Carotid pulses are 2+ on the right side with bruit, and 2+ on the left side with bruit.       Radial pulses are 2+ on the right side, and 2+ on the left side.        Femoral pulses are 2+ on the right side with bruit, and 2+ on the left side with bruit.       Popliteal pulses are 1+ on the right side, and 1+ on the left side.        Dorsalis pedis pulses are 0 on the right side, and 0 on the left side.        Posterior tibial pulses are 0 on the right side, and 0 on the left side.   Scars femoral surgery well healed.    Pulmonary/Chest: Effort normal and breath sounds normal. No respiratory distress. She has no wheezes. She has no rhonchi. She has no rales. She exhibits no tenderness.   Scar cabg well " healed   Abdominal: Soft. Normal appearance, normal aorta and bowel sounds are normal. She exhibits no distension, no abdominal bruit, no ascites and no pulsatile midline mass. There is no hepatomegaly. There is no tenderness.   Obese  Scar abdominal surgery well healed.    Musculoskeletal: She exhibits no edema.        Right shoulder: She exhibits no deformity.   Neurological: She is alert and oriented to person, place, and time. She has normal strength. No cranial nerve deficit. Coordination normal.   Skin: Skin is warm and dry. No rash noted. She is not diaphoretic. No cyanosis or erythema. Nails show no clubbing.   Scarring in both feet and hands from discoid lupus no drainage no gangrene. Healed callouses noted.   Psychiatric: She has a normal mood and affect. Her speech is normal and behavior is normal.   Nursing note and vitals reviewed.     Lab Results   Component Value Date    CHOL 155 06/17/2020    CHOL 196 04/02/2019    CHOL 213 (H) 10/25/2018     Lab Results   Component Value Date    HDL 61 06/17/2020    HDL 51 04/02/2019    HDL 53 10/25/2018     Lab Results   Component Value Date    LDLCALC 81.6 06/17/2020    LDLCALC 128.0 04/02/2019    LDLCALC 141.8 10/25/2018     Lab Results   Component Value Date    TRIG 62 06/17/2020    TRIG 85 04/02/2019    TRIG 91 10/25/2018     Lab Results   Component Value Date    CHOLHDL 39.4 06/17/2020    CHOLHDL 26.0 04/02/2019    CHOLHDL 24.9 10/25/2018       Chemistry        Component Value Date/Time     08/20/2020 1054    K 4.2 08/20/2020 1054     08/20/2020 1054    CO2 28 08/20/2020 1054    BUN 15 08/20/2020 1054    CREATININE 0.8 08/20/2020 1054    GLU 83 08/20/2020 1054        Component Value Date/Time    CALCIUM 9.7 08/20/2020 1054    ALKPHOS 174 (H) 06/17/2020 1132    AST 19 06/17/2020 1132    ALT 17 06/17/2020 1132    BILITOT 0.3 06/17/2020 1132    ESTGFRAFRICA >60.0 08/20/2020 1054    EGFRNONAA >60.0 08/20/2020 1054          Lab Results   Component Value  Date    TSH 1.775 06/17/2020     Lab Results   Component Value Date    INR 1.0 04/28/2017    INR 0.9 07/26/2007     Lab Results   Component Value Date    WBC 9.92 08/20/2020    HGB 15.1 08/20/2020    HCT 48.6 (H) 08/20/2020    MCV 99 (H) 08/20/2020     08/20/2020     BMP  Sodium   Date Value Ref Range Status   08/20/2020 142 136 - 145 mmol/L Final     Potassium   Date Value Ref Range Status   08/20/2020 4.2 3.5 - 5.1 mmol/L Final     Chloride   Date Value Ref Range Status   08/20/2020 105 95 - 110 mmol/L Final     CO2   Date Value Ref Range Status   08/20/2020 28 23 - 29 mmol/L Final     BUN, Bld   Date Value Ref Range Status   08/20/2020 15 8 - 23 mg/dL Final     Creatinine   Date Value Ref Range Status   08/20/2020 0.8 0.5 - 1.4 mg/dL Final     Calcium   Date Value Ref Range Status   08/20/2020 9.7 8.7 - 10.5 mg/dL Final     Anion Gap   Date Value Ref Range Status   08/20/2020 9 8 - 16 mmol/L Final     eGFR if    Date Value Ref Range Status   08/20/2020 >60.0 >60 mL/min/1.73 m^2 Final     eGFR if non    Date Value Ref Range Status   08/20/2020 >60.0 >60 mL/min/1.73 m^2 Final     Comment:     Calculation used to obtain the estimated glomerular filtration  rate (eGFR) is the CKD-EPI equation.        CrCl cannot be calculated (Patient's most recent lab result is older than the maximum 7 days allowed.).    Assessment:     1. CAD: Hx of CABG    2. Hyperlipidemia LDL goal <70    3. Hypertension goal BP (blood pressure) < 140/90    4. Peripheral vascular disease    5. Bilateral carotid artery stenosis    6. Discoid lupus erythematosus    7. Rheumatoid arthritis involving both feet with positive rheumatoid factor    8. Hypothyroidism (acquired)    9. Eczema, unspecified type    10. Tobacco use    11. Chronic obstructive pulmonary disease, unspecified COPD type      Cad asymptomatic   pvd stable no limitation   Carotid stenosis asymptomatic   Lipids improved needs to be more compliant  and stop smoking counseled.   Counseled about exercise  Appropriate diet and structured weight loss.   Plan:   Continue current therapy  Cardiac low salt diet.  Risk factor modification and excercise program.  Smoking cessation counseling  F/u in 6 months with lipid cmp

## 2020-09-23 ENCOUNTER — TELEPHONE (OUTPATIENT)
Dept: INTERNAL MEDICINE | Facility: CLINIC | Age: 75
End: 2020-09-23

## 2020-09-23 NOTE — TELEPHONE ENCOUNTER
----- Message from Kalee Mcginnis sent at 9/23/2020 12:08 PM CDT -----  Type:  Needs Medical Advice    Who Called: NO JARVIS JUNE  Symptoms (please be specific)  How long has patient had these symptoms:  Pharmacy name and phone #:  Would the patient rather a call back or a response via [facility]?  Call   Best Call Back Number: [phone] (home)    Additional Information:  Pt is requesting a call back from the nurse in regards to the pt  needing the nurse to ask the Dr a question for her please

## 2020-09-23 NOTE — TELEPHONE ENCOUNTER
Pt asked have you filled out paperwork she left at the front and will you mail or faxed it off or do she needs to come pick it up.

## 2020-09-24 ENCOUNTER — PATIENT OUTREACH (OUTPATIENT)
Dept: ADMINISTRATIVE | Facility: OTHER | Age: 75
End: 2020-09-24

## 2020-09-24 NOTE — TELEPHONE ENCOUNTER
Paperwork received yesterday.   Will review and notify when completed or if there are any questions.

## 2020-09-25 ENCOUNTER — OFFICE VISIT (OUTPATIENT)
Dept: PODIATRY | Facility: CLINIC | Age: 75
End: 2020-09-25
Payer: MEDICARE

## 2020-09-25 ENCOUNTER — OFFICE VISIT (OUTPATIENT)
Dept: RHEUMATOLOGY | Facility: CLINIC | Age: 75
End: 2020-09-25
Payer: MEDICARE

## 2020-09-25 ENCOUNTER — LAB VISIT (OUTPATIENT)
Dept: LAB | Facility: HOSPITAL | Age: 75
End: 2020-09-25
Attending: INTERNAL MEDICINE
Payer: MEDICARE

## 2020-09-25 VITALS
HEIGHT: 66 IN | SYSTOLIC BLOOD PRESSURE: 157 MMHG | DIASTOLIC BLOOD PRESSURE: 75 MMHG | HEART RATE: 61 BPM | BODY MASS INDEX: 29.02 KG/M2 | WEIGHT: 180.56 LBS

## 2020-09-25 VITALS — HEIGHT: 66 IN | WEIGHT: 180.56 LBS | BODY MASS INDEX: 29.02 KG/M2

## 2020-09-25 DIAGNOSIS — Z72.0 TOBACCO USE: ICD-10-CM

## 2020-09-25 DIAGNOSIS — E78.5 HYPERLIPIDEMIA LDL GOAL <70: Chronic | ICD-10-CM

## 2020-09-25 DIAGNOSIS — I73.9 PERIPHERAL VASCULAR DISEASE: Chronic | ICD-10-CM

## 2020-09-25 DIAGNOSIS — Z95.1 HX OF CABG: Chronic | ICD-10-CM

## 2020-09-25 DIAGNOSIS — M05.771 RHEUMATOID ARTHRITIS INVOLVING BOTH FEET WITH POSITIVE RHEUMATOID FACTOR: Primary | ICD-10-CM

## 2020-09-25 DIAGNOSIS — L93.0 DISCOID LUPUS ERYTHEMATOSUS: ICD-10-CM

## 2020-09-25 DIAGNOSIS — B35.1 DERMATOPHYTOSIS OF NAIL: ICD-10-CM

## 2020-09-25 DIAGNOSIS — I73.9 PERIPHERAL VASCULAR DISEASE: Primary | ICD-10-CM

## 2020-09-25 DIAGNOSIS — L84 CORN OR CALLUS: ICD-10-CM

## 2020-09-25 DIAGNOSIS — M05.772 RHEUMATOID ARTHRITIS INVOLVING BOTH FEET WITH POSITIVE RHEUMATOID FACTOR: Primary | ICD-10-CM

## 2020-09-25 DIAGNOSIS — M81.0 AGE-RELATED OSTEOPOROSIS WITHOUT CURRENT PATHOLOGICAL FRACTURE: ICD-10-CM

## 2020-09-25 LAB
ALBUMIN SERPL BCP-MCNC: 3.8 G/DL (ref 3.5–5.2)
ALP SERPL-CCNC: 190 U/L (ref 55–135)
ALT SERPL W/O P-5'-P-CCNC: 14 U/L (ref 10–44)
ANION GAP SERPL CALC-SCNC: 10 MMOL/L (ref 8–16)
AST SERPL-CCNC: 19 U/L (ref 10–40)
BILIRUB SERPL-MCNC: 0.4 MG/DL (ref 0.1–1)
BUN SERPL-MCNC: 14 MG/DL (ref 8–23)
CALCIUM SERPL-MCNC: 9.5 MG/DL (ref 8.7–10.5)
CHLORIDE SERPL-SCNC: 103 MMOL/L (ref 95–110)
CHOLEST SERPL-MCNC: 156 MG/DL (ref 120–199)
CHOLEST/HDLC SERPL: 3.1 {RATIO} (ref 2–5)
CO2 SERPL-SCNC: 30 MMOL/L (ref 23–29)
CREAT SERPL-MCNC: 0.8 MG/DL (ref 0.5–1.4)
EST. GFR  (AFRICAN AMERICAN): >60 ML/MIN/1.73 M^2
EST. GFR  (NON AFRICAN AMERICAN): >60 ML/MIN/1.73 M^2
GLUCOSE SERPL-MCNC: 85 MG/DL (ref 70–110)
HDLC SERPL-MCNC: 51 MG/DL (ref 40–75)
HDLC SERPL: 32.7 % (ref 20–50)
LDLC SERPL CALC-MCNC: 90 MG/DL (ref 63–159)
NONHDLC SERPL-MCNC: 105 MG/DL
POTASSIUM SERPL-SCNC: 4.7 MMOL/L (ref 3.5–5.1)
PROT SERPL-MCNC: 7.3 G/DL (ref 6–8.4)
SODIUM SERPL-SCNC: 143 MMOL/L (ref 136–145)
TRIGL SERPL-MCNC: 75 MG/DL (ref 30–150)

## 2020-09-25 PROCEDURE — 99214 OFFICE O/P EST MOD 30 MIN: CPT | Mod: HCNC,S$GLB,, | Performed by: INTERNAL MEDICINE

## 2020-09-25 PROCEDURE — 11721 DEBRIDE NAIL 6 OR MORE: CPT | Mod: 59,Q8,HCNC,S$GLB | Performed by: PODIATRIST

## 2020-09-25 PROCEDURE — 99999 PR PBB SHADOW E&M-EST. PATIENT-LVL III: ICD-10-PCS | Mod: PBBFAC,HCNC,, | Performed by: INTERNAL MEDICINE

## 2020-09-25 PROCEDURE — 80061 LIPID PANEL: CPT | Mod: HCNC

## 2020-09-25 PROCEDURE — 36415 COLL VENOUS BLD VENIPUNCTURE: CPT | Mod: HCNC

## 2020-09-25 PROCEDURE — 99214 PR OFFICE/OUTPT VISIT, EST, LEVL IV, 30-39 MIN: ICD-10-PCS | Mod: HCNC,S$GLB,, | Performed by: INTERNAL MEDICINE

## 2020-09-25 PROCEDURE — 99499 RISK ADDL DX/OHS AUDIT: ICD-10-PCS | Mod: HCNC,S$GLB,, | Performed by: INTERNAL MEDICINE

## 2020-09-25 PROCEDURE — 80053 COMPREHEN METABOLIC PANEL: CPT | Mod: HCNC

## 2020-09-25 PROCEDURE — 3078F DIAST BP <80 MM HG: CPT | Mod: HCNC,CPTII,S$GLB, | Performed by: PODIATRIST

## 2020-09-25 PROCEDURE — 99999 PR PBB SHADOW E&M-EST. PATIENT-LVL III: ICD-10-PCS | Mod: PBBFAC,HCNC,, | Performed by: PODIATRIST

## 2020-09-25 PROCEDURE — 3078F PR MOST RECENT DIASTOLIC BLOOD PRESSURE < 80 MM HG: ICD-10-PCS | Mod: HCNC,CPTII,S$GLB, | Performed by: PODIATRIST

## 2020-09-25 PROCEDURE — 11721 PR DEBRIDEMENT OF NAILS, 6 OR MORE: ICD-10-PCS | Mod: 59,Q8,HCNC,S$GLB | Performed by: PODIATRIST

## 2020-09-25 PROCEDURE — 1159F MED LIST DOCD IN RCRD: CPT | Mod: HCNC,S$GLB,, | Performed by: PODIATRIST

## 2020-09-25 PROCEDURE — 99213 OFFICE O/P EST LOW 20 MIN: CPT | Mod: 25,HCNC,S$GLB, | Performed by: PODIATRIST

## 2020-09-25 PROCEDURE — 11056 PARNG/CUTG B9 HYPRKR LES 2-4: CPT | Mod: Q8,HCNC,S$GLB, | Performed by: PODIATRIST

## 2020-09-25 PROCEDURE — 99213 PR OFFICE/OUTPT VISIT, EST, LEVL III, 20-29 MIN: ICD-10-PCS | Mod: 25,HCNC,S$GLB, | Performed by: PODIATRIST

## 2020-09-25 PROCEDURE — 99999 PR PBB SHADOW E&M-EST. PATIENT-LVL III: CPT | Mod: PBBFAC,HCNC,, | Performed by: INTERNAL MEDICINE

## 2020-09-25 PROCEDURE — 1101F PT FALLS ASSESS-DOCD LE1/YR: CPT | Mod: HCNC,CPTII,S$GLB, | Performed by: PODIATRIST

## 2020-09-25 PROCEDURE — 99499 UNLISTED E&M SERVICE: CPT | Mod: HCNC,S$GLB,, | Performed by: INTERNAL MEDICINE

## 2020-09-25 PROCEDURE — 1101F PR PT FALLS ASSESS DOC 0-1 FALLS W/OUT INJ PAST YR: ICD-10-PCS | Mod: HCNC,CPTII,S$GLB, | Performed by: PODIATRIST

## 2020-09-25 PROCEDURE — 3077F PR MOST RECENT SYSTOLIC BLOOD PRESSURE >= 140 MM HG: ICD-10-PCS | Mod: HCNC,CPTII,S$GLB, | Performed by: PODIATRIST

## 2020-09-25 PROCEDURE — 99999 PR PBB SHADOW E&M-EST. PATIENT-LVL III: CPT | Mod: PBBFAC,HCNC,, | Performed by: PODIATRIST

## 2020-09-25 PROCEDURE — 1159F PR MEDICATION LIST DOCUMENTED IN MEDICAL RECORD: ICD-10-PCS | Mod: HCNC,S$GLB,, | Performed by: PODIATRIST

## 2020-09-25 PROCEDURE — 11056 PR TRIM BENIGN HYPERKERATOTIC SKIN LESION,2-4: ICD-10-PCS | Mod: Q8,HCNC,S$GLB, | Performed by: PODIATRIST

## 2020-09-25 PROCEDURE — 99213 OFFICE O/P EST LOW 20 MIN: CPT | Mod: PBBFAC,HCNC | Performed by: INTERNAL MEDICINE

## 2020-09-25 PROCEDURE — 3077F SYST BP >= 140 MM HG: CPT | Mod: HCNC,CPTII,S$GLB, | Performed by: PODIATRIST

## 2020-09-25 NOTE — PROGRESS NOTES
CC:  Chief Complaint   Patient presents with    Rheumatoid Arthritis    Discoid lupus     History of Present Illness:    Here for routine f/u of sero positive RA and ? Discoid  lupus and lichen planus overlap   Rf/CCP + ve    KLYE negative  Still continues to smoke      In the past  she was noted to have  Lichen planus  lesions in the right hand and right wrist and she was seen by Dermatology  She was referred to Hand surgery for excision  Per her lesions came back positive for malignancy, she cannot say what kind of malignancy   she sees Dr. Rivas at [facility] and is currently on chemo for sq cell ca of Rt hand    denies any new joint pain or stiffness or joint swelling    She completed HD vit d supp, levels good now   She is advised to take vitamin-D 2000 units a day, further    Plaquenil had to be stopped per Ophthalmology recommendations  Then she was started on methotrexate but however she developed allergy reaction with rash and stopped   then was advised sulfasalazine which she never started as she was anxious,  Then diagnosed with skin cance  She also has chronic pain in the back, which is stable  She sees pain management gets Norco, takes it as needed and this is helpful  Denies any fever, chills, weight loss, dysuria, oral ulcers, chest pain, shortness of breath    History  Looks like she was diagnosed with discoid lupus vs lichen planus at [facility], from review of records looks like she has been on prednisone, Plaquenil, methotrexate in the past   Looks like the lesions were unresponsive to methotrexate and she was started on CellCept and later Imuran.  She had a rash on both CellCept and Imuran and they were discontinued  More recently she had a biopsy of right palm lesion by Dermatology which was suggestive of lichen planus , she is on topicals per Dermatology  Most recent labs reveal negative KYLE, normal ESR/CRP,     Initial visit  :  She has seen Dr URIARTE in the past   She is currently on plaquenil  200 mg bid   Last seen here in 10/2016   She is currently on plaquenil 200 mg bid , doing well   On it for > 10 years now   Denies any joint swelling'  No am stiffness   She was seen by opthalmologist Dr Gonsales , who recommended she be considered something else in place of plaquenil  Due to ongoing issues with vision   Rash inv face / hands/ forearms / feet , some on legs stable     She was seen by  in 2014 for Discoid lupus and was lost for follow up. In that visit , since she was having joint pains,  also checked for rheumatoid arthritis which came back highly positive for RF/CCP    Past Medical History:   Diagnosis Date    Acute coronary syndrome     Anxiety     Bilateral carotid artery stenosis 11/17/2015    Chronic pain     Colon polyp     Coronary artery disease     GERD (gastroesophageal reflux disease)     Hyperlipidemia     Hypertension     Hypothyroidism     Low back pain     Lupus     Osteoporosis     Poor circulation     PVD (peripheral vascular disease)     S/P peripheral artery angioplasty 02/13/2014    SCCA (squamous cell carcinoma) of skin 10/2019    Dr. ZANDRA Rivas--oncology    Skin disease          Past Surgical History:   Procedure Laterality Date    COLONOSCOPY N/A 1/4/2017    Procedure: COLONOSCOPY;  Surgeon: Sylvester Arboleda III, MD;  Location: [facility] ENDO;  Service: Endoscopy;  Laterality: N/A;    COLONOSCOPY N/A 8/4/2020    Procedure: COLONOSCOPY;  Surgeon: Sylvester Arboleda III, MD;  Location: [facility] ENDO;  Service: Endoscopy;  Laterality: N/A;    CORONARY ANGIOPLASTY      CORONARY ARTERY BYPASS GRAFT      HYSTERECTOMY      OOPHORECTOMY      THYROIDECTOMY           Social History     Tobacco Use    Smoking status: Current Every Day Smoker     Packs/day: 0.25     Years: 40.00     Pack years: 10.00     Types: Cigarettes     Start date: 1961    Smokeless tobacco: Never Used    Tobacco comment: entered smoking cessation program 1/13/20    Substance Use  Topics    Alcohol use: No     Frequency: Never     Drinks per session: 1 or 2     Binge frequency: Never    Drug use: No       Family History   Problem Relation Age of Onset    Melanoma Neg Hx     Psoriasis Neg Hx     Lupus Neg Hx     Eczema Neg Hx        Review of patient's allergies indicates:  No Known Allergies          Review of Systems:  Constitutional: Denies fever, chills. No recent weight changes.   Fatigue: no  Muscle weakness: no  Headaches: no new headaches  Eyes: No redness or dryness.  No recent visual changes.  ENT: Denies dry mouth. No oral or nasal ulcers.  Card: No chest pain.  Resp: No cough or sob.   Gastro: No nausea or vomiting.  No heartburn.  Constipation: no  Diarrhea: no  Genito:  No dysuria.  No genital ulcers.  Skin:per hpi   Raynauds:no  Neuro: No numbness / tingling.   Psych: No depression, anxiety  Endo:  no excess thirst.  Heme: no abnormal bleeding or bruising  Clots:none     OBJECTIVE:     Vital Signs   Vitals:    09/25/20 1010   BP: (!) 157/75   Pulse: 61     Physical Exam:  General Appearance:  NAD.   Gait: not favoring.  HEENT: PERRL.  Eyes not dry or injected.  No nasal ulcers.  Mouth not dry, no oral lesions.  Lymph: cervical, supraclavicular or axillary nodes: none abnormal   Cardio: no irregularity of S1 or S2.  No gallops or rubs.   Resp: Normal respiratory motion. Clear to auscultation bilaterally.   No abnormal chest conformation.  Abd: Soft, non-tender, nondistended.  No masses.   Skin: Head and neck,  and extremities examined.   Dry scaly lesions on  palmar aspect of rt hand , dryness noted on left palm as well     Old scars on arms, feet , some on legs   Face - hyperpigmentation ,   Neuro: Ox3.   Cranial nerves II-XII grossly intact.   Sensation intact  in both distal LE and upper extremities to light touch.  Musculoskeletal Exam:    Right Side Rheumatological Exam     2,3 MCP fullness , no tenderness noted, ulnar deviation   Multiple dry scaly  lesions/hyperkeratotic lesions  noted on the right palm    Others :    Ankle :no synovitis   Foot: edematous , with callouses on ventral aspect ,     Left Side Rheumatological Exam     2,3 MCP fullness , no tenderness noted      Others :    Ankle :no synovitis   Foot: edematous     Spine :TTP lower lumbar region      Muscle strength:Equal and full in all mm groups of the upper and lower ext.    Results for NO JARVIS (MRN [id number]) as of 7/31/2018 12:54   Ref. Range 9/11/2014 13:46 10/26/2016 10:38   CCP Antibodies Latest Ref Range: <5.0 U/mL 194.4 (H) 387.2 (H)   Rheumatoid Factor Latest Ref Range: 0.0 - 15.0 IU/mL 56.0 (H) 123.0 (H)     KYLE - negative   CMP  Sodium   Date Value Ref Range Status   08/20/2020 142 136 - 145 mmol/L Final     Potassium   Date Value Ref Range Status   08/20/2020 4.2 3.5 - 5.1 mmol/L Final     Chloride   Date Value Ref Range Status   08/20/2020 105 95 - 110 mmol/L Final     CO2   Date Value Ref Range Status   08/20/2020 28 23 - 29 mmol/L Final     Glucose   Date Value Ref Range Status   08/20/2020 83 70 - 110 mg/dL Final     BUN, Bld   Date Value Ref Range Status   08/20/2020 15 8 - 23 mg/dL Final     Creatinine   Date Value Ref Range Status   08/20/2020 0.8 0.5 - 1.4 mg/dL Final     Calcium   Date Value Ref Range Status   08/20/2020 9.7 8.7 - 10.5 mg/dL Final     Total Protein   Date Value Ref Range Status   06/17/2020 7.3 6.0 - 8.4 g/dL Final     Albumin   Date Value Ref Range Status   06/17/2020 3.8 3.5 - 5.2 g/dL Final     Total Bilirubin   Date Value Ref Range Status   06/17/2020 0.3 0.1 - 1.0 mg/dL Final     Comment:     For infants and newborns, interpretation of results should be based  on gestational age, weight and in agreement with clinical  observations.  Premature Infant recommended reference ranges:  Up to 24 hours.............<8.0 mg/dL  Up to 48 hours............<12.0 mg/dL  3-5 days..................<15.0 mg/dL  6-29 days.................<15.0 mg/dL        Alkaline Phosphatase   Date Value Ref Range Status   06/17/2020 174 (H) 55 - 135 U/L Final     AST   Date Value Ref Range Status   06/17/2020 19 10 - 40 U/L Final     ALT   Date Value Ref Range Status   06/17/2020 17 10 - 44 U/L Final     Anion Gap   Date Value Ref Range Status   08/20/2020 9 8 - 16 mmol/L Final     eGFR if    Date Value Ref Range Status   08/20/2020 >60.0 >60 mL/min/1.73 m^2 Final     eGFR if non    Date Value Ref Range Status   08/20/2020 >60.0 >60 mL/min/1.73 m^2 Final     Comment:     Calculation used to obtain the estimated glomerular filtration  rate (eGFR) is the CKD-EPI equation.        Imaging : x ray foot :  Mild calcaneal spurring. No acute fracture. No dislocation. Minimal degenerative change first metatarsophalangeal joint.    Notes reviewed  Other procedures:    ASSESSMENT/PLAN:     Rheumatoid arthritis involving both feet with positive rheumatoid factor    Tobacco use    Age-related osteoporosis without current pathological fracture  -     Vitamin D; Standing  -     PTH, intact; Future; Expected date: 09/25/2020    RA/ discoid lupus/lichen planus overlap :     Neg KYLE   + RF/ +CCP    in remission now.  On chemo for skin cancer    Plaquenil had to be discontinued per Ophthalmology recommendation  methotrexate to be stopped now due to worsening of facial rash  Rash with CellCept and Imuran in the past   never started sulfasalazine as recommended and she was anxious about the side effects again  No synovitis noted today, only chronic swelling  Asymptomatic denies any pain or stiffness    With newly diagnosed skin cancer, not sure what type and is on chemo now  Will hold off on any new immunosuppressants at this point   advised her to get me records from Dr. Rivas    H/o discoid lupus:  Do not see any lesions suggestive of discoid lupus at this point  KYLE negative  Normal complements and UA negative for protein  Will monitor for now off  Plaquenil  Plaquenil stopped due to ophthalmology recs     Lichen planus involving right hand :  With underlying malignancy status post recent excision biopsy  Follow-up per Dr. Rivas for  chemo    Osteoporosis  :  Deferred decision on Prolia again today  Stay on vitamin-D 49073 units a week    Chronic mild elevation in alkaline phosphatase:  Asymptomatic, stable  Recommend follow-up with PCP    Mild elevated PTH with history of vitamin-D deficiency  Stable levels  Normal calcium  Vitamin-D levels normal now  Completed high-dose supplementation, recommend she stays on 2000 units a day  Check PTH, vitamin-D with next labs    No smoking    6  Month return    Went over uptodate information /literature on the meds prescribed today     Disclaimer: This note was prepared using voice recognition system and is likely to have sound alike errors and is not proof read.  Please call me with any questions.

## 2020-09-25 NOTE — PROGRESS NOTES
PODIATRY NOTE    CHIEF COMPLAINT  Chief Complaint   Patient presents with    Routine Foot Care     PCP Dr. Ovalle 9/04/20 3 mo [facility]       HPI  SUBJECTIVE: Flory Cam is a 75 y.o. female w/ PMH of PVD, Lupus, hx of intermittent leg claudifcation- s/p vascular intervention with much improvement in symptoms and other medical problems who presents to clinic for high risk  foot exam and care.Patient admits to painful toenails and calluses aggravated by increased weight bearing, shoe gear, and pressure. States pain is relieved with routine debridements. She is currently undergoing chemo therapy. no new complaints. She notes marked improvement in calluses since starting chemotherapy. She denies any N/V/F. Patient has no other pedal complaints at this time.    HgA1c:   Hemoglobin A1C   Date Value Ref Range Status   08/04/2017 5.8 (H) 4.0 - 5.6 % Final     Comment:     According to ADA guidelines, hemoglobin A1c <7.0% represents  optimal control in non-pregnant diabetic patients. Different  metrics may apply to specific patient populations.   Standards of Medical Care in Diabetes-2016.  For the purpose of screening for the presence of diabetes:  <5.7%     Consistent with the absence of diabetes  5.7-6.4%  Consistent with increasing risk for diabetes   (prediabetes)  >or=6.5%  Consistent with diabetes  Currently, no consensus exists for use of hemoglobin A1c  for diagnosis of diabetes for children.  This Hemoglobin A1c assay has significant interference with fetal   hemoglobin   (HbF). The results are invalid for patients with abnormal amounts of   HbF,   including those with known Hereditary Persistence   of Fetal Hemoglobin. Heterozygous hemoglobin variants (HbAS, HbAC,   HbAD, HbAE, HbA2) do not significantly interfere with this assay;   however, presence of multiple variants in a sample may impact the %   interference.     09/14/2016 6.2 4.5 - 6.2 % Final     Comment:     According to ADA guidelines,  "hemoglobin A1C <7.0% represents  optimal control in non-pregnant diabetic patients.  Different  metrics may apply to specific populations.   Standards of Medical Care in Diabetes - 2016.  For the purpose of screening for the presence of diabetes:  <5.7%     Consistent with the absence of diabetes  5.7-6.4%  Consistent with increasing risk for diabetes   (prediabetes)  >or=6.5%  Consistent with diabetes  Currently no consensus exists for use of hemoglobin A1C  for diagnosis of diabetes for children.     04/13/2010 5.9 4.0 - 6.2 % Final       REVIEW OF SYSTEMS  General: Denies any fever or chills  Chest: Denies shortness of breath, wheezing, coughing, or sputum production  Heart: Denies chest pain.  As noted above and per history of current illness above, otherwise negative in the remainder of the 14 systems.     PHYSICAL EXAM  Vitals:    09/25/20 1115   Weight: 81.9 kg (180 lb 8.9 oz)   Height: 5' 6" (1.676 m)       GEN:  This patient is well-developed, well-nourished and appears stated age, well-oriented to person, place and time, and cooperative and pleasant on today's visit.      LOWER EXTREMITY  Vascular:   · DP pedal pulse 0/4 b/l, PT pedal pulse 1/4 b/l  · Skin temperature warm to warm from prox to distally  · CFT <5 secs b/l  · There is LE edema noted b/l.     Dermatologic:   · Thickened, dystrophic, elongated toenails with subungal debris 1-5 b/l.   · Webspaces are C/D/I B/L.  · There is hyperkeratotic tissue noted plantar aspect of b/l feet at medial arch x 2  · Skin texture and turgor dry with hyperkeratosis diffusely b/l plantar heel   · There is no pedal hair growth noted    Neurologic:  · Vibratory sensation diminished at level of Hallux IPJ b/l    · Protective sensation absent at 0/10 sites upon examination with Doyle Weinsten 5.07 g monofilament.   · Propioception intact at 1st MTPJ b/l.   · Achilles and patellar deep tendon reflexes intact  · Babinski reflex absent b/l. Light touch and sharp/dull " sensation intact b/l.    Musculoskeletal/Orthopedic:  · No symptomatic structural abnormalities noted.   · Muscle strength is 5/5 for foot inverters, everters, plantarflexors, and dorsiflexors. Muscle tone is normal.  · Pain free range of motion in all four quadrants with stiffness and limitation b/l  · PAIN with palpation of callus plantar medial arch, LEFT    ASSESSMENT  Encounter Diagnoses   Name Primary?    Peripheral vascular disease Yes    Discoid lupus erythematosus     Dermatophytosis of nail     Corn or callus        Plan:  -Discuss presenting problems, etiology, pathologic processes and management options with patient today.     -With patient's permission, the elongated onychomycotic toenails, as outlined in the physical examination, were sharply debrided with a double action nail nipper to their soft tissue attachment. If indicated, the nails were then smoothed down in thickness with an sotero board or dremmel to facilitate in further debridement removing all offending nail and subungual debris.  The elongated nails, as outlined in the objective portion of this note, were trimmed to appropriate length, with a double action nail nipper, for alleviation/reduction of pains as well. Patient relates relief following the procedure.       -With patient's permission via verbal consent, the involved area was cleansed with an alcohol swab. Trimming of hyperkeratotic lesions deep to epidermal layer x 2 was performed with a #15 blade without incident. Patient relates relief following the procedure. Patient will continue to monitor the areas daily, inspect feet, wear protective shoe gear when ambulatory, moisturizer to maintain skin integrity.    -Recommendations on purchase of Urea 40 and/or Amlactin was provided for calluses. Metatarsal pad dispensed and patient was instructed on how to apply for offloading callus. Shoe recommendations provided for accomodative foot wear.    Patient has above noted diagnosis  and/or medical co-morbidities.They are being treated and managed by their  Primary Care Physician for management of comorbid states which are deemed to be currently stable.          Future Appointments   Date Time Provider Department Center   10/2/2020  4:00 PM Rita Lee PA-C [facility]   12/8/2020  9:40 [facility]   1/7/2021 11:30 AM Melva Nettles DPM [facility] [place]   3/18/2021  9:00 AM LABORATORY, JAQUELINE JETER [facility] [place]   3/25/2021 10:45 AM Christopher Cohen MD [facility]   3/30/2021 12:30 PM Yeimy Monteiro MD [facility]

## 2020-09-28 ENCOUNTER — TELEPHONE (OUTPATIENT)
Dept: INTERNAL MEDICINE | Facility: CLINIC | Age: 75
End: 2020-09-28

## 2020-09-28 NOTE — TELEPHONE ENCOUNTER
----- Message from La Adan MA sent at 9/28/2020  4:31 PM CDT -----  Regarding: FW: will her paper work be mailed or will she have to pick it up to be mailed  Contact: pt    ----- Message -----  From: Kizzy Gelelr  Sent: 9/28/2020   4:20 PM CDT  To: Vasquez Lester Staff  Subject: will her paper work be mailed or will she ha#    Caller is requesting a call back regarding will your office mail her paperwork or will caller need to pick it up and mail it.  Please call back at [phone].  Thanks.

## 2020-09-29 ENCOUNTER — TELEPHONE (OUTPATIENT)
Dept: INTERNAL MEDICINE | Facility: CLINIC | Age: 75
End: 2020-09-29

## 2020-09-29 ENCOUNTER — TELEPHONE (OUTPATIENT)
Dept: CARDIOLOGY | Facility: CLINIC | Age: 75
End: 2020-09-29

## 2020-09-29 NOTE — TELEPHONE ENCOUNTER
LVM for patient to call the office regarding labs.      ----- Message from Christopher Cohen MD sent at 9/28/2020 10:30 PM CDT -----  Needs better compliance

## 2020-09-29 NOTE — TELEPHONE ENCOUNTER
Pt needs an in-clinic appt tomorrow at 11am with Dr. Ovalle for paperwork.    Pt appt on hold.      Please and thank you.

## 2020-09-30 ENCOUNTER — TELEPHONE (OUTPATIENT)
Dept: INTERNAL MEDICINE | Facility: CLINIC | Age: 75
End: 2020-09-30

## 2020-09-30 NOTE — TELEPHONE ENCOUNTER
Please schedule this pt for Friday 10/02/2020 at 3:20pm with Dr. Ovalle for paperwork. Pt had to cancel appt today due to lack of ride. Pt has an appt Friday to see Gogo Norris.

## 2020-10-02 ENCOUNTER — TELEPHONE (OUTPATIENT)
Dept: INTERNAL MEDICINE | Facility: CLINIC | Age: 75
End: 2020-10-02

## 2020-10-02 NOTE — TELEPHONE ENCOUNTER
Pt needs the appt rescheduled for 10/06/2020 at 10am, appt on hold pt having transportation issues.     appt for paperwork/physical.

## 2020-10-05 ENCOUNTER — TELEPHONE (OUTPATIENT)
Dept: CARDIOLOGY | Facility: CLINIC | Age: 75
End: 2020-10-05

## 2020-10-05 NOTE — TELEPHONE ENCOUNTER
Returned patient's call and informed her of her lab results and that per Dr. Cohen,she needs to be more compliant. Patient verbalized an understanding.    ----- Message from Steffi Levine sent at 10/5/2020 11:37 AM CDT -----  Contact: Flory  Type:  Patient Returning Call    Who Called: Flory   Who Left Message for Patient: Unknown   Does the patient know what this is regarding?: Unknown   Would the patient rather a call back or a response via [employer]? Call back   Best Call Back Number: [phone]  Additional Information:       Thanks,  Abundio

## 2020-10-06 ENCOUNTER — OFFICE VISIT (OUTPATIENT)
Dept: INTERNAL MEDICINE | Facility: CLINIC | Age: 75
End: 2020-10-06
Payer: MEDICARE

## 2020-10-06 VITALS
HEART RATE: 74 BPM | HEIGHT: 66 IN | SYSTOLIC BLOOD PRESSURE: 138 MMHG | BODY MASS INDEX: 29.23 KG/M2 | OXYGEN SATURATION: 95 % | DIASTOLIC BLOOD PRESSURE: 68 MMHG | TEMPERATURE: 100 F | WEIGHT: 181.88 LBS

## 2020-10-06 DIAGNOSIS — R11.0 NAUSEA: ICD-10-CM

## 2020-10-06 DIAGNOSIS — E03.9 HYPOTHYROIDISM (ACQUIRED): ICD-10-CM

## 2020-10-06 DIAGNOSIS — Z71.85 IMMUNIZATION COUNSELING: ICD-10-CM

## 2020-10-06 DIAGNOSIS — Z74.1 REQUIRES ASSISTANCE WITH ACTIVITIES OF DAILY LIVING (ADL): Primary | ICD-10-CM

## 2020-10-06 DIAGNOSIS — M81.0 AGE-RELATED OSTEOPOROSIS WITHOUT CURRENT PATHOLOGICAL FRACTURE: ICD-10-CM

## 2020-10-06 DIAGNOSIS — I10 ESSENTIAL HYPERTENSION: ICD-10-CM

## 2020-10-06 DIAGNOSIS — Z12.31 ENCOUNTER FOR SCREENING MAMMOGRAM FOR MALIGNANT NEOPLASM OF BREAST: ICD-10-CM

## 2020-10-06 DIAGNOSIS — E78.5 HYPERLIPIDEMIA LDL GOAL <70: Chronic | ICD-10-CM

## 2020-10-06 PROCEDURE — 99999 PR PBB SHADOW E&M-EST. PATIENT-LVL IV: ICD-10-PCS | Mod: PBBFAC,HCNC,, | Performed by: INTERNAL MEDICINE

## 2020-10-06 PROCEDURE — 99999 PR PBB SHADOW E&M-EST. PATIENT-LVL IV: CPT | Mod: PBBFAC,HCNC,, | Performed by: INTERNAL MEDICINE

## 2020-10-06 PROCEDURE — 3075F SYST BP GE 130 - 139MM HG: CPT | Mod: HCNC,CPTII,S$GLB, | Performed by: INTERNAL MEDICINE

## 2020-10-06 PROCEDURE — 99214 PR OFFICE/OUTPT VISIT, EST, LEVL IV, 30-39 MIN: ICD-10-PCS | Mod: HCNC,S$GLB,, | Performed by: INTERNAL MEDICINE

## 2020-10-06 PROCEDURE — 1159F MED LIST DOCD IN RCRD: CPT | Mod: HCNC,S$GLB,, | Performed by: INTERNAL MEDICINE

## 2020-10-06 PROCEDURE — 3288F PR FALLS RISK ASSESSMENT DOCUMENTED: ICD-10-PCS | Mod: HCNC,CPTII,S$GLB, | Performed by: INTERNAL MEDICINE

## 2020-10-06 PROCEDURE — 3288F FALL RISK ASSESSMENT DOCD: CPT | Mod: HCNC,CPTII,S$GLB, | Performed by: INTERNAL MEDICINE

## 2020-10-06 PROCEDURE — 3078F PR MOST RECENT DIASTOLIC BLOOD PRESSURE < 80 MM HG: ICD-10-PCS | Mod: HCNC,CPTII,S$GLB, | Performed by: INTERNAL MEDICINE

## 2020-10-06 PROCEDURE — 1100F PTFALLS ASSESS-DOCD GE2>/YR: CPT | Mod: HCNC,CPTII,S$GLB, | Performed by: INTERNAL MEDICINE

## 2020-10-06 PROCEDURE — 3075F PR MOST RECENT SYSTOLIC BLOOD PRESS GE 130-139MM HG: ICD-10-PCS | Mod: HCNC,CPTII,S$GLB, | Performed by: INTERNAL MEDICINE

## 2020-10-06 PROCEDURE — 3078F DIAST BP <80 MM HG: CPT | Mod: HCNC,CPTII,S$GLB, | Performed by: INTERNAL MEDICINE

## 2020-10-06 PROCEDURE — 99214 OFFICE O/P EST MOD 30 MIN: CPT | Mod: HCNC,S$GLB,, | Performed by: INTERNAL MEDICINE

## 2020-10-06 PROCEDURE — 1159F PR MEDICATION LIST DOCUMENTED IN MEDICAL RECORD: ICD-10-PCS | Mod: HCNC,S$GLB,, | Performed by: INTERNAL MEDICINE

## 2020-10-06 PROCEDURE — 1100F PR PT FALLS ASSESS DOC 2+ FALLS/FALL W/INJURY/YR: ICD-10-PCS | Mod: HCNC,CPTII,S$GLB, | Performed by: INTERNAL MEDICINE

## 2020-10-06 RX ORDER — ONDANSETRON 4 MG/1
4 TABLET, FILM COATED ORAL EVERY 8 HOURS PRN
Qty: 15 TABLET | Refills: 0 | Status: SHIPPED | OUTPATIENT
Start: 2020-10-06 | End: 2021-06-24 | Stop reason: SDUPTHER

## 2020-10-06 NOTE — PROGRESS NOTES
Subjective:       Patient ID: Flory Cam is a 75 y.o. female.    Chief Complaint: Paperwork and Annual Exam    Flory Cam  75 y.o. Black or  female     Patient presents with:  Paperwork  Annual Exam    HPI: Here today for an annual physical. She needs paperwork completed so she can have assistance in her home. She needs help with her ADL's. Specifically she needs help with cooking and cleaning. She also needs someone to help with shopping.   She continues to have nausea from time to time and needs a refill on Zofran.   HTN--stable on valsartan and amlodipine.    HLD--compliant with atorvastatin.                      LDLCALC                  90.0                09/25/2020                 CHOL                     156                 09/25/2020                 TRIG                     75                  09/25/2020                 HDL                      51                  09/25/2020                 ALT                      14                  09/25/2020                 AST                      19                  09/25/2020                 NA                       143                 09/25/2020                 K                        4.7                 09/25/2020                 CL                       103                 09/25/2020                 CREATININE               0.8                 09/25/2020                 BUN                      14                  09/25/2020                 CO2                      30 (H)              09/25/2020     Hypothyroidism--compliant with levothyroxine.               TSH                      1.775               06/17/2020            Osteoporosis--she is not on treatment. She needs a repeat DEXA.     DEXA SCAN due on 10/25/2020  Pneumococcal Vaccine (65+ Low/Medium Risk)(1 of 2 - PCV13) due on 10/26/2020   Aspirin/Antiplatelet Therapy due on 09/25/2021  Lipid Panel due on 09/25/2021  Colonoscopy due on 08/04/2025  TETANUS VACCINE due on  02/21/2027  Hepatitis C Screening Completed    Past Medical History:  Acute coronary syndrome  Anxiety  11/17/2015: Bilateral carotid artery stenosis  Chronic pain  Colon polyp  Coronary artery disease  GERD (gastroesophageal reflux disease)  Hyperlipidemia  Hypertension  Hypothyroidism  Low back pain  Lupus  Osteoporosis  Poor circulation  PVD (peripheral vascular disease)  02/13/2014: S/P peripheral artery angioplasty  10/2019: SCCA (squamous cell carcinoma) of skin      Comment:  Dr. ZANDRA Rivas--oncology  Skin disease    Past Surgical History:  1/4/2017: COLONOSCOPY; N/A      Comment:  Procedure: COLONOSCOPY;  Surgeon: Sylvester Arboleda III, MD;  Location: [facility] ENDO;  Service: Endoscopy;                 Laterality: N/A;  8/4/2020: COLONOSCOPY; N/A      Comment:  Procedure: COLONOSCOPY;  Surgeon: Sylvester Arboleda III, MD;  Location: [facility] ENDO;  Service: Endoscopy;                 Laterality: N/A;  CORONARY ANGIOPLASTY  CORONARY ARTERY BYPASS GRAFT  HYSTERECTOMY  OOPHORECTOMY  THYROIDECTOMY    Review of patient's family history indicates:  Problem: Melanoma      Relation: Neg Hx          Age of Onset: (Not Specified)  Problem: Psoriasis      Relation: Neg Hx          Age of Onset: (Not Specified)  Problem: Lupus      Relation: Neg Hx          Age of Onset: (Not Specified)  Problem: Eczema      Relation: Neg Hx          Age of Onset: (Not Specified)    Current Outpatient Medications on File Prior to Visit:  albuterol (PROVENTIL/VENTOLIN HFA) 90 mcg/actuation inhaler, Inhale 2 puffs into the lungs every 6 (six) hours as needed for Wheezing., Disp: 18 g, Rfl: 3  amLODIPine (NORVASC) 10 MG tablet, TAKE 1 TABLET BY MOUTH EVERY DAY, Disp: 30 tablet, Rfl: 6  atorvastatin (LIPITOR) 80 MG tablet, TAKE ONE TABLET BY MOUTH DAILY, Disp: 90 tablet, Rfl: 1  clopidogreL (PLAVIX) 75 mg tablet, TAKE 1 TABLET(75 MG) BY MOUTH EVERY DAY, Disp: 90 tablet, Rfl: 1  ezetimibe (ZETIA) 10 mg tablet, Take 1  tablet (10 mg total) by mouth once daily., Disp: 90 tablet, Rfl: 1  levothyroxine (SYNTHROID) 137 MCG Tab tablet, TAKE ONE TABLET BY MOUTH ONCE DAILY, Disp: 90 tablet, Rfl: 1  oxyCODONE-acetaminophen (PERCOCET)  mg per tablet, , Disp: , Rfl:   prochlorperazine (COMPAZINE) 10 MG tablet, , Disp: , Rfl:   traZODone (DESYREL) 50 MG tablet, TAKE 1 TABLET BY MOUTH EVERY DAY AT NIGHT AS NEEDED FOR INSOMNIA, Disp: 30 tablet, Rfl: 3  valsartan (DIOVAN) 320 MG tablet, Take 1 tablet (320 mg total) by mouth once daily., Disp: 90 tablet, Rfl: 3  ondansetron (ZOFRAN) 4 MG tablet, Take 1 tablet (4 mg total) by mouth every 8 (eight) hours as needed for Nausea., Disp: 15 tablet, Rfl: 0    Allergies:  Review of patient's allergies indicates:  No Known Allergies              Review of Systems   Constitutional: Negative for fever and unexpected weight change.   Respiratory: Negative for shortness of breath.    Cardiovascular: Negative for chest pain.   Gastrointestinal: Positive for nausea. Negative for abdominal pain and vomiting.   Genitourinary: Negative for difficulty urinating.   Musculoskeletal: Positive for arthralgias.   Neurological: Negative for dizziness and headaches.   Psychiatric/Behavioral: Negative for dysphoric mood. The patient is nervous/anxious.          Objective:      Physical Exam  Vitals signs reviewed.   Constitutional:       General: She is not in acute distress.     Appearance: She is well-developed. She is not ill-appearing.   Cardiovascular:      Rate and Rhythm: Normal rate.   Pulmonary:      Effort: Pulmonary effort is normal. No respiratory distress.   Neurological:      Mental Status: She is alert and oriented to person, place, and time.   Psychiatric:         Behavior: Behavior normal.         Thought Content: Thought content normal.         Judgment: Judgment normal.         Assessment:       1. Requires assistance with activities of daily living (ADL)    2. Nausea    3. Essential hypertension     4. Hyperlipidemia LDL goal <70    5. Hypothyroidism (acquired)    6. Age-related osteoporosis without current pathological fracture    7. Encounter for screening mammogram for malignant neoplasm of breast        Plan:       Flory was seen today for paperwork and annual exam.    Diagnoses and all orders for this visit:    Requires assistance with activities of daily living (ADL)  -     Paperwork completed    Nausea  -     Refill ondansetron (ZOFRAN) 4 MG tablet; Take 1 tablet (4 mg total) by mouth every 8 (eight) hours as needed for Nausea.    Essential hypertension  -     Continue current management    Hyperlipidemia LDL goal <70  -     Continue atorvastatin    Hypothyroidism (acquired)  -     Continue levothyroxine    Age-related osteoporosis without current pathological fracture  -     DXA Bone Density Spine And Hip; Future    Encounter for screening mammogram for malignant neoplasm of breast  -     Mammo Digital Screening Bilat w/ Jus; Future    Immunization counseling  -     Recommend influenza vaccine    F/U in December as previously scheduled.

## 2020-10-19 ENCOUNTER — HOSPITAL ENCOUNTER (INPATIENT)
Facility: HOSPITAL | Age: 75
LOS: 2 days | Discharge: HOME | DRG: 305 | End: 2020-10-21
Attending: EMERGENCY MEDICINE | Admitting: INTERNAL MEDICINE
Payer: MEDICARE

## 2020-10-19 ENCOUNTER — TELEPHONE (OUTPATIENT)
Dept: INTERNAL MEDICINE | Facility: CLINIC | Age: 75
End: 2020-10-19

## 2020-10-19 DIAGNOSIS — I21.4 NSTEMI (NON-ST ELEVATED MYOCARDIAL INFARCTION): ICD-10-CM

## 2020-10-19 DIAGNOSIS — R79.89 ELEVATED TROPONIN: ICD-10-CM

## 2020-10-19 DIAGNOSIS — R94.31 EKG, ABNORMAL: ICD-10-CM

## 2020-10-19 DIAGNOSIS — I10 HYPERTENSION: ICD-10-CM

## 2020-10-19 DIAGNOSIS — I16.1 HYPERTENSIVE EMERGENCY: Primary | ICD-10-CM

## 2020-10-19 DIAGNOSIS — R79.89 TROPONIN LEVEL ELEVATED: ICD-10-CM

## 2020-10-19 DIAGNOSIS — I10 ACCELERATED HYPERTENSION: ICD-10-CM

## 2020-10-19 LAB
ALBUMIN SERPL BCP-MCNC: 3.9 G/DL (ref 3.5–5.2)
ALP SERPL-CCNC: 183 U/L (ref 55–135)
ALT SERPL W/O P-5'-P-CCNC: 18 U/L (ref 10–44)
ANION GAP SERPL CALC-SCNC: 5 MMOL/L (ref 8–16)
AST SERPL-CCNC: 22 U/L (ref 10–40)
BASOPHILS # BLD AUTO: 0.05 K/UL (ref 0–0.2)
BASOPHILS NFR BLD: 0.5 % (ref 0–1.9)
BILIRUB SERPL-MCNC: 0.3 MG/DL (ref 0.1–1)
BUN SERPL-MCNC: 16 MG/DL (ref 8–23)
CALCIUM SERPL-MCNC: 9.7 MG/DL (ref 8.7–10.5)
CHLORIDE SERPL-SCNC: 103 MMOL/L (ref 95–110)
CO2 SERPL-SCNC: 31 MMOL/L (ref 23–29)
CREAT SERPL-MCNC: 0.8 MG/DL (ref 0.5–1.4)
DIFFERENTIAL METHOD: NORMAL
EOSINOPHIL # BLD AUTO: 0.4 K/UL (ref 0–0.5)
EOSINOPHIL NFR BLD: 4 % (ref 0–8)
ERYTHROCYTE [DISTWIDTH] IN BLOOD BY AUTOMATED COUNT: 14.3 % (ref 11.5–14.5)
EST. GFR  (AFRICAN AMERICAN): >60 ML/MIN/1.73 M^2
EST. GFR  (NON AFRICAN AMERICAN): >60 ML/MIN/1.73 M^2
GLUCOSE SERPL-MCNC: 90 MG/DL (ref 70–110)
HCT VFR BLD AUTO: 44.6 % (ref 37–48.5)
HGB BLD-MCNC: 14.3 G/DL (ref 12–16)
IMM GRANULOCYTES # BLD AUTO: 0.03 K/UL (ref 0–0.04)
IMM GRANULOCYTES NFR BLD AUTO: 0.3 % (ref 0–0.5)
LYMPHOCYTES # BLD AUTO: 3.5 K/UL (ref 1–4.8)
LYMPHOCYTES NFR BLD: 33.3 % (ref 18–48)
MCH RBC QN AUTO: 30.4 PG (ref 27–31)
MCHC RBC AUTO-ENTMCNC: 32.1 G/DL (ref 32–36)
MCV RBC AUTO: 95 FL (ref 82–98)
MONOCYTES # BLD AUTO: 0.9 K/UL (ref 0.3–1)
MONOCYTES NFR BLD: 8.6 % (ref 4–15)
NEUTROPHILS # BLD AUTO: 5.7 K/UL (ref 1.8–7.7)
NEUTROPHILS NFR BLD: 53.3 % (ref 38–73)
NRBC BLD-RTO: 0 /100 WBC
PLATELET # BLD AUTO: 281 K/UL (ref 150–350)
PMV BLD AUTO: 9.9 FL (ref 9.2–12.9)
POTASSIUM SERPL-SCNC: 3.9 MMOL/L (ref 3.5–5.1)
PROT SERPL-MCNC: 7.6 G/DL (ref 6–8.4)
RBC # BLD AUTO: 4.7 M/UL (ref 4–5.4)
SODIUM SERPL-SCNC: 139 MMOL/L (ref 136–145)
TROPONIN I SERPL DL<=0.01 NG/ML-MCNC: 0.22 NG/ML (ref 0–0.03)
WBC # BLD AUTO: 10.61 K/UL (ref 3.9–12.7)

## 2020-10-19 PROCEDURE — U0002 COVID-19 LAB TEST NON-CDC: HCPCS | Mod: HCNC

## 2020-10-19 PROCEDURE — 85025 COMPLETE CBC W/AUTO DIFF WBC: CPT | Mod: HCNC

## 2020-10-19 PROCEDURE — 93010 EKG 12-LEAD: ICD-10-PCS | Mod: HCNC,,, | Performed by: INTERNAL MEDICINE

## 2020-10-19 PROCEDURE — 11000001 HC ACUTE MED/SURG PRIVATE ROOM: Mod: HCNC

## 2020-10-19 PROCEDURE — 93005 ELECTROCARDIOGRAM TRACING: CPT | Mod: HCNC

## 2020-10-19 PROCEDURE — 84484 ASSAY OF TROPONIN QUANT: CPT | Mod: HCNC

## 2020-10-19 PROCEDURE — 99291 CRITICAL CARE FIRST HOUR: CPT | Mod: 25,HCNC

## 2020-10-19 PROCEDURE — 93010 ELECTROCARDIOGRAM REPORT: CPT | Mod: HCNC,,, | Performed by: INTERNAL MEDICINE

## 2020-10-19 PROCEDURE — 25000003 PHARM REV CODE 250: Mod: HCNC | Performed by: NURSE PRACTITIONER

## 2020-10-19 PROCEDURE — 80053 COMPREHEN METABOLIC PANEL: CPT | Mod: HCNC

## 2020-10-19 PROCEDURE — 36415 COLL VENOUS BLD VENIPUNCTURE: CPT | Mod: HCNC

## 2020-10-19 RX ORDER — CLONIDINE HYDROCHLORIDE 0.1 MG/1
0.1 TABLET ORAL
Status: COMPLETED | OUTPATIENT
Start: 2020-10-19 | End: 2020-10-19

## 2020-10-19 RX ORDER — ASPIRIN 325 MG
325 TABLET ORAL
Status: COMPLETED | OUTPATIENT
Start: 2020-10-20 | End: 2020-10-19

## 2020-10-19 RX ADMIN — ASPIRIN 325 MG ORAL TABLET 325 MG: 325 PILL ORAL at 11:10

## 2020-10-19 RX ADMIN — CLONIDINE HYDROCHLORIDE 0.1 MG: 0.1 TABLET ORAL at 10:10

## 2020-10-19 NOTE — TELEPHONE ENCOUNTER
Pt requesting something for her knee pain, she tried to make appt with you but you are booked. Pt also stated could you order an rollerlater  walker for her.

## 2020-10-19 NOTE — TELEPHONE ENCOUNTER
----- Message from Frank Boyer sent at 10/19/2020 12:08 PM CDT -----  Contact: self  Would like  to consult with nurse regarding a few questions.  Please contact Flory Cam @ [phone].  Thanks/As

## 2020-10-20 PROBLEM — I21.4 NSTEMI (NON-ST ELEVATED MYOCARDIAL INFARCTION): Status: ACTIVE | Noted: 2020-10-20

## 2020-10-20 PROBLEM — I10 ACCELERATED HYPERTENSION: Status: ACTIVE | Noted: 2020-10-20

## 2020-10-20 PROBLEM — I16.1 HYPERTENSIVE EMERGENCY: Status: ACTIVE | Noted: 2020-10-20

## 2020-10-20 LAB
AORTIC ROOT ANNULUS: 3.4 CM
APTT BLDCRRT: 25.2 SEC (ref 21–32)
APTT BLDCRRT: 30.1 SEC (ref 21–32)
APTT BLDCRRT: 49 SEC (ref 21–32)
APTT BLDCRRT: 57.5 SEC (ref 21–32)
ASCENDING AORTA: 3.96 CM
AV INDEX (PROSTH): 0.62
AV MEAN GRADIENT: 12 MMHG
AV PEAK GRADIENT: 21 MMHG
AV VALVE AREA: 1.99 CM2
AV VELOCITY RATIO: 0.49
BASOPHILS # BLD AUTO: 0.05 K/UL (ref 0–0.2)
BASOPHILS # BLD AUTO: 0.05 K/UL (ref 0–0.2)
BASOPHILS NFR BLD: 0.4 % (ref 0–1.9)
BASOPHILS NFR BLD: 0.5 % (ref 0–1.9)
BSA FOR ECHO PROCEDURE: 1.97 M2
CHOLEST SERPL-MCNC: 110 MG/DL (ref 120–199)
CHOLEST/HDLC SERPL: 2.3 {RATIO} (ref 2–5)
CK SERPL-CCNC: 104 U/L (ref 20–180)
CV ECHO LV RWT: 0.65 CM
DIFFERENTIAL METHOD: NORMAL
DIFFERENTIAL METHOD: NORMAL
DOP CALC AO PEAK VEL: 2.28 M/S
DOP CALC AO VTI: 47.83 CM
DOP CALC LVOT AREA: 3.2 CM2
DOP CALC LVOT DIAMETER: 2.03 CM
DOP CALC LVOT PEAK VEL: 1.12 M/S
DOP CALC LVOT STROKE VOLUME: 95.37 CM3
DOP CALC RVOT PEAK VEL: 0.68 M/S
DOP CALC RVOT VTI: 19.03 CM
DOP CALCLVOT PEAK VEL VTI: 29.48 CM
E WAVE DECELERATION TIME: 282.69 MSEC
E/A RATIO: 1.56
E/E' RATIO: 10.22 M/S
ECHO LV POSTERIOR WALL: 1.56 CM (ref 0.6–1.1)
EOSINOPHIL # BLD AUTO: 0.4 K/UL (ref 0–0.5)
EOSINOPHIL # BLD AUTO: 0.4 K/UL (ref 0–0.5)
EOSINOPHIL NFR BLD: 3.2 % (ref 0–8)
EOSINOPHIL NFR BLD: 3.4 % (ref 0–8)
ERYTHROCYTE [DISTWIDTH] IN BLOOD BY AUTOMATED COUNT: 14.4 % (ref 11.5–14.5)
ERYTHROCYTE [DISTWIDTH] IN BLOOD BY AUTOMATED COUNT: 14.5 % (ref 11.5–14.5)
FRACTIONAL SHORTENING: 33 % (ref 28–44)
HCT VFR BLD AUTO: 38.8 % (ref 37–48.5)
HCT VFR BLD AUTO: 43 % (ref 37–48.5)
HDLC SERPL-MCNC: 48 MG/DL (ref 40–75)
HDLC SERPL: 43.6 % (ref 20–50)
HGB BLD-MCNC: 12.6 G/DL (ref 12–16)
HGB BLD-MCNC: 14 G/DL (ref 12–16)
IMM GRANULOCYTES # BLD AUTO: 0.04 K/UL (ref 0–0.04)
IMM GRANULOCYTES # BLD AUTO: 0.04 K/UL (ref 0–0.04)
IMM GRANULOCYTES NFR BLD AUTO: 0.3 % (ref 0–0.5)
IMM GRANULOCYTES NFR BLD AUTO: 0.4 % (ref 0–0.5)
INR PPP: 1 (ref 0.8–1.2)
INTERVENTRICULAR SEPTUM: 1.68 CM (ref 0.6–1.1)
IVRT: 82.78 MSEC
LA MAJOR: 4.43 CM
LA MINOR: 3.2 CM
LA WIDTH: 4.45 CM
LDLC SERPL CALC-MCNC: 52.4 MG/DL (ref 63–159)
LEFT ATRIUM SIZE: 3.26 CM
LEFT ATRIUM VOLUME INDEX: 23.8 ML/M2
LEFT ATRIUM VOLUME: 45.82 CM3
LEFT INTERNAL DIMENSION IN SYSTOLE: 3.21 CM (ref 2.1–4)
LEFT VENTRICLE DIASTOLIC VOLUME INDEX: 56.31 ML/M2
LEFT VENTRICLE DIASTOLIC VOLUME: 108.46 ML
LEFT VENTRICLE MASS INDEX: 178 G/M2
LEFT VENTRICLE SYSTOLIC VOLUME INDEX: 21.5 ML/M2
LEFT VENTRICLE SYSTOLIC VOLUME: 41.32 ML
LEFT VENTRICULAR INTERNAL DIMENSION IN DIASTOLE: 4.82 CM (ref 3.5–6)
LEFT VENTRICULAR MASS: 343.07 G
LV LATERAL E/E' RATIO: 8.36 M/S
LV SEPTAL E/E' RATIO: 13.14 M/S
LYMPHOCYTES # BLD AUTO: 3.5 K/UL (ref 1–4.8)
LYMPHOCYTES # BLD AUTO: 3.8 K/UL (ref 1–4.8)
LYMPHOCYTES NFR BLD: 29.1 % (ref 18–48)
LYMPHOCYTES NFR BLD: 35.2 % (ref 18–48)
MCH RBC QN AUTO: 30.7 PG (ref 27–31)
MCH RBC QN AUTO: 30.8 PG (ref 27–31)
MCHC RBC AUTO-ENTMCNC: 32.5 G/DL (ref 32–36)
MCHC RBC AUTO-ENTMCNC: 32.6 G/DL (ref 32–36)
MCV RBC AUTO: 94 FL (ref 82–98)
MCV RBC AUTO: 95 FL (ref 82–98)
MONOCYTES # BLD AUTO: 1 K/UL (ref 0.3–1)
MONOCYTES # BLD AUTO: 1 K/UL (ref 0.3–1)
MONOCYTES NFR BLD: 8.1 % (ref 4–15)
MONOCYTES NFR BLD: 9.2 % (ref 4–15)
MV PEAK A VEL: 0.59 M/S
MV PEAK E VEL: 0.92 M/S
MV STENOSIS PRESSURE HALF TIME: 81.98 MS
MV VALVE AREA P 1/2 METHOD: 2.68 CM2
NEUTROPHILS # BLD AUTO: 5.5 K/UL (ref 1.8–7.7)
NEUTROPHILS # BLD AUTO: 7.1 K/UL (ref 1.8–7.7)
NEUTROPHILS NFR BLD: 51.3 % (ref 38–73)
NEUTROPHILS NFR BLD: 58.9 % (ref 38–73)
NONHDLC SERPL-MCNC: 62 MG/DL
NRBC BLD-RTO: 0 /100 WBC
NRBC BLD-RTO: 0 /100 WBC
PISA TR MAX VEL: 3.75 M/S
PLATELET # BLD AUTO: 248 K/UL (ref 150–350)
PLATELET # BLD AUTO: 285 K/UL (ref 150–350)
PMV BLD AUTO: 10.1 FL (ref 9.2–12.9)
PMV BLD AUTO: 10.3 FL (ref 9.2–12.9)
PROTHROMBIN TIME: 10.3 SEC (ref 9–12.5)
PV MEAN GRADIENT: 1.22 MMHG
RA MAJOR: 3.23 CM
RA PRESSURE: 3 MMHG
RBC # BLD AUTO: 4.11 M/UL (ref 4–5.4)
RBC # BLD AUTO: 4.54 M/UL (ref 4–5.4)
SARS-COV-2 RDRP RESP QL NAA+PROBE: NEGATIVE
SINUS: 3.57 CM
STJ: 3.39 CM
TDI LATERAL: 0.11 M/S
TDI SEPTAL: 0.07 M/S
TDI: 0.09 M/S
TR MAX PG: 56 MMHG
TRICUSPID ANNULAR PLANE SYSTOLIC EXCURSION: 1.76 CM
TRIGL SERPL-MCNC: 48 MG/DL (ref 30–150)
TROPONIN I SERPL DL<=0.01 NG/ML-MCNC: 0.19 NG/ML (ref 0–0.03)
TROPONIN I SERPL DL<=0.01 NG/ML-MCNC: 0.24 NG/ML (ref 0–0.03)
TV REST PULMONARY ARTERY PRESSURE: 59 MMHG
WBC # BLD AUTO: 10.71 K/UL (ref 3.9–12.7)
WBC # BLD AUTO: 11.98 K/UL (ref 3.9–12.7)

## 2020-10-20 PROCEDURE — 84484 ASSAY OF TROPONIN QUANT: CPT | Mod: 91,HCNC

## 2020-10-20 PROCEDURE — 82550 ASSAY OF CK (CPK): CPT | Mod: HCNC

## 2020-10-20 PROCEDURE — 85025 COMPLETE CBC W/AUTO DIFF WBC: CPT | Mod: 91,HCNC

## 2020-10-20 PROCEDURE — 96365 THER/PROPH/DIAG IV INF INIT: CPT

## 2020-10-20 PROCEDURE — 36415 COLL VENOUS BLD VENIPUNCTURE: CPT | Mod: HCNC

## 2020-10-20 PROCEDURE — 25000003 PHARM REV CODE 250: Mod: HCNC | Performed by: INTERNAL MEDICINE

## 2020-10-20 PROCEDURE — 99223 1ST HOSP IP/OBS HIGH 75: CPT | Mod: 25,HCNC,, | Performed by: INTERNAL MEDICINE

## 2020-10-20 PROCEDURE — 25000003 PHARM REV CODE 250: Mod: HCNC | Performed by: NURSE PRACTITIONER

## 2020-10-20 PROCEDURE — 93010 EKG 12-LEAD: ICD-10-PCS | Mod: HCNC,,, | Performed by: INTERNAL MEDICINE

## 2020-10-20 PROCEDURE — 63600175 PHARM REV CODE 636 W HCPCS: Mod: HCNC | Performed by: EMERGENCY MEDICINE

## 2020-10-20 PROCEDURE — 93010 ELECTROCARDIOGRAM REPORT: CPT | Mod: HCNC,,, | Performed by: INTERNAL MEDICINE

## 2020-10-20 PROCEDURE — 25000003 PHARM REV CODE 250: Mod: HCNC | Performed by: EMERGENCY MEDICINE

## 2020-10-20 PROCEDURE — 99223 PR INITIAL HOSPITAL CARE,LEVL III: ICD-10-PCS | Mod: 25,HCNC,, | Performed by: INTERNAL MEDICINE

## 2020-10-20 PROCEDURE — 85730 THROMBOPLASTIN TIME PARTIAL: CPT | Mod: HCNC

## 2020-10-20 PROCEDURE — 85730 THROMBOPLASTIN TIME PARTIAL: CPT | Mod: 91,HCNC

## 2020-10-20 PROCEDURE — 96366 THER/PROPH/DIAG IV INF ADDON: CPT

## 2020-10-20 PROCEDURE — 93005 ELECTROCARDIOGRAM TRACING: CPT | Mod: HCNC

## 2020-10-20 PROCEDURE — 85610 PROTHROMBIN TIME: CPT | Mod: HCNC

## 2020-10-20 PROCEDURE — 80061 LIPID PANEL: CPT | Mod: HCNC

## 2020-10-20 PROCEDURE — 21400001 HC TELEMETRY ROOM: Mod: HCNC

## 2020-10-20 RX ORDER — ONDANSETRON 2 MG/ML
4 INJECTION INTRAMUSCULAR; INTRAVENOUS EVERY 8 HOURS PRN
Status: DISCONTINUED | OUTPATIENT
Start: 2020-10-20 | End: 2020-10-21 | Stop reason: HOSPADM

## 2020-10-20 RX ORDER — CLONIDINE HYDROCHLORIDE 0.2 MG/1
0.2 TABLET ORAL EVERY 6 HOURS PRN
Status: DISCONTINUED | OUTPATIENT
Start: 2020-10-20 | End: 2020-10-21 | Stop reason: HOSPADM

## 2020-10-20 RX ORDER — ASPIRIN 81 MG/1
81 TABLET ORAL DAILY
Status: DISCONTINUED | OUTPATIENT
Start: 2020-10-20 | End: 2020-10-21 | Stop reason: HOSPADM

## 2020-10-20 RX ORDER — CLOPIDOGREL BISULFATE 75 MG/1
75 TABLET ORAL DAILY
Status: DISCONTINUED | OUTPATIENT
Start: 2020-10-20 | End: 2020-10-21 | Stop reason: HOSPADM

## 2020-10-20 RX ORDER — IPRATROPIUM BROMIDE AND ALBUTEROL SULFATE 2.5; .5 MG/3ML; MG/3ML
3 SOLUTION RESPIRATORY (INHALATION) EVERY 4 HOURS PRN
Status: DISCONTINUED | OUTPATIENT
Start: 2020-10-20 | End: 2020-10-21 | Stop reason: HOSPADM

## 2020-10-20 RX ORDER — ATORVASTATIN CALCIUM 40 MG/1
80 TABLET, FILM COATED ORAL DAILY
Status: DISCONTINUED | OUTPATIENT
Start: 2020-10-20 | End: 2020-10-21 | Stop reason: HOSPADM

## 2020-10-20 RX ORDER — NITROGLYCERIN 0.4 MG/1
0.4 TABLET SUBLINGUAL EVERY 5 MIN PRN
Status: DISCONTINUED | OUTPATIENT
Start: 2020-10-20 | End: 2020-10-21 | Stop reason: HOSPADM

## 2020-10-20 RX ORDER — OXYCODONE AND ACETAMINOPHEN 10; 325 MG/1; MG/1
1 TABLET ORAL EVERY 6 HOURS PRN
Status: DISCONTINUED | OUTPATIENT
Start: 2020-10-20 | End: 2020-10-21 | Stop reason: HOSPADM

## 2020-10-20 RX ORDER — TRAZODONE HYDROCHLORIDE 50 MG/1
50 TABLET ORAL NIGHTLY PRN
Status: DISCONTINUED | OUTPATIENT
Start: 2020-10-20 | End: 2020-10-21 | Stop reason: HOSPADM

## 2020-10-20 RX ORDER — AMLODIPINE BESYLATE 5 MG/1
10 TABLET ORAL DAILY
Status: DISCONTINUED | OUTPATIENT
Start: 2020-10-20 | End: 2020-10-20

## 2020-10-20 RX ORDER — BISACODYL 10 MG
10 SUPPOSITORY, RECTAL RECTAL DAILY PRN
Status: DISCONTINUED | OUTPATIENT
Start: 2020-10-20 | End: 2020-10-21 | Stop reason: HOSPADM

## 2020-10-20 RX ORDER — HEPARIN SODIUM,PORCINE/D5W 25000/250
12 INTRAVENOUS SOLUTION INTRAVENOUS CONTINUOUS
Status: DISCONTINUED | OUTPATIENT
Start: 2020-10-20 | End: 2020-10-21

## 2020-10-20 RX ORDER — EZETIMIBE 10 MG/1
10 TABLET ORAL DAILY
Status: DISCONTINUED | OUTPATIENT
Start: 2020-10-20 | End: 2020-10-21 | Stop reason: HOSPADM

## 2020-10-20 RX ORDER — CANDESARTAN 16 MG/1
16 TABLET ORAL 2 TIMES DAILY
Status: DISCONTINUED | OUTPATIENT
Start: 2020-10-20 | End: 2020-10-21 | Stop reason: HOSPADM

## 2020-10-20 RX ORDER — AMLODIPINE BESYLATE 10 MG/1
10 TABLET ORAL DAILY
Status: DISCONTINUED | OUTPATIENT
Start: 2020-10-20 | End: 2020-10-21 | Stop reason: HOSPADM

## 2020-10-20 RX ORDER — VALSARTAN 160 MG/1
320 TABLET ORAL DAILY
Status: DISCONTINUED | OUTPATIENT
Start: 2020-10-20 | End: 2020-10-20 | Stop reason: CLARIF

## 2020-10-20 RX ORDER — ACETAMINOPHEN 325 MG/1
650 TABLET ORAL EVERY 6 HOURS PRN
Status: DISCONTINUED | OUTPATIENT
Start: 2020-10-20 | End: 2020-10-21 | Stop reason: HOSPADM

## 2020-10-20 RX ADMIN — HEPARIN SODIUM 12 UNITS/KG/HR: 10000 INJECTION, SOLUTION INTRAVENOUS at 02:10

## 2020-10-20 RX ADMIN — ATORVASTATIN CALCIUM 80 MG: 40 TABLET, FILM COATED ORAL at 01:10

## 2020-10-20 RX ADMIN — EZETIMIBE 10 MG: 10 TABLET ORAL at 10:10

## 2020-10-20 RX ADMIN — HEPARIN SODIUM 15 UNITS/KG/HR: 10000 INJECTION, SOLUTION INTRAVENOUS at 02:10

## 2020-10-20 RX ADMIN — OXYCODONE HYDROCHLORIDE AND ACETAMINOPHEN 1 TABLET: 10; 325 TABLET ORAL at 09:10

## 2020-10-20 RX ADMIN — LEVOTHYROXINE SODIUM 137 MCG: 25 TABLET ORAL at 08:10

## 2020-10-20 RX ADMIN — CANDESARTAN CILEXETIL 16 MG: 16 TABLET ORAL at 10:10

## 2020-10-20 RX ADMIN — CLOPIDOGREL 75 MG: 75 TABLET, FILM COATED ORAL at 10:10

## 2020-10-20 RX ADMIN — ASPIRIN 81 MG: 81 TABLET, COATED ORAL at 10:10

## 2020-10-20 RX ADMIN — CLONIDINE HYDROCHLORIDE 0.2 MG: 0.2 TABLET ORAL at 08:10

## 2020-10-20 RX ADMIN — AMLODIPINE BESYLATE 10 MG: 10 TABLET ORAL at 01:10

## 2020-10-20 RX ADMIN — HEPARIN SODIUM 15 UNITS/KG/HR: 10000 INJECTION, SOLUTION INTRAVENOUS at 04:10

## 2020-10-20 NOTE — ED PROVIDER NOTES
10/19/2020, 9:58 PM   History obtained from the patient       History of Present Illness: Flory Cam is a 75 y.o. female patient presenting with htn and headache    9:59 PM: Pt was placed back in [facility]. I explained to pt that due to lack of available rooms pt was seen by myself to begin the workup. Pt understands they will be seen and dispositioned by the next available physician. Pt voices understanding and agrees with plan. Pt also understands the longer than normal wait time. I am removing myself from the care of pt and pt will now be assigned to the next available physician.     SCRIBE #1 NOTE: I, Uriel Christianson, am scribing for, and in the presence of, Axel Alexander Jr., MD. I have scribed the entire note.      History      Chief Complaint   Patient presents with    Headache     States headache began this afternoon.  States seen here for same in past.  Denies nausea       Review of patient's allergies indicates:  No Known Allergies     HPI   HPI    10/19/2020, 11:48 PM   History obtained from the patient      History of Present Illness: Flory Cam is a 75 y.o. female patient with a PMHx of HTN, HLD who presents to the Emergency Department for headache, onset several hours PTA. Symptoms are constant and moderate in severity. No mitigating or exacerbating factors reported. Associated sxs include cough. Patient denies any fever, chills, n/v/d, SOB, CP, weakness, numbness, dizziness, headache, and all other sxs at this time. No prior Tx reported. No further complaints or concerns at this time.     Arrival mode: Personal vehicle    PCP: Tayler Ovalle DO       Past Medical History:  Past Medical History:   Diagnosis Date    Acute coronary syndrome     Anxiety     Bilateral carotid artery stenosis 11/17/2015    Chronic pain     Colon polyp     Coronary artery disease     GERD (gastroesophageal reflux disease)     Hyperlipidemia     Hypertension     Hypothyroidism     Low back pain      Lupus     Osteoporosis     Poor circulation     PVD (peripheral vascular disease)     S/P peripheral artery angioplasty 02/13/2014    SCCA (squamous cell carcinoma) of skin 10/2019    Dr. ZANDRA Rivas--oncology    Skin disease        Past Surgical History:  Past Surgical History:   Procedure Laterality Date    COLONOSCOPY N/A 1/4/2017    Procedure: COLONOSCOPY;  Surgeon: Sylvester Arboleda III, MD;  Location: [facility] ENDO;  Service: Endoscopy;  Laterality: N/A;    COLONOSCOPY N/A 8/4/2020    Procedure: COLONOSCOPY;  Surgeon: Sylvester Arboleda III, MD;  Location: [facility] ENDO;  Service: Endoscopy;  Laterality: N/A;    CORONARY ANGIOPLASTY      CORONARY ARTERY BYPASS GRAFT      HYSTERECTOMY      OOPHORECTOMY      THYROIDECTOMY           Family History:  Family History   Problem Relation Age of Onset    Melanoma Neg Hx     Psoriasis Neg Hx     Lupus Neg Hx     Eczema Neg Hx        Social History:  Social History     Tobacco Use    Smoking status: Current Every Day Smoker     Packs/day: 0.25     Years: 40.00     Pack years: 10.00     Types: Cigarettes     Start date: 1961    Smokeless tobacco: Never Used    Tobacco comment: entered smoking cessation program 1/13/20    Substance and Sexual Activity    Alcohol use: No     Frequency: Never     Drinks per session: 1 or 2     Binge frequency: Never    Drug use: No    Sexual activity: Not on file       ROS   Review of Systems   Constitutional: Negative for chills and fever.   HENT: Negative for rhinorrhea and sore throat.    Respiratory: Positive for cough. Negative for shortness of breath.    Cardiovascular: Negative for chest pain.   Gastrointestinal: Negative for diarrhea, nausea and vomiting.   Genitourinary: Negative for dysuria.   Musculoskeletal: Negative for back pain.   Skin: Negative for rash.   Neurological: Positive for headaches. Negative for dizziness, seizures, weakness, light-headedness and numbness.   Hematological: Does not bruise/bleed easily.    All other systems reviewed and are negative.    Physical Exam      Initial Vitals [10/19/20 2118]   BP Pulse Resp Temp SpO2   (!) 204/87 90 20 99 °F (37.2 °C) (!) 94 %      MAP       --          Physical Exam  Nursing Notes and Vital Signs Reviewed.  Constitutional: Patient is in no acute distress. Well-developed and well-nourished.  Head: Atraumatic. Normocephalic.  Eyes: PERRL. EOM intact. Conjunctivae are not pale. No scleral icterus.  ENT: Mucous membranes are moist. Oropharynx is clear and symmetric.    Neck: Supple. Full ROM. No lymphadenopathy.  Cardiovascular: Regular rate. Regular rhythm. No murmurs, rubs, or gallops. Distal pulses are 2+ and symmetric.  Pulmonary/Chest: No respiratory distress. Clear to auscultation bilaterally. No wheezing or rales.  Abdominal: Soft and non-distended.  There is no tenderness.  No rebound, guarding, or rigidity. Good bowel sounds.  Musculoskeletal: Moves all extremities. No obvious deformities. No edema.  Skin: Warm and dry.  Neurological:  Alert, awake, and appropriate.  Normal speech.  No acute focal neurological deficits are appreciated.  Psychiatric: Normal affect. Good eye contact. Appropriate in content.    ED Course    Critical Care    Date/Time: 10/20/2020 1:14 AM  Performed by: Axel Alexander Jr., MD  Authorized by: Axel Alexander Jr., MD   Direct patient critical care time: 10 minutes  Additional history critical care time: 5 minutes  Ordering / reviewing critical care time: 10 minutes  Documentation critical care time: 10 minutes  Consulting other physicians critical care time: 5 minutes  Total critical care time (exclusive of procedural time) : 40 minutes  Critical care time was exclusive of separately billable procedures and treating other patients and teaching time.  Critical care was necessary to treat or prevent imminent or life-threatening deterioration of the following conditions: Hypertensive emergency, elevated troponin.  Critical care was time  "spent personally by me on the following activities: blood draw for specimens, development of treatment plan with patient or surrogate, discussions with consultants, interpretation of cardiac output measurements, evaluation of patient's response to treatment, examination of patient, obtaining history from patient or surrogate, ordering and performing treatments and interventions, ordering and review of laboratory studies, pulse oximetry and re-evaluation of patient's condition.        ED Vital Signs:  Vitals:    10/19/20 2118 10/19/20 2320 10/20/20 0100   BP: (!) 204/87 118/66 (!) 184/87   Pulse: 90 62 (!) 54   Resp: 20 20 19   Temp: 99 °F (37.2 °C)     TempSrc: Oral     SpO2: (!) 94% 96% 99%   Weight: 83 kg (183 lb 1.5 oz)     Height: 5' 6" (1.676 m)         Abnormal Lab Results:  Labs Reviewed   COMPREHENSIVE METABOLIC PANEL - Abnormal; Notable for the following components:       Result Value    CO2 31 (*)     Alkaline Phosphatase 183 (*)     Anion Gap 5 (*)     All other components within normal limits   TROPONIN I - Abnormal; Notable for the following components:    Troponin I 0.221 (*)     All other components within normal limits   CBC W/ AUTO DIFFERENTIAL   SARS-COV-2 RNA AMPLIFICATION, QUAL   APTT   APTT   PROTIME-INR   CBC W/ AUTO DIFFERENTIAL   CBC W/ AUTO DIFFERENTIAL        All Lab Results:  Results for orders placed or performed during the hospital encounter of 10/19/20   CBC auto differential   Result Value Ref Range    WBC 10.61 3.90 - 12.70 K/uL    RBC 4.70 4.00 - 5.40 M/uL    Hemoglobin 14.3 12.0 - 16.0 g/dL    Hematocrit 44.6 37.0 - 48.5 %    Mean Corpuscular Volume 95 82 - 98 fL    Mean Corpuscular Hemoglobin 30.4 27.0 - 31.0 pg    Mean Corpuscular Hemoglobin Conc 32.1 32.0 - 36.0 g/dL    RDW 14.3 11.5 - 14.5 %    Platelets 281 150 - 350 K/uL    MPV 9.9 9.2 - 12.9 fL    Immature Granulocytes 0.3 0.0 - 0.5 %    Gran # (ANC) 5.7 1.8 - 7.7 K/uL    Immature Grans (Abs) 0.03 0.00 - 0.04 K/uL    Lymph # " 3.5 1.0 - 4.8 K/uL    Mono # 0.9 0.3 - 1.0 K/uL    Eos # 0.4 0.0 - 0.5 K/uL    Baso # 0.05 0.00 - 0.20 K/uL    nRBC 0 0 /100 WBC    Gran% 53.3 38.0 - 73.0 %    Lymph% 33.3 18.0 - 48.0 %    Mono% 8.6 4.0 - 15.0 %    Eosinophil% 4.0 0.0 - 8.0 %    Basophil% 0.5 0.0 - 1.9 %    Differential Method Automated    Comprehensive metabolic panel   Result Value Ref Range    Sodium 139 136 - 145 mmol/L    Potassium 3.9 3.5 - 5.1 mmol/L    Chloride 103 95 - 110 mmol/L    CO2 31 (H) 23 - 29 mmol/L    Glucose 90 70 - 110 mg/dL    BUN, Bld 16 8 - 23 mg/dL    Creatinine 0.8 0.5 - 1.4 mg/dL    Calcium 9.7 8.7 - 10.5 mg/dL    Total Protein 7.6 6.0 - 8.4 g/dL    Albumin 3.9 3.5 - 5.2 g/dL    Total Bilirubin 0.3 0.1 - 1.0 mg/dL    Alkaline Phosphatase 183 (H) 55 - 135 U/L    AST 22 10 - 40 U/L    ALT 18 10 - 44 U/L    Anion Gap 5 (L) 8 - 16 mmol/L    eGFR if African American >60 >60 mL/min/1.73 m^2    eGFR if non African American >60 >60 mL/min/1.73 m^2   Troponin I   Result Value Ref Range    Troponin I 0.221 (H) 0.000 - 0.026 ng/mL   COVID-19 Rapid Screening   Result Value Ref Range    SARS-CoV-2 RNA, Amplification, Qual Negative Negative     Imaging Results:  Imaging Results    None        The EKG was ordered, reviewed, and independently interpreted by the ED provider.  Interpretation time: 22:10  Rate: 67 BPM  Rhythm: normal sinus rhythm  Interpretation: RBBB. Left anterior fascicular block. Bifascicular block. No STEMI.           The Emergency Provider reviewed the vital signs and test results, which are outlined above.    ED Discussion     1:18 AM: Discussed pt's case and plan for admission with Dr. Lagos (Cardiology), who agrees with plan of care and has no further recommendations at this time.    1:20 AM: Discussed case with Anaid Merchant NP (Hospital Medicine). Dr. Altman agrees with current care and management of pt and accepts admission.   Admitting Service: Hospital Medicine  Admitting Physician:   Anupama  Admit to: Obs Tele    1:21 AM: Re-evaluated pt. I have discussed test results, shared treatment plan, and the need for admission with patient and family at bedside. Pt and family express understanding at this time and agree with all information. All questions answered. Pt and family have no further questions or concerns at this time. Pt is ready for admit.    1:24 AM  Patient's blood pressure is now improved.  Patient presented with a markedly elevated blood pressure with abnormal EKG and elevated troponin.  She denies chest pain or shortness of breath.  Patient being admitted the hospital medicine with cardiology consultation on heparin drip for further evaluation of her hypertensive emergency.       ED Medication(s):  Medications   heparin 25,000 units in dextrose 5% (100 units/ml) IV bolus from bag INITIAL BOLUS (max bolus 4000 units) (has no administration in time range)   heparin 25,000 units in dextrose 5% 250 mL (100 units/mL) infusion LOW INTENSITY nomogram - [facility] (has no administration in time range)   heparin 25,000 units in dextrose 5% (100 units/ml) IV bolus from bag - ADDITIONAL PRN BOLUS - 60 units/kg (max bolus 4000 units) (has no administration in time range)   heparin 25,000 units in dextrose 5% (100 units/ml) IV bolus from bag - ADDITIONAL PRN BOLUS - 30 units/kg (max bolus 4000 units) (has no administration in time range)   cloNIDine tablet 0.1 mg (0.1 mg Oral Given 10/19/20 2221)   aspirin tablet 325 mg (325 mg Oral Given 10/19/20 2356)     New Prescriptions    No medications on file           Medical Decision Making    Medical Decision Making:   Clinical Tests:   Lab Tests: Ordered and Reviewed  Medical Tests: Ordered and Reviewed           Scribe Attestation:   Scribe #1: I performed the above scribed service and the documentation accurately describes the services I performed. I attest to the accuracy of the note.    Attending:   Physician Attestation Statement for Scribe #1: I,  Axel Alexander Jr., MD, personally performed the services described in this documentation, as scribed by Uriel Christianson, in my presence, and it is both accurate and complete.          Clinical Impression       ICD-10-CM ICD-9-CM   1. Hypertensive emergency  I16.1 401.9   2. Hypertension  I10 401.9   3. Troponin level elevated  R77.8 790.6   4. EKG, abnormal  R94.31 794.31       Disposition:   Disposition: Placed in Observation  Condition: Fair                   Axel Alexander Jr., MD  10/20/20 0125

## 2020-10-20 NOTE — ASSESSMENT & PLAN NOTE
- Initial troponin 0.221. EKG with new inferior T-wave inversions. Patient denies any CP or anginal equivalent.  - Continue ASA, Plavix, and high-intensity statin. No BB due to h/o bradycardia.  - Continue heparin drip per protocol.  - SL NTG PRN.  - Trend serial cardiac enzymes and EKG.  - 2D echo.  - Check FLP.  - Cardiology consult.    Echocardiogram pending

## 2020-10-20 NOTE — SUBJECTIVE & OBJECTIVE
Past Medical History:   Diagnosis Date    Acute coronary syndrome     Anxiety     Bilateral carotid artery stenosis 11/17/2015    Chronic pain     Colon polyp     Coronary artery disease     GERD (gastroesophageal reflux disease)     Hyperlipidemia     Hypertension     Hypothyroidism     Low back pain     Lupus     Osteoporosis     Poor circulation     PVD (peripheral vascular disease)     S/P peripheral artery angioplasty 02/13/2014    SCCA (squamous cell carcinoma) of skin 10/2019    Dr. ZANDRA Rivas--oncology    Skin disease        Past Surgical History:   Procedure Laterality Date    COLONOSCOPY N/A 1/4/2017    Procedure: COLONOSCOPY;  Surgeon: Sylvester Arboleda III, MD;  Location: [facility] ENDO;  Service: Endoscopy;  Laterality: N/A;    COLONOSCOPY N/A 8/4/2020    Procedure: COLONOSCOPY;  Surgeon: Sylvester Arboleda III, MD;  Location: [facility] ENDO;  Service: Endoscopy;  Laterality: N/A;    CORONARY ANGIOPLASTY      CORONARY ARTERY BYPASS GRAFT      HYSTERECTOMY      OOPHORECTOMY      THYROIDECTOMY         Review of patient's allergies indicates:  No Known Allergies    No current facility-administered medications on file prior to encounter.      Current Outpatient Medications on File Prior to Encounter   Medication Sig    amLODIPine (NORVASC) 10 MG tablet TAKE 1 TABLET BY MOUTH EVERY DAY    atorvastatin (LIPITOR) 80 MG tablet TAKE ONE TABLET BY MOUTH DAILY    clopidogreL (PLAVIX) 75 mg tablet TAKE 1 TABLET(75 MG) BY MOUTH EVERY DAY    ezetimibe (ZETIA) 10 mg tablet Take 1 tablet (10 mg total) by mouth once daily.    levothyroxine (SYNTHROID) 137 MCG Tab tablet TAKE ONE TABLET BY MOUTH ONCE DAILY    valsartan (DIOVAN) 320 MG tablet Take 1 tablet (320 mg total) by mouth once daily.    albuterol (PROVENTIL/VENTOLIN HFA) 90 mcg/actuation inhaler Inhale 2 puffs into the lungs every 6 (six) hours as needed for Wheezing.    ondansetron (ZOFRAN) 4 MG tablet Take 1 tablet (4 mg total) by mouth every  8 (eight) hours as needed for Nausea.    oxyCODONE-acetaminophen (PERCOCET)  mg per tablet     prochlorperazine (COMPAZINE) 10 MG tablet     traZODone (DESYREL) 50 MG tablet TAKE 1 TABLET BY MOUTH EVERY DAY AT NIGHT AS NEEDED FOR INSOMNIA     Family History     None        Tobacco Use    Smoking status: Current Every Day Smoker     Packs/day: 0.25     Years: 40.00     Pack years: 10.00     Types: Cigarettes     Start date: 1961    Smokeless tobacco: Never Used    Tobacco comment: entered smoking cessation program 1/13/20    Substance and Sexual Activity    Alcohol use: No     Frequency: Never     Drinks per session: 1 or 2     Binge frequency: Never    Drug use: No    Sexual activity: Not on file     Review of Systems   Constitution: Positive for malaise/fatigue.   HENT: Negative for hearing loss and hoarse voice.    Eyes: Negative for blurred vision and visual disturbance.   Cardiovascular: Negative for chest pain, claudication, dyspnea on exertion, irregular heartbeat, leg swelling, near-syncope, orthopnea, palpitations, paroxysmal nocturnal dyspnea and syncope.   Respiratory: Positive for cough. Negative for hemoptysis, shortness of breath, sleep disturbances due to breathing, snoring and wheezing.    Endocrine: Negative for cold intolerance and heat intolerance.   Hematologic/Lymphatic: Bruises/bleeds easily.   Skin: Negative for color change, dry skin and nail changes.   Musculoskeletal: Positive for arthritis. Negative for back pain, joint pain and myalgias.   Gastrointestinal: Negative for bloating, abdominal pain, constipation, nausea and vomiting.   Genitourinary: Negative for dysuria, flank pain, hematuria and hesitancy.   Neurological: Positive for headaches (resolved). Negative for light-headedness, loss of balance, numbness, paresthesias and weakness.   Psychiatric/Behavioral: Negative for altered mental status.   Allergic/Immunologic: Negative for environmental allergies.     Objective:      Vital Signs (Most Recent):  Temp: 97.6 °F (36.4 °C) (10/20/20 0827)  Pulse: 71 (10/20/20 0927)  Resp: 18 (10/20/20 0827)  BP: (!) 145/70 (10/20/20 0827)  SpO2: (!) 94 % (10/20/20 0827) Vital Signs (24h Range):  Temp:  [97.6 °F (36.4 °C)-99 °F (37.2 °C)] 97.6 °F (36.4 °C)  Pulse:  [52-90] 71  Resp:  [18-20] 18  SpO2:  [94 %-99 %] 94 %  BP: (118-204)/(57-87) 145/70     Weight: 83 kg (183 lb 1.5 oz)  Body mass index is 29.55 kg/m².    SpO2: (!) 94 %  O2 Device (Oxygen Therapy): room air    No intake or output data in the 24 hours ending 10/20/20 0958    Lines/Drains/Airways     Peripheral Intravenous Line                 Peripheral IV - Single Lumen 10/20/20 0200 22 G Right Antecubital less than 1 day         Peripheral IV - Single Lumen 10/20/20 0205 20 G Left Forearm less than 1 day                Physical Exam   Constitutional: She is oriented to person, place, and time. She appears well-developed and well-nourished. No distress.   HENT:   Head: Normocephalic and atraumatic.   Eyes: Pupils are equal, round, and reactive to light.   Neck: Normal range of motion and full passive range of motion without pain. Neck supple. No JVD present.   Cardiovascular: Regular rhythm, S1 normal, S2 normal and intact distal pulses. Bradycardia present. PMI is not displaced. Exam reveals no distant heart sounds.   No murmur heard.  Pulses:       Radial pulses are 2+ on the right side and 2+ on the left side.        Dorsalis pedis pulses are 2+ on the right side and 2+ on the left side.   CHEST PAIN FREE   Pulmonary/Chest: Effort normal and breath sounds normal. No accessory muscle usage. No respiratory distress. She has no decreased breath sounds. She has no wheezes. She has no rales.   +NONPRODUCTIVE COUGH   Abdominal: Soft. Bowel sounds are normal. She exhibits no distension.   Musculoskeletal: Normal range of motion.         General: No edema.      Right ankle: She exhibits no swelling.      Left ankle: She exhibits no  swelling.   Neurological: She is alert and oriented to person, place, and time.   Skin: Skin is warm and dry. She is not diaphoretic. No cyanosis. Nails show no clubbing.   Psychiatric: She has a normal mood and affect. Her speech is normal and behavior is normal. Judgment and thought content normal. Cognition and memory are normal.   Nursing note and vitals reviewed.      Significant Labs:   BMP:   Recent Labs   Lab 10/19/20  2230   GLU 90      K 3.9      CO2 31*   BUN 16   CREATININE 0.8   CALCIUM 9.7   , CBC   Recent Labs   Lab 10/19/20  2230 10/20/20  0202 10/20/20  0510   WBC 10.61 11.98 10.71   HGB 14.3 14.0 12.6   HCT 44.6 43.0 38.8    285 248   , Troponin   Recent Labs   Lab 10/19/20  2230 10/20/20  0510   TROPONINI 0.221* 0.240*    and All pertinent lab results from the last 24 hours have been reviewed.

## 2020-10-20 NOTE — HPI
Flory Cam is a 75 y.o. female patient with a PMHx of CAD with remote CABG, HTN, HLD, COPD, PVD, RISHI, RA, lupus, and tobacco use, who presented to the Emergency Department with c/o generalized headache that started several hours PTA. Symptoms are constant and moderate in severity. No mitigating or exacerbating factors reported. Associated sxs include nonproductive cough. Patient denies any fever, chills, n/v/d, SOB, CP, weakness, numbness, dizziness, headache, and all other sxs at this time. In the ED, patient was significantly hypertensive with /87. Clonidine 0.1 mg given, which improved BP to 167/78 and HA resolved. Work-up in the ED showed stable labs, except for troponin of 0.221. EKG with new T wave inversions in inferior leads. 325 mg ASA given in the ED. Case discussed with Cardiology, who recommended heparin drip. Hospital Medicine was contacted for admission and patient placed in Observation for NSTEMI.

## 2020-10-20 NOTE — CONSULTS
[facility]  Cardiology  Consult Note    Patient Name: Flory Cam  MRN: [id number]  Admission Date: 10/19/2020  Hospital Length of Stay: 0 days  Code Status: Full Code   Attending Provider: Yuri Altman MD   Consulting Provider: DELORES Lin  Primary Care Physician: Tayler Ovalle DO  Principal Problem:NSTEMI (non-ST elevated myocardial infarction)    Patient information was obtained from patient, past medical records and ER records.     Inpatient consult to Cardiology  Consult performed by: DELORES Newell  Consult ordered by: Anaid Merchant NP        Subjective:     Chief Complaint:  headache     HPI:   Flory Cam is a 75 year old female who presented to [facility] due to generalized headache PTA with associated nonproductive cough. Her current medical conditions include CAD s/p CABG, HTN, HLP, COPD, PVD, Carotid artery stenosis, RA, Lupus, Smoker. ED workup revealed /87 which improved to 167/78 after  Clonidine 0.1 mg with resolution of headache. EKG revealed new T wave inversions and patient was subsequently placed on IV heparin gtt. She was placed in observation under the care of hospital medicine. Cardiology consulted to assist with medical management. Chart reviewed, patient seen and examined. No chest pain or anginal equivalents. NO shortness of breath, LAURENT or palpitations. NO leg swelling or claudications. Echo pending. Further recs to follow.     Past Medical History:   Diagnosis Date    Acute coronary syndrome     Anxiety     Bilateral carotid artery stenosis 11/17/2015    Chronic pain     Colon polyp     Coronary artery disease     GERD (gastroesophageal reflux disease)     Hyperlipidemia     Hypertension     Hypothyroidism     Low back pain     Lupus     Osteoporosis     Poor circulation     PVD (peripheral vascular disease)     S/P peripheral artery angioplasty 02/13/2014    SCCA (squamous cell carcinoma) of skin 10/2019    Dr. DE PAZ  Evelyn--oncology    Skin disease        Past Surgical History:   Procedure Laterality Date    COLONOSCOPY N/A 1/4/2017    Procedure: COLONOSCOPY;  Surgeon: Sylvester Arboleda III, MD;  Location: [facility] ENDO;  Service: Endoscopy;  Laterality: N/A;    COLONOSCOPY N/A 8/4/2020    Procedure: COLONOSCOPY;  Surgeon: Sylvester Arboleda III, MD;  Location: [facility] ENDO;  Service: Endoscopy;  Laterality: N/A;    CORONARY ANGIOPLASTY      CORONARY ARTERY BYPASS GRAFT      HYSTERECTOMY      OOPHORECTOMY      THYROIDECTOMY         Review of patient's allergies indicates:  No Known Allergies    No current facility-administered medications on file prior to encounter.      Current Outpatient Medications on File Prior to Encounter   Medication Sig    amLODIPine (NORVASC) 10 MG tablet TAKE 1 TABLET BY MOUTH EVERY DAY    atorvastatin (LIPITOR) 80 MG tablet TAKE ONE TABLET BY MOUTH DAILY    clopidogreL (PLAVIX) 75 mg tablet TAKE 1 TABLET(75 MG) BY MOUTH EVERY DAY    ezetimibe (ZETIA) 10 mg tablet Take 1 tablet (10 mg total) by mouth once daily.    levothyroxine (SYNTHROID) 137 MCG Tab tablet TAKE ONE TABLET BY MOUTH ONCE DAILY    valsartan (DIOVAN) 320 MG tablet Take 1 tablet (320 mg total) by mouth once daily.    albuterol (PROVENTIL/VENTOLIN HFA) 90 mcg/actuation inhaler Inhale 2 puffs into the lungs every 6 (six) hours as needed for Wheezing.    ondansetron (ZOFRAN) 4 MG tablet Take 1 tablet (4 mg total) by mouth every 8 (eight) hours as needed for Nausea.    oxyCODONE-acetaminophen (PERCOCET)  mg per tablet     prochlorperazine (COMPAZINE) 10 MG tablet     traZODone (DESYREL) 50 MG tablet TAKE 1 TABLET BY MOUTH EVERY DAY AT NIGHT AS NEEDED FOR INSOMNIA     Family History     None        Tobacco Use    Smoking status: Current Every Day Smoker     Packs/day: 0.25     Years: 40.00     Pack years: 10.00     Types: Cigarettes     Start date: 1961    Smokeless tobacco: Never Used    Tobacco comment: entered smoking  cessation program 1/13/20    Substance and Sexual Activity    Alcohol use: No     Frequency: Never     Drinks per session: 1 or 2     Binge frequency: Never    Drug use: No    Sexual activity: Not on file     Review of Systems   Constitution: Positive for malaise/fatigue.   HENT: Negative for hearing loss and hoarse voice.    Eyes: Negative for blurred vision and visual disturbance.   Cardiovascular: Negative for chest pain, claudication, dyspnea on exertion, irregular heartbeat, leg swelling, near-syncope, orthopnea, palpitations, paroxysmal nocturnal dyspnea and syncope.   Respiratory: Positive for cough. Negative for hemoptysis, shortness of breath, sleep disturbances due to breathing, snoring and wheezing.    Endocrine: Negative for cold intolerance and heat intolerance.   Hematologic/Lymphatic: Bruises/bleeds easily.   Skin: Negative for color change, dry skin and nail changes.   Musculoskeletal: Positive for arthritis. Negative for back pain, joint pain and myalgias.   Gastrointestinal: Negative for bloating, abdominal pain, constipation, nausea and vomiting.   Genitourinary: Negative for dysuria, flank pain, hematuria and hesitancy.   Neurological: Positive for headaches (resolved). Negative for light-headedness, loss of balance, numbness, paresthesias and weakness.   Psychiatric/Behavioral: Negative for altered mental status.   Allergic/Immunologic: Negative for environmental allergies.     Objective:     Vital Signs (Most Recent):  Temp: 97.6 °F (36.4 °C) (10/20/20 0827)  Pulse: 71 (10/20/20 0927)  Resp: 18 (10/20/20 0827)  BP: (!) 145/70 (10/20/20 0827)  SpO2: (!) 94 % (10/20/20 0827) Vital Signs (24h Range):  Temp:  [97.6 °F (36.4 °C)-99 °F (37.2 °C)] 97.6 °F (36.4 °C)  Pulse:  [52-90] 71  Resp:  [18-20] 18  SpO2:  [94 %-99 %] 94 %  BP: (118-204)/(57-87) 145/70     Weight: 83 kg (183 lb 1.5 oz)  Body mass index is 29.55 kg/m².    SpO2: (!) 94 %  O2 Device (Oxygen Therapy): room air    No intake or  output data in the 24 hours ending 10/20/20 0958    Lines/Drains/Airways     Peripheral Intravenous Line                 Peripheral IV - Single Lumen 10/20/20 0200 22 G Right Antecubital less than 1 day         Peripheral IV - Single Lumen 10/20/20 0205 20 G Left Forearm less than 1 day                Physical Exam   Constitutional: She is oriented to person, place, and time. She appears well-developed and well-nourished. No distress.   HENT:   Head: Normocephalic and atraumatic.   Eyes: Pupils are equal, round, and reactive to light.   Neck: Normal range of motion and full passive range of motion without pain. Neck supple. No JVD present.   Cardiovascular: Regular rhythm, S1 normal, S2 normal and intact distal pulses. Bradycardia present. PMI is not displaced. Exam reveals no distant heart sounds.   No murmur heard.  Pulses:       Radial pulses are 2+ on the right side and 2+ on the left side.        Dorsalis pedis pulses are 2+ on the right side and 2+ on the left side.   CHEST PAIN FREE   Pulmonary/Chest: Effort normal and breath sounds normal. No accessory muscle usage. No respiratory distress. She has no decreased breath sounds. She has no wheezes. She has no rales.   +NONPRODUCTIVE COUGH   Abdominal: Soft. Bowel sounds are normal. She exhibits no distension.   Musculoskeletal: Normal range of motion.         General: No edema.      Right ankle: She exhibits no swelling.      Left ankle: She exhibits no swelling.   Neurological: She is alert and oriented to person, place, and time.   Skin: Skin is warm and dry. She is not diaphoretic. No cyanosis. Nails show no clubbing.   Psychiatric: She has a normal mood and affect. Her speech is normal and behavior is normal. Judgment and thought content normal. Cognition and memory are normal.   Nursing note and vitals reviewed.      Significant Labs:   BMP:   Recent Labs   Lab 10/19/20  2230   GLU 90      K 3.9      CO2 31*   BUN 16   CREATININE 0.8   CALCIUM  9.7   , CBC   Recent Labs   Lab 10/19/20  2230 10/20/20  0202 10/20/20  0510   WBC 10.61 11.98 10.71   HGB 14.3 14.0 12.6   HCT 44.6 43.0 38.8    285 248   , Troponin   Recent Labs   Lab 10/19/20  2230 10/20/20  0510   TROPONINI 0.221* 0.240*    and All pertinent lab results from the last 24 hours have been reviewed.        Assessment and Plan:     * NSTEMI (non-ST elevated myocardial infarction)  Troponin 0.221>0.24  Elevated troponin likely due to uncontrolled HTN  Repeat pending  IV heparin gtt started in ED for ACS protocol  Chest pain free on exam  Check ECHO  Likely MPI stress test tomorrow  Continue trend serial troponin  Continue current meds for now  Assess response in AM    Accelerated hypertension  On medical therapy  Continue ARB, Norvasc  BB on hold given bradycardia    CAD: Hx of CABG  Chest pain free currently  Continue ASA, Statin, Plavix, ARB, Norvasc   BB on hold given bradycardia       Tobacco use  Encouraged complete smoking cessation    Hyperlipidemia LDL goal <70  Continue statin        VTE Risk Mitigation (From admission, onward)         Ordered     IP VTE HIGH RISK PATIENT  Once      10/20/20 0340     Place sequential compression device  Until discontinued      10/20/20 0340     Reason for No Pharmacological VTE Prophylaxis  Once     Question:  Reasons:  Answer:  Physician Provided (leave comment)    10/20/20 0340     heparin 25,000 units in dextrose 5% 250 mL (100 units/mL) infusion LOW INTENSITY nomogram - [facility]  Continuous     Question:  Heparin Infusion Adjustment (DO NOT MODIFY ANSWER)  Answer:  [email or web address]\epic\Images\Pharmacy\HeparinInfusions\heparin LOW INTENSITY nomogram for [facility] [id number]    10/20/20 0118     heparin 25,000 units in dextrose 5% (100 units/ml) IV bolus from bag - ADDITIONAL PRN BOLUS - 60 units/kg (max bolus 4000 units)  As needed (PRN)     Question:  Heparin Infusion Adjustment (DO NOT MODIFY ANSWER)  Answer:   \\[email or web address]\epic\Images\Pharmacy\HeparinInfusions\heparin LOW INTENSITY nomogram for [facility] [id number].pdf    10/20/20 0118     heparin 25,000 units in dextrose 5% (100 units/ml) IV bolus from bag - ADDITIONAL PRN BOLUS - 30 units/kg (max bolus 4000 units)  As needed (PRN)     Question:  Heparin Infusion Adjustment (DO NOT MODIFY ANSWER)  Answer:  \\[email or web address]\epic\Images\Pharmacy\HeparinInfusions\heparin LOW INTENSITY nomogram for [facility] [id number].pdf    10/20/20 0118                Thank you for your consult. I will follow-up with patient. Please contact us if you have any additional questions.    SACHIN Lin-C  Cardiology   [facility]

## 2020-10-20 NOTE — ED NOTES
Per nurse Huber to obtain labs via straight stick venipuncture, states will obtain IV access later.

## 2020-10-20 NOTE — PLAN OF CARE
Progressing towards goals.  Problem: Fall Injury Risk  Goal: Absence of Fall and Fall-Related Injury  Outcome: Ongoing, Progressing     Problem: Adult Inpatient Plan of Care  Goal: Plan of Care Review  Outcome: Ongoing, Progressing  Goal: Patient-Specific Goal (Individualization)  Outcome: Ongoing, Progressing  Goal: Absence of Hospital-Acquired Illness or Injury  Outcome: Ongoing, Progressing  Goal: Optimal Comfort and Wellbeing  Outcome: Ongoing, Progressing  Goal: Readiness for Transition of Care  Outcome: Ongoing, Progressing  Goal: Rounds/Family Conference  Outcome: Ongoing, Progressing

## 2020-10-20 NOTE — ASSESSMENT & PLAN NOTE
Chest pain free currently  Continue ASA, Statin, Plavix, ARB, Norvasc   BB on hold given bradycardia

## 2020-10-20 NOTE — ED NOTES
Pt resting in bed. Respirations even and unlabored. No acute distress noted. Updated patient on POC.

## 2020-10-20 NOTE — ASSESSMENT & PLAN NOTE
- BP improved and remains stable after Clonidine given in the ED.  - Continue home amlodipine and valsartan.  - Clonidine PRN.

## 2020-10-20 NOTE — SIGNIFICANT EVENT
Patient seen and examined.  Patient currently remains stable at this time.  Currently no complaints of chest pain.  Patient with elevated troponin-trending upward.  Patient currently remains on IV heparin drip.    Awake, alert, oriented h no edema wit  H eart with regular rate rhythm lungs   Bilateral breath sounds are clear   Abdomen soft, nontender, nondistended, bowel sounds present extremities no edema    Patient reports Headache is better.    Cardiology following. Patient for possible stress test in am.

## 2020-10-20 NOTE — PLAN OF CARE
ANA met with pt at bedside to complete discharge assessment. Pt lives at home alone. Pt help at home is her granddaughter. Her granddaughter will pick her up when she is discharged. Pt is ambulatory and ADLs. Pt is requesting a rollator. No other needs identified at this time. SW provided a transitional care folder, information on advanced directives, information on pharmacy bedside delivery, and discharge planning begins on admission with contact information for any needs/questions. Pt board updated with CM name and number.    Payor: [employer] MEDICARE / Plan: [employer] MEDICARE HMO / Product Type: Capitation /   PCP: Tayler Ovalle DO  Pharmacy:   [id number] - [place] - [address] AT [facility] [address]  [address]  [place] [address]  Phone: [phone] Fax: [phone]    [id number] - [place] - [address] AT [facility]  [address]  [facility] [address]  Phone: [phone] Fax: [phone]  Bedside Delivery: Yes  MyChart: active       10/20/20 8385   Discharge Assessment   Assessment Type Discharge Planning Assessment   Confirmed/corrected address and phone number on facesheet? Yes   Assessment information obtained from? Patient   Expected Length of Stay (days)   (TBD)   Communicated expected length of stay with patient/caregiver yes   Prior to hospitilization cognitive status: Alert/Oriented   Prior to hospitalization functional status: Independent   Current cognitive status: Alert/Oriented   Current Functional Status: Independent   Facility Arrived From: Home   Lives With alone   Able to Return to Prior Arrangements yes   Is patient able to care for self after discharge? Yes   Who are your caregiver(s) and their phone number(s)? Lidia Cam (grandchild) [phone]   Patient's perception of discharge disposition home or selfcare   Readmission Within the Last  30 Days no previous admission in last 30 days   Patient currently being followed by outpatient case management? No   Patient currently receives any other outside agency services? No   Equipment Currently Used at Home none   Part D Coverage n/a   Do you have any problems affording any of your prescribed medications? No   Is the patient taking medications as prescribed? yes   Does the patient have transportation home? Yes   Transportation Anticipated family or friend will provide   Dialysis Name and Scheduled days n/a   Does the patient receive services at the [facility]mad[facility] Clinic? No   Discharge Plan A Home   Discharge Plan B Home   DME Needed Upon Discharge  none   Patient/Family in Agreement with Plan yes       Enrique Oscar LMSW 10/20/2020 2:14 PM

## 2020-10-20 NOTE — H&P
[facility] Hospital Medicine  History & Physical    Patient Name: Flory Cam  MRN: [id number]  Admission Date: 10/19/2020  Attending Physician: Brendon Carbone MD   Primary Care Provider: Tayler Ovalle DO         Patient information was obtained from patient, past medical records and ER records.     Subjective:     Principal Problem:NSTEMI (non-ST elevated myocardial infarction)    Chief Complaint:   Chief Complaint   Patient presents with    Headache     States headache began this afternoon.  States seen here for same in past.  Denies nausea        HPI: Flory Cam is a 75 y.o. female patient with a PMHx of CAD with remote CABG, HTN, HLD, COPD, PVD, RISHI, RA, lupus, and tobacco use, who presented to the Emergency Department with c/o generalized headache that started several hours PTA. Symptoms are constant and moderate in severity. No mitigating or exacerbating factors reported. Associated sxs include nonproductive cough. Patient denies any fever, chills, n/v/d, SOB, CP, weakness, numbness, dizziness, headache, and all other sxs at this time. In the ED, patient was significantly hypertensive with /87. Clonidine 0.1 mg given, which improved BP to 167/78 and HA resolved. Work-up in the ED showed stable labs, except for troponin of 0.221. EKG with new T wave inversions in inferior leads. 325 mg ASA given in the ED. Case discussed with Cardiology, who recommended heparin drip. Hospital Medicine was contacted for admission and patient placed in Observation for NSTEMI.       Past Medical History:   Diagnosis Date    Acute coronary syndrome     Anxiety     Bilateral carotid artery stenosis 11/17/2015    Chronic pain     Colon polyp     Coronary artery disease     GERD (gastroesophageal reflux disease)     Hyperlipidemia     Hypertension     Hypothyroidism     Low back pain     Lupus     Osteoporosis     Poor circulation     PVD (peripheral vascular disease)      S/P peripheral artery angioplasty 02/13/2014    SCCA (squamous cell carcinoma) of skin 10/2019    Dr. ZANDRA Rivas--oncology    Skin disease        Past Surgical History:   Procedure Laterality Date    COLONOSCOPY N/A 1/4/2017    Procedure: COLONOSCOPY;  Surgeon: Sylvester Arboleda III, MD;  Location: [facility];  Service: Endoscopy;  Laterality: N/A;    COLONOSCOPY N/A 8/4/2020    Procedure: COLONOSCOPY;  Surgeon: Sylvester Arboleda III, MD;  Location: [facility];  Service: Endoscopy;  Laterality: N/A;    CORONARY ANGIOPLASTY      CORONARY ARTERY BYPASS GRAFT      HYSTERECTOMY      OOPHORECTOMY      THYROIDECTOMY         Review of patient's allergies indicates:  No Known Allergies    No current facility-administered medications on file prior to encounter.      Current Outpatient Medications on File Prior to Encounter   Medication Sig    amLODIPine (NORVASC) 10 MG tablet TAKE 1 TABLET BY MOUTH EVERY DAY    atorvastatin (LIPITOR) 80 MG tablet TAKE ONE TABLET BY MOUTH DAILY    clopidogreL (PLAVIX) 75 mg tablet TAKE 1 TABLET(75 MG) BY MOUTH EVERY DAY    ezetimibe (ZETIA) 10 mg tablet Take 1 tablet (10 mg total) by mouth once daily.    levothyroxine (SYNTHROID) 137 MCG Tab tablet TAKE ONE TABLET BY MOUTH ONCE DAILY    valsartan (DIOVAN) 320 MG tablet Take 1 tablet (320 mg total) by mouth once daily.    albuterol (PROVENTIL/VENTOLIN HFA) 90 mcg/actuation inhaler Inhale 2 puffs into the lungs every 6 (six) hours as needed for Wheezing.    ondansetron (ZOFRAN) 4 MG tablet Take 1 tablet (4 mg total) by mouth every 8 (eight) hours as needed for Nausea.    oxyCODONE-acetaminophen (PERCOCET)  mg per tablet     prochlorperazine (COMPAZINE) 10 MG tablet     traZODone (DESYREL) 50 MG tablet TAKE 1 TABLET BY MOUTH EVERY DAY AT NIGHT AS NEEDED FOR INSOMNIA     Family History     Reviewed and not pertinent.         Tobacco Use    Smoking status: Current Every Day Smoker     Packs/day: 0.25     Years: 40.00      Pack years: 10.00     Types: Cigarettes     Start date: 1961    Smokeless tobacco: Never Used    Tobacco comment: entered smoking cessation program 1/13/20    Substance and Sexual Activity    Alcohol use: No     Frequency: Never     Drinks per session: 1 or 2     Binge frequency: Never    Drug use: No    Sexual activity: Not on file     Review of Systems   Constitutional: Negative for activity change, chills, diaphoresis, fatigue and fever.   HENT: Negative for congestion, postnasal drip, rhinorrhea, sinus pressure and sore throat.    Eyes: Negative for visual disturbance.   Respiratory: Positive for cough. Negative for shortness of breath and wheezing.    Cardiovascular: Negative for chest pain, palpitations and leg swelling.   Gastrointestinal: Negative for abdominal pain, constipation, diarrhea, nausea and vomiting.   Genitourinary: Negative for dysuria, hematuria and urgency.   Musculoskeletal: Negative for arthralgias, back pain and myalgias.   Skin: Negative for pallor and rash.   Neurological: Positive for headaches. Negative for dizziness, syncope, facial asymmetry, speech difficulty, weakness, light-headedness and numbness.   Psychiatric/Behavioral: Negative for confusion. The patient is not nervous/anxious.    All other systems reviewed and are negative.    Objective:     Vital Signs (Most Recent):  Temp: 99 °F (37.2 °C) (10/19/20 2118)  Pulse: 65 (10/20/20 0518)  Resp: 20 (10/20/20 0518)  BP: (!) 146/65 (10/20/20 0518)  SpO2: 98 % (10/20/20 0518) Vital Signs (24h Range):  Temp:  [99 °F (37.2 °C)] 99 °F (37.2 °C)  Pulse:  [52-90] 65  Resp:  [19-20] 20  SpO2:  [94 %-99 %] 98 %  BP: (118-204)/(57-87) 146/65     Weight: 83 kg (183 lb 1.5 oz)  Body mass index is 29.55 kg/m².    Physical Exam  Vitals signs and nursing note reviewed.   Constitutional:       General: She is awake. She is not in acute distress.     Appearance: Normal appearance. She is well-developed. She is not diaphoretic.   HENT:       Head: Normocephalic and atraumatic.   Eyes:      Conjunctiva/sclera: Conjunctivae normal.      Comments: PERRL; EOM intact.   Neck:      Musculoskeletal: Normal range of motion and neck supple.   Cardiovascular:      Rate and Rhythm: Normal rate and regular rhythm.  No extrasystoles are present.     Heart sounds: S1 normal and S2 normal. No murmur. No gallop.    Pulmonary:      Effort: Pulmonary effort is normal. No tachypnea.      Breath sounds: Normal breath sounds and air entry. No wheezing, rhonchi or rales.   Abdominal:      General: Bowel sounds are normal. There is no distension.      Palpations: Abdomen is soft.      Tenderness: There is no abdominal tenderness.   Musculoskeletal: Normal range of motion.         General: No tenderness or deformity.      Right lower leg: No edema.      Left lower leg: No edema.   Skin:     General: Skin is warm and dry.      Capillary Refill: Capillary refill takes less than 2 seconds.      Findings: No erythema or rash.   Neurological:      General: No focal deficit present.      Mental Status: She is alert and oriented to person, place, and time.      GCS: GCS eye subscore is 4. GCS verbal subscore is 5. GCS motor subscore is 6.   Psychiatric:         Mood and Affect: Mood and affect normal.         Speech: Speech normal.         Behavior: Behavior normal. Behavior is cooperative.             Significant Labs:  Results for orders placed or performed during the hospital encounter of 10/19/20   CBC auto differential   Result Value Ref Range    WBC 10.61 3.90 - 12.70 K/uL    RBC 4.70 4.00 - 5.40 M/uL    Hemoglobin 14.3 12.0 - 16.0 g/dL    Hematocrit 44.6 37.0 - 48.5 %    Mean Corpuscular Volume 95 82 - 98 fL    Mean Corpuscular Hemoglobin 30.4 27.0 - 31.0 pg    Mean Corpuscular Hemoglobin Conc 32.1 32.0 - 36.0 g/dL    RDW 14.3 11.5 - 14.5 %    Platelets 281 150 - 350 K/uL    MPV 9.9 9.2 - 12.9 fL    Immature Granulocytes 0.3 0.0 - 0.5 %    Gran # (ANC) 5.7 1.8 - 7.7 K/uL     Immature Grans (Abs) 0.03 0.00 - 0.04 K/uL    Lymph # 3.5 1.0 - 4.8 K/uL    Mono # 0.9 0.3 - 1.0 K/uL    Eos # 0.4 0.0 - 0.5 K/uL    Baso # 0.05 0.00 - 0.20 K/uL    nRBC 0 0 /100 WBC    Gran% 53.3 38.0 - 73.0 %    Lymph% 33.3 18.0 - 48.0 %    Mono% 8.6 4.0 - 15.0 %    Eosinophil% 4.0 0.0 - 8.0 %    Basophil% 0.5 0.0 - 1.9 %    Differential Method Automated    Comprehensive metabolic panel   Result Value Ref Range    Sodium 139 136 - 145 mmol/L    Potassium 3.9 3.5 - 5.1 mmol/L    Chloride 103 95 - 110 mmol/L    CO2 31 (H) 23 - 29 mmol/L    Glucose 90 70 - 110 mg/dL    BUN, Bld 16 8 - 23 mg/dL    Creatinine 0.8 0.5 - 1.4 mg/dL    Calcium 9.7 8.7 - 10.5 mg/dL    Total Protein 7.6 6.0 - 8.4 g/dL    Albumin 3.9 3.5 - 5.2 g/dL    Total Bilirubin 0.3 0.1 - 1.0 mg/dL    Alkaline Phosphatase 183 (H) 55 - 135 U/L    AST 22 10 - 40 U/L    ALT 18 10 - 44 U/L    Anion Gap 5 (L) 8 - 16 mmol/L    eGFR if African American >60 >60 mL/min/1.73 m^2    eGFR if non African American >60 >60 mL/min/1.73 m^2   Troponin I   Result Value Ref Range    Troponin I 0.221 (H) 0.000 - 0.026 ng/mL   COVID-19 Rapid Screening   Result Value Ref Range    SARS-CoV-2 RNA, Amplification, Qual Negative Negative   APTT   Result Value Ref Range    aPTT 25.2 21.0 - 32.0 sec   APTT   Result Value Ref Range    aPTT 49.0 (H) 21.0 - 32.0 sec   Protime-INR   Result Value Ref Range    Prothrombin Time 10.3 9.0 - 12.5 sec    INR 1.0 0.8 - 1.2   CBC auto differential   Result Value Ref Range    WBC 11.98 3.90 - 12.70 K/uL    RBC 4.54 4.00 - 5.40 M/uL    Hemoglobin 14.0 12.0 - 16.0 g/dL    Hematocrit 43.0 37.0 - 48.5 %    Mean Corpuscular Volume 95 82 - 98 fL    Mean Corpuscular Hemoglobin 30.8 27.0 - 31.0 pg    Mean Corpuscular Hemoglobin Conc 32.6 32.0 - 36.0 g/dL    RDW 14.4 11.5 - 14.5 %    Platelets 285 150 - 350 K/uL    MPV 10.3 9.2 - 12.9 fL    Immature Granulocytes 0.3 0.0 - 0.5 %    Gran # (ANC) 7.1 1.8 - 7.7 K/uL    Immature Grans (Abs) 0.04 0.00 - 0.04  K/uL    Lymph # 3.5 1.0 - 4.8 K/uL    Mono # 1.0 0.3 - 1.0 K/uL    Eos # 0.4 0.0 - 0.5 K/uL    Baso # 0.05 0.00 - 0.20 K/uL    nRBC 0 0 /100 WBC    Gran% 58.9 38.0 - 73.0 %    Lymph% 29.1 18.0 - 48.0 %    Mono% 8.1 4.0 - 15.0 %    Eosinophil% 3.2 0.0 - 8.0 %    Basophil% 0.4 0.0 - 1.9 %    Differential Method Automated    CBC auto differential   Result Value Ref Range    WBC 10.71 3.90 - 12.70 K/uL    RBC 4.11 4.00 - 5.40 M/uL    Hemoglobin 12.6 12.0 - 16.0 g/dL    Hematocrit 38.8 37.0 - 48.5 %    Mean Corpuscular Volume 94 82 - 98 fL    Mean Corpuscular Hemoglobin 30.7 27.0 - 31.0 pg    Mean Corpuscular Hemoglobin Conc 32.5 32.0 - 36.0 g/dL    RDW 14.5 11.5 - 14.5 %    Platelets 248 150 - 350 K/uL    MPV 10.1 9.2 - 12.9 fL    Immature Granulocytes 0.4 0.0 - 0.5 %    Gran # (ANC) 5.5 1.8 - 7.7 K/uL    Immature Grans (Abs) 0.04 0.00 - 0.04 K/uL    Lymph # 3.8 1.0 - 4.8 K/uL    Mono # 1.0 0.3 - 1.0 K/uL    Eos # 0.4 0.0 - 0.5 K/uL    Baso # 0.05 0.00 - 0.20 K/uL    nRBC 0 0 /100 WBC    Gran% 51.3 38.0 - 73.0 %    Lymph% 35.2 18.0 - 48.0 %    Mono% 9.2 4.0 - 15.0 %    Eosinophil% 3.4 0.0 - 8.0 %    Basophil% 0.5 0.0 - 1.9 %    Differential Method Automated    CK   Result Value Ref Range     20 - 180 U/L      All pertinent labs within the past 24 hours have been reviewed.    Significant Imaging:  Imaging Results    None        I have reviewed all pertinent imaging results/findings within the past 24 hours.     EKG: (personally reviewed)  Normal sinus rhythm, RBBB, LAFB, new T wave inversions in inferior leads.             Assessment/Plan:     * NSTEMI (non-ST elevated myocardial infarction)  - Initial troponin 0.221. EKG with new inferior T-wave inversions. Patient denies any CP or anginal equivalent.  - Continue ASA, Plavix, and high-intensity statin. No BB due to h/o bradycardia.  - Continue heparin drip per protocol.  - SL NTG PRN.  - Trend serial cardiac enzymes and EKG.  - 2D echo.  - Check FLP.  -  Cardiology consult.    Accelerated hypertension  - BP improved and remains stable after Clonidine given in the ED.  - Continue home amlodipine and valsartan.  - Clonidine PRN.    CAD: Hx of CABG  - Continue medical management as above.     Tobacco use  - Counseled on cessation. Nicotine patch if needed.    Hyperlipidemia LDL goal <70  - Continue statin. Check FLP.      VTE Risk Mitigation (From admission, onward)         Ordered     IP VTE HIGH RISK PATIENT  Once      10/20/20 0340     Place sequential compression device  Until discontinued      10/20/20 0340     Reason for No Pharmacological VTE Prophylaxis  Once     Question:  Reasons:  Answer:  Physician Provided (leave comment)    10/20/20 0340     heparin 25,000 units in dextrose 5% 250 mL (100 units/mL) infusion LOW INTENSITY nomogram - [facility]  Continuous     Question:  Heparin Infusion Adjustment (DO NOT MODIFY ANSWER)  Answer:  \\[facility]\epic\Images\Pharmacy\HeparinInfusions\heparin LOW INTENSITY nomogram for [facility] [id number]    10/20/20 0118     heparin 25,000 units in dextrose 5% (100 units/ml) IV bolus from bag - ADDITIONAL PRN BOLUS - 60 units/kg (max bolus 4000 units)  As needed (PRN)     Question:  Heparin Infusion Adjustment (DO NOT MODIFY ANSWER)  Answer:  \[employer] [facility]\epic\Images\Pharmacy\HeparinInfusions\heparin LOW INTENSITY nomogram for [facility] [id number].pdf    10/20/20 0118     heparin 25,000 units in dextrose 5% (100 units/ml) IV bolus from bag - ADDITIONAL PRN BOLUS - 30 units/kg (max bolus 4000 units)  As needed (PRN)     Question:  Heparin Infusion Adjustment (DO NOT MODIFY ANSWER)  Answer:  \[employer]\epic\Images\Pharmacy\HeparinInfusions\heparin LOW INTENSITY nomogram for [facility] [id number].pdf    10/20/20 0118                   Anaid Merchant NP  Department of Hospital Medicine   [facility]

## 2020-10-20 NOTE — ED NOTES
Report received from XOCHITL Parada. Patient sitting up in bed, no acute distress noted, awake, alert, and oriented x 3, calm, respirations even and unlabored, and skin is warm and dry. Patient verbalized understanding of status and plan of care; just awaiting bed upstairs. Patient denies needs at this time. Side rails up x 2, call light in reach, bed low and locked. Will continue to monitor.

## 2020-10-20 NOTE — SUBJECTIVE & OBJECTIVE
Past Medical History:   Diagnosis Date    Acute coronary syndrome     Anxiety     Bilateral carotid artery stenosis 11/17/2015    Chronic pain     Colon polyp     Coronary artery disease     GERD (gastroesophageal reflux disease)     Hyperlipidemia     Hypertension     Hypothyroidism     Low back pain     Lupus     Osteoporosis     Poor circulation     PVD (peripheral vascular disease)     S/P peripheral artery angioplasty 02/13/2014    SCCA (squamous cell carcinoma) of skin 10/2019    Dr. ZANDRA Rivas--oncology    Skin disease        Past Surgical History:   Procedure Laterality Date    COLONOSCOPY N/A 1/4/2017    Procedure: COLONOSCOPY;  Surgeon: Sylvester Arboleda III, MD;  Location: [facility];  Service: Endoscopy;  Laterality: N/A;    COLONOSCOPY N/A 8/4/2020    Procedure: COLONOSCOPY;  Surgeon: Sylvester Arboleda III, MD;  Location: [facility];  Service: Endoscopy;  Laterality: N/A;    CORONARY ANGIOPLASTY      CORONARY ARTERY BYPASS GRAFT      HYSTERECTOMY      OOPHORECTOMY      THYROIDECTOMY         Review of patient's allergies indicates:  No Known Allergies    No current facility-administered medications on file prior to encounter.      Current Outpatient Medications on File Prior to Encounter   Medication Sig    amLODIPine (NORVASC) 10 MG tablet TAKE 1 TABLET BY MOUTH EVERY DAY    atorvastatin (LIPITOR) 80 MG tablet TAKE ONE TABLET BY MOUTH DAILY    clopidogreL (PLAVIX) 75 mg tablet TAKE 1 TABLET(75 MG) BY MOUTH EVERY DAY    ezetimibe (ZETIA) 10 mg tablet Take 1 tablet (10 mg total) by mouth once daily.    levothyroxine (SYNTHROID) 137 MCG Tab tablet TAKE ONE TABLET BY MOUTH ONCE DAILY    valsartan (DIOVAN) 320 MG tablet Take 1 tablet (320 mg total) by mouth once daily.    albuterol (PROVENTIL/VENTOLIN HFA) 90 mcg/actuation inhaler Inhale 2 puffs into the lungs every 6 (six) hours as needed for Wheezing.    ondansetron (ZOFRAN) 4 MG tablet Take 1 tablet (4 mg total) by mouth every  8 (eight) hours as needed for Nausea.    oxyCODONE-acetaminophen (PERCOCET)  mg per tablet     prochlorperazine (COMPAZINE) 10 MG tablet     traZODone (DESYREL) 50 MG tablet TAKE 1 TABLET BY MOUTH EVERY DAY AT NIGHT AS NEEDED FOR INSOMNIA     Family History     None        Tobacco Use    Smoking status: Current Every Day Smoker     Packs/day: 0.25     Years: 40.00     Pack years: 10.00     Types: Cigarettes     Start date: 1961    Smokeless tobacco: Never Used    Tobacco comment: entered smoking cessation program 1/13/20    Substance and Sexual Activity    Alcohol use: No     Frequency: Never     Drinks per session: 1 or 2     Binge frequency: Never    Drug use: No    Sexual activity: Not on file     Review of Systems   Constitutional: Negative for activity change, chills, diaphoresis, fatigue and fever.   HENT: Negative for congestion, postnasal drip, rhinorrhea, sinus pressure and sore throat.    Eyes: Negative for visual disturbance.   Respiratory: Positive for cough. Negative for shortness of breath and wheezing.    Cardiovascular: Negative for chest pain, palpitations and leg swelling.   Gastrointestinal: Negative for abdominal pain, constipation, diarrhea, nausea and vomiting.   Genitourinary: Negative for dysuria, hematuria and urgency.   Musculoskeletal: Negative for arthralgias, back pain and myalgias.   Skin: Negative for pallor and rash.   Neurological: Positive for headaches. Negative for dizziness, syncope, facial asymmetry, speech difficulty, weakness, light-headedness and numbness.   Psychiatric/Behavioral: Negative for confusion. The patient is not nervous/anxious.    All other systems reviewed and are negative.    Objective:     Vital Signs (Most Recent):  Temp: 99 °F (37.2 °C) (10/19/20 2118)  Pulse: 65 (10/20/20 0518)  Resp: 20 (10/20/20 0518)  BP: (!) 146/65 (10/20/20 0518)  SpO2: 98 % (10/20/20 0518) Vital Signs (24h Range):  Temp:  [99 °F (37.2 °C)] 99 °F (37.2 °C)  Pulse:   [52-90] 65  Resp:  [19-20] 20  SpO2:  [94 %-99 %] 98 %  BP: (118-204)/(57-87) 146/65     Weight: 83 kg (183 lb 1.5 oz)  Body mass index is 29.55 kg/m².    Physical Exam  Vitals signs and nursing note reviewed.   Constitutional:       General: She is awake. She is not in acute distress.     Appearance: Normal appearance. She is well-developed. She is not diaphoretic.   HENT:      Head: Normocephalic and atraumatic.   Eyes:      Conjunctiva/sclera: Conjunctivae normal.      Comments: PERRL; EOM intact.   Neck:      Musculoskeletal: Normal range of motion and neck supple.   Cardiovascular:      Rate and Rhythm: Normal rate and regular rhythm.  No extrasystoles are present.     Heart sounds: S1 normal and S2 normal. No murmur. No gallop.    Pulmonary:      Effort: Pulmonary effort is normal. No tachypnea.      Breath sounds: Normal breath sounds and air entry. No wheezing, rhonchi or rales.   Abdominal:      General: Bowel sounds are normal. There is no distension.      Palpations: Abdomen is soft.      Tenderness: There is no abdominal tenderness.   Musculoskeletal: Normal range of motion.         General: No tenderness or deformity.      Right lower leg: No edema.      Left lower leg: No edema.   Skin:     General: Skin is warm and dry.      Capillary Refill: Capillary refill takes less than 2 seconds.      Findings: No erythema or rash.   Neurological:      General: No focal deficit present.      Mental Status: She is alert and oriented to person, place, and time.      GCS: GCS eye subscore is 4. GCS verbal subscore is 5. GCS motor subscore is 6.   Psychiatric:         Mood and Affect: Mood and affect normal.         Speech: Speech normal.         Behavior: Behavior normal. Behavior is cooperative.             Significant Labs:  Results for orders placed or performed during the hospital encounter of 10/19/20   CBC auto differential   Result Value Ref Range    WBC 10.61 3.90 - 12.70 K/uL    RBC 4.70 4.00 - 5.40 M/uL     Hemoglobin 14.3 12.0 - 16.0 g/dL    Hematocrit 44.6 37.0 - 48.5 %    Mean Corpuscular Volume 95 82 - 98 fL    Mean Corpuscular Hemoglobin 30.4 27.0 - 31.0 pg    Mean Corpuscular Hemoglobin Conc 32.1 32.0 - 36.0 g/dL    RDW 14.3 11.5 - 14.5 %    Platelets 281 150 - 350 K/uL    MPV 9.9 9.2 - 12.9 fL    Immature Granulocytes 0.3 0.0 - 0.5 %    Gran # (ANC) 5.7 1.8 - 7.7 K/uL    Immature Grans (Abs) 0.03 0.00 - 0.04 K/uL    Lymph # 3.5 1.0 - 4.8 K/uL    Mono # 0.9 0.3 - 1.0 K/uL    Eos # 0.4 0.0 - 0.5 K/uL    Baso # 0.05 0.00 - 0.20 K/uL    nRBC 0 0 /100 WBC    Gran% 53.3 38.0 - 73.0 %    Lymph% 33.3 18.0 - 48.0 %    Mono% 8.6 4.0 - 15.0 %    Eosinophil% 4.0 0.0 - 8.0 %    Basophil% 0.5 0.0 - 1.9 %    Differential Method Automated    Comprehensive metabolic panel   Result Value Ref Range    Sodium 139 136 - 145 mmol/L    Potassium 3.9 3.5 - 5.1 mmol/L    Chloride 103 95 - 110 mmol/L    CO2 31 (H) 23 - 29 mmol/L    Glucose 90 70 - 110 mg/dL    BUN, Bld 16 8 - 23 mg/dL    Creatinine 0.8 0.5 - 1.4 mg/dL    Calcium 9.7 8.7 - 10.5 mg/dL    Total Protein 7.6 6.0 - 8.4 g/dL    Albumin 3.9 3.5 - 5.2 g/dL    Total Bilirubin 0.3 0.1 - 1.0 mg/dL    Alkaline Phosphatase 183 (H) 55 - 135 U/L    AST 22 10 - 40 U/L    ALT 18 10 - 44 U/L    Anion Gap 5 (L) 8 - 16 mmol/L    eGFR if African American >60 >60 mL/min/1.73 m^2    eGFR if non African American >60 >60 mL/min/1.73 m^2   Troponin I   Result Value Ref Range    Troponin I 0.221 (H) 0.000 - 0.026 ng/mL   COVID-19 Rapid Screening   Result Value Ref Range    SARS-CoV-2 RNA, Amplification, Qual Negative Negative   APTT   Result Value Ref Range    aPTT 25.2 21.0 - 32.0 sec   APTT   Result Value Ref Range    aPTT 49.0 (H) 21.0 - 32.0 sec   Protime-INR   Result Value Ref Range    Prothrombin Time 10.3 9.0 - 12.5 sec    INR 1.0 0.8 - 1.2   CBC auto differential   Result Value Ref Range    WBC 11.98 3.90 - 12.70 K/uL    RBC 4.54 4.00 - 5.40 M/uL    Hemoglobin 14.0 12.0 - 16.0 g/dL     Hematocrit 43.0 37.0 - 48.5 %    Mean Corpuscular Volume 95 82 - 98 fL    Mean Corpuscular Hemoglobin 30.8 27.0 - 31.0 pg    Mean Corpuscular Hemoglobin Conc 32.6 32.0 - 36.0 g/dL    RDW 14.4 11.5 - 14.5 %    Platelets 285 150 - 350 K/uL    MPV 10.3 9.2 - 12.9 fL    Immature Granulocytes 0.3 0.0 - 0.5 %    Gran # (ANC) 7.1 1.8 - 7.7 K/uL    Immature Grans (Abs) 0.04 0.00 - 0.04 K/uL    Lymph # 3.5 1.0 - 4.8 K/uL    Mono # 1.0 0.3 - 1.0 K/uL    Eos # 0.4 0.0 - 0.5 K/uL    Baso # 0.05 0.00 - 0.20 K/uL    nRBC 0 0 /100 WBC    Gran% 58.9 38.0 - 73.0 %    Lymph% 29.1 18.0 - 48.0 %    Mono% 8.1 4.0 - 15.0 %    Eosinophil% 3.2 0.0 - 8.0 %    Basophil% 0.4 0.0 - 1.9 %    Differential Method Automated    CBC auto differential   Result Value Ref Range    WBC 10.71 3.90 - 12.70 K/uL    RBC 4.11 4.00 - 5.40 M/uL    Hemoglobin 12.6 12.0 - 16.0 g/dL    Hematocrit 38.8 37.0 - 48.5 %    Mean Corpuscular Volume 94 82 - 98 fL    Mean Corpuscular Hemoglobin 30.7 27.0 - 31.0 pg    Mean Corpuscular Hemoglobin Conc 32.5 32.0 - 36.0 g/dL    RDW 14.5 11.5 - 14.5 %    Platelets 248 150 - 350 K/uL    MPV 10.1 9.2 - 12.9 fL    Immature Granulocytes 0.4 0.0 - 0.5 %    Gran # (ANC) 5.5 1.8 - 7.7 K/uL    Immature Grans (Abs) 0.04 0.00 - 0.04 K/uL    Lymph # 3.8 1.0 - 4.8 K/uL    Mono # 1.0 0.3 - 1.0 K/uL    Eos # 0.4 0.0 - 0.5 K/uL    Baso # 0.05 0.00 - 0.20 K/uL    nRBC 0 0 /100 WBC    Gran% 51.3 38.0 - 73.0 %    Lymph% 35.2 18.0 - 48.0 %    Mono% 9.2 4.0 - 15.0 %    Eosinophil% 3.4 0.0 - 8.0 %    Basophil% 0.5 0.0 - 1.9 %    Differential Method Automated    CK   Result Value Ref Range     20 - 180 U/L      All pertinent labs within the past 24 hours have been reviewed.    Significant Imaging:  Imaging Results    None        I have reviewed all pertinent imaging results/findings within the past 24 hours.     EKG: (personally reviewed)  Normal sinus rhythm, RBBB, LAFB, new T wave inversions in inferior leads.

## 2020-10-20 NOTE — ASSESSMENT & PLAN NOTE
- Initial troponin 0.221. EKG with new inferior T-wave inversions. Patient denies any CP or anginal equivalent.  - Continue ASA, Plavix, and high-intensity statin. No BB due to h/o bradycardia.  - Continue heparin drip per protocol.  - SL NTG PRN.  - Trend serial cardiac enzymes and EKG.  - 2D echo.  - Check FLP.  - Cardiology consult.

## 2020-10-21 ENCOUNTER — OUTPATIENT CASE MANAGEMENT (OUTPATIENT)
Dept: ADMINISTRATIVE | Facility: OTHER | Age: 75
End: 2020-10-21

## 2020-10-21 ENCOUNTER — TELEPHONE (OUTPATIENT)
Dept: INTERNAL MEDICINE | Facility: CLINIC | Age: 75
End: 2020-10-21

## 2020-10-21 ENCOUNTER — TELEPHONE (OUTPATIENT)
Dept: CARDIOLOGY | Facility: CLINIC | Age: 75
End: 2020-10-21

## 2020-10-21 VITALS
RESPIRATION RATE: 18 BRPM | SYSTOLIC BLOOD PRESSURE: 125 MMHG | TEMPERATURE: 98 F | HEART RATE: 51 BPM | OXYGEN SATURATION: 99 % | WEIGHT: 184 LBS | BODY MASS INDEX: 29.57 KG/M2 | HEIGHT: 66 IN | DIASTOLIC BLOOD PRESSURE: 60 MMHG

## 2020-10-21 PROBLEM — R00.1 BRADYCARDIA: Status: ACTIVE | Noted: 2020-10-21

## 2020-10-21 LAB
ALBUMIN SERPL BCP-MCNC: 3.3 G/DL (ref 3.5–5.2)
ALP SERPL-CCNC: 150 U/L (ref 55–135)
ALT SERPL W/O P-5'-P-CCNC: 16 U/L (ref 10–44)
ANION GAP SERPL CALC-SCNC: 7 MMOL/L (ref 8–16)
APTT BLDCRRT: 58.6 SEC (ref 21–32)
AST SERPL-CCNC: 18 U/L (ref 10–40)
BILIRUB SERPL-MCNC: 0.5 MG/DL (ref 0.1–1)
BUN SERPL-MCNC: 21 MG/DL (ref 8–23)
CALCIUM SERPL-MCNC: 9.1 MG/DL (ref 8.7–10.5)
CHLORIDE SERPL-SCNC: 105 MMOL/L (ref 95–110)
CO2 SERPL-SCNC: 28 MMOL/L (ref 23–29)
CREAT SERPL-MCNC: 0.8 MG/DL (ref 0.5–1.4)
CV STRESS BASE HR: 45 BPM
DIASTOLIC BLOOD PRESSURE: 58 MMHG
EJECTION FRACTION- HIGH: 65 %
END DIASTOLIC INDEX-HIGH: 158 ML/M2
END DIASTOLIC INDEX-LOW: 94 ML/M2
END SYSTOLIC INDEX-HIGH: 71 ML/M2
END SYSTOLIC INDEX-LOW: 33 ML/M2
EST. GFR  (AFRICAN AMERICAN): >60 ML/MIN/1.73 M^2
EST. GFR  (NON AFRICAN AMERICAN): >60 ML/MIN/1.73 M^2
GLUCOSE SERPL-MCNC: 95 MG/DL (ref 70–110)
NUC REST DIASTOLIC VOLUME INDEX: 136
NUC REST EJECTION FRACTION: 61
NUC REST SYSTOLIC VOLUME INDEX: 53
NUC STRESS DIASTOLIC VOLUME INDEX: 109
NUC STRESS EJECTION FRACTION: 45 %
NUC STRESS SYSTOLIC VOLUME INDEX: 60
OHS CV CPX 85 PERCENT MAX PREDICTED HEART RATE MALE: 118
OHS CV CPX MAX PREDICTED HEART RATE: 139
OHS CV CPX PATIENT IS FEMALE: 1
OHS CV CPX PATIENT IS MALE: 0
OHS CV CPX PEAK DIASTOLIC BLOOD PRESSURE: 58 MMHG
OHS CV CPX PEAK HEAR RATE: 81 BPM
OHS CV CPX PEAK RATE PRESSURE PRODUCT: NORMAL
OHS CV CPX PEAK SYSTOLIC BLOOD PRESSURE: 167 MMHG
OHS CV CPX PERCENT MAX PREDICTED HEART RATE ACHIEVED: 58
OHS CV CPX RATE PRESSURE PRODUCT PRESENTING: 7515
POTASSIUM SERPL-SCNC: 4.1 MMOL/L (ref 3.5–5.1)
PROT SERPL-MCNC: 6.4 G/DL (ref 6–8.4)
RETIRED EF AND QEF - SEE NOTES: 53 %
SODIUM SERPL-SCNC: 140 MMOL/L (ref 136–145)
SYSTOLIC BLOOD PRESSURE: 167 MMHG

## 2020-10-21 PROCEDURE — 99233 SBSQ HOSP IP/OBS HIGH 50: CPT | Mod: HCNC,,, | Performed by: NURSE PRACTITIONER

## 2020-10-21 PROCEDURE — 25000003 PHARM REV CODE 250: Mod: HCNC | Performed by: NURSE PRACTITIONER

## 2020-10-21 PROCEDURE — 99233 PR SUBSEQUENT HOSPITAL CARE,LEVL III: ICD-10-PCS | Mod: HCNC,,, | Performed by: NURSE PRACTITIONER

## 2020-10-21 PROCEDURE — 63600175 PHARM REV CODE 636 W HCPCS: Mod: HCNC | Performed by: NURSE PRACTITIONER

## 2020-10-21 PROCEDURE — 80053 COMPREHEN METABOLIC PANEL: CPT | Mod: HCNC

## 2020-10-21 PROCEDURE — 25000003 PHARM REV CODE 250: Mod: HCNC | Performed by: INTERNAL MEDICINE

## 2020-10-21 PROCEDURE — 85730 THROMBOPLASTIN TIME PARTIAL: CPT | Mod: HCNC

## 2020-10-21 PROCEDURE — 36415 COLL VENOUS BLD VENIPUNCTURE: CPT | Mod: HCNC

## 2020-10-21 RX ORDER — ACETAMINOPHEN 325 MG/1
650 TABLET ORAL EVERY 6 HOURS PRN
Refills: 0 | Status: ON HOLD
Start: 2020-10-21 | End: 2021-05-25

## 2020-10-21 RX ORDER — ASPIRIN 81 MG/1
81 TABLET ORAL DAILY
Refills: 0
Start: 2020-10-22 | End: 2024-03-04

## 2020-10-21 RX ORDER — REGADENOSON 0.08 MG/ML
0.4 INJECTION, SOLUTION INTRAVENOUS ONCE
Status: COMPLETED | OUTPATIENT
Start: 2020-10-21 | End: 2020-10-21

## 2020-10-21 RX ADMIN — REGADENOSON 0.4 MG: 0.08 INJECTION, SOLUTION INTRAVENOUS at 11:10

## 2020-10-21 RX ADMIN — ASPIRIN 81 MG: 81 TABLET, COATED ORAL at 12:10

## 2020-10-21 RX ADMIN — CLOPIDOGREL 75 MG: 75 TABLET, FILM COATED ORAL at 12:10

## 2020-10-21 RX ADMIN — LEVOTHYROXINE SODIUM 137 MCG: 25 TABLET ORAL at 05:10

## 2020-10-21 RX ADMIN — ATORVASTATIN CALCIUM 80 MG: 40 TABLET, FILM COATED ORAL at 12:10

## 2020-10-21 RX ADMIN — EZETIMIBE 10 MG: 10 TABLET ORAL at 12:10

## 2020-10-21 RX ADMIN — AMLODIPINE BESYLATE 10 MG: 10 TABLET ORAL at 12:10

## 2020-10-21 RX ADMIN — CANDESARTAN CILEXETIL 16 MG: 16 TABLET ORAL at 12:10

## 2020-10-21 NOTE — PROGRESS NOTES
[facility] -   Cardiology  Progress Note    Patient Name: Flory Cam  MRN: [id number]  Admission Date: 10/19/2020  Hospital Length of Stay: 1 days  Code Status: Full Code   Attending Physician: Brendon Carbone MD   Primary Care Physician: Tayler Ovalle DO  Expected Discharge Date:   Principal Problem:NSTEMI (non-ST elevated myocardial infarction)    Subjective:   HPI    Flory Cam is a 75 year old female who presented to [facility] due to generalized headache PTA with associated nonproductive cough. Her current medical conditions include CAD s/p CABG, HTN, HLP, COPD, PVD, Carotid artery stenosis, RA, Lupus, Smoker. ED workup revealed /87 which improved to 167/78 after  Clonidine 0.1 mg with resolution of headache. EKG revealed new T wave inversions and patient was subsequently placed on IV heparin gtt. She was placed in observation under the care of hospital medicine. Cardiology consulted to assist with medical management. Chart reviewed, patient seen and examined. No chest pain or anginal equivalents. NO shortness of breath, LAURENT or palpitations. NO leg swelling or claudications. Echo pending. Further recs to follow.      Hospital Course:   10/21/2020-Patient seen and examined in room, lying in bed. Feels good today. No cardiac complaints. Remains on IV heparin gtt today. MPI Stress test pending.     Interval History: no acute issues noted o/n. MPI stress test pending.     Review of Systems   Constitution: Positive for malaise/fatigue.   HENT: Negative for hearing loss and hoarse voice.    Eyes: Negative for blurred vision and visual disturbance.   Cardiovascular: Negative for chest pain, claudication, dyspnea on exertion, irregular heartbeat, leg swelling, near-syncope, orthopnea, palpitations, paroxysmal nocturnal dyspnea and syncope.   Respiratory: Positive for cough. Negative for hemoptysis, shortness of breath, sleep disturbances due to breathing, snoring and wheezing.     Endocrine: Negative for cold intolerance and heat intolerance.   Hematologic/Lymphatic: Bruises/bleeds easily.   Skin: Negative for color change, dry skin and nail changes.   Musculoskeletal: Positive for arthritis. Negative for back pain, joint pain and myalgias.   Gastrointestinal: Negative for bloating, abdominal pain, constipation, nausea and vomiting.   Genitourinary: Negative for dysuria, flank pain, hematuria and hesitancy.   Neurological: Positive for headaches (resolved). Negative for light-headedness, loss of balance, numbness, paresthesias and weakness.   Psychiatric/Behavioral: Negative for altered mental status.   Allergic/Immunologic: Negative for environmental allergies.     Objective:     Vital Signs (Most Recent):  Temp: 96.6 °F (35.9 °C) (10/21/20 0722)  Pulse: (!) 46 (10/21/20 1110)  Resp: 18 (10/21/20 0722)  BP: (!) 167/58 (10/21/20 1110)  SpO2: 99 % (10/21/20 0722) Vital Signs (24h Range):  Temp:  [96.6 °F (35.9 °C)-98.5 °F (36.9 °C)] 96.6 °F (35.9 °C)  Pulse:  [44-64] 46  Resp:  [18] 18  SpO2:  [93 %-99 %] 99 %  BP: (103-177)/(52-80) 167/58     Weight: 83.5 kg (184 lb)  Body mass index is 29.7 kg/m².     SpO2: 99 %  O2 Device (Oxygen Therapy): room air      Intake/Output Summary (Last 24 hours) at 10/21/2020 1150  Last data filed at 10/21/2020 0436  Gross per 24 hour   Intake 369.27 ml   Output --   Net 369.27 ml       Lines/Drains/Airways     Peripheral Intravenous Line                 Peripheral IV - Single Lumen 10/20/20 0200 22 G Right Antecubital 1 day         Peripheral IV - Single Lumen 10/20/20 0205 20 G Left Forearm 1 day                Physical Exam   Constitutional: She is oriented to person, place, and time. She appears well-developed and well-nourished. No distress.   HENT:   Head: Normocephalic and atraumatic.   Eyes: Pupils are equal, round, and reactive to light.   Neck: Normal range of motion and full passive range of motion without pain. Neck supple. No JVD present.    Cardiovascular: Regular rhythm, S1 normal, S2 normal and intact distal pulses. Bradycardia present. PMI is not displaced. Exam reveals no distant heart sounds.   No murmur heard.  Pulses:       Radial pulses are 2+ on the right side and 2+ on the left side.        Dorsalis pedis pulses are 2+ on the right side and 2+ on the left side.   CHEST PAIN FREE   Pulmonary/Chest: Effort normal and breath sounds normal. No accessory muscle usage. No respiratory distress. She has no decreased breath sounds. She has no wheezes. She has no rales.   +NONPRODUCTIVE COUGH   Abdominal: Soft. Bowel sounds are normal. She exhibits no distension.   Musculoskeletal: Normal range of motion.         General: No edema.      Right ankle: She exhibits no swelling.      Left ankle: She exhibits no swelling.   Neurological: She is alert and oriented to person, place, and time.   Skin: Skin is warm and dry. She is not diaphoretic. No cyanosis. Nails show no clubbing.   Psychiatric: She has a normal mood and affect. Her speech is normal and behavior is normal. Judgment and thought content normal. Cognition and memory are normal.   Nursing note and vitals reviewed.      Significant Labs:   BMP:   Recent Labs   Lab 10/19/20  2230 10/21/20  0323   GLU 90 95    140   K 3.9 4.1    105   CO2 31* 28   BUN 16 21   CREATININE 0.8 0.8   CALCIUM 9.7 9.1   , CBC   Recent Labs   Lab 10/19/20  2230 10/20/20  0202 10/20/20  0510   WBC 10.61 11.98 10.71   HGB 14.3 14.0 12.6   HCT 44.6 43.0 38.8    285 248   , Troponin   Recent Labs   Lab 10/19/20  2230 10/20/20  0510 10/20/20  1048   TROPONINI 0.221* 0.240* 0.193*    and All pertinent lab results from the last 24 hours have been reviewed.    Significant Imaging: Echocardiogram:   Transthoracic echo (TTE) complete (Cupid Only):   Results for orders placed or performed during the hospital encounter of 10/19/20   Echo Color Flow Doppler? Yes   Result Value Ref Range    Ascending aorta 3.96 cm     STJ 3.39 cm    AV mean gradient 12 mmHg    Ao peak mark 2.28 m/s    Ao VTI 47.83 cm    IVRT 82.78 msec    IVS 1.68 (A) 0.6 - 1.1 cm    LA size 3.26 cm    Left Atrium Major Axis 4.43 cm    Left Atrium Minor Axis 3.20 cm    LVIDd 4.82 3.5 - 6.0 cm    LVIDs 3.21 2.1 - 4.0 cm    LVOT diameter 2.03 cm    LVOT peak VTI 29.48 cm    Posterior Wall 1.56 (A) 0.6 - 1.1 cm    MV Peak A Mark 0.59 m/s    E wave decelartion time 282.69 msec    MV Peak E Mark 0.92 m/s    RA Major Axis 3.23 cm    Sinus 3.57 cm    TAPSE 1.76 cm    TR Max Mark 3.75 m/s    TDI LATERAL 0.11 m/s    TDI SEPTAL 0.07 m/s    LA WIDTH 4.45 cm    Ao root annulus 3.40 cm    MV stenosis pressure 1/2 time 81.98 ms    LV Diastolic Volume 108.46 mL    LV Systolic Volume 41.32 mL    LVOT peak mark 1.12 m/s    RVOT peak VTI 19.03 cm    RVOT peak mark 0.68 m/s    PV mean gradient 1.22 mmHg    LV LATERAL E/E' RATIO 8.36 m/s    LV SEPTAL E/E' RATIO 13.14 m/s    FS 33 %    LA volume 45.82 cm3    LV mass 343.07 g    Left Ventricle Relative Wall Thickness 0.65 cm    AV valve area 1.99 cm2    AV Velocity Ratio 0.49     AV index (prosthetic) 0.62     MV valve area p 1/2 method 2.68 cm2    E/A ratio 1.56     Mean e' 0.09 m/s    LVOT area 3.2 cm2    LVOT stroke volume 95.37 cm3    AV peak gradient 21 mmHg    E/E' ratio 10.22 m/s    LV Systolic Volume Index 21.5 mL/m2    LV Diastolic Volume Index 56.31 mL/m2    LA Volume Index 23.8 mL/m2    LV Mass Index 178 g/m2    Triscuspid Valve Regurgitation Peak Gradient 56 mmHg    BSA 1.97 m2    Right Atrial Pressure (from IVC) 3 mmHg    TV rest pulmonary artery pressure 59 mmHg    Narrative    · The left ventricle is normal in size with normal systolic function. The   estimated ejection fraction is 55%.  · Normal left ventricular diastolic function.  · There is moderate left ventricular concentric hypertrophy.  · Normal right ventricular systolic function.  · Normal central venous pressure (3 mmHg).  · The estimated PA systolic pressure is  59 mmHg.  · There is pulmonary hypertension.        Assessment and Plan:       * NSTEMI (non-ST elevated myocardial infarction)  Troponin 0.221>0.24  Elevated troponin likely due to uncontrolled HTN  Repeat pending  IV heparin gtt started in ED for ACS protocol  Chest pain free on exam  Check ECHO  Likely MPI stress test tomorrow   Continue trend serial troponin  Continue current meds for now  Assess response in AM    10/21  -Troponin peaked and trending downward  -MPI stress test today pending  -Continue ASA, Statin, Plavix, ARB, Norvasc    Bradycardia  Recommend OP Zio monitor to further evaluate bradycardia  Continue to hold BB    Accelerated hypertension  On medical therapy  Continue ARB, Norvasc  BB on hold given bradycardia    CAD: Hx of CABG  Chest pain free currently  Continue ASA, Statin, Plavix, ARB, Norvasc   BB on hold given bradycardia       Nicotine dependence  Encouraged complete smoking cessation    Hyperlipidemia LDL goal <70  Continue statin        VTE Risk Mitigation (From admission, onward)         Ordered     IP VTE HIGH RISK PATIENT  Once      10/20/20 0340     Place sequential compression device  Until discontinued      10/20/20 0340     Reason for No Pharmacological VTE Prophylaxis  Once     Question:  Reasons:  Answer:  Physician Provided (leave comment)    10/20/20 0340     heparin 25,000 units in dextrose 5% 250 mL (100 units/mL) infusion LOW INTENSITY nomogram - [facility]  Continuous     Question:  Heparin Infusion Adjustment (DO NOT MODIFY ANSWER)  Answer:  [email or web address]\epic\Images\Pharmacy\HeparinInfusions\heparin LOW INTENSITY nomogram for [facility] [id number]    10/20/20 0118     heparin 25,000 units in dextrose 5% (100 units/ml) IV bolus from bag - ADDITIONAL PRN BOLUS - 60 units/kg (max bolus 4000 units)  As needed (PRN)     Question:  Heparin Infusion Adjustment (DO NOT MODIFY ANSWER)  Answer:  \[email or web address]\epic\Images\Pharmacy\HeparinInfusions\heparin LOW INTENSITY nomogram for [facility]  [id number]    10/20/20 0118     heparin 25,000 units in dextrose 5% (100 units/ml) IV bolus from bag - ADDITIONAL PRN BOLUS - 30 units/kg (max bolus 4000 units)  As needed (PRN)     Question:  Heparin Infusion Adjustment (DO NOT MODIFY ANSWER)  Answer:  \\[email or web address]\epic\Images\Pharmacy\HeparinInfusions\heparin LOW INTENSITY nomogram for [facility] [id number]    10/20/20 0118                BENOIT LinP-C  Cardiology  [facility]

## 2020-10-21 NOTE — DISCHARGE SUMMARY
[facility] Hospital Medicine  Discharge Summary    Patient Name: Flory Cam  MRN: [id number]  Admission Date: 10/19/2020  Hospital Length of Stay: 1 days  Discharge Date and Time:  10/21/2020 4:15 PM  Attending Physician: Brendon Carbone MD   Discharging Provider: SACHIN Brooks  Primary Care Provider: Tayler Ovalle DO    HPI:   Flory Cma is a 75 y.o. female patient with a PMHx of CAD with remote CABG, HTN, HLD, COPD, PVD, RISHI, RA, lupus, and tobacco use, who presented to the Emergency Department with c/o generalized headache that started several hours PTA. Symptoms are constant and moderate in severity. No mitigating or exacerbating factors reported. Associated sxs include nonproductive cough. Patient denies any fever, chills, n/v/d, SOB, CP, weakness, numbness, dizziness, headache, and all other sxs at this time. In the ED, patient was significantly hypertensive with /87. Clonidine 0.1 mg given, which improved BP to 167/78 and HA resolved. Work-up in the ED showed stable labs, except for troponin of 0.221. EKG with new T wave inversions in inferior leads. 325 mg ASA given in the ED. Case discussed with Cardiology, who recommended heparin drip. Hospital Medicine was contacted for admission and patient placed in Observation for NSTEMI.       * No surgery found *      Hospital Course:   The patient was monitored closely during her stay.  She was placed on continuous telemetry monitoring. Patient was noted to have some bradycardia and elevated troponin.  Cardiology was consulted.  Her troponin was trended.  Patient was placed on IV heparin drip.  An echocardiogram was done which showed the left ventricle was normal in size with normal systolic function. The estimated ejection fraction was 55%. Normal left ventricular diastolic function. There was moderate left ventricular concentric hypertrophy. Normal right ventricular systolic function. Normal central venous  pressure (3 mmHg). The estimated PA systolic pressure was 59 mmHg. There was pulmonary hypertension. The patient was educated on the importance of smoking cessation.  Patient was ordered for a Stress test which showed no acute issues.  The patient's overall condition remained stable and improved.  It was felt her elevated troponin level was secondary to her accelerated hypertension.  Patient was discharged to home once cleared by Cardiology.  Patient was to take her blood pressure and pulse 2 times a day and keep log for follow-up in the clinic.     Consults:   Consults (From admission, onward)        Status Ordering Provider     Inpatient consult to Cardiology    Dr. Prosper Guerra    Completed EMMA FLANAGAN          Final Active Diagnoses:    Diagnosis Date Noted POA    PRINCIPAL PROBLEM:  Accelerated hypertension [I10] 10/20/2020 Yes    Bradycardia [R00.1] 10/21/2020 Yes    CAD: Hx of CABG [Z95.1] 10/08/2015 Not Applicable     Chronic    Nicotine dependence [F17.200] 10/08/2015 Yes    Hyperlipidemia LDL goal <70 [E78.5] 09/30/2013 Yes     Chronic      Problems Resolved During this Admission:       Discharged Condition: stable    Disposition: Home or Self Care    Follow Up:  Follow-up Information     Cardiology  In 2 weeks.    Why: Take blood pressure and pulse 2 times a day and keep log for follow-up           Tayler Ovalle DO In 2 weeks.    Specialty: Internal Medicine  Contact information:  [address] [place] [address] [address] Jesi MCDOWELL [address] [phone]                 Patient Instructions:      Diet Cardiac     Notify your health care provider if you experience any of the following:  difficulty breathing or increased cough     Notify your health care provider if you experience any of the following:   Order Comments: Any decline in condition     Activity as tolerated       Significant Diagnostic Studies:    CMP   Recent Labs   Lab 10/19/20  2230 10/21/20  0323    140   K 3.9 4.1    105   CO2  "31* 28   GLU 90 95   BUN 16 21   CREATININE 0.8 0.8   CALCIUM 9.7 9.1   PROT 7.6 6.4   ALBUMIN 3.9 3.3*   BILITOT 0.3 0.5   ALKPHOS 183* 150*   AST 22 18   ALT 18 16   ANIONGAP 5* 7*   ESTGFRAFRICA >60 >60   EGFRNONAA >60 >60   CBC   Recent Labs   Lab 10/19/20  2230 10/20/20  0202 10/20/20  0510   WBC 10.61 11.98 10.71   HGB 14.3 14.0 12.6   HCT 44.6 43.0 38.8    285 248   Lipid Panel   Lab Results   Component Value Date    CHOL 110 (L) 10/20/2020    HDL 48 10/20/2020    LDLCALC 52.4 (L) 10/20/2020    TRIG 48 10/20/2020    CHOLHDL 43.6 10/20/2020   Troponin   Recent Labs   Lab 10/19/20  2230 10/20/20  0510 10/20/20  1048   TROPONINI 0.221* 0.240* 0.193*     PT 10.3 INR 1.0        COVID-19 negative    2D Echocardiogram-  Transthoracic echo (TTE) complete  Order# [id number]  Reading physician: Prosper Guerra MD Ordering physician: Anaid Merchant NP Study date: 10/20/20   Reason for Exam  Priority: Routine  Comments:    Staff    Performing Sonographer   Rhonda Gaviria    Vitals    Height Weight BMI (Calculated) BSA (Calculated - sq m) BP   5' 6" (1.676 m) 83 kg (183 lb) 29.6 1.97 sq meters 145/70      Conclusion    · The left ventricle is normal in size with normal systolic function. The estimated ejection fraction is 55%.  · Normal left ventricular diastolic function.  · There is moderate left ventricular concentric hypertrophy.  · Normal right ventricular systolic function.  · Normal central venous pressure (3 mmHg).  · The estimated PA systolic pressure is 59 mmHg.  · There is pulmonary hypertension.      Study Details    A complete echo was performed using complete 2D, color flow Doppler and spectral Doppler. During the study, the apical, parasternal, subcostal and suprasternal views were captured.  Overall the study quality was excellent.   Echocardiography Findings    Left Ventricle    The left ventricle is normal in size with normal systolic function. The estimated ejection fraction is 55%. " There is moderate left ventricular concentric hypertrophy. There is normal diastolic function. There is normal wall motion.   Right Ventricle    Normal cavity size, wall thickness and systolic function. Wall motion normal.   Left Atrium    The left atrial volume index is normal. Left atrial volume index is 23.8 mL/m2.   Right Atrium    There is normal right atrial size.   Aortic Valve    There is thickening of the aortic valve. There is mild of the aortic valve present. No regurgitation. There is normal leaflet mobility.   Mitral Valve    The mitral valve appears structurally normal. There is normal leaflet mobility.  There is no mitral valve regurgitation. The estimated mitral valve area by pressure half time is 2.68 cm2.   Tricuspid Valve    There is trace regurgitation. There is pulmonary hypertension. The estimated PA systolic pressure is 59 mmHg.   Pulmonic Valve    The pulmonic valve is structurally normal.   IVC/SVC    Normal central venous pressure (3 mm Hg).   Ascending Aorta    The aortic root and ascending aorta are normal in size.   Pericardium and Other Findings    Pericardium is normal. There is no pericardial effusion.   2D Measurements    Dimensions   LVIDd 4.82 cm      LVIDs 3.21 cm      IVS 1.68 cm      Posterior Wall 1.56 cm      FS 33 %      LA size 3.26 cm      LA volume 45.82 cm3      LA Volume Index 23.8 mL/m2      LV mass 343.07 g       Dimensions   TAPSE 1.76 cm      RA Major Axis 3.23 cm       Aortic Root - End Diastolic   Ao root annulus 3.4 cm      Sinus 3.57 cm      STJ 3.39 cm      Ascending aorta 3.96 cm           Doppler Measurements - Diastolic Filling    Diastolic Filling   E/A ratio 1.56       IVRT 82.78 msec      Mean e' 0.09 m/s       E wave decelartion time 282.69 msec      E/E' ratio 10.22 m/s         Doppler Measurements - Aortic Valve    Stenosis   LVOT diameter 2.03 cm      LVOT area 3.2 cm2      LVOT peak blue 1.12 m/s      LVOT peak VTI 29.48 cm      Ao peak blue 2.28 m/s     "  Ao VTI 47.83 cm      AV valve area 1.99 cm2      AV mean gradient 12 mmHg      AV peak gradient 21 mmHg      AV Velocity Ratio 0.49       AV index (prosthetic) 0.62             Doppler Measurements - Mitral Valve    Stenosis   MV valve area p 1/2 method 2.68 cm2       PISA-MS   MV Peak E Mark 0.92 m/s            Doppler Measurements - Pulmonic Valve    Stenosis   RVOT peak mark 0.68 m/s      RVOT peak VTI 19.03 cm      PV mean gradient 1.22 mmHg          Doppler Measurements - Shunt Ratio    Shunt Ratio   LVOT stroke volume 95.37 cm3          Doppler Measurements - Tricuspid Valve    Stress Evaluation   TV rest pulmonary artery pressure 59 mmHg            Wall Scoring    Resting Score Index: 1.00              The left ventricular wall motion is normal.               Result Image Hyperlink     Show images for Echo Color Flow Doppler? Yes       Medications  (Filter: Administrations occurring from 10/20/20 0729 to 10/20/20 1000)  None   Signed    Electronically signed by Prosper Guerra MD on 10/20/20 at 1113 CDT       Regadenoson stress test with myocardial perfusion SPECT (multi study)  Order# [id number]  Reading physician: Chad Zuniga MD Ordering physician: DELORES Newell Study date: 10/21/20   Reason for Exam  Priority: Routine  Dx: Elevated troponin [R77.8 (ICD-10-CM)]   Comments:    Staff    Performing Sonographer   Pankaj Murphy, RT    Vitals    Height Weight BMI (Calculated) BSA (Calculated - sq m) BP   5' 6" (1.676 m) 83.5 kg (184 lb) 29.7 1.97 sq meters 137/65      Conclusion         No reversible defects to suggest coronary ischemia.    Perfusion Defect There is a mild intensity, fixed defect consistent with scar  in the inferior wall(s).    There is a mild intensity fixed defect in the anterior wall of the left ventricle, likely secondary to soft tissue attenuation.    Gated perfusion images showed an ejection fraction of 61% at rest and 45% post stress.    The EKG portion of this study is " negative for ischemia.    There were no arrhythmias during stress.      Stress Measurements    Baseline Data   HR at rest 45 bpm      Systolic blood pressure 167 mmHg      Diastolic blood pressure 58 mmHg       Stress Data   Peak HR 81 bpm      Peak Systolic  mmHg      Peak Diatolic BP 58 mmHg       HR   Max Predicted        % Max HR Achieved 58          2D Measurements    Dimensions   QEF 53 %             Result Image Hyperlink     Show images for Nuclear Stress - Cardiology Interpreted       Medications  (Filter: Administrations occurring from 10/21/20 0945 to 10/21/20 9709)  None   Signed    Electronically signed by Chad Zuniga MD on 10/21/20 at 1518 CDT       Pending Diagnostic Studies:     None         Medications:  Reconciled Home Medications:      Medication List      START taking these medications    acetaminophen 325 MG tablet  Commonly known as: TYLENOL  Take 2 tablets (650 mg total) by mouth every 6 (six) hours as needed for Pain.     aspirin 81 MG EC tablet  Commonly known as: ECOTRIN  Take 1 tablet (81 mg total) by mouth once daily.  Start taking on: October 22, 2020        CONTINUE taking these medications    albuterol 90 mcg/actuation inhaler  Commonly known as: PROVENTIL/VENTOLIN HFA  Inhale 2 puffs into the lungs every 6 (six) hours as needed for Wheezing.     amLODIPine 10 MG tablet  Commonly known as: NORVASC  TAKE 1 TABLET BY MOUTH EVERY DAY     atorvastatin 80 MG tablet  Commonly known as: LIPITOR  TAKE ONE TABLET BY MOUTH DAILY     clopidogreL 75 mg tablet  Commonly known as: PLAVIX  TAKE 1 TABLET(75 MG) BY MOUTH EVERY DAY     ezetimibe 10 mg tablet  Commonly known as: ZETIA  Take 1 tablet (10 mg total) by mouth once daily.     levothyroxine 137 MCG Tab tablet  Commonly known as: SYNTHROID  TAKE ONE TABLET BY MOUTH ONCE DAILY     ondansetron 4 MG tablet  Commonly known as: ZOFRAN  Take 1 tablet (4 mg total) by mouth every 8 (eight) hours as needed for Nausea.      oxyCODONE-acetaminophen  mg per tablet  Commonly known as: PERCOCET     prochlorperazine 10 MG tablet  Commonly known as: COMPAZINE     traZODone 50 MG tablet  Commonly known as: DESYREL  TAKE 1 TABLET BY MOUTH EVERY DAY AT NIGHT AS NEEDED FOR INSOMNIA     valsartan 320 MG tablet  Commonly known as: DIOVAN  Take 1 tablet (320 mg total) by mouth once daily.            Indwelling Lines/Drains at time of discharge:   Lines/Drains/Airways     None                 Time spent on the discharge of patient: 48 minutes  Patient was seen and examined on the date of discharge by Cardiology and determined to be suitable for discharge.         Jade Molina, SACHIN  Department of Hospital Medicine  [facility]

## 2020-10-21 NOTE — PROGRESS NOTES
Secure chat message sent to Celestina Nunez, patient is High Risk and eligible for Outpatient Case Management. Requested Ambulatory Referral to Outpatient Case management at discharge. XOCHITL Edouard

## 2020-10-21 NOTE — TELEPHONE ENCOUNTER
----- Message from Natalia Houston sent at 10/21/2020 12:28 PM CDT -----  Contact: [phone] self  Would like to consult with nurse regarding patient being in the hospital. Please call back [phone]. Thanks

## 2020-10-21 NOTE — NURSING
Pt d/c 1630. PIV pulled. Pt in understanding of d/c meds and plan of care. Awaiting arrival of family for ride home.     Pt out 2292.

## 2020-10-21 NOTE — TELEPHONE ENCOUNTER
----- Message from DELORES Newell sent at 10/21/2020  4:10 PM CDT -----  Patient to be discharged home today    Needs hospital follow up arranged for next week    Thanks

## 2020-10-21 NOTE — PLAN OF CARE
Pt AAOx4. VSS. Pt remained free of falls this shift. Pt complained of chronic back pain, 7/10. Medications administered as ordered. Pt is on room air. Pt is sinus Jose on monitor. Hourly rounding completed. Pt instructed to call for assistance. POC reviewed. Pt verbalized understanding. Will continue to monitor.

## 2020-10-21 NOTE — HOSPITAL COURSE
· The patient was monitored closely during her stay.  She was placed on continuous telemetry monitoring. Patient was noted to have some bradycardia and elevated troponin.  Cardiology was consulted.  Her troponin was trended.  Patient was placed on IV heparin drip.  An echocardiogram was done which showed the left ventricle was normal in size with normal systolic function. The estimated ejection fraction was 55%. Normal left ventricular diastolic function. There was moderate left ventricular concentric hypertrophy. Normal right ventricular systolic function. Normal central venous pressure (3 mmHg). The estimated PA systolic pressure was 59 mmHg. There was pulmonary hypertension. Patient was ordered for a Stress test which showed no acute issues.  The patient's overall condition remained stable and improved.  It was felt her elevated troponin level was secondary to her accelerated hypertension.  Patient was discharged to home once cleared by Cardiology.  Patient was to take her blood pressure and pulse 2 times a day and keep log for follow-up in the clinic.

## 2020-10-21 NOTE — HOSPITAL COURSE
10/21/2020-Patient seen and examined in room, lying in bed. Feels good today. No cardiac complaints. Remains on IV heparin gtt today. MPI Stress test pending.

## 2020-10-21 NOTE — ASSESSMENT & PLAN NOTE
Troponin 0.221>0.24  Elevated troponin likely due to uncontrolled HTN  Repeat pending  IV heparin gtt started in ED for ACS protocol  Chest pain free on exam  Check ECHO  Likely MPI stress test tomorrow   Continue trend serial troponin  Continue current meds for now  Assess response in AM    10/21  -Troponin peaked and trending downward  -MPI stress test today pending  -Continue ASA, Statin, Plavix, ARB, Norvasc

## 2020-10-25 NOTE — PROGRESS NOTES
Subjective:   Patient ID:  Flory Cam is a 75 y.o. female who presents for evaluation of No chief complaint on file.      HPI    Flory Cam is a 75 year old female who presents to Cardiology clinic for hospital follow up. She was admitted due to elevated HTN and chest discomfort. She was placed on IV heparin gtt and underwent MPI stress test which was negative. Her current medical conditions include CAD s/p CABG, HTN, HLP, PVD, Bilateral carotid artery disease, hypothyroidism, RA, COPD, DLE. She returns today and is doing well today.     Denies chest pain or anginal equivalents. No shortness of breath, LAURENT or palpitations. Denies orthopnea, PND or abdominal bloating. Reports regular walking without any issues lately. NO leg swelling or claudications. No recent falls, syncope or near syncopal events. Reports compliance with medications and dietary restrictions. NO CNS complaints to suggest TIA or CVA today. No signs of abnormal bleeding on ASA and Plavix.     BP well controlled today. She is still smoking about 2-3 cigs/day but is working to completely quit.     Past Medical History:   Diagnosis Date    Acute coronary syndrome     Anxiety     Bilateral carotid artery stenosis 11/17/2015    Chronic pain     Colon polyp     Coronary artery disease     GERD (gastroesophageal reflux disease)     Hyperlipidemia     Hypertension     Hypothyroidism     Low back pain     Lupus     Osteoporosis     Poor circulation     PVD (peripheral vascular disease)     S/P peripheral artery angioplasty 02/13/2014    SCCA (squamous cell carcinoma) of skin 10/2019    Dr. ZANDRA Rivas--oncology    Skin disease        Past Surgical History:   Procedure Laterality Date    COLONOSCOPY N/A 1/4/2017    Procedure: COLONOSCOPY;  Surgeon: Sylvester Arboleda III, MD;  Location: [facility];  Service: Endoscopy;  Laterality: N/A;    COLONOSCOPY N/A 8/4/2020    Procedure: COLONOSCOPY;  Surgeon: Sylvester Arboleda III,  MD;  Location: [facility];  Service: Endoscopy;  Laterality: N/A;    CORONARY ANGIOPLASTY      CORONARY ARTERY BYPASS GRAFT      HYSTERECTOMY      OOPHORECTOMY      THYROIDECTOMY         Social History     Tobacco Use    Smoking status: Current Every Day Smoker     Packs/day: 0.25     Years: 40.00     Pack years: 10.00     Types: Cigarettes     Start date: 1961    Smokeless tobacco: Never Used    Tobacco comment: entered smoking cessation program 1/13/20    Substance Use Topics    Alcohol use: No     Frequency: Never     Drinks per session: 1 or 2     Binge frequency: Never    Drug use: No       Family History   Problem Relation Age of Onset    Melanoma Neg Hx     Psoriasis Neg Hx     Lupus Neg Hx     Eczema Neg Hx      Wt Readings from Last 3 Encounters:   10/26/20 81.8 kg (180 lb 5.4 oz)   10/26/20 83.5 kg (184 lb 1.4 oz)   10/21/20 83.5 kg (184 lb)     Temp Readings from Last 3 Encounters:   10/21/20 97.9 °F (36.6 °C)   10/06/20 99.7 °F (37.6 °C) (Tympanic)   09/04/20 99.4 °F (37.4 °C) (Tympanic)     BP Readings from Last 3 Encounters:   10/26/20 138/76   10/21/20 125/60   10/06/20 138/68     Pulse Readings from Last 3 Encounters:   10/26/20 60   10/21/20 (!) 51   10/06/20 74     Current Outpatient Medications on File Prior to Visit   Medication Sig Dispense Refill    acetaminophen (TYLENOL) 325 MG tablet Take 2 tablets (650 mg total) by mouth every 6 (six) hours as needed for Pain.  0    albuterol (PROVENTIL/VENTOLIN HFA) 90 mcg/actuation inhaler Inhale 2 puffs into the lungs every 6 (six) hours as needed for Wheezing. 18 g 3    amLODIPine (NORVASC) 10 MG tablet TAKE 1 TABLET BY MOUTH EVERY DAY 30 tablet 6    aspirin (ECOTRIN) 81 MG EC tablet Take 1 tablet (81 mg total) by mouth once daily.  0    atorvastatin (LIPITOR) 80 MG tablet TAKE ONE TABLET BY MOUTH DAILY 90 tablet 1    clopidogreL (PLAVIX) 75 mg tablet TAKE 1 TABLET(75 MG) BY MOUTH EVERY DAY 90 tablet 1    ezetimibe (ZETIA) 10 mg  tablet Take 1 tablet (10 mg total) by mouth once daily. 90 tablet 1    levothyroxine (SYNTHROID) 137 MCG Tab tablet TAKE ONE TABLET BY MOUTH ONCE DAILY 90 tablet 1    ondansetron (ZOFRAN) 4 MG tablet Take 1 tablet (4 mg total) by mouth every 8 (eight) hours as needed for Nausea. 15 tablet 0    oxyCODONE-acetaminophen (PERCOCET)  mg per tablet       prochlorperazine (COMPAZINE) 10 MG tablet       traZODone (DESYREL) 50 MG tablet TAKE 1 TABLET BY MOUTH EVERY DAY AT NIGHT AS NEEDED FOR INSOMNIA 30 tablet 3    valsartan (DIOVAN) 320 MG tablet Take 1 tablet (320 mg total) by mouth once daily. 90 tablet 3     No current facility-administered medications on file prior to visit.        Review of Systems   Constitution: Positive for malaise/fatigue.   HENT: Negative for hearing loss and hoarse voice.    Eyes: Negative for blurred vision and visual disturbance.   Cardiovascular: Negative for chest pain, claudication, dyspnea on exertion, irregular heartbeat, leg swelling, near-syncope, orthopnea, palpitations, paroxysmal nocturnal dyspnea and syncope.   Respiratory: Negative for cough, hemoptysis, shortness of breath, sleep disturbances due to breathing, snoring and wheezing.    Endocrine: Negative for cold intolerance and heat intolerance.   Hematologic/Lymphatic: Bruises/bleeds easily (on ASA and Plavix).   Skin: Negative for color change, dry skin and nail changes.   Musculoskeletal: Positive for arthritis and back pain. Negative for joint pain and myalgias.   Gastrointestinal: Negative for bloating, abdominal pain, constipation, nausea and vomiting.   Genitourinary: Negative for dysuria, flank pain, hematuria and hesitancy.   Neurological: Negative for headaches, light-headedness, loss of balance, numbness, paresthesias and weakness.   Psychiatric/Behavioral: Negative for altered mental status.        +smoker   Allergic/Immunologic: Negative for environmental allergies.     /76 (BP Location: Left arm,  Patient Position: Sitting)   Pulse 60   Wt 81.8 kg (180 lb 5.4 oz)   SpO2 97%   BMI 29.11 kg/m²     Objective:   Physical Exam   Constitutional: She is oriented to person, place, and time. She appears well-developed and well-nourished. No distress.   HENT:   Head: Normocephalic and atraumatic.   Eyes: Pupils are equal, round, and reactive to light.   Neck: Normal range of motion and full passive range of motion without pain. Neck supple. No JVD present.   Cardiovascular: Normal rate, regular rhythm, S1 normal, S2 normal and intact distal pulses. PMI is not displaced. Exam reveals no distant heart sounds.   No murmur heard.  Pulses:       Radial pulses are 2+ on the right side and 2+ on the left side.        Dorsalis pedis pulses are 2+ on the right side and 2+ on the left side.   CHEST PAIN FREE   Pulmonary/Chest: Effort normal and breath sounds normal. No accessory muscle usage. No respiratory distress. She has no decreased breath sounds. She has no wheezes. She has no rales.   Abdominal: Soft. Bowel sounds are normal. She exhibits no distension. There is no abdominal tenderness.   Obese abdomen   Musculoskeletal: Normal range of motion.         General: No edema.      Right ankle: She exhibits no swelling.      Left ankle: She exhibits no swelling.   Neurological: She is alert and oriented to person, place, and time.   Skin: Skin is warm and dry. She is not diaphoretic. No cyanosis. Nails show no clubbing.   Psychiatric: She has a normal mood and affect. Her speech is normal and behavior is normal. Judgment and thought content normal. Cognition and memory are normal.   Nursing note and vitals reviewed.      Lab Results   Component Value Date    CHOL 110 (L) 10/20/2020    CHOL 156 09/25/2020    CHOL 155 06/17/2020     Lab Results   Component Value Date    HDL 48 10/20/2020    HDL 51 09/25/2020    HDL 61 06/17/2020     Lab Results   Component Value Date    LDLCALC 52.4 (L) 10/20/2020    LDLCALC 90.0 09/25/2020     LDLCALC 81.6 06/17/2020     Lab Results   Component Value Date    TRIG 48 10/20/2020    TRIG 75 09/25/2020    TRIG 62 06/17/2020     Lab Results   Component Value Date    CHOLHDL 43.6 10/20/2020    CHOLHDL 32.7 09/25/2020    CHOLHDL 39.4 06/17/2020       Chemistry        Component Value Date/Time     10/21/2020 0323    K 4.1 10/21/2020 0323     10/21/2020 0323    CO2 28 10/21/2020 0323    BUN 21 10/21/2020 0323    CREATININE 0.8 10/21/2020 0323    GLU 95 10/21/2020 0323        Component Value Date/Time    CALCIUM 9.1 10/21/2020 0323    ALKPHOS 150 (H) 10/21/2020 0323    AST 18 10/21/2020 0323    ALT 16 10/21/2020 0323    BILITOT 0.5 10/21/2020 0323    ESTGFRAFRICA >60 10/21/2020 0323    EGFRNONAA >60 10/21/2020 0323          Lab Results   Component Value Date    TSH 1.775 06/17/2020     Lab Results   Component Value Date    INR 1.0 10/20/2020    INR 1.0 04/28/2017    INR 0.9 07/26/2007     Lab Results   Component Value Date    WBC 10.71 10/20/2020    HGB 12.6 10/20/2020    HCT 38.8 10/20/2020    MCV 94 10/20/2020     10/20/2020        Results for orders placed during the hospital encounter of 10/19/20   Echo Color Flow Doppler? Yes    Narrative · The left ventricle is normal in size with normal systolic function. The   estimated ejection fraction is 55%.  · Normal left ventricular diastolic function.  · There is moderate left ventricular concentric hypertrophy.  · Normal right ventricular systolic function.  · Normal central venous pressure (3 mmHg).  · The estimated PA systolic pressure is 59 mmHg.  · There is pulmonary hypertension.        Results for orders placed during the hospital encounter of 10/19/20   Nuclear Stress - Cardiology Interpreted    Narrative   No reversible defects to suggest coronary ischemia.    Perfusion Defect There is a mild intensity, fixed defect consistent   with scar  in the inferior wall(s).    There is a mild intensity fixed defect in the anterior wall of the  left   ventricle, likely secondary to soft tissue attenuation.    Gated perfusion images showed an ejection fraction of 61% at rest and   45% post stress.    The EKG portion of this study is negative for ischemia.    There were no arrhythmias during stress.         Assessment:      1. Hypertension goal BP (blood pressure) < 140/90    2. Hyperlipidemia LDL goal <70    3. CAD: Hx of CABG      Patient presents to clinic for hospital follow up and is doing well today. No new cardiac complaints today. No chest pain or SOB today. BP well controlled on current meds.   Plan:     Continue same CV meds for now  Dash diet,2  Gm sodium restriction  1500 ml fluid restriction  Encourage daily walking  Monitor BP at home  STOP Smoking completely  RTC with Dr Cohen as scheduled.     SACHIN Irwin-VANDA  [facility]

## 2020-10-26 ENCOUNTER — HOSPITAL ENCOUNTER (OUTPATIENT)
Dept: RADIOLOGY | Facility: HOSPITAL | Age: 75
Discharge: HOME | End: 2020-10-26
Attending: INTERNAL MEDICINE
Payer: MEDICARE

## 2020-10-26 ENCOUNTER — OFFICE VISIT (OUTPATIENT)
Dept: CARDIOLOGY | Facility: CLINIC | Age: 75
End: 2020-10-26
Payer: MEDICARE

## 2020-10-26 VITALS
OXYGEN SATURATION: 97 % | DIASTOLIC BLOOD PRESSURE: 76 MMHG | BODY MASS INDEX: 29.11 KG/M2 | HEART RATE: 60 BPM | WEIGHT: 180.31 LBS | SYSTOLIC BLOOD PRESSURE: 138 MMHG

## 2020-10-26 VITALS — HEIGHT: 66 IN | BODY MASS INDEX: 29.58 KG/M2 | WEIGHT: 184.06 LBS

## 2020-10-26 DIAGNOSIS — I10 HYPERTENSION GOAL BP (BLOOD PRESSURE) < 140/90: Primary | Chronic | ICD-10-CM

## 2020-10-26 DIAGNOSIS — Z95.1 HX OF CABG: Chronic | ICD-10-CM

## 2020-10-26 DIAGNOSIS — Z12.31 ENCOUNTER FOR SCREENING MAMMOGRAM FOR MALIGNANT NEOPLASM OF BREAST: ICD-10-CM

## 2020-10-26 DIAGNOSIS — E78.5 HYPERLIPIDEMIA LDL GOAL <70: Chronic | ICD-10-CM

## 2020-10-26 PROCEDURE — 1126F AMNT PAIN NOTED NONE PRSNT: CPT | Mod: HCNC,S$GLB,, | Performed by: NURSE PRACTITIONER

## 2020-10-26 PROCEDURE — 1159F MED LIST DOCD IN RCRD: CPT | Mod: HCNC,S$GLB,, | Performed by: NURSE PRACTITIONER

## 2020-10-26 PROCEDURE — 99214 OFFICE O/P EST MOD 30 MIN: CPT | Mod: HCNC,S$GLB,, | Performed by: NURSE PRACTITIONER

## 2020-10-26 PROCEDURE — 1100F PTFALLS ASSESS-DOCD GE2>/YR: CPT | Mod: HCNC,CPTII,S$GLB, | Performed by: NURSE PRACTITIONER

## 2020-10-26 PROCEDURE — 3078F DIAST BP <80 MM HG: CPT | Mod: HCNC,CPTII,S$GLB, | Performed by: NURSE PRACTITIONER

## 2020-10-26 PROCEDURE — 99499 UNLISTED E&M SERVICE: CPT | Mod: S$GLB,,, | Performed by: NURSE PRACTITIONER

## 2020-10-26 PROCEDURE — 3075F PR MOST RECENT SYSTOLIC BLOOD PRESS GE 130-139MM HG: ICD-10-PCS | Mod: HCNC,CPTII,S$GLB, | Performed by: NURSE PRACTITIONER

## 2020-10-26 PROCEDURE — 99999 PR PBB SHADOW E&M-EST. PATIENT-LVL III: ICD-10-PCS | Mod: PBBFAC,HCNC,, | Performed by: NURSE PRACTITIONER

## 2020-10-26 PROCEDURE — 1126F PR PAIN SEVERITY QUANTIFIED, NO PAIN PRESENT: ICD-10-PCS | Mod: HCNC,S$GLB,, | Performed by: NURSE PRACTITIONER

## 2020-10-26 PROCEDURE — 99999 PR PBB SHADOW E&M-EST. PATIENT-LVL III: CPT | Mod: PBBFAC,HCNC,, | Performed by: NURSE PRACTITIONER

## 2020-10-26 PROCEDURE — 77067 SCR MAMMO BI INCL CAD: CPT | Mod: 26,HCNC,, | Performed by: RADIOLOGY

## 2020-10-26 PROCEDURE — 1159F PR MEDICATION LIST DOCUMENTED IN MEDICAL RECORD: ICD-10-PCS | Mod: HCNC,S$GLB,, | Performed by: NURSE PRACTITIONER

## 2020-10-26 PROCEDURE — 77063 BREAST TOMOSYNTHESIS BI: CPT | Mod: 26,HCNC,, | Performed by: RADIOLOGY

## 2020-10-26 PROCEDURE — 77067 MAMMO DIGITAL SCREENING BILAT WITH TOMO: ICD-10-PCS | Mod: 26,HCNC,, | Performed by: RADIOLOGY

## 2020-10-26 PROCEDURE — 77063 MAMMO DIGITAL SCREENING BILAT WITH TOMO: ICD-10-PCS | Mod: 26,HCNC,, | Performed by: RADIOLOGY

## 2020-10-26 PROCEDURE — 1100F PR PT FALLS ASSESS DOC 2+ FALLS/FALL W/INJURY/YR: ICD-10-PCS | Mod: HCNC,CPTII,S$GLB, | Performed by: NURSE PRACTITIONER

## 2020-10-26 PROCEDURE — 77067 SCR MAMMO BI INCL CAD: CPT | Mod: TC,HCNC

## 2020-10-26 PROCEDURE — 3288F PR FALLS RISK ASSESSMENT DOCUMENTED: ICD-10-PCS | Mod: HCNC,CPTII,S$GLB, | Performed by: NURSE PRACTITIONER

## 2020-10-26 PROCEDURE — 3078F PR MOST RECENT DIASTOLIC BLOOD PRESSURE < 80 MM HG: ICD-10-PCS | Mod: HCNC,CPTII,S$GLB, | Performed by: NURSE PRACTITIONER

## 2020-10-26 PROCEDURE — 3288F FALL RISK ASSESSMENT DOCD: CPT | Mod: HCNC,CPTII,S$GLB, | Performed by: NURSE PRACTITIONER

## 2020-10-26 PROCEDURE — 3075F SYST BP GE 130 - 139MM HG: CPT | Mod: HCNC,CPTII,S$GLB, | Performed by: NURSE PRACTITIONER

## 2020-10-26 PROCEDURE — 99214 PR OFFICE/OUTPT VISIT, EST, LEVL IV, 30-39 MIN: ICD-10-PCS | Mod: HCNC,S$GLB,, | Performed by: NURSE PRACTITIONER

## 2020-10-26 PROCEDURE — 99499 RISK ADDL DX/OHS AUDIT: ICD-10-PCS | Mod: S$GLB,,, | Performed by: NURSE PRACTITIONER

## 2020-11-03 ENCOUNTER — TELEPHONE (OUTPATIENT)
Dept: INTERNAL MEDICINE | Facility: CLINIC | Age: 75
End: 2020-11-03

## 2020-11-03 DIAGNOSIS — M25.561 CHRONIC PAIN OF RIGHT KNEE: Primary | ICD-10-CM

## 2020-11-03 DIAGNOSIS — G89.29 CHRONIC PAIN OF RIGHT KNEE: Primary | ICD-10-CM

## 2020-11-03 DIAGNOSIS — S89.91XA INJURY OF RIGHT KNEE, INITIAL ENCOUNTER: ICD-10-CM

## 2020-11-03 NOTE — TELEPHONE ENCOUNTER
----- Message from Steffi Levine sent at 11/3/2020  2:37 PM CST -----  Contact: Flory Munguia called in regarding speaking to the nurse about a referral that she requested. Please give her a call back at [phone].    Thanks,   Pb

## 2020-11-03 NOTE — TELEPHONE ENCOUNTER
Pt states fell around June. Pt states right knee clicks. Pain 6 on scale 1-10. Pt requesting referral. Pt states she believes she mentioned it in last visit on 10/6/20

## 2020-11-05 NOTE — TELEPHONE ENCOUNTER
Schedule knee x-ray and appointment with orthopedics. Make sure the x-ray is done prior to the visit.

## 2020-11-06 DIAGNOSIS — M25.561 RIGHT KNEE PAIN, UNSPECIFIED CHRONICITY: Primary | ICD-10-CM

## 2020-11-09 ENCOUNTER — PES CALL (OUTPATIENT)
Dept: ADMINISTRATIVE | Facility: CLINIC | Age: 75
End: 2020-11-09

## 2020-11-09 ENCOUNTER — TELEPHONE (OUTPATIENT)
Dept: ORTHOPEDICS | Facility: CLINIC | Age: 75
End: 2020-11-09

## 2020-11-09 DIAGNOSIS — E78.49 OTHER HYPERLIPIDEMIA: ICD-10-CM

## 2020-11-09 RX ORDER — EZETIMIBE 10 MG/1
10 TABLET ORAL DAILY
Qty: 90 TABLET | Refills: 1 | Status: SHIPPED | OUTPATIENT
Start: 2020-11-09 | End: 2021-05-06

## 2020-11-09 NOTE — TELEPHONE ENCOUNTER
Called the pt back to inform her to arrive 30mins earlier for her appt. Pt understood.       ----- Message from Malka Bhatia sent at 11/9/2020  1:49 PM CST -----  Regarding: xray  Contact: pt  Pt calling regarding xray at [facility]. Please call pt at [phone]

## 2020-11-19 ENCOUNTER — HOSPITAL ENCOUNTER (OUTPATIENT)
Dept: RADIOLOGY | Facility: HOSPITAL | Age: 75
Discharge: HOME | End: 2020-11-19
Attending: STUDENT IN AN ORGANIZED HEALTH CARE EDUCATION/TRAINING PROGRAM
Payer: MEDICARE

## 2020-11-19 ENCOUNTER — OFFICE VISIT (OUTPATIENT)
Dept: ORTHOPEDICS | Facility: CLINIC | Age: 75
End: 2020-11-19
Payer: MEDICARE

## 2020-11-19 VITALS — WEIGHT: 180 LBS | BODY MASS INDEX: 28.93 KG/M2 | HEIGHT: 66 IN

## 2020-11-19 DIAGNOSIS — M25.561 CHRONIC PAIN OF RIGHT KNEE: ICD-10-CM

## 2020-11-19 DIAGNOSIS — M25.561 RIGHT KNEE PAIN, UNSPECIFIED CHRONICITY: ICD-10-CM

## 2020-11-19 DIAGNOSIS — G89.29 CHRONIC PAIN OF RIGHT KNEE: ICD-10-CM

## 2020-11-19 DIAGNOSIS — M17.11 TRICOMPARTMENT OSTEOARTHRITIS OF RIGHT KNEE: Primary | ICD-10-CM

## 2020-11-19 DIAGNOSIS — S89.91XA INJURY OF RIGHT KNEE, INITIAL ENCOUNTER: ICD-10-CM

## 2020-11-19 PROCEDURE — 1100F PR PT FALLS ASSESS DOC 2+ FALLS/FALL W/INJURY/YR: ICD-10-PCS | Mod: HCNC,CPTII,S$GLB, | Performed by: STUDENT IN AN ORGANIZED HEALTH CARE EDUCATION/TRAINING PROGRAM

## 2020-11-19 PROCEDURE — 1159F MED LIST DOCD IN RCRD: CPT | Mod: HCNC,S$GLB,, | Performed by: STUDENT IN AN ORGANIZED HEALTH CARE EDUCATION/TRAINING PROGRAM

## 2020-11-19 PROCEDURE — 1159F PR MEDICATION LIST DOCUMENTED IN MEDICAL RECORD: ICD-10-PCS | Mod: HCNC,S$GLB,, | Performed by: STUDENT IN AN ORGANIZED HEALTH CARE EDUCATION/TRAINING PROGRAM

## 2020-11-19 PROCEDURE — 73562 X-RAY EXAM OF KNEE 3: CPT | Mod: 26,59,HCNC,LT | Performed by: RADIOLOGY

## 2020-11-19 PROCEDURE — 73562 X-RAY EXAM OF KNEE 3: CPT | Mod: TC,HCNC,LT,59

## 2020-11-19 PROCEDURE — 99203 OFFICE O/P NEW LOW 30 MIN: CPT | Mod: HCNC,S$GLB,, | Performed by: STUDENT IN AN ORGANIZED HEALTH CARE EDUCATION/TRAINING PROGRAM

## 2020-11-19 PROCEDURE — 73564 X-RAY EXAM KNEE 4 OR MORE: CPT | Mod: 26,HCNC,RT, | Performed by: RADIOLOGY

## 2020-11-19 PROCEDURE — 99203 PR OFFICE/OUTPT VISIT, NEW, LEVL III, 30-44 MIN: ICD-10-PCS | Mod: HCNC,S$GLB,, | Performed by: STUDENT IN AN ORGANIZED HEALTH CARE EDUCATION/TRAINING PROGRAM

## 2020-11-19 PROCEDURE — 99999 PR PBB SHADOW E&M-EST. PATIENT-LVL IV: ICD-10-PCS | Mod: PBBFAC,HCNC,, | Performed by: STUDENT IN AN ORGANIZED HEALTH CARE EDUCATION/TRAINING PROGRAM

## 2020-11-19 PROCEDURE — 1125F AMNT PAIN NOTED PAIN PRSNT: CPT | Mod: HCNC,S$GLB,, | Performed by: STUDENT IN AN ORGANIZED HEALTH CARE EDUCATION/TRAINING PROGRAM

## 2020-11-19 PROCEDURE — 1125F PR PAIN SEVERITY QUANTIFIED, PAIN PRESENT: ICD-10-PCS | Mod: HCNC,S$GLB,, | Performed by: STUDENT IN AN ORGANIZED HEALTH CARE EDUCATION/TRAINING PROGRAM

## 2020-11-19 PROCEDURE — 99999 PR PBB SHADOW E&M-EST. PATIENT-LVL IV: CPT | Mod: PBBFAC,HCNC,, | Performed by: STUDENT IN AN ORGANIZED HEALTH CARE EDUCATION/TRAINING PROGRAM

## 2020-11-19 PROCEDURE — 73564 XR KNEE ORTHO RIGHT WITH FLEXION: ICD-10-PCS | Mod: 26,HCNC,RT, | Performed by: RADIOLOGY

## 2020-11-19 PROCEDURE — 3288F PR FALLS RISK ASSESSMENT DOCUMENTED: ICD-10-PCS | Mod: HCNC,CPTII,S$GLB, | Performed by: STUDENT IN AN ORGANIZED HEALTH CARE EDUCATION/TRAINING PROGRAM

## 2020-11-19 PROCEDURE — 73562 XR KNEE ORTHO RIGHT WITH FLEXION: ICD-10-PCS | Mod: 26,59,HCNC,LT | Performed by: RADIOLOGY

## 2020-11-19 PROCEDURE — 3288F FALL RISK ASSESSMENT DOCD: CPT | Mod: HCNC,CPTII,S$GLB, | Performed by: STUDENT IN AN ORGANIZED HEALTH CARE EDUCATION/TRAINING PROGRAM

## 2020-11-19 PROCEDURE — 1100F PTFALLS ASSESS-DOCD GE2>/YR: CPT | Mod: HCNC,CPTII,S$GLB, | Performed by: STUDENT IN AN ORGANIZED HEALTH CARE EDUCATION/TRAINING PROGRAM

## 2020-11-19 NOTE — LETTER
November 19, 2020      Tayler Ovalle DO  [id number] [place] [address]           [facility]  [address]  [place] [address]  Phone: [phone]  Fax: [phone]          Patient: Flory Cam   MR Number: [id number]   YOB: 1945   Date of Visit: 11/19/2020       Dear Dr. Tayler Ovalle:    Thank you for referring Flory Cam to me for evaluation. Attached you will find relevant portions of my assessment and plan of care.    If you have questions, please do not hesitate to call me. I look forward to following Flory Cam along with you.    Sincerely,    Naeem Lira MD    Enclosure  CC:  No Recipients    If you would like to receive this communication electronically, please contact [email or web address] or [phone] to request more information on [employer] Link access.    For providers and/or their staff who would like to refer a patient to [facility], please contact us through our one-stop-shop provider referral line, [facility] , at [phone].    If you feel you have received this communication in error or would no longer like to receive these types of communications, please e-mail [email or web address]

## 2020-11-19 NOTE — PROGRESS NOTES
Patient ID: Flory Cam  YOB: 1945  MRN: [id number]    Chief Complaint: Pain of the Right Knee    Referred By: Self for Right knee pain    History of Present Illness: Flory Cam is a 75 y.o. female who presents today with right knee pain.     It has been present for 5 months.   Pain is located in the right knee(s).   The pain is described as constant, moderate.  The symptoms are worse withcold, walking, standing  The patient has tried heat and Rubbing alcohol, Epsom salt for pain, with minimal relief.   Related to injury: no.    The patient is active in None.  Occupation:  Retired    Presenting today with acute on chronic right knee pain.  She has had a constant achiness throughout the entire knee.  She is been having pain in the morning as well as worse with walking.  She has not seen anyone recently for her knee pain, but notes prior surgical operation that she cannot remember to this knee many years ago.  She denies any instability, but notes some clicking and popping that can be painful.  Denies locking.      Past Medical History:   Past Medical History:   Diagnosis Date    Acute coronary syndrome     Anxiety     Bilateral carotid artery stenosis 11/17/2015    Chronic pain     Colon polyp     Coronary artery disease     GERD (gastroesophageal reflux disease)     Hyperlipidemia     Hypertension     Hypothyroidism     Low back pain     Lupus     Osteoporosis     Poor circulation     PVD (peripheral vascular disease)     S/P peripheral artery angioplasty 02/13/2014    SCCA (squamous cell carcinoma) of skin 10/2019    Dr. ZANDRA Rivas--oncology    Skin disease      Past Surgical History:   Procedure Laterality Date    COLONOSCOPY N/A 1/4/2017    Procedure: COLONOSCOPY;  Surgeon: Sylvester Arboleda III, MD;  Location: [facility];  Service: Endoscopy;  Laterality: N/A;    COLONOSCOPY N/A 8/4/2020    Procedure: COLONOSCOPY;  Surgeon: Sylvester Arboleda III, MD;   [facility];  Service: Endoscopy;  Laterality: N/A;    CORONARY ANGIOPLASTY      CORONARY ARTERY BYPASS GRAFT      HYSTERECTOMY      OOPHORECTOMY      THYROIDECTOMY       Family History   Problem Relation Age of Onset    Melanoma Neg Hx     Psoriasis Neg Hx     Lupus Neg Hx     Eczema Neg Hx      Social History     Socioeconomic History    Marital status:      Spouse name: Not on file    Number of children: Not on file    Years of education: Not on file    Highest education level: Not on file   Occupational History    Occupation: Retired     Comment: Dispatcher   Social Needs    Financial resource strain: Somewhat hard    Food insecurity     Worry: Sometimes true     Inability: Sometimes true    Transportation needs     Medical: Yes     Non-medical: Yes   Tobacco Use    Smoking status: Current Every Day Smoker     Packs/day: 0.25     Years: 40.00     Pack years: 10.00     Types: Cigarettes     Start date: 1961    Smokeless tobacco: Never Used    Tobacco comment: entered smoking cessation program 1/13/20    Substance and Sexual Activity    Alcohol use: No     Frequency: Never     Drinks per session: 1 or 2     Binge frequency: Never    Drug use: No    Sexual activity: Not on file   Lifestyle    Physical activity     Days per week: 0 days     Minutes per session: 0 min    Stress: To some extent   Relationships    Social connections     Talks on phone: More than three times a week     Gets together: Once a week     Attends [employer] service: 1 to 4 times per year     Active member of club or organization: No     Attends meetings of clubs or organizations: Never     Relationship status:    Other Topics Concern    Are you pregnant or think you may be? No    Breast-feeding No   Social History Narrative    Patient is retired dispatcher.     Medication List with Changes/Refills   Current Medications    ACETAMINOPHEN (TYLENOL) 325 MG TABLET    Take 2 tablets (650 mg total) by  mouth every 6 (six) hours as needed for Pain.    ALBUTEROL (PROVENTIL/VENTOLIN HFA) 90 MCG/ACTUATION INHALER    Inhale 2 puffs into the lungs every 6 (six) hours as needed for Wheezing.    AMLODIPINE (NORVASC) 10 MG TABLET    TAKE 1 TABLET BY MOUTH EVERY DAY    ASPIRIN (ECOTRIN) 81 MG EC TABLET    Take 1 tablet (81 mg total) by mouth once daily.    ATORVASTATIN (LIPITOR) 80 MG TABLET    TAKE ONE TABLET BY MOUTH DAILY    CLOPIDOGREL (PLAVIX) 75 MG TABLET    TAKE 1 TABLET(75 MG) BY MOUTH EVERY DAY    EZETIMIBE (ZETIA) 10 MG TABLET    Take 1 tablet (10 mg total) by mouth once daily.    LEVOTHYROXINE (SYNTHROID) 137 MCG TAB TABLET    TAKE ONE TABLET BY MOUTH ONCE DAILY    ONDANSETRON (ZOFRAN) 4 MG TABLET    Take 1 tablet (4 mg total) by mouth every 8 (eight) hours as needed for Nausea.    OXYCODONE-ACETAMINOPHEN (PERCOCET)  MG PER TABLET        PROCHLORPERAZINE (COMPAZINE) 10 MG TABLET        TRAZODONE (DESYREL) 50 MG TABLET    TAKE 1 TABLET BY MOUTH EVERY DAY AT NIGHT AS NEEDED FOR INSOMNIA    VALSARTAN (DIOVAN) 320 MG TABLET    Take 1 tablet (320 mg total) by mouth once daily.     Review of patient's allergies indicates:  No Known Allergies  Review of Systems   Constitution: Negative for chills and fever.   HENT: Negative for congestion.    Eyes: Negative for photophobia.   Cardiovascular: Negative for chest pain and leg swelling.   Respiratory: Negative for shortness of breath.    Musculoskeletal: Negative for falls and myalgias.   Gastrointestinal: Negative for abdominal pain, constipation and diarrhea.   Neurological: Negative for headaches, numbness and weakness.       Physical Exam:   Body mass index is 29.05 kg/m².    GENERAL: Well appearing, in no acute distress.  HEAD: Normocephalic and atraumatic.  ENT: External ears and nose grossly normal.  EYES: EOMI bilaterally  PULMONARY: Respirations are grossly even and non-labored.  NEURO: Awake, alert, and oriented x 3.  SKIN: No obvious rashes  appreciated.  PSYCH: Mood & affect are appropriate.    Detailed MSK exam:     Right knee exam  Range of motion 0/0/95  Crepitance with active extension  Tenderness palpation over the medial joint line  No effusion in moderately decreased patellar mobility    Imaging:    Narrative & Impression     EXAMINATION:  XR KNEE ORTHO RIGHT WITH FLEXION     CLINICAL HISTORY:  Pain in right knee     TECHNIQUE:  AP standing as well as PA flexion standing and Merchant views of both knees were performed.  A lateral view of the right knee is also performed.     COMPARISON:  04/16/2011     FINDINGS:  Bones appear osteopenic.  Degenerative changes noted bilaterally more prevalent within the right knee with mild-to-moderate medial compartment joint space loss.  There is lateral aspect bilateral patellofemoral compartment joint space loss.  Bilateral chondrocalcinosis noted.  Arterial vascular calcifications present.  No large joint effusion.     Impression:     As above         Relevant imaging results were reviewed and interpreted by me and per my read osteopenic bone characteristics as well as moderate medial compartment joint space narrowing.  This was discussed with the patient and / or family today.     Assessment:  Flory Cam is a 75 y.o. female presenting today with acute on chronic osteoarthritis of the right knee.  We discussed bracing as well as physical therapy.  We also discussed possible corticosteroid and hyaluronic acid injections in the future she continues to have worsening symptoms.    Follow-up in 8 weeks    Tricompartment osteoarthritis of right knee  -     Ambulatory referral/consult to Physical/Occupational Therapy; Future; Expected date: 11/26/2020    Chronic pain of right knee  -     Ambulatory referral/consult to Orthopedics  -     Ambulatory referral/consult to Physical/Occupational Therapy; Future; Expected date: 11/26/2020    Injury of right knee, initial encounter  -     Ambulatory  referral/consult to Orthopedics       A copy of today's visit note has been sent to the referring provider.     Naeem Lira MD    Disclaimer: This note was prepared using a voice recognition system and is likely to have sound alike errors within the text.

## 2020-11-23 ENCOUNTER — TELEPHONE (OUTPATIENT)
Dept: INTERNAL MEDICINE | Facility: CLINIC | Age: 75
End: 2020-11-23

## 2020-11-23 DIAGNOSIS — G89.29 CHRONIC PAIN OF RIGHT KNEE: ICD-10-CM

## 2020-11-23 DIAGNOSIS — Z74.09 IMPAIRED MOBILITY: Primary | ICD-10-CM

## 2020-11-23 DIAGNOSIS — M25.561 CHRONIC PAIN OF RIGHT KNEE: ICD-10-CM

## 2020-11-23 DIAGNOSIS — M17.11 TRICOMPARTMENT OSTEOARTHRITIS OF RIGHT KNEE: Primary | ICD-10-CM

## 2020-11-23 NOTE — TELEPHONE ENCOUNTER
Pt wants a order for a walker with a seat and a brace for her right knee. Dr. Lira her ortho provider told pt to call her pcp to get the knee brace, because Dr. Ovalle can get it quicker than he can.     Please advise.

## 2020-11-23 NOTE — TELEPHONE ENCOUNTER
----- Message from Chandrika Ramirez sent at 11/23/2020  8:54 AM CST -----  Regarding: personal  Contact: pt  Pt requesting a call back to discuss a personal matter,pt would not provide any additional information Please call back at [phone].

## 2020-12-01 ENCOUNTER — CLINICAL SUPPORT (OUTPATIENT)
Dept: REHABILITATION | Facility: HOSPITAL | Age: 75
End: 2020-12-01
Attending: STUDENT IN AN ORGANIZED HEALTH CARE EDUCATION/TRAINING PROGRAM
Payer: MEDICARE

## 2020-12-01 ENCOUNTER — TELEPHONE (OUTPATIENT)
Dept: INTERNAL MEDICINE | Facility: CLINIC | Age: 75
End: 2020-12-01

## 2020-12-01 DIAGNOSIS — M25.561 CHRONIC PAIN OF RIGHT KNEE: ICD-10-CM

## 2020-12-01 DIAGNOSIS — G89.29 CHRONIC PAIN OF RIGHT KNEE: ICD-10-CM

## 2020-12-01 DIAGNOSIS — M17.11 TRICOMPARTMENT OSTEOARTHRITIS OF RIGHT KNEE: ICD-10-CM

## 2020-12-01 DIAGNOSIS — R29.898 RIGHT LEG WEAKNESS: ICD-10-CM

## 2020-12-01 PROCEDURE — 97110 THERAPEUTIC EXERCISES: CPT | Mod: HCNC

## 2020-12-01 PROCEDURE — 97161 PT EVAL LOW COMPLEX 20 MIN: CPT | Mod: HCNC

## 2020-12-01 NOTE — TELEPHONE ENCOUNTER
----- Message from Janine Victor sent at 12/1/2020  3:10 PM CST -----  Regarding: advice  Contact: Patient  Please have the nurse to give patient a call back, she has a question she would like to ask the nurse. Patient did not elaborate any further. Please call at  [phone]

## 2020-12-01 NOTE — TELEPHONE ENCOUNTER
Pt daughter friend tested pos for covid-19 pt wanted to be tested currently has no syps.. Pt advised to go to Urgent Care in [place]. Pt verbalized understanding.

## 2020-12-07 ENCOUNTER — TELEPHONE (OUTPATIENT)
Dept: FAMILY MEDICINE | Facility: CLINIC | Age: 75
End: 2020-12-07

## 2020-12-07 NOTE — TELEPHONE ENCOUNTER
----- Message from Berna Claudio sent at 12/7/2020  3:33 PM CST -----  Regarding: WALKER ROLLER BP CUFF  Contact: DEBORA QUIROGA DAUGHTER  STATES PATIENT HAVE BEEN WAITING FOR A ROLLER WALKER & BP CUFF SINCE September AND NO ONE HAS RETURNED HER CALL. PLEASE CALL DEBORA @ [phone] TODAY ASAP URGENT!! THANKS    Type:  RX Refill Request    Who Called: DEBORA  Refill or New Rx:REFILL  RX Name and Strength:AMLODIPINE 10 MG  How is the patient currently taking it? (ex. 1XDay):1 PILL DAY  Is this a 30 day or 90 day RX:90   Preferred Pharmacy with phone number:  [facility] @ [phone] [place]    Local or Mail Order:LOCAL  Ordering Provider:DARLYN  Would the patient rather a call back or a response via [facility]? CALL  Best Call Back Number:[phone]  Additional Information: PLEASE CALL IN REFILL ASAP & CALL PATIENT WHEN SENT.  THANKS

## 2020-12-07 NOTE — TELEPHONE ENCOUNTER
----- Message from Berna Claudio sent at 12/7/2020  3:33 PM CST -----  Regarding: WALKER ROLLER BP CUFF  Contact: DEBORA QUIROGA DAUGHTER  STATES PATIENT HAVE BEEN WAITING FOR A ROLLER WALKER & BP CUFF SINCE September AND NO ONE HAS RETURNED HER CALL. PLEASE CALL DEBORA @ [phone] TODAY ASAP URGENT!! THANKS    Type:  RX Refill Request    Who Called: DEBORA  Refill or New Rx:REFILL  RX Name and Strength:AMLODIPINE 10 MG  How is the patient currently taking it? (ex. 1XDay):1 PILL DAY  Is this a 30 day or 90 day RX:90   Preferred Pharmacy with phone number:  [facility] @ [phone] [place]    Local or Mail Order:LOCAL  Ordering Provider:DARLYN  Would the patient rather a call back or a response via [facility]? CALL  Best Call Back Number:[phone]  Additional Information: PLEASE CALL IN REFILL ASAP & CALL PATIENT WHEN SENT.  THANKS

## 2020-12-10 ENCOUNTER — TELEPHONE (OUTPATIENT)
Dept: REHABILITATION | Facility: HOSPITAL | Age: 75
End: 2020-12-10

## 2020-12-10 PROBLEM — R29.898 RIGHT LEG WEAKNESS: Status: ACTIVE | Noted: 2020-12-10

## 2020-12-10 NOTE — PLAN OF CARE
[facility]  Physical Therapy Initial Evaluation    Name: Flory Soria [facility] Number: [id number]    Therapy Diagnosis:   Encounter Diagnoses   Name Primary?    Chronic pain of right knee     Tricompartment osteoarthritis of right knee     Right leg weakness      Physician: Naeem Lira MD    Physician Orders: PT Eval and Treat   Medical Diagnosis from Referral:   M25.561,G89.29 (ICD-10-CM) - Chronic pain of right knee   M17.11 (ICD-10-CM) - Tricompartment osteoarthritis of right knee       Evaluation Date: 12/1/2020  Authorization Period Expiration: 12/23/20  Plan of Care Expiration: 1/1/21  Visit # / Visits authorized: 1/1    Time In: 1:00  Time Out: 2:00  Total Billable Time: 9 minutes    Precautions: COPD, Lupus, HTN, chemo patient    Subjective   Date of onset: 6 months ago  History of current condition - Flory reports: pain in back x 15-20 years.  Pt. Reports pain in R knee x 6 months after fall.  Pt. Reports click in knee when she stands and limited with walking requiring walker for long distances.  Pt. Reports having knee scope on R knee around 2008 which did help but clicking did begin again.       Pain:  Current 5/10, worst 8/10, best 0/10   Location: right knee   Description: Aching. 'click'  Aggravating Factors: Standing and Walking, laundry, cooking, making up bed  Easing Factors: pain medication for back, lying down, elevating foot    Prior Therapy: yes  Social History: grandson or son are present sometimes  Occupation: Retired  Prior Level of Function:   Current Level of Function: Standing and Walking, laundry, cooking, making up bed      Imaging:  R knee X-Ray 11/19/20  FINDINGS:  Bones appear osteopenic.  Degenerative changes noted bilaterally more prevalent within the right knee with mild-to-moderate medial compartment joint space loss.  There is lateral aspect bilateral patellofemoral compartment joint space loss.  Bilateral chondrocalcinosis noted.   Arterial vascular calcifications present.  No large joint effusion.     Impression:     As above        Medical History:   Past Medical History:   Diagnosis Date    Acute coronary syndrome     Anxiety     Bilateral carotid artery stenosis 11/17/2015    Chronic pain     Colon polyp     Coronary artery disease     GERD (gastroesophageal reflux disease)     Hyperlipidemia     Hypertension     Hypothyroidism     Low back pain     Lupus     Osteoporosis     Poor circulation     PVD (peripheral vascular disease)     S/P peripheral artery angioplasty 02/13/2014    SCCA (squamous cell carcinoma) of skin 10/2019    Dr. ZANDRA Rivas--oncology    Skin disease        Surgical History:   Flory Cam  has a past surgical history that includes Coronary artery bypass graft; Hysterectomy; Thyroidectomy; Coronary angioplasty; Colonoscopy (N/A, 1/4/2017); Oophorectomy; and Colonoscopy (N/A, 8/4/2020).    Medications:   Flory has a current medication list which includes the following prescription(s): acetaminophen, albuterol, amlodipine, aspirin, atorvastatin, clopidogrel, ezetimibe, levothyroxine, ondansetron, oxycodone-acetaminophen, prochlorperazine, trazodone, and valsartan.    Allergies:   Review of patient's allergies indicates:  No Known Allergies     Pts goals:     Objective       CMS Impairment/Limitation/Restriction for FOTO Knee Survey    Therapist reviewed FOTO scores for Flory Cam on 12/1/2020.   FOTO documents entered into [facility] - see Media section.    Limitation Score: 57%       Gait: decreased WB R LE    Squat: Double leg- pain and click in both knees per pt., good knee flexion   Single leg- NT    Balance: SLS R:L 2:4 sec.     Reflex/Sensation:     Knee AROM:     (L) (R)     Flexion   NT NT     Extension  NT NT            R L  Strength:  Hip flexors  3/5 3/5  Quadriceps  5/5 5/5      Hamstrings  3/5 5/5     Anterior Tibialis 4/5 4/5     Peroneals  5/5 5/5     Post.  Tibialis  4/5 4/5     Gastrocnemius 0 16 heel raises     Adductors  1+/5 1+/5     External rotators 3+/5 4/5     Gluteus Medius 4/5 4/5     Gluteus Jose Alberto 1+/5 3-/5     B hip int.rotators 1+/5 1+/5      Joint Mobility: pain with R patellofemoral mob and R prox. Tibia fibula mob     Muscle Length:  Hamstrings: 90/90 R:L 0:15 deg.     1/2 Kneeling Soleus:     Lenard Test: + R quad tension moderate, mild + L quad tension    Tenderness to palpation: Tender to palpate along L mid to distal quad      Lower Limb Tension Test:  + mild R femoral nerve bias       TREATMENT   Treatment Time In: 1:45  Treatment Time Out: 2:00  Total Treatment time separate from Evaluation: 9 minutes    Flory received therapeutic exercises to develop strength and flexibility for 9 minutes including: prone femoral nerve glides x 2 min., bridging x 1 min., R seated HS curls with GTB x 2 min., heel raises x 2 min., R clams x 2 min.      Flory received the following manual therapy techniques: Joint mobilizations were applied to the:  for 4 minutes, including: R tibia femoral distraction mob x 2 min., L tibia int. And ext. Rot mob x 2 min.        Home Exercises and Patient Education Provided    Education provided:   -Education on condition, HEP, and activity    Written Home Exercises Provided: yes.  Exercises were reviewed and Flory was able to demonstrate them prior to the end of the session.  Flory demonstrated good  understanding of the education provided.     See EMR under Patient Instructions for exercises provided 12/1/2020.    Assessment   Flory is a 75 y.o. female referred to outpatient Physical Therapy with a medical diagnosis of  M25.561,G89.29 (ICD-10-CM) - Chronic pain of right knee   M17.11 (ICD-10-CM) - Tricompartment osteoarthritis of right knee    Pt. presents with weakness in R hip musculature greater than L hip musculature, tension in B quads, weakness R gastroc.    Pt prognosis is Good.   Pt will benefit from skilled  outpatient Physical Therapy to address the deficits stated above and in the chart below, provide pt/family education, and to maximize pt's level of independence.     Plan of care discussed with patient: Yes  Pt's spiritual, cultural and educational needs considered and patient is agreeable to the plan of care and goals as stated below:     Anticipated Barriers for therapy: none    Medical Necessity is demonstrated by the following  History  Co-morbidities and personal factors that may impact the plan of care Co-morbidities:    COPD, Lupus, HTN, chemo patient      Personal Factors:   no deficits     low   Examination  Body Structures and Functions, activity limitations and participation restrictions that may impact the plan of care Body Regions:   back  lower extremities    Body Systems:    gross symmetry  ROM  strength  gross coordinated movement  balance  gait  motor control  motor learning    Participation Restrictions:   Limited with walking and standing    Activity limitations:   Learning and applying knowledge  no deficits    General Tasks and Commands  no deficits    Communication  no deficits    Mobility  walking    Self care  no deficits    Domestic Life  shopping  cooking  doing house work (cleaning house, washing dishes, laundry)    Interactions/Relationships  no deficits    Life Areas  no deficits    Community and Social Life  community life  recreation and leisure         moderate   Clinical Presentation stable and uncomplicated low   Decision Making/ Complexity Score: low     Goals:  Short Term Goals: In 4 weeks:  1.I with HEP  2.Patient to demo B knee ROM WNL.  3.Patient to demo increase R hip strength to 4/5 or greater.  4.Patient to have decreased pain to 4/10 or less.  5.Patient to ambulate with normal amb pattern.  6.Patient to score less than 30% impaired on the FOTO.    Long Term Goals: In 8 weeks  1. Patient to perform daily activities including standing and walking without limitation.  2.  Patient to demonstrate good dynamic standing balance to reduce fall risk.  3. Patient to demonstrate increased LE strength to 5/5 gross MMT to tolerate transfers with less discomfort.  4. Patient to have decreased pain to less than 2/10  5. Patient to score less than 15% on the LEFS.      Plan   Plan of care Certification: 12/1/2020 to 1/1/20.    Outpatient Physical Therapy 2 times weekly for 8 weeks to include the following interventions: Aquatic Therapy, Gait Training, Manual Therapy, Moist Heat/ Ice, Neuromuscular Re-ed, Patient Education, Self Care, Therapeutic Activites and Therapeutic Exercise, e-stim.      Kassandra Londono, PT    Thank you for this referral.    These services are reasonable and necessary for the conditions set forth above while under my care.

## 2020-12-10 NOTE — TELEPHONE ENCOUNTER
Pt. Reports that her doctor told her to not attend PT secondary coronavirus pandemic.  Pt. Educated to continue HEP.  Pt. Educated to ask doctor for referral for PT when she is ready to return if she returns next year.

## 2020-12-30 ENCOUNTER — PES CALL (OUTPATIENT)
Dept: ADMINISTRATIVE | Facility: CLINIC | Age: 75
End: 2020-12-30

## 2021-01-04 ENCOUNTER — TELEPHONE (OUTPATIENT)
Dept: CARDIOLOGY | Facility: CLINIC | Age: 76
End: 2021-01-04

## 2021-01-05 ENCOUNTER — TELEPHONE (OUTPATIENT)
Dept: INTERNAL MEDICINE | Facility: CLINIC | Age: 76
End: 2021-01-05

## 2021-01-06 ENCOUNTER — PATIENT OUTREACH (OUTPATIENT)
Dept: ADMINISTRATIVE | Facility: OTHER | Age: 76
End: 2021-01-06

## 2021-01-09 ENCOUNTER — NURSE TRIAGE (OUTPATIENT)
Dept: ADMINISTRATIVE | Facility: CLINIC | Age: 76
End: 2021-01-09

## 2021-01-09 ENCOUNTER — IMMUNIZATION (OUTPATIENT)
Dept: INTERNAL MEDICINE | Facility: CLINIC | Age: 76
End: 2021-01-09
Payer: MEDICARE

## 2021-01-09 DIAGNOSIS — Z23 NEED FOR VACCINATION: ICD-10-CM

## 2021-01-09 PROCEDURE — 91300 COVID-19, MRNA, LNP-S, PF, 30 MCG/0.3 ML DOSE VACCINE: CPT | Mod: PBBFAC | Performed by: FAMILY MEDICINE

## 2021-01-12 ENCOUNTER — OFFICE VISIT (OUTPATIENT)
Dept: DERMATOLOGY | Facility: CLINIC | Age: 76
End: 2021-01-12
Payer: MEDICARE

## 2021-01-12 ENCOUNTER — OFFICE VISIT (OUTPATIENT)
Dept: PODIATRY | Facility: CLINIC | Age: 76
End: 2021-01-12
Payer: MEDICARE

## 2021-01-12 VITALS
HEART RATE: 58 BPM | HEIGHT: 66 IN | WEIGHT: 179.88 LBS | SYSTOLIC BLOOD PRESSURE: 171 MMHG | DIASTOLIC BLOOD PRESSURE: 81 MMHG | BODY MASS INDEX: 28.91 KG/M2

## 2021-01-12 DIAGNOSIS — L43.0 LICHEN PLANUS HYPERTROPHICUS: Primary | ICD-10-CM

## 2021-01-12 DIAGNOSIS — L70.0 ACNE VULGARIS: ICD-10-CM

## 2021-01-12 DIAGNOSIS — B35.1 DERMATOPHYTOSIS OF NAIL: ICD-10-CM

## 2021-01-12 DIAGNOSIS — L93.0 DISCOID LUPUS: ICD-10-CM

## 2021-01-12 DIAGNOSIS — I73.9 PERIPHERAL VASCULAR DISEASE: Primary | ICD-10-CM

## 2021-01-12 DIAGNOSIS — L93.0 DISCOID LUPUS ERYTHEMATOSUS: ICD-10-CM

## 2021-01-12 DIAGNOSIS — L84 CORN OR CALLUS: ICD-10-CM

## 2021-01-12 PROCEDURE — 3077F PR MOST RECENT SYSTOLIC BLOOD PRESSURE >= 140 MM HG: ICD-10-PCS | Mod: HCNC,CPTII,S$GLB, | Performed by: PODIATRIST

## 2021-01-12 PROCEDURE — 99999 PR PBB SHADOW E&M-EST. PATIENT-LVL III: CPT | Mod: PBBFAC,HCNC,, | Performed by: DERMATOLOGY

## 2021-01-12 PROCEDURE — 3288F PR FALLS RISK ASSESSMENT DOCUMENTED: ICD-10-PCS | Mod: HCNC,CPTII,S$GLB, | Performed by: PODIATRIST

## 2021-01-12 PROCEDURE — 3078F PR MOST RECENT DIASTOLIC BLOOD PRESSURE < 80 MM HG: ICD-10-PCS | Mod: HCNC,CPTII,S$GLB, | Performed by: DERMATOLOGY

## 2021-01-12 PROCEDURE — 3077F PR MOST RECENT SYSTOLIC BLOOD PRESSURE >= 140 MM HG: ICD-10-PCS | Mod: HCNC,CPTII,S$GLB, | Performed by: DERMATOLOGY

## 2021-01-12 PROCEDURE — 3079F DIAST BP 80-89 MM HG: CPT | Mod: HCNC,CPTII,S$GLB, | Performed by: PODIATRIST

## 2021-01-12 PROCEDURE — 11056 PR TRIM BENIGN HYPERKERATOTIC SKIN LESION,2-4: ICD-10-PCS | Mod: Q8,HCNC,S$GLB, | Performed by: PODIATRIST

## 2021-01-12 PROCEDURE — 1101F PR PT FALLS ASSESS DOC 0-1 FALLS W/OUT INJ PAST YR: ICD-10-PCS | Mod: HCNC,CPTII,S$GLB, | Performed by: DERMATOLOGY

## 2021-01-12 PROCEDURE — 3079F PR MOST RECENT DIASTOLIC BLOOD PRESSURE 80-89 MM HG: ICD-10-PCS | Mod: HCNC,CPTII,S$GLB, | Performed by: PODIATRIST

## 2021-01-12 PROCEDURE — 1126F PR PAIN SEVERITY QUANTIFIED, NO PAIN PRESENT: ICD-10-PCS | Mod: HCNC,S$GLB,, | Performed by: DERMATOLOGY

## 2021-01-12 PROCEDURE — 99999 PR PBB SHADOW E&M-EST. PATIENT-LVL III: ICD-10-PCS | Mod: PBBFAC,HCNC,, | Performed by: DERMATOLOGY

## 2021-01-12 PROCEDURE — 11721 PR DEBRIDEMENT OF NAILS, 6 OR MORE: ICD-10-PCS | Mod: 59,Q8,HCNC,S$GLB | Performed by: PODIATRIST

## 2021-01-12 PROCEDURE — 11721 DEBRIDE NAIL 6 OR MORE: CPT | Mod: 59,Q8,HCNC,S$GLB | Performed by: PODIATRIST

## 2021-01-12 PROCEDURE — 1159F MED LIST DOCD IN RCRD: CPT | Mod: HCNC,S$GLB,, | Performed by: PODIATRIST

## 2021-01-12 PROCEDURE — 1159F PR MEDICATION LIST DOCUMENTED IN MEDICAL RECORD: ICD-10-PCS | Mod: HCNC,S$GLB,, | Performed by: PODIATRIST

## 2021-01-12 PROCEDURE — 3288F FALL RISK ASSESSMENT DOCD: CPT | Mod: HCNC,CPTII,S$GLB, | Performed by: DERMATOLOGY

## 2021-01-12 PROCEDURE — 99214 OFFICE O/P EST MOD 30 MIN: CPT | Mod: HCNC,S$GLB,, | Performed by: DERMATOLOGY

## 2021-01-12 PROCEDURE — 1101F PT FALLS ASSESS-DOCD LE1/YR: CPT | Mod: HCNC,CPTII,S$GLB, | Performed by: PODIATRIST

## 2021-01-12 PROCEDURE — 3077F SYST BP >= 140 MM HG: CPT | Mod: HCNC,CPTII,S$GLB, | Performed by: PODIATRIST

## 2021-01-12 PROCEDURE — 99213 OFFICE O/P EST LOW 20 MIN: CPT | Mod: 25,HCNC,S$GLB, | Performed by: PODIATRIST

## 2021-01-12 PROCEDURE — 3077F SYST BP >= 140 MM HG: CPT | Mod: HCNC,CPTII,S$GLB, | Performed by: DERMATOLOGY

## 2021-01-12 PROCEDURE — 3288F FALL RISK ASSESSMENT DOCD: CPT | Mod: HCNC,CPTII,S$GLB, | Performed by: PODIATRIST

## 2021-01-12 PROCEDURE — 3288F PR FALLS RISK ASSESSMENT DOCUMENTED: ICD-10-PCS | Mod: HCNC,CPTII,S$GLB, | Performed by: DERMATOLOGY

## 2021-01-12 PROCEDURE — 99999 PR PBB SHADOW E&M-EST. PATIENT-LVL III: ICD-10-PCS | Mod: PBBFAC,HCNC,, | Performed by: PODIATRIST

## 2021-01-12 PROCEDURE — 1101F PR PT FALLS ASSESS DOC 0-1 FALLS W/OUT INJ PAST YR: ICD-10-PCS | Mod: HCNC,CPTII,S$GLB, | Performed by: PODIATRIST

## 2021-01-12 PROCEDURE — 99214 PR OFFICE/OUTPT VISIT, EST, LEVL IV, 30-39 MIN: ICD-10-PCS | Mod: HCNC,S$GLB,, | Performed by: DERMATOLOGY

## 2021-01-12 PROCEDURE — 1159F PR MEDICATION LIST DOCUMENTED IN MEDICAL RECORD: ICD-10-PCS | Mod: HCNC,S$GLB,, | Performed by: DERMATOLOGY

## 2021-01-12 PROCEDURE — 99213 PR OFFICE/OUTPT VISIT, EST, LEVL III, 20-29 MIN: ICD-10-PCS | Mod: 25,HCNC,S$GLB, | Performed by: PODIATRIST

## 2021-01-12 PROCEDURE — 1101F PT FALLS ASSESS-DOCD LE1/YR: CPT | Mod: HCNC,CPTII,S$GLB, | Performed by: DERMATOLOGY

## 2021-01-12 PROCEDURE — 1159F MED LIST DOCD IN RCRD: CPT | Mod: HCNC,S$GLB,, | Performed by: DERMATOLOGY

## 2021-01-12 PROCEDURE — 3078F DIAST BP <80 MM HG: CPT | Mod: HCNC,CPTII,S$GLB, | Performed by: DERMATOLOGY

## 2021-01-12 PROCEDURE — 99999 PR PBB SHADOW E&M-EST. PATIENT-LVL III: CPT | Mod: PBBFAC,HCNC,, | Performed by: PODIATRIST

## 2021-01-12 PROCEDURE — 11056 PARNG/CUTG B9 HYPRKR LES 2-4: CPT | Mod: Q8,HCNC,S$GLB, | Performed by: PODIATRIST

## 2021-01-12 PROCEDURE — 1126F AMNT PAIN NOTED NONE PRSNT: CPT | Mod: HCNC,S$GLB,, | Performed by: DERMATOLOGY

## 2021-01-12 RX ORDER — MOMETASONE FUROATE 1 MG/G
OINTMENT TOPICAL
Qty: 90 G | Refills: 1 | Status: ON HOLD | OUTPATIENT
Start: 2021-01-12 | End: 2021-05-25

## 2021-01-12 RX ORDER — TRETINOIN 0.25 MG/G
CREAM TOPICAL
Qty: 20 G | Refills: 6 | Status: ON HOLD | OUTPATIENT
Start: 2021-01-12 | End: 2021-05-25

## 2021-01-12 RX ORDER — TACROLIMUS 1 MG/G
OINTMENT TOPICAL
Qty: 60 G | Refills: 3 | Status: ON HOLD | OUTPATIENT
Start: 2021-01-12 | End: 2021-05-25

## 2021-01-12 RX ORDER — UREA 40 %
CREAM (GRAM) TOPICAL 2 TIMES DAILY
Qty: 1 TUBE | Refills: 3 | Status: ON HOLD | OUTPATIENT
Start: 2021-01-12 | End: 2021-05-25

## 2021-01-26 PROBLEM — I27.9 PULMONARY HEART DISEASE: Status: ACTIVE | Noted: 2021-01-26

## 2021-01-26 PROBLEM — I70.0 CALCIFICATION OF AORTA: Status: ACTIVE | Noted: 2021-01-26

## 2021-01-26 PROBLEM — R91.1 LUNG NODULE: Status: ACTIVE | Noted: 2021-01-26

## 2021-01-30 ENCOUNTER — IMMUNIZATION (OUTPATIENT)
Dept: INTERNAL MEDICINE | Facility: CLINIC | Age: 76
End: 2021-01-30
Payer: MEDICARE

## 2021-01-30 DIAGNOSIS — Z23 NEED FOR VACCINATION: Primary | ICD-10-CM

## 2021-01-30 PROCEDURE — 0002A COVID-19, MRNA, LNP-S, PF, 30 MCG/0.3 ML DOSE VACCINE: CPT | Mod: PBBFAC | Performed by: FAMILY MEDICINE

## 2021-01-30 PROCEDURE — 91300 COVID-19, MRNA, LNP-S, PF, 30 MCG/0.3 ML DOSE VACCINE: CPT | Mod: PBBFAC | Performed by: FAMILY MEDICINE

## 2021-02-04 ENCOUNTER — TELEPHONE (OUTPATIENT)
Dept: ORTHOPEDICS | Facility: CLINIC | Age: 76
End: 2021-02-04

## 2021-02-24 ENCOUNTER — TELEPHONE (OUTPATIENT)
Dept: INTERNAL MEDICINE | Facility: CLINIC | Age: 76
End: 2021-02-24

## 2021-03-03 ENCOUNTER — CLINICAL SUPPORT (OUTPATIENT)
Dept: SMOKING CESSATION | Facility: CLINIC | Age: 76
End: 2021-03-03
Payer: COMMERCIAL

## 2021-03-03 DIAGNOSIS — F17.200 NICOTINE DEPENDENCE: Primary | ICD-10-CM

## 2021-03-03 PROCEDURE — 99407 PR TOBACCO USE CESSATION INTENSIVE >10 MINUTES: ICD-10-PCS | Mod: S$GLB,,, | Performed by: INTERNAL MEDICINE

## 2021-03-03 PROCEDURE — 99407 BEHAV CHNG SMOKING > 10 MIN: CPT | Mod: S$GLB,,, | Performed by: INTERNAL MEDICINE

## 2021-03-21 ENCOUNTER — PATIENT OUTREACH (OUTPATIENT)
Dept: ADMINISTRATIVE | Facility: OTHER | Age: 76
End: 2021-03-21

## 2021-03-22 ENCOUNTER — LAB VISIT (OUTPATIENT)
Dept: LAB | Facility: HOSPITAL | Age: 76
End: 2021-03-22
Attending: INTERNAL MEDICINE
Payer: MEDICARE

## 2021-03-22 DIAGNOSIS — E78.5 HYPERLIPIDEMIA LDL GOAL <70: Chronic | ICD-10-CM

## 2021-03-22 LAB
ALBUMIN SERPL BCP-MCNC: 3.7 G/DL (ref 3.5–5.2)
ALP SERPL-CCNC: 172 U/L (ref 55–135)
ALT SERPL W/O P-5'-P-CCNC: 22 U/L (ref 10–44)
ANION GAP SERPL CALC-SCNC: 7 MMOL/L (ref 8–16)
AST SERPL-CCNC: 22 U/L (ref 10–40)
BILIRUB SERPL-MCNC: 0.5 MG/DL (ref 0.1–1)
BUN SERPL-MCNC: 22 MG/DL (ref 8–23)
CALCIUM SERPL-MCNC: 9.4 MG/DL (ref 8.7–10.5)
CHLORIDE SERPL-SCNC: 104 MMOL/L (ref 95–110)
CHOLEST SERPL-MCNC: 142 MG/DL (ref 120–199)
CHOLEST/HDLC SERPL: 2.7 {RATIO} (ref 2–5)
CO2 SERPL-SCNC: 29 MMOL/L (ref 23–29)
CREAT SERPL-MCNC: 0.9 MG/DL (ref 0.5–1.4)
EST. GFR  (AFRICAN AMERICAN): >60 ML/MIN/1.73 M^2
EST. GFR  (NON AFRICAN AMERICAN): >60 ML/MIN/1.73 M^2
GLUCOSE SERPL-MCNC: 84 MG/DL (ref 70–110)
HDLC SERPL-MCNC: 52 MG/DL (ref 40–75)
HDLC SERPL: 36.6 % (ref 20–50)
LDLC SERPL CALC-MCNC: 76.2 MG/DL (ref 63–159)
NONHDLC SERPL-MCNC: 90 MG/DL
POTASSIUM SERPL-SCNC: 4.1 MMOL/L (ref 3.5–5.1)
PROT SERPL-MCNC: 7 G/DL (ref 6–8.4)
SODIUM SERPL-SCNC: 140 MMOL/L (ref 136–145)
TRIGL SERPL-MCNC: 69 MG/DL (ref 30–150)

## 2021-03-22 PROCEDURE — 80053 COMPREHEN METABOLIC PANEL: CPT | Performed by: INTERNAL MEDICINE

## 2021-03-22 PROCEDURE — 36415 COLL VENOUS BLD VENIPUNCTURE: CPT | Performed by: INTERNAL MEDICINE

## 2021-03-22 PROCEDURE — 80061 LIPID PANEL: CPT | Performed by: INTERNAL MEDICINE

## 2021-03-25 RX ORDER — AMLODIPINE BESYLATE 10 MG/1
TABLET ORAL
Qty: 90 TABLET | Refills: 1 | Status: SHIPPED | OUTPATIENT
Start: 2021-03-25 | End: 2021-07-26

## 2021-03-29 ENCOUNTER — PES CALL (OUTPATIENT)
Dept: ADMINISTRATIVE | Facility: CLINIC | Age: 76
End: 2021-03-29

## 2021-04-09 ENCOUNTER — OFFICE VISIT (OUTPATIENT)
Dept: PODIATRY | Facility: CLINIC | Age: 76
End: 2021-04-09
Payer: MEDICARE

## 2021-04-09 VITALS — BODY MASS INDEX: 28.88 KG/M2 | WEIGHT: 179.69 LBS | HEIGHT: 66 IN

## 2021-04-09 DIAGNOSIS — L84 CORN OR CALLUS: ICD-10-CM

## 2021-04-09 DIAGNOSIS — B35.1 DERMATOPHYTOSIS OF NAIL: ICD-10-CM

## 2021-04-09 DIAGNOSIS — I73.9 PERIPHERAL VASCULAR DISEASE: Primary | ICD-10-CM

## 2021-04-09 DIAGNOSIS — M79.676 PAIN OF FIFTH TOE: ICD-10-CM

## 2021-04-09 PROCEDURE — 99999 PR PBB SHADOW E&M-EST. PATIENT-LVL III: CPT | Mod: PBBFAC,,, | Performed by: PODIATRIST

## 2021-04-09 PROCEDURE — 1101F PT FALLS ASSESS-DOCD LE1/YR: CPT | Mod: CPTII,S$GLB,, | Performed by: PODIATRIST

## 2021-04-09 PROCEDURE — 11056 PARNG/CUTG B9 HYPRKR LES 2-4: CPT | Mod: Q8,S$GLB,, | Performed by: PODIATRIST

## 2021-04-09 PROCEDURE — 3288F FALL RISK ASSESSMENT DOCD: CPT | Mod: CPTII,S$GLB,, | Performed by: PODIATRIST

## 2021-04-09 PROCEDURE — 11056 PR TRIM BENIGN HYPERKERATOTIC SKIN LESION,2-4: ICD-10-PCS | Mod: Q8,S$GLB,, | Performed by: PODIATRIST

## 2021-04-09 PROCEDURE — 99214 PR OFFICE/OUTPT VISIT, EST, LEVL IV, 30-39 MIN: ICD-10-PCS | Mod: 25,S$GLB,, | Performed by: PODIATRIST

## 2021-04-09 PROCEDURE — 11721 PR DEBRIDEMENT OF NAILS, 6 OR MORE: ICD-10-PCS | Mod: 59,Q8,S$GLB, | Performed by: PODIATRIST

## 2021-04-09 PROCEDURE — 3288F PR FALLS RISK ASSESSMENT DOCUMENTED: ICD-10-PCS | Mod: CPTII,S$GLB,, | Performed by: PODIATRIST

## 2021-04-09 PROCEDURE — 1101F PR PT FALLS ASSESS DOC 0-1 FALLS W/OUT INJ PAST YR: ICD-10-PCS | Mod: CPTII,S$GLB,, | Performed by: PODIATRIST

## 2021-04-09 PROCEDURE — 99214 OFFICE O/P EST MOD 30 MIN: CPT | Mod: 25,S$GLB,, | Performed by: PODIATRIST

## 2021-04-09 PROCEDURE — 11721 DEBRIDE NAIL 6 OR MORE: CPT | Mod: 59,Q8,S$GLB, | Performed by: PODIATRIST

## 2021-04-09 PROCEDURE — 1159F PR MEDICATION LIST DOCUMENTED IN MEDICAL RECORD: ICD-10-PCS | Mod: S$GLB,,, | Performed by: PODIATRIST

## 2021-04-09 PROCEDURE — 99999 PR PBB SHADOW E&M-EST. PATIENT-LVL III: ICD-10-PCS | Mod: PBBFAC,,, | Performed by: PODIATRIST

## 2021-04-09 PROCEDURE — 1159F MED LIST DOCD IN RCRD: CPT | Mod: S$GLB,,, | Performed by: PODIATRIST

## 2021-04-12 ENCOUNTER — OFFICE VISIT (OUTPATIENT)
Dept: CARDIOLOGY | Facility: CLINIC | Age: 76
End: 2021-04-12
Payer: MEDICARE

## 2021-04-12 VITALS
WEIGHT: 185.88 LBS | BODY MASS INDEX: 30 KG/M2 | DIASTOLIC BLOOD PRESSURE: 76 MMHG | OXYGEN SATURATION: 95 % | SYSTOLIC BLOOD PRESSURE: 142 MMHG | HEART RATE: 67 BPM

## 2021-04-12 DIAGNOSIS — E78.5 HYPERLIPIDEMIA LDL GOAL <70: Chronic | ICD-10-CM

## 2021-04-12 DIAGNOSIS — I27.9 PULMONARY HEART DISEASE: ICD-10-CM

## 2021-04-12 DIAGNOSIS — I10 HYPERTENSION GOAL BP (BLOOD PRESSURE) < 140/90: Chronic | ICD-10-CM

## 2021-04-12 DIAGNOSIS — Z95.1 HX OF CABG: Primary | Chronic | ICD-10-CM

## 2021-04-12 DIAGNOSIS — L43.9 LICHEN PLANUS: ICD-10-CM

## 2021-04-12 DIAGNOSIS — L93.0 DISCOID LUPUS ERYTHEMATOSUS: Chronic | ICD-10-CM

## 2021-04-12 DIAGNOSIS — M05.771 RHEUMATOID ARTHRITIS INVOLVING BOTH FEET WITH POSITIVE RHEUMATOID FACTOR: Chronic | ICD-10-CM

## 2021-04-12 DIAGNOSIS — I65.29 STENOSIS OF CAROTID ARTERY, UNSPECIFIED LATERALITY: ICD-10-CM

## 2021-04-12 DIAGNOSIS — L30.9 ECZEMA, UNSPECIFIED TYPE: ICD-10-CM

## 2021-04-12 DIAGNOSIS — J44.9 CHRONIC OBSTRUCTIVE PULMONARY DISEASE, UNSPECIFIED COPD TYPE: ICD-10-CM

## 2021-04-12 DIAGNOSIS — I73.9 PERIPHERAL VASCULAR DISEASE: Chronic | ICD-10-CM

## 2021-04-12 DIAGNOSIS — I70.0 CALCIFICATION OF AORTA: ICD-10-CM

## 2021-04-12 DIAGNOSIS — I70.219 ATHEROSCLEROTIC PVD WITH INTERMITTENT CLAUDICATION: Chronic | ICD-10-CM

## 2021-04-12 DIAGNOSIS — R91.1 LUNG NODULE: ICD-10-CM

## 2021-04-12 DIAGNOSIS — M05.772 RHEUMATOID ARTHRITIS INVOLVING BOTH FEET WITH POSITIVE RHEUMATOID FACTOR: Chronic | ICD-10-CM

## 2021-04-12 PROCEDURE — 1126F AMNT PAIN NOTED NONE PRSNT: CPT | Mod: S$GLB,,, | Performed by: INTERNAL MEDICINE

## 2021-04-12 PROCEDURE — 1159F MED LIST DOCD IN RCRD: CPT | Mod: S$GLB,,, | Performed by: INTERNAL MEDICINE

## 2021-04-12 PROCEDURE — 99999 PR PBB SHADOW E&M-EST. PATIENT-LVL IV: ICD-10-PCS | Mod: PBBFAC,,, | Performed by: INTERNAL MEDICINE

## 2021-04-12 PROCEDURE — 1159F PR MEDICATION LIST DOCUMENTED IN MEDICAL RECORD: ICD-10-PCS | Mod: S$GLB,,, | Performed by: INTERNAL MEDICINE

## 2021-04-12 PROCEDURE — 99214 OFFICE O/P EST MOD 30 MIN: CPT | Mod: S$GLB,,, | Performed by: INTERNAL MEDICINE

## 2021-04-12 PROCEDURE — 99499 UNLISTED E&M SERVICE: CPT | Mod: S$GLB,,, | Performed by: INTERNAL MEDICINE

## 2021-04-12 PROCEDURE — 99999 PR PBB SHADOW E&M-EST. PATIENT-LVL IV: CPT | Mod: PBBFAC,,, | Performed by: INTERNAL MEDICINE

## 2021-04-12 PROCEDURE — 99214 PR OFFICE/OUTPT VISIT, EST, LEVL IV, 30-39 MIN: ICD-10-PCS | Mod: S$GLB,,, | Performed by: INTERNAL MEDICINE

## 2021-04-12 PROCEDURE — 1126F PR PAIN SEVERITY QUANTIFIED, NO PAIN PRESENT: ICD-10-PCS | Mod: S$GLB,,, | Performed by: INTERNAL MEDICINE

## 2021-04-12 PROCEDURE — 99499 RISK ADDL DX/OHS AUDIT: ICD-10-PCS | Mod: S$GLB,,, | Performed by: INTERNAL MEDICINE

## 2021-04-26 ENCOUNTER — PATIENT OUTREACH (OUTPATIENT)
Dept: ADMINISTRATIVE | Facility: HOSPITAL | Age: 76
End: 2021-04-26

## 2021-04-28 ENCOUNTER — HOSPITAL ENCOUNTER (OUTPATIENT)
Dept: CARDIOLOGY | Facility: HOSPITAL | Age: 76
Discharge: HOME | End: 2021-04-28
Attending: PODIATRIST
Payer: MEDICARE

## 2021-04-28 VITALS
DIASTOLIC BLOOD PRESSURE: 76 MMHG | SYSTOLIC BLOOD PRESSURE: 142 MMHG | BODY MASS INDEX: 29.73 KG/M2 | HEIGHT: 66 IN | WEIGHT: 185 LBS

## 2021-04-28 DIAGNOSIS — I73.9 PERIPHERAL VASCULAR DISEASE: ICD-10-CM

## 2021-04-28 PROCEDURE — 93926 CV US DOPPLER ARTERIAL LEG RIGHT (CUPID ONLY): ICD-10-PCS | Mod: 26,RT,, | Performed by: INTERNAL MEDICINE

## 2021-04-28 PROCEDURE — 93926 LOWER EXTREMITY STUDY: CPT | Mod: RT

## 2021-04-28 PROCEDURE — 93926 LOWER EXTREMITY STUDY: CPT | Mod: 26,RT,, | Performed by: INTERNAL MEDICINE

## 2021-04-29 LAB
OHS CV RIGHT ABI LOWER EXTREMITY (NO CALC): 0.63
RIGHT ANT TIBIAL SYS PSV: 68 CM/S
RIGHT CFA PSV: 125 CM/S
RIGHT PERONEAL SYS PSV: 43 CM/S
RIGHT POPLITEAL PSV: 97 CM/S
RIGHT POST TIBIAL SYS PSV: 64 CM/S
RIGHT PROFUNDA SYS PSV: 128 CM/S
RIGHT SUPER FEMORAL DIST SYS PSV: 83 CM/S
RIGHT SUPER FEMORAL MID SYS PSV: 164 CM/S
RIGHT SUPER FEMORAL OSTIAL SYS PSV: 137 CM/S
RIGHT SUPER FEMORAL PROX SYS PSV: 124 CM/S
RIGHT TIB/PER TRUNK SYS PSV: 77 CM/S

## 2021-04-30 ENCOUNTER — EXTERNAL CHRONIC CARE MANAGEMENT (OUTPATIENT)
Dept: PRIMARY CARE CLINIC | Facility: CLINIC | Age: 76
End: 2021-04-30
Payer: MEDICARE

## 2021-04-30 PROCEDURE — 99490 CHRNC CARE MGMT STAFF 1ST 20: CPT | Mod: S$GLB,,, | Performed by: INTERNAL MEDICINE

## 2021-04-30 PROCEDURE — 99490 PR CHRONIC CARE MGMT, 1ST 20 MIN: ICD-10-PCS | Mod: S$GLB,,, | Performed by: INTERNAL MEDICINE

## 2021-05-05 ENCOUNTER — TELEPHONE (OUTPATIENT)
Dept: PODIATRY | Facility: CLINIC | Age: 76
End: 2021-05-05

## 2021-05-06 ENCOUNTER — OFFICE VISIT (OUTPATIENT)
Dept: CARDIOLOGY | Facility: CLINIC | Age: 76
End: 2021-05-06
Payer: MEDICARE

## 2021-05-06 VITALS
DIASTOLIC BLOOD PRESSURE: 66 MMHG | OXYGEN SATURATION: 93 % | WEIGHT: 185.19 LBS | BODY MASS INDEX: 29.89 KG/M2 | SYSTOLIC BLOOD PRESSURE: 128 MMHG | HEART RATE: 72 BPM

## 2021-05-06 DIAGNOSIS — L93.0 DISCOID LUPUS ERYTHEMATOSUS: Chronic | ICD-10-CM

## 2021-05-06 DIAGNOSIS — F17.218 CIGARETTE NICOTINE DEPENDENCE WITH OTHER NICOTINE-INDUCED DISORDER: ICD-10-CM

## 2021-05-06 DIAGNOSIS — I10 ACCELERATED HYPERTENSION: ICD-10-CM

## 2021-05-06 DIAGNOSIS — I10 HYPERTENSION GOAL BP (BLOOD PRESSURE) < 140/90: Chronic | ICD-10-CM

## 2021-05-06 DIAGNOSIS — I70.0 CALCIFICATION OF AORTA: ICD-10-CM

## 2021-05-06 DIAGNOSIS — I70.219 ATHEROSCLEROTIC PVD WITH INTERMITTENT CLAUDICATION: Primary | Chronic | ICD-10-CM

## 2021-05-06 DIAGNOSIS — J44.9 CHRONIC OBSTRUCTIVE PULMONARY DISEASE, UNSPECIFIED COPD TYPE: ICD-10-CM

## 2021-05-06 DIAGNOSIS — I73.9 PERIPHERAL VASCULAR DISEASE: Chronic | ICD-10-CM

## 2021-05-06 DIAGNOSIS — Z95.1 HX OF CABG: Chronic | ICD-10-CM

## 2021-05-06 PROCEDURE — 1126F AMNT PAIN NOTED NONE PRSNT: CPT | Mod: S$GLB,,, | Performed by: PHYSICIAN ASSISTANT

## 2021-05-06 PROCEDURE — 99999 PR PBB SHADOW E&M-EST. PATIENT-LVL IV: CPT | Mod: PBBFAC,,, | Performed by: PHYSICIAN ASSISTANT

## 2021-05-06 PROCEDURE — 1101F PT FALLS ASSESS-DOCD LE1/YR: CPT | Mod: CPTII,S$GLB,, | Performed by: PHYSICIAN ASSISTANT

## 2021-05-06 PROCEDURE — 3288F FALL RISK ASSESSMENT DOCD: CPT | Mod: CPTII,S$GLB,, | Performed by: PHYSICIAN ASSISTANT

## 2021-05-06 PROCEDURE — 99215 OFFICE O/P EST HI 40 MIN: CPT | Mod: S$GLB,,, | Performed by: PHYSICIAN ASSISTANT

## 2021-05-06 PROCEDURE — 3288F PR FALLS RISK ASSESSMENT DOCUMENTED: ICD-10-PCS | Mod: CPTII,S$GLB,, | Performed by: PHYSICIAN ASSISTANT

## 2021-05-06 PROCEDURE — 1126F PR PAIN SEVERITY QUANTIFIED, NO PAIN PRESENT: ICD-10-PCS | Mod: S$GLB,,, | Performed by: PHYSICIAN ASSISTANT

## 2021-05-06 PROCEDURE — 1101F PR PT FALLS ASSESS DOC 0-1 FALLS W/OUT INJ PAST YR: ICD-10-PCS | Mod: CPTII,S$GLB,, | Performed by: PHYSICIAN ASSISTANT

## 2021-05-06 PROCEDURE — 1159F MED LIST DOCD IN RCRD: CPT | Mod: S$GLB,,, | Performed by: PHYSICIAN ASSISTANT

## 2021-05-06 PROCEDURE — 99215 PR OFFICE/OUTPT VISIT, EST, LEVL V, 40-54 MIN: ICD-10-PCS | Mod: S$GLB,,, | Performed by: PHYSICIAN ASSISTANT

## 2021-05-06 PROCEDURE — 99999 PR PBB SHADOW E&M-EST. PATIENT-LVL IV: ICD-10-PCS | Mod: PBBFAC,,, | Performed by: PHYSICIAN ASSISTANT

## 2021-05-06 PROCEDURE — 1159F PR MEDICATION LIST DOCUMENTED IN MEDICAL RECORD: ICD-10-PCS | Mod: S$GLB,,, | Performed by: PHYSICIAN ASSISTANT

## 2021-05-07 ENCOUNTER — TELEPHONE (OUTPATIENT)
Dept: CARDIOLOGY | Facility: CLINIC | Age: 76
End: 2021-05-07

## 2021-05-07 ENCOUNTER — PATIENT MESSAGE (OUTPATIENT)
Dept: CARDIOLOGY | Facility: CLINIC | Age: 76
End: 2021-05-07

## 2021-05-07 DIAGNOSIS — M79.604 PAIN IN BOTH LOWER EXTREMITIES: ICD-10-CM

## 2021-05-07 DIAGNOSIS — R68.89 ABNORMAL ANKLE BRACHIAL INDEX (ABI): ICD-10-CM

## 2021-05-07 DIAGNOSIS — I70.219 ATHEROSCLEROTIC PVD WITH INTERMITTENT CLAUDICATION: Primary | ICD-10-CM

## 2021-05-07 DIAGNOSIS — M79.605 PAIN IN BOTH LOWER EXTREMITIES: ICD-10-CM

## 2021-05-09 ENCOUNTER — PES CALL (OUTPATIENT)
Dept: ADMINISTRATIVE | Facility: CLINIC | Age: 76
End: 2021-05-09

## 2021-05-10 ENCOUNTER — TELEPHONE (OUTPATIENT)
Dept: CARDIOLOGY | Facility: CLINIC | Age: 76
End: 2021-05-10

## 2021-05-10 ENCOUNTER — TELEPHONE (OUTPATIENT)
Dept: CARDIOLOGY | Facility: HOSPITAL | Age: 76
End: 2021-05-10

## 2021-05-14 NOTE — TELEPHONE ENCOUNTER
----- Message from Lea Garcia sent at 10/8/2018  1:30 PM CDT -----  Contact: self [phone]  States that she would like to speak to Ms. Negrete. Pt states that she has a question that she want Mr. Alexandre to answer. Please call back at [phone]//thank you acc  
----- Message from Malka Bhatia sent at 10/8/2018 12:46 PM CDT -----  Contact: [facility] [phone]  Verify the quantity on the escript today.   
Mr. Alexandre is re sending the prescription for the tizanidine  
msg left for return call  
[phone]

## 2021-05-19 ENCOUNTER — HOSPITAL ENCOUNTER (OUTPATIENT)
Dept: CARDIOLOGY | Facility: HOSPITAL | Age: 76
Discharge: HOME | End: 2021-05-19
Attending: PHYSICIAN ASSISTANT
Payer: MEDICARE

## 2021-05-19 VITALS
WEIGHT: 185 LBS | SYSTOLIC BLOOD PRESSURE: 134 MMHG | BODY MASS INDEX: 29.73 KG/M2 | HEIGHT: 66 IN | DIASTOLIC BLOOD PRESSURE: 65 MMHG

## 2021-05-19 DIAGNOSIS — I70.219 ATHEROSCLEROTIC PVD WITH INTERMITTENT CLAUDICATION: ICD-10-CM

## 2021-05-19 LAB
LEFT ABI: 0.62
LEFT ARM BP: 127 MMHG
LEFT DORSALIS PEDIS: 68 MMHG
LEFT POSTERIOR TIBIAL: 83 MMHG
LEFT TBI: 0.42
LEFT TOE PRESSURE: 56 MMHG
RIGHT ABI: 0.81
RIGHT ARM BP: 134 MMHG
RIGHT DORSALIS PEDIS: 108 MMHG
RIGHT POSTERIOR TIBIAL: 103 MMHG
RIGHT TBI: 0.51
RIGHT TOE PRESSURE: 68 MMHG

## 2021-05-19 PROCEDURE — 93922 UPR/L XTREMITY ART 2 LEVELS: CPT | Mod: 26,,, | Performed by: INTERNAL MEDICINE

## 2021-05-19 PROCEDURE — 93922 ANKLE BRACHIAL INDICES (ABI): ICD-10-PCS | Mod: 26,,, | Performed by: INTERNAL MEDICINE

## 2021-05-19 PROCEDURE — 93922 UPR/L XTREMITY ART 2 LEVELS: CPT

## 2021-05-20 ENCOUNTER — TELEPHONE (OUTPATIENT)
Dept: CARDIOLOGY | Facility: HOSPITAL | Age: 76
End: 2021-05-20

## 2021-05-22 ENCOUNTER — LAB VISIT (OUTPATIENT)
Dept: LAB | Facility: HOSPITAL | Age: 76
End: 2021-05-22
Attending: INTERNAL MEDICINE
Payer: MEDICARE

## 2021-05-22 DIAGNOSIS — M79.605 PAIN IN BOTH LOWER EXTREMITIES: ICD-10-CM

## 2021-05-22 DIAGNOSIS — R68.89 ABNORMAL ANKLE BRACHIAL INDEX (ABI): ICD-10-CM

## 2021-05-22 DIAGNOSIS — I70.219 ATHEROSCLEROTIC PVD WITH INTERMITTENT CLAUDICATION: ICD-10-CM

## 2021-05-22 DIAGNOSIS — M79.604 PAIN IN BOTH LOWER EXTREMITIES: ICD-10-CM

## 2021-05-22 LAB
ANION GAP SERPL CALC-SCNC: 8 MMOL/L (ref 8–16)
APTT BLDCRRT: 23.4 SEC (ref 21–32)
BASOPHILS # BLD AUTO: 0.05 K/UL (ref 0–0.2)
BASOPHILS NFR BLD: 0.5 % (ref 0–1.9)
BUN SERPL-MCNC: 14 MG/DL (ref 8–23)
CALCIUM SERPL-MCNC: 9.6 MG/DL (ref 8.7–10.5)
CHLORIDE SERPL-SCNC: 104 MMOL/L (ref 95–110)
CO2 SERPL-SCNC: 28 MMOL/L (ref 23–29)
CREAT SERPL-MCNC: 0.8 MG/DL (ref 0.5–1.4)
DIFFERENTIAL METHOD: NORMAL
EOSINOPHIL # BLD AUTO: 0.2 K/UL (ref 0–0.5)
EOSINOPHIL NFR BLD: 1.8 % (ref 0–8)
ERYTHROCYTE [DISTWIDTH] IN BLOOD BY AUTOMATED COUNT: 14.2 % (ref 11.5–14.5)
EST. GFR  (AFRICAN AMERICAN): >60 ML/MIN/1.73 M^2
EST. GFR  (NON AFRICAN AMERICAN): >60 ML/MIN/1.73 M^2
GLUCOSE SERPL-MCNC: 93 MG/DL (ref 70–110)
HCT VFR BLD AUTO: 44.2 % (ref 37–48.5)
HGB BLD-MCNC: 14.5 G/DL (ref 12–16)
IMM GRANULOCYTES # BLD AUTO: 0.04 K/UL (ref 0–0.04)
IMM GRANULOCYTES NFR BLD AUTO: 0.4 % (ref 0–0.5)
INR PPP: 1 (ref 0.8–1.2)
LYMPHOCYTES # BLD AUTO: 2.5 K/UL (ref 1–4.8)
LYMPHOCYTES NFR BLD: 24.1 % (ref 18–48)
MCH RBC QN AUTO: 31 PG (ref 27–31)
MCHC RBC AUTO-ENTMCNC: 32.8 G/DL (ref 32–36)
MCV RBC AUTO: 94 FL (ref 82–98)
MONOCYTES # BLD AUTO: 0.9 K/UL (ref 0.3–1)
MONOCYTES NFR BLD: 8.5 % (ref 4–15)
NEUTROPHILS # BLD AUTO: 6.8 K/UL (ref 1.8–7.7)
NEUTROPHILS NFR BLD: 64.7 % (ref 38–73)
NRBC BLD-RTO: 0 /100 WBC
PLATELET # BLD AUTO: 255 K/UL (ref 150–450)
PMV BLD AUTO: 10.8 FL (ref 9.2–12.9)
POTASSIUM SERPL-SCNC: 4.4 MMOL/L (ref 3.5–5.1)
PROTHROMBIN TIME: 10.5 SEC (ref 9–12.5)
RBC # BLD AUTO: 4.68 M/UL (ref 4–5.4)
SODIUM SERPL-SCNC: 140 MMOL/L (ref 136–145)
WBC # BLD AUTO: 10.49 K/UL (ref 3.9–12.7)

## 2021-05-22 PROCEDURE — 85610 PROTHROMBIN TIME: CPT | Performed by: INTERNAL MEDICINE

## 2021-05-22 PROCEDURE — 85730 THROMBOPLASTIN TIME PARTIAL: CPT | Performed by: INTERNAL MEDICINE

## 2021-05-22 PROCEDURE — 85025 COMPLETE CBC W/AUTO DIFF WBC: CPT | Performed by: INTERNAL MEDICINE

## 2021-05-22 PROCEDURE — 36415 COLL VENOUS BLD VENIPUNCTURE: CPT | Performed by: INTERNAL MEDICINE

## 2021-05-22 PROCEDURE — 80048 BASIC METABOLIC PNL TOTAL CA: CPT | Performed by: INTERNAL MEDICINE

## 2021-05-24 ENCOUNTER — PES CALL (OUTPATIENT)
Dept: ADMINISTRATIVE | Facility: CLINIC | Age: 76
End: 2021-05-24

## 2021-05-24 ENCOUNTER — TELEPHONE (OUTPATIENT)
Dept: CARDIOLOGY | Facility: CLINIC | Age: 76
End: 2021-05-24

## 2021-05-25 ENCOUNTER — HOSPITAL ENCOUNTER (OUTPATIENT)
Facility: HOSPITAL | Age: 76
Discharge: HOME | End: 2021-05-25
Attending: INTERNAL MEDICINE | Admitting: INTERNAL MEDICINE
Payer: MEDICARE

## 2021-05-25 DIAGNOSIS — I70.219 ATHEROSCLEROTIC PVD WITH INTERMITTENT CLAUDICATION: ICD-10-CM

## 2021-05-25 LAB
POC ACTIVATED CLOTTING TIME K: 202 SEC (ref 74–137)
POC ACTIVATED CLOTTING TIME K: 224 SEC (ref 74–137)
POC ACTIVATED CLOTTING TIME K: 224 SEC (ref 74–137)
SAMPLE: ABNORMAL

## 2021-05-25 PROCEDURE — 37221 PR REVASCULARIZE ILIAC ARTERY,ANGIOPLASTY/STENT, INITIAL VESSEL: ICD-10-PCS | Mod: LT,,, | Performed by: INTERNAL MEDICINE

## 2021-05-25 PROCEDURE — 99152 MOD SED SAME PHYS/QHP 5/>YRS: CPT | Mod: ,,, | Performed by: INTERNAL MEDICINE

## 2021-05-25 PROCEDURE — 63600175 PHARM REV CODE 636 W HCPCS: Performed by: INTERNAL MEDICINE

## 2021-05-25 PROCEDURE — 99153 MOD SED SAME PHYS/QHP EA: CPT | Performed by: INTERNAL MEDICINE

## 2021-05-25 PROCEDURE — 99152 PR MOD CONSCIOUS SEDATION, SAME PHYS, 5+ YRS, FIRST 15 MIN: ICD-10-PCS | Mod: ,,, | Performed by: INTERNAL MEDICINE

## 2021-05-25 PROCEDURE — 75716 ARTERY X-RAYS ARMS/LEGS: CPT | Mod: 26,59,, | Performed by: INTERNAL MEDICINE

## 2021-05-25 PROCEDURE — 75625 PR  ANGIO AORTOGRAM ABD SERIAL: ICD-10-PCS | Mod: 26,,, | Performed by: INTERNAL MEDICINE

## 2021-05-25 PROCEDURE — 37221 HC ILIAC REVASC W/STENT: CPT | Mod: LT | Performed by: INTERNAL MEDICINE

## 2021-05-25 PROCEDURE — 75716 ARTERY X-RAYS ARMS/LEGS: CPT | Mod: 59 | Performed by: INTERNAL MEDICINE

## 2021-05-25 PROCEDURE — 25500020 PHARM REV CODE 255: Performed by: INTERNAL MEDICINE

## 2021-05-25 PROCEDURE — 27201423 OPTIME MED/SURG SUP & DEVICES STERILE SUPPLY: Performed by: INTERNAL MEDICINE

## 2021-05-25 PROCEDURE — 85347 COAGULATION TIME ACTIVATED: CPT | Mod: 91 | Performed by: INTERNAL MEDICINE

## 2021-05-25 PROCEDURE — 99152 MOD SED SAME PHYS/QHP 5/>YRS: CPT | Performed by: INTERNAL MEDICINE

## 2021-05-25 PROCEDURE — C1725 CATH, TRANSLUMIN NON-LASER: HCPCS | Performed by: INTERNAL MEDICINE

## 2021-05-25 PROCEDURE — 37221 PR REVASCULARIZE ILIAC ARTERY,ANGIOPLASTY/STENT, INITIAL VESSEL: CPT | Mod: LT,,, | Performed by: INTERNAL MEDICINE

## 2021-05-25 PROCEDURE — C1769 GUIDE WIRE: HCPCS | Performed by: INTERNAL MEDICINE

## 2021-05-25 PROCEDURE — 75716 PR  ANGIO EXTERMITY BILAT: ICD-10-PCS | Mod: 26,59,, | Performed by: INTERNAL MEDICINE

## 2021-05-25 PROCEDURE — 75625 CONTRAST EXAM ABDOMINL AORTA: CPT | Mod: 26,,, | Performed by: INTERNAL MEDICINE

## 2021-05-25 PROCEDURE — C1894 INTRO/SHEATH, NON-LASER: HCPCS | Performed by: INTERNAL MEDICINE

## 2021-05-25 PROCEDURE — 25000003 PHARM REV CODE 250: Performed by: INTERNAL MEDICINE

## 2021-05-25 PROCEDURE — 75625 CONTRAST EXAM ABDOMINL AORTA: CPT | Performed by: INTERNAL MEDICINE

## 2021-05-25 PROCEDURE — C1874 STENT, COATED/COV W/DEL SYS: HCPCS | Performed by: INTERNAL MEDICINE

## 2021-05-25 DEVICE — ICAST COVERED STENT, 8MMX59MMX120CM
Type: IMPLANTABLE DEVICE | Site: LEG | Status: FUNCTIONAL
Brand: ICAST

## 2021-05-25 DEVICE — ICAST COVERED STENT, 7MMX22MMX120CM
Type: IMPLANTABLE DEVICE | Site: LEG | Status: FUNCTIONAL
Brand: ICAST

## 2021-05-25 DEVICE — ICAST COVERED STENT, 8MMX38MMX120CM
Type: IMPLANTABLE DEVICE | Site: LEG | Status: FUNCTIONAL
Brand: ICAST

## 2021-05-25 DEVICE — ICAST COVERED STENT, 7MMX38MMX120CM
Type: IMPLANTABLE DEVICE | Site: LEG | Status: FUNCTIONAL
Brand: ICAST

## 2021-05-25 RX ORDER — HYDRALAZINE HYDROCHLORIDE 20 MG/ML
INJECTION INTRAMUSCULAR; INTRAVENOUS
Status: DISCONTINUED | OUTPATIENT
Start: 2021-05-25 | End: 2021-05-25 | Stop reason: HOSPADM

## 2021-05-25 RX ORDER — NITROGLYCERIN 20 MG/100ML
INJECTION INTRAVENOUS
Status: DISCONTINUED | OUTPATIENT
Start: 2021-05-25 | End: 2021-05-25 | Stop reason: HOSPADM

## 2021-05-25 RX ORDER — CLOPIDOGREL 300 MG/1
TABLET, FILM COATED ORAL
Status: DISCONTINUED | OUTPATIENT
Start: 2021-05-25 | End: 2021-05-25 | Stop reason: HOSPADM

## 2021-05-25 RX ORDER — ONDANSETRON 8 MG/1
8 TABLET, ORALLY DISINTEGRATING ORAL EVERY 8 HOURS PRN
Status: DISCONTINUED | OUTPATIENT
Start: 2021-05-25 | End: 2021-05-25 | Stop reason: HOSPADM

## 2021-05-25 RX ORDER — SODIUM CHLORIDE 9 MG/ML
INJECTION, SOLUTION INTRAVENOUS CONTINUOUS
Status: DISCONTINUED | OUTPATIENT
Start: 2021-05-26 | End: 2021-05-25 | Stop reason: HOSPADM

## 2021-05-25 RX ORDER — VERAPAMIL HYDROCHLORIDE 2.5 MG/ML
INJECTION, SOLUTION INTRAVENOUS
Status: DISCONTINUED | OUTPATIENT
Start: 2021-05-25 | End: 2021-05-25 | Stop reason: HOSPADM

## 2021-05-25 RX ORDER — NAPROXEN SODIUM 220 MG/1
81 TABLET, FILM COATED ORAL ONCE
Status: DISCONTINUED | OUTPATIENT
Start: 2021-05-26 | End: 2021-05-25 | Stop reason: HOSPADM

## 2021-05-25 RX ORDER — DIPHENHYDRAMINE HCL 50 MG
50 CAPSULE ORAL ONCE
Status: DISCONTINUED | OUTPATIENT
Start: 2021-05-26 | End: 2021-05-25 | Stop reason: HOSPADM

## 2021-05-25 RX ORDER — FENTANYL CITRATE 50 UG/ML
INJECTION, SOLUTION INTRAMUSCULAR; INTRAVENOUS
Status: DISCONTINUED | OUTPATIENT
Start: 2021-05-25 | End: 2021-05-25 | Stop reason: HOSPADM

## 2021-05-25 RX ORDER — ACETAMINOPHEN 325 MG/1
650 TABLET ORAL EVERY 4 HOURS PRN
Status: DISCONTINUED | OUTPATIENT
Start: 2021-05-25 | End: 2021-05-25 | Stop reason: HOSPADM

## 2021-05-25 RX ORDER — PROTAMINE SULFATE 10 MG/ML
10 INJECTION, SOLUTION INTRAVENOUS ONCE
Status: COMPLETED | OUTPATIENT
Start: 2021-05-25 | End: 2021-05-25

## 2021-05-25 RX ORDER — MIDAZOLAM HYDROCHLORIDE 1 MG/ML
INJECTION, SOLUTION INTRAMUSCULAR; INTRAVENOUS
Status: DISCONTINUED | OUTPATIENT
Start: 2021-05-25 | End: 2021-05-25 | Stop reason: HOSPADM

## 2021-05-25 RX ORDER — HEPARIN SODIUM 1000 [USP'U]/ML
INJECTION, SOLUTION INTRAVENOUS; SUBCUTANEOUS
Status: DISCONTINUED | OUTPATIENT
Start: 2021-05-25 | End: 2021-05-25 | Stop reason: HOSPADM

## 2021-05-25 RX ORDER — LIDOCAINE HYDROCHLORIDE 20 MG/ML
INJECTION, SOLUTION EPIDURAL; INFILTRATION; INTRACAUDAL; PERINEURAL
Status: DISCONTINUED | OUTPATIENT
Start: 2021-05-25 | End: 2021-05-25 | Stop reason: HOSPADM

## 2021-05-25 RX ADMIN — SODIUM CHLORIDE: 0.9 INJECTION, SOLUTION INTRAVENOUS at 09:05

## 2021-05-25 RX ADMIN — PROTAMINE SULFATE 10 MG: 10 INJECTION, SOLUTION INTRAVENOUS at 02:05

## 2021-05-26 VITALS
TEMPERATURE: 98 F | SYSTOLIC BLOOD PRESSURE: 169 MMHG | HEIGHT: 66 IN | DIASTOLIC BLOOD PRESSURE: 66 MMHG | RESPIRATION RATE: 18 BRPM | OXYGEN SATURATION: 95 % | HEART RATE: 59 BPM | BODY MASS INDEX: 29.73 KG/M2 | WEIGHT: 185 LBS

## 2021-06-01 ENCOUNTER — TELEPHONE (OUTPATIENT)
Dept: CARDIOLOGY | Facility: CLINIC | Age: 76
End: 2021-06-01

## 2021-06-02 ENCOUNTER — TELEPHONE (OUTPATIENT)
Dept: ADMINISTRATIVE | Facility: HOSPITAL | Age: 76
End: 2021-06-02

## 2021-06-03 ENCOUNTER — OFFICE VISIT (OUTPATIENT)
Dept: INTERNAL MEDICINE | Facility: CLINIC | Age: 76
End: 2021-06-03
Payer: MEDICARE

## 2021-06-03 ENCOUNTER — OFFICE VISIT (OUTPATIENT)
Dept: CARDIOLOGY | Facility: CLINIC | Age: 76
End: 2021-06-03
Payer: MEDICARE

## 2021-06-03 VITALS
SYSTOLIC BLOOD PRESSURE: 132 MMHG | DIASTOLIC BLOOD PRESSURE: 68 MMHG | BODY MASS INDEX: 30.25 KG/M2 | HEIGHT: 66 IN | OXYGEN SATURATION: 99 % | WEIGHT: 188.25 LBS | HEART RATE: 62 BPM

## 2021-06-03 VITALS
RESPIRATION RATE: 16 BRPM | HEIGHT: 66 IN | HEART RATE: 68 BPM | DIASTOLIC BLOOD PRESSURE: 76 MMHG | BODY MASS INDEX: 30.28 KG/M2 | WEIGHT: 188.38 LBS | OXYGEN SATURATION: 94 % | SYSTOLIC BLOOD PRESSURE: 154 MMHG

## 2021-06-03 DIAGNOSIS — R26.9 ABNORMALITY OF GAIT AND MOBILITY: ICD-10-CM

## 2021-06-03 DIAGNOSIS — L93.0 DISCOID LUPUS ERYTHEMATOSUS: Chronic | ICD-10-CM

## 2021-06-03 DIAGNOSIS — I27.9 PULMONARY HEART DISEASE: ICD-10-CM

## 2021-06-03 DIAGNOSIS — Z00.00 ENCOUNTER FOR PREVENTIVE HEALTH EXAMINATION: Primary | ICD-10-CM

## 2021-06-03 DIAGNOSIS — I10 HYPERTENSION, UNSPECIFIED TYPE: ICD-10-CM

## 2021-06-03 DIAGNOSIS — I73.9 PVD (PERIPHERAL VASCULAR DISEASE): ICD-10-CM

## 2021-06-03 DIAGNOSIS — M05.771 RHEUMATOID ARTHRITIS INVOLVING BOTH FEET WITH POSITIVE RHEUMATOID FACTOR: Chronic | ICD-10-CM

## 2021-06-03 DIAGNOSIS — E78.5 HYPERLIPIDEMIA, UNSPECIFIED HYPERLIPIDEMIA TYPE: ICD-10-CM

## 2021-06-03 DIAGNOSIS — I65.29 STENOSIS OF CAROTID ARTERY, UNSPECIFIED LATERALITY: ICD-10-CM

## 2021-06-03 DIAGNOSIS — Z72.0 TOBACCO USE: ICD-10-CM

## 2021-06-03 DIAGNOSIS — R94.120 ABNORMAL HEARING SCREEN: ICD-10-CM

## 2021-06-03 DIAGNOSIS — J44.9 CHRONIC OBSTRUCTIVE PULMONARY DISEASE, UNSPECIFIED COPD TYPE: ICD-10-CM

## 2021-06-03 DIAGNOSIS — E03.9 HYPOTHYROIDISM, UNSPECIFIED TYPE: ICD-10-CM

## 2021-06-03 DIAGNOSIS — M81.0 OSTEOPOROSIS, UNSPECIFIED OSTEOPOROSIS TYPE, UNSPECIFIED PATHOLOGICAL FRACTURE PRESENCE: ICD-10-CM

## 2021-06-03 DIAGNOSIS — I70.219 ATHEROSCLEROTIC PVD WITH INTERMITTENT CLAUDICATION: Primary | Chronic | ICD-10-CM

## 2021-06-03 DIAGNOSIS — I70.0 CALCIFICATION OF AORTA: ICD-10-CM

## 2021-06-03 DIAGNOSIS — R41.3 MEMORY DIFFICULTIES: ICD-10-CM

## 2021-06-03 DIAGNOSIS — M05.772 RHEUMATOID ARTHRITIS INVOLVING BOTH FEET WITH POSITIVE RHEUMATOID FACTOR: Chronic | ICD-10-CM

## 2021-06-03 DIAGNOSIS — I10 ACCELERATED HYPERTENSION: ICD-10-CM

## 2021-06-03 DIAGNOSIS — Z74.8 ASSISTANCE NEEDED WITH TRANSPORTATION: ICD-10-CM

## 2021-06-03 DIAGNOSIS — E78.5 HYPERLIPIDEMIA LDL GOAL <70: Chronic | ICD-10-CM

## 2021-06-03 DIAGNOSIS — I10 HYPERTENSION GOAL BP (BLOOD PRESSURE) < 140/90: Chronic | ICD-10-CM

## 2021-06-03 DIAGNOSIS — I25.10 CORONARY ARTERY DISEASE, ANGINA PRESENCE UNSPECIFIED, UNSPECIFIED VESSEL OR LESION TYPE, UNSPECIFIED WHETHER NATIVE OR TRANSPLANTED HEART: ICD-10-CM

## 2021-06-03 DIAGNOSIS — Z74.09 OTHER REDUCED MOBILITY: ICD-10-CM

## 2021-06-03 DIAGNOSIS — F32.A MILD DEPRESSION: ICD-10-CM

## 2021-06-03 DIAGNOSIS — R91.1 LUNG NODULE: ICD-10-CM

## 2021-06-03 DIAGNOSIS — H91.90 HEARING DIFFICULTY, UNSPECIFIED LATERALITY: ICD-10-CM

## 2021-06-03 DIAGNOSIS — Z95.1 HX OF CABG: Chronic | ICD-10-CM

## 2021-06-03 DIAGNOSIS — Z85.828 HISTORY OF SKIN CANCER: ICD-10-CM

## 2021-06-03 DIAGNOSIS — M06.9 RHEUMATOID ARTHRITIS, INVOLVING UNSPECIFIED SITE, UNSPECIFIED WHETHER RHEUMATOID FACTOR PRESENT: ICD-10-CM

## 2021-06-03 PROCEDURE — 1101F PR PT FALLS ASSESS DOC 0-1 FALLS W/OUT INJ PAST YR: ICD-10-PCS | Mod: CPTII,S$GLB,, | Performed by: NURSE PRACTITIONER

## 2021-06-03 PROCEDURE — 1101F PT FALLS ASSESS-DOCD LE1/YR: CPT | Mod: CPTII,S$GLB,, | Performed by: INTERNAL MEDICINE

## 2021-06-03 PROCEDURE — 99499 RISK ADDL DX/OHS AUDIT: ICD-10-PCS | Mod: S$GLB,,, | Performed by: INTERNAL MEDICINE

## 2021-06-03 PROCEDURE — 99499 UNLISTED E&M SERVICE: CPT | Mod: S$GLB,,, | Performed by: NURSE PRACTITIONER

## 2021-06-03 PROCEDURE — 3288F PR FALLS RISK ASSESSMENT DOCUMENTED: ICD-10-PCS | Mod: CPTII,S$GLB,, | Performed by: INTERNAL MEDICINE

## 2021-06-03 PROCEDURE — 1126F AMNT PAIN NOTED NONE PRSNT: CPT | Mod: S$GLB,,, | Performed by: INTERNAL MEDICINE

## 2021-06-03 PROCEDURE — 3288F PR FALLS RISK ASSESSMENT DOCUMENTED: ICD-10-PCS | Mod: CPTII,S$GLB,, | Performed by: NURSE PRACTITIONER

## 2021-06-03 PROCEDURE — 99214 PR OFFICE/OUTPT VISIT, EST, LEVL IV, 30-39 MIN: ICD-10-PCS | Mod: S$GLB,,, | Performed by: INTERNAL MEDICINE

## 2021-06-03 PROCEDURE — 3288F FALL RISK ASSESSMENT DOCD: CPT | Mod: CPTII,S$GLB,, | Performed by: INTERNAL MEDICINE

## 2021-06-03 PROCEDURE — 1126F PR PAIN SEVERITY QUANTIFIED, NO PAIN PRESENT: ICD-10-PCS | Mod: S$GLB,,, | Performed by: INTERNAL MEDICINE

## 2021-06-03 PROCEDURE — 1159F PR MEDICATION LIST DOCUMENTED IN MEDICAL RECORD: ICD-10-PCS | Mod: S$GLB,,, | Performed by: INTERNAL MEDICINE

## 2021-06-03 PROCEDURE — G0439 PR MEDICARE ANNUAL WELLNESS SUBSEQUENT VISIT: ICD-10-PCS | Mod: S$GLB,,, | Performed by: NURSE PRACTITIONER

## 2021-06-03 PROCEDURE — 99214 OFFICE O/P EST MOD 30 MIN: CPT | Mod: S$GLB,,, | Performed by: INTERNAL MEDICINE

## 2021-06-03 PROCEDURE — 1158F ADVNC CARE PLAN TLK DOCD: CPT | Mod: S$GLB,,, | Performed by: NURSE PRACTITIONER

## 2021-06-03 PROCEDURE — 1158F PR ADVANCE CARE PLANNING DISCUSS DOCUMENTED IN MEDICAL RECORD: ICD-10-PCS | Mod: S$GLB,,, | Performed by: NURSE PRACTITIONER

## 2021-06-03 PROCEDURE — G0439 PPPS, SUBSEQ VISIT: HCPCS | Mod: S$GLB,,, | Performed by: NURSE PRACTITIONER

## 2021-06-03 PROCEDURE — 1159F MED LIST DOCD IN RCRD: CPT | Mod: S$GLB,,, | Performed by: INTERNAL MEDICINE

## 2021-06-03 PROCEDURE — 1101F PR PT FALLS ASSESS DOC 0-1 FALLS W/OUT INJ PAST YR: ICD-10-PCS | Mod: CPTII,S$GLB,, | Performed by: INTERNAL MEDICINE

## 2021-06-03 PROCEDURE — 1126F PR PAIN SEVERITY QUANTIFIED, NO PAIN PRESENT: ICD-10-PCS | Mod: S$GLB,,, | Performed by: NURSE PRACTITIONER

## 2021-06-03 PROCEDURE — 1158F PR ADVANCE CARE PLANNING DISCUSS DOCUMENTED IN MEDICAL RECORD: ICD-10-PCS | Mod: S$GLB,,, | Performed by: INTERNAL MEDICINE

## 2021-06-03 PROCEDURE — 1158F ADVNC CARE PLAN TLK DOCD: CPT | Mod: S$GLB,,, | Performed by: INTERNAL MEDICINE

## 2021-06-03 PROCEDURE — 99999 PR PBB SHADOW E&M-EST. PATIENT-LVL IV: CPT | Mod: PBBFAC,,, | Performed by: INTERNAL MEDICINE

## 2021-06-03 PROCEDURE — 99499 RISK ADDL DX/OHS AUDIT: ICD-10-PCS | Mod: S$GLB,,, | Performed by: NURSE PRACTITIONER

## 2021-06-03 PROCEDURE — G9919 PR SCREENING AND POSITIVE: ICD-10-PCS | Mod: CPTII,S$GLB,, | Performed by: NURSE PRACTITIONER

## 2021-06-03 PROCEDURE — G9919 SCRN ND POS ND PROV OF REC: HCPCS | Mod: CPTII,S$GLB,, | Performed by: NURSE PRACTITIONER

## 2021-06-03 PROCEDURE — 1101F PT FALLS ASSESS-DOCD LE1/YR: CPT | Mod: CPTII,S$GLB,, | Performed by: NURSE PRACTITIONER

## 2021-06-03 PROCEDURE — 99999 PR PBB SHADOW E&M-EST. PATIENT-LVL V: ICD-10-PCS | Mod: PBBFAC,,, | Performed by: NURSE PRACTITIONER

## 2021-06-03 PROCEDURE — 99499 UNLISTED E&M SERVICE: CPT | Mod: S$GLB,,, | Performed by: INTERNAL MEDICINE

## 2021-06-03 PROCEDURE — 99999 PR PBB SHADOW E&M-EST. PATIENT-LVL V: CPT | Mod: PBBFAC,,, | Performed by: NURSE PRACTITIONER

## 2021-06-03 PROCEDURE — 99999 PR PBB SHADOW E&M-EST. PATIENT-LVL IV: ICD-10-PCS | Mod: PBBFAC,,, | Performed by: INTERNAL MEDICINE

## 2021-06-03 PROCEDURE — 3288F FALL RISK ASSESSMENT DOCD: CPT | Mod: CPTII,S$GLB,, | Performed by: NURSE PRACTITIONER

## 2021-06-03 PROCEDURE — 1126F AMNT PAIN NOTED NONE PRSNT: CPT | Mod: S$GLB,,, | Performed by: NURSE PRACTITIONER

## 2021-06-07 ENCOUNTER — OUTPATIENT CASE MANAGEMENT (OUTPATIENT)
Dept: ADMINISTRATIVE | Facility: OTHER | Age: 76
End: 2021-06-07

## 2021-06-08 LAB
POC ACTIVATED CLOTTING TIME K: 158 SEC (ref 74–137)
SAMPLE: ABNORMAL

## 2021-06-18 RX ORDER — ATORVASTATIN CALCIUM 80 MG/1
TABLET, FILM COATED ORAL
Qty: 90 TABLET | Refills: 0 | Status: SHIPPED | OUTPATIENT
Start: 2021-06-18 | End: 2021-07-20

## 2021-06-24 ENCOUNTER — OFFICE VISIT (OUTPATIENT)
Dept: INTERNAL MEDICINE | Facility: CLINIC | Age: 76
End: 2021-06-24
Payer: MEDICARE

## 2021-06-24 ENCOUNTER — LAB VISIT (OUTPATIENT)
Dept: LAB | Facility: HOSPITAL | Age: 76
End: 2021-06-24
Payer: MEDICARE

## 2021-06-24 VITALS
SYSTOLIC BLOOD PRESSURE: 110 MMHG | OXYGEN SATURATION: 95 % | WEIGHT: 188.06 LBS | DIASTOLIC BLOOD PRESSURE: 70 MMHG | BODY MASS INDEX: 30.22 KG/M2 | HEIGHT: 66 IN | HEART RATE: 76 BPM | TEMPERATURE: 100 F

## 2021-06-24 DIAGNOSIS — F17.200 TOBACCO USE DISORDER: ICD-10-CM

## 2021-06-24 DIAGNOSIS — E78.5 HYPERLIPIDEMIA LDL GOAL <70: ICD-10-CM

## 2021-06-24 DIAGNOSIS — I73.9 PVD (PERIPHERAL VASCULAR DISEASE): ICD-10-CM

## 2021-06-24 DIAGNOSIS — E03.9 HYPOTHYROIDISM (ACQUIRED): ICD-10-CM

## 2021-06-24 DIAGNOSIS — I10 ESSENTIAL HYPERTENSION: Primary | ICD-10-CM

## 2021-06-24 DIAGNOSIS — R11.0 NAUSEA: ICD-10-CM

## 2021-06-24 LAB — TSH SERPL DL<=0.005 MIU/L-ACNC: 1.29 UIU/ML (ref 0.4–4)

## 2021-06-24 PROCEDURE — 99214 OFFICE O/P EST MOD 30 MIN: CPT | Mod: S$GLB,,, | Performed by: INTERNAL MEDICINE

## 2021-06-24 PROCEDURE — 3074F PR MOST RECENT SYSTOLIC BLOOD PRESSURE < 130 MM HG: ICD-10-PCS | Mod: CPTII,S$GLB,, | Performed by: INTERNAL MEDICINE

## 2021-06-24 PROCEDURE — 1159F MED LIST DOCD IN RCRD: CPT | Mod: S$GLB,,, | Performed by: INTERNAL MEDICINE

## 2021-06-24 PROCEDURE — 99999 PR PBB SHADOW E&M-EST. PATIENT-LVL IV: ICD-10-PCS | Mod: PBBFAC,,, | Performed by: INTERNAL MEDICINE

## 2021-06-24 PROCEDURE — 1125F PR PAIN SEVERITY QUANTIFIED, PAIN PRESENT: ICD-10-PCS | Mod: S$GLB,,, | Performed by: INTERNAL MEDICINE

## 2021-06-24 PROCEDURE — 99999 PR PBB SHADOW E&M-EST. PATIENT-LVL IV: CPT | Mod: PBBFAC,,, | Performed by: INTERNAL MEDICINE

## 2021-06-24 PROCEDURE — 3078F DIAST BP <80 MM HG: CPT | Mod: CPTII,S$GLB,, | Performed by: INTERNAL MEDICINE

## 2021-06-24 PROCEDURE — 99214 PR OFFICE/OUTPT VISIT, EST, LEVL IV, 30-39 MIN: ICD-10-PCS | Mod: S$GLB,,, | Performed by: INTERNAL MEDICINE

## 2021-06-24 PROCEDURE — 36415 COLL VENOUS BLD VENIPUNCTURE: CPT | Performed by: INTERNAL MEDICINE

## 2021-06-24 PROCEDURE — 1159F PR MEDICATION LIST DOCUMENTED IN MEDICAL RECORD: ICD-10-PCS | Mod: S$GLB,,, | Performed by: INTERNAL MEDICINE

## 2021-06-24 PROCEDURE — 84443 ASSAY THYROID STIM HORMONE: CPT | Performed by: INTERNAL MEDICINE

## 2021-06-24 PROCEDURE — 3288F PR FALLS RISK ASSESSMENT DOCUMENTED: ICD-10-PCS | Mod: CPTII,S$GLB,, | Performed by: INTERNAL MEDICINE

## 2021-06-24 PROCEDURE — 3074F SYST BP LT 130 MM HG: CPT | Mod: CPTII,S$GLB,, | Performed by: INTERNAL MEDICINE

## 2021-06-24 PROCEDURE — 3288F FALL RISK ASSESSMENT DOCD: CPT | Mod: CPTII,S$GLB,, | Performed by: INTERNAL MEDICINE

## 2021-06-24 PROCEDURE — 3078F PR MOST RECENT DIASTOLIC BLOOD PRESSURE < 80 MM HG: ICD-10-PCS | Mod: CPTII,S$GLB,, | Performed by: INTERNAL MEDICINE

## 2021-06-24 PROCEDURE — 1101F PR PT FALLS ASSESS DOC 0-1 FALLS W/OUT INJ PAST YR: ICD-10-PCS | Mod: CPTII,S$GLB,, | Performed by: INTERNAL MEDICINE

## 2021-06-24 PROCEDURE — 1125F AMNT PAIN NOTED PAIN PRSNT: CPT | Mod: S$GLB,,, | Performed by: INTERNAL MEDICINE

## 2021-06-24 PROCEDURE — 1101F PT FALLS ASSESS-DOCD LE1/YR: CPT | Mod: CPTII,S$GLB,, | Performed by: INTERNAL MEDICINE

## 2021-06-24 RX ORDER — ONDANSETRON 4 MG/1
4 TABLET, FILM COATED ORAL EVERY 8 HOURS PRN
Qty: 15 TABLET | Refills: 0 | Status: SHIPPED | OUTPATIENT
Start: 2021-06-24 | End: 2021-09-08

## 2021-06-24 RX ORDER — CITALOPRAM 10 MG/1
10 TABLET ORAL DAILY
COMMUNITY
Start: 2021-04-21 | End: 2024-02-12

## 2021-07-16 NOTE — ED NOTES
Waiting on APTT and INR to result prior to starting heparin drip.    Where Do You Want The Question To Include Opioid Counseling Located?: Case Summary Tab

## 2021-07-20 RX ORDER — ATORVASTATIN CALCIUM 80 MG/1
TABLET, FILM COATED ORAL
Qty: 90 TABLET | Refills: 0 | Status: SHIPPED | OUTPATIENT
Start: 2021-07-20 | End: 2021-12-13 | Stop reason: SDUPTHER

## 2021-07-21 ENCOUNTER — PATIENT OUTREACH (OUTPATIENT)
Dept: ADMINISTRATIVE | Facility: OTHER | Age: 76
End: 2021-07-21

## 2021-07-26 RX ORDER — AMLODIPINE BESYLATE 10 MG/1
TABLET ORAL
Qty: 90 TABLET | Refills: 3 | Status: SHIPPED | OUTPATIENT
Start: 2021-07-26 | End: 2021-12-13 | Stop reason: SDUPTHER

## 2021-07-31 ENCOUNTER — EXTERNAL CHRONIC CARE MANAGEMENT (OUTPATIENT)
Dept: PRIMARY CARE CLINIC | Facility: CLINIC | Age: 76
End: 2021-07-31
Payer: MEDICARE

## 2021-07-31 PROCEDURE — 99490 CHRNC CARE MGMT STAFF 1ST 20: CPT | Mod: S$GLB,,, | Performed by: INTERNAL MEDICINE

## 2021-07-31 PROCEDURE — 99490 PR CHRONIC CARE MGMT, 1ST 20 MIN: ICD-10-PCS | Mod: S$GLB,,, | Performed by: INTERNAL MEDICINE

## 2021-08-03 RX ORDER — LEVOTHYROXINE SODIUM 137 UG/1
TABLET ORAL
Qty: 90 TABLET | Refills: 3 | Status: SHIPPED | OUTPATIENT
Start: 2021-08-03 | End: 2022-08-25

## 2021-08-11 ENCOUNTER — TELEPHONE (OUTPATIENT)
Dept: INTERNAL MEDICINE | Facility: CLINIC | Age: 76
End: 2021-08-11

## 2021-08-11 DIAGNOSIS — M79.642 LEFT HAND PAIN: Primary | ICD-10-CM

## 2021-08-11 NOTE — ASSESSMENT & PLAN NOTE
Troponin 0.221>0.24  Elevated troponin likely due to uncontrolled HTN  Repeat pending  IV heparin gtt started in ED for ACS protocol  Chest pain free on exam  Check ECHO  Likely MPI stress test tomorrow  Continue trend serial troponin  Continue current meds for now  Assess response in AM   10-Aug-2021 09:45 11-Aug-2021 06:15 10-Aug-2021 08:33 10-Aug-2021 06:30 08-Jul-2021 16:45 10-Aug-2021 09:45

## 2021-08-12 ENCOUNTER — HOSPITAL ENCOUNTER (OUTPATIENT)
Dept: RADIOLOGY | Facility: HOSPITAL | Age: 76
Discharge: HOME | End: 2021-08-12
Attending: INTERNAL MEDICINE
Payer: MEDICARE

## 2021-08-12 DIAGNOSIS — M79.642 LEFT HAND PAIN: ICD-10-CM

## 2021-08-12 PROCEDURE — 73130 X-RAY EXAM OF HAND: CPT | Mod: TC,LT

## 2021-08-12 PROCEDURE — 73130 XR HAND COMPLETE 3 VIEW LEFT: ICD-10-PCS | Mod: 26,LT,, | Performed by: RADIOLOGY

## 2021-08-12 PROCEDURE — 73130 X-RAY EXAM OF HAND: CPT | Mod: 26,LT,, | Performed by: RADIOLOGY

## 2021-08-17 ENCOUNTER — OFFICE VISIT (OUTPATIENT)
Dept: PODIATRY | Facility: CLINIC | Age: 76
End: 2021-08-17
Payer: MEDICARE

## 2021-08-17 VITALS — HEIGHT: 66 IN | WEIGHT: 187.38 LBS | BODY MASS INDEX: 30.11 KG/M2

## 2021-08-17 DIAGNOSIS — L93.0 DISCOID LUPUS ERYTHEMATOSUS: ICD-10-CM

## 2021-08-17 DIAGNOSIS — B35.1 DERMATOPHYTOSIS OF NAIL: ICD-10-CM

## 2021-08-17 DIAGNOSIS — L84 CORN OR CALLUS: ICD-10-CM

## 2021-08-17 DIAGNOSIS — I73.9 PERIPHERAL VASCULAR DISEASE: Primary | ICD-10-CM

## 2021-08-17 PROCEDURE — 99499 NO LOS: ICD-10-PCS | Mod: S$GLB,,, | Performed by: PODIATRIST

## 2021-08-17 PROCEDURE — 3288F PR FALLS RISK ASSESSMENT DOCUMENTED: ICD-10-PCS | Mod: CPTII,S$GLB,, | Performed by: PODIATRIST

## 2021-08-17 PROCEDURE — 3288F FALL RISK ASSESSMENT DOCD: CPT | Mod: CPTII,S$GLB,, | Performed by: PODIATRIST

## 2021-08-17 PROCEDURE — 99499 UNLISTED E&M SERVICE: CPT | Mod: S$GLB,,, | Performed by: PODIATRIST

## 2021-08-17 PROCEDURE — 1159F PR MEDICATION LIST DOCUMENTED IN MEDICAL RECORD: ICD-10-PCS | Mod: CPTII,S$GLB,, | Performed by: PODIATRIST

## 2021-08-17 PROCEDURE — 99999 PR PBB SHADOW E&M-EST. PATIENT-LVL III: CPT | Mod: PBBFAC,,, | Performed by: PODIATRIST

## 2021-08-17 PROCEDURE — 1101F PR PT FALLS ASSESS DOC 0-1 FALLS W/OUT INJ PAST YR: ICD-10-PCS | Mod: CPTII,S$GLB,, | Performed by: PODIATRIST

## 2021-08-17 PROCEDURE — 11056 PARNG/CUTG B9 HYPRKR LES 2-4: CPT | Mod: Q8,S$GLB,, | Performed by: PODIATRIST

## 2021-08-17 PROCEDURE — 11721 PR DEBRIDEMENT OF NAILS, 6 OR MORE: ICD-10-PCS | Mod: Q8,59,S$GLB, | Performed by: PODIATRIST

## 2021-08-17 PROCEDURE — 11721 DEBRIDE NAIL 6 OR MORE: CPT | Mod: Q8,59,S$GLB, | Performed by: PODIATRIST

## 2021-08-17 PROCEDURE — 1159F MED LIST DOCD IN RCRD: CPT | Mod: CPTII,S$GLB,, | Performed by: PODIATRIST

## 2021-08-17 PROCEDURE — 11056 PR TRIM BENIGN HYPERKERATOTIC SKIN LESION,2-4: ICD-10-PCS | Mod: Q8,S$GLB,, | Performed by: PODIATRIST

## 2021-08-17 PROCEDURE — 99999 PR PBB SHADOW E&M-EST. PATIENT-LVL III: ICD-10-PCS | Mod: PBBFAC,,, | Performed by: PODIATRIST

## 2021-08-17 PROCEDURE — 1101F PT FALLS ASSESS-DOCD LE1/YR: CPT | Mod: CPTII,S$GLB,, | Performed by: PODIATRIST

## 2021-08-25 ENCOUNTER — TELEPHONE (OUTPATIENT)
Dept: INTERNAL MEDICINE | Facility: CLINIC | Age: 76
End: 2021-08-25

## 2021-08-31 ENCOUNTER — EXTERNAL CHRONIC CARE MANAGEMENT (OUTPATIENT)
Dept: PRIMARY CARE CLINIC | Facility: CLINIC | Age: 76
End: 2021-08-31
Payer: MEDICARE

## 2021-08-31 PROCEDURE — 99490 PR CHRONIC CARE MGMT, 1ST 20 MIN: ICD-10-PCS | Mod: S$GLB,,, | Performed by: INTERNAL MEDICINE

## 2021-08-31 PROCEDURE — 99490 CHRNC CARE MGMT STAFF 1ST 20: CPT | Mod: S$GLB,,, | Performed by: INTERNAL MEDICINE

## 2021-08-31 PROCEDURE — 99439 CHRNC CARE MGMT STAF EA ADDL: CPT | Mod: S$GLB,,, | Performed by: INTERNAL MEDICINE

## 2021-08-31 PROCEDURE — 99439 PR CHRONIC CARE MGMT, EA ADDTL 20 MIN: ICD-10-PCS | Mod: S$GLB,,, | Performed by: INTERNAL MEDICINE

## 2021-09-07 DIAGNOSIS — R11.0 NAUSEA: ICD-10-CM

## 2021-09-08 RX ORDER — ONDANSETRON 4 MG/1
TABLET, FILM COATED ORAL
Qty: 15 TABLET | Refills: 0 | Status: SHIPPED | OUTPATIENT
Start: 2021-09-08 | End: 2021-11-25

## 2021-09-20 ENCOUNTER — TELEPHONE (OUTPATIENT)
Dept: INTERNAL MEDICINE | Facility: CLINIC | Age: 76
End: 2021-09-20

## 2021-09-20 ENCOUNTER — OFFICE VISIT (OUTPATIENT)
Dept: CARDIOLOGY | Facility: CLINIC | Age: 76
End: 2021-09-20
Payer: MEDICARE

## 2021-09-20 VITALS
OXYGEN SATURATION: 95 % | DIASTOLIC BLOOD PRESSURE: 92 MMHG | SYSTOLIC BLOOD PRESSURE: 140 MMHG | WEIGHT: 189.13 LBS | HEIGHT: 66 IN | HEART RATE: 95 BPM | BODY MASS INDEX: 30.4 KG/M2

## 2021-09-20 DIAGNOSIS — Z95.1 HX OF CABG: Chronic | ICD-10-CM

## 2021-09-20 DIAGNOSIS — I65.29 STENOSIS OF CAROTID ARTERY, UNSPECIFIED LATERALITY: ICD-10-CM

## 2021-09-20 DIAGNOSIS — L93.0 DISCOID LUPUS ERYTHEMATOSUS: Chronic | ICD-10-CM

## 2021-09-20 DIAGNOSIS — M05.772 RHEUMATOID ARTHRITIS INVOLVING BOTH FEET WITH POSITIVE RHEUMATOID FACTOR: Chronic | ICD-10-CM

## 2021-09-20 DIAGNOSIS — I27.9 PULMONARY HEART DISEASE: ICD-10-CM

## 2021-09-20 DIAGNOSIS — M05.771 RHEUMATOID ARTHRITIS INVOLVING BOTH FEET WITH POSITIVE RHEUMATOID FACTOR: Chronic | ICD-10-CM

## 2021-09-20 DIAGNOSIS — E78.5 HYPERLIPIDEMIA LDL GOAL <70: Chronic | ICD-10-CM

## 2021-09-20 DIAGNOSIS — J44.9 CHRONIC OBSTRUCTIVE PULMONARY DISEASE, UNSPECIFIED COPD TYPE: ICD-10-CM

## 2021-09-20 DIAGNOSIS — I10 HYPERTENSION GOAL BP (BLOOD PRESSURE) < 140/90: Chronic | ICD-10-CM

## 2021-09-20 DIAGNOSIS — I70.219 ATHEROSCLEROTIC PVD WITH INTERMITTENT CLAUDICATION: Primary | Chronic | ICD-10-CM

## 2021-09-20 DIAGNOSIS — I73.9 PERIPHERAL VASCULAR DISEASE: Chronic | ICD-10-CM

## 2021-09-20 DIAGNOSIS — R91.1 LUNG NODULE: ICD-10-CM

## 2021-09-20 PROCEDURE — 3077F PR MOST RECENT SYSTOLIC BLOOD PRESSURE >= 140 MM HG: ICD-10-PCS | Mod: HCNC,CPTII,S$GLB, | Performed by: INTERNAL MEDICINE

## 2021-09-20 PROCEDURE — 1160F RVW MEDS BY RX/DR IN RCRD: CPT | Mod: HCNC,CPTII,S$GLB, | Performed by: INTERNAL MEDICINE

## 2021-09-20 PROCEDURE — 3288F FALL RISK ASSESSMENT DOCD: CPT | Mod: HCNC,CPTII,S$GLB, | Performed by: INTERNAL MEDICINE

## 2021-09-20 PROCEDURE — 99999 PR PBB SHADOW E&M-EST. PATIENT-LVL III: CPT | Mod: PBBFAC,HCNC,, | Performed by: INTERNAL MEDICINE

## 2021-09-20 PROCEDURE — 99499 UNLISTED E&M SERVICE: CPT | Mod: HCNC,S$GLB,, | Performed by: INTERNAL MEDICINE

## 2021-09-20 PROCEDURE — 1159F MED LIST DOCD IN RCRD: CPT | Mod: HCNC,CPTII,S$GLB, | Performed by: INTERNAL MEDICINE

## 2021-09-20 PROCEDURE — 3080F DIAST BP >= 90 MM HG: CPT | Mod: HCNC,CPTII,S$GLB, | Performed by: INTERNAL MEDICINE

## 2021-09-20 PROCEDURE — 99214 OFFICE O/P EST MOD 30 MIN: CPT | Mod: HCNC,S$GLB,, | Performed by: INTERNAL MEDICINE

## 2021-09-20 PROCEDURE — 99214 PR OFFICE/OUTPT VISIT, EST, LEVL IV, 30-39 MIN: ICD-10-PCS | Mod: HCNC,S$GLB,, | Performed by: INTERNAL MEDICINE

## 2021-09-20 PROCEDURE — 1160F PR REVIEW ALL MEDS BY PRESCRIBER/CLIN PHARMACIST DOCUMENTED: ICD-10-PCS | Mod: HCNC,CPTII,S$GLB, | Performed by: INTERNAL MEDICINE

## 2021-09-20 PROCEDURE — 1159F PR MEDICATION LIST DOCUMENTED IN MEDICAL RECORD: ICD-10-PCS | Mod: HCNC,CPTII,S$GLB, | Performed by: INTERNAL MEDICINE

## 2021-09-20 PROCEDURE — 1101F PR PT FALLS ASSESS DOC 0-1 FALLS W/OUT INJ PAST YR: ICD-10-PCS | Mod: HCNC,CPTII,S$GLB, | Performed by: INTERNAL MEDICINE

## 2021-09-20 PROCEDURE — 3077F SYST BP >= 140 MM HG: CPT | Mod: HCNC,CPTII,S$GLB, | Performed by: INTERNAL MEDICINE

## 2021-09-20 PROCEDURE — 99499 RISK ADDL DX/OHS AUDIT: ICD-10-PCS | Mod: HCNC,S$GLB,, | Performed by: INTERNAL MEDICINE

## 2021-09-20 PROCEDURE — 1125F PR PAIN SEVERITY QUANTIFIED, PAIN PRESENT: ICD-10-PCS | Mod: HCNC,CPTII,S$GLB, | Performed by: INTERNAL MEDICINE

## 2021-09-20 PROCEDURE — 1101F PT FALLS ASSESS-DOCD LE1/YR: CPT | Mod: HCNC,CPTII,S$GLB, | Performed by: INTERNAL MEDICINE

## 2021-09-20 PROCEDURE — 99999 PR PBB SHADOW E&M-EST. PATIENT-LVL III: ICD-10-PCS | Mod: PBBFAC,HCNC,, | Performed by: INTERNAL MEDICINE

## 2021-09-20 PROCEDURE — 3080F PR MOST RECENT DIASTOLIC BLOOD PRESSURE >= 90 MM HG: ICD-10-PCS | Mod: HCNC,CPTII,S$GLB, | Performed by: INTERNAL MEDICINE

## 2021-09-20 PROCEDURE — 3288F PR FALLS RISK ASSESSMENT DOCUMENTED: ICD-10-PCS | Mod: HCNC,CPTII,S$GLB, | Performed by: INTERNAL MEDICINE

## 2021-09-20 PROCEDURE — 1125F AMNT PAIN NOTED PAIN PRSNT: CPT | Mod: HCNC,CPTII,S$GLB, | Performed by: INTERNAL MEDICINE

## 2021-09-30 ENCOUNTER — HOSPITAL ENCOUNTER (EMERGENCY)
Facility: HOSPITAL | Age: 76
Discharge: HOME | End: 2021-09-30
Attending: EMERGENCY MEDICINE
Payer: MEDICARE

## 2021-09-30 ENCOUNTER — EXTERNAL CHRONIC CARE MANAGEMENT (OUTPATIENT)
Dept: PRIMARY CARE CLINIC | Facility: CLINIC | Age: 76
End: 2021-09-30
Payer: MEDICARE

## 2021-09-30 VITALS
BODY MASS INDEX: 30.83 KG/M2 | DIASTOLIC BLOOD PRESSURE: 85 MMHG | SYSTOLIC BLOOD PRESSURE: 180 MMHG | TEMPERATURE: 98 F | OXYGEN SATURATION: 97 % | HEART RATE: 65 BPM | RESPIRATION RATE: 18 BRPM | WEIGHT: 191 LBS

## 2021-09-30 DIAGNOSIS — M54.2 NECK PAIN: ICD-10-CM

## 2021-09-30 DIAGNOSIS — R51.9 NONINTRACTABLE HEADACHE, UNSPECIFIED CHRONICITY PATTERN, UNSPECIFIED HEADACHE TYPE: Primary | ICD-10-CM

## 2021-09-30 PROCEDURE — 99490 CHRNC CARE MGMT STAFF 1ST 20: CPT | Mod: S$GLB,,, | Performed by: INTERNAL MEDICINE

## 2021-09-30 PROCEDURE — 96372 THER/PROPH/DIAG INJ SC/IM: CPT | Mod: HCNC

## 2021-09-30 PROCEDURE — 99285 EMERGENCY DEPT VISIT HI MDM: CPT | Mod: 25,HCNC

## 2021-09-30 PROCEDURE — 63600175 PHARM REV CODE 636 W HCPCS: Mod: HCNC | Performed by: NURSE PRACTITIONER

## 2021-09-30 PROCEDURE — 99490 PR CHRONIC CARE MGMT, 1ST 20 MIN: ICD-10-PCS | Mod: S$GLB,,, | Performed by: INTERNAL MEDICINE

## 2021-09-30 RX ORDER — DEXAMETHASONE SODIUM PHOSPHATE 4 MG/ML
8 INJECTION, SOLUTION INTRA-ARTICULAR; INTRALESIONAL; INTRAMUSCULAR; INTRAVENOUS; SOFT TISSUE
Status: COMPLETED | OUTPATIENT
Start: 2021-09-30 | End: 2021-09-30

## 2021-09-30 RX ORDER — ONDANSETRON 8 MG/1
8 TABLET, ORALLY DISINTEGRATING ORAL
Status: DISCONTINUED | OUTPATIENT
Start: 2021-09-30 | End: 2021-09-30 | Stop reason: HOSPADM

## 2021-09-30 RX ORDER — MORPHINE SULFATE 4 MG/ML
4 INJECTION, SOLUTION INTRAMUSCULAR; INTRAVENOUS
Status: DISCONTINUED | OUTPATIENT
Start: 2021-09-30 | End: 2021-09-30 | Stop reason: HOSPADM

## 2021-09-30 RX ADMIN — DEXAMETHASONE SODIUM PHOSPHATE 8 MG: 4 INJECTION, SOLUTION INTRA-ARTICULAR; INTRALESIONAL; INTRAMUSCULAR; INTRAVENOUS; SOFT TISSUE at 10:09

## 2021-10-01 ENCOUNTER — TELEPHONE (OUTPATIENT)
Dept: INTERNAL MEDICINE | Facility: CLINIC | Age: 76
End: 2021-10-01

## 2021-10-08 ENCOUNTER — TELEPHONE (OUTPATIENT)
Dept: INTERNAL MEDICINE | Facility: CLINIC | Age: 76
End: 2021-10-08

## 2021-10-13 ENCOUNTER — PATIENT OUTREACH (OUTPATIENT)
Dept: ADMINISTRATIVE | Facility: OTHER | Age: 76
End: 2021-10-13

## 2021-10-14 ENCOUNTER — OFFICE VISIT (OUTPATIENT)
Dept: DERMATOLOGY | Facility: CLINIC | Age: 76
End: 2021-10-14
Payer: MEDICARE

## 2021-10-14 ENCOUNTER — OFFICE VISIT (OUTPATIENT)
Dept: PODIATRY | Facility: CLINIC | Age: 76
End: 2021-10-14
Payer: MEDICARE

## 2021-10-14 VITALS — WEIGHT: 191.13 LBS | HEIGHT: 66 IN | BODY MASS INDEX: 30.72 KG/M2

## 2021-10-14 DIAGNOSIS — M79.676 PAIN OF FIFTH TOE: ICD-10-CM

## 2021-10-14 DIAGNOSIS — L93.0 DISCOID LUPUS ERYTHEMATOSUS: ICD-10-CM

## 2021-10-14 DIAGNOSIS — B35.1 DERMATOPHYTOSIS OF NAIL: ICD-10-CM

## 2021-10-14 DIAGNOSIS — L40.3 PALMOPLANTAR PUSTULAR PSORIASIS: ICD-10-CM

## 2021-10-14 DIAGNOSIS — L84 CORN OR CALLUS: ICD-10-CM

## 2021-10-14 DIAGNOSIS — L43.0 LICHEN PLANUS HYPERTROPHICUS: Primary | ICD-10-CM

## 2021-10-14 DIAGNOSIS — D48.5 NEOPLASM OF UNCERTAIN BEHAVIOR OF SKIN: ICD-10-CM

## 2021-10-14 DIAGNOSIS — I73.9 PERIPHERAL VASCULAR DISEASE: Primary | ICD-10-CM

## 2021-10-14 DIAGNOSIS — L93.0 DISCOID LUPUS: ICD-10-CM

## 2021-10-14 PROCEDURE — 99999 PR PBB SHADOW E&M-EST. PATIENT-LVL III: ICD-10-PCS | Mod: PBBFAC,HCNC,, | Performed by: PODIATRIST

## 2021-10-14 PROCEDURE — 99213 PR OFFICE/OUTPT VISIT, EST, LEVL III, 20-29 MIN: ICD-10-PCS | Mod: HCNC,S$GLB,, | Performed by: DERMATOLOGY

## 2021-10-14 PROCEDURE — 11721 DEBRIDE NAIL 6 OR MORE: CPT | Mod: 59,Q8,HCNC,S$GLB | Performed by: PODIATRIST

## 2021-10-14 PROCEDURE — 1159F MED LIST DOCD IN RCRD: CPT | Mod: HCNC,CPTII,S$GLB, | Performed by: PODIATRIST

## 2021-10-14 PROCEDURE — 99999 PR PBB SHADOW E&M-EST. PATIENT-LVL IV: CPT | Mod: PBBFAC,HCNC,, | Performed by: DERMATOLOGY

## 2021-10-14 PROCEDURE — 3288F PR FALLS RISK ASSESSMENT DOCUMENTED: ICD-10-PCS | Mod: HCNC,CPTII,S$GLB, | Performed by: PODIATRIST

## 2021-10-14 PROCEDURE — 99213 OFFICE O/P EST LOW 20 MIN: CPT | Mod: 25,HCNC,S$GLB, | Performed by: PODIATRIST

## 2021-10-14 PROCEDURE — 3288F FALL RISK ASSESSMENT DOCD: CPT | Mod: HCNC,CPTII,S$GLB, | Performed by: PODIATRIST

## 2021-10-14 PROCEDURE — 11056 PR TRIM BENIGN HYPERKERATOTIC SKIN LESION,2-4: ICD-10-PCS | Mod: Q8,HCNC,S$GLB, | Performed by: PODIATRIST

## 2021-10-14 PROCEDURE — 99999 PR PBB SHADOW E&M-EST. PATIENT-LVL IV: ICD-10-PCS | Mod: PBBFAC,HCNC,, | Performed by: DERMATOLOGY

## 2021-10-14 PROCEDURE — 99999 PR PBB SHADOW E&M-EST. PATIENT-LVL III: CPT | Mod: PBBFAC,HCNC,, | Performed by: PODIATRIST

## 2021-10-14 PROCEDURE — 99213 OFFICE O/P EST LOW 20 MIN: CPT | Mod: HCNC,S$GLB,, | Performed by: DERMATOLOGY

## 2021-10-14 PROCEDURE — 11721 PR DEBRIDEMENT OF NAILS, 6 OR MORE: ICD-10-PCS | Mod: 59,Q8,HCNC,S$GLB | Performed by: PODIATRIST

## 2021-10-14 PROCEDURE — 1101F PT FALLS ASSESS-DOCD LE1/YR: CPT | Mod: HCNC,CPTII,S$GLB, | Performed by: PODIATRIST

## 2021-10-14 PROCEDURE — 99213 PR OFFICE/OUTPT VISIT, EST, LEVL III, 20-29 MIN: ICD-10-PCS | Mod: 25,HCNC,S$GLB, | Performed by: PODIATRIST

## 2021-10-14 PROCEDURE — 11056 PARNG/CUTG B9 HYPRKR LES 2-4: CPT | Mod: Q8,HCNC,S$GLB, | Performed by: PODIATRIST

## 2021-10-14 PROCEDURE — 1159F PR MEDICATION LIST DOCUMENTED IN MEDICAL RECORD: ICD-10-PCS | Mod: HCNC,CPTII,S$GLB, | Performed by: PODIATRIST

## 2021-10-14 PROCEDURE — 1101F PR PT FALLS ASSESS DOC 0-1 FALLS W/OUT INJ PAST YR: ICD-10-PCS | Mod: HCNC,CPTII,S$GLB, | Performed by: PODIATRIST

## 2021-10-15 ENCOUNTER — TELEPHONE (OUTPATIENT)
Dept: ORTHOPEDICS | Facility: CLINIC | Age: 76
End: 2021-10-15

## 2021-10-19 ENCOUNTER — PATIENT OUTREACH (OUTPATIENT)
Dept: ADMINISTRATIVE | Facility: HOSPITAL | Age: 76
End: 2021-10-19

## 2021-10-21 ENCOUNTER — HOSPITAL ENCOUNTER (EMERGENCY)
Facility: HOSPITAL | Age: 76
Discharge: HOME | End: 2021-10-21
Attending: EMERGENCY MEDICINE
Payer: MEDICARE

## 2021-10-21 VITALS
HEIGHT: 66 IN | WEIGHT: 180 LBS | BODY MASS INDEX: 28.93 KG/M2 | HEART RATE: 57 BPM | SYSTOLIC BLOOD PRESSURE: 135 MMHG | TEMPERATURE: 99 F | OXYGEN SATURATION: 97 % | RESPIRATION RATE: 20 BRPM | DIASTOLIC BLOOD PRESSURE: 67 MMHG

## 2021-10-21 DIAGNOSIS — T50.901A ACCIDENTAL DRUG OVERDOSE, INITIAL ENCOUNTER: Primary | ICD-10-CM

## 2021-10-21 DIAGNOSIS — T50.901A OVERDOSE: ICD-10-CM

## 2021-10-21 LAB
ALBUMIN SERPL BCP-MCNC: 3.2 G/DL (ref 3.5–5.2)
ALP SERPL-CCNC: 159 U/L (ref 55–135)
ALT SERPL W/O P-5'-P-CCNC: 22 U/L (ref 10–44)
AMORPH CRY URNS QL MICRO: NORMAL
AMPHET+METHAMPHET UR QL: NEGATIVE
ANION GAP SERPL CALC-SCNC: 12 MMOL/L (ref 8–16)
AST SERPL-CCNC: 18 U/L (ref 10–40)
BACTERIA #/AREA URNS HPF: NORMAL /HPF
BARBITURATES UR QL SCN>200 NG/ML: NEGATIVE
BASOPHILS # BLD AUTO: 0.04 K/UL (ref 0–0.2)
BASOPHILS NFR BLD: 0.4 % (ref 0–1.9)
BENZODIAZ UR QL SCN>200 NG/ML: NEGATIVE
BILIRUB SERPL-MCNC: 0.4 MG/DL (ref 0.1–1)
BILIRUB UR QL STRIP: NEGATIVE
BUN SERPL-MCNC: 17 MG/DL (ref 8–23)
BZE UR QL SCN: NEGATIVE
CALCIUM SERPL-MCNC: 9 MG/DL (ref 8.7–10.5)
CANNABINOIDS UR QL SCN: NEGATIVE
CHLORIDE SERPL-SCNC: 104 MMOL/L (ref 95–110)
CLARITY UR: CLEAR
CO2 SERPL-SCNC: 23 MMOL/L (ref 23–29)
COLOR UR: YELLOW
CREAT SERPL-MCNC: 0.9 MG/DL (ref 0.5–1.4)
CREAT UR-MCNC: 92.9 MG/DL (ref 15–325)
DIFFERENTIAL METHOD: NORMAL
EOSINOPHIL # BLD AUTO: 0.1 K/UL (ref 0–0.5)
EOSINOPHIL NFR BLD: 1.5 % (ref 0–8)
ERYTHROCYTE [DISTWIDTH] IN BLOOD BY AUTOMATED COUNT: 14.1 % (ref 11.5–14.5)
EST. GFR  (AFRICAN AMERICAN): >60 ML/MIN/1.73 M^2
EST. GFR  (NON AFRICAN AMERICAN): >60 ML/MIN/1.73 M^2
GLUCOSE SERPL-MCNC: 118 MG/DL (ref 70–110)
GLUCOSE UR QL STRIP: NEGATIVE
HCT VFR BLD AUTO: 40.1 % (ref 37–48.5)
HGB BLD-MCNC: 13.1 G/DL (ref 12–16)
HGB UR QL STRIP: NEGATIVE
IMM GRANULOCYTES # BLD AUTO: 0.03 K/UL (ref 0–0.04)
IMM GRANULOCYTES NFR BLD AUTO: 0.3 % (ref 0–0.5)
KETONES UR QL STRIP: NEGATIVE
LEUKOCYTE ESTERASE UR QL STRIP: ABNORMAL
LYMPHOCYTES # BLD AUTO: 2.2 K/UL (ref 1–4.8)
LYMPHOCYTES NFR BLD: 24.4 % (ref 18–48)
MCH RBC QN AUTO: 30.5 PG (ref 27–31)
MCHC RBC AUTO-ENTMCNC: 32.7 G/DL (ref 32–36)
MCV RBC AUTO: 94 FL (ref 82–98)
METHADONE UR QL SCN>300 NG/ML: NEGATIVE
MICROSCOPIC COMMENT: NORMAL
MONOCYTES # BLD AUTO: 1 K/UL (ref 0.3–1)
MONOCYTES NFR BLD: 11.3 % (ref 4–15)
NEUTROPHILS # BLD AUTO: 5.7 K/UL (ref 1.8–7.7)
NEUTROPHILS NFR BLD: 62.1 % (ref 38–73)
NITRITE UR QL STRIP: NEGATIVE
NRBC BLD-RTO: 0 /100 WBC
OPIATES UR QL SCN: NEGATIVE
PCP UR QL SCN>25 NG/ML: NEGATIVE
PH UR STRIP: 6 [PH] (ref 5–8)
PLATELET # BLD AUTO: 252 K/UL (ref 150–450)
PMV BLD AUTO: 9.8 FL (ref 9.2–12.9)
POTASSIUM SERPL-SCNC: 3.1 MMOL/L (ref 3.5–5.1)
PROT SERPL-MCNC: 6.5 G/DL (ref 6–8.4)
PROT UR QL STRIP: NEGATIVE
RBC # BLD AUTO: 4.29 M/UL (ref 4–5.4)
SODIUM SERPL-SCNC: 139 MMOL/L (ref 136–145)
SP GR UR STRIP: 1.01 (ref 1–1.03)
SQUAMOUS #/AREA URNS HPF: 2 /HPF
TOXICOLOGY INFORMATION: NORMAL
URN SPEC COLLECT METH UR: ABNORMAL
UROBILINOGEN UR STRIP-ACNC: NEGATIVE EU/DL
WBC # BLD AUTO: 9.18 K/UL (ref 3.9–12.7)
WBC #/AREA URNS HPF: 2 /HPF (ref 0–5)

## 2021-10-21 PROCEDURE — 85025 COMPLETE CBC W/AUTO DIFF WBC: CPT | Mod: HCNC | Performed by: EMERGENCY MEDICINE

## 2021-10-21 PROCEDURE — 80053 COMPREHEN METABOLIC PANEL: CPT | Mod: HCNC | Performed by: EMERGENCY MEDICINE

## 2021-10-21 PROCEDURE — 80307 DRUG TEST PRSMV CHEM ANLYZR: CPT | Mod: HCNC | Performed by: EMERGENCY MEDICINE

## 2021-10-21 PROCEDURE — 93005 ELECTROCARDIOGRAM TRACING: CPT | Mod: HCNC

## 2021-10-21 PROCEDURE — 99291 CRITICAL CARE FIRST HOUR: CPT | Mod: HCNC

## 2021-10-21 PROCEDURE — 93010 EKG 12-LEAD: ICD-10-PCS | Mod: HCNC,,, | Performed by: INTERNAL MEDICINE

## 2021-10-21 PROCEDURE — 25000003 PHARM REV CODE 250: Mod: HCNC | Performed by: EMERGENCY MEDICINE

## 2021-10-21 PROCEDURE — 81000 URINALYSIS NONAUTO W/SCOPE: CPT | Mod: HCNC,59 | Performed by: EMERGENCY MEDICINE

## 2021-10-21 PROCEDURE — 93010 ELECTROCARDIOGRAM REPORT: CPT | Mod: HCNC,,, | Performed by: INTERNAL MEDICINE

## 2021-10-21 RX ADMIN — ACTIVATED CHARCOAL 50 G: 208 SUSPENSION ORAL at 05:10

## 2021-10-21 RX ADMIN — POTASSIUM BICARBONATE 40 MEQ: 391 TABLET, EFFERVESCENT ORAL at 08:10

## 2021-10-22 ENCOUNTER — TELEPHONE (OUTPATIENT)
Dept: SPORTS MEDICINE | Facility: CLINIC | Age: 76
End: 2021-10-22

## 2021-10-22 ENCOUNTER — TELEPHONE (OUTPATIENT)
Dept: DERMATOLOGY | Facility: CLINIC | Age: 76
End: 2021-10-22
Payer: MEDICARE

## 2021-10-26 ENCOUNTER — OFFICE VISIT (OUTPATIENT)
Dept: CARDIOLOGY | Facility: CLINIC | Age: 76
End: 2021-10-26
Payer: MEDICARE

## 2021-10-26 VITALS
HEART RATE: 66 BPM | SYSTOLIC BLOOD PRESSURE: 134 MMHG | DIASTOLIC BLOOD PRESSURE: 74 MMHG | OXYGEN SATURATION: 95 % | WEIGHT: 191.38 LBS | BODY MASS INDEX: 30.89 KG/M2

## 2021-10-26 DIAGNOSIS — J44.9 CHRONIC OBSTRUCTIVE PULMONARY DISEASE, UNSPECIFIED COPD TYPE: ICD-10-CM

## 2021-10-26 DIAGNOSIS — I10 HYPERTENSION GOAL BP (BLOOD PRESSURE) < 140/90: Primary | Chronic | ICD-10-CM

## 2021-10-26 DIAGNOSIS — I65.29 STENOSIS OF CAROTID ARTERY, UNSPECIFIED LATERALITY: ICD-10-CM

## 2021-10-26 DIAGNOSIS — I70.0 CALCIFICATION OF AORTA: ICD-10-CM

## 2021-10-26 DIAGNOSIS — E78.5 HYPERLIPIDEMIA LDL GOAL <70: Chronic | ICD-10-CM

## 2021-10-26 DIAGNOSIS — R00.1 BRADYCARDIA: ICD-10-CM

## 2021-10-26 DIAGNOSIS — Z95.1 HX OF CABG: Chronic | ICD-10-CM

## 2021-10-26 DIAGNOSIS — I27.9 PULMONARY HEART DISEASE: ICD-10-CM

## 2021-10-26 DIAGNOSIS — F17.218 CIGARETTE NICOTINE DEPENDENCE WITH OTHER NICOTINE-INDUCED DISORDER: ICD-10-CM

## 2021-10-26 DIAGNOSIS — I70.219 ATHEROSCLEROTIC PVD WITH INTERMITTENT CLAUDICATION: Chronic | ICD-10-CM

## 2021-10-26 DIAGNOSIS — R06.02 SHORTNESS OF BREATH: ICD-10-CM

## 2021-10-26 DIAGNOSIS — I10 ACCELERATED HYPERTENSION: ICD-10-CM

## 2021-10-26 DIAGNOSIS — I73.9 PERIPHERAL VASCULAR DISEASE: Chronic | ICD-10-CM

## 2021-10-26 PROCEDURE — 3075F SYST BP GE 130 - 139MM HG: CPT | Mod: HCNC,CPTII,S$GLB, | Performed by: PHYSICIAN ASSISTANT

## 2021-10-26 PROCEDURE — 1160F RVW MEDS BY RX/DR IN RCRD: CPT | Mod: HCNC,CPTII,S$GLB, | Performed by: PHYSICIAN ASSISTANT

## 2021-10-26 PROCEDURE — 99999 PR PBB SHADOW E&M-EST. PATIENT-LVL IV: CPT | Mod: PBBFAC,HCNC,, | Performed by: PHYSICIAN ASSISTANT

## 2021-10-26 PROCEDURE — 1126F PR PAIN SEVERITY QUANTIFIED, NO PAIN PRESENT: ICD-10-PCS | Mod: HCNC,CPTII,S$GLB, | Performed by: PHYSICIAN ASSISTANT

## 2021-10-26 PROCEDURE — 1159F MED LIST DOCD IN RCRD: CPT | Mod: HCNC,CPTII,S$GLB, | Performed by: PHYSICIAN ASSISTANT

## 2021-10-26 PROCEDURE — 1160F PR REVIEW ALL MEDS BY PRESCRIBER/CLIN PHARMACIST DOCUMENTED: ICD-10-PCS | Mod: HCNC,CPTII,S$GLB, | Performed by: PHYSICIAN ASSISTANT

## 2021-10-26 PROCEDURE — 99214 PR OFFICE/OUTPT VISIT, EST, LEVL IV, 30-39 MIN: ICD-10-PCS | Mod: HCNC,S$GLB,, | Performed by: PHYSICIAN ASSISTANT

## 2021-10-26 PROCEDURE — 1126F AMNT PAIN NOTED NONE PRSNT: CPT | Mod: HCNC,CPTII,S$GLB, | Performed by: PHYSICIAN ASSISTANT

## 2021-10-26 PROCEDURE — 99999 PR PBB SHADOW E&M-EST. PATIENT-LVL IV: ICD-10-PCS | Mod: PBBFAC,HCNC,, | Performed by: PHYSICIAN ASSISTANT

## 2021-10-26 PROCEDURE — 1159F PR MEDICATION LIST DOCUMENTED IN MEDICAL RECORD: ICD-10-PCS | Mod: HCNC,CPTII,S$GLB, | Performed by: PHYSICIAN ASSISTANT

## 2021-10-26 PROCEDURE — 99499 RISK ADDL DX/OHS AUDIT: ICD-10-PCS | Mod: HCNC,S$GLB,, | Performed by: PHYSICIAN ASSISTANT

## 2021-10-26 PROCEDURE — 3078F DIAST BP <80 MM HG: CPT | Mod: HCNC,CPTII,S$GLB, | Performed by: PHYSICIAN ASSISTANT

## 2021-10-26 PROCEDURE — 3078F PR MOST RECENT DIASTOLIC BLOOD PRESSURE < 80 MM HG: ICD-10-PCS | Mod: HCNC,CPTII,S$GLB, | Performed by: PHYSICIAN ASSISTANT

## 2021-10-26 PROCEDURE — 99214 OFFICE O/P EST MOD 30 MIN: CPT | Mod: HCNC,S$GLB,, | Performed by: PHYSICIAN ASSISTANT

## 2021-10-26 PROCEDURE — 3075F PR MOST RECENT SYSTOLIC BLOOD PRESS GE 130-139MM HG: ICD-10-PCS | Mod: HCNC,CPTII,S$GLB, | Performed by: PHYSICIAN ASSISTANT

## 2021-10-26 PROCEDURE — 99499 UNLISTED E&M SERVICE: CPT | Mod: HCNC,S$GLB,, | Performed by: PHYSICIAN ASSISTANT

## 2021-10-26 RX ORDER — ALBUTEROL SULFATE 90 UG/1
2 AEROSOL, METERED RESPIRATORY (INHALATION) EVERY 6 HOURS PRN
Qty: 18 G | Refills: 3 | Status: SHIPPED | OUTPATIENT
Start: 2021-10-26 | End: 2023-02-08 | Stop reason: SDUPTHER

## 2021-10-27 ENCOUNTER — PES CALL (OUTPATIENT)
Dept: ADMINISTRATIVE | Facility: CLINIC | Age: 76
End: 2021-10-27
Payer: MEDICARE

## 2021-10-28 ENCOUNTER — PATIENT MESSAGE (OUTPATIENT)
Dept: DERMATOLOGY | Facility: CLINIC | Age: 76
End: 2021-10-28
Payer: MEDICARE

## 2021-10-28 DIAGNOSIS — D48.5 NEOPLASM OF UNCERTAIN BEHAVIOR OF SKIN: Primary | ICD-10-CM

## 2021-10-31 ENCOUNTER — EXTERNAL CHRONIC CARE MANAGEMENT (OUTPATIENT)
Dept: PRIMARY CARE CLINIC | Facility: CLINIC | Age: 76
End: 2021-10-31
Payer: MEDICARE

## 2021-10-31 PROCEDURE — 99439 PR CHRONIC CARE MGMT, EA ADDTL 20 MIN: ICD-10-PCS | Mod: S$GLB,,, | Performed by: INTERNAL MEDICINE

## 2021-10-31 PROCEDURE — 99490 CHRNC CARE MGMT STAFF 1ST 20: CPT | Mod: S$GLB,,, | Performed by: INTERNAL MEDICINE

## 2021-10-31 PROCEDURE — 99439 CHRNC CARE MGMT STAF EA ADDL: CPT | Mod: S$GLB,,, | Performed by: INTERNAL MEDICINE

## 2021-10-31 PROCEDURE — 99490 PR CHRONIC CARE MGMT, 1ST 20 MIN: ICD-10-PCS | Mod: S$GLB,,, | Performed by: INTERNAL MEDICINE

## 2021-11-07 ENCOUNTER — HOSPITAL ENCOUNTER (EMERGENCY)
Facility: HOSPITAL | Age: 76
Discharge: HOME | End: 2021-11-07
Attending: EMERGENCY MEDICINE
Payer: MEDICARE

## 2021-11-07 VITALS
HEART RATE: 67 BPM | RESPIRATION RATE: 20 BRPM | WEIGHT: 187.38 LBS | DIASTOLIC BLOOD PRESSURE: 91 MMHG | SYSTOLIC BLOOD PRESSURE: 178 MMHG | BODY MASS INDEX: 30.25 KG/M2 | TEMPERATURE: 98 F | OXYGEN SATURATION: 95 %

## 2021-11-07 DIAGNOSIS — M25.532 LEFT WRIST PAIN: ICD-10-CM

## 2021-11-07 PROCEDURE — 29125 APPL SHORT ARM SPLINT STATIC: CPT | Mod: HCNC,LT

## 2021-11-07 PROCEDURE — 99283 EMERGENCY DEPT VISIT LOW MDM: CPT | Mod: 25,HCNC

## 2021-11-08 ENCOUNTER — TELEPHONE (OUTPATIENT)
Dept: INTERNAL MEDICINE | Facility: CLINIC | Age: 76
End: 2021-11-08
Payer: MEDICARE

## 2021-11-10 ENCOUNTER — TELEPHONE (OUTPATIENT)
Dept: INTERNAL MEDICINE | Facility: CLINIC | Age: 76
End: 2021-11-10
Payer: MEDICARE

## 2021-11-30 ENCOUNTER — HOSPITAL ENCOUNTER (EMERGENCY)
Facility: HOSPITAL | Age: 76
Discharge: HOME | End: 2021-11-30
Attending: EMERGENCY MEDICINE
Payer: MEDICARE

## 2021-11-30 ENCOUNTER — EXTERNAL CHRONIC CARE MANAGEMENT (OUTPATIENT)
Dept: PRIMARY CARE CLINIC | Facility: CLINIC | Age: 76
End: 2021-11-30
Payer: MEDICARE

## 2021-11-30 VITALS
TEMPERATURE: 98 F | BODY MASS INDEX: 31.31 KG/M2 | WEIGHT: 194 LBS | RESPIRATION RATE: 18 BRPM | OXYGEN SATURATION: 97 % | DIASTOLIC BLOOD PRESSURE: 86 MMHG | HEART RATE: 62 BPM | SYSTOLIC BLOOD PRESSURE: 182 MMHG

## 2021-11-30 DIAGNOSIS — M79.641 RIGHT HAND PAIN: Primary | ICD-10-CM

## 2021-11-30 DIAGNOSIS — I16.0 HYPERTENSIVE URGENCY: ICD-10-CM

## 2021-11-30 DIAGNOSIS — M79.642 LEFT HAND PAIN: Primary | ICD-10-CM

## 2021-11-30 DIAGNOSIS — M79.89 SWELLING OF LEFT HAND: ICD-10-CM

## 2021-11-30 PROCEDURE — 99490 PR CHRONIC CARE MGMT, 1ST 20 MIN: ICD-10-PCS | Mod: S$GLB,,, | Performed by: INTERNAL MEDICINE

## 2021-11-30 PROCEDURE — 99283 EMERGENCY DEPT VISIT LOW MDM: CPT | Mod: 25

## 2021-11-30 PROCEDURE — 25000003 PHARM REV CODE 250: Mod: HCNC | Performed by: EMERGENCY MEDICINE

## 2021-11-30 PROCEDURE — 29125 APPL SHORT ARM SPLINT STATIC: CPT | Mod: LT

## 2021-11-30 PROCEDURE — 99490 CHRNC CARE MGMT STAFF 1ST 20: CPT | Mod: S$GLB,,, | Performed by: INTERNAL MEDICINE

## 2021-11-30 RX ORDER — MELOXICAM 7.5 MG/1
7.5 TABLET ORAL DAILY
Status: DISCONTINUED | OUTPATIENT
Start: 2021-11-30 | End: 2021-11-30 | Stop reason: HOSPADM

## 2021-11-30 RX ORDER — AMLODIPINE BESYLATE 5 MG/1
10 TABLET ORAL
Status: COMPLETED | OUTPATIENT
Start: 2021-11-30 | End: 2021-11-30

## 2021-11-30 RX ORDER — OXYCODONE AND ACETAMINOPHEN 10; 325 MG/1; MG/1
1 TABLET ORAL
Status: DISCONTINUED | OUTPATIENT
Start: 2021-11-30 | End: 2021-11-30 | Stop reason: HOSPADM

## 2021-11-30 RX ADMIN — AMLODIPINE BESYLATE 10 MG: 5 TABLET ORAL at 09:11

## 2021-12-03 ENCOUNTER — OFFICE VISIT (OUTPATIENT)
Dept: INTERNAL MEDICINE | Facility: CLINIC | Age: 76
End: 2021-12-03
Payer: MEDICARE

## 2021-12-03 ENCOUNTER — TELEPHONE (OUTPATIENT)
Dept: CARDIOLOGY | Facility: CLINIC | Age: 76
End: 2021-12-03
Payer: MEDICARE

## 2021-12-03 VITALS
HEIGHT: 66 IN | SYSTOLIC BLOOD PRESSURE: 158 MMHG | DIASTOLIC BLOOD PRESSURE: 78 MMHG | TEMPERATURE: 99 F | OXYGEN SATURATION: 95 % | BODY MASS INDEX: 31.15 KG/M2 | WEIGHT: 193.81 LBS | HEART RATE: 81 BPM

## 2021-12-03 DIAGNOSIS — I10 HYPERTENSION GOAL BP (BLOOD PRESSURE) < 140/90: Chronic | ICD-10-CM

## 2021-12-03 DIAGNOSIS — E87.6 HYPOKALEMIA: ICD-10-CM

## 2021-12-03 DIAGNOSIS — M79.89 SWELLING OF LEFT HAND: ICD-10-CM

## 2021-12-03 DIAGNOSIS — M79.642 LEFT HAND PAIN: Primary | ICD-10-CM

## 2021-12-03 PROCEDURE — 99214 OFFICE O/P EST MOD 30 MIN: CPT | Mod: S$GLB,,, | Performed by: PHYSICIAN ASSISTANT

## 2021-12-03 PROCEDURE — 99999 PR PBB SHADOW E&M-EST. PATIENT-LVL IV: CPT | Mod: PBBFAC,,, | Performed by: PHYSICIAN ASSISTANT

## 2021-12-03 PROCEDURE — 99214 PR OFFICE/OUTPT VISIT, EST, LEVL IV, 30-39 MIN: ICD-10-PCS | Mod: S$GLB,,, | Performed by: PHYSICIAN ASSISTANT

## 2021-12-03 PROCEDURE — 99999 PR PBB SHADOW E&M-EST. PATIENT-LVL IV: ICD-10-PCS | Mod: PBBFAC,,, | Performed by: PHYSICIAN ASSISTANT

## 2021-12-06 ENCOUNTER — LAB VISIT (OUTPATIENT)
Dept: LAB | Facility: HOSPITAL | Age: 76
End: 2021-12-06
Attending: PHYSICIAN ASSISTANT
Payer: MEDICARE

## 2021-12-06 ENCOUNTER — IMMUNIZATION (OUTPATIENT)
Dept: PRIMARY CARE CLINIC | Facility: CLINIC | Age: 76
End: 2021-12-06
Payer: MEDICARE

## 2021-12-06 DIAGNOSIS — I65.29 STENOSIS OF CAROTID ARTERY, UNSPECIFIED LATERALITY: ICD-10-CM

## 2021-12-06 DIAGNOSIS — Z23 NEED FOR VACCINATION: Primary | ICD-10-CM

## 2021-12-06 DIAGNOSIS — E78.5 HYPERLIPIDEMIA LDL GOAL <70: Chronic | ICD-10-CM

## 2021-12-06 DIAGNOSIS — I10 ACCELERATED HYPERTENSION: ICD-10-CM

## 2021-12-06 DIAGNOSIS — J44.9 CHRONIC OBSTRUCTIVE PULMONARY DISEASE, UNSPECIFIED COPD TYPE: ICD-10-CM

## 2021-12-06 DIAGNOSIS — I70.219 ATHEROSCLEROTIC PVD WITH INTERMITTENT CLAUDICATION: Chronic | ICD-10-CM

## 2021-12-06 DIAGNOSIS — I10 HYPERTENSION GOAL BP (BLOOD PRESSURE) < 140/90: Chronic | ICD-10-CM

## 2021-12-06 LAB
CHOLEST SERPL-MCNC: 121 MG/DL (ref 120–199)
CHOLEST/HDLC SERPL: 2.5 {RATIO} (ref 2–5)
HDLC SERPL-MCNC: 48 MG/DL (ref 40–75)
HDLC SERPL: 39.7 % (ref 20–50)
LDLC SERPL CALC-MCNC: 60.4 MG/DL (ref 63–159)
NONHDLC SERPL-MCNC: 73 MG/DL
TRIGL SERPL-MCNC: 63 MG/DL (ref 30–150)

## 2021-12-06 PROCEDURE — 80061 LIPID PANEL: CPT | Performed by: PHYSICIAN ASSISTANT

## 2021-12-06 PROCEDURE — 36415 COLL VENOUS BLD VENIPUNCTURE: CPT | Performed by: PHYSICIAN ASSISTANT

## 2021-12-06 PROCEDURE — 91300 COVID-19, MRNA, LNP-S, PF, 30 MCG/0.3 ML DOSE VACCINE: CPT | Mod: PBBFAC | Performed by: FAMILY MEDICINE

## 2021-12-06 PROCEDURE — 0003A COVID-19, MRNA, LNP-S, PF, 30 MCG/0.3 ML DOSE VACCINE: CPT | Mod: CV19,PBBFAC | Performed by: FAMILY MEDICINE

## 2021-12-07 ENCOUNTER — TELEPHONE (OUTPATIENT)
Dept: CARDIOLOGY | Facility: CLINIC | Age: 76
End: 2021-12-07
Payer: MEDICARE

## 2021-12-13 ENCOUNTER — HOSPITAL ENCOUNTER (OUTPATIENT)
Dept: CARDIOLOGY | Facility: HOSPITAL | Age: 76
Discharge: HOME | End: 2021-12-13
Attending: PHYSICIAN ASSISTANT
Payer: MEDICARE

## 2021-12-13 VITALS
SYSTOLIC BLOOD PRESSURE: 123 MMHG | WEIGHT: 193 LBS | DIASTOLIC BLOOD PRESSURE: 70 MMHG | HEIGHT: 66 IN | BODY MASS INDEX: 31.02 KG/M2 | SYSTOLIC BLOOD PRESSURE: 123 MMHG | WEIGHT: 193 LBS | BODY MASS INDEX: 31.02 KG/M2 | DIASTOLIC BLOOD PRESSURE: 70 MMHG | HEIGHT: 66 IN

## 2021-12-13 DIAGNOSIS — I10 HYPERTENSION GOAL BP (BLOOD PRESSURE) < 140/90: ICD-10-CM

## 2021-12-13 DIAGNOSIS — E78.5 HYPERLIPIDEMIA LDL GOAL <70: ICD-10-CM

## 2021-12-13 DIAGNOSIS — I70.219 ATHEROSCLEROTIC PVD WITH INTERMITTENT CLAUDICATION: ICD-10-CM

## 2021-12-13 DIAGNOSIS — I73.9 PERIPHERAL VASCULAR DISEASE: ICD-10-CM

## 2021-12-13 LAB
LEFT ABI: 0.7
LEFT ARM BP: 123 MMHG
LEFT DORSALIS PEDIS: 86 MMHG
LEFT POSTERIOR TIBIAL: 82 MMHG
LEFT TBI: 0.57
LEFT TOE PRESSURE: 70 MMHG
RIGHT ABI: 0.87
RIGHT ARM BP: 120 MMHG
RIGHT DORSALIS PEDIS: 102 MMHG
RIGHT POSTERIOR TIBIAL: 107 MMHG
RIGHT TBI: 0.71
RIGHT TOE PRESSURE: 87 MMHG

## 2021-12-13 PROCEDURE — 93922 UPR/L XTREMITY ART 2 LEVELS: CPT | Mod: 26,,, | Performed by: INTERNAL MEDICINE

## 2021-12-13 PROCEDURE — 93922 ANKLE BRACHIAL INDICES (ABI): ICD-10-PCS | Mod: 26,,, | Performed by: INTERNAL MEDICINE

## 2021-12-13 PROCEDURE — 93922 UPR/L XTREMITY ART 2 LEVELS: CPT

## 2021-12-13 PROCEDURE — 93925 CV US DOPPLER ARTERIAL LEGS BILATERAL (CUPID ONLY): ICD-10-PCS | Mod: 26,,, | Performed by: INTERNAL MEDICINE

## 2021-12-13 PROCEDURE — 93925 LOWER EXTREMITY STUDY: CPT | Mod: 26,,, | Performed by: INTERNAL MEDICINE

## 2021-12-13 PROCEDURE — 93925 LOWER EXTREMITY STUDY: CPT

## 2021-12-13 RX ORDER — AMLODIPINE BESYLATE 10 MG/1
10 TABLET ORAL DAILY
Qty: 90 TABLET | Refills: 3 | Status: SHIPPED | OUTPATIENT
Start: 2021-12-13 | End: 2023-05-03 | Stop reason: SDUPTHER

## 2021-12-13 RX ORDER — ATORVASTATIN CALCIUM 80 MG/1
80 TABLET, FILM COATED ORAL DAILY
Qty: 90 TABLET | Refills: 3 | Status: SHIPPED | OUTPATIENT
Start: 2021-12-13 | End: 2023-05-03 | Stop reason: SDUPTHER

## 2021-12-14 ENCOUNTER — OFFICE VISIT (OUTPATIENT)
Dept: ORTHOPEDICS | Facility: CLINIC | Age: 76
End: 2021-12-14
Payer: MEDICARE

## 2021-12-14 ENCOUNTER — PATIENT OUTREACH (OUTPATIENT)
Dept: ADMINISTRATIVE | Facility: OTHER | Age: 76
End: 2021-12-14
Payer: MEDICARE

## 2021-12-14 ENCOUNTER — HOSPITAL ENCOUNTER (OUTPATIENT)
Dept: RADIOLOGY | Facility: HOSPITAL | Age: 76
Discharge: HOME | End: 2021-12-14
Attending: ORTHOPAEDIC SURGERY
Payer: MEDICARE

## 2021-12-14 VITALS
SYSTOLIC BLOOD PRESSURE: 153 MMHG | WEIGHT: 193 LBS | BODY MASS INDEX: 31.02 KG/M2 | HEART RATE: 66 BPM | HEIGHT: 66 IN | DIASTOLIC BLOOD PRESSURE: 81 MMHG

## 2021-12-14 DIAGNOSIS — Z01.818 PREOP TESTING: Primary | ICD-10-CM

## 2021-12-14 DIAGNOSIS — L43.9 LICHEN PLANUS: Primary | ICD-10-CM

## 2021-12-14 DIAGNOSIS — M79.641 RIGHT HAND PAIN: ICD-10-CM

## 2021-12-14 PROCEDURE — 73130 X-RAY EXAM OF HAND: CPT | Mod: 26,50,, | Performed by: RADIOLOGY

## 2021-12-14 PROCEDURE — 99999 PR PBB SHADOW E&M-EST. PATIENT-LVL III: CPT | Mod: PBBFAC,,, | Performed by: ORTHOPAEDIC SURGERY

## 2021-12-14 PROCEDURE — 99203 PR OFFICE/OUTPT VISIT, NEW, LEVL III, 30-44 MIN: ICD-10-PCS | Mod: S$GLB,,, | Performed by: ORTHOPAEDIC SURGERY

## 2021-12-14 PROCEDURE — 99203 OFFICE O/P NEW LOW 30 MIN: CPT | Mod: S$GLB,,, | Performed by: ORTHOPAEDIC SURGERY

## 2021-12-14 PROCEDURE — 99999 PR PBB SHADOW E&M-EST. PATIENT-LVL III: ICD-10-PCS | Mod: PBBFAC,,, | Performed by: ORTHOPAEDIC SURGERY

## 2021-12-14 PROCEDURE — 73130 X-RAY EXAM OF HAND: CPT | Mod: TC,50

## 2021-12-14 PROCEDURE — 73130 XR HAND COMPLETE 3 VIEWS BILATERAL: ICD-10-PCS | Mod: 26,50,, | Performed by: RADIOLOGY

## 2021-12-15 DIAGNOSIS — L43.9 LICHEN PLANUS: ICD-10-CM

## 2021-12-15 DIAGNOSIS — M79.641 RIGHT HAND PAIN: Primary | ICD-10-CM

## 2021-12-15 LAB
LEFT ANT TIBIAL SYS PSV: 43 CM/S
LEFT CFA PSV: 210 CM/S
LEFT PERONEAL SYS PSV: 24 CM/S
LEFT POPLITEAL PSV: 39 CM/S
LEFT POST TIBIAL SYS PSV: 45 CM/S
LEFT PROFUNDA SYS PSV: 97 CM/S
LEFT SUPER FEMORAL DIST SYS PSV: 0 CM/S
LEFT SUPER FEMORAL MID SYS PSV: 0 CM/S
LEFT SUPER FEMORAL OSTIAL SYS PSV: 72 CM/S
LEFT SUPER FEMORAL PROX SYS PSV: 0 CM/S
LEFT TIB/PER TRUNK SYS PSV: 46 CM/S
OHS CV LEFT LOWER EXTREMITY ABI (NO CALC): 0.7
OHS CV LOWER EXTREMITY STENT MEASUREMENTS - LEFT - DSFA S1 - DIST: 0
OHS CV LOWER EXTREMITY STENT MEASUREMENTS - LEFT - DSFA S1 - MID: 0
OHS CV LOWER EXTREMITY STENT MEASUREMENTS - LEFT - DSFA S1 - OST: 0
OHS CV LOWER EXTREMITY STENT MEASUREMENTS - LEFT - DSFA S1 - PROX: 0
OHS CV RIGHT ABI LOWER EXTREMITY (NO CALC): 0.87
RIGHT ANT TIBIAL SYS PSV: 62 CM/S
RIGHT CFA PSV: 111 CM/S
RIGHT PERONEAL SYS PSV: 51 CM/S
RIGHT POPLITEAL PSV: 107 CM/S
RIGHT POST TIBIAL SYS PSV: 58 CM/S
RIGHT PROFUNDA SYS PSV: 252 CM/S
RIGHT SUPER FEMORAL DIST SYS PSV: 76 CM/S
RIGHT SUPER FEMORAL MID SYS PSV: 198 CM/S
RIGHT SUPER FEMORAL OSTIAL SYS PSV: 167 CM/S
RIGHT SUPER FEMORAL PROX SYS PSV: 231 CM/S
RIGHT TIB/PER TRUNK SYS PSV: 87 CM/S

## 2021-12-16 ENCOUNTER — OFFICE VISIT (OUTPATIENT)
Dept: CARDIOLOGY | Facility: CLINIC | Age: 76
End: 2021-12-16
Payer: MEDICARE

## 2021-12-16 VITALS
SYSTOLIC BLOOD PRESSURE: 158 MMHG | DIASTOLIC BLOOD PRESSURE: 86 MMHG | HEART RATE: 74 BPM | OXYGEN SATURATION: 94 % | HEIGHT: 66 IN | BODY MASS INDEX: 31 KG/M2 | WEIGHT: 192.88 LBS

## 2021-12-16 DIAGNOSIS — E78.5 HYPERLIPIDEMIA LDL GOAL <70: Chronic | ICD-10-CM

## 2021-12-16 DIAGNOSIS — L93.0 DISCOID LUPUS ERYTHEMATOSUS: Chronic | ICD-10-CM

## 2021-12-16 DIAGNOSIS — Z51.81 MEDICATION MONITORING ENCOUNTER: ICD-10-CM

## 2021-12-16 DIAGNOSIS — I70.219 ATHEROSCLEROTIC PVD WITH INTERMITTENT CLAUDICATION: Primary | Chronic | ICD-10-CM

## 2021-12-16 DIAGNOSIS — J44.9 CHRONIC OBSTRUCTIVE PULMONARY DISEASE, UNSPECIFIED COPD TYPE: ICD-10-CM

## 2021-12-16 DIAGNOSIS — R91.1 LUNG NODULE: ICD-10-CM

## 2021-12-16 DIAGNOSIS — I65.29 STENOSIS OF CAROTID ARTERY, UNSPECIFIED LATERALITY: ICD-10-CM

## 2021-12-16 DIAGNOSIS — I70.0 CALCIFICATION OF AORTA: ICD-10-CM

## 2021-12-16 DIAGNOSIS — Z95.1 HX OF CABG: Chronic | ICD-10-CM

## 2021-12-16 DIAGNOSIS — I27.9 PULMONARY HEART DISEASE: ICD-10-CM

## 2021-12-16 DIAGNOSIS — I10 HYPERTENSION GOAL BP (BLOOD PRESSURE) < 140/90: Chronic | ICD-10-CM

## 2021-12-16 DIAGNOSIS — F17.218 CIGARETTE NICOTINE DEPENDENCE WITH OTHER NICOTINE-INDUCED DISORDER: ICD-10-CM

## 2021-12-16 DIAGNOSIS — E03.9 HYPOTHYROIDISM (ACQUIRED): ICD-10-CM

## 2021-12-16 DIAGNOSIS — I10 ACCELERATED HYPERTENSION: ICD-10-CM

## 2021-12-16 DIAGNOSIS — I73.9 PERIPHERAL VASCULAR DISEASE: Chronic | ICD-10-CM

## 2021-12-16 PROCEDURE — 99999 PR PBB SHADOW E&M-EST. PATIENT-LVL III: CPT | Mod: PBBFAC,,, | Performed by: INTERNAL MEDICINE

## 2021-12-16 PROCEDURE — 99499 RISK ADDL DX/OHS AUDIT: ICD-10-PCS | Mod: HCNC,S$GLB,, | Performed by: INTERNAL MEDICINE

## 2021-12-16 PROCEDURE — 99499 UNLISTED E&M SERVICE: CPT | Mod: HCNC,S$GLB,, | Performed by: INTERNAL MEDICINE

## 2021-12-16 PROCEDURE — 99214 PR OFFICE/OUTPT VISIT, EST, LEVL IV, 30-39 MIN: ICD-10-PCS | Mod: S$GLB,,, | Performed by: INTERNAL MEDICINE

## 2021-12-16 PROCEDURE — 99214 OFFICE O/P EST MOD 30 MIN: CPT | Mod: S$GLB,,, | Performed by: INTERNAL MEDICINE

## 2021-12-16 PROCEDURE — 99999 PR PBB SHADOW E&M-EST. PATIENT-LVL III: ICD-10-PCS | Mod: PBBFAC,,, | Performed by: INTERNAL MEDICINE

## 2021-12-17 ENCOUNTER — TELEPHONE (OUTPATIENT)
Dept: ORTHOPEDICS | Facility: CLINIC | Age: 76
End: 2021-12-17
Payer: MEDICARE

## 2021-12-20 ENCOUNTER — TELEPHONE (OUTPATIENT)
Dept: ORTHOPEDICS | Facility: CLINIC | Age: 76
End: 2021-12-20
Payer: MEDICARE

## 2021-12-30 NOTE — HPI
Flory Cam is a 75 year old female who presented to [facility]- due to generalized headache PTA with associated nonproductive cough. Her current medical conditions include CAD s/p CABG, HTN, HLP, COPD, PVD, Carotid artery stenosis, RA, Lupus, Smoker. ED workup revealed /87 which improved to 167/78 after  Clonidine 0.1 mg with resolution of headache. EKG revealed new T wave inversions and patient was subsequently placed on IV heparin gtt. She was placed in observation under the care of hospital medicine. Cardiology consulted to assist with medical management. Chart reviewed, patient seen and examined. No chest pain or anginal equivalents. NO shortness of breath, LAURENT or palpitations. NO leg swelling or claudications. Echo pending. Further recs to follow.    Equal and normal pulses (carotid, femoral, dorsalis pedis)

## 2021-12-31 ENCOUNTER — EXTERNAL CHRONIC CARE MANAGEMENT (OUTPATIENT)
Dept: PRIMARY CARE CLINIC | Facility: CLINIC | Age: 76
End: 2021-12-31
Payer: MEDICARE

## 2021-12-31 PROCEDURE — 99490 CHRNC CARE MGMT STAFF 1ST 20: CPT | Mod: S$GLB,,, | Performed by: INTERNAL MEDICINE

## 2021-12-31 PROCEDURE — 99490 PR CHRONIC CARE MGMT, 1ST 20 MIN: ICD-10-PCS | Mod: S$GLB,,, | Performed by: INTERNAL MEDICINE

## 2022-01-05 ENCOUNTER — TELEPHONE (OUTPATIENT)
Dept: ORTHOPEDICS | Facility: CLINIC | Age: 77
End: 2022-01-05
Payer: MEDICARE

## 2022-01-05 NOTE — TELEPHONE ENCOUNTER
Spoke to the patient an let her know that her surgery is on 2/11/2022 an that they will call her the day before after 2:00PM to let her know what time patient verbalized understanding         ----- Message from Lona Villafuerte sent at 1/5/2022 10:53 AM CST -----  Pt is requesting a call back in regards to an upcoming procedure. Pt can be reached at [phone] ([email or web address])

## 2022-01-15 ENCOUNTER — PATIENT OUTREACH (OUTPATIENT)
Dept: ADMINISTRATIVE | Facility: OTHER | Age: 77
End: 2022-01-15
Payer: MEDICARE

## 2022-01-20 ENCOUNTER — TELEPHONE (OUTPATIENT)
Dept: ORTHOPEDICS | Facility: CLINIC | Age: 77
End: 2022-01-20
Payer: MEDICARE

## 2022-01-24 ENCOUNTER — TELEPHONE (OUTPATIENT)
Dept: ORTHOPEDICS | Facility: CLINIC | Age: 77
End: 2022-01-24
Payer: MEDICARE

## 2022-01-24 NOTE — TELEPHONE ENCOUNTER
Spoke to patient. Patient states she has pain in her left hand due to her arthritis. I explained that she may take tylenol arthritis. Patient states she is unable to take tylenol because it upsets her stomach. Patient was also advised to rest, ice and elevate for swelling. Pt was also rescheduled for all preop testing. Pt verbalized understanding.

## 2022-01-31 ENCOUNTER — EXTERNAL CHRONIC CARE MANAGEMENT (OUTPATIENT)
Dept: PRIMARY CARE CLINIC | Facility: CLINIC | Age: 77
End: 2022-01-31
Payer: MEDICARE

## 2022-01-31 PROCEDURE — 99439 CHRNC CARE MGMT STAF EA ADDL: CPT | Mod: S$GLB,,, | Performed by: INTERNAL MEDICINE

## 2022-01-31 PROCEDURE — 99439 PR CHRONIC CARE MGMT, EA ADDTL 20 MIN: ICD-10-PCS | Mod: S$GLB,,, | Performed by: INTERNAL MEDICINE

## 2022-01-31 PROCEDURE — 99490 PR CHRONIC CARE MGMT, 1ST 20 MIN: ICD-10-PCS | Mod: S$GLB,,, | Performed by: INTERNAL MEDICINE

## 2022-01-31 PROCEDURE — 99490 CHRNC CARE MGMT STAFF 1ST 20: CPT | Mod: S$GLB,,, | Performed by: INTERNAL MEDICINE

## 2022-02-01 ENCOUNTER — HOSPITAL ENCOUNTER (OUTPATIENT)
Dept: RADIOLOGY | Facility: HOSPITAL | Age: 77
Discharge: HOME | End: 2022-02-01
Attending: ORTHOPAEDIC SURGERY
Payer: MEDICARE

## 2022-02-01 ENCOUNTER — HOSPITAL ENCOUNTER (OUTPATIENT)
Dept: CARDIOLOGY | Facility: HOSPITAL | Age: 77
Discharge: HOME | End: 2022-02-01
Attending: ORTHOPAEDIC SURGERY
Payer: MEDICARE

## 2022-02-01 DIAGNOSIS — Z01.818 PREOP TESTING: ICD-10-CM

## 2022-02-01 PROCEDURE — 71046 X-RAY EXAM CHEST 2 VIEWS: CPT | Mod: TC,HCNC

## 2022-02-01 PROCEDURE — 93005 ELECTROCARDIOGRAM TRACING: CPT | Mod: HCNC

## 2022-02-01 PROCEDURE — 71046 XR CHEST PA AND LATERAL PRE-OP: ICD-10-PCS | Mod: 26,HCNC,, | Performed by: RADIOLOGY

## 2022-02-01 PROCEDURE — 93010 EKG 12-LEAD: ICD-10-PCS | Mod: HCNC,,, | Performed by: INTERNAL MEDICINE

## 2022-02-01 PROCEDURE — 71046 X-RAY EXAM CHEST 2 VIEWS: CPT | Mod: 26,HCNC,, | Performed by: RADIOLOGY

## 2022-02-01 PROCEDURE — 93010 ELECTROCARDIOGRAM REPORT: CPT | Mod: HCNC,,, | Performed by: INTERNAL MEDICINE

## 2022-02-04 ENCOUNTER — TELEPHONE (OUTPATIENT)
Dept: ORTHOPEDICS | Facility: CLINIC | Age: 77
End: 2022-02-04
Payer: MEDICARE

## 2022-02-04 NOTE — TELEPHONE ENCOUNTER
Spoke to the patient an confirmed her Covid testing date.     ----- Message from Astrid Londono sent at 2/4/2022  3:25 PM CST -----  Contact: Flory [phone]  Patient is returning a phone call.    Who left a message for the patient: Staff    Does patient know what this is regarding:  Yes    Would you like a call back, or a response through your [facility] portal?:   Call back     Comments:

## 2022-02-07 NOTE — H&P
Subjective:     Patient ID: Flory Cam is a 76 y.o. female.    Chief Complaint: No chief complaint on file.      HPI:  The patient is a 76-year-old female who has had multiple right hand surgeries over the last 6 years at least.  She has seen multiple physicians and currently has a diagnosis of lichen planus right hand with a history of discoid lupus rheumatoid arthritis and squamous cell carcinoma.  She has a multiple lesions on her right palm and 1 on the thenar eminence that measures about 3 x 2 cm.  This is her main complaint and she wishes to have this mass excised.  Review of her last dermatology note notes that she has had chemotherapy as well.  She says she has had previous surgeries by Dr. Mathis.  She said she had [facility].  We have requested these records.    Past Medical History:   Diagnosis Date    Acute coronary syndrome     Anxiety     Bilateral carotid artery stenosis 11/17/2015    Chronic pain     Colon polyp     Coronary artery disease     GERD (gastroesophageal reflux disease)     Hyperlipidemia     Hypertension     Hypothyroidism     Low back pain     Lupus     Osteoporosis     Poor circulation     Pre-operative clearance 7/12/2019    PVD (peripheral vascular disease)     S/P peripheral artery angioplasty 02/13/2014    SCCA (squamous cell carcinoma) of skin 10/2019    Dr. ZANDRA Rivas--oncology    Skin disease      Past Surgical History:   Procedure Laterality Date    AORTOGRAPHY WITH SERIALOGRAPHY N/A 5/25/2021    Procedure: AORTOGRAM, WITH RUNOFF;  Surgeon: Christopher Cohen MD;  [facility] LAB;  Service: Cardiology;  Laterality: N/A;  COVID-19, mRNA, LNP-S, PF, 30 mcg/0.3 mL dose (COVID-19, MRNA, LN-S, PF (Pfizer)) 1/30/2021, 1/9/2021    Atherosclerotic PVD with intermittent claudication Bilateral 05/2021    Dr. Cohen    CATARACT EXTRACTION      COLONOSCOPY N/A 1/4/2017    Procedure: COLONOSCOPY;  Surgeon: Sylvester Arboleda III,  MD;  Location: [facility] ENDO;  Service: Endoscopy;  Laterality: N/A;    COLONOSCOPY N/A 8/4/2020    Procedure: COLONOSCOPY;  Surgeon: Sylvester Arboleda III, MD;  Location: [facility] ENDO;  Service: Endoscopy;  Laterality: N/A;    CORONARY ANGIOPLASTY      CORONARY ARTERY BYPASS GRAFT      HYSTERECTOMY      OOPHORECTOMY      THYROIDECTOMY       Family History   Problem Relation Age of Onset    Hypertension Mother     Stroke Mother     Lung cancer Daughter     Melanoma Neg Hx     Psoriasis Neg Hx     Lupus Neg Hx     Eczema Neg Hx      Social History     Socioeconomic History    Marital status:     Number of children: 3   Occupational History    Occupation: Retired     Comment: Dispatcher   Tobacco Use    Smoking status: Current Every Day Smoker     Packs/day: 0.25     Years: 40.00     Pack years: 10.00     Types: Cigarettes     Start date: 1961    Smokeless tobacco: Never Used    Tobacco comment: entered smoking cessation program 1/13/20    Substance and Sexual Activity    Alcohol use: No    Drug use: No    Sexual activity: Yes   Other Topics Concern    Are you pregnant or think you may be? No    Breast-feeding No   Social History Narrative    Patient is retired dispatcher.     Social Determinants of Health     Financial Resource Strain: Low Risk     Difficulty of Paying Living Expenses: Not hard at all   Food Insecurity: No Food Insecurity    Worried About Running Out of Food in the Last Year: Never true    Ran Out of Food in the Last Year: Never true   Transportation Needs: Unmet Transportation Needs    Lack of Transportation (Medical): Yes    Lack of Transportation (Non-Medical): Yes   Physical Activity: Insufficiently Active    Days of Exercise per Week: 2 days    Minutes of Exercise per Session: 10 min   Stress: Stress Concern Present    Feeling of Stress : To some extent   Social Connections: Socially Isolated    Frequency of Communication with Friends and Family: More than three  times a week    Frequency of Social Gatherings with Friends and Family: Twice a week    Attends Baptism Services: Never    Active Member of Clubs or Organizations: No    Attends Club or Organization Meetings: Never    Marital Status:    Housing Stability: Low Risk     Unable to Pay for Housing in the Last Year: No    Number of Places Lived in the Last Year: 1    Unstable Housing in the Last Year: No     Medication List with Changes/Refills   Current Medications    ALBUTEROL (PROVENTIL/VENTOLIN HFA) 90 MCG/ACTUATION INHALER    Inhale 2 puffs into the lungs every 6 (six) hours as needed for Wheezing.    AMLODIPINE (NORVASC) 10 MG TABLET    Take 1 tablet (10 mg total) by mouth once daily.    ASPIRIN (ECOTRIN) 81 MG EC TABLET    Take 1 tablet (81 mg total) by mouth once daily.    ATORVASTATIN (LIPITOR) 80 MG TABLET    Take 1 tablet (80 mg total) by mouth once daily.    CITALOPRAM (CELEXA) 10 MG TABLET    Take 10 mg by mouth once daily.    CLOPIDOGREL (PLAVIX) 75 MG TABLET    TAKE 1 TABLET(75 MG) BY MOUTH EVERY DAY    EZETIMIBE (ZETIA) 10 MG TABLET    TAKE 1 TABLET(10 MG) BY MOUTH EVERY DAY    LEVOTHYROXINE (SYNTHROID) 137 MCG TAB TABLET    TAKE 1 TABLET BY MOUTH EVERY DAY    ONDANSETRON (ZOFRAN) 4 MG TABLET    TAKE 1 TABLET(4 MG) BY MOUTH EVERY 8 HOURS AS NEEDED FOR NAUSEA    OXYCODONE-ACETAMINOPHEN (PERCOCET)  MG PER TABLET        PROCHLORPERAZINE (COMPAZINE) 10 MG TABLET        TRAZODONE (DESYREL) 50 MG TABLET    TAKE 1 TABLET BY MOUTH EVERY DAY AT NIGHT AS NEEDED FOR INSOMNIA    VALSARTAN (DIOVAN) 320 MG TABLET    TAKE 1 TABLET(320 MG) BY MOUTH EVERY DAY     Review of patient's allergies indicates:  No Known Allergies  Review of Systems   Constitutional: Negative for malaise/fatigue.   HENT: Negative for hearing loss.    Eyes: Negative for double vision and visual disturbance.   Cardiovascular: Positive for chest pain.   Respiratory: Negative for shortness of breath.    Endocrine: Negative for  cold intolerance.   Hematologic/Lymphatic: Does not bruise/bleed easily.   Skin: Negative for poor wound healing and suspicious lesions.   Musculoskeletal: Positive for arthritis, joint pain and joint swelling. Negative for gout.   Gastrointestinal: Negative for nausea and vomiting.   Genitourinary: Negative for dysuria.   Neurological: Positive for focal weakness. Negative for numbness, paresthesias and sensory change.   Psychiatric/Behavioral: Positive for substance abuse. Negative for depression and memory loss. The patient is not nervous/anxious.    Allergic/Immunologic: Negative for persistent infections.       Objective:   There is no height or weight on file to calculate BMI.  There were no vitals filed for this visit.             General    Constitutional: She is oriented to person, place, and time. She appears well-developed and well-nourished. No distress.   HENT:   Head: Normocephalic.   Mouth/Throat: Oropharynx is clear and moist.   Eyes: EOM are normal.   Cardiovascular: Normal rate.    Pulmonary/Chest: Effort normal.   Abdominal: Soft.   Neurological: She is alert and oriented to person, place, and time. No cranial nerve deficit.   Psychiatric: She has a normal mood and affect.             Right Hand/Wrist Exam     Inspection   Scars: Wrist - present Hand -  present  Effusion: Wrist - present Hand -  present    Tenderness   The patient is tender to palpation of the guidry area.    Other     Neuorologic Exam    Median Distribution: normal  Ulnar Distribution: normal  Radial Distribution: normal    Comments:  The patient has small lesions right palm and a larger 3 x 2 cm hyperkeratotic mass on the thenar eminence.  This is quite bothersome to her.  She wishes to have this mass excised          Vascular Exam       Capillary Refill  Right Hand: normal capillary refill      Relevant imaging results reviewed and interpreted by me, discussed with the patient and / or family today radiographs both hands and  wrist were reviewed which showed some degenerative lesions but no lytic lesions.  She does have cystic change in the left lunate  Assessment:     Encounter Diagnosis   Name Primary?    Lichen planus Yes    Right hand    Plan:     The patient does wish to have this thenar eminence skin lesion excised and a Z-plasty skin flap for coverage.  Risk complications and alternatives were discussed including the risk of infection, anesthetic risk, injury to nerves and vessels, loss of motion, and possible need for additional surgeries were discussed.  She seems to understand and agree that surgery.  All questions were answered.  Path report from 2015 showed lichen planus.                Disclaimer: This note was prepared using a voice recognition system and is likely to have sound alike errors within the text.

## 2022-02-08 ENCOUNTER — LAB VISIT (OUTPATIENT)
Dept: PRIMARY CARE CLINIC | Facility: CLINIC | Age: 77
End: 2022-02-08
Payer: MEDICARE

## 2022-02-08 DIAGNOSIS — Z01.818 PRE-OP TESTING: ICD-10-CM

## 2022-02-08 PROCEDURE — U0005 INFEC AGEN DETEC AMPLI PROBE: HCPCS | Performed by: ORTHOPAEDIC SURGERY

## 2022-02-08 PROCEDURE — U0003 INFECTIOUS AGENT DETECTION BY NUCLEIC ACID (DNA OR RNA); SEVERE ACUTE RESPIRATORY SYNDROME CORONAVIRUS 2 (SARS-COV-2) (CORONAVIRUS DISEASE [COVID-19]), AMPLIFIED PROBE TECHNIQUE, MAKING USE OF HIGH THROUGHPUT TECHNOLOGIES AS DESCRIBED BY CMS-2020-01-R: HCPCS | Mod: HCNC | Performed by: ORTHOPAEDIC SURGERY

## 2022-02-09 ENCOUNTER — PATIENT OUTREACH (OUTPATIENT)
Dept: ADMINISTRATIVE | Facility: HOSPITAL | Age: 77
End: 2022-02-09
Payer: MEDICARE

## 2022-02-09 LAB
SARS-COV-2 RNA RESP QL NAA+PROBE: NOT DETECTED
SARS-COV-2- CYCLE NUMBER: NORMAL

## 2022-02-09 NOTE — PRE ADMISSION SCREENING
Pre op instructions reviewed with patient per phone:    To confirm, Your surgeon has instructed you:  Surgery is scheduled 22at 08:25am.      [facility] main lobby by 07:00am.   Pre admit office to call afternoon prior to surgery with final arrival time    Covid 19 testin/8 in process      INSTRUCTIONS IMPORTANT!!!  ¨ Do not eat, drink, or smoke after 12 midnight prior to surgery, including water. OK to brush teeth, no gum, candy or mints!    ¨ Take only these medicines with a small swallow of water-morning of surgery.  Amlodipine  Levothyroxine    -------   Do not shave legs/underarms  today prior to surgery  ____   1 visitor is  allowed in the pre operative area, no one under the age of 16,and must adhere to social distancing guidelines.  1 visitor/family member is allowed to              visit non covid  in-patients in their room        ____   Family/caregivers will be updated re pt status via text/cell phone      ____  Do not wear makeup, including mascara.  ____  No powder, lotions or creams to surgical area.  ____  Please remove all jewelry, including piercings and leave at home.  ____  No money or valuables needed. Please leave at home.  ____  Please bring identification and insurance information to hospital.  ____  If going home the same day, arrange for a ride home. You will not be able to   drive if Anesthesia was used.  ____  Children, under 12 years old, must remain in the waiting room with an adult.  They are not allowed in patient areas.  ____  Wear loose fitting clothing. Allow for dressings, bandages.  ____  Stop Aspirin, Ibuprofen, Motrin and Aleve at least 5-7 days before surgery, unless otherwise instructed by your doctor, or the nurse.   You MAY use Tylenol/acetaminophen until day of surgery.  ____  If you take diabetic medication, do not take am of surgery unless instructed by   Doctor.  ____ Stop taking any Fish Oil supplement or any  Vitamins that contain Vitamin E at least 5 days prior to surgery.          Bathing Instructions-- The night before surgery and the morning prior to coming to the hospital: NOTE: DUE TO MEDICAL CONDITION PT CAN ONLY USE DIAL CONDITION.   -Do not shave the surgical area.   -Shower and wash your hair and body as usual with anti-bacterial soap and shampoo.   -Rinse your hair and body completely.   -Rinse your body thoroughly.   -Dry with clean, soft towel.  Do not use lotion, cream, deodorant, or powders on   the surgical site.       Surgical Site Infection    Prevention of surgical site infections:     -Keep incisions clean and dry.   -Do not soak/submerge incisions in water until completely healed.   -Do not apply lotions, powders, creams, or deodorants to site.   -Always make sure hands are cleaned with antibacterial soap/ alcohol-based   prior to touching the surgical site.  (This includes doctors, nurses, staff, and yourself.)    Signs and symptoms:   -Redness and pain around the area where you had surgery   -Drainage of cloudy fluid from your surgical wound   -Fever over 100.4  I have read or had read and explained to me, and understand the above information.

## 2022-02-09 NOTE — PROGRESS NOTES
Working HTN Report.    Spoke with Ms Lidia Cam (granddaughter) said pt is not in. Requested lm for updated bp reading. States she does not have a machine to check it.  Nurse BP check scheduled, 2/15/22.

## 2022-02-10 ENCOUNTER — TELEPHONE (OUTPATIENT)
Dept: PREADMISSION TESTING | Facility: HOSPITAL | Age: 77
End: 2022-02-10
Payer: MEDICARE

## 2022-02-10 ENCOUNTER — ANESTHESIA EVENT (OUTPATIENT)
Dept: SURGERY | Facility: HOSPITAL | Age: 77
End: 2022-02-10
Payer: MEDICARE

## 2022-02-10 NOTE — ANESTHESIA PREPROCEDURE EVALUATION
02/10/2022  Flory Cam is a 76 y.o., female.    Past Medical History:  Acute coronary syndrome  Anxiety  11/17/2015: Bilateral carotid artery stenosis  Chronic pain  Coronary artery disease  GERD (gastroesophageal reflux disease)  Hyperlipidemia  Hypertension  Hypothyroidism  Low back pain  Lupus  Osteoporosis  Poor circulation  PVD (peripheral vascular disease)  02/13/2014: S/P peripheral artery angioplasty  Skin disease    Pre-op Assessment    I have reviewed the Patient Summary Reports.     I have reviewed the Nursing Notes.    I have reviewed the Medications.     Review of Systems  Anesthesia Hx:  No problems with previous Anesthesia  Denies Family Hx of Anesthesia complications.   Denies Personal Hx of Anesthesia complications.   Social:  Smoker, No Alcohol Use 1 ppd for for over 50 year.   Hematology/Oncology:  Hematology Normal   Oncology Normal     Cardiovascular:   Exercise tolerance: good Hypertension, well controlled CAD asymptomatic CABG/stent  PVD hyperlipidemia Carotid disease   Pulmonary:   Shortness of breath Clear productive cough.  snore   Renal/:  Renal/ Normal     Hepatic/GI:   Bowel Prep. PUD, GERD, well controlled 0430 last drink of fluid.   Musculoskeletal:   Arthritis  Osteoporosis  Left foot abscess   Neurological:  Neurology Normal    Endocrine:   Hypothyroidism Pre diabetes   Dermatological:  Skin Normal Discoid lupus   Psych:  Psychiatric Normal           Physical Exam  General:  Morbid Obesity    Airway/Jaw/Neck:  Airway Findings: Mouth Opening: Normal General Airway Assessment: Adult  Mallampati: III       Chest/Lungs:  Chest/Lungs Findings: Expiratory Wheezes, Mild     Heart/Vascular:  Heart Findings: Rate: Normal  Rhythm: Regular Rhythm             Anesthesia Plan  Type of Anesthesia, risks & benefits discussed:  Anesthesia Type:  general    Patient's Preference:    Plan Factors:          Intra-op Monitoring Plan:   Intra-op Monitoring Plan Comments:   Post Op Pain Control Plan:   Post Op Pain Control Plan Comments:     Induction:   IV  Beta Blocker:  Patient is not currently on a Beta-Blocker (No further documentation required).       Informed Consent: Patient understands risks and agrees with Anesthesia plan.  Questions answered. Anesthesia consent signed with patient.  ASA Score: 3     Day of Surgery Review of History & Physical: I have interviewed and examined the patient. I have reviewed the patient's H&P dated: 4/2/17.           Ready For Surgery From Anesthesia Perspective.       Lab Results   Component Value Date    WBC 9.48 02/01/2022    HGB 14.4 02/01/2022    HCT 44.5 02/01/2022    MCV 94 02/01/2022     02/01/2022

## 2022-02-11 ENCOUNTER — HOSPITAL ENCOUNTER (OUTPATIENT)
Facility: HOSPITAL | Age: 77
Discharge: HOME | End: 2022-02-11
Attending: ORTHOPAEDIC SURGERY | Admitting: ORTHOPAEDIC SURGERY
Payer: MEDICARE

## 2022-02-11 ENCOUNTER — ANESTHESIA (OUTPATIENT)
Dept: SURGERY | Facility: HOSPITAL | Age: 77
End: 2022-02-11
Payer: MEDICARE

## 2022-02-11 DIAGNOSIS — L43.9 LICHEN PLANUS: Primary | ICD-10-CM

## 2022-02-11 DIAGNOSIS — Z98.890 HX OF EXCISION OF MASS: ICD-10-CM

## 2022-02-11 DIAGNOSIS — R00.1 BRADYCARDIA: ICD-10-CM

## 2022-02-11 DIAGNOSIS — Z98.890 H/O EXCISION OF MASS: ICD-10-CM

## 2022-02-11 PROCEDURE — 36000706: Mod: HCNC | Performed by: ORTHOPAEDIC SURGERY

## 2022-02-11 PROCEDURE — 71000039 HC RECOVERY, EACH ADD'L HOUR: Mod: HCNC | Performed by: ORTHOPAEDIC SURGERY

## 2022-02-11 PROCEDURE — 25000003 PHARM REV CODE 250: Mod: HCNC | Performed by: ANESTHESIOLOGY

## 2022-02-11 PROCEDURE — 37000008 HC ANESTHESIA 1ST 15 MINUTES: Mod: HCNC | Performed by: ORTHOPAEDIC SURGERY

## 2022-02-11 PROCEDURE — 71000015 HC POSTOP RECOV 1ST HR: Mod: HCNC | Performed by: ORTHOPAEDIC SURGERY

## 2022-02-11 PROCEDURE — 63600175 PHARM REV CODE 636 W HCPCS: Mod: HCNC | Performed by: NURSE ANESTHETIST, CERTIFIED REGISTERED

## 2022-02-11 PROCEDURE — 88342 IMHCHEM/IMCYTCHM 1ST ANTB: CPT | Mod: 26,HCNC,, | Performed by: PATHOLOGY

## 2022-02-11 PROCEDURE — 88341 IMHCHEM/IMCYTCHM EA ADD ANTB: CPT | Mod: HCNC | Performed by: PATHOLOGY

## 2022-02-11 PROCEDURE — 37000009 HC ANESTHESIA EA ADD 15 MINS: Mod: HCNC | Performed by: ORTHOPAEDIC SURGERY

## 2022-02-11 PROCEDURE — 88307 TISSUE EXAM BY PATHOLOGIST: CPT | Mod: 26,HCNC,, | Performed by: PATHOLOGY

## 2022-02-11 PROCEDURE — 88342 IMHCHEM/IMCYTCHM 1ST ANTB: CPT | Mod: HCNC | Performed by: PATHOLOGY

## 2022-02-11 PROCEDURE — 11426 EXC H-F-NK-SP B9+MARG >4 CM: CPT | Mod: ,,, | Performed by: ORTHOPAEDIC SURGERY

## 2022-02-11 PROCEDURE — 25000003 PHARM REV CODE 250: Mod: HCNC | Performed by: ORTHOPAEDIC SURGERY

## 2022-02-11 PROCEDURE — 25000003 PHARM REV CODE 250: Mod: HCNC | Performed by: NURSE ANESTHETIST, CERTIFIED REGISTERED

## 2022-02-11 PROCEDURE — 36000707: Mod: HCNC | Performed by: ORTHOPAEDIC SURGERY

## 2022-02-11 PROCEDURE — 88307 PR  SURG PATH,LEVEL V: ICD-10-PCS | Mod: 26,HCNC,, | Performed by: PATHOLOGY

## 2022-02-11 PROCEDURE — 88341 IMHCHEM/IMCYTCHM EA ADD ANTB: CPT | Mod: 26,HCNC,, | Performed by: PATHOLOGY

## 2022-02-11 PROCEDURE — 88341 PR IHC OR ICC EACH ADD'L SINGLE ANTIBODY  STAINPR: ICD-10-PCS | Mod: 26,HCNC,, | Performed by: PATHOLOGY

## 2022-02-11 PROCEDURE — 11426 PR EXC SKIN BENIG >4 CM REMAINDR BODY: ICD-10-PCS | Mod: ,,, | Performed by: ORTHOPAEDIC SURGERY

## 2022-02-11 PROCEDURE — 88342 CHG IMMUNOCYTOCHEMISTRY: ICD-10-PCS | Mod: 26,HCNC,, | Performed by: PATHOLOGY

## 2022-02-11 PROCEDURE — 71000033 HC RECOVERY, INTIAL HOUR: Mod: HCNC | Performed by: ORTHOPAEDIC SURGERY

## 2022-02-11 PROCEDURE — 88307 TISSUE EXAM BY PATHOLOGIST: CPT | Mod: HCNC | Performed by: PATHOLOGY

## 2022-02-11 RX ORDER — SODIUM CHLORIDE 0.9 % (FLUSH) 0.9 %
10 SYRINGE (ML) INJECTION
Status: DISCONTINUED | OUTPATIENT
Start: 2022-02-11 | End: 2022-02-11 | Stop reason: HOSPADM

## 2022-02-11 RX ORDER — SODIUM CHLORIDE, SODIUM LACTATE, POTASSIUM CHLORIDE, CALCIUM CHLORIDE 600; 310; 30; 20 MG/100ML; MG/100ML; MG/100ML; MG/100ML
INJECTION, SOLUTION INTRAVENOUS
Status: DISCONTINUED | OUTPATIENT
Start: 2022-02-11 | End: 2022-02-11 | Stop reason: HOSPADM

## 2022-02-11 RX ORDER — FENTANYL CITRATE 50 UG/ML
INJECTION, SOLUTION INTRAMUSCULAR; INTRAVENOUS
Status: DISCONTINUED | OUTPATIENT
Start: 2022-02-11 | End: 2022-02-11

## 2022-02-11 RX ORDER — HYDROCODONE BITARTRATE AND ACETAMINOPHEN 5; 325 MG/1; MG/1
1 TABLET ORAL EVERY 4 HOURS PRN
Status: DISCONTINUED | OUTPATIENT
Start: 2022-02-11 | End: 2022-02-11 | Stop reason: HOSPADM

## 2022-02-11 RX ORDER — HYDROCODONE BITARTRATE AND ACETAMINOPHEN 5; 325 MG/1; MG/1
1 TABLET ORAL EVERY 4 HOURS PRN
Status: DISCONTINUED | OUTPATIENT
Start: 2022-02-11 | End: 2022-02-11 | Stop reason: SDUPTHER

## 2022-02-11 RX ORDER — BUPIVACAINE HYDROCHLORIDE 2.5 MG/ML
INJECTION, SOLUTION EPIDURAL; INFILTRATION; INTRACAUDAL
Status: DISCONTINUED | OUTPATIENT
Start: 2022-02-11 | End: 2022-02-11 | Stop reason: HOSPADM

## 2022-02-11 RX ORDER — LIDOCAINE HYDROCHLORIDE 20 MG/ML
INJECTION INTRAVENOUS
Status: DISCONTINUED | OUTPATIENT
Start: 2022-02-11 | End: 2022-02-11

## 2022-02-11 RX ORDER — HYDROMORPHONE HYDROCHLORIDE 2 MG/ML
0.2 INJECTION, SOLUTION INTRAMUSCULAR; INTRAVENOUS; SUBCUTANEOUS EVERY 5 MIN PRN
Status: DISCONTINUED | OUTPATIENT
Start: 2022-02-11 | End: 2022-02-11 | Stop reason: HOSPADM

## 2022-02-11 RX ORDER — PROPOFOL 10 MG/ML
VIAL (ML) INTRAVENOUS
Status: DISCONTINUED | OUTPATIENT
Start: 2022-02-11 | End: 2022-02-11

## 2022-02-11 RX ORDER — CEFAZOLIN SODIUM 2 G/50ML
2 SOLUTION INTRAVENOUS
Status: DISCONTINUED | OUTPATIENT
Start: 2022-02-11 | End: 2022-02-11 | Stop reason: HOSPADM

## 2022-02-11 RX ORDER — OXYCODONE AND ACETAMINOPHEN 5; 325 MG/1; MG/1
1 TABLET ORAL
Status: DISCONTINUED | OUTPATIENT
Start: 2022-02-11 | End: 2022-02-11 | Stop reason: HOSPADM

## 2022-02-11 RX ORDER — CHLORHEXIDINE GLUCONATE ORAL RINSE 1.2 MG/ML
10 SOLUTION DENTAL 2 TIMES DAILY
Status: DISCONTINUED | OUTPATIENT
Start: 2022-02-11 | End: 2022-02-11 | Stop reason: HOSPADM

## 2022-02-11 RX ORDER — CEFAZOLIN SODIUM 1 G/3ML
INJECTION, POWDER, FOR SOLUTION INTRAMUSCULAR; INTRAVENOUS
Status: DISCONTINUED | OUTPATIENT
Start: 2022-02-11 | End: 2022-02-11

## 2022-02-11 RX ORDER — CHLORHEXIDINE GLUCONATE ORAL RINSE 1.2 MG/ML
10 SOLUTION DENTAL 2 TIMES DAILY
Status: DISCONTINUED | OUTPATIENT
Start: 2022-02-11 | End: 2022-02-11 | Stop reason: SDUPTHER

## 2022-02-11 RX ORDER — ONDANSETRON 2 MG/ML
INJECTION INTRAMUSCULAR; INTRAVENOUS
Status: DISCONTINUED | OUTPATIENT
Start: 2022-02-11 | End: 2022-02-11

## 2022-02-11 RX ORDER — SUCCINYLCHOLINE CHLORIDE 20 MG/ML
INJECTION INTRAMUSCULAR; INTRAVENOUS
Status: DISCONTINUED | OUTPATIENT
Start: 2022-02-11 | End: 2022-02-11

## 2022-02-11 RX ORDER — PHENYLEPHRINE HYDROCHLORIDE 10 MG/ML
INJECTION INTRAVENOUS
Status: DISCONTINUED | OUTPATIENT
Start: 2022-02-11 | End: 2022-02-11

## 2022-02-11 RX ADMIN — PHENYLEPHRINE HYDROCHLORIDE 200 MCG: 10 INJECTION INTRAVENOUS at 09:02

## 2022-02-11 RX ADMIN — ONDANSETRON 4 MG: 2 INJECTION, SOLUTION INTRAMUSCULAR; INTRAVENOUS at 09:02

## 2022-02-11 RX ADMIN — SUCCINYLCHOLINE CHLORIDE 140 MG: 20 INJECTION, SOLUTION INTRAMUSCULAR; INTRAVENOUS at 09:02

## 2022-02-11 RX ADMIN — LIDOCAINE HYDROCHLORIDE 100 MG: 20 INJECTION, SOLUTION INTRAVENOUS at 09:02

## 2022-02-11 RX ADMIN — SODIUM CHLORIDE, POTASSIUM CHLORIDE, SODIUM LACTATE AND CALCIUM CHLORIDE: 600; 310; 30; 20 INJECTION, SOLUTION INTRAVENOUS at 09:02

## 2022-02-11 RX ADMIN — OXYCODONE HYDROCHLORIDE AND ACETAMINOPHEN 1 TABLET: 5; 325 TABLET ORAL at 11:02

## 2022-02-11 RX ADMIN — GLYCOPYRROLATE 0.2 MG: 0.2 INJECTION, SOLUTION INTRAMUSCULAR; INTRAVENOUS at 09:02

## 2022-02-11 RX ADMIN — FENTANYL CITRATE 50 MCG: 50 INJECTION, SOLUTION INTRAMUSCULAR; INTRAVENOUS at 09:02

## 2022-02-11 RX ADMIN — PROPOFOL 100 MG: 10 INJECTION, EMULSION INTRAVENOUS at 09:02

## 2022-02-11 RX ADMIN — CEFAZOLIN 2 G: 1 INJECTION, POWDER, FOR SOLUTION INTRAMUSCULAR; INTRAVENOUS at 09:02

## 2022-02-11 NOTE — TRANSFER OF CARE
"Anesthesia Transfer of Care Note    Patient: Flory Cam    Procedure(s) Performed: Procedure(s) (LRB):  EXCISION, MASS, HAND (Right)    Patient location: PACU    Anesthesia Type: general    Transport from OR: Transported from OR on room air with adequate spontaneous ventilation    Post pain: adequate analgesia    Post assessment: no apparent anesthetic complications and tolerated procedure well    Post vital signs: stable    Level of consciousness: awake    Nausea/Vomiting: no nausea/vomiting    Complications: none    Transfer of care protocol was followed      Last vitals:   Visit Vitals  BP (!) 204/93   Pulse (!) 58   Temp 36.2 °C (97.2 °F) (Temporal)   Resp 18   Ht 5' 6" (1.676 m)   Wt 88.2 kg (194 lb 7.1 oz)   SpO2 96%   Breastfeeding No   BMI 31.38 kg/m²     "

## 2022-02-11 NOTE — ANESTHESIA PROCEDURE NOTES
Intubation    Date/Time: 2/11/2022 9:17 AM  Performed by: Emmett Tsai CRNA  Authorized by: Randolph Rivers MD     Intubation:     Induction:  Intravenous    Intubated:  Postinduction    Mask Ventilation:  Easy with oral airway    Attempts:  2    Attempted By:  CRNA    Method of Intubation:  Other (see comments) (lma #5)    Attempted By (2nd Attempt):  CRNA    Method of Intubation (2nd Attempt):  Direct    Blade (2nd Attempt):  Angeles 2    Laryngeal View Grade (2nd Attempt): Grade IIa - cords partially seen      Difficult Airway Encountered?: No      Complications:  None    Airway Device:  Oral endotracheal tube    Airway Device Size:  7.5    Style/Cuff Inflation:  Cuffed (inflated to minimal occlusive pressure)    Tube secured:  21    Secured at:  The lips    Placement Verified By:  Capnometry    Complicating Factors:  Obesity, oropharyngeal edema or fat, poor neck/head extension and short neck    Findings Post-Intubation:  BS equal bilateral and atraumatic/condition of teeth unchanged

## 2022-02-11 NOTE — OP NOTE
[facility] - Surgery ([facility])  General Surgery  Operative Note    SUMMARY     Date of Procedure: 2/11/2022     Procedure: Procedure(s) (LRB):  EXCISION, MASS, HAND (Right)   lichen planus x2    Surgeon(s) and Role:     * Hernan Culver MD - Primary    Assisting Surgeon: None    Pre-Operative Diagnosis  Lichen planus [L43.9] right hand    Post-Operative Diagnosis: Post-Op Diagnosis Codes:     *  right hand     * Lichen planus [L43.9]    Anesthesia: General    Description:  The patient is a 76-year-old female with severe lichen planus right hand with 2 keratotic lesions that are quite bothersome admitted for excision of these 2 lesions and possible Z-plasty if needed.  The patient was taken to the operating room where general anesthesia was administered.  Satisfactory anesthesia had been achieved the right hand was prepped and draped in the usual sterile fashion.  After exsanguination of the extremity a proximal tourniquet was inflated to 250 mmHg after patient received 2 g of Ancef intravenously preoperatively.  At this time 2 large keratotic lesions were removed from the palm measuring about 6 cm in length in the other about 4 cm in length.  The specimens were completely excised and submitted to pathology for examination.  The skin was undermined and 3 retention sutures were used using 2. Nylon suture and red rubber Doss as retention sutures.  The skin was closed using horizontal mattress 4 nylon suture.  Xeroform gauze 4x4s and 2 ABD pads over wrapped with 3 in gauze dressing.  The patient tolerated the procedure well was transferred to the recovery room in satisfactory condition.      Significant Surgical Tasks Conducted by the Assistant(s), if Applicable:  No assistant    Complications:  None     Estimated Blood Loss (EBL):  5 mL           Implants: None    Specimens: Lichen planus x2           Condition: Good    Disposition: PACU - hemodynamically stable.    Attestation: I was present and scrubbed for  the entire procedure.

## 2022-02-11 NOTE — DISCHARGE SUMMARY
[facility] - Surgery (Hospital)  Discharge Note  Short Stay    Procedure(s) (LRB):  EXCISION, MASS, HAND (Right) lichen planus x2    OUTCOME: Patient tolerated treatment/procedure well without complication and is now ready for discharge.    DISPOSITION: Home or Self Care    FINAL DIAGNOSIS:  Lichen planus right hand x2    FOLLOWUP: In clinic    DISCHARGE INSTRUCTIONS:    Discharge Procedure Orders   Diet general     Diet general     Call MD for:  temperature >100.4     Call MD for:  persistent nausea and vomiting     Call MD for:  severe uncontrolled pain     Call MD for:  difficulty breathing, headache or visual disturbances     Call MD for:  redness, tenderness, or signs of infection (pain, swelling, redness, odor or green/yellow discharge around incision site)     Call MD for:  hives     Call MD for:  persistent dizziness or light-headedness     Call MD for:  extreme fatigue     Call MD for:  temperature >100.4     Call MD for:  persistent nausea and vomiting     Call MD for:  severe uncontrolled pain     Call MD for:  difficulty breathing, headache or visual disturbances     Call MD for:  redness, tenderness, or signs of infection (pain, swelling, redness, odor or green/yellow discharge around incision site)     Call MD for:  hives     Call MD for:  persistent dizziness or light-headedness     Call MD for:  extreme fatigue        TIME SPENT ON DISCHARGE:  20 minutes

## 2022-02-11 NOTE — ANESTHESIA POSTPROCEDURE EVALUATION
Anesthesia Post Evaluation    Patient: Flory Cam    Procedure(s) Performed: Procedure(s) (LRB):  EXCISION, MASS, HAND (Right)    Final Anesthesia Type: general      Patient location during evaluation: PACU  Patient participation: Yes- Able to Participate  Level of consciousness: awake  Post-procedure vital signs: reviewed and stable  Pain management: adequate  Airway patency: patent    PONV status at discharge: No PONV  Anesthetic complications: no      Cardiovascular status: blood pressure returned to baseline and hemodynamically stable  Respiratory status: unassisted and spontaneous ventilation  Hydration status: euvolemic  Follow-up not needed.          Vitals Value Taken Time   /93 02/11/22 0740   Temp 36.2 °C (97.2 °F) 02/11/22 0733   Pulse 58 02/11/22 0733   Resp 18 02/11/22 0733   SpO2 96 % 02/11/22 0733         No case tracking events are documented in the log.      Pain/Ángel Score: No data recorded

## 2022-02-15 ENCOUNTER — OFFICE VISIT (OUTPATIENT)
Dept: ORTHOPEDICS | Facility: CLINIC | Age: 77
End: 2022-02-15
Payer: MEDICARE

## 2022-02-15 ENCOUNTER — CLINICAL SUPPORT (OUTPATIENT)
Dept: INTERNAL MEDICINE | Facility: CLINIC | Age: 77
End: 2022-02-15
Payer: MEDICARE

## 2022-02-15 VITALS
DIASTOLIC BLOOD PRESSURE: 81 MMHG | SYSTOLIC BLOOD PRESSURE: 152 MMHG | OXYGEN SATURATION: 87 % | HEIGHT: 66 IN | RESPIRATION RATE: 30 BRPM | BODY MASS INDEX: 31.25 KG/M2 | WEIGHT: 194.44 LBS | TEMPERATURE: 98 F | HEART RATE: 64 BPM

## 2022-02-15 VITALS
SYSTOLIC BLOOD PRESSURE: 140 MMHG | SYSTOLIC BLOOD PRESSURE: 130 MMHG | TEMPERATURE: 98 F | HEART RATE: 73 BPM | WEIGHT: 194.44 LBS | DIASTOLIC BLOOD PRESSURE: 70 MMHG | HEIGHT: 66 IN | DIASTOLIC BLOOD PRESSURE: 90 MMHG | BODY MASS INDEX: 31.25 KG/M2

## 2022-02-15 DIAGNOSIS — L43.9 LICHEN PLANUS: Primary | ICD-10-CM

## 2022-02-15 PROCEDURE — 99999 PR PBB SHADOW E&M-EST. PATIENT-LVL IV: ICD-10-PCS | Mod: PBBFAC,HCNC,, | Performed by: PHYSICIAN ASSISTANT

## 2022-02-15 PROCEDURE — 99999 PR PBB SHADOW E&M-EST. PATIENT-LVL IV: CPT | Mod: PBBFAC,HCNC,, | Performed by: PHYSICIAN ASSISTANT

## 2022-02-15 PROCEDURE — 3080F DIAST BP >= 90 MM HG: CPT | Mod: HCNC,CPTII,S$GLB, | Performed by: PHYSICIAN ASSISTANT

## 2022-02-15 PROCEDURE — 3288F PR FALLS RISK ASSESSMENT DOCUMENTED: ICD-10-PCS | Mod: HCNC,CPTII,S$GLB, | Performed by: PHYSICIAN ASSISTANT

## 2022-02-15 PROCEDURE — 1101F PT FALLS ASSESS-DOCD LE1/YR: CPT | Mod: HCNC,CPTII,S$GLB, | Performed by: PHYSICIAN ASSISTANT

## 2022-02-15 PROCEDURE — 99999 PR PBB SHADOW E&M-EST. PATIENT-LVL I: ICD-10-PCS | Mod: PBBFAC,HCNC,,

## 2022-02-15 PROCEDURE — 3075F PR MOST RECENT SYSTOLIC BLOOD PRESS GE 130-139MM HG: ICD-10-PCS | Mod: HCNC,CPTII,S$GLB, | Performed by: PHYSICIAN ASSISTANT

## 2022-02-15 PROCEDURE — 1125F PR PAIN SEVERITY QUANTIFIED, PAIN PRESENT: ICD-10-PCS | Mod: HCNC,CPTII,S$GLB, | Performed by: PHYSICIAN ASSISTANT

## 2022-02-15 PROCEDURE — 1160F RVW MEDS BY RX/DR IN RCRD: CPT | Mod: HCNC,CPTII,S$GLB, | Performed by: PHYSICIAN ASSISTANT

## 2022-02-15 PROCEDURE — 1159F MED LIST DOCD IN RCRD: CPT | Mod: HCNC,CPTII,S$GLB, | Performed by: PHYSICIAN ASSISTANT

## 2022-02-15 PROCEDURE — 1101F PR PT FALLS ASSESS DOC 0-1 FALLS W/OUT INJ PAST YR: ICD-10-PCS | Mod: HCNC,CPTII,S$GLB, | Performed by: PHYSICIAN ASSISTANT

## 2022-02-15 PROCEDURE — 99024 POSTOP FOLLOW-UP VISIT: CPT | Mod: HCNC,S$GLB,, | Performed by: PHYSICIAN ASSISTANT

## 2022-02-15 PROCEDURE — 3080F PR MOST RECENT DIASTOLIC BLOOD PRESSURE >= 90 MM HG: ICD-10-PCS | Mod: HCNC,CPTII,S$GLB, | Performed by: PHYSICIAN ASSISTANT

## 2022-02-15 PROCEDURE — 3288F FALL RISK ASSESSMENT DOCD: CPT | Mod: HCNC,CPTII,S$GLB, | Performed by: PHYSICIAN ASSISTANT

## 2022-02-15 PROCEDURE — 1159F PR MEDICATION LIST DOCUMENTED IN MEDICAL RECORD: ICD-10-PCS | Mod: HCNC,CPTII,S$GLB, | Performed by: PHYSICIAN ASSISTANT

## 2022-02-15 PROCEDURE — 99024 PR POST-OP FOLLOW-UP VISIT: ICD-10-PCS | Mod: HCNC,S$GLB,, | Performed by: PHYSICIAN ASSISTANT

## 2022-02-15 PROCEDURE — 1125F AMNT PAIN NOTED PAIN PRSNT: CPT | Mod: HCNC,CPTII,S$GLB, | Performed by: PHYSICIAN ASSISTANT

## 2022-02-15 PROCEDURE — 3075F SYST BP GE 130 - 139MM HG: CPT | Mod: HCNC,CPTII,S$GLB, | Performed by: PHYSICIAN ASSISTANT

## 2022-02-15 PROCEDURE — 1160F PR REVIEW ALL MEDS BY PRESCRIBER/CLIN PHARMACIST DOCUMENTED: ICD-10-PCS | Mod: HCNC,CPTII,S$GLB, | Performed by: PHYSICIAN ASSISTANT

## 2022-02-15 PROCEDURE — 99999 PR PBB SHADOW E&M-EST. PATIENT-LVL I: CPT | Mod: PBBFAC,HCNC,,

## 2022-02-15 NOTE — PROGRESS NOTES
Patient presents today for a blood pressure, says she has not been having any symptoms.  Patient's blood pressure was 140/70

## 2022-02-15 NOTE — PROGRESS NOTES
Subjective:      Patient ID: Flory Cam is a 76 y.o. female.    Chief Complaint: Post-op Evaluation and Pain of the Right Hand      HPI: Flory Cam is a 76-year-old female in clinic today for postoperative follow-up.  Patient is 4 days status post excision of a mass to the right hand.  Patient is doing well at this time.  No new complaints    Past Medical History:   Diagnosis Date    Acute coronary syndrome     Anxiety     Bilateral carotid artery stenosis 11/17/2015    Chronic pain     Colon polyp     Coronary artery disease     GERD (gastroesophageal reflux disease)     Hyperlipidemia     Hypertension     Hypothyroidism     Low back pain     Lupus     Osteoporosis     Poor circulation     Pre-operative clearance 7/12/2019    PVD (peripheral vascular disease)     S/P peripheral artery angioplasty 02/13/2014    SCCA (squamous cell carcinoma) of skin 10/2019    Dr. ZANDRA Rivas--oncology    Skin disease        Current Outpatient Medications:     albuterol (PROVENTIL/VENTOLIN HFA) 90 mcg/actuation inhaler, Inhale 2 puffs into the lungs every 6 (six) hours as needed for Wheezing., Disp: 18 g, Rfl: 3    amLODIPine (NORVASC) 10 MG tablet, Take 1 tablet (10 mg total) by mouth once daily., Disp: 90 tablet, Rfl: 3    atorvastatin (LIPITOR) 80 MG tablet, Take 1 tablet (80 mg total) by mouth once daily., Disp: 90 tablet, Rfl: 3    citalopram (CELEXA) 10 MG tablet, Take 10 mg by mouth once daily., Disp: , Rfl:     clopidogreL (PLAVIX) 75 mg tablet, TAKE 1 TABLET(75 MG) BY MOUTH EVERY DAY, Disp: 90 tablet, Rfl: 1    ezetimibe (ZETIA) 10 mg tablet, TAKE 1 TABLET(10 MG) BY MOUTH EVERY DAY (Patient taking differently: Take 10 mg by mouth once daily.), Disp: 90 tablet, Rfl: 3    levothyroxine (SYNTHROID) 137 MCG Tab tablet, TAKE 1 TABLET BY MOUTH EVERY DAY, Disp: 90 tablet, Rfl: 3    ondansetron (ZOFRAN) 4 MG tablet, TAKE 1 TABLET(4 MG) BY MOUTH EVERY 8 HOURS AS NEEDED FOR NAUSEA, Disp: 15  "tablet, Rfl: 0    oxyCODONE-acetaminophen (PERCOCET)  mg per tablet, Take 1 tablet by mouth., Disp: , Rfl:     prochlorperazine (COMPAZINE) 10 MG tablet, , Disp: , Rfl:     traZODone (DESYREL) 50 MG tablet, TAKE 1 TABLET BY MOUTH EVERY DAY AT NIGHT AS NEEDED FOR INSOMNIA, Disp: 30 tablet, Rfl: 3    valsartan (DIOVAN) 320 MG tablet, TAKE 1 TABLET(320 MG) BY MOUTH EVERY DAY, Disp: 90 tablet, Rfl: 3    aspirin (ECOTRIN) 81 MG EC tablet, Take 1 tablet (81 mg total) by mouth once daily., Disp: , Rfl: 0  Review of patient's allergies indicates:  No Known Allergies    BP (!) 130/90 (BP Location: Left arm, Patient Position: Sitting, BP Method: Medium (Automatic))   Pulse 73   Temp 97.9 °F (36.6 °C)   Ht 5' 6" (1.676 m)   Wt 88.2 kg (194 lb 7.1 oz)   BMI 31.38 kg/m²     ROS      Objective:    Ortho Exam   Right hand:  Sutures intact, wound margins well approximated, no signs of infection  Mild edema  Mild TTP  ROM decreased secondary to swelling/stiffness  Motor exam normal  Sensation and pulses intact    GEN: Well developed, well nourished female. AAOX3. No acute distress.   Normocephalic, atraumatic.   AYESHA  Breathing unlabored.  Mood and affect appropriate.        Assessment:     Imaging:  No new imaging ordered today        1. Lichen planus          Plan:       Patient progressing as expected postoperatively.  Wound cleaned with peroxide and covered with 4x4s, light wrap, and Ace bandage.  Patient instructed to perform similar wound cleanings.  Return to clinic in 10 days for wound check and possible suture removal     Follow up in about 10 days (around 2/25/2022).          "

## 2022-02-18 ENCOUNTER — TELEPHONE (OUTPATIENT)
Dept: INTERNAL MEDICINE | Facility: CLINIC | Age: 77
End: 2022-02-18
Payer: MEDICARE

## 2022-02-18 NOTE — TELEPHONE ENCOUNTER
----- Message from Kalee Mcginnis sent at 2/18/2022 12:04 PM CST -----  Contact: NO JARVIS JUNE [[id number]]  .Type:  Needs Medical Advice    Who Called: NO JARVIS JUNE [[id number]]  Symptoms (please be specific):   How long has patient had these symptoms:   Pharmacy name and phone #:   Would the patient rather a call back or a response via [facility]?   Call    Best Call Back Number:   [phone] (home)   Additional Information:  Pt is requesting  a call back from the nurse in regards to the pt long term  care paperwork being  filled out and faxed to the number on the form  please

## 2022-02-21 LAB
FINAL PATHOLOGIC DIAGNOSIS: NORMAL
GROSS: NORMAL
Lab: NORMAL
MICROSCOPIC EXAM: NORMAL

## 2022-02-23 DIAGNOSIS — D84.9 IMMUNOSUPPRESSED STATUS: ICD-10-CM

## 2022-02-24 NOTE — TELEPHONE ENCOUNTER
Spoke with patient about paperwork if they received the fax already. I advised the patient to call them and see if they have it and if not we will refax it to them. Patient states and verbalized understanding

## 2022-02-25 ENCOUNTER — OFFICE VISIT (OUTPATIENT)
Dept: ORTHOPEDICS | Facility: CLINIC | Age: 77
End: 2022-02-25
Payer: MEDICARE

## 2022-02-25 VITALS
DIASTOLIC BLOOD PRESSURE: 76 MMHG | SYSTOLIC BLOOD PRESSURE: 157 MMHG | BODY MASS INDEX: 31.25 KG/M2 | HEIGHT: 66 IN | WEIGHT: 194.44 LBS | HEART RATE: 59 BPM

## 2022-02-25 DIAGNOSIS — L43.9 LICHEN PLANUS: Primary | ICD-10-CM

## 2022-02-25 PROCEDURE — 99024 PR POST-OP FOLLOW-UP VISIT: ICD-10-PCS | Mod: HCNC,S$GLB,, | Performed by: PHYSICIAN ASSISTANT

## 2022-02-25 PROCEDURE — 1125F PR PAIN SEVERITY QUANTIFIED, PAIN PRESENT: ICD-10-PCS | Mod: HCNC,CPTII,S$GLB, | Performed by: PHYSICIAN ASSISTANT

## 2022-02-25 PROCEDURE — 3077F SYST BP >= 140 MM HG: CPT | Mod: HCNC,CPTII,S$GLB, | Performed by: PHYSICIAN ASSISTANT

## 2022-02-25 PROCEDURE — 99999 PR PBB SHADOW E&M-EST. PATIENT-LVL IV: ICD-10-PCS | Mod: PBBFAC,HCNC,, | Performed by: PHYSICIAN ASSISTANT

## 2022-02-25 PROCEDURE — 99024 POSTOP FOLLOW-UP VISIT: CPT | Mod: HCNC,S$GLB,, | Performed by: PHYSICIAN ASSISTANT

## 2022-02-25 PROCEDURE — 3288F FALL RISK ASSESSMENT DOCD: CPT | Mod: HCNC,CPTII,S$GLB, | Performed by: PHYSICIAN ASSISTANT

## 2022-02-25 PROCEDURE — 1101F PR PT FALLS ASSESS DOC 0-1 FALLS W/OUT INJ PAST YR: ICD-10-PCS | Mod: HCNC,CPTII,S$GLB, | Performed by: PHYSICIAN ASSISTANT

## 2022-02-25 PROCEDURE — 1160F RVW MEDS BY RX/DR IN RCRD: CPT | Mod: HCNC,CPTII,S$GLB, | Performed by: PHYSICIAN ASSISTANT

## 2022-02-25 PROCEDURE — 3078F DIAST BP <80 MM HG: CPT | Mod: HCNC,CPTII,S$GLB, | Performed by: PHYSICIAN ASSISTANT

## 2022-02-25 PROCEDURE — 1159F MED LIST DOCD IN RCRD: CPT | Mod: HCNC,CPTII,S$GLB, | Performed by: PHYSICIAN ASSISTANT

## 2022-02-25 PROCEDURE — 1101F PT FALLS ASSESS-DOCD LE1/YR: CPT | Mod: HCNC,CPTII,S$GLB, | Performed by: PHYSICIAN ASSISTANT

## 2022-02-25 PROCEDURE — 3288F PR FALLS RISK ASSESSMENT DOCUMENTED: ICD-10-PCS | Mod: HCNC,CPTII,S$GLB, | Performed by: PHYSICIAN ASSISTANT

## 2022-02-25 PROCEDURE — 3078F PR MOST RECENT DIASTOLIC BLOOD PRESSURE < 80 MM HG: ICD-10-PCS | Mod: HCNC,CPTII,S$GLB, | Performed by: PHYSICIAN ASSISTANT

## 2022-02-25 PROCEDURE — 99999 PR PBB SHADOW E&M-EST. PATIENT-LVL IV: CPT | Mod: PBBFAC,HCNC,, | Performed by: PHYSICIAN ASSISTANT

## 2022-02-25 PROCEDURE — 1125F AMNT PAIN NOTED PAIN PRSNT: CPT | Mod: HCNC,CPTII,S$GLB, | Performed by: PHYSICIAN ASSISTANT

## 2022-02-25 PROCEDURE — 3077F PR MOST RECENT SYSTOLIC BLOOD PRESSURE >= 140 MM HG: ICD-10-PCS | Mod: HCNC,CPTII,S$GLB, | Performed by: PHYSICIAN ASSISTANT

## 2022-02-25 PROCEDURE — 1159F PR MEDICATION LIST DOCUMENTED IN MEDICAL RECORD: ICD-10-PCS | Mod: HCNC,CPTII,S$GLB, | Performed by: PHYSICIAN ASSISTANT

## 2022-02-25 PROCEDURE — 1160F PR REVIEW ALL MEDS BY PRESCRIBER/CLIN PHARMACIST DOCUMENTED: ICD-10-PCS | Mod: HCNC,CPTII,S$GLB, | Performed by: PHYSICIAN ASSISTANT

## 2022-02-25 NOTE — PROGRESS NOTES
Patient ID: Flory Cam is a 76 y.o. female.    Chief Complaint: Post-op Evaluation of the Right Hand and Post-op Evaluation (R hand mass excision, rates pain 6/10)      HPI: Flory Cam  is a 76 y.o. female who c/o Post-op Evaluation of the Right Hand and Post-op Evaluation (R hand mass excision, rates pain 6/10)     Post op visit 2  Pain is at worse a 6/10  The patient is doing okay since surgery although has not really increased use and activity of the hand and has been really hesitant  She notes she is living with this constant pain and thought surgery would be of some improvement although she has not seen the results she would like at this time  We discussed the healing process in time as she is roughly only 2 weeks out from surgery  She has been compliant with postop instructions and keeping the extremity dry  She states she has been trying to do the daily cleanings with family support although this did not happen yesterday  She has been keeping dressings and bandages over it as well as an Ace wrap to help with comfort and support    Patient is presently denying any shortness of breath, chest pain, fever/chills, nausea/vomiting, loss of taste or smell, numbness/tingling or sensation changes, loss of bladder or bowel function.      Procedure: Right keratosis excisions  DOS:  02/11/2022    Past Medical History:   Diagnosis Date    Acute coronary syndrome     Anxiety     Bilateral carotid artery stenosis 11/17/2015    Chronic pain     Colon polyp     Coronary artery disease     GERD (gastroesophageal reflux disease)     Hyperlipidemia     Hypertension     Hypothyroidism     Low back pain     Lupus     Osteoporosis     Poor circulation     Pre-operative clearance 7/12/2019    PVD (peripheral vascular disease)     S/P peripheral artery angioplasty 02/13/2014    SCCA (squamous cell carcinoma) of skin 10/2019    Dr. ZANDRA Rivas--oncology    Skin disease      Past Surgical History:    Procedure Laterality Date    AORTOGRAPHY WITH SERIALOGRAPHY N/A 5/25/2021    Procedure: AORTOGRAM, WITH RUNOFF;  Surgeon: Christopher Cohen MD;  Location: [facility] CATH LAB;  Service: Cardiology;  Laterality: N/A;  COVID-19, mRNA, LNP-S, PF, 30 mcg/0.3 mL dose (COVID-19, MRNA, LN-S, PF (Pfizer)) 1/30/2021, 1/9/2021    Atherosclerotic PVD with intermittent claudication Bilateral 05/2021    Dr. Cohen    CATARACT EXTRACTION      COLONOSCOPY N/A 1/4/2017    Procedure: COLONOSCOPY;  Surgeon: Sylvester Arboleda III, MD;  Location: [facility] ENDO;  Service: Endoscopy;  Laterality: N/A;    COLONOSCOPY N/A 8/4/2020    Procedure: COLONOSCOPY;  Surgeon: Sylvester Arboleda III, MD;  Location: [facility] ENDO;  Service: Endoscopy;  Laterality: N/A;    CORONARY ANGIOPLASTY      CORONARY ARTERY BYPASS GRAFT      EXCISION OF MASS OF HAND Right 2/11/2022    Procedure: EXCISION, MASS, HAND;  Surgeon: Hernan Culver MD;  Location: [facility] OR;  Service: Orthopedics;  Laterality: Right;   thenar eminence skin lesion excised and a Z-plasty skin flap for coverage    HYSTERECTOMY      OOPHORECTOMY      THYROIDECTOMY       Family History   Problem Relation Age of Onset    Hypertension Mother     Stroke Mother     Lung cancer Daughter     Melanoma Neg Hx     Psoriasis Neg Hx     Lupus Neg Hx     Eczema Neg Hx      Social History     Socioeconomic History    Marital status:     Number of children: 3   Occupational History    Occupation: Retired     Comment: Dispatcher   Tobacco Use    Smoking status: Current Every Day Smoker     Packs/day: 0.25     Years: 40.00     Pack years: 10.00     Types: Cigarettes     Start date: 1961    Smokeless tobacco: Never Used    Tobacco comment: hold midnight prior to surgery   Substance and Sexual Activity    Alcohol use: Never    Drug use: No    Sexual activity: Yes   Other Topics Concern    Are you pregnant or think you may be? No    Breast-feeding No   Social History Narrative     Patient is retired dispatcher.     Social Determinants of Health     Financial Resource Strain: Low Risk     Difficulty of Paying Living Expenses: Not hard at all   Food Insecurity: No Food Insecurity    Worried About Running Out of Food in the Last Year: Never true    Ran Out of Food in the Last Year: Never true   Transportation Needs: Unmet Transportation Needs    Lack of Transportation (Medical): Yes    Lack of Transportation (Non-Medical): Yes   Physical Activity: Insufficiently Active    Days of Exercise per Week: 2 days    Minutes of Exercise per Session: 10 min   Stress: Stress Concern Present    Feeling of Stress : To some extent   Social Connections: Socially Isolated    Frequency of Communication with Friends and Family: More than three times a week    Frequency of Social Gatherings with Friends and Family: Twice a week    Attends Yazidi Services: Never    Active Member of Clubs or Organizations: No    Attends Club or Organization Meetings: Never    Marital Status:    Housing Stability: Low Risk     Unable to Pay for Housing in the Last Year: No    Number of Places Lived in the Last Year: 1    Unstable Housing in the Last Year: No     Medication List with Changes/Refills   Current Medications    ALBUTEROL (PROVENTIL/VENTOLIN HFA) 90 MCG/ACTUATION INHALER    Inhale 2 puffs into the lungs every 6 (six) hours as needed for Wheezing.    AMLODIPINE (NORVASC) 10 MG TABLET    Take 1 tablet (10 mg total) by mouth once daily.    ASPIRIN (ECOTRIN) 81 MG EC TABLET    Take 1 tablet (81 mg total) by mouth once daily.    ATORVASTATIN (LIPITOR) 80 MG TABLET    Take 1 tablet (80 mg total) by mouth once daily.    CITALOPRAM (CELEXA) 10 MG TABLET    Take 10 mg by mouth once daily.    CLOPIDOGREL (PLAVIX) 75 MG TABLET    TAKE 1 TABLET(75 MG) BY MOUTH EVERY DAY    EZETIMIBE (ZETIA) 10 MG TABLET    TAKE 1 TABLET(10 MG) BY MOUTH EVERY DAY    LEVOTHYROXINE (SYNTHROID) 137 MCG TAB TABLET    TAKE 1  TABLET BY MOUTH EVERY DAY    ONDANSETRON (ZOFRAN) 4 MG TABLET    TAKE 1 TABLET(4 MG) BY MOUTH EVERY 8 HOURS AS NEEDED FOR NAUSEA    OXYCODONE-ACETAMINOPHEN (PERCOCET)  MG PER TABLET    Take 1 tablet by mouth.    PROCHLORPERAZINE (COMPAZINE) 10 MG TABLET        TRAZODONE (DESYREL) 50 MG TABLET    TAKE 1 TABLET BY MOUTH EVERY DAY AT NIGHT AS NEEDED FOR INSOMNIA    VALSARTAN (DIOVAN) 320 MG TABLET    TAKE 1 TABLET(320 MG) BY MOUTH EVERY DAY     Review of patient's allergies indicates:  No Known Allergies    Objective:     Right Hand  2+ rad pulse  Comp soft  Sensation intact, some hyper sensation to light palpation especially over the thumb  Inc C/D/I  No SOI, moderate amount of scabbing   Retention sutures noted  Cap refill < 2 sec  Motor intact to hand and wrist    Assessment:       Encounter Diagnosis   Name Primary?    Lichen planus Yes          Plan:       Flory was seen today for post-op evaluation and post-op evaluation.    Diagnoses and all orders for this visit:    Lichen planus        Flory Cam is an established pt here for postop follow-up.  She still has pain medication to take if needed. The incision was cleaned with hydrogen peroxide and normal saline, careful attention was noted to work with removing the scabs along the incision to free up tails of both retention and nylon sutures.  Nylon sutures were removed and retention sutures were left in place.  I asked that she return in roughly 1 week for the removal of these. Patient may clean the incision daily as above.  She was instructed to keep the incision clean and dry until follow-up.  Patient may use  splint as needed for symptomatic relief.  Patient should notify the office of any signs or symptoms of infection including fevers, erythema, purulent drainage, increasing pain.  Patient will follow up as scheduled.  I would like to see her back in roughly 1 week for the removal of retention sutures.  She verbalizes understanding and  agrees.    No follow-ups on file.    The patient understands, chooses and consents to this plan and accepts all   the risks which include but are not limited to the risks mentioned above.     Disclaimer: This note was prepared using a voice recognition system and is likely to have sound alike errors within the text.

## 2022-02-28 ENCOUNTER — EXTERNAL CHRONIC CARE MANAGEMENT (OUTPATIENT)
Dept: PRIMARY CARE CLINIC | Facility: CLINIC | Age: 77
End: 2022-02-28
Payer: MEDICARE

## 2022-02-28 ENCOUNTER — TELEPHONE (OUTPATIENT)
Dept: INTERNAL MEDICINE | Facility: CLINIC | Age: 77
End: 2022-02-28

## 2022-02-28 PROCEDURE — 99490 PR CHRONIC CARE MGMT, 1ST 20 MIN: ICD-10-PCS | Mod: S$GLB,,, | Performed by: INTERNAL MEDICINE

## 2022-02-28 PROCEDURE — 99490 CHRNC CARE MGMT STAFF 1ST 20: CPT | Mod: S$GLB,,, | Performed by: INTERNAL MEDICINE

## 2022-02-28 NOTE — TELEPHONE ENCOUNTER
----- Message from Lea Garcia sent at 2/28/2022  3:45 PM CST -----  Regarding: pt advice  Contact: Pt  Type:  Needs Medical Advice    Who Called:  Flory Cam  Symptoms (please be specific):  muscle spasms   How long has patient had these symptoms:   couple of days   Pharmacy name and phone #:       [id number] - [place] - [address] [place] [address] [facility] [address] [facility]  [address] [place] [address]  Phone: [phone] Fax: [phone]    Would the patient rather a call back or a response via [facility]?  Call back   Best Call Back Number:  [phone]  Additional Information:

## 2022-02-28 NOTE — TELEPHONE ENCOUNTER
Pt states you once ordered tizanidine for her for muscle spasms   She wants to know if you can sendi n a new prescription to the pharmacy

## 2022-03-03 ENCOUNTER — OFFICE VISIT (OUTPATIENT)
Dept: ORTHOPEDICS | Facility: CLINIC | Age: 77
End: 2022-03-03
Payer: MEDICARE

## 2022-03-03 VITALS
WEIGHT: 194.44 LBS | DIASTOLIC BLOOD PRESSURE: 75 MMHG | SYSTOLIC BLOOD PRESSURE: 150 MMHG | TEMPERATURE: 98 F | BODY MASS INDEX: 31.25 KG/M2 | HEART RATE: 63 BPM | HEIGHT: 66 IN

## 2022-03-03 DIAGNOSIS — L43.9 LICHEN PLANUS: Primary | ICD-10-CM

## 2022-03-03 PROCEDURE — 3078F PR MOST RECENT DIASTOLIC BLOOD PRESSURE < 80 MM HG: ICD-10-PCS | Mod: CPTII,S$GLB,, | Performed by: PHYSICIAN ASSISTANT

## 2022-03-03 PROCEDURE — 1101F PR PT FALLS ASSESS DOC 0-1 FALLS W/OUT INJ PAST YR: ICD-10-PCS | Mod: CPTII,S$GLB,, | Performed by: PHYSICIAN ASSISTANT

## 2022-03-03 PROCEDURE — 3288F FALL RISK ASSESSMENT DOCD: CPT | Mod: CPTII,S$GLB,, | Performed by: PHYSICIAN ASSISTANT

## 2022-03-03 PROCEDURE — 1160F RVW MEDS BY RX/DR IN RCRD: CPT | Mod: CPTII,S$GLB,, | Performed by: PHYSICIAN ASSISTANT

## 2022-03-03 PROCEDURE — 1101F PT FALLS ASSESS-DOCD LE1/YR: CPT | Mod: CPTII,S$GLB,, | Performed by: PHYSICIAN ASSISTANT

## 2022-03-03 PROCEDURE — 99999 PR PBB SHADOW E&M-EST. PATIENT-LVL IV: ICD-10-PCS | Mod: PBBFAC,,, | Performed by: PHYSICIAN ASSISTANT

## 2022-03-03 PROCEDURE — 1159F PR MEDICATION LIST DOCUMENTED IN MEDICAL RECORD: ICD-10-PCS | Mod: CPTII,S$GLB,, | Performed by: PHYSICIAN ASSISTANT

## 2022-03-03 PROCEDURE — 3077F PR MOST RECENT SYSTOLIC BLOOD PRESSURE >= 140 MM HG: ICD-10-PCS | Mod: CPTII,S$GLB,, | Performed by: PHYSICIAN ASSISTANT

## 2022-03-03 PROCEDURE — 1160F PR REVIEW ALL MEDS BY PRESCRIBER/CLIN PHARMACIST DOCUMENTED: ICD-10-PCS | Mod: CPTII,S$GLB,, | Performed by: PHYSICIAN ASSISTANT

## 2022-03-03 PROCEDURE — 99024 POSTOP FOLLOW-UP VISIT: CPT | Mod: S$GLB,,, | Performed by: PHYSICIAN ASSISTANT

## 2022-03-03 PROCEDURE — 3288F PR FALLS RISK ASSESSMENT DOCUMENTED: ICD-10-PCS | Mod: CPTII,S$GLB,, | Performed by: PHYSICIAN ASSISTANT

## 2022-03-03 PROCEDURE — 99024 PR POST-OP FOLLOW-UP VISIT: ICD-10-PCS | Mod: S$GLB,,, | Performed by: PHYSICIAN ASSISTANT

## 2022-03-03 PROCEDURE — 1125F AMNT PAIN NOTED PAIN PRSNT: CPT | Mod: CPTII,S$GLB,, | Performed by: PHYSICIAN ASSISTANT

## 2022-03-03 PROCEDURE — 99999 PR PBB SHADOW E&M-EST. PATIENT-LVL IV: CPT | Mod: PBBFAC,,, | Performed by: PHYSICIAN ASSISTANT

## 2022-03-03 PROCEDURE — 3077F SYST BP >= 140 MM HG: CPT | Mod: CPTII,S$GLB,, | Performed by: PHYSICIAN ASSISTANT

## 2022-03-03 PROCEDURE — 3078F DIAST BP <80 MM HG: CPT | Mod: CPTII,S$GLB,, | Performed by: PHYSICIAN ASSISTANT

## 2022-03-03 PROCEDURE — 1159F MED LIST DOCD IN RCRD: CPT | Mod: CPTII,S$GLB,, | Performed by: PHYSICIAN ASSISTANT

## 2022-03-03 PROCEDURE — 1125F PR PAIN SEVERITY QUANTIFIED, PAIN PRESENT: ICD-10-PCS | Mod: CPTII,S$GLB,, | Performed by: PHYSICIAN ASSISTANT

## 2022-03-07 NOTE — TELEPHONE ENCOUNTER
I do not see where I have prescribed this medication for her. Please schedule an appointment with me or [facility] for evaluation.

## 2022-03-09 ENCOUNTER — OFFICE VISIT (OUTPATIENT)
Dept: ORTHOPEDICS | Facility: CLINIC | Age: 77
End: 2022-03-09
Payer: MEDICARE

## 2022-03-09 VITALS — BODY MASS INDEX: 31.18 KG/M2 | HEIGHT: 66 IN | WEIGHT: 194 LBS

## 2022-03-09 DIAGNOSIS — L43.9 LICHEN PLANUS: Primary | ICD-10-CM

## 2022-03-09 PROCEDURE — 1101F PR PT FALLS ASSESS DOC 0-1 FALLS W/OUT INJ PAST YR: ICD-10-PCS | Mod: CPTII,S$GLB,, | Performed by: ORTHOPAEDIC SURGERY

## 2022-03-09 PROCEDURE — 1125F PR PAIN SEVERITY QUANTIFIED, PAIN PRESENT: ICD-10-PCS | Mod: CPTII,S$GLB,, | Performed by: ORTHOPAEDIC SURGERY

## 2022-03-09 PROCEDURE — 99999 PR PBB SHADOW E&M-EST. PATIENT-LVL III: CPT | Mod: PBBFAC,,, | Performed by: ORTHOPAEDIC SURGERY

## 2022-03-09 PROCEDURE — 99024 PR POST-OP FOLLOW-UP VISIT: ICD-10-PCS | Mod: S$GLB,,, | Performed by: ORTHOPAEDIC SURGERY

## 2022-03-09 PROCEDURE — 1159F PR MEDICATION LIST DOCUMENTED IN MEDICAL RECORD: ICD-10-PCS | Mod: CPTII,S$GLB,, | Performed by: ORTHOPAEDIC SURGERY

## 2022-03-09 PROCEDURE — 1101F PT FALLS ASSESS-DOCD LE1/YR: CPT | Mod: CPTII,S$GLB,, | Performed by: ORTHOPAEDIC SURGERY

## 2022-03-09 PROCEDURE — 1159F MED LIST DOCD IN RCRD: CPT | Mod: CPTII,S$GLB,, | Performed by: ORTHOPAEDIC SURGERY

## 2022-03-09 PROCEDURE — 3288F FALL RISK ASSESSMENT DOCD: CPT | Mod: CPTII,S$GLB,, | Performed by: ORTHOPAEDIC SURGERY

## 2022-03-09 PROCEDURE — 99024 POSTOP FOLLOW-UP VISIT: CPT | Mod: S$GLB,,, | Performed by: ORTHOPAEDIC SURGERY

## 2022-03-09 PROCEDURE — 99999 PR PBB SHADOW E&M-EST. PATIENT-LVL III: ICD-10-PCS | Mod: PBBFAC,,, | Performed by: ORTHOPAEDIC SURGERY

## 2022-03-09 PROCEDURE — 1125F AMNT PAIN NOTED PAIN PRSNT: CPT | Mod: CPTII,S$GLB,, | Performed by: ORTHOPAEDIC SURGERY

## 2022-03-09 PROCEDURE — 3288F PR FALLS RISK ASSESSMENT DOCUMENTED: ICD-10-PCS | Mod: CPTII,S$GLB,, | Performed by: ORTHOPAEDIC SURGERY

## 2022-03-09 NOTE — PROGRESS NOTES
Subjective:     Patient ID: Flory Cam is a 76 y.o. female.    Chief Complaint: Pain and Post-op Evaluation of the Right Hand      HPI:  The patient is a 76-year-old female seen in follow-up after excision large care and missed lesions secondary to lichen planus date of surgery is 02/11/2022.  She still has retention sutures in place.  She has had 2 episodes of suture removal but had to stop because of pain.    Past Medical History:   Diagnosis Date    Acute coronary syndrome     Anxiety     Bilateral carotid artery stenosis 11/17/2015    Chronic pain     Colon polyp     Coronary artery disease     GERD (gastroesophageal reflux disease)     Hyperlipidemia     Hypertension     Hypothyroidism     Low back pain     Lupus     Osteoporosis     Poor circulation     Pre-operative clearance 7/12/2019    PVD (peripheral vascular disease)     S/P peripheral artery angioplasty 02/13/2014    SCCA (squamous cell carcinoma) of skin 10/2019    Dr. ZANDRA Rivas--oncology    Skin disease      Past Surgical History:   Procedure Laterality Date    AORTOGRAPHY WITH SERIALOGRAPHY N/A 5/25/2021    Procedure: AORTOGRAM, WITH RUNOFF;  Surgeon: Christopher Cohen MD;  Location: [facility] CATH LAB;  Service: Cardiology;  Laterality: N/A;  COVID-19, mRNA, LNP-S, PF, 30 mcg/0.3 mL dose (COVID-19, MRNA, LN-S, PF (Pfizer)) 1/30/2021, 1/9/2021    Atherosclerotic PVD with intermittent claudication Bilateral 05/2021    Dr. Cohen    CATARACT EXTRACTION      COLONOSCOPY N/A 1/4/2017    Procedure: COLONOSCOPY;  Surgeon: Sylvester Arboleda III, MD;  Location: [facility] ENDO;  Service: Endoscopy;  Laterality: N/A;    COLONOSCOPY N/A 8/4/2020    Procedure: COLONOSCOPY;  Surgeon: Sylvester Arboleda III, MD;  Location: [facility] ENDO;  Service: Endoscopy;  Laterality: N/A;    CORONARY ANGIOPLASTY      CORONARY ARTERY BYPASS GRAFT      EXCISION OF MASS OF HAND Right 2/11/2022    Procedure: EXCISION, MASS, HAND;  Surgeon: Hernan Culver,  MD;  [facility]  Service: Orthopedics;  Laterality: Right;   thenar eminence skin lesion excised and a Z-plasty skin flap for coverage    HYSTERECTOMY      OOPHORECTOMY      THYROIDECTOMY       Family History   Problem Relation Age of Onset    Hypertension Mother     Stroke Mother     Lung cancer Daughter     Melanoma Neg Hx     Psoriasis Neg Hx     Lupus Neg Hx     Eczema Neg Hx      Social History     Socioeconomic History    Marital status:     Number of children: 3   Occupational History    Occupation: Retired     Comment: Dispatcher   Tobacco Use    Smoking status: Current Every Day Smoker     Packs/day: 0.25     Years: 40.00     Pack years: 10.00     Types: Cigarettes     Start date: 1961    Smokeless tobacco: Never Used    Tobacco comment: hold midnight prior to surgery   Substance and Sexual Activity    Alcohol use: Never    Drug use: No    Sexual activity: Yes   Other Topics Concern    Are you pregnant or think you may be? No    Breast-feeding No   Social History Narrative    Patient is retired dispatcher.     Social Determinants of Health     Financial Resource Strain: Low Risk     Difficulty of Paying Living Expenses: Not hard at all   Food Insecurity: No Food Insecurity    Worried About Running Out of Food in the Last Year: Never true    Ran Out of Food in the Last Year: Never true   Transportation Needs: Unmet Transportation Needs    Lack of Transportation (Medical): Yes    Lack of Transportation (Non-Medical): Yes   Physical Activity: Insufficiently Active    Days of Exercise per Week: 2 days    Minutes of Exercise per Session: 10 min   Stress: Stress Concern Present    Feeling of Stress : To some extent   Social Connections: Socially Isolated    Frequency of Communication with Friends and Family: More than three times a week    Frequency of Social Gatherings with Friends and Family: Twice a week    Attends Latter day Services: Never    Active Member of  Clubs or Organizations: No    Attends Club or Organization Meetings: Never    Marital Status:    Housing Stability: Low Risk     Unable to Pay for Housing in the Last Year: No    Number of Places Lived in the Last Year: 1    Unstable Housing in the Last Year: No     Medication List with Changes/Refills   Current Medications    ALBUTEROL (PROVENTIL/VENTOLIN HFA) 90 MCG/ACTUATION INHALER    Inhale 2 puffs into the lungs every 6 (six) hours as needed for Wheezing.    AMLODIPINE (NORVASC) 10 MG TABLET    Take 1 tablet (10 mg total) by mouth once daily.    ASPIRIN (ECOTRIN) 81 MG EC TABLET    Take 1 tablet (81 mg total) by mouth once daily.    ATORVASTATIN (LIPITOR) 80 MG TABLET    Take 1 tablet (80 mg total) by mouth once daily.    CITALOPRAM (CELEXA) 10 MG TABLET    Take 10 mg by mouth once daily.    CLOPIDOGREL (PLAVIX) 75 MG TABLET    TAKE 1 TABLET(75 MG) BY MOUTH EVERY DAY    EZETIMIBE (ZETIA) 10 MG TABLET    TAKE 1 TABLET(10 MG) BY MOUTH EVERY DAY    LEVOTHYROXINE (SYNTHROID) 137 MCG TAB TABLET    TAKE 1 TABLET BY MOUTH EVERY DAY    ONDANSETRON (ZOFRAN) 4 MG TABLET    TAKE 1 TABLET(4 MG) BY MOUTH EVERY 8 HOURS AS NEEDED FOR NAUSEA    OXYCODONE-ACETAMINOPHEN (PERCOCET)  MG PER TABLET    Take 1 tablet by mouth.    PROCHLORPERAZINE (COMPAZINE) 10 MG TABLET        TRAZODONE (DESYREL) 50 MG TABLET    TAKE 1 TABLET BY MOUTH EVERY DAY AT NIGHT AS NEEDED FOR INSOMNIA    VALSARTAN (DIOVAN) 320 MG TABLET    TAKE 1 TABLET(320 MG) BY MOUTH EVERY DAY     Review of patient's allergies indicates:  No Known Allergies  Review of Systems   Constitutional: Negative for malaise/fatigue.   HENT: Negative for hearing loss.    Eyes: Negative for double vision and visual disturbance.   Cardiovascular: Negative for chest pain.   Respiratory: Positive for shortness of breath.    Endocrine: Negative for cold intolerance.   Hematologic/Lymphatic: Does not bruise/bleed easily.   Skin: Negative for poor wound healing and  suspicious lesions.   Musculoskeletal: Positive for arthritis, joint pain, joint swelling and muscle weakness. Negative for gout.   Gastrointestinal: Negative for nausea and vomiting.   Genitourinary: Negative for dysuria.   Neurological: Negative for numbness, paresthesias and sensory change.   Psychiatric/Behavioral: Positive for substance abuse. Negative for depression and memory loss. The patient is not nervous/anxious.    Allergic/Immunologic: Negative for persistent infections.       Objective:   Body mass index is 31.31 kg/m².  There were no vitals filed for this visit.             General    Constitutional: She is oriented to person, place, and time. She appears well-developed and well-nourished. No distress.   HENT:   Head: Normocephalic.   Mouth/Throat: Oropharynx is clear and moist.   Eyes: EOM are normal.   Cardiovascular: Normal rate.    Pulmonary/Chest: Effort normal.   Abdominal: Soft.   Neurological: She is alert and oriented to person, place, and time. No cranial nerve deficit.   Psychiatric: She has a normal mood and affect.             Right Hand/Wrist Exam     Inspection   Scars: Wrist - present Hand -  present  Effusion: Wrist - absent Hand -  absent    Other     Neuorologic Exam    Median Distribution: normal  Ulnar Distribution: normal  Radial Distribution: normal    Comments:  Sutures are intact volar right wrist.  There is no sign of infection.  There are no motor or sensory deficits.          Vascular Exam       Capillary Refill  Right Hand: normal capillary refill      radiographs were not obtained today  Assessment:     Encounter Diagnosis   Name Primary?    Lichen planus Yes        Plan:     The patient had the retention sutures removed.  She may have 2 or 3 small sutures in place under the scab.  There is no sign of infection.  She cannot tolerate additional suture removal today.  She will return in 1 week for residual suture removal.                Disclaimer: This note was prepared  using a voice recognition system and is likely to have sound alike errors within the text.

## 2022-03-15 ENCOUNTER — TELEPHONE (OUTPATIENT)
Dept: CARDIOLOGY | Facility: CLINIC | Age: 77
End: 2022-03-15
Payer: MEDICARE

## 2022-03-15 NOTE — TELEPHONE ENCOUNTER
[facility] for patient to call back----- Message from Kadi Hanna sent at 3/15/2022 10:07 AM CDT -----  Contact: Flory Ruth is needing a call back regarding questions she has. Please call her back at [phone]

## 2022-03-17 ENCOUNTER — TELEPHONE (OUTPATIENT)
Dept: CARDIOLOGY | Facility: CLINIC | Age: 77
End: 2022-03-17
Payer: MEDICARE

## 2022-03-17 NOTE — TELEPHONE ENCOUNTER
Spoke to patient and she said it' ok she is alright----- Message from Julius Londono sent at 3/17/2022  4:21 PM CDT -----  Contact: self [phone]  Pt requesting a call back stated she need to ask a question.    Please call and advise

## 2022-03-21 ENCOUNTER — TELEPHONE (OUTPATIENT)
Dept: ORTHOPEDICS | Facility: CLINIC | Age: 77
End: 2022-03-21
Payer: MEDICARE

## 2022-03-21 NOTE — TELEPHONE ENCOUNTER
Spoke to patient regarding appointment. Pt states she will keep her appointment for 3/29 due to transportation. I offered her 3/23 but, is unable to make that date due to her not having enough time to find someone to bring her to that appointment.    ----- Message from Melani Villafuerte sent at 3/21/2022  2:00 PM CDT -----  Contact: Flory Ruth would like a call back at [phone], Regards to getting a sooner appointment.    Thanks  Td

## 2022-03-29 ENCOUNTER — OFFICE VISIT (OUTPATIENT)
Dept: ORTHOPEDICS | Facility: CLINIC | Age: 77
End: 2022-03-29
Payer: MEDICARE

## 2022-03-29 VITALS — WEIGHT: 194 LBS | BODY MASS INDEX: 31.18 KG/M2 | HEIGHT: 66 IN

## 2022-03-29 DIAGNOSIS — L43.9 LICHEN PLANUS: Primary | ICD-10-CM

## 2022-03-29 PROCEDURE — 1160F PR REVIEW ALL MEDS BY PRESCRIBER/CLIN PHARMACIST DOCUMENTED: ICD-10-PCS | Mod: CPTII,S$GLB,, | Performed by: ORTHOPAEDIC SURGERY

## 2022-03-29 PROCEDURE — 1101F PT FALLS ASSESS-DOCD LE1/YR: CPT | Mod: CPTII,S$GLB,, | Performed by: ORTHOPAEDIC SURGERY

## 2022-03-29 PROCEDURE — 1125F PR PAIN SEVERITY QUANTIFIED, PAIN PRESENT: ICD-10-PCS | Mod: CPTII,S$GLB,, | Performed by: ORTHOPAEDIC SURGERY

## 2022-03-29 PROCEDURE — 99999 PR PBB SHADOW E&M-EST. PATIENT-LVL III: ICD-10-PCS | Mod: PBBFAC,,, | Performed by: ORTHOPAEDIC SURGERY

## 2022-03-29 PROCEDURE — 1159F MED LIST DOCD IN RCRD: CPT | Mod: CPTII,S$GLB,, | Performed by: ORTHOPAEDIC SURGERY

## 2022-03-29 PROCEDURE — 1159F PR MEDICATION LIST DOCUMENTED IN MEDICAL RECORD: ICD-10-PCS | Mod: CPTII,S$GLB,, | Performed by: ORTHOPAEDIC SURGERY

## 2022-03-29 PROCEDURE — 3288F FALL RISK ASSESSMENT DOCD: CPT | Mod: CPTII,S$GLB,, | Performed by: ORTHOPAEDIC SURGERY

## 2022-03-29 PROCEDURE — 99024 PR POST-OP FOLLOW-UP VISIT: ICD-10-PCS | Mod: S$GLB,,, | Performed by: ORTHOPAEDIC SURGERY

## 2022-03-29 PROCEDURE — 1160F RVW MEDS BY RX/DR IN RCRD: CPT | Mod: CPTII,S$GLB,, | Performed by: ORTHOPAEDIC SURGERY

## 2022-03-29 PROCEDURE — 99024 POSTOP FOLLOW-UP VISIT: CPT | Mod: S$GLB,,, | Performed by: ORTHOPAEDIC SURGERY

## 2022-03-29 PROCEDURE — 99999 PR PBB SHADOW E&M-EST. PATIENT-LVL III: CPT | Mod: PBBFAC,,, | Performed by: ORTHOPAEDIC SURGERY

## 2022-03-29 PROCEDURE — 3288F PR FALLS RISK ASSESSMENT DOCUMENTED: ICD-10-PCS | Mod: CPTII,S$GLB,, | Performed by: ORTHOPAEDIC SURGERY

## 2022-03-29 PROCEDURE — 1125F AMNT PAIN NOTED PAIN PRSNT: CPT | Mod: CPTII,S$GLB,, | Performed by: ORTHOPAEDIC SURGERY

## 2022-03-29 PROCEDURE — 1101F PR PT FALLS ASSESS DOC 0-1 FALLS W/OUT INJ PAST YR: ICD-10-PCS | Mod: CPTII,S$GLB,, | Performed by: ORTHOPAEDIC SURGERY

## 2022-03-29 NOTE — PROGRESS NOTES
Subjective:     Patient ID: Flory Cam is a 76 y.o. female.    Chief Complaint: Pain and Post-op Evaluation of the Right Hand      HPI:  The patient is seen in follow-up after excision lesion right hand.  It was quite large and path report turned out to be benign.  She reports for 3 remaining sutures removal.  Date of surgery was 02/11/2022    Past Medical History:   Diagnosis Date    Acute coronary syndrome     Anxiety     Bilateral carotid artery stenosis 11/17/2015    Chronic pain     Colon polyp     Coronary artery disease     GERD (gastroesophageal reflux disease)     Hyperlipidemia     Hypertension     Hypothyroidism     Low back pain     Lupus     Osteoporosis     Poor circulation     Pre-operative clearance 7/12/2019    PVD (peripheral vascular disease)     S/P peripheral artery angioplasty 02/13/2014    SCCA (squamous cell carcinoma) of skin 10/2019    Dr. ZANDRA Rivas--oncology    Skin disease      Past Surgical History:   Procedure Laterality Date    AORTOGRAPHY WITH SERIALOGRAPHY N/A 5/25/2021    Procedure: AORTOGRAM, WITH RUNOFF;  Surgeon: Christopher Cohen MD;  Location: [facility] CATH LAB;  Service: Cardiology;  Laterality: N/A;  COVID-19, mRNA, LNP-S, PF, 30 mcg/0.3 mL dose (COVID-19, MRNA, LN-S, PF (Pfizer)) 1/30/2021, 1/9/2021    Atherosclerotic PVD with intermittent claudication Bilateral 05/2021    Dr. Cohen    CATARACT EXTRACTION      COLONOSCOPY N/A 1/4/2017    Procedure: COLONOSCOPY;  Surgeon: Sylvester Arboleda III, MD;  Location: [facility] ENDO;  Service: Endoscopy;  Laterality: N/A;    COLONOSCOPY N/A 8/4/2020    Procedure: COLONOSCOPY;  Surgeon: Sylvester Arboleda III, MD;  Location: [facility] ENDO;  Service: Endoscopy;  Laterality: N/A;    CORONARY ANGIOPLASTY      CORONARY ARTERY BYPASS GRAFT      EXCISION OF MASS OF HAND Right 2/11/2022    Procedure: EXCISION, MASS, HAND;  Surgeon: Hernan Culver MD;  Location: [facility] OR;  Service: Orthopedics;  Laterality: Right;    thenar eminence skin lesion excised and a Z-plasty skin flap for coverage    HYSTERECTOMY      OOPHORECTOMY      THYROIDECTOMY       Family History   Problem Relation Age of Onset    Hypertension Mother     Stroke Mother     Lung cancer Daughter     Melanoma Neg Hx     Psoriasis Neg Hx     Lupus Neg Hx     Eczema Neg Hx      Social History     Socioeconomic History    Marital status:     Number of children: 3   Occupational History    Occupation: Retired     Comment: Dispatcher   Tobacco Use    Smoking status: Current Every Day Smoker     Packs/day: 0.25     Years: 40.00     Pack years: 10.00     Types: Cigarettes     Start date: 1961    Smokeless tobacco: Never Used    Tobacco comment: hold midnight prior to surgery   Substance and Sexual Activity    Alcohol use: Never    Drug use: No    Sexual activity: Yes   Other Topics Concern    Are you pregnant or think you may be? No    Breast-feeding No   Social History Narrative    Patient is retired dispatcher.     Social Determinants of Health     Financial Resource Strain: Low Risk     Difficulty of Paying Living Expenses: Not hard at all   Food Insecurity: No Food Insecurity    Worried About Running Out of Food in the Last Year: Never true    Ran Out of Food in the Last Year: Never true   Transportation Needs: Unmet Transportation Needs    Lack of Transportation (Medical): Yes    Lack of Transportation (Non-Medical): Yes   Physical Activity: Insufficiently Active    Days of Exercise per Week: 2 days    Minutes of Exercise per Session: 10 min   Stress: Stress Concern Present    Feeling of Stress : To some extent   Social Connections: Socially Isolated    Frequency of Communication with Friends and Family: More than three times a week    Frequency of Social Gatherings with Friends and Family: Twice a week    Attends Cheondoism Services: Never    Active Member of Clubs or Organizations: No    Attends Club or Organization Meetings:  Never    Marital Status:    Housing Stability: Low Risk     Unable to Pay for Housing in the Last Year: No    Number of Places Lived in the Last Year: 1    Unstable Housing in the Last Year: No     Medication List with Changes/Refills   Current Medications    ALBUTEROL (PROVENTIL/VENTOLIN HFA) 90 MCG/ACTUATION INHALER    Inhale 2 puffs into the lungs every 6 (six) hours as needed for Wheezing.    AMLODIPINE (NORVASC) 10 MG TABLET    Take 1 tablet (10 mg total) by mouth once daily.    ASPIRIN (ECOTRIN) 81 MG EC TABLET    Take 1 tablet (81 mg total) by mouth once daily.    ATORVASTATIN (LIPITOR) 80 MG TABLET    Take 1 tablet (80 mg total) by mouth once daily.    CITALOPRAM (CELEXA) 10 MG TABLET    Take 10 mg by mouth once daily.    CLOPIDOGREL (PLAVIX) 75 MG TABLET    TAKE 1 TABLET(75 MG) BY MOUTH EVERY DAY    EZETIMIBE (ZETIA) 10 MG TABLET    TAKE 1 TABLET(10 MG) BY MOUTH EVERY DAY    LEVOTHYROXINE (SYNTHROID) 137 MCG TAB TABLET    TAKE 1 TABLET BY MOUTH EVERY DAY    ONDANSETRON (ZOFRAN) 4 MG TABLET    TAKE 1 TABLET(4 MG) BY MOUTH EVERY 8 HOURS AS NEEDED FOR NAUSEA    OXYCODONE-ACETAMINOPHEN (PERCOCET)  MG PER TABLET    Take 1 tablet by mouth.    PROCHLORPERAZINE (COMPAZINE) 10 MG TABLET        TRAZODONE (DESYREL) 50 MG TABLET    TAKE 1 TABLET BY MOUTH EVERY DAY AT NIGHT AS NEEDED FOR INSOMNIA    VALSARTAN (DIOVAN) 320 MG TABLET    TAKE 1 TABLET(320 MG) BY MOUTH EVERY DAY     Review of patient's allergies indicates:  No Known Allergies  Review of Systems   Constitutional: Negative for malaise/fatigue.   HENT: Negative for hearing loss.    Eyes: Negative for double vision and visual disturbance.   Cardiovascular: Positive for chest pain and claudication.   Respiratory: Positive for shortness of breath.    Endocrine: Negative for cold intolerance.   Hematologic/Lymphatic: Does not bruise/bleed easily.   Skin: Negative for poor wound healing and suspicious lesions.   Musculoskeletal: Positive for  arthritis, joint pain, joint swelling and muscle weakness. Negative for gout.   Gastrointestinal: Negative for nausea and vomiting.   Genitourinary: Negative for dysuria.   Neurological: Negative for numbness, paresthesias and sensory change.   Psychiatric/Behavioral: Positive for substance abuse. Negative for depression and memory loss. The patient is not nervous/anxious.    Allergic/Immunologic: Negative for persistent infections.       Objective:   Body mass index is 31.31 kg/m².  There were no vitals filed for this visit.             General    Constitutional: She is oriented to person, place, and time. She appears well-developed and well-nourished. No distress.   HENT:   Head: Normocephalic.   Eyes: EOM are normal.   Pulmonary/Chest: Effort normal.   Neurological: She is oriented to person, place, and time.   Psychiatric: She has a normal mood and affect.             Right Hand/Wrist Exam     Inspection   Scars: Wrist - absent Hand -  present  Effusion: Wrist - absent Hand -  absent    Other     Neuorologic Exam    Median Distribution: normal  Ulnar Distribution: normal  Radial Distribution: normal    Comments:  Three sutures remain in place from the mass excision.  These were removed today.          Vascular Exam       Capillary Refill  Right Hand: normal capillary refill        radiographs were not obtained today  Assessment:     Encounter Diagnosis   Name Primary?    Lichen planus Yes        Plan:     The patient was referred back to Phyllis Yap in the dermatology clinic for follow-up.  She will return on an as-needed basis.                Disclaimer: This note was prepared using a voice recognition system and is likely to have sound alike errors within the text.

## 2022-03-30 ENCOUNTER — PATIENT OUTREACH (OUTPATIENT)
Dept: ADMINISTRATIVE | Facility: OTHER | Age: 77
End: 2022-03-30
Payer: MEDICARE

## 2022-03-31 ENCOUNTER — EXTERNAL CHRONIC CARE MANAGEMENT (OUTPATIENT)
Dept: PRIMARY CARE CLINIC | Facility: CLINIC | Age: 77
End: 2022-03-31
Payer: MEDICARE

## 2022-03-31 ENCOUNTER — OFFICE VISIT (OUTPATIENT)
Dept: PODIATRY | Facility: CLINIC | Age: 77
End: 2022-03-31
Payer: MEDICARE

## 2022-03-31 VITALS — BODY MASS INDEX: 31.18 KG/M2 | WEIGHT: 194 LBS | HEIGHT: 66 IN

## 2022-03-31 DIAGNOSIS — L84 CORN OR CALLUS: ICD-10-CM

## 2022-03-31 DIAGNOSIS — I73.9 PERIPHERAL VASCULAR DISEASE: Primary | ICD-10-CM

## 2022-03-31 DIAGNOSIS — B35.1 DERMATOPHYTOSIS OF NAIL: ICD-10-CM

## 2022-03-31 DIAGNOSIS — M79.676 PAIN OF FIFTH TOE: ICD-10-CM

## 2022-03-31 DIAGNOSIS — L93.0 DISCOID LUPUS ERYTHEMATOSUS: ICD-10-CM

## 2022-03-31 PROCEDURE — 99490 CHRNC CARE MGMT STAFF 1ST 20: CPT | Mod: S$GLB,,, | Performed by: INTERNAL MEDICINE

## 2022-03-31 PROCEDURE — 11721 DEBRIDE NAIL 6 OR MORE: CPT | Mod: 59,Q8,S$GLB, | Performed by: PODIATRIST

## 2022-03-31 PROCEDURE — 1101F PT FALLS ASSESS-DOCD LE1/YR: CPT | Mod: CPTII,S$GLB,, | Performed by: PODIATRIST

## 2022-03-31 PROCEDURE — 99439 PR CHRONIC CARE MGMT, EA ADDTL 20 MIN: ICD-10-PCS | Mod: S$GLB,,, | Performed by: INTERNAL MEDICINE

## 2022-03-31 PROCEDURE — 11056 PARNG/CUTG B9 HYPRKR LES 2-4: CPT | Mod: Q8,S$GLB,, | Performed by: PODIATRIST

## 2022-03-31 PROCEDURE — 1125F PR PAIN SEVERITY QUANTIFIED, PAIN PRESENT: ICD-10-PCS | Mod: CPTII,S$GLB,, | Performed by: PODIATRIST

## 2022-03-31 PROCEDURE — 99439 CHRNC CARE MGMT STAF EA ADDL: CPT | Mod: S$GLB,,, | Performed by: INTERNAL MEDICINE

## 2022-03-31 PROCEDURE — 11721 PR DEBRIDEMENT OF NAILS, 6 OR MORE: ICD-10-PCS | Mod: 59,Q8,S$GLB, | Performed by: PODIATRIST

## 2022-03-31 PROCEDURE — 99999 PR PBB SHADOW E&M-EST. PATIENT-LVL III: ICD-10-PCS | Mod: PBBFAC,,, | Performed by: PODIATRIST

## 2022-03-31 PROCEDURE — 99213 PR OFFICE/OUTPT VISIT, EST, LEVL III, 20-29 MIN: ICD-10-PCS | Mod: 25,S$GLB,, | Performed by: PODIATRIST

## 2022-03-31 PROCEDURE — 1125F AMNT PAIN NOTED PAIN PRSNT: CPT | Mod: CPTII,S$GLB,, | Performed by: PODIATRIST

## 2022-03-31 PROCEDURE — 1159F PR MEDICATION LIST DOCUMENTED IN MEDICAL RECORD: ICD-10-PCS | Mod: CPTII,S$GLB,, | Performed by: PODIATRIST

## 2022-03-31 PROCEDURE — 99499 UNLISTED E&M SERVICE: CPT | Mod: HCNC,S$GLB,, | Performed by: PODIATRIST

## 2022-03-31 PROCEDURE — 1101F PR PT FALLS ASSESS DOC 0-1 FALLS W/OUT INJ PAST YR: ICD-10-PCS | Mod: CPTII,S$GLB,, | Performed by: PODIATRIST

## 2022-03-31 PROCEDURE — 99999 PR PBB SHADOW E&M-EST. PATIENT-LVL III: CPT | Mod: PBBFAC,,, | Performed by: PODIATRIST

## 2022-03-31 PROCEDURE — 3288F PR FALLS RISK ASSESSMENT DOCUMENTED: ICD-10-PCS | Mod: CPTII,S$GLB,, | Performed by: PODIATRIST

## 2022-03-31 PROCEDURE — 99213 OFFICE O/P EST LOW 20 MIN: CPT | Mod: 25,S$GLB,, | Performed by: PODIATRIST

## 2022-03-31 PROCEDURE — 99499 RISK ADDL DX/OHS AUDIT: ICD-10-PCS | Mod: HCNC,S$GLB,, | Performed by: PODIATRIST

## 2022-03-31 PROCEDURE — 1159F MED LIST DOCD IN RCRD: CPT | Mod: CPTII,S$GLB,, | Performed by: PODIATRIST

## 2022-03-31 PROCEDURE — 99490 PR CHRONIC CARE MGMT, 1ST 20 MIN: ICD-10-PCS | Mod: S$GLB,,, | Performed by: INTERNAL MEDICINE

## 2022-03-31 PROCEDURE — 3288F FALL RISK ASSESSMENT DOCD: CPT | Mod: CPTII,S$GLB,, | Performed by: PODIATRIST

## 2022-03-31 PROCEDURE — 11056 PR TRIM BENIGN HYPERKERATOTIC SKIN LESION,2-4: ICD-10-PCS | Mod: Q8,S$GLB,, | Performed by: PODIATRIST

## 2022-04-01 NOTE — PROGRESS NOTES
PODIATRY NOTE    CHIEF COMPLAINT  Chief Complaint   Patient presents with    Nail Care     F/u for RFC, corn on 5th toe, right, 5/10, PVD, wears sandals and socks, last seen PCP Tayler Ovalle on 12/31/21       HPI  SUBJECTIVE: Flory Cam is a 76 y.o. female w/ PMH of PVD, Lupus, hx of intermittent leg claudifcation- s/p vascular intervention with much improvement in symptoms and other medical problems who presents to clinic for high risk  foot exam and care. Patient admits to painful toenails and calluses aggravated by increased weight bearing, shoe gear, and pressure. States pain is relieved with routine debridements.       HgA1c:   Hemoglobin A1C   Date Value Ref Range Status   08/04/2017 5.8 (H) 4.0 - 5.6 % Final     Comment:     According to ADA guidelines, hemoglobin A1c <7.0% represents  optimal control in non-pregnant diabetic patients. Different  metrics may apply to specific patient populations.   Standards of Medical Care in Diabetes-2016.  For the purpose of screening for the presence of diabetes:  <5.7%     Consistent with the absence of diabetes  5.7-6.4%  Consistent with increasing risk for diabetes   (prediabetes)  >or=6.5%  Consistent with diabetes  Currently, no consensus exists for use of hemoglobin A1c  for diagnosis of diabetes for children.  This Hemoglobin A1c assay has significant interference with fetal   hemoglobin   (HbF). The results are invalid for patients with abnormal amounts of   HbF,   including those with known Hereditary Persistence   of Fetal Hemoglobin. Heterozygous hemoglobin variants (HbAS, HbAC,   HbAD, HbAE, HbA2) do not significantly interfere with this assay;   however, presence of multiple variants in a sample may impact the %   interference.     09/14/2016 6.2 4.5 - 6.2 % Final     Comment:     According to ADA guidelines, hemoglobin A1C <7.0% represents  optimal control in non-pregnant diabetic patients.  Different  metrics may apply to specific populations.  "  Standards of Medical Care in Diabetes - 2016.  For the purpose of screening for the presence of diabetes:  <5.7%     Consistent with the absence of diabetes  5.7-6.4%  Consistent with increasing risk for diabetes   (prediabetes)  >or=6.5%  Consistent with diabetes  Currently no consensus exists for use of hemoglobin A1C  for diagnosis of diabetes for children.     04/13/2010 5.9 4.0 - 6.2 % Final     REVIEW OF SYSTEMS  General: This patient is well-developed, well-nourished and appears stated age, well-oriented to person, place and time, and cooperative and pleasant on today's visit  Constitutional: Negative for chills and fever.   Respiratory: Negative for shortness of breath.    Cardiovascular: Negative for chest pain, palpitations, orthopnea  Gastrointestinal: Negative for diarrhea, nausea and vomiting.   Musculoskeletal: Positive for above noted in HPI  Skin:positive for skin and nail changes   Peripheral Vascular: no claudication or cyanosis  Psychiatric/Behavioral: Negative for altered mental status     PHYSICAL EXAM  Vitals:    03/31/22 1206   Weight: 88 kg (194 lb)   Height: 5' 6" (1.676 m)   PainSc:   5       GEN:  This patient is well-developed, well-nourished and appears stated age, well-oriented to person, place and time, and cooperative and pleasant on today's visit.      LOWER EXTREMITY  Vascular:   · DP pedal pulse 0/4 b/l, PT pedal pulse 1/4 b/l  · Skin temperature warm to warm from prox to distally  · CFT <5 secs b/l  · There is LE edema noted b/l.     Dermatologic:   · Thickened, dystrophic, elongated toenails with subungal debris 1-5 b/l.   · Webspaces are C/D/I B/L.  · There is hyperkeratotic tissue noted plantar aspect of b/l feet at medial arch x 2, dorsal lateral right fifth digit  · Skin texture and turgor dry with hyperkeratosis diffusely b/l plantar heel   · There is no pedal hair growth noted    Neurologic:  · Vibratory sensation diminished at level of Hallux IPJ b/l    · Protective " sensation absent at 0/10 sites upon examination with [employer]en 5.07 g monofilament.   · Propioception intact at 1st MTPJ b/l.   · Achilles and patellar deep tendon reflexes intact  · Babinski reflex absent b/l. Light touch and sharp/dull sensation intact b/l.    Musculoskeletal/Orthopedic:  · Pain dorsolateral fifth digit  · Muscle strength is 5/5 for foot inverters, everters, plantarflexors, and dorsiflexors. Muscle tone is normal.  · Pain free range of motion in all four quadrants with stiffness and limitation b/l  · PAIN with palpation of callus plantar medial arch, LEFT    ASSESSMENT  Encounter Diagnoses   Name Primary?    Peripheral vascular disease Yes    Dermatophytosis of nail     Corn or callus     Discoid lupus erythematosus     Pain of fifth toe        Plan:  -Discuss presenting problems, etiology, pathologic processes and management options with patient today.     Peripheral vascular disease    Dermatophytosis of nail    Corn or callus    Discoid lupus erythematosus    Pain of fifth toe        -With patient's permission, the elongated onychomycotic toenails, as outlined in the physical examination, were sharply debrided with a double action nail nipper to their soft tissue attachment. If indicated, the nails were then smoothed down in thickness with an [place] board or dremmel to facilitate in further debridement removing all offending nail and subungual debris.  The elongated nails, as outlined in the objective portion of this note, were trimmed to appropriate length, with a double action nail nipper, for alleviation/reduction of pains as well. Patient relates relief following the procedure.     -With patient's permission via verbal consent, the involved area was cleansed with an alcohol swab. Trimming of hyperkeratotic lesions deep to epidermal layer x 3 was performed with a #15 blade without incident. Patient relates relief following the procedure. Patient will continue to monitor the areas  daily, inspect feet, wear protective shoe gear when ambulatory, moisturizer to maintain skin integrity.    -Recommendations on purchase of Urea 40 and/or Amlactin was provided for calluses. Metatarsal pad dispensed and patient was instructed on how to apply for offloading callus. Shoe recommendations provided for accomodative foot wear.    Patient has above noted diagnosis and/or medical co-morbidities.They are being treated and managed by their  Primary Care Physician for management of comorbid states which are deemed to be currently stable.          Future Appointments   Date Time Provider Department Center   5/4/2022 10:15 AM Treasure Yap MD [facility] DERM [place]   6/9/2022  9:20 AM LABORATORY, [facility] [place]   6/16/2022  2:45 PM Christopher Cohen MD [facility]   6/30/2022 10:00 AM Melva Nettles DPM [facility] POD [facility]

## 2022-04-03 ENCOUNTER — NURSE TRIAGE (OUTPATIENT)
Dept: ADMINISTRATIVE | Facility: CLINIC | Age: 77
End: 2022-04-03
Payer: MEDICARE

## 2022-04-03 NOTE — TELEPHONE ENCOUNTER
OOC NT return call -  Pt reports nausea and mild diarrhea. Requesting med be called in. Nausea protocol followed and pt advised to be seen by provider with in 2 weeks. NT able to schedule apt with in time frame.  Encounter routed to PCP.     Reason for Disposition   Nausea is a chronic symptom (recurrent or ongoing AND present > 4 weeks)    Additional Information   Negative: Shock suspected (e.g., cold/pale/clammy skin, too weak to stand, low BP, rapid pulse)   Negative: Sounds like a life-threatening emergency to the triager   Negative: [1] Nausea or vomiting AND [2] pregnancy < 20 weeks   Negative: Menstrual Period - Missed or Late (i.e., pregnancy suspected)   Negative: Heat exhaustion suspected (i.e., dehydration from heat exposure)   Negative: Motion sickness suspected (i.e., nausea with car, plane, boat, or train travel)   Negative: Anxiety or stress suspected (i.e., nausea with anxiety attacks or stressful situations)   Negative: Traumatic Brain Injury (TBI) suspected   Negative: Nausea (or Vomiting) in a cancer patient who is currently (or recently) receiving chemotherapy or radiation therapy, or cancer patient who has metastatic or end-stage cancer and is receiving palliative care   Negative: Vomiting occurs   Negative: Other symptom is present, see that guideline.  (e.g., chest pain, headache, dizziness, abdominal pain, colds, sore throat, etc.).   Negative: Unable to walk, or can only walk with assistance (e.g., requires support)   Negative: Difficulty breathing   Negative: [1] Insulin-dependent diabetes (Type I) AND [2] glucose > 400 mg/dl (22 mmol/l)   Negative: [1] Drinking very little AND [2] dehydration suspected (e.g., no urine > 12 hours, very dry mouth, very lightheaded)   Negative: Patient sounds very sick or weak to the triager   Negative: Fever > 104 F (40 C)   Negative: [1] Fever > 101 F (38.3 C) AND [2] age > 60 years   Negative: [1] Fever > 100.0 F (37.8 C) AND [2]  bedridden (e.g., nursing home patient, CVA, chronic illness, recovering from surgery)   Negative: [1] Fever > 100.0 F (37.8 C) AND [2] diabetes mellitus or weak immune system (e.g., HIV positive, cancer chemo, splenectomy, organ transplant, chronic steroids)   Negative: Taking any of the following medications: digoxin (Lanoxin), lithium, theophylline, phenytoin (Dilantin)   Negative: Yellowish color of the skin or white of the eye (i.e., jaundice)   Negative: Fever present > 3 days (72 hours)   Negative: Receiving cancer chemotherapy medication   Negative: Taking prescription medication that could cause nausea (e.g., narcotics/opiates, antibiotics, OCPs, many others)   Negative: Nausea lasts > 1 week   Negative: Substance use (drug use) or unhealthy alcohol use, known or suspected    Protocols used: NAUSEA-A-AH

## 2022-04-05 DIAGNOSIS — R11.0 NAUSEA: ICD-10-CM

## 2022-04-05 RX ORDER — ONDANSETRON 4 MG/1
4 TABLET, FILM COATED ORAL EVERY 6 HOURS PRN
Qty: 15 TABLET | Refills: 0 | Status: CANCELLED | OUTPATIENT
Start: 2022-04-05

## 2022-04-05 NOTE — TELEPHONE ENCOUNTER
Care Due:                  Date            Visit Type   Department     Provider  --------------------------------------------------------------------------------                                EP -                              PRIMARY      [facility] INTERNAL  Last Visit: 06-      CARE ([facility])   MEDICINE       Tayler Ovalle  Next Visit: None Scheduled  None         None Found                                                            Last  Test          Frequency    Reason                     Performed    Due Date  --------------------------------------------------------------------------------    Office Visit  12 months..  albuterol, traZODone.....  06- 06-    Powered by [employer]. Reference number: [id number].   4/05/2022 3:24:00 PM CDT

## 2022-04-13 ENCOUNTER — TELEPHONE (OUTPATIENT)
Dept: ORTHOPEDICS | Facility: CLINIC | Age: 77
End: 2022-04-13
Payer: MEDICARE

## 2022-04-13 DIAGNOSIS — M25.561 RIGHT KNEE PAIN, UNSPECIFIED CHRONICITY: Primary | ICD-10-CM

## 2022-04-13 NOTE — TELEPHONE ENCOUNTER
Patient scheduled with Dr. Rian Lindquist 05/03/2022 at 3:20 pm with xrays at 3pm. Pt notified. Pt verbalized understanding.        ----- Message from Virginie Moyer sent at 4/13/2022  1:17 PM CDT -----  Contact: self [phone]  Patient called regarding R knee issues please call back art [phone]    Thanks bs

## 2022-04-29 ENCOUNTER — TELEPHONE (OUTPATIENT)
Dept: INTERNAL MEDICINE | Facility: CLINIC | Age: 77
End: 2022-04-29
Payer: MEDICARE

## 2022-04-29 NOTE — TELEPHONE ENCOUNTER
----- Message from Waqas Jones sent at 4/29/2022 12:56 PM CDT -----  Contact: Flory  Patient is calling to speak with the nurse regarding some questions to discuss.Patient declined to provide additional details at this time. Please give patient a call back at [phone] as requested.  Thanks,  RP

## 2022-04-30 ENCOUNTER — EXTERNAL CHRONIC CARE MANAGEMENT (OUTPATIENT)
Dept: PRIMARY CARE CLINIC | Facility: CLINIC | Age: 77
End: 2022-04-30
Payer: MEDICARE

## 2022-04-30 PROCEDURE — 99490 PR CHRONIC CARE MGMT, 1ST 20 MIN: ICD-10-PCS | Mod: S$GLB,,, | Performed by: INTERNAL MEDICINE

## 2022-04-30 PROCEDURE — 99490 CHRNC CARE MGMT STAFF 1ST 20: CPT | Mod: S$GLB,,, | Performed by: INTERNAL MEDICINE

## 2022-05-02 ENCOUNTER — TELEPHONE (OUTPATIENT)
Dept: INTERNAL MEDICINE | Facility: CLINIC | Age: 77
End: 2022-05-02
Payer: MEDICARE

## 2022-05-02 NOTE — TELEPHONE ENCOUNTER
----- Message from Sherry Dennis sent at 5/2/2022  1:48 PM CDT -----  Pt would like a return call regarding whether she needs a covid test or not. Pt states her son came over last week and has beem diagnosed with Covid. Pt would like to know if you think she should get a covid test or not. Please call back at .[phone]

## 2022-05-02 NOTE — TELEPHONE ENCOUNTER
Spoke with patient about testing here in clinic. Patient doesn't have any symptoms at this time. Pt has a home test that she will test herself at home.

## 2022-05-03 ENCOUNTER — PATIENT OUTREACH (OUTPATIENT)
Dept: ADMINISTRATIVE | Facility: OTHER | Age: 77
End: 2022-05-03
Payer: MEDICARE

## 2022-05-04 ENCOUNTER — HOSPITAL ENCOUNTER (OUTPATIENT)
Dept: RADIOLOGY | Facility: HOSPITAL | Age: 77
Discharge: HOME | End: 2022-05-04
Attending: FAMILY MEDICINE
Payer: MEDICARE

## 2022-05-04 ENCOUNTER — TELEPHONE (OUTPATIENT)
Dept: PHARMACY | Facility: CLINIC | Age: 77
End: 2022-05-04
Payer: MEDICARE

## 2022-05-04 ENCOUNTER — OFFICE VISIT (OUTPATIENT)
Dept: DERMATOLOGY | Facility: CLINIC | Age: 77
End: 2022-05-04
Payer: MEDICARE

## 2022-05-04 DIAGNOSIS — M25.561 RIGHT KNEE PAIN, UNSPECIFIED CHRONICITY: ICD-10-CM

## 2022-05-04 DIAGNOSIS — L43.0 LICHEN PLANUS HYPERTROPHICUS: Primary | ICD-10-CM

## 2022-05-04 DIAGNOSIS — L40.3 PALMOPLANTAR PUSTULAR PSORIASIS: ICD-10-CM

## 2022-05-04 PROCEDURE — 73562 XR KNEE ORTHO RIGHT WITH FLEXION: ICD-10-PCS | Mod: 26,LT,, | Performed by: RADIOLOGY

## 2022-05-04 PROCEDURE — 73564 X-RAY EXAM KNEE 4 OR MORE: CPT | Mod: 26,RT,, | Performed by: RADIOLOGY

## 2022-05-04 PROCEDURE — 73562 X-RAY EXAM OF KNEE 3: CPT | Mod: 26,LT,, | Performed by: RADIOLOGY

## 2022-05-04 PROCEDURE — 3288F FALL RISK ASSESSMENT DOCD: CPT | Mod: CPTII,S$GLB,, | Performed by: DERMATOLOGY

## 2022-05-04 PROCEDURE — 1101F PT FALLS ASSESS-DOCD LE1/YR: CPT | Mod: CPTII,S$GLB,, | Performed by: DERMATOLOGY

## 2022-05-04 PROCEDURE — 99214 PR OFFICE/OUTPT VISIT, EST, LEVL IV, 30-39 MIN: ICD-10-PCS | Mod: S$GLB,,, | Performed by: DERMATOLOGY

## 2022-05-04 PROCEDURE — 1126F PR PAIN SEVERITY QUANTIFIED, NO PAIN PRESENT: ICD-10-PCS | Mod: CPTII,S$GLB,, | Performed by: DERMATOLOGY

## 2022-05-04 PROCEDURE — 3288F PR FALLS RISK ASSESSMENT DOCUMENTED: ICD-10-PCS | Mod: CPTII,S$GLB,, | Performed by: DERMATOLOGY

## 2022-05-04 PROCEDURE — 99499 UNLISTED E&M SERVICE: CPT | Mod: ,,, | Performed by: DERMATOLOGY

## 2022-05-04 PROCEDURE — 1159F MED LIST DOCD IN RCRD: CPT | Mod: CPTII,S$GLB,, | Performed by: DERMATOLOGY

## 2022-05-04 PROCEDURE — 1160F PR REVIEW ALL MEDS BY PRESCRIBER/CLIN PHARMACIST DOCUMENTED: ICD-10-PCS | Mod: CPTII,S$GLB,, | Performed by: DERMATOLOGY

## 2022-05-04 PROCEDURE — 73562 X-RAY EXAM OF KNEE 3: CPT | Mod: TC,LT,59

## 2022-05-04 PROCEDURE — 73564 XR KNEE ORTHO RIGHT WITH FLEXION: ICD-10-PCS | Mod: 26,RT,, | Performed by: RADIOLOGY

## 2022-05-04 PROCEDURE — 99999 PR PBB SHADOW E&M-EST. PATIENT-LVL III: CPT | Mod: PBBFAC,,, | Performed by: DERMATOLOGY

## 2022-05-04 PROCEDURE — 99214 OFFICE O/P EST MOD 30 MIN: CPT | Mod: S$GLB,,, | Performed by: DERMATOLOGY

## 2022-05-04 PROCEDURE — 1126F AMNT PAIN NOTED NONE PRSNT: CPT | Mod: CPTII,S$GLB,, | Performed by: DERMATOLOGY

## 2022-05-04 PROCEDURE — 1159F PR MEDICATION LIST DOCUMENTED IN MEDICAL RECORD: ICD-10-PCS | Mod: CPTII,S$GLB,, | Performed by: DERMATOLOGY

## 2022-05-04 PROCEDURE — 99499 RISK ADDL DX/OHS AUDIT: ICD-10-PCS | Mod: ,,, | Performed by: DERMATOLOGY

## 2022-05-04 PROCEDURE — 99999 PR PBB SHADOW E&M-EST. PATIENT-LVL III: ICD-10-PCS | Mod: PBBFAC,,, | Performed by: DERMATOLOGY

## 2022-05-04 PROCEDURE — 1101F PR PT FALLS ASSESS DOC 0-1 FALLS W/OUT INJ PAST YR: ICD-10-PCS | Mod: CPTII,S$GLB,, | Performed by: DERMATOLOGY

## 2022-05-04 PROCEDURE — 1160F RVW MEDS BY RX/DR IN RCRD: CPT | Mod: CPTII,S$GLB,, | Performed by: DERMATOLOGY

## 2022-05-04 RX ORDER — FLUOCINONIDE 0.5 MG/G
CREAM TOPICAL
Qty: 60 G | Refills: 3 | Status: SHIPPED | OUTPATIENT
Start: 2022-05-04 | End: 2023-10-30

## 2022-05-04 RX ORDER — TAZAROTENE 1 MG/G
CREAM TOPICAL
Qty: 60 G | Refills: 3 | Status: SHIPPED | OUTPATIENT
Start: 2022-05-04 | End: 2023-10-30

## 2022-05-04 NOTE — PROGRESS NOTES
Subjective:       Patient ID:  Flory Cam is a 76 y.o. female who presents for   Chief Complaint   Patient presents with    Follow-up     Surgery on hand      70 y/o female c/ hx of DLE and lichen planus overlap of the bilateral arms (KYLE negative) x 50 years and RA (with +.0), last seen on 10/14/21.  She is s/p excision by Dr. Culver on 2/11/22 flattened lesion, biopsy consisted of keratotic lesion possible VV. Pt believes lesions have worsened once stopped IV infusions with chemotherapy by Dr. Rivas.      She understands risk of possible skin cancer and greater risk for metastases in scars.      Prior history: Bx done showing benign verrucous keratosis with focal lichenoid inflammation of the right palm, Dr. Rivas at [facility] and receives infusion (uncertain name) for SCC of the right hand,     Prior treatments: plaquenil, MTX, protopic 0.1% ointment BID, fluticasone, ILK and topical steroids, mometasone oint, urea cream fluocinonide, urea cream, compounded urea/fluocinonide, remote hx of cellcept (rash) and imuran (rash)      Review of Systems   Constitutional: Negative for fever and chills.   Gastrointestinal: Negative for nausea and vomiting.   Skin: Positive for activity-related sunscreen use. Negative for daily sunscreen use and recent sunburn.   Hematologic/Lymphatic: Does not bruise/bleed easily.        Objective:    Physical Exam   Constitutional: She appears well-developed and well-nourished. No distress.   Neurological: She is alert and oriented to person, place, and time. She is not disoriented.   Psychiatric: She has a normal mood and affect.   Skin:   Areas Examined (abnormalities noted in diagram):   Head / Face Inspection Performed  Neck Inspection Performed  RUE Inspected  LUE Inspection Performed  Nails and Digits Inspection Performed              1. Right hand, mass, excision:   - BENIGN VERRUCOUS KERATOSIS WITH FEATURES SUGGESTIVE OF VERRUCA VULGARIS,   INFLAMED.   This  lesion is benign.    Assessment / Plan:        Lichen planus hypertrophicus  Palmoplantar pustular psoriasis  -     tazarotene (TAZORAC) 0.1 % cream; AAA of hands qOD  Dispense: 60 g; Refill: 3  -     fluocinonide 0.05% (LIDEX) 0.05 % cream; AAA of hands qOD  Dispense: 60 g; Refill: 3  -     Consider tazorac if covered. Discussed soriatane vs. Otezla, discussed may be difficult to get otezla covered.  Pt defers, will attempt to get records from [facility] from Dr. Rivas to find out chemotherapy previously used.              Follow up in about 3 months (around 8/4/2022).

## 2022-05-31 ENCOUNTER — EXTERNAL CHRONIC CARE MANAGEMENT (OUTPATIENT)
Dept: PRIMARY CARE CLINIC | Facility: CLINIC | Age: 77
End: 2022-05-31
Payer: MEDICARE

## 2022-05-31 PROCEDURE — 99490 CHRNC CARE MGMT STAFF 1ST 20: CPT | Mod: S$GLB,,, | Performed by: INTERNAL MEDICINE

## 2022-05-31 PROCEDURE — 99490 PR CHRONIC CARE MGMT, 1ST 20 MIN: ICD-10-PCS | Mod: S$GLB,,, | Performed by: INTERNAL MEDICINE

## 2022-06-17 ENCOUNTER — OFFICE VISIT (OUTPATIENT)
Dept: INTERNAL MEDICINE | Facility: CLINIC | Age: 77
End: 2022-06-17
Payer: MEDICARE

## 2022-06-17 ENCOUNTER — OFFICE VISIT (OUTPATIENT)
Dept: ORTHOPEDICS | Facility: CLINIC | Age: 77
End: 2022-06-17
Payer: MEDICARE

## 2022-06-17 VITALS
WEIGHT: 192 LBS | TEMPERATURE: 99 F | SYSTOLIC BLOOD PRESSURE: 138 MMHG | HEART RATE: 67 BPM | BODY MASS INDEX: 30.86 KG/M2 | HEIGHT: 66 IN | DIASTOLIC BLOOD PRESSURE: 60 MMHG | OXYGEN SATURATION: 93 %

## 2022-06-17 VITALS — HEIGHT: 66 IN | BODY MASS INDEX: 31.18 KG/M2 | WEIGHT: 194 LBS

## 2022-06-17 DIAGNOSIS — G56.03 CARPAL TUNNEL SYNDROME, BILATERAL: ICD-10-CM

## 2022-06-17 DIAGNOSIS — L43.9 LICHEN PLANUS: Primary | ICD-10-CM

## 2022-06-17 DIAGNOSIS — R20.0 NUMBNESS AND TINGLING IN BOTH HANDS: Primary | ICD-10-CM

## 2022-06-17 DIAGNOSIS — E03.9 HYPOTHYROIDISM (ACQUIRED): ICD-10-CM

## 2022-06-17 DIAGNOSIS — I10 HYPERTENSION GOAL BP (BLOOD PRESSURE) < 140/90: Primary | Chronic | ICD-10-CM

## 2022-06-17 DIAGNOSIS — Z12.31 ENCOUNTER FOR SCREENING MAMMOGRAM FOR MALIGNANT NEOPLASM OF BREAST: ICD-10-CM

## 2022-06-17 DIAGNOSIS — R11.0 NAUSEA: ICD-10-CM

## 2022-06-17 DIAGNOSIS — M19.90 OSTEOARTHRITIS, UNSPECIFIED OSTEOARTHRITIS TYPE, UNSPECIFIED SITE: ICD-10-CM

## 2022-06-17 DIAGNOSIS — G47.00 INSOMNIA, UNSPECIFIED TYPE: ICD-10-CM

## 2022-06-17 DIAGNOSIS — R20.2 NUMBNESS AND TINGLING IN BOTH HANDS: Primary | ICD-10-CM

## 2022-06-17 DIAGNOSIS — E55.9 VITAMIN D DEFICIENCY: ICD-10-CM

## 2022-06-17 PROCEDURE — 3078F PR MOST RECENT DIASTOLIC BLOOD PRESSURE < 80 MM HG: ICD-10-PCS | Mod: CPTII,S$GLB,, | Performed by: PHYSICIAN ASSISTANT

## 2022-06-17 PROCEDURE — 3288F PR FALLS RISK ASSESSMENT DOCUMENTED: ICD-10-PCS | Mod: CPTII,S$GLB,, | Performed by: PHYSICIAN ASSISTANT

## 2022-06-17 PROCEDURE — 1126F PR PAIN SEVERITY QUANTIFIED, NO PAIN PRESENT: ICD-10-PCS | Mod: CPTII,S$GLB,, | Performed by: PHYSICIAN ASSISTANT

## 2022-06-17 PROCEDURE — 3288F FALL RISK ASSESSMENT DOCD: CPT | Mod: CPTII,S$GLB,, | Performed by: PHYSICIAN ASSISTANT

## 2022-06-17 PROCEDURE — 3288F FALL RISK ASSESSMENT DOCD: CPT | Mod: CPTII,S$GLB,, | Performed by: ORTHOPAEDIC SURGERY

## 2022-06-17 PROCEDURE — 1101F PT FALLS ASSESS-DOCD LE1/YR: CPT | Mod: CPTII,S$GLB,, | Performed by: ORTHOPAEDIC SURGERY

## 2022-06-17 PROCEDURE — 3075F SYST BP GE 130 - 139MM HG: CPT | Mod: CPTII,S$GLB,, | Performed by: PHYSICIAN ASSISTANT

## 2022-06-17 PROCEDURE — 99999 PR PBB SHADOW E&M-EST. PATIENT-LVL IV: ICD-10-PCS | Mod: PBBFAC,,, | Performed by: PHYSICIAN ASSISTANT

## 2022-06-17 PROCEDURE — 1159F PR MEDICATION LIST DOCUMENTED IN MEDICAL RECORD: ICD-10-PCS | Mod: CPTII,S$GLB,, | Performed by: ORTHOPAEDIC SURGERY

## 2022-06-17 PROCEDURE — 1160F PR REVIEW ALL MEDS BY PRESCRIBER/CLIN PHARMACIST DOCUMENTED: ICD-10-PCS | Mod: CPTII,S$GLB,, | Performed by: ORTHOPAEDIC SURGERY

## 2022-06-17 PROCEDURE — 99999 PR PBB SHADOW E&M-EST. PATIENT-LVL III: ICD-10-PCS | Mod: PBBFAC,,, | Performed by: ORTHOPAEDIC SURGERY

## 2022-06-17 PROCEDURE — 1159F PR MEDICATION LIST DOCUMENTED IN MEDICAL RECORD: ICD-10-PCS | Mod: CPTII,S$GLB,, | Performed by: PHYSICIAN ASSISTANT

## 2022-06-17 PROCEDURE — 3075F PR MOST RECENT SYSTOLIC BLOOD PRESS GE 130-139MM HG: ICD-10-PCS | Mod: CPTII,S$GLB,, | Performed by: PHYSICIAN ASSISTANT

## 2022-06-17 PROCEDURE — 1159F MED LIST DOCD IN RCRD: CPT | Mod: CPTII,S$GLB,, | Performed by: PHYSICIAN ASSISTANT

## 2022-06-17 PROCEDURE — 99214 PR OFFICE/OUTPT VISIT, EST, LEVL IV, 30-39 MIN: ICD-10-PCS | Mod: S$GLB,,, | Performed by: PHYSICIAN ASSISTANT

## 2022-06-17 PROCEDURE — 1101F PR PT FALLS ASSESS DOC 0-1 FALLS W/OUT INJ PAST YR: ICD-10-PCS | Mod: CPTII,S$GLB,, | Performed by: ORTHOPAEDIC SURGERY

## 2022-06-17 PROCEDURE — 1160F RVW MEDS BY RX/DR IN RCRD: CPT | Mod: CPTII,S$GLB,, | Performed by: PHYSICIAN ASSISTANT

## 2022-06-17 PROCEDURE — 1160F RVW MEDS BY RX/DR IN RCRD: CPT | Mod: CPTII,S$GLB,, | Performed by: ORTHOPAEDIC SURGERY

## 2022-06-17 PROCEDURE — 1160F PR REVIEW ALL MEDS BY PRESCRIBER/CLIN PHARMACIST DOCUMENTED: ICD-10-PCS | Mod: CPTII,S$GLB,, | Performed by: PHYSICIAN ASSISTANT

## 2022-06-17 PROCEDURE — 99999 PR PBB SHADOW E&M-EST. PATIENT-LVL III: CPT | Mod: PBBFAC,,, | Performed by: ORTHOPAEDIC SURGERY

## 2022-06-17 PROCEDURE — 3078F DIAST BP <80 MM HG: CPT | Mod: CPTII,S$GLB,, | Performed by: PHYSICIAN ASSISTANT

## 2022-06-17 PROCEDURE — 99999 PR PBB SHADOW E&M-EST. PATIENT-LVL IV: CPT | Mod: PBBFAC,,, | Performed by: PHYSICIAN ASSISTANT

## 2022-06-17 PROCEDURE — 99213 PR OFFICE/OUTPT VISIT, EST, LEVL III, 20-29 MIN: ICD-10-PCS | Mod: S$GLB,,, | Performed by: ORTHOPAEDIC SURGERY

## 2022-06-17 PROCEDURE — 1159F MED LIST DOCD IN RCRD: CPT | Mod: CPTII,S$GLB,, | Performed by: ORTHOPAEDIC SURGERY

## 2022-06-17 PROCEDURE — 99213 OFFICE O/P EST LOW 20 MIN: CPT | Mod: S$GLB,,, | Performed by: ORTHOPAEDIC SURGERY

## 2022-06-17 PROCEDURE — 99214 OFFICE O/P EST MOD 30 MIN: CPT | Mod: S$GLB,,, | Performed by: PHYSICIAN ASSISTANT

## 2022-06-17 PROCEDURE — 1100F PTFALLS ASSESS-DOCD GE2>/YR: CPT | Mod: CPTII,S$GLB,, | Performed by: PHYSICIAN ASSISTANT

## 2022-06-17 PROCEDURE — 1125F PR PAIN SEVERITY QUANTIFIED, PAIN PRESENT: ICD-10-PCS | Mod: CPTII,S$GLB,, | Performed by: ORTHOPAEDIC SURGERY

## 2022-06-17 PROCEDURE — 1126F AMNT PAIN NOTED NONE PRSNT: CPT | Mod: CPTII,S$GLB,, | Performed by: PHYSICIAN ASSISTANT

## 2022-06-17 PROCEDURE — 1125F AMNT PAIN NOTED PAIN PRSNT: CPT | Mod: CPTII,S$GLB,, | Performed by: ORTHOPAEDIC SURGERY

## 2022-06-17 PROCEDURE — 3288F PR FALLS RISK ASSESSMENT DOCUMENTED: ICD-10-PCS | Mod: CPTII,S$GLB,, | Performed by: ORTHOPAEDIC SURGERY

## 2022-06-17 PROCEDURE — 1100F PR PT FALLS ASSESS DOC 2+ FALLS/FALL W/INJURY/YR: ICD-10-PCS | Mod: CPTII,S$GLB,, | Performed by: PHYSICIAN ASSISTANT

## 2022-06-17 RX ORDER — ERGOCALCIFEROL 1.25 MG/1
50000 CAPSULE ORAL
Qty: 12 CAPSULE | Refills: 1 | Status: SHIPPED | OUTPATIENT
Start: 2022-06-17 | End: 2022-06-20 | Stop reason: SDUPTHER

## 2022-06-17 RX ORDER — DICLOFENAC SODIUM 10 MG/G
2 GEL TOPICAL 4 TIMES DAILY
Qty: 100 G | Refills: 0 | Status: SHIPPED | OUTPATIENT
Start: 2022-06-17 | End: 2023-10-30

## 2022-06-17 RX ORDER — ONDANSETRON 4 MG/1
TABLET, FILM COATED ORAL
Qty: 15 TABLET | Refills: 0 | Status: SHIPPED | OUTPATIENT
Start: 2022-06-17 | End: 2023-03-28 | Stop reason: SDUPTHER

## 2022-06-17 RX ORDER — TRAZODONE HYDROCHLORIDE 50 MG/1
TABLET ORAL
Qty: 30 TABLET | Refills: 3 | Status: SHIPPED | OUTPATIENT
Start: 2022-06-17 | End: 2022-09-23 | Stop reason: SDUPTHER

## 2022-06-17 NOTE — PROGRESS NOTES
Subjective:     Patient ID: Flory Cam is a 76 y.o. female.    Chief Complaint: Pain of the Right Hand      HPI:  The patient is a 76-year-old female seen in follow-up after excision lesions right palm that were felt to be lichen planus.  Date of that surgery was 02/11/2022.  Path report came back verrica vulgais.  The patient has numbness in median nerve distribution of both hands with the right being worse than the left.  She has not had nerve conduction studies.    Past Medical History:   Diagnosis Date    Acute coronary syndrome     Anxiety     Bilateral carotid artery stenosis 11/17/2015    Chronic pain     Colon polyp     Coronary artery disease     GERD (gastroesophageal reflux disease)     Hyperlipidemia     Hypertension     Hypothyroidism     Low back pain     Lupus     Osteoporosis     Poor circulation     Pre-operative clearance 7/12/2019    PVD (peripheral vascular disease)     S/P peripheral artery angioplasty 02/13/2014    SCCA (squamous cell carcinoma) of skin 10/2019    Dr. ZANDRA Rivas--oncology    Skin disease      Past Surgical History:   Procedure Laterality Date    AORTOGRAPHY WITH SERIALOGRAPHY N/A 5/25/2021    Procedure: AORTOGRAM, WITH RUNOFF;  Surgeon: Christopher Cohen MD;  Location: [facility] CATH LAB;  Service: Cardiology;  Laterality: N/A;  COVID-19, mRNA, LNP-S, PF, 30 mcg/0.3 mL dose (COVID-19, MRNA, LN-S, PF (Pfizer)) 1/30/2021, 1/9/2021    Atherosclerotic PVD with intermittent claudication Bilateral 05/2021    Dr. Cohen    CATARACT EXTRACTION      COLONOSCOPY N/A 1/4/2017    Procedure: COLONOSCOPY;  Surgeon: Sylvester Arboleda III, MD;  Location: [facility] ENDO;  Service: Endoscopy;  Laterality: N/A;    COLONOSCOPY N/A 8/4/2020    Procedure: COLONOSCOPY;  Surgeon: Sylvester Arboleda III, MD;  Location: [facility] ENDO;  Service: Endoscopy;  Laterality: N/A;    CORONARY ANGIOPLASTY      CORONARY ARTERY BYPASS GRAFT      EXCISION OF MASS OF HAND Right 2/11/2022     Procedure: EXCISION, MASS, HAND;  Surgeon: Hernan Culver MD;  Location: [facility];  Service: Orthopedics;  Laterality: Right;   thenar eminence skin lesion excised and a Z-plasty skin flap for coverage    HYSTERECTOMY      OOPHORECTOMY      THYROIDECTOMY       Family History   Problem Relation Age of Onset    Hypertension Mother     Stroke Mother     Lung cancer Daughter     Melanoma Neg Hx     Psoriasis Neg Hx     Lupus Neg Hx     Eczema Neg Hx      Social History     Socioeconomic History    Marital status:     Number of children: 3   Occupational History    Occupation: Retired     Comment: Dispatcher   Tobacco Use    Smoking status: Current Every Day Smoker     Packs/day: 0.25     Years: 40.00     Pack years: 10.00     Types: Cigarettes     Start date: 1961    Smokeless tobacco: Never Used    Tobacco comment: hold midnight prior to surgery   Substance and Sexual Activity    Alcohol use: Never    Drug use: No    Sexual activity: Yes   Other Topics Concern    Are you pregnant or think you may be? No    Breast-feeding No   Social History Narrative    Patient is retired dispatcher.     Medication List with Changes/Refills   New Medications    DICLOFENAC SODIUM (VOLTAREN) 1 % GEL    Apply 2 g topically 4 (four) times daily. for 10 days    ERGOCALCIFEROL (ERGOCALCIFEROL) 50,000 UNIT CAP    Take 1 capsule (50,000 Units total) by mouth every 7 days.   Current Medications    ALBUTEROL (PROVENTIL/VENTOLIN HFA) 90 MCG/ACTUATION INHALER    Inhale 2 puffs into the lungs every 6 (six) hours as needed for Wheezing.    AMLODIPINE (NORVASC) 10 MG TABLET    Take 1 tablet (10 mg total) by mouth once daily.    ASPIRIN (ECOTRIN) 81 MG EC TABLET    Take 1 tablet (81 mg total) by mouth once daily.    ATORVASTATIN (LIPITOR) 80 MG TABLET    Take 1 tablet (80 mg total) by mouth once daily.    CITALOPRAM (CELEXA) 10 MG TABLET    Take 10 mg by mouth once daily.    CLOPIDOGREL (PLAVIX) 75 MG TABLET    TAKE  1 TABLET(75 MG) BY MOUTH EVERY DAY    EZETIMIBE (ZETIA) 10 MG TABLET    TAKE 1 TABLET(10 MG) BY MOUTH EVERY DAY    FLUOCINONIDE 0.05% (LIDEX) 0.05 % CREAM    Apply to affected area of hands every other day    LEVOTHYROXINE (SYNTHROID) 137 MCG TAB TABLET    TAKE 1 TABLET BY MOUTH EVERY DAY    OXYCODONE-ACETAMINOPHEN (PERCOCET)  MG PER TABLET    Take 1 tablet by mouth.    PROCHLORPERAZINE (COMPAZINE) 10 MG TABLET        TAZAROTENE (TAZORAC) 0.1 % CREAM    Apply to affected area of hands every other day    VALSARTAN (DIOVAN) 320 MG TABLET    TAKE 1 TABLET(320 MG) BY MOUTH EVERY DAY   Changed and/or Refilled Medications    Modified Medication Previous Medication    ONDANSETRON (ZOFRAN) 4 MG TABLET ondansetron (ZOFRAN) 4 MG tablet       TAKE 1 TABLET(4 MG) BY MOUTH EVERY 8 HOURS AS NEEDED FOR NAUSEA    TAKE 1 TABLET(4 MG) BY MOUTH EVERY 8 HOURS AS NEEDED FOR NAUSEA    TRAZODONE (DESYREL) 50 MG TABLET traZODone (DESYREL) 50 MG tablet       TAKE 1 TABLET BY MOUTH EVERY DAY AT NIGHT AS NEEDED FOR INSOMNIA    TAKE 1 TABLET BY MOUTH EVERY DAY AT NIGHT AS NEEDED FOR INSOMNIA     Review of patient's allergies indicates:  No Known Allergies  Review of Systems   Constitutional: Negative for malaise/fatigue.   HENT: Negative for hearing loss.    Eyes: Negative for double vision and visual disturbance.   Cardiovascular: Negative for chest pain.   Respiratory: Positive for shortness of breath.    Endocrine: Negative for cold intolerance.   Hematologic/Lymphatic: Does not bruise/bleed easily.   Skin: Negative for poor wound healing and suspicious lesions.   Musculoskeletal: Positive for arthritis, joint pain, joint swelling and muscle weakness. Negative for gout.   Gastrointestinal: Negative for nausea and vomiting.   Genitourinary: Negative for dysuria.   Neurological: Positive for numbness, paresthesias and sensory change.   Psychiatric/Behavioral: Positive for substance abuse. Negative for depression and memory loss. The  patient is not nervous/anxious.    Allergic/Immunologic: Negative for persistent infections.       Objective:   Body mass index is 31.31 kg/m².  There were no vitals filed for this visit.             General    Constitutional: She is oriented to person, place, and time. She appears well-developed and well-nourished. No distress.   HENT:   Head: Normocephalic.   Eyes: EOM are normal.   Pulmonary/Chest: Effort normal.   Neurological: She is oriented to person, place, and time.   Psychiatric: She has a normal mood and affect.             Right Hand/Wrist Exam     Inspection   Scars: Wrist - present Hand -  present  Effusion: Wrist - absent Hand -  absent    Pain   Wrist - The patient exhibits pain of the flexor/pronator group.    Tests   Phalens sign: positive  Tinel's sign (median nerve): positive  Carpal Tunnel Compression Test: positive    Atrophy   Thenar:  negative  Intrinsic:  negative    Other     Neuorologic Exam    Median Distribution: abnormal  Ulnar Distribution: normal  Radial Distribution: normal      Left Hand/Wrist Exam     Inspection   Scars: Wrist - absent Hand -  absent  Effusion: Wrist - absent Hand -  absent    Tests   Phalens sign: positive  Tinel's sign (median nerve): positive  Carpal Tunnel Compression Test: positive    Atrophy  Thenar:  Negative  Intrinsic: negative    Other     Sensory Exam  Median Distribution: abnormal  Ulnar Distribution: normal  Radial Distribution: normal          Vascular Exam       Capillary Refill  Right Hand: normal capillary refill  Left Hand: normal capillary refill              radiographs were not obtained today  Assessment:     Encounter Diagnoses   Name Primary?    Lichen planus Yes    Carpal tunnel syndrome, bilateral         Plan:     The patient is sent for nerve conduction studies and will return with that study.                Disclaimer: This note was prepared using a voice recognition system and is likely to have sound alike errors within the text.

## 2022-06-17 NOTE — PROGRESS NOTES
"Subjective:      Patient ID: Flory Cam is a 76 y.o. female.    Chief Complaint: Knee Pain and Shoulder Pain    Patient is known to me, being seen today for multiple concerns.      Reports chronic R knee pain, x-ray May per ortho shows mod arthritis     Last visit Dec 2021, due for labs/annual.     Review of Systems   Constitutional: Negative for chills, diaphoresis and fever.   HENT: Negative for congestion, rhinorrhea and sore throat.    Respiratory: Negative for cough, shortness of breath and wheezing.    Gastrointestinal: Positive for diarrhea (ongoing, on and off). Negative for abdominal pain, blood in stool, constipation, nausea and vomiting.   Endocrine: Positive for cold intolerance.   Musculoskeletal: Positive for arthralgias and joint swelling.   Skin: Negative for rash.   Neurological: Negative for dizziness, light-headedness and headaches.       Objective:   /60   Pulse 67   Temp 98.6 °F (37 °C)   Ht 5' 6" (1.676 m)   Wt 87.1 kg (192 lb 0.3 oz)   SpO2 (!) 93%   BMI 30.99 kg/m²   Physical Exam  Constitutional:       General: She is not in acute distress.     Appearance: Normal appearance. She is well-developed. She is not ill-appearing.   HENT:      Head: Normocephalic and atraumatic.   Cardiovascular:      Rate and Rhythm: Normal rate and regular rhythm.      Heart sounds: Normal heart sounds. No murmur heard.  Pulmonary:      Effort: Pulmonary effort is normal. No respiratory distress.      Breath sounds: Normal breath sounds. No decreased breath sounds.   Musculoskeletal:      Right knee: No swelling or bony tenderness.      Right lower leg: No edema.      Left lower leg: No edema.   Skin:     General: Skin is warm and dry.      Findings: No rash.   Psychiatric:         Speech: Speech normal.         Behavior: Behavior normal.         Thought Content: Thought content normal.       Assessment:      1. Hypertension goal BP (blood pressure) < 140/90    2. Osteoarthritis, unspecified " osteoarthritis type, unspecified site    3. Encounter for screening mammogram for malignant neoplasm of breast    4. Hypothyroidism (acquired)    5. Nausea    6. Insomnia, unspecified type    7. Vitamin D deficiency       Plan:   Hypertension goal BP (blood pressure) < 140/90  -     CBC Auto Differential; Future; Expected date: 06/17/2022    Osteoarthritis, unspecified osteoarthritis type, unspecified site  -     Ambulatory referral/consult to Orthopedics; Future; Expected date: 06/24/2022  -     diclofenac sodium (VOLTAREN) 1 % Gel; Apply 2 g topically 4 (four) times daily. for 10 days  Dispense: 100 g; Refill: 0    Encounter for screening mammogram for malignant neoplasm of breast  -     Mammo Digital Screening Bilat w/ Jus; Future; Expected date: 06/17/2022    Hypothyroidism (acquired)  -     TSH; Future; Expected date: 06/17/2022  -     T4, Free; Future; Expected date: 06/17/2022    Nausea  -     ondansetron (ZOFRAN) 4 MG tablet; TAKE 1 TABLET(4 MG) BY MOUTH EVERY 8 HOURS AS NEEDED FOR NAUSEA  Dispense: 15 tablet; Refill: 0    Insomnia, unspecified type  -     traZODone (DESYREL) 50 MG tablet; TAKE 1 TABLET BY MOUTH EVERY DAY AT NIGHT AS NEEDED FOR INSOMNIA  Dispense: 30 tablet; Refill: 3    Vitamin D deficiency  -     ergocalciferol (ERGOCALCIFEROL) 50,000 unit Cap; Take 1 capsule (50,000 Units total) by mouth every 7 days.  Dispense: 12 capsule; Refill: 1      Discussed RICE and topical NSAIDs for R knee, f/u ortho     F/u PCP to be scheduled

## 2022-06-20 DIAGNOSIS — E55.9 VITAMIN D DEFICIENCY: ICD-10-CM

## 2022-06-20 RX ORDER — ERGOCALCIFEROL 1.25 MG/1
50000 CAPSULE ORAL
Qty: 12 CAPSULE | Refills: 0 | Status: SHIPPED | OUTPATIENT
Start: 2022-06-20 | End: 2023-10-03

## 2022-06-30 ENCOUNTER — EXTERNAL CHRONIC CARE MANAGEMENT (OUTPATIENT)
Dept: PRIMARY CARE CLINIC | Facility: CLINIC | Age: 77
End: 2022-06-30
Payer: MEDICARE

## 2022-06-30 PROCEDURE — 99490 PR CHRONIC CARE MGMT, 1ST 20 MIN: ICD-10-PCS | Mod: S$GLB,,, | Performed by: INTERNAL MEDICINE

## 2022-06-30 PROCEDURE — 99490 CHRNC CARE MGMT STAFF 1ST 20: CPT | Mod: S$GLB,,, | Performed by: INTERNAL MEDICINE

## 2022-07-11 RX ORDER — CITALOPRAM 10 MG/1
10 TABLET ORAL DAILY
Status: CANCELLED | OUTPATIENT
Start: 2022-07-11

## 2022-07-11 NOTE — TELEPHONE ENCOUNTER
I do not see that we have ever prescribed this medication.  Who was prescribing it previously?  How long has she been on medicine?

## 2022-07-21 ENCOUNTER — TELEPHONE (OUTPATIENT)
Dept: PHYSICAL MEDICINE AND REHAB | Facility: CLINIC | Age: 77
End: 2022-07-21
Payer: MEDICARE

## 2022-07-21 NOTE — TELEPHONE ENCOUNTER
----- Message from Janine Victor sent at 7/21/2022  8:16 AM CDT -----  Regarding: Reschedule  Contact: Patient  Please call patient to reschedule her EMG appointment at Ph .[phone] (home), she's not feeling well.

## 2022-07-31 ENCOUNTER — EXTERNAL CHRONIC CARE MANAGEMENT (OUTPATIENT)
Dept: PRIMARY CARE CLINIC | Facility: CLINIC | Age: 77
End: 2022-07-31
Payer: MEDICARE

## 2022-07-31 PROCEDURE — 99490 CHRNC CARE MGMT STAFF 1ST 20: CPT | Mod: S$GLB,,, | Performed by: INTERNAL MEDICINE

## 2022-07-31 PROCEDURE — 99490 PR CHRONIC CARE MGMT, 1ST 20 MIN: ICD-10-PCS | Mod: S$GLB,,, | Performed by: INTERNAL MEDICINE

## 2022-08-08 ENCOUNTER — TELEPHONE (OUTPATIENT)
Dept: INTERNAL MEDICINE | Facility: CLINIC | Age: 77
End: 2022-08-08
Payer: MEDICARE

## 2022-08-08 NOTE — TELEPHONE ENCOUNTER
----- Message from Waqas Jones sent at 8/8/2022 10:22 AM CDT -----  Contact: Flory  .Type:  Sooner Apoointment Request    Caller is requesting a sooner appointment.  Caller declined first available appointment listed below.  Caller will not accept being placed on the waitlist and is requesting a message be sent to doctor.  Name of Caller: Ealine  When is the first available appointment?8/10/22  Symptoms: not feeling well  Would the patient rather a call back or a response via [facility]? call  Best Call Back Number: [phone]  Additional Information: requests to be seen tomorrow morning, sooner than next available. Reports having fasting labs in the morning as well.  Thanks,  RP/AB

## 2022-08-10 ENCOUNTER — PES CALL (OUTPATIENT)
Dept: ADMINISTRATIVE | Facility: CLINIC | Age: 77
End: 2022-08-10
Payer: MEDICARE

## 2022-08-31 ENCOUNTER — EXTERNAL CHRONIC CARE MANAGEMENT (OUTPATIENT)
Dept: PRIMARY CARE CLINIC | Facility: CLINIC | Age: 77
End: 2022-08-31
Payer: MEDICARE

## 2022-08-31 PROCEDURE — 99490 CHRNC CARE MGMT STAFF 1ST 20: CPT | Mod: S$GLB,,, | Performed by: INTERNAL MEDICINE

## 2022-08-31 PROCEDURE — 99490 PR CHRONIC CARE MGMT, 1ST 20 MIN: ICD-10-PCS | Mod: S$GLB,,, | Performed by: INTERNAL MEDICINE

## 2022-09-22 ENCOUNTER — OFFICE VISIT (OUTPATIENT)
Dept: PODIATRY | Facility: CLINIC | Age: 77
End: 2022-09-22
Payer: MEDICARE

## 2022-09-22 ENCOUNTER — LAB VISIT (OUTPATIENT)
Dept: LAB | Facility: HOSPITAL | Age: 77
End: 2022-09-22
Attending: INTERNAL MEDICINE
Payer: MEDICARE

## 2022-09-22 VITALS — WEIGHT: 192 LBS | HEIGHT: 66 IN | BODY MASS INDEX: 30.86 KG/M2

## 2022-09-22 DIAGNOSIS — I73.9 PERIPHERAL VASCULAR DISEASE: ICD-10-CM

## 2022-09-22 DIAGNOSIS — E78.5 HYPERLIPIDEMIA LDL GOAL <70: Chronic | ICD-10-CM

## 2022-09-22 DIAGNOSIS — B35.1 DERMATOPHYTOSIS OF NAIL: ICD-10-CM

## 2022-09-22 DIAGNOSIS — I70.219 ATHEROSCLEROTIC PVD WITH INTERMITTENT CLAUDICATION: Chronic | ICD-10-CM

## 2022-09-22 DIAGNOSIS — L84 CORN OR CALLUS: ICD-10-CM

## 2022-09-22 DIAGNOSIS — F32.89 OTHER DEPRESSION: Primary | ICD-10-CM

## 2022-09-22 DIAGNOSIS — L93.0 DISCOID LUPUS ERYTHEMATOSUS: ICD-10-CM

## 2022-09-22 DIAGNOSIS — M79.676 PAIN OF FIFTH TOE: ICD-10-CM

## 2022-09-22 LAB
ALBUMIN SERPL BCP-MCNC: 3.8 G/DL (ref 3.5–5.2)
ALP SERPL-CCNC: 159 U/L (ref 55–135)
ALT SERPL W/O P-5'-P-CCNC: 16 U/L (ref 10–44)
ANION GAP SERPL CALC-SCNC: 6 MMOL/L (ref 8–16)
AST SERPL-CCNC: 17 U/L (ref 10–40)
BILIRUB SERPL-MCNC: 0.4 MG/DL (ref 0.1–1)
BUN SERPL-MCNC: 19 MG/DL (ref 8–23)
CALCIUM SERPL-MCNC: 9.3 MG/DL (ref 8.7–10.5)
CHLORIDE SERPL-SCNC: 104 MMOL/L (ref 95–110)
CHOLEST SERPL-MCNC: 151 MG/DL (ref 120–199)
CHOLEST/HDLC SERPL: 2.9 {RATIO} (ref 2–5)
CO2 SERPL-SCNC: 29 MMOL/L (ref 23–29)
CREAT SERPL-MCNC: 0.8 MG/DL (ref 0.5–1.4)
EST. GFR  (NO RACE VARIABLE): >60 ML/MIN/1.73 M^2
GLUCOSE SERPL-MCNC: 89 MG/DL (ref 70–110)
HDLC SERPL-MCNC: 52 MG/DL (ref 40–75)
HDLC SERPL: 34.4 % (ref 20–50)
LDLC SERPL CALC-MCNC: 84 MG/DL (ref 63–159)
NONHDLC SERPL-MCNC: 99 MG/DL
POTASSIUM SERPL-SCNC: 4.3 MMOL/L (ref 3.5–5.1)
PROT SERPL-MCNC: 6.9 G/DL (ref 6–8.4)
SODIUM SERPL-SCNC: 139 MMOL/L (ref 136–145)
TRIGL SERPL-MCNC: 75 MG/DL (ref 30–150)

## 2022-09-22 PROCEDURE — 1159F MED LIST DOCD IN RCRD: CPT | Mod: CPTII,S$GLB,, | Performed by: PODIATRIST

## 2022-09-22 PROCEDURE — 3288F FALL RISK ASSESSMENT DOCD: CPT | Mod: CPTII,S$GLB,, | Performed by: PODIATRIST

## 2022-09-22 PROCEDURE — 80061 LIPID PANEL: CPT | Performed by: INTERNAL MEDICINE

## 2022-09-22 PROCEDURE — 11721 PR DEBRIDEMENT OF NAILS, 6 OR MORE: ICD-10-PCS | Mod: 59,Q8,S$GLB, | Performed by: PODIATRIST

## 2022-09-22 PROCEDURE — 1100F PTFALLS ASSESS-DOCD GE2>/YR: CPT | Mod: CPTII,S$GLB,, | Performed by: PODIATRIST

## 2022-09-22 PROCEDURE — 99999 PR PBB SHADOW E&M-EST. PATIENT-LVL IV: ICD-10-PCS | Mod: PBBFAC,,, | Performed by: PODIATRIST

## 2022-09-22 PROCEDURE — 99999 PR PBB SHADOW E&M-EST. PATIENT-LVL IV: CPT | Mod: PBBFAC,,, | Performed by: PODIATRIST

## 2022-09-22 PROCEDURE — 36415 COLL VENOUS BLD VENIPUNCTURE: CPT | Performed by: INTERNAL MEDICINE

## 2022-09-22 PROCEDURE — 11056 PARNG/CUTG B9 HYPRKR LES 2-4: CPT | Mod: Q8,S$GLB,, | Performed by: PODIATRIST

## 2022-09-22 PROCEDURE — 11056 PR TRIM BENIGN HYPERKERATOTIC SKIN LESION,2-4: ICD-10-PCS | Mod: Q8,S$GLB,, | Performed by: PODIATRIST

## 2022-09-22 PROCEDURE — 99214 OFFICE O/P EST MOD 30 MIN: CPT | Mod: 25,S$GLB,, | Performed by: PODIATRIST

## 2022-09-22 PROCEDURE — 1100F PR PT FALLS ASSESS DOC 2+ FALLS/FALL W/INJURY/YR: ICD-10-PCS | Mod: CPTII,S$GLB,, | Performed by: PODIATRIST

## 2022-09-22 PROCEDURE — 1159F PR MEDICATION LIST DOCUMENTED IN MEDICAL RECORD: ICD-10-PCS | Mod: CPTII,S$GLB,, | Performed by: PODIATRIST

## 2022-09-22 PROCEDURE — 99214 PR OFFICE/OUTPT VISIT, EST, LEVL IV, 30-39 MIN: ICD-10-PCS | Mod: 25,S$GLB,, | Performed by: PODIATRIST

## 2022-09-22 PROCEDURE — 1126F PR PAIN SEVERITY QUANTIFIED, NO PAIN PRESENT: ICD-10-PCS | Mod: CPTII,S$GLB,, | Performed by: PODIATRIST

## 2022-09-22 PROCEDURE — 11721 DEBRIDE NAIL 6 OR MORE: CPT | Mod: 59,Q8,S$GLB, | Performed by: PODIATRIST

## 2022-09-22 PROCEDURE — 1126F AMNT PAIN NOTED NONE PRSNT: CPT | Mod: CPTII,S$GLB,, | Performed by: PODIATRIST

## 2022-09-22 PROCEDURE — 3288F PR FALLS RISK ASSESSMENT DOCUMENTED: ICD-10-PCS | Mod: CPTII,S$GLB,, | Performed by: PODIATRIST

## 2022-09-22 PROCEDURE — 80053 COMPREHEN METABOLIC PANEL: CPT | Performed by: INTERNAL MEDICINE

## 2022-09-22 NOTE — PROGRESS NOTES
PODIATRY NOTE    CHIEF COMPLAINT  Chief Complaint   Patient presents with    Nail Care     Nail care, non diabetic wears casual shoes and socks, Last seen mouna Lee PA-C 06/17/2022       HPI  SUBJECTIVE: Flory Cam is a 77 y.o. female w/ PMH of PVD, Lupus, hx of intermittent leg claudifcation- s/p vascular intervention with much improvement in symptoms and other medical problems who presents to clinic for high risk  foot exam and care. Patient admits to painful toenails and calluses aggravated by increased weight bearing, shoe gear, and pressure. States pain is relieved with routine debridements.     She does complain about feeling depressed and anxious. She denies suicidal ideations.       HgA1c:   Hemoglobin A1C   Date Value Ref Range Status   08/04/2017 5.8 (H) 4.0 - 5.6 % Final     Comment:     According to ADA guidelines, hemoglobin A1c <7.0% represents  optimal control in non-pregnant diabetic patients. Different  metrics may apply to specific patient populations.   Standards of Medical Care in Diabetes-2016.  For the purpose of screening for the presence of diabetes:  <5.7%     Consistent with the absence of diabetes  5.7-6.4%  Consistent with increasing risk for diabetes   (prediabetes)  >or=6.5%  Consistent with diabetes  Currently, no consensus exists for use of hemoglobin A1c  for diagnosis of diabetes for children.  This Hemoglobin A1c assay has significant interference with fetal   hemoglobin   (HbF). The results are invalid for patients with abnormal amounts of   HbF,   including those with known Hereditary Persistence   of Fetal Hemoglobin. Heterozygous hemoglobin variants (HbAS, HbAC,   HbAD, HbAE, HbA2) do not significantly interfere with this assay;   however, presence of multiple variants in a sample may impact the %   interference.     09/14/2016 6.2 4.5 - 6.2 % Final     Comment:     According to ADA guidelines, hemoglobin A1C <7.0% represents  optimal control in non-pregnant  "diabetic patients.  Different  metrics may apply to specific populations.   Standards of Medical Care in Diabetes - 2016.  For the purpose of screening for the presence of diabetes:  <5.7%     Consistent with the absence of diabetes  5.7-6.4%  Consistent with increasing risk for diabetes   (prediabetes)  >or=6.5%  Consistent with diabetes  Currently no consensus exists for use of hemoglobin A1C  for diagnosis of diabetes for children.     04/13/2010 5.9 4.0 - 6.2 % Final     REVIEW OF SYSTEMS  General: This patient is well-developed, well-nourished and appears stated age, well-oriented to person, place and time, and cooperative and pleasant on today's visit  Constitutional: Negative for chills and fever.   Respiratory: Negative for shortness of breath.    Cardiovascular: Negative for chest pain, palpitations, orthopnea  Gastrointestinal: Negative for diarrhea, nausea and vomiting.   Musculoskeletal: Positive for above noted in HPI  Skin:positive for skin and nail changes   Peripheral Vascular: no claudication or cyanosis  Psychiatric/Behavioral: Negative for altered mental status     PHYSICAL EXAM  Vitals:    09/22/22 1041   Weight: 87.1 kg (192 lb)   Height: 5' 6" (1.676 m)   PainSc: 0-No pain       GEN:  This patient is well-developed, well-nourished and appears stated age, well-oriented to person, place and time, and cooperative and pleasant on today's visit.      LOWER EXTREMITY  Vascular:   DP pedal pulse 0/4 b/l, PT pedal pulse 1/4 b/l  Skin temperature warm to warm from prox to distally  CFT <5 secs b/l  There is LE edema noted b/l.     Dermatologic:   Thickened, dystrophic, elongated toenails with subungal debris 1-5 b/l.   Webspaces are C/D/I B/L.  There is hyperkeratotic tissue noted plantar aspect of b/l feet at medial arch x 2, dorsal lateral right fifth digit  Skin texture and turgor dry with hyperkeratosis diffusely b/l plantar heel   There is no pedal hair growth noted    Neurologic:  Vibratory " sensation diminished at level of Hallux IPJ b/l    Protective sensation absent at 0/10 sites upon examination with Noble Weinsten 5.07 g monofilament.   Propioception intact at 1st MTPJ b/l.   Achilles and patellar deep tendon reflexes intact  Babinski reflex absent b/l. Light touch and sharp/dull sensation intact b/l.    Musculoskeletal/Orthopedic:  Pain dorsolateral fifth digit  Muscle strength is 5/5 for foot inverters, everters, plantarflexors, and dorsiflexors. Muscle tone is normal.  Pain free range of motion in all four quadrants with stiffness and limitation b/l  PAIN with palpation of callus plantar medial arch, LEFT    IMAGING:     Right Lower Arterial CFA has triphasic flow.     PFA has triphasic flow.     SFAo has biphasic flow.     SFAp has biphasic flow.     SFAm has biphasic flow.     SFAd has biphasic flow.     POP has biphasic flow.     AT has biphasic flow.     TIB TRNK has biphasic flow.     PT has biphasic flow.     PER has biphasic flow.   Left Lower Arterial CFA has biphasic flow.     PFA has biphasic flow.     SFAo has monophasic flow.   SFAp is occluded.   SFAm is occluded.     SFAd is occluded and is stented.   Left SFAd stent velocity origin: 0, proximal: 0, mid: 0, distal: 0    POP has monophasic flow.     AT has monophasic flow.     TIB TRNK has monophasic flow.     PT has monophasic flow.     PER has monophasic flow.     ASSESSMENT  Encounter Diagnoses   Name Primary?    Other depression Yes    Peripheral vascular disease     Corn or callus     Dermatophytosis of nail     Pain of fifth toe     Discoid lupus erythematosus        Plan:  -Discuss presenting problems, etiology, pathologic processes and management options with patient today.     Other depression  -     Ambulatory referral/consult to Psychiatry; Future; Expected date: 09/29/2022    Peripheral vascular disease    Corn or callus    Dermatophytosis of nail    Pain of fifth toe    Discoid lupus erythematosus      -With patient's  permission, the elongated onychomycotic toenails, as outlined in the physical examination, were sharply debrided with a double action nail nipper to their soft tissue attachment. If indicated, the nails were then smoothed down in thickness with an sotero board or dremmel to facilitate in further debridement removing all offending nail and subungual debris.  The elongated nails, as outlined in the objective portion of this note, were trimmed to appropriate length, with a double action nail nipper, for alleviation/reduction of pains as well. Patient relates relief following the procedure.     -With patient's permission via verbal consent, the involved area was cleansed with an alcohol swab. Trimming of hyperkeratotic lesions deep to epidermal layer x 3 was performed with a #15 blade without incident. Patient relates relief following the procedure. Patient will continue to monitor the areas daily, inspect feet, wear protective shoe gear when ambulatory, moisturizer to maintain skin integrity.    -Recommendations on purchase of Urea 40 and/or Amlactin was provided for calluses. Metatarsal pad dispensed and patient was instructed on how to apply for offloading callus. Shoe recommendations provided for accomodative foot wear.    Patient has above noted diagnosis and/or medical co-morbidities.They are being treated and managed by their  Primary Care Physician for management of comorbid states which are deemed to be currently stable.        Psych consult placed.      Future Appointments   Date Time Provider Department Center   9/22/2022 12:15 PM LABORATORY, [facility]   10/13/2022  2:00 PM Gladys Hennessy MD [facility] PHYS [facility]   10/18/2022  3:15 PM Hernan Culver MD [facility] ORTHO [facility]   1/5/2023 10:45 AM XIAO Pace POD [facility]

## 2022-09-23 DIAGNOSIS — G47.00 INSOMNIA, UNSPECIFIED TYPE: ICD-10-CM

## 2022-09-23 RX ORDER — TRAZODONE HYDROCHLORIDE 50 MG/1
TABLET ORAL
Qty: 30 TABLET | Refills: 3 | Status: SHIPPED | OUTPATIENT
Start: 2022-09-23

## 2022-09-30 ENCOUNTER — EXTERNAL CHRONIC CARE MANAGEMENT (OUTPATIENT)
Dept: PRIMARY CARE CLINIC | Facility: CLINIC | Age: 77
End: 2022-09-30
Payer: MEDICARE

## 2022-09-30 PROCEDURE — 99490 PR CHRONIC CARE MGMT, 1ST 20 MIN: ICD-10-PCS | Mod: S$GLB,,, | Performed by: INTERNAL MEDICINE

## 2022-09-30 PROCEDURE — 99490 CHRNC CARE MGMT STAFF 1ST 20: CPT | Mod: S$GLB,,, | Performed by: INTERNAL MEDICINE

## 2022-10-13 ENCOUNTER — TELEPHONE (OUTPATIENT)
Dept: PHYSICAL MEDICINE AND REHAB | Facility: CLINIC | Age: 77
End: 2022-10-13
Payer: MEDICARE

## 2022-10-13 NOTE — TELEPHONE ENCOUNTER
----- Message from Elaina Londono sent at 10/13/2022  2:05 PM CDT -----  Contact: pt  The pt is having transportation issues and wants to know if she can still be seen today, no additional info given and can be reached at [phone]///[id number]

## 2022-10-25 ENCOUNTER — LAB VISIT (OUTPATIENT)
Dept: LAB | Facility: HOSPITAL | Age: 77
End: 2022-10-25
Attending: PHYSICIAN ASSISTANT
Payer: MEDICARE

## 2022-10-25 ENCOUNTER — OFFICE VISIT (OUTPATIENT)
Dept: INTERNAL MEDICINE | Facility: CLINIC | Age: 77
End: 2022-10-25
Payer: MEDICARE

## 2022-10-25 VITALS
OXYGEN SATURATION: 95 % | DIASTOLIC BLOOD PRESSURE: 70 MMHG | BODY MASS INDEX: 30.54 KG/M2 | SYSTOLIC BLOOD PRESSURE: 138 MMHG | TEMPERATURE: 98 F | WEIGHT: 190.06 LBS | HEART RATE: 74 BPM | HEIGHT: 66 IN

## 2022-10-25 DIAGNOSIS — E03.9 HYPOTHYROIDISM (ACQUIRED): ICD-10-CM

## 2022-10-25 DIAGNOSIS — I10 HYPERTENSION GOAL BP (BLOOD PRESSURE) < 140/90: Chronic | ICD-10-CM

## 2022-10-25 DIAGNOSIS — I73.9 PVD (PERIPHERAL VASCULAR DISEASE): ICD-10-CM

## 2022-10-25 DIAGNOSIS — E78.5 HYPERLIPIDEMIA LDL GOAL <70: ICD-10-CM

## 2022-10-25 DIAGNOSIS — F32.A MILD DEPRESSION: ICD-10-CM

## 2022-10-25 DIAGNOSIS — F17.210 CIGARETTE NICOTINE DEPENDENCE WITHOUT COMPLICATION: ICD-10-CM

## 2022-10-25 DIAGNOSIS — Z00.00 ANNUAL PHYSICAL EXAM: Primary | ICD-10-CM

## 2022-10-25 DIAGNOSIS — M81.0 AGE-RELATED OSTEOPOROSIS WITHOUT CURRENT PATHOLOGICAL FRACTURE: ICD-10-CM

## 2022-10-25 DIAGNOSIS — I10 ESSENTIAL HYPERTENSION: ICD-10-CM

## 2022-10-25 LAB
BASOPHILS # BLD AUTO: 0.03 K/UL (ref 0–0.2)
BASOPHILS NFR BLD: 0.4 % (ref 0–1.9)
DIFFERENTIAL METHOD: NORMAL
EOSINOPHIL # BLD AUTO: 0.2 K/UL (ref 0–0.5)
EOSINOPHIL NFR BLD: 2.2 % (ref 0–8)
ERYTHROCYTE [DISTWIDTH] IN BLOOD BY AUTOMATED COUNT: 13.3 % (ref 11.5–14.5)
HCT VFR BLD AUTO: 45 % (ref 37–48.5)
HGB BLD-MCNC: 14.5 G/DL (ref 12–16)
IMM GRANULOCYTES # BLD AUTO: 0.01 K/UL (ref 0–0.04)
IMM GRANULOCYTES NFR BLD AUTO: 0.1 % (ref 0–0.5)
LYMPHOCYTES # BLD AUTO: 2.6 K/UL (ref 1–4.8)
LYMPHOCYTES NFR BLD: 31 % (ref 18–48)
MCH RBC QN AUTO: 30.1 PG (ref 27–31)
MCHC RBC AUTO-ENTMCNC: 32.2 G/DL (ref 32–36)
MCV RBC AUTO: 93 FL (ref 82–98)
MONOCYTES # BLD AUTO: 0.7 K/UL (ref 0.3–1)
MONOCYTES NFR BLD: 8.2 % (ref 4–15)
NEUTROPHILS # BLD AUTO: 4.9 K/UL (ref 1.8–7.7)
NEUTROPHILS NFR BLD: 58.1 % (ref 38–73)
NRBC BLD-RTO: 0 /100 WBC
PLATELET # BLD AUTO: 248 K/UL (ref 150–450)
PMV BLD AUTO: 9.6 FL (ref 9.2–12.9)
RBC # BLD AUTO: 4.82 M/UL (ref 4–5.4)
WBC # BLD AUTO: 8.46 K/UL (ref 3.9–12.7)

## 2022-10-25 PROCEDURE — 99999 PR PBB SHADOW E&M-EST. PATIENT-LVL V: ICD-10-PCS | Mod: PBBFAC,,, | Performed by: INTERNAL MEDICINE

## 2022-10-25 PROCEDURE — 1159F MED LIST DOCD IN RCRD: CPT | Mod: CPTII,S$GLB,, | Performed by: INTERNAL MEDICINE

## 2022-10-25 PROCEDURE — 1159F PR MEDICATION LIST DOCUMENTED IN MEDICAL RECORD: ICD-10-PCS | Mod: CPTII,S$GLB,, | Performed by: INTERNAL MEDICINE

## 2022-10-25 PROCEDURE — 99214 PR OFFICE/OUTPT VISIT, EST, LEVL IV, 30-39 MIN: ICD-10-PCS | Mod: S$GLB,,, | Performed by: INTERNAL MEDICINE

## 2022-10-25 PROCEDURE — 3078F PR MOST RECENT DIASTOLIC BLOOD PRESSURE < 80 MM HG: ICD-10-PCS | Mod: CPTII,S$GLB,, | Performed by: INTERNAL MEDICINE

## 2022-10-25 PROCEDURE — 1125F PR PAIN SEVERITY QUANTIFIED, PAIN PRESENT: ICD-10-PCS | Mod: CPTII,S$GLB,, | Performed by: INTERNAL MEDICINE

## 2022-10-25 PROCEDURE — 99214 OFFICE O/P EST MOD 30 MIN: CPT | Mod: S$GLB,,, | Performed by: INTERNAL MEDICINE

## 2022-10-25 PROCEDURE — 3288F PR FALLS RISK ASSESSMENT DOCUMENTED: ICD-10-PCS | Mod: CPTII,S$GLB,, | Performed by: INTERNAL MEDICINE

## 2022-10-25 PROCEDURE — 99499 UNLISTED E&M SERVICE: CPT | Mod: HCNC,S$GLB,, | Performed by: INTERNAL MEDICINE

## 2022-10-25 PROCEDURE — 1101F PR PT FALLS ASSESS DOC 0-1 FALLS W/OUT INJ PAST YR: ICD-10-PCS | Mod: CPTII,S$GLB,, | Performed by: INTERNAL MEDICINE

## 2022-10-25 PROCEDURE — 1160F RVW MEDS BY RX/DR IN RCRD: CPT | Mod: CPTII,S$GLB,, | Performed by: INTERNAL MEDICINE

## 2022-10-25 PROCEDURE — 99999 PR PBB SHADOW E&M-EST. PATIENT-LVL V: CPT | Mod: PBBFAC,,, | Performed by: INTERNAL MEDICINE

## 2022-10-25 PROCEDURE — 1101F PT FALLS ASSESS-DOCD LE1/YR: CPT | Mod: CPTII,S$GLB,, | Performed by: INTERNAL MEDICINE

## 2022-10-25 PROCEDURE — 3075F PR MOST RECENT SYSTOLIC BLOOD PRESS GE 130-139MM HG: ICD-10-PCS | Mod: CPTII,S$GLB,, | Performed by: INTERNAL MEDICINE

## 2022-10-25 PROCEDURE — 3288F FALL RISK ASSESSMENT DOCD: CPT | Mod: CPTII,S$GLB,, | Performed by: INTERNAL MEDICINE

## 2022-10-25 PROCEDURE — 3078F DIAST BP <80 MM HG: CPT | Mod: CPTII,S$GLB,, | Performed by: INTERNAL MEDICINE

## 2022-10-25 PROCEDURE — 1160F PR REVIEW ALL MEDS BY PRESCRIBER/CLIN PHARMACIST DOCUMENTED: ICD-10-PCS | Mod: CPTII,S$GLB,, | Performed by: INTERNAL MEDICINE

## 2022-10-25 PROCEDURE — 3075F SYST BP GE 130 - 139MM HG: CPT | Mod: CPTII,S$GLB,, | Performed by: INTERNAL MEDICINE

## 2022-10-25 PROCEDURE — 1125F AMNT PAIN NOTED PAIN PRSNT: CPT | Mod: CPTII,S$GLB,, | Performed by: INTERNAL MEDICINE

## 2022-10-25 PROCEDURE — 36415 COLL VENOUS BLD VENIPUNCTURE: CPT | Performed by: PHYSICIAN ASSISTANT

## 2022-10-25 PROCEDURE — 84439 ASSAY OF FREE THYROXINE: CPT | Performed by: PHYSICIAN ASSISTANT

## 2022-10-25 PROCEDURE — 84443 ASSAY THYROID STIM HORMONE: CPT | Performed by: PHYSICIAN ASSISTANT

## 2022-10-25 PROCEDURE — 85025 COMPLETE CBC W/AUTO DIFF WBC: CPT | Performed by: PHYSICIAN ASSISTANT

## 2022-10-25 NOTE — PROGRESS NOTES
"  Flory Cam  10/25/2022  Principle Problem Annual/ My shoulders and arms hurt so much that I can't put a shirt on    History     Flory Cam is a 77 y.o. female with a PMHx inclusive of heart disease, anxiety, HTN, HLD, hypothyroidism, osteoporosis and SLE who presents for  being "cold all the time" . She was last seen in primary care by Ms. Rosa PA-C in June 2022. Ms. Cam has also been seen by  dermatology, PMR, orthopaedics and pain management.    She describes "being cold all the time". She has been feeling this way for the "last couple of months." She endorses some low mood and cloudy thinking, but denies any constipation. She has a history of hypothyroidism s/p thyroidectomy that she takes medication for.    Review of Systems   Constitutional:  Positive for chills. Negative for diaphoresis, fever and unexpected weight change.   HENT:  Negative for rhinorrhea, sneezing and sore throat.    Eyes:  Negative for itching.   Respiratory:  Positive for cough. Negative for shortness of breath and wheezing.    Cardiovascular:  Negative for chest pain and palpitations.   Gastrointestinal:  Positive for diarrhea. Negative for constipation, nausea and vomiting.   Endocrine: Positive for cold intolerance and polyuria. Negative for polydipsia.   Genitourinary:  Negative for difficulty urinating, dysuria and pelvic pain.   Musculoskeletal:  Positive for arthralgias, joint swelling and myalgias.   Skin:  Positive for rash.   Neurological:  Positive for dizziness. Negative for seizures, syncope, weakness, light-headedness, numbness and headaches.   Psychiatric/Behavioral:  Positive for dysphoric mood.      Past Medical History:   Diagnosis Date    Acute coronary syndrome     Anxiety     Bilateral carotid artery stenosis 11/17/2015    Chronic pain     Colon polyp     Coronary artery disease     GERD (gastroesophageal reflux disease)     Hyperlipidemia     Hypertension     Hypothyroidism     Low back " pain     Lupus     Osteoporosis     Poor circulation     Pre-operative clearance 7/12/2019    PVD (peripheral vascular disease)     S/P peripheral artery angioplasty 02/13/2014    SCCA (squamous cell carcinoma) of skin 10/2019    Dr. ZANDRA Rivas--oncology    Skin disease     Past Surgical History:   Procedure Laterality Date    AORTOGRAPHY WITH SERIALOGRAPHY N/A 5/25/2021    Procedure: AORTOGRAM, WITH RUNOFF;  Surgeon: Christopher Cohen MD;  Location: [facility] CATH LAB;  Service: Cardiology;  Laterality: N/A;  COVID-19, mRNA, LNP-S, PF, 30 mcg/0.3 mL dose (COVID-19, MRNA, LN-S, PF (Pfizer)) 1/30/2021, 1/9/2021    Atherosclerotic PVD with intermittent claudication Bilateral 05/2021    Dr. Cohen    CATARACT EXTRACTION      COLONOSCOPY N/A 1/4/2017    Procedure: COLONOSCOPY;  Surgeon: Sylvester Arboleda III, MD;  Location: [facility] ENDO;  Service: Endoscopy;  Laterality: N/A;    COLONOSCOPY N/A 8/4/2020    Procedure: COLONOSCOPY;  Surgeon: Sylvester Arboleda III, MD;  Location: [facility] ENDO;  Service: Endoscopy;  Laterality: N/A;    CORONARY ANGIOPLASTY      CORONARY ARTERY BYPASS GRAFT      EXCISION OF MASS OF HAND Right 2/11/2022    Procedure: EXCISION, MASS, HAND;  Surgeon: Hernan Culver MD;  Location: [facility] OR;  Service: Orthopedics;  Laterality: Right;   thenar eminence skin lesion excised and a Z-plasty skin flap for coverage    HYSTERECTOMY      OOPHORECTOMY      THYROIDECTOMY          Current Outpatient Medications on File Prior to Visit   Medication Sig Dispense Refill    albuterol (PROVENTIL/VENTOLIN HFA) 90 mcg/actuation inhaler Inhale 2 puffs into the lungs every 6 (six) hours as needed for Wheezing. 18 g 3    amLODIPine (NORVASC) 10 MG tablet Take 1 tablet (10 mg total) by mouth once daily. 90 tablet 3    atorvastatin (LIPITOR) 80 MG tablet Take 1 tablet (80 mg total) by mouth once daily. 90 tablet 3    citalopram (CELEXA) 10 MG tablet Take 10 mg by mouth once daily.      clopidogreL (PLAVIX) 75 mg tablet TAKE 1  TABLET(75 MG) BY MOUTH EVERY DAY 90 tablet 1    ergocalciferol (ERGOCALCIFEROL) 50,000 unit Cap Take 1 capsule (50,000 Units total) by mouth every 7 days. 12 capsule 0    ezetimibe (ZETIA) 10 mg tablet TAKE 1 TABLET(10 MG) BY MOUTH EVERY DAY (Patient taking differently: Take 10 mg by mouth once daily.) 90 tablet 3    fluocinonide 0.05% (LIDEX) 0.05 % cream Apply to affected area of hands every other day 60 g 3    levothyroxine (SYNTHROID) 137 MCG Tab tablet TAKE 1 TABLET BY MOUTH EVERY DAY 90 tablet 0    ondansetron (ZOFRAN) 4 MG tablet TAKE 1 TABLET(4 MG) BY MOUTH EVERY 8 HOURS AS NEEDED FOR NAUSEA 15 tablet 0    oxyCODONE-acetaminophen (PERCOCET)  mg per tablet Take 1 tablet by mouth.      prochlorperazine (COMPAZINE) 10 MG tablet       tazarotene (TAZORAC) 0.1 % cream Apply to affected area of hands every other day 60 g 3    traZODone (DESYREL) 50 MG tablet TAKE 1 TABLET BY MOUTH EVERY DAY AT NIGHT AS NEEDED FOR INSOMNIA 30 tablet 3    valsartan (DIOVAN) 320 MG tablet TAKE 1 TABLET(320 MG) BY MOUTH EVERY DAY 90 tablet 3    aspirin (ECOTRIN) 81 MG EC tablet Take 1 tablet (81 mg total) by mouth once daily.  0    diclofenac sodium (VOLTAREN) 1 % Gel Apply 2 g topically 4 (four) times daily. for 10 days 100 g 0     No current facility-administered medications on file prior to visit.     Review of patient's allergies indicates:  No Known Allergies  Social History     Socioeconomic History    Marital status:     Number of children: 3   Occupational History    Occupation: Retired     Comment: Dispatcher   Tobacco Use    Smoking status: Every Day     Packs/day: 0.25     Years: 40.00     Pack years: 10.00     Types: Cigarettes     Start date: 1961    Smokeless tobacco: Never    Tobacco comments:     hold midnight prior to surgery   Substance and Sexual Activity    Alcohol use: Never    Drug use: No    Sexual activity: Yes   Other Topics Concern    Are you pregnant or think you may be? No    Breast-feeding No  "  Social History Narrative    Patient is retired dispatcher.       Objective     Vitals:    10/25/22 1028   BP: 138/70   Pulse: 74   Temp: 98.2 °F (36.8 °C)   TempSrc: Tympanic   SpO2: 95%   Weight: 86.2 kg (190 lb 0.6 oz)   Height: 5' 6" (1.676 m)    Body mass index is 30.67 kg/m².    Wt Readings from Last 3 Encounters:   10/25/22 86.2 kg (190 lb 0.6 oz)   09/22/22 87.1 kg (192 lb)   06/17/22 87.1 kg (192 lb 0.3 oz)        Physical Exam  Constitutional:       Appearance: She is obese. She is ill-appearing.   Cardiovascular:      Rate and Rhythm: Normal rate and regular rhythm.      Pulses: Normal pulses.      Heart sounds: Normal heart sounds.   Pulmonary:      Effort: Pulmonary effort is normal.   Skin:     Comments: Rashes on hands;  Wearing sunglasses   Neurological:      Mental Status: She is alert.   Psychiatric:         Mood and Affect: Mood is depressed.         Speech: Speech normal.         Behavior: Behavior normal.         Judgment: Judgment normal.     Lab Results   Component Value Date    WBC 9.48 02/01/2022    HGB 14.4 02/01/2022    MCV 94 02/01/2022     02/01/2022    Lab Results   Component Value Date     09/22/2022    K 4.3 09/22/2022     09/22/2022    CO2 29 09/22/2022    BUN 19 09/22/2022    CREATININE 0.8 09/22/2022    CALCIUM 9.3 09/22/2022    ALKPHOS 159 (H) 09/22/2022    ALT 16 09/22/2022    AST 17 09/22/2022      Lab Results   Component Value Date    TSH 1.286 06/24/2021    TSH 1.775 06/17/2020    TSH 3.030 02/05/2019    CHOL 151 09/22/2022    CHOL 121 12/06/2021    CHOL 142 03/22/2021    TRIG 75 09/22/2022    TRIG 63 12/06/2021    TRIG 69 03/22/2021    HDL 52 09/22/2022    HDL 48 12/06/2021    HDL 52 03/22/2021    Lab Results   Component Value Date/Time    HGBA1C 5.8 (H) 08/04/2017 10:03 AM          Health Maintenance         Date Due Completion Date    Shingles Vaccine (1 of 2) Never done ---    Pneumococcal Vaccines (Age 65+) (2 - PCV) 12/12/2007 12/12/2006    COVID-19 " Vaccine (4 - Booster for Pfizer series) 01/31/2022 12/6/2021    Influenza Vaccine (1) 09/01/2022 11/19/2020    DEXA Scan 10/26/2022 10/26/2020    Override on 12/28/2011: Done (Osteopenia; Moderate fx risk; reepat in 2 yrs. check vit D.)    Aspirin/Antiplatelet Therapy 09/22/2023 9/22/2022    Lipid Panel 09/22/2023 9/22/2022    Colonoscopy 08/04/2025 8/4/2020    TETANUS VACCINE 02/21/2027 2/21/2017 (ClinicallyNA)    Override on 2/21/2017: Not Clinically Appropriate          Assessment and Plan       # Hypothyroidism    #HTN     # Hyperlipidemia      Jonny Talley, MS-4  [employer]    I hereby acknowledge that I am relying upon documentation authored by a medical student working under my supervision and further I hereby attest that I have verified the student documentation or findings by personally performing the physical exam and medical decision making activities of the Evaluation and Management service to be billed.     Tayler Ovalle D.O.    See addendum below.    Flory was seen today for annual exam.  Hypothyroidism--reports feeling cold all the time. Taking medication as prescribed. Due to have TSH checked.   HTN--stable.   HLD--compliant with ezetimibe and atorvastaitn.   Lab Results   Component Value Date    LDLCALC 84.0 09/22/2022     PVD--compliant with aspirin, clopidogrel, ezetimibe and atorvastatin. Management per cardiology.   Osteoporosis--not on medication. Taking vitamin D. Due for a repeat DEXA.   Depression--not taking medication. She is not interested in taking medication and feels she is doing okay.     Diagnoses and all orders for this visit:    Annual physical exam  -     Counseled regarding age appropriate screenings and immunizations  -     Counseled regarding lifestyle modifications    Hypothyroidism (acquired)  -     check TSH    Essential hypertension  -     continue amlodipine and valsartan    Hyperlipidemia LDL goal <70  -     continue ezetimibe  and atorvastaitn    PVD (peripheral vascular disease)  -     continue aspirin, clopidogrel, atorvastatin and ezetimibe  -     f/u with cardiology  -     recommend smoking cessation    Age-related osteoporosis without current pathological fracture  -     DXA Bone Density Spine And Hip; Future  -     recommend smoking cessation     Cigarette nicotine dependence without complication  -     recommend cessation    Mild depression  -     monitor    Labs today.

## 2022-10-26 LAB
T4 FREE SERPL-MCNC: 0.89 NG/DL (ref 0.71–1.51)
TSH SERPL DL<=0.005 MIU/L-ACNC: 3.01 UIU/ML (ref 0.4–4)

## 2022-10-31 ENCOUNTER — EXTERNAL CHRONIC CARE MANAGEMENT (OUTPATIENT)
Dept: PRIMARY CARE CLINIC | Facility: CLINIC | Age: 77
End: 2022-10-31
Payer: MEDICARE

## 2022-10-31 PROCEDURE — 99490 PR CHRONIC CARE MGMT, 1ST 20 MIN: ICD-10-PCS | Mod: S$GLB,,, | Performed by: INTERNAL MEDICINE

## 2022-10-31 PROCEDURE — 99490 CHRNC CARE MGMT STAFF 1ST 20: CPT | Mod: S$GLB,,, | Performed by: INTERNAL MEDICINE

## 2022-11-02 ENCOUNTER — OFFICE VISIT (OUTPATIENT)
Dept: PHYSICAL MEDICINE AND REHAB | Facility: CLINIC | Age: 77
End: 2022-11-02
Payer: MEDICARE

## 2022-11-02 VITALS
BODY MASS INDEX: 30.53 KG/M2 | HEART RATE: 66 BPM | WEIGHT: 190 LBS | RESPIRATION RATE: 14 BRPM | SYSTOLIC BLOOD PRESSURE: 194 MMHG | DIASTOLIC BLOOD PRESSURE: 77 MMHG | HEIGHT: 66 IN

## 2022-11-02 DIAGNOSIS — G56.03 BILATERAL CARPAL TUNNEL SYNDROME: ICD-10-CM

## 2022-11-02 PROCEDURE — 99999 PR PBB SHADOW E&M-EST. PATIENT-LVL III: ICD-10-PCS | Mod: PBBFAC,,, | Performed by: PHYSICAL MEDICINE & REHABILITATION

## 2022-11-02 PROCEDURE — 3078F PR MOST RECENT DIASTOLIC BLOOD PRESSURE < 80 MM HG: ICD-10-PCS | Mod: CPTII,S$GLB,, | Performed by: PHYSICAL MEDICINE & REHABILITATION

## 2022-11-02 PROCEDURE — 95911 NRV CNDJ TEST 9-10 STUDIES: CPT | Mod: S$GLB,,, | Performed by: PHYSICAL MEDICINE & REHABILITATION

## 2022-11-02 PROCEDURE — 3077F SYST BP >= 140 MM HG: CPT | Mod: CPTII,S$GLB,, | Performed by: PHYSICAL MEDICINE & REHABILITATION

## 2022-11-02 PROCEDURE — 1159F MED LIST DOCD IN RCRD: CPT | Mod: CPTII,S$GLB,, | Performed by: PHYSICAL MEDICINE & REHABILITATION

## 2022-11-02 PROCEDURE — 1125F AMNT PAIN NOTED PAIN PRSNT: CPT | Mod: CPTII,S$GLB,, | Performed by: PHYSICAL MEDICINE & REHABILITATION

## 2022-11-02 PROCEDURE — 3077F PR MOST RECENT SYSTOLIC BLOOD PRESSURE >= 140 MM HG: ICD-10-PCS | Mod: CPTII,S$GLB,, | Performed by: PHYSICAL MEDICINE & REHABILITATION

## 2022-11-02 PROCEDURE — 1160F PR REVIEW ALL MEDS BY PRESCRIBER/CLIN PHARMACIST DOCUMENTED: ICD-10-PCS | Mod: CPTII,S$GLB,, | Performed by: PHYSICAL MEDICINE & REHABILITATION

## 2022-11-02 PROCEDURE — 3078F DIAST BP <80 MM HG: CPT | Mod: CPTII,S$GLB,, | Performed by: PHYSICAL MEDICINE & REHABILITATION

## 2022-11-02 PROCEDURE — 1125F PR PAIN SEVERITY QUANTIFIED, PAIN PRESENT: ICD-10-PCS | Mod: CPTII,S$GLB,, | Performed by: PHYSICAL MEDICINE & REHABILITATION

## 2022-11-02 PROCEDURE — 95911 PR NERVE CONDUCTION STUDY; 9-10 STUDIES: ICD-10-PCS | Mod: S$GLB,,, | Performed by: PHYSICAL MEDICINE & REHABILITATION

## 2022-11-02 PROCEDURE — 1160F RVW MEDS BY RX/DR IN RCRD: CPT | Mod: CPTII,S$GLB,, | Performed by: PHYSICAL MEDICINE & REHABILITATION

## 2022-11-02 PROCEDURE — 99999 PR PBB SHADOW E&M-EST. PATIENT-LVL III: CPT | Mod: PBBFAC,,, | Performed by: PHYSICAL MEDICINE & REHABILITATION

## 2022-11-02 PROCEDURE — 1159F PR MEDICATION LIST DOCUMENTED IN MEDICAL RECORD: ICD-10-PCS | Mod: CPTII,S$GLB,, | Performed by: PHYSICAL MEDICINE & REHABILITATION

## 2022-11-02 PROCEDURE — 99499 UNLISTED E&M SERVICE: CPT | Mod: S$GLB,,, | Performed by: PHYSICAL MEDICINE & REHABILITATION

## 2022-11-02 PROCEDURE — 99499 NO LOS: ICD-10-PCS | Mod: S$GLB,,, | Performed by: PHYSICAL MEDICINE & REHABILITATION

## 2022-11-02 NOTE — PROGRESS NOTES
[facility]   [id number]  [place] [address]  Phone: [phone]        Full Name: Flory Cam YOB: 1945  Patient ID: [id number]      Visit Date: 11/2/2022 09:19  Age: 77 Years 4 Months Old  Examining Physician: Gladys Hennessy M.D.  Referring Physician:   Reason for Referral: uex pain    Chief Complaint   Patient presents with    Numbness       HPI: This is a 77 y.o.  female being seen in clinic today for evaluation of chronic hand numbness/tingling-worse on the right.  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  She has some weakness of  strength in both hands.  Rest, shaking, and meds provide some relief.    History obtained from patient    Past family, medical, social, and surgical history reviewed in chart    Review of Systems:     General- denies lethargy, weight change, fever, chills  Head/neck- denies swallowing difficulties  ENT- denies hearing changes  Cardiovascular-denies chest pain  Pulmonary- denies shortness of breath  GI- denies constipation or bowel incontinence  - denies bladder incontinence  Skin- denies wounds or rashes  Musculoskeletal- denies weakness, + pain  Neurologic- + numbness and tingling  Psychiatric- denies depressive or psychotic features, denies anxiety  Lymphatic-denies swelling  Endocrine- denies hypoglycemic symptoms/DM history  All other pertinent systems negative     Physical Examination:  General: Well developed, well nourished female, NAD  HEENT:NCAT EOMI bilaterally   Pulmonary:Normal respirations    Spinal Examination: CERVICAL  Active ROM is within normal limits.  Inspection: No deformity of spinal alignment.    Musculoskeletal Tests:  Phalen:   Elbow compression (ulnar): neg  Tinels at wrist: +    Bilateral Upper and Lower Extremities:  Pulses are 2+ at radial bilaterally.  Shoulder/Elbow/Wrist/Hand ROM wnl. Various lichen planus at palm of right hand  Hip/Knee/Ankle  ROM   Bilateral Extremities show normal capillary refill.  No signs of cyanosis, rubor, edema, skin changes, or dysvascular changes of appendages.  Nails appear intact.    Neurological Exam:  Cranial Nerves:  II-XII grossly intact    Manual Muscle Testing: (Motor 5=normal)  5/5 strength bilateral upper/lower extremities    No focal atrophy is noted of either upper or lower extremity.    Bilateral Reflexes:  Polk's response is absent bilaterally.  No clonus at knee or ankle.    Sensation: tested to light touch  - intact in arms    Gait: Narrow base and good arm swing.      Entire procedure explained to patient prior to proceeding.  Verbal consent obtained    SNC      Nerve / Sites Rec. Site Onset Lat Peak Lat Amp Segments Distance Velocity     ms ms µV  mm m/s   R Median - Digit II (Antidromic)      Wrist Dig II 3.2 4.1 25.7 Wrist - Dig  44   L Median - Digit II (Antidromic)      Wrist Dig II 3.1 4.0 22.2 Wrist - Dig  46   R Ulnar - Digit V (Antidromic)      Wrist Dig V 2.6 3.4 28.5 Wrist - Dig V 140 55   L Ulnar - Digit V (Antidromic)      Wrist Dig V 2.9 3.8 15.4 Wrist - Dig V 140 48   R Radial - Anatomical snuff box (Forearm)      Forearm Wrist 1.4 2.2 12.8 Forearm - Wrist 100 71   L Radial - Anatomical snuff box (Forearm)      Forearm Wrist 2.1 2.5 23.5 Forearm - Wrist 100 48       MNC      Nerve / Sites Muscle Latency Amplitude Duration Rel Amp Segments Distance Lat Diff Velocity     ms mV ms %  mm ms m/s   R Median - APB      Wrist APB 4.3 6.2 4.3 100 Wrist - APB 80        Elbow APB 8.2 5.7 5.3 90.9 Elbow - Wrist 210 3.9 54   L Median - APB      Wrist APB 3.6 11.5 6.1 100 Wrist - APB 80        Elbow APB 7.8 11.1 6.3 96.3 Elbow - Wrist 210 4.2 50   R Ulnar - ADM      Wrist ADM 2.8 11.4 6.3 100 Wrist - ADM 80        B.Elbow ADM 7.1 11.7 6.1 102 B.Elbow - Wrist 230 4.3 53      A.Elbow ADM 9.3 10.6 7.0 91.1 A.Elbow - B.Elbow 110 2.1 52         A.Elbow - Wrist  6.5    L Ulnar - ADM      Wrist ADM 3.1  9.1 7.4 100 Wrist - ADM 80        B.Elbow ADM 7.8 7.2 7.9 78.7 B.Elbow - Wrist 240 4.7 51      A.Elbow ADM 10.0 6.4 8.0 89.4 A.Elbow - B.Elbow 110 2.2 50         A.Elbow - Wrist  6.9                                         INTERPRETATION    -Bilateral median motor nerve conduction study showed normal latency, amplitude, and conduction velocity  -Bilateral median sensory nerve conduction study showed prolonged peak latency and normal amplitude  -Bilateral ulnar motor nerve conduction study showed normal latency, amplitude, and conduction velocity  -Bilateral ulnar sensory nerve conduction study showed normal peak latency and amplitude  -Bilateral radial sensory nerve conduction study showed normal peak latency and amplitude    IMPRESSION  ABNORMAL study  2. There is electrodiagnostic evidence of a mild demyelinating median neuropathy (Carpal tunnel syndrome) across bilateral wrists-worse on the right    PLAN  Discussed in detail for greater than 30 minutes about diagnosis and treatment plan    1. Follow up with referring provider: Dr. Hernan Aguirre*  2. Handouts on CTS provided.  3. This study is good for one year. If symptoms worsen or do not improve, please re-consult.    Gladys Hennessy M.D.  Physical Medicine and Rehab

## 2022-11-04 ENCOUNTER — OFFICE VISIT (OUTPATIENT)
Dept: ORTHOPEDICS | Facility: CLINIC | Age: 77
End: 2022-11-04
Payer: MEDICARE

## 2022-11-04 ENCOUNTER — HOSPITAL ENCOUNTER (OUTPATIENT)
Dept: RADIOLOGY | Facility: HOSPITAL | Age: 77
Discharge: HOME | End: 2022-11-04
Attending: PHYSICAL MEDICINE & REHABILITATION
Payer: MEDICARE

## 2022-11-04 VITALS — WEIGHT: 190 LBS | BODY MASS INDEX: 30.53 KG/M2 | HEIGHT: 66 IN

## 2022-11-04 DIAGNOSIS — M54.2 CERVICALGIA: ICD-10-CM

## 2022-11-04 DIAGNOSIS — L43.9 LICHEN PLANUS: ICD-10-CM

## 2022-11-04 DIAGNOSIS — G56.03 CARPAL TUNNEL SYNDROME, BILATERAL: Primary | ICD-10-CM

## 2022-11-04 PROCEDURE — 99999 PR PBB SHADOW E&M-EST. PATIENT-LVL III: CPT | Mod: PBBFAC,,, | Performed by: ORTHOPAEDIC SURGERY

## 2022-11-04 PROCEDURE — 99213 OFFICE O/P EST LOW 20 MIN: CPT | Mod: S$GLB,,, | Performed by: ORTHOPAEDIC SURGERY

## 2022-11-04 PROCEDURE — 99213 PR OFFICE/OUTPT VISIT, EST, LEVL III, 20-29 MIN: ICD-10-PCS | Mod: S$GLB,,, | Performed by: ORTHOPAEDIC SURGERY

## 2022-11-04 PROCEDURE — 72125 CT NECK SPINE W/O DYE: CPT | Mod: TC

## 2022-11-04 PROCEDURE — 1159F PR MEDICATION LIST DOCUMENTED IN MEDICAL RECORD: ICD-10-PCS | Mod: CPTII,S$GLB,, | Performed by: ORTHOPAEDIC SURGERY

## 2022-11-04 PROCEDURE — 1159F MED LIST DOCD IN RCRD: CPT | Mod: CPTII,S$GLB,, | Performed by: ORTHOPAEDIC SURGERY

## 2022-11-04 PROCEDURE — 1101F PR PT FALLS ASSESS DOC 0-1 FALLS W/OUT INJ PAST YR: ICD-10-PCS | Mod: CPTII,S$GLB,, | Performed by: ORTHOPAEDIC SURGERY

## 2022-11-04 PROCEDURE — 1125F AMNT PAIN NOTED PAIN PRSNT: CPT | Mod: CPTII,S$GLB,, | Performed by: ORTHOPAEDIC SURGERY

## 2022-11-04 PROCEDURE — 1101F PT FALLS ASSESS-DOCD LE1/YR: CPT | Mod: CPTII,S$GLB,, | Performed by: ORTHOPAEDIC SURGERY

## 2022-11-04 PROCEDURE — 99999 PR PBB SHADOW E&M-EST. PATIENT-LVL III: ICD-10-PCS | Mod: PBBFAC,,, | Performed by: ORTHOPAEDIC SURGERY

## 2022-11-04 PROCEDURE — 3288F PR FALLS RISK ASSESSMENT DOCUMENTED: ICD-10-PCS | Mod: CPTII,S$GLB,, | Performed by: ORTHOPAEDIC SURGERY

## 2022-11-04 PROCEDURE — 1125F PR PAIN SEVERITY QUANTIFIED, PAIN PRESENT: ICD-10-PCS | Mod: CPTII,S$GLB,, | Performed by: ORTHOPAEDIC SURGERY

## 2022-11-04 PROCEDURE — 3288F FALL RISK ASSESSMENT DOCD: CPT | Mod: CPTII,S$GLB,, | Performed by: ORTHOPAEDIC SURGERY

## 2022-11-04 PROCEDURE — 1160F PR REVIEW ALL MEDS BY PRESCRIBER/CLIN PHARMACIST DOCUMENTED: ICD-10-PCS | Mod: CPTII,S$GLB,, | Performed by: ORTHOPAEDIC SURGERY

## 2022-11-04 PROCEDURE — 1160F RVW MEDS BY RX/DR IN RCRD: CPT | Mod: CPTII,S$GLB,, | Performed by: ORTHOPAEDIC SURGERY

## 2022-11-04 NOTE — PROGRESS NOTES
Subjective:     Patient ID: Flory Cam is a 77 y.o. female.    Chief Complaint: Pain of the Right Hand and Pain of the Left Hand      HPI:  The patient is a 77-year-old female with bilateral carpal tunnel syndrome documented by nerve conduction studies.  The right is worse than the left.  She has lichen planus lesions multiple that have recurred since and excision done 02/11/2022.  These are smaller than her previous lesions.    Past Medical History:   Diagnosis Date    Acute coronary syndrome     Anxiety     Bilateral carotid artery stenosis 11/17/2015    Chronic pain     Colon polyp     Coronary artery disease     GERD (gastroesophageal reflux disease)     Hyperlipidemia     Hypertension     Hypothyroidism     Low back pain     Lupus     Osteoporosis     Poor circulation     Pre-operative clearance 7/12/2019    PVD (peripheral vascular disease)     S/P peripheral artery angioplasty 02/13/2014    SCCA (squamous cell carcinoma) of skin 10/2019    Dr. ZANDRA Rivas--oncology    Skin disease      Past Surgical History:   Procedure Laterality Date    AORTOGRAPHY WITH SERIALOGRAPHY N/A 5/25/2021    Procedure: AORTOGRAM, WITH RUNOFF;  Surgeon: Christopher Cohen MD;  Location: [facility] CATH LAB;  Service: Cardiology;  Laterality: N/A;  COVID-19, mRNA, LNP-S, PF, 30 mcg/0.3 mL dose (COVID-19, MRNA, LN-S, PF (Pfizer)) 1/30/2021, 1/9/2021    Atherosclerotic PVD with intermittent claudication Bilateral 05/2021    Dr. Cohen    CATARACT EXTRACTION      COLONOSCOPY N/A 1/4/2017    Procedure: COLONOSCOPY;  Surgeon: Sylvester Arboleda III, MD;  Location: [facility] ENDO;  Service: Endoscopy;  Laterality: N/A;    COLONOSCOPY N/A 8/4/2020    Procedure: COLONOSCOPY;  Surgeon: Sylvester Arboleda III, MD;  Location: [facility] ENDO;  Service: Endoscopy;  Laterality: N/A;    CORONARY ANGIOPLASTY      CORONARY ARTERY BYPASS GRAFT      EXCISION OF MASS OF HAND Right 2/11/2022    Procedure: EXCISION, MASS, HAND;  Surgeon: Hernan Culver MD;   Location: [facility] OR;  Service: Orthopedics;  Laterality: Right;   thenar eminence skin lesion excised and a Z-plasty skin flap for coverage    HYSTERECTOMY      OOPHORECTOMY      THYROIDECTOMY       Family History   Problem Relation Age of Onset    Hypertension Mother     Stroke Mother     Lung cancer Daughter     Melanoma Neg Hx     Psoriasis Neg Hx     Lupus Neg Hx     Eczema Neg Hx      Social History     Socioeconomic History    Marital status:     Number of children: 3   Occupational History    Occupation: Retired     Comment: Dispatcher   Tobacco Use    Smoking status: Every Day     Packs/day: 0.25     Years: 40.00     Pack years: 10.00     Types: Cigarettes     Start date: 1961    Smokeless tobacco: Never    Tobacco comments:     hold midnight prior to surgery   Substance and Sexual Activity    Alcohol use: Never    Drug use: No    Sexual activity: Yes   Other Topics Concern    Are you pregnant or think you may be? No    Breast-feeding No   Social History Narrative    Patient is retired dispatcher.     Medication List with Changes/Refills   Current Medications    ALBUTEROL (PROVENTIL/VENTOLIN HFA) 90 MCG/ACTUATION INHALER    Inhale 2 puffs into the lungs every 6 (six) hours as needed for Wheezing.    AMLODIPINE (NORVASC) 10 MG TABLET    Take 1 tablet (10 mg total) by mouth once daily.    ASPIRIN (ECOTRIN) 81 MG EC TABLET    Take 1 tablet (81 mg total) by mouth once daily.    ATORVASTATIN (LIPITOR) 80 MG TABLET    Take 1 tablet (80 mg total) by mouth once daily.    CITALOPRAM (CELEXA) 10 MG TABLET    Take 10 mg by mouth once daily.    CLOPIDOGREL (PLAVIX) 75 MG TABLET    TAKE 1 TABLET(75 MG) BY MOUTH EVERY DAY    DICLOFENAC SODIUM (VOLTAREN) 1 % GEL    Apply 2 g topically 4 (four) times daily. for 10 days    ERGOCALCIFEROL (ERGOCALCIFEROL) 50,000 UNIT CAP    Take 1 capsule (50,000 Units total) by mouth every 7 days.    EZETIMIBE (ZETIA) 10 MG TABLET    TAKE 1 TABLET(10 MG) BY MOUTH EVERY DAY     FLUOCINONIDE 0.05% (LIDEX) 0.05 % CREAM    Apply to affected area of hands every other day    LEVOTHYROXINE (SYNTHROID) 137 MCG TAB TABLET    TAKE 1 TABLET BY MOUTH EVERY DAY    ONDANSETRON (ZOFRAN) 4 MG TABLET    TAKE 1 TABLET(4 MG) BY MOUTH EVERY 8 HOURS AS NEEDED FOR NAUSEA    OXYCODONE-ACETAMINOPHEN (PERCOCET)  MG PER TABLET    Take 1 tablet by mouth.    PROCHLORPERAZINE (COMPAZINE) 10 MG TABLET        TAZAROTENE (TAZORAC) 0.1 % CREAM    Apply to affected area of hands every other day    TRAZODONE (DESYREL) 50 MG TABLET    TAKE 1 TABLET BY MOUTH EVERY DAY AT NIGHT AS NEEDED FOR INSOMNIA    VALSARTAN (DIOVAN) 320 MG TABLET    TAKE 1 TABLET(320 MG) BY MOUTH EVERY DAY     Review of patient's allergies indicates:  No Known Allergies  Review of Systems   Constitutional: Negative for malaise/fatigue.   HENT:  Negative for hearing loss.    Eyes:  Negative for double vision and visual disturbance.   Cardiovascular:  Negative for chest pain.   Respiratory:  Positive for shortness of breath.    Endocrine: Negative for cold intolerance.   Hematologic/Lymphatic: Does not bruise/bleed easily.   Skin:  Negative for poor wound healing and suspicious lesions.   Musculoskeletal:  Positive for arthritis, joint pain and joint swelling. Negative for gout.   Gastrointestinal:  Negative for nausea and vomiting.   Genitourinary:  Negative for dysuria.   Neurological:  Positive for focal weakness, numbness, paresthesias, sensory change and weakness.   Psychiatric/Behavioral:  Positive for substance abuse. Negative for depression and memory loss. The patient is not nervous/anxious.    Allergic/Immunologic: Negative for persistent infections.     Objective:   Body mass index is 30.67 kg/m².  There were no vitals filed for this visit.             General    Constitutional: She is oriented to person, place, and time. She appears well-developed and well-nourished. No distress.   HENT:   Head: Normocephalic.   Eyes: EOM are normal.    Pulmonary/Chest: Effort normal.   Neurological: She is oriented to person, place, and time.   Psychiatric: She has a normal mood and affect.             Right Hand/Wrist Exam     Inspection   Scars: Wrist - absent Hand -  absent  Effusion: Wrist - absent Hand -  absent    Pain   Wrist - The patient exhibits pain of the flexor/pronator group.    Tests   Phalens sign: positive  Tinel's sign (median nerve): positive  Carpal Tunnel Compression Test: positive    Atrophy   Thenar:  negative  Intrinsic:  negative    Other     Neuorologic Exam    Median Distribution: abnormal  Ulnar Distribution: normal  Radial Distribution: normal    Comments:  The patient has a positive Tinel and positive Phalen sign.  There is no thenar atrophy noted.  She has multiple lichen planus lesions that she wishes to have excised.          Vascular Exam       Capillary Refill  Right Hand: normal capillary refill        radiographs were not obtained today  Assessment:     Encounter Diagnoses   Name Primary?    Carpal tunnel syndrome, bilateral Yes    Lichen planus         Plan:     The patient wishes to have multiple lichen planus lesions excised to palm right hand.  She also wishes to have a right carpal tunnel release.  Risk complications and alternatives were discussed including the risk of infection, anesthetic risk, injury to nerves and vessels, loss of motion, and possible need for additional surgeries were discussed.  We will allow the skin lesion excisions to heal by secondary intention.  All questions were answered.  She seems to understand and agree that surgery.  She was given bilateral wrist splints today                Disclaimer: This note was prepared using a voice recognition system and is likely to have sound alike errors within the text.

## 2022-11-07 ENCOUNTER — TELEPHONE (OUTPATIENT)
Dept: INTERNAL MEDICINE | Facility: CLINIC | Age: 77
End: 2022-11-07
Payer: MEDICARE

## 2022-11-07 NOTE — TELEPHONE ENCOUNTER
----- Message from Aye Aiken sent at 11/7/2022 10:50 AM CST -----  Contact: flory  Type:  Patient Returning Call    Who Called: Flory   Who Left Message for Patient: nurse   Does the patient know what this is regarding?:no  Would the patient rather a call back or a response via [facility]? call  Best Call Back Number: [phone] (home)    Additional Information: The patient returned the missed call and would like to be contacted again today please

## 2022-11-09 ENCOUNTER — TELEPHONE (OUTPATIENT)
Dept: ADMINISTRATIVE | Facility: HOSPITAL | Age: 77
End: 2022-11-09
Payer: MEDICARE

## 2022-11-15 ENCOUNTER — TELEPHONE (OUTPATIENT)
Dept: INTERNAL MEDICINE | Facility: CLINIC | Age: 77
End: 2022-11-15
Payer: MEDICARE

## 2022-11-15 NOTE — TELEPHONE ENCOUNTER
----- Message from Patricia Funk sent at 11/15/2022  8:30 AM CST -----  Patient is calling in regards to will not able to make appointment today not feeling well but would like a call back at [phone].  Thanks/jamarcus

## 2022-11-18 ENCOUNTER — TELEPHONE (OUTPATIENT)
Dept: INTERNAL MEDICINE | Facility: CLINIC | Age: 77
End: 2022-11-18
Payer: MEDICARE

## 2022-11-21 ENCOUNTER — PES CALL (OUTPATIENT)
Dept: ADMINISTRATIVE | Facility: CLINIC | Age: 77
End: 2022-11-21
Payer: MEDICARE

## 2022-11-21 DIAGNOSIS — I73.9 PAD (PERIPHERAL ARTERY DISEASE): ICD-10-CM

## 2022-11-22 RX ORDER — CLOPIDOGREL BISULFATE 75 MG/1
TABLET ORAL
Qty: 90 TABLET | Refills: 3 | Status: SHIPPED | OUTPATIENT
Start: 2022-11-22

## 2022-11-22 NOTE — TELEPHONE ENCOUNTER
Refill Decision Note   Flory Cam  is requesting a refill authorization.  Brief Assessment and Rationale for Refill:  Approve     Medication Therapy Plan:       Medication Reconciliation Completed: No   Comments:     No Care Gaps recommended.     Note composed:2:17 PM 11/22/2022

## 2022-11-22 NOTE — TELEPHONE ENCOUNTER
No new care gaps identified.  [facility] Embedded Care Gaps. Reference number: [id number]. 11/21/2022   8:45:26 PM CST

## 2022-11-30 ENCOUNTER — EXTERNAL CHRONIC CARE MANAGEMENT (OUTPATIENT)
Dept: PRIMARY CARE CLINIC | Facility: CLINIC | Age: 77
End: 2022-11-30
Payer: MEDICARE

## 2022-11-30 ENCOUNTER — PES CALL (OUTPATIENT)
Dept: ADMINISTRATIVE | Facility: CLINIC | Age: 77
End: 2022-11-30
Payer: MEDICARE

## 2022-11-30 PROCEDURE — 99490 CHRNC CARE MGMT STAFF 1ST 20: CPT | Mod: S$GLB,,, | Performed by: INTERNAL MEDICINE

## 2022-11-30 PROCEDURE — 99490 PR CHRONIC CARE MGMT, 1ST 20 MIN: ICD-10-PCS | Mod: S$GLB,,, | Performed by: INTERNAL MEDICINE

## 2022-12-13 ENCOUNTER — TELEPHONE (OUTPATIENT)
Dept: PSYCHIATRY | Facility: CLINIC | Age: 77
End: 2022-12-13

## 2022-12-31 ENCOUNTER — EXTERNAL CHRONIC CARE MANAGEMENT (OUTPATIENT)
Dept: PRIMARY CARE CLINIC | Facility: CLINIC | Age: 77
End: 2022-12-31
Payer: MEDICARE

## 2022-12-31 PROCEDURE — 99490 PR CHRONIC CARE MGMT, 1ST 20 MIN: ICD-10-PCS | Mod: S$GLB,,, | Performed by: INTERNAL MEDICINE

## 2022-12-31 PROCEDURE — 99490 CHRNC CARE MGMT STAFF 1ST 20: CPT | Mod: S$GLB,,, | Performed by: INTERNAL MEDICINE

## 2023-01-17 ENCOUNTER — OFFICE VISIT (OUTPATIENT)
Dept: PODIATRY | Facility: CLINIC | Age: 78
End: 2023-01-17
Payer: MEDICARE

## 2023-01-17 VITALS — WEIGHT: 190 LBS | HEIGHT: 66 IN | BODY MASS INDEX: 30.53 KG/M2

## 2023-01-17 DIAGNOSIS — L84 CORN OR CALLUS: ICD-10-CM

## 2023-01-17 DIAGNOSIS — L93.0 DISCOID LUPUS ERYTHEMATOSUS: ICD-10-CM

## 2023-01-17 DIAGNOSIS — I73.9 PERIPHERAL VASCULAR DISEASE: Primary | ICD-10-CM

## 2023-01-17 DIAGNOSIS — B35.1 DERMATOPHYTOSIS OF NAIL: ICD-10-CM

## 2023-01-17 PROCEDURE — 99999 PR PBB SHADOW E&M-EST. PATIENT-LVL IV: CPT | Mod: PBBFAC,HCNC,, | Performed by: PODIATRIST

## 2023-01-17 PROCEDURE — 1159F MED LIST DOCD IN RCRD: CPT | Mod: HCNC,CPTII,S$GLB, | Performed by: PODIATRIST

## 2023-01-17 PROCEDURE — 11721 DEBRIDE NAIL 6 OR MORE: CPT | Mod: Q8,HCNC,S$GLB, | Performed by: PODIATRIST

## 2023-01-17 PROCEDURE — 99213 OFFICE O/P EST LOW 20 MIN: CPT | Mod: 25,HCNC,S$GLB, | Performed by: PODIATRIST

## 2023-01-17 PROCEDURE — 1100F PTFALLS ASSESS-DOCD GE2>/YR: CPT | Mod: HCNC,CPTII,S$GLB, | Performed by: PODIATRIST

## 2023-01-17 PROCEDURE — 11721 PR DEBRIDEMENT OF NAILS, 6 OR MORE: ICD-10-PCS | Mod: Q8,HCNC,S$GLB, | Performed by: PODIATRIST

## 2023-01-17 PROCEDURE — 3288F FALL RISK ASSESSMENT DOCD: CPT | Mod: HCNC,CPTII,S$GLB, | Performed by: PODIATRIST

## 2023-01-17 PROCEDURE — 1126F PR PAIN SEVERITY QUANTIFIED, NO PAIN PRESENT: ICD-10-PCS | Mod: HCNC,CPTII,S$GLB, | Performed by: PODIATRIST

## 2023-01-17 PROCEDURE — 1159F PR MEDICATION LIST DOCUMENTED IN MEDICAL RECORD: ICD-10-PCS | Mod: HCNC,CPTII,S$GLB, | Performed by: PODIATRIST

## 2023-01-17 PROCEDURE — 3288F PR FALLS RISK ASSESSMENT DOCUMENTED: ICD-10-PCS | Mod: HCNC,CPTII,S$GLB, | Performed by: PODIATRIST

## 2023-01-17 PROCEDURE — 99213 PR OFFICE/OUTPT VISIT, EST, LEVL III, 20-29 MIN: ICD-10-PCS | Mod: 25,HCNC,S$GLB, | Performed by: PODIATRIST

## 2023-01-17 PROCEDURE — 99999 PR PBB SHADOW E&M-EST. PATIENT-LVL IV: ICD-10-PCS | Mod: PBBFAC,HCNC,, | Performed by: PODIATRIST

## 2023-01-17 PROCEDURE — 1126F AMNT PAIN NOTED NONE PRSNT: CPT | Mod: HCNC,CPTII,S$GLB, | Performed by: PODIATRIST

## 2023-01-17 PROCEDURE — 1100F PR PT FALLS ASSESS DOC 2+ FALLS/FALL W/INJURY/YR: ICD-10-PCS | Mod: HCNC,CPTII,S$GLB, | Performed by: PODIATRIST

## 2023-01-17 NOTE — PROGRESS NOTES
PODIATRY NOTE    CHIEF COMPLAINT  Chief Complaint   Patient presents with    Routine Foot Care     3 month RFC, PVD, wears casual shoes and socks, last seen PCP Dr. Ovalle on 10/25/22       HPI  SUBJECTIVE: Flory Cam is a 77 y.o. female w/ PMH of PVD, Lupus, hx of intermittent leg claudifcation- s/p vascular intervention with much improvement in symptoms and other medical problems who presents to clinic for high risk  foot exam and care. Patient admits to painful toenails and calluses aggravated by increased weight bearing, shoe gear, and pressure. States pain is relieved with routine debridements.     She does complain about feeling depressed and anxious. She denies suicidal ideations.       HgA1c:   Hemoglobin A1C   Date Value Ref Range Status   08/04/2017 5.8 (H) 4.0 - 5.6 % Final     Comment:     According to ADA guidelines, hemoglobin A1c <7.0% represents  optimal control in non-pregnant diabetic patients. Different  metrics may apply to specific patient populations.   Standards of Medical Care in Diabetes-2016.  For the purpose of screening for the presence of diabetes:  <5.7%     Consistent with the absence of diabetes  5.7-6.4%  Consistent with increasing risk for diabetes   (prediabetes)  >or=6.5%  Consistent with diabetes  Currently, no consensus exists for use of hemoglobin A1c  for diagnosis of diabetes for children.  This Hemoglobin A1c assay has significant interference with fetal   hemoglobin   (HbF). The results are invalid for patients with abnormal amounts of   HbF,   including those with known Hereditary Persistence   of Fetal Hemoglobin. Heterozygous hemoglobin variants (HbAS, HbAC,   HbAD, HbAE, HbA2) do not significantly interfere with this assay;   however, presence of multiple variants in a sample may impact the %   interference.     09/14/2016 6.2 4.5 - 6.2 % Final     Comment:     According to ADA guidelines, hemoglobin A1C <7.0% represents  optimal control in non-pregnant  "diabetic patients.  Different  metrics may apply to specific populations.   Standards of Medical Care in Diabetes - 2016.  For the purpose of screening for the presence of diabetes:  <5.7%     Consistent with the absence of diabetes  5.7-6.4%  Consistent with increasing risk for diabetes   (prediabetes)  >or=6.5%  Consistent with diabetes  Currently no consensus exists for use of hemoglobin A1C  for diagnosis of diabetes for children.     04/13/2010 5.9 4.0 - 6.2 % Final     REVIEW OF SYSTEMS  General: This patient is well-developed, well-nourished and appears stated age, well-oriented to person, place and time, and cooperative and pleasant on today's visit  Constitutional: Negative for chills and fever.   Respiratory: Negative for shortness of breath.    Cardiovascular: Negative for chest pain, palpitations, orthopnea  Gastrointestinal: Negative for diarrhea, nausea and vomiting.   Musculoskeletal: Positive for above noted in HPI  Skin:positive for skin and nail changes   Peripheral Vascular: no claudication or cyanosis  Psychiatric/Behavioral: Negative for altered mental status     PHYSICAL EXAM  Vitals:    01/17/23 0909   Weight: 86.2 kg (190 lb)   Height: 5' 6" (1.676 m)   PainSc: 0-No pain       GEN:  This patient is well-developed, well-nourished and appears stated age, well-oriented to person, place and time, and cooperative and pleasant on today's visit.      LOWER EXTREMITY  Vascular:   DP pedal pulse 0/4 b/l, PT pedal pulse 1/4 b/l  Skin temperature warm to warm from prox to distally  CFT <5 secs b/l  There is LE edema noted b/l.     Dermatologic:   Thickened, dystrophic, elongated toenails with subungal debris 1-5 b/l.   Webspaces are C/D/I B/L.  There is hyperkeratotic tissue noted plantar aspect of b/l feet at medial arch x 2, dorsal lateral right fifth digit  Skin texture and turgor dry with hyperkeratosis diffusely b/l plantar heel   There is no pedal hair growth noted    Neurologic:  Vibratory " sensation diminished at level of Hallux IPJ b/l    Protective sensation absent at 0/10 sites upon examination with [place] Weinsten 5.07 g monofilament.   Propioception intact at 1st MTPJ b/l.   Achilles and patellar deep tendon reflexes intact  Babinski reflex absent b/l. Light touch and sharp/dull sensation intact b/l.    Musculoskeletal/Orthopedic:  Pain dorsolateral fifth digit  Muscle strength is 5/5 for foot inverters, everters, plantarflexors, and dorsiflexors. Muscle tone is normal.  Pain free range of motion in all four quadrants with stiffness and limitation b/l  PAIN with palpation of callus plantar medial arch, LEFT    IMAGING:     Right Lower Arterial CFA has triphasic flow.     PFA has triphasic flow.     SFAo has biphasic flow.     SFAp has biphasic flow.     SFAm has biphasic flow.     SFAd has biphasic flow.     POP has biphasic flow.     AT has biphasic flow.     TIB TRNK has biphasic flow.     PT has biphasic flow.     PER has biphasic flow.   Left Lower Arterial CFA has biphasic flow.     PFA has biphasic flow.     SFAo has monophasic flow.   SFAp is occluded.   SFAm is occluded.     SFAd is occluded and is stented.   Left SFAd stent velocity origin: 0, proximal: 0, mid: 0, distal: 0    POP has monophasic flow.     AT has monophasic flow.     TIB TRNK has monophasic flow.     PT has monophasic flow.     PER has monophasic flow.     ASSESSMENT  Encounter Diagnoses   Name Primary?    Peripheral vascular disease Yes    Corn or callus     Discoid lupus erythematosus     Dermatophytosis of nail          Plan:  -Discuss presenting problems, etiology, pathologic processes and management options with patient today.     Peripheral vascular disease    Corn or callus    Discoid lupus erythematosus    Dermatophytosis of nail          -With patient's permission, the elongated onychomycotic toenails, as outlined in the physical examination, were sharply debrided with a double action nail nipper to their soft  tissue attachment. If indicated, the nails were then smoothed down in thickness with an sotero board or dremmel to facilitate in further debridement removing all offending nail and subungual debris.  The elongated nails, as outlined in the objective portion of this note, were trimmed to appropriate length, with a double action nail nipper, for alleviation/reduction of pains as well. Patient relates relief following the procedure.     -Recommendations on purchase of Urea 40 and/or Amlactin was provided for calluses. Metatarsal pad dispensed and patient was instructed on how to apply for offloading callus. Shoe recommendations provided for accomodative foot wear.    Patient has above noted diagnosis and/or medical co-morbidities.They are being treated and managed by their  Primary Care Physician for management of comorbid states which are deemed to be currently stable.        Psych consult placed.      Future Appointments   Date Time Provider Department Center   1/24/2023  2:45 PM Hernan Culver MD [facility]O [facility]   2/9/2023  2:40 PM Christopher Cohen MD [facility] CARDIO [facility]   3/27/2023  1:20 PM Tayler Ovalle DO [facility] IM [facility]   4/18/2023  9:00 AM Melva Nettles DPM [facility] POD [facility]

## 2023-01-17 NOTE — PATIENT INSTRUCTIONS
Pt arrived to ED via EMS with c/o right lower back pain that has been going on for about 1 month. Pt states he had told his pcp about it and he states it was arthritis. Denies cp, sob, fever. You can purchase UREA 40% cream online     [email or web address]     [employer]   Urea 40% Foot Cream 4 oz - Best Callus Remover - Moisturizes & Rehydrates Thick, Cracked, Rough, Dead & Dry Skin - For Feet, Elbows and Hands + Free Pumice Stone - 100% Money Back Guarantee   4.6 out of 5 stars 1,181   $14.99 Prime    You can also purchase Flexitol heel balm cream. [facility] or online [email or web address]

## 2023-01-23 ENCOUNTER — TELEPHONE (OUTPATIENT)
Dept: ADMINISTRATIVE | Facility: HOSPITAL | Age: 78
End: 2023-01-23
Payer: MEDICARE

## 2023-01-24 ENCOUNTER — OFFICE VISIT (OUTPATIENT)
Dept: ORTHOPEDICS | Facility: CLINIC | Age: 78
End: 2023-01-24
Payer: MEDICARE

## 2023-01-24 VITALS — WEIGHT: 190 LBS | HEIGHT: 66 IN | BODY MASS INDEX: 30.53 KG/M2

## 2023-01-24 DIAGNOSIS — L43.9 LICHEN PLANUS: Primary | ICD-10-CM

## 2023-01-24 PROCEDURE — 1159F PR MEDICATION LIST DOCUMENTED IN MEDICAL RECORD: ICD-10-PCS | Mod: HCNC,CPTII,S$GLB, | Performed by: ORTHOPAEDIC SURGERY

## 2023-01-24 PROCEDURE — 3288F FALL RISK ASSESSMENT DOCD: CPT | Mod: HCNC,CPTII,S$GLB, | Performed by: ORTHOPAEDIC SURGERY

## 2023-01-24 PROCEDURE — 99213 OFFICE O/P EST LOW 20 MIN: CPT | Mod: HCNC,S$GLB,, | Performed by: ORTHOPAEDIC SURGERY

## 2023-01-24 PROCEDURE — 1160F RVW MEDS BY RX/DR IN RCRD: CPT | Mod: HCNC,CPTII,S$GLB, | Performed by: ORTHOPAEDIC SURGERY

## 2023-01-24 PROCEDURE — 99999 PR PBB SHADOW E&M-EST. PATIENT-LVL III: ICD-10-PCS | Mod: PBBFAC,HCNC,, | Performed by: ORTHOPAEDIC SURGERY

## 2023-01-24 PROCEDURE — 99999 PR PBB SHADOW E&M-EST. PATIENT-LVL III: CPT | Mod: PBBFAC,HCNC,, | Performed by: ORTHOPAEDIC SURGERY

## 2023-01-24 PROCEDURE — 1159F MED LIST DOCD IN RCRD: CPT | Mod: HCNC,CPTII,S$GLB, | Performed by: ORTHOPAEDIC SURGERY

## 2023-01-24 PROCEDURE — 1101F PT FALLS ASSESS-DOCD LE1/YR: CPT | Mod: HCNC,CPTII,S$GLB, | Performed by: ORTHOPAEDIC SURGERY

## 2023-01-24 PROCEDURE — 1125F AMNT PAIN NOTED PAIN PRSNT: CPT | Mod: HCNC,CPTII,S$GLB, | Performed by: ORTHOPAEDIC SURGERY

## 2023-01-24 PROCEDURE — 1160F PR REVIEW ALL MEDS BY PRESCRIBER/CLIN PHARMACIST DOCUMENTED: ICD-10-PCS | Mod: HCNC,CPTII,S$GLB, | Performed by: ORTHOPAEDIC SURGERY

## 2023-01-24 PROCEDURE — 3288F PR FALLS RISK ASSESSMENT DOCUMENTED: ICD-10-PCS | Mod: HCNC,CPTII,S$GLB, | Performed by: ORTHOPAEDIC SURGERY

## 2023-01-24 PROCEDURE — 1125F PR PAIN SEVERITY QUANTIFIED, PAIN PRESENT: ICD-10-PCS | Mod: HCNC,CPTII,S$GLB, | Performed by: ORTHOPAEDIC SURGERY

## 2023-01-24 PROCEDURE — 1101F PR PT FALLS ASSESS DOC 0-1 FALLS W/OUT INJ PAST YR: ICD-10-PCS | Mod: HCNC,CPTII,S$GLB, | Performed by: ORTHOPAEDIC SURGERY

## 2023-01-24 PROCEDURE — 99213 PR OFFICE/OUTPT VISIT, EST, LEVL III, 20-29 MIN: ICD-10-PCS | Mod: HCNC,S$GLB,, | Performed by: ORTHOPAEDIC SURGERY

## 2023-01-24 NOTE — PROGRESS NOTES
Subjective:     Patient ID: Flory Cam is a 77 y.o. female.    Chief Complaint: Pain of the Right Hand      HPI:  The patient is a 77-year-old female status post lichen planus lesion excision 02/11/2022.  Five lesions have recurred from the right hand.  The left hand is not involved.  She had nerve conduction studies that confirmed bilateral carpal tunnel syndrome.  She says she is asymptomatic with regard to that but wishes to have the lichen planus lesions excised and let them heal by secondary intention as she does not wish to undergo suture removal.    Past Medical History:   Diagnosis Date    Acute coronary syndrome     Anxiety     Bilateral carotid artery stenosis 11/17/2015    Chronic pain     Colon polyp     Coronary artery disease     GERD (gastroesophageal reflux disease)     Hyperlipidemia     Hypertension     Hypothyroidism     Low back pain     Lupus     Osteoporosis     Poor circulation     Pre-operative clearance 7/12/2019    PVD (peripheral vascular disease)     S/P peripheral artery angioplasty 02/13/2014    SCCA (squamous cell carcinoma) of skin 10/2019    Dr. ZANDRA Rivas--oncology    Skin disease      Past Surgical History:   Procedure Laterality Date    AORTOGRAPHY WITH SERIALOGRAPHY N/A 5/25/2021    Procedure: AORTOGRAM, WITH RUNOFF;  Surgeon: Christopher Cohen MD;  Location: [facility] CATH LAB;  Service: Cardiology;  Laterality: N/A;  COVID-19, mRNA, LNP-S, PF, 30 mcg/0.3 mL dose (COVID-19, MRNA, LN-S, PF (Pfizer)) 1/30/2021, 1/9/2021    Atherosclerotic PVD with intermittent claudication Bilateral 05/2021    Dr. Cohen    CATARACT EXTRACTION      COLONOSCOPY N/A 1/4/2017    Procedure: COLONOSCOPY;  Surgeon: Sylvester Arboleda III, MD;  Location: [facility] ENDO;  Service: Endoscopy;  Laterality: N/A;    COLONOSCOPY N/A 8/4/2020    Procedure: COLONOSCOPY;  Surgeon: Sylvester Arboleda III, MD;  Location: [facility] ENDO;  Service: Endoscopy;  Laterality: N/A;    CORONARY ANGIOPLASTY      CORONARY ARTERY BYPASS  GRAFT      EXCISION OF MASS OF HAND Right 2/11/2022    Procedure: EXCISION, MASS, HAND;  Surgeon: Hernan Culver MD;  Location: [facility];  Service: Orthopedics;  Laterality: Right;   thenar eminence skin lesion excised and a Z-plasty skin flap for coverage    HYSTERECTOMY      OOPHORECTOMY      THYROIDECTOMY       Family History   Problem Relation Age of Onset    Hypertension Mother     Stroke Mother     Lung cancer Daughter     Melanoma Neg Hx     Psoriasis Neg Hx     Lupus Neg Hx     Eczema Neg Hx      Social History     Socioeconomic History    Marital status:     Number of children: 3   Occupational History    Occupation: Retired     Comment: Dispatcher   Tobacco Use    Smoking status: Every Day     Packs/day: 0.25     Years: 40.00     Pack years: 10.00     Types: Cigarettes     Start date: 1961    Smokeless tobacco: Never    Tobacco comments:     hold midnight prior to surgery   Substance and Sexual Activity    Alcohol use: Never    Drug use: No    Sexual activity: Yes   Other Topics Concern    Are you pregnant or think you may be? No    Breast-feeding No   Social History Narrative    Patient is retired dispatcher.     Medication List with Changes/Refills   Current Medications    ALBUTEROL (PROVENTIL/VENTOLIN HFA) 90 MCG/ACTUATION INHALER    Inhale 2 puffs into the lungs every 6 (six) hours as needed for Wheezing.    AMLODIPINE (NORVASC) 10 MG TABLET    Take 1 tablet (10 mg total) by mouth once daily.    ASPIRIN (ECOTRIN) 81 MG EC TABLET    Take 1 tablet (81 mg total) by mouth once daily.    ATORVASTATIN (LIPITOR) 80 MG TABLET    Take 1 tablet (80 mg total) by mouth once daily.    CITALOPRAM (CELEXA) 10 MG TABLET    Take 10 mg by mouth once daily.    CLOPIDOGREL (PLAVIX) 75 MG TABLET    TAKE 1 TABLET(75 MG) BY MOUTH EVERY DAY    DICLOFENAC SODIUM (VOLTAREN) 1 % GEL    Apply 2 g topically 4 (four) times daily. for 10 days    ERGOCALCIFEROL (ERGOCALCIFEROL) 50,000 UNIT CAP    Take 1 capsule (50,000  Units total) by mouth every 7 days.    EZETIMIBE (ZETIA) 10 MG TABLET    TAKE 1 TABLET(10 MG) BY MOUTH EVERY DAY    FLUOCINONIDE 0.05% (LIDEX) 0.05 % CREAM    Apply to affected area of hands every other day    LEVOTHYROXINE (SYNTHROID) 137 MCG TAB TABLET    TAKE 1 TABLET BY MOUTH EVERY DAY    ONDANSETRON (ZOFRAN) 4 MG TABLET    TAKE 1 TABLET(4 MG) BY MOUTH EVERY 8 HOURS AS NEEDED FOR NAUSEA    OXYCODONE-ACETAMINOPHEN (PERCOCET)  MG PER TABLET    Take 1 tablet by mouth.    PROCHLORPERAZINE (COMPAZINE) 10 MG TABLET        TAZAROTENE (TAZORAC) 0.1 % CREAM    Apply to affected area of hands every other day    TRAZODONE (DESYREL) 50 MG TABLET    TAKE 1 TABLET BY MOUTH EVERY DAY AT NIGHT AS NEEDED FOR INSOMNIA    VALSARTAN (DIOVAN) 320 MG TABLET    TAKE 1 TABLET(320 MG) BY MOUTH EVERY DAY     Review of patient's allergies indicates:  No Known Allergies  Review of Systems   Constitutional: Negative for malaise/fatigue.   HENT:  Negative for hearing loss.    Eyes:  Negative for double vision and visual disturbance.   Cardiovascular:  Positive for chest pain.   Respiratory:  Positive for shortness of breath.    Endocrine: Negative for cold intolerance.   Hematologic/Lymphatic: Does not bruise/bleed easily.   Skin:  Negative for poor wound healing and suspicious lesions.   Musculoskeletal:  Positive for arthritis, joint pain, joint swelling and muscle weakness. Negative for gout.   Gastrointestinal:  Negative for nausea and vomiting.   Genitourinary:  Positive for flank pain. Negative for dysuria.   Neurological:  Positive for numbness, paresthesias and sensory change.   Psychiatric/Behavioral:  Positive for substance abuse. Negative for depression and memory loss. The patient is not nervous/anxious.    Allergic/Immunologic: Negative for persistent infections.     Objective:   Body mass index is 30.67 kg/m².  There were no vitals filed for this visit.             General    Constitutional: She is oriented to person,  place, and time. She appears well-developed and well-nourished. No distress.   HENT:   Head: Normocephalic.   Mouth/Throat: Oropharynx is clear and moist.   Eyes: EOM are normal.   Cardiovascular:  Normal rate.            Pulmonary/Chest: Effort normal.   Abdominal: Soft.   Neurological: She is alert and oriented to person, place, and time. No cranial nerve deficit.   Psychiatric: She has a normal mood and affect.             Right Hand/Wrist Exam     Inspection   Scars: Wrist - present Hand -  present  Effusion: Wrist - absent Hand -  absent    Other     Neuorologic Exam    Median Distribution: normal  Ulnar Distribution: normal  Radial Distribution: normal    Comments:  The patient has 5 per lichen planus lesions 1 on the thenar eminence 1 on the hypo thenar eminence and 3 in between.  There are no motor or sensory deficits.  The 1 with a greatest diameter is 2 cm          Vascular Exam       Capillary Refill  Right Hand: normal capillary refill        Relevant imaging results reviewed and interpreted by me, discussed with the patient and / or family today radiographs were not obtained today  Assessment:     Encounter Diagnosis   Name Primary?    Lichen planus Yes    Right hand    Plan:       The patient was counseled regarding excision lichen planus lesions right hand x5.  She agrees to allowing the wound to heal by secondary intention.  Risk complications and alternatives were discussed including the risk of infection, anesthetic risk, injury to nerves and vessels, loss of motion, and possible need for additional surgeries were discussed.  She has nerve studies that did confirm bilateral carpal tunnel syndrome but she says she has not symptomatic with regard to that and does not wish to have surgery for that problem.  All questions were answered.  She seems to understand and agree that surgery.                Disclaimer: This note was prepared using a voice recognition system and is likely to have sound alike  errors within the text.

## 2023-01-27 ENCOUNTER — TELEPHONE (OUTPATIENT)
Dept: INTERNAL MEDICINE | Facility: CLINIC | Age: 78
End: 2023-01-27
Payer: MEDICARE

## 2023-01-27 ENCOUNTER — DOCUMENTATION ONLY (OUTPATIENT)
Dept: PREADMISSION TESTING | Facility: HOSPITAL | Age: 78
End: 2023-01-27
Payer: MEDICARE

## 2023-01-27 NOTE — PROGRESS NOTES
Pt has a history of pulmonary hypertension with a PAP  of 59.  She will need general anesthesia for her upcoming hand lesion excision. Her case will need to be moved to [facility] .  Dr. Culver and staff have been notified.

## 2023-01-27 NOTE — TELEPHONE ENCOUNTER
----- Message from Zhao Jordan sent at 1/27/2023 12:18 PM CST -----  Contact: Flory Ruth is calling in to get information and authorization to get Home Health care. Please call her back at [phone]

## 2023-01-31 ENCOUNTER — TELEPHONE (OUTPATIENT)
Dept: ORTHOPEDICS | Facility: CLINIC | Age: 78
End: 2023-01-31
Payer: MEDICARE

## 2023-01-31 ENCOUNTER — EXTERNAL CHRONIC CARE MANAGEMENT (OUTPATIENT)
Dept: PRIMARY CARE CLINIC | Facility: CLINIC | Age: 78
End: 2023-01-31
Payer: MEDICARE

## 2023-01-31 PROCEDURE — 99490 CHRNC CARE MGMT STAFF 1ST 20: CPT | Mod: S$GLB,,, | Performed by: INTERNAL MEDICINE

## 2023-01-31 PROCEDURE — 99490 PR CHRONIC CARE MGMT, 1ST 20 MIN: ICD-10-PCS | Mod: S$GLB,,, | Performed by: INTERNAL MEDICINE

## 2023-01-31 NOTE — TELEPHONE ENCOUNTER
----- Message from Jayashree Reyes MA sent at 1/27/2023  3:11 PM CST -----  Regarding: FW: Move case to [facility]    ----- Message -----  From: Alesha Euceda PA-C  Sent: 1/27/2023   2:27 PM CST  To: [facility] Staff  Subject: Move case to [facility]                             Pt has a history of pulmonary hypertension with a PA pressure of 59.  She will need general anesthesia and needs to be moved to [facility] to have her lesions excised to her hand.  Dr. Culver has been notified.  Please notify patient.  Thank you.

## 2023-02-01 ENCOUNTER — TELEPHONE (OUTPATIENT)
Dept: RHEUMATOLOGY | Facility: CLINIC | Age: 78
End: 2023-02-01
Payer: MEDICARE

## 2023-02-01 NOTE — TELEPHONE ENCOUNTER
----- Message from Martine Zepeda sent at 2/1/2023  9:48 AM CST -----  Contact: [phone]  Type:  Sooner Apoointment Request    Caller is requesting a sooner appointment.  Caller declined first available appointment listed below.  Caller will not accept being placed on the waitlist and is requesting a message be sent to doctor.  Name of Caller: Flory  When is the first available appointment? 03/2023  Symptoms: Lupus  Would the patient rather a call back or a response via [facility]?  Call  Best Call Back Number: [phone]  Additional Information:

## 2023-02-07 DIAGNOSIS — Z00.00 ENCOUNTER FOR MEDICARE ANNUAL WELLNESS EXAM: ICD-10-CM

## 2023-02-08 ENCOUNTER — TELEPHONE (OUTPATIENT)
Dept: CARDIOLOGY | Facility: HOSPITAL | Age: 78
End: 2023-02-08
Payer: MEDICARE

## 2023-02-08 ENCOUNTER — TELEPHONE (OUTPATIENT)
Dept: ORTHOPEDICS | Facility: CLINIC | Age: 78
End: 2023-02-08
Payer: MEDICARE

## 2023-02-08 DIAGNOSIS — R06.02 SHORTNESS OF BREATH: ICD-10-CM

## 2023-02-08 DIAGNOSIS — I10 PRIMARY HYPERTENSION: Primary | ICD-10-CM

## 2023-02-08 NOTE — TELEPHONE ENCOUNTER
Returned call. Ekg rescheduled to same day as appt with Dr.Khuri Gaspar Appointment:      Visit Type: EKG [2099]  Provider: EKGJAQUELINE CARDIO [[id number]]  Dept: [facility] SPECIAL CARD PROCEDURES [[id number]]  Date: 3/14/23  Time: 4:10 PM  Length: 10

## 2023-02-08 NOTE — TELEPHONE ENCOUNTER
----- Message from Rita Liuhedemetri sent at 2/8/2023 12:36 PM CST -----  Contact: pt  Pt is calling in regard to needing info on her procedure for 2/24 and needs more info, due to her misplacing her paperwork.  Please call her back at [phone] thanks/mpd

## 2023-02-08 NOTE — TELEPHONE ENCOUNTER
No new care gaps identified.  [facility] Embedded Care Gaps. Reference number: [id number]. 2/08/2023   2:06:22 PM CST

## 2023-02-08 NOTE — TELEPHONE ENCOUNTER
----- Message from Rita Hernandez sent at 2/8/2023 12:43 PM CST -----  Contact: pt  Pt is calling in regard to having her EKG reschedule to 3/14 since she had to reschedule appt with dr. Cohen.  Please call her to schedule b/c she has to call for a ride.  Please call her back at [phone]

## 2023-02-08 NOTE — TELEPHONE ENCOUNTER
----- Message from Matt Pedro sent at 2/8/2023  1:42 PM CST -----  Ot is calling in  regards to getting a refill  on her inhaler     Pharmacy    [facility] [address]    Pt contact   [phone]     Doyle Spence

## 2023-02-09 DIAGNOSIS — Z01.818 PRE-OP TESTING: Primary | ICD-10-CM

## 2023-02-09 DIAGNOSIS — Z00.00 ENCOUNTER FOR MEDICARE ANNUAL WELLNESS EXAM: ICD-10-CM

## 2023-02-09 RX ORDER — ALBUTEROL SULFATE 90 UG/1
2 AEROSOL, METERED RESPIRATORY (INHALATION) EVERY 6 HOURS PRN
Qty: 18 G | Refills: 3 | Status: SHIPPED | OUTPATIENT
Start: 2023-02-09

## 2023-02-10 ENCOUNTER — HOSPITAL ENCOUNTER (EMERGENCY)
Facility: HOSPITAL | Age: 78
Discharge: HOME | End: 2023-02-10
Attending: EMERGENCY MEDICINE
Payer: MEDICARE

## 2023-02-10 VITALS
OXYGEN SATURATION: 98 % | TEMPERATURE: 99 F | DIASTOLIC BLOOD PRESSURE: 79 MMHG | SYSTOLIC BLOOD PRESSURE: 194 MMHG | WEIGHT: 190 LBS | HEART RATE: 75 BPM | RESPIRATION RATE: 29 BRPM | BODY MASS INDEX: 30.67 KG/M2

## 2023-02-10 DIAGNOSIS — R05.9 COUGH: ICD-10-CM

## 2023-02-10 DIAGNOSIS — F17.210 CONTINUOUS DEPENDENCE ON CIGARETTE SMOKING: ICD-10-CM

## 2023-02-10 DIAGNOSIS — J44.1 COPD EXACERBATION: Primary | ICD-10-CM

## 2023-02-10 LAB
ALBUMIN SERPL BCP-MCNC: 3.7 G/DL (ref 3.5–5.2)
ALP SERPL-CCNC: 150 U/L (ref 55–135)
ALT SERPL W/O P-5'-P-CCNC: 24 U/L (ref 10–44)
ANION GAP SERPL CALC-SCNC: 14 MMOL/L (ref 8–16)
AST SERPL-CCNC: 24 U/L (ref 10–40)
BASOPHILS # BLD AUTO: 0.03 K/UL (ref 0–0.2)
BASOPHILS NFR BLD: 0.2 % (ref 0–1.9)
BILIRUB SERPL-MCNC: 0.5 MG/DL (ref 0.1–1)
BUN SERPL-MCNC: 18 MG/DL (ref 8–23)
CALCIUM SERPL-MCNC: 9.3 MG/DL (ref 8.7–10.5)
CHLORIDE SERPL-SCNC: 102 MMOL/L (ref 95–110)
CO2 SERPL-SCNC: 25 MMOL/L (ref 23–29)
CREAT SERPL-MCNC: 0.8 MG/DL (ref 0.5–1.4)
DIFFERENTIAL METHOD: ABNORMAL
EOSINOPHIL # BLD AUTO: 0 K/UL (ref 0–0.5)
EOSINOPHIL NFR BLD: 0.1 % (ref 0–8)
ERYTHROCYTE [DISTWIDTH] IN BLOOD BY AUTOMATED COUNT: 13.3 % (ref 11.5–14.5)
EST. GFR  (NO RACE VARIABLE): >60 ML/MIN/1.73 M^2
GLUCOSE SERPL-MCNC: 122 MG/DL (ref 70–110)
HCT VFR BLD AUTO: 44.4 % (ref 37–48.5)
HCV AB SERPL QL IA: NEGATIVE
HEP C VIRUS HOLD SPECIMEN: NORMAL
HGB BLD-MCNC: 14.8 G/DL (ref 12–16)
HIV 1+2 AB+HIV1 P24 AG SERPL QL IA: NEGATIVE
IMM GRANULOCYTES # BLD AUTO: 0.07 K/UL (ref 0–0.04)
IMM GRANULOCYTES NFR BLD AUTO: 0.5 % (ref 0–0.5)
LYMPHOCYTES # BLD AUTO: 2.4 K/UL (ref 1–4.8)
LYMPHOCYTES NFR BLD: 16.2 % (ref 18–48)
MCH RBC QN AUTO: 31 PG (ref 27–31)
MCHC RBC AUTO-ENTMCNC: 33.3 G/DL (ref 32–36)
MCV RBC AUTO: 93 FL (ref 82–98)
MONOCYTES # BLD AUTO: 1.3 K/UL (ref 0.3–1)
MONOCYTES NFR BLD: 8.9 % (ref 4–15)
NEUTROPHILS # BLD AUTO: 10.9 K/UL (ref 1.8–7.7)
NEUTROPHILS NFR BLD: 74.1 % (ref 38–73)
NRBC BLD-RTO: 0 /100 WBC
PLATELET # BLD AUTO: 233 K/UL (ref 150–450)
PMV BLD AUTO: 9.9 FL (ref 9.2–12.9)
POTASSIUM SERPL-SCNC: 3.3 MMOL/L (ref 3.5–5.1)
PROT SERPL-MCNC: 7.4 G/DL (ref 6–8.4)
RBC # BLD AUTO: 4.78 M/UL (ref 4–5.4)
SODIUM SERPL-SCNC: 141 MMOL/L (ref 136–145)
WBC # BLD AUTO: 14.74 K/UL (ref 3.9–12.7)

## 2023-02-10 PROCEDURE — 86803 HEPATITIS C AB TEST: CPT | Mod: HCNC | Performed by: EMERGENCY MEDICINE

## 2023-02-10 PROCEDURE — 87389 HIV-1 AG W/HIV-1&-2 AB AG IA: CPT | Mod: HCNC | Performed by: EMERGENCY MEDICINE

## 2023-02-10 PROCEDURE — 80053 COMPREHEN METABOLIC PANEL: CPT | Mod: HCNC | Performed by: EMERGENCY MEDICINE

## 2023-02-10 PROCEDURE — 99284 EMERGENCY DEPT VISIT MOD MDM: CPT | Mod: 25,HCNC

## 2023-02-10 PROCEDURE — 85025 COMPLETE CBC W/AUTO DIFF WBC: CPT | Mod: HCNC | Performed by: EMERGENCY MEDICINE

## 2023-02-10 NOTE — ED PROVIDER NOTES
SCRIBE #1 NOTE: IUriel, am scribing for, and in the presence of, Trae Gallego Jr., MD. I have scribed the entire note.      History      Chief Complaint   Patient presents with    Shortness of Breath     Shortness of breath and wheezing       Review of patient's allergies indicates:  No Known Allergies     HPI   HPI    2/10/2023, 7:51 AM   History obtained from the patient      History of Present Illness: Flory Cam is a 77 y.o. female patient with a PMHx of CAD, COPD who presents to the Emergency Department for SOB, onset this morning. Symptoms are constant and moderate in severity. No mitigating or exacerbating factors reported. No associated sxs reported. Patient denies any fever, chills, n/v/d, CP, weakness, numbness, dizziness, headache, and all other sxs at this time. Pt was given 2 breathing treatments and solumedrol en route to the ED. Pt is an active smoker. No further complaints or concerns at this time.     Arrival mode: EMS    PCP: Tayler Ovalle DO       Past Medical History:  Past Medical History:   Diagnosis Date    Acute coronary syndrome     Anxiety     Bilateral carotid artery stenosis 11/17/2015    Chronic pain     Colon polyp     Coronary artery disease     GERD (gastroesophageal reflux disease)     Hyperlipidemia     Hypertension     Hypothyroidism     Low back pain     Lupus     Osteoporosis     Poor circulation     Pre-operative clearance 7/12/2019    PVD (peripheral vascular disease)     S/P peripheral artery angioplasty 02/13/2014    SCCA (squamous cell carcinoma) of skin 10/2019    Dr. ZANDRA Rivas--oncology    Skin disease        Past Surgical History:  Past Surgical History:   Procedure Laterality Date    AORTOGRAPHY WITH SERIALOGRAPHY N/A 5/25/2021    Procedure: AORTOGRAM, WITH RUNOFF;  Surgeon: Christopher Cohen MD;  Location: [facility] CATH LAB;  Service: Cardiology;  Laterality: N/A;  COVID-19, mRNA, LNP-S, PF, 30 mcg/0.3 mL dose (COVID-19, MRNA, LN-S, PF (Pfizer)) 1/30/2021,  1/9/2021    Atherosclerotic PVD with intermittent claudication Bilateral 05/2021    Dr. Cohen    CATARACT EXTRACTION      COLONOSCOPY N/A 1/4/2017    Procedure: COLONOSCOPY;  Surgeon: Sylvester Arboleda III, MD;  Location: [facility] ENDO;  Service: Endoscopy;  Laterality: N/A;    COLONOSCOPY N/A 8/4/2020    Procedure: COLONOSCOPY;  Surgeon: Sylvester Arboleda III, MD;  Location: [facility] ENDO;  Service: Endoscopy;  Laterality: N/A;    CORONARY ANGIOPLASTY      CORONARY ARTERY BYPASS GRAFT      EXCISION OF MASS OF HAND Right 2/11/2022    Procedure: EXCISION, MASS, HAND;  Surgeon: Hernan Culver MD;  Location: [facility] OR;  Service: Orthopedics;  Laterality: Right;   thenar eminence skin lesion excised and a Z-plasty skin flap for coverage    HYSTERECTOMY      OOPHORECTOMY      THYROIDECTOMY           Family History:  Family History   Problem Relation Age of Onset    Hypertension Mother     Stroke Mother     Lung cancer Daughter     Melanoma Neg Hx     Psoriasis Neg Hx     Lupus Neg Hx     Eczema Neg Hx        Social History:  Social History     Tobacco Use    Smoking status: Every Day     Packs/day: 0.25     Years: 40.00     Pack years: 10.00     Types: Cigarettes     Start date: 1961    Smokeless tobacco: Never    Tobacco comments:     hold midnight prior to surgery   Substance and Sexual Activity    Alcohol use: Never    Drug use: No    Sexual activity: Yes       ROS   Review of Systems   Constitutional:  Negative for chills and fever.   HENT:  Negative for sore throat.    Respiratory:  Positive for shortness of breath.    Cardiovascular:  Negative for chest pain.   Gastrointestinal:  Negative for diarrhea, nausea and vomiting.   Genitourinary:  Negative for dysuria.   Musculoskeletal:  Negative for back pain.   Skin:  Negative for rash.   Neurological:  Negative for dizziness, weakness, light-headedness, numbness and headaches.   Hematological:  Does not bruise/bleed easily.   All other systems reviewed and are  negative.    Physical Exam      Initial Vitals [02/10/23 0744]   BP Pulse Resp Temp SpO2   (!) 160/90 85 (!) 22 98.6 °F (37 °C) (!) 94 %      MAP       --          Physical Exam  Nursing Notes and Vital Signs Reviewed.  Constitutional: Patient is in no acute distress. Well-developed and well-nourished.  Head: Atraumatic. Normocephalic.  Eyes: PERRL. EOM intact. Conjunctivae are not pale. No scleral icterus.  ENT: Mucous membranes are moist. Oropharynx is clear and symmetric.    Neck: Supple. Full ROM.  Cardiovascular: Regular rate. Regular rhythm. No murmurs, rubs, or gallops. Distal pulses are 2+ and symmetric.  Pulmonary/Chest: Coarse breath sounds bilaterally. No wheezing or rales.  Abdominal: Soft and non-distended.  There is no tenderness.  No rebound, guarding, or rigidity.   Musculoskeletal: Moves all extremities. No obvious deformities. No edema.  Skin: Warm and dry.  Neurological:  Alert, awake, and appropriate.  Normal speech.  No acute focal neurological deficits are appreciated.  Psychiatric: Normal affect. Good eye contact. Appropriate in content.    ED Course    Procedures  ED Vital Signs:  Vitals:    02/10/23 0744 02/10/23 0815 02/10/23 0820 02/10/23 0830   BP: (!) 160/90   (!) 178/80   Pulse: 85   73   Resp: (!) 22 (!) 28  (!) 28   Temp: 98.6 °F (37 °C)      TempSrc: Oral      SpO2: (!) 94%   98%   Weight:   86.2 kg (190 lb)     02/10/23 0905   BP: (!) 194/79   Pulse: 75   Resp: (!) 29   Temp:    TempSrc:    SpO2: 98%   Weight:        Abnormal Lab Results:  Labs Reviewed   CBC W/ AUTO DIFFERENTIAL - Abnormal; Notable for the following components:       Result Value    WBC 14.74 (*)     Gran # (ANC) 10.9 (*)     Immature Grans (Abs) 0.07 (*)     Mono # 1.3 (*)     Gran % 74.1 (*)     Lymph % 16.2 (*)     All other components within normal limits   COMPREHENSIVE METABOLIC PANEL - Abnormal; Notable for the following components:    Potassium 3.3 (*)     Glucose 122 (*)     Alkaline Phosphatase 150 (*)      All other components within normal limits   HIV 1 / 2 ANTIBODY    Narrative:     Release to patient->Immediate   HEPATITIS C ANTIBODY    Narrative:     Release to patient->Immediate   HEP C VIRUS HOLD SPECIMEN    Narrative:     Release to patient->Immediate        All Lab Results:  Results for orders placed or performed during the hospital encounter of 02/10/23   CBC auto differential   Result Value Ref Range    WBC 14.74 (H) 3.90 - 12.70 K/uL    RBC 4.78 4.00 - 5.40 M/uL    Hemoglobin 14.8 12.0 - 16.0 g/dL    Hematocrit 44.4 37.0 - 48.5 %    MCV 93 82 - 98 fL    MCH 31.0 27.0 - 31.0 pg    MCHC 33.3 32.0 - 36.0 g/dL    RDW 13.3 11.5 - 14.5 %    Platelets 233 150 - 450 K/uL    MPV 9.9 9.2 - 12.9 fL    Immature Granulocytes 0.5 0.0 - 0.5 %    Gran # (ANC) 10.9 (H) 1.8 - 7.7 K/uL    Immature Grans (Abs) 0.07 (H) 0.00 - 0.04 K/uL    Lymph # 2.4 1.0 - 4.8 K/uL    Mono # 1.3 (H) 0.3 - 1.0 K/uL    Eos # 0.0 0.0 - 0.5 K/uL    Baso # 0.03 0.00 - 0.20 K/uL    nRBC 0 0 /100 WBC    Gran % 74.1 (H) 38.0 - 73.0 %    Lymph % 16.2 (L) 18.0 - 48.0 %    Mono % 8.9 4.0 - 15.0 %    Eosinophil % 0.1 0.0 - 8.0 %    Basophil % 0.2 0.0 - 1.9 %    Differential Method Automated    Comprehensive metabolic panel   Result Value Ref Range    Sodium 141 136 - 145 mmol/L    Potassium 3.3 (L) 3.5 - 5.1 mmol/L    Chloride 102 95 - 110 mmol/L    CO2 25 23 - 29 mmol/L    Glucose 122 (H) 70 - 110 mg/dL    BUN 18 8 - 23 mg/dL    Creatinine 0.8 0.5 - 1.4 mg/dL    Calcium 9.3 8.7 - 10.5 mg/dL    Total Protein 7.4 6.0 - 8.4 g/dL    Albumin 3.7 3.5 - 5.2 g/dL    Total Bilirubin 0.5 0.1 - 1.0 mg/dL    Alkaline Phosphatase 150 (H) 55 - 135 U/L    AST 24 10 - 40 U/L    ALT 24 10 - 44 U/L    Anion Gap 14 8 - 16 mmol/L    eGFR >60 >60 mL/min/1.73 m^2   HIV 1/2 Ag/Ab (4th Gen)   Result Value Ref Range    HIV 1/2 Ag/Ab Negative Negative   Hepatitis C Antibody   Result Value Ref Range    Hepatitis C Ab Negative Negative   HCV Virus Hold Specimen   Result Value  Ref Range    HEP C Virus Hold Specimen Hold for HCV sendout      *Note: Due to a large number of results and/or encounters for the requested time period, some results have not been displayed. A complete set of results can be found in Results Review.     Imaging Results:  Imaging Results              X-Ray Chest 1 View (Final result)  Result time 02/10/23 08:20:59      Final result by KLEBER Singer Sr., MD (02/10/23 08:20:59)                   Impression:      1. There is blunting of the right costophrenic angle.  This is characteristic of a tiny pleural effusion.  2. There is no focal pulmonary infiltrate visualized.  3. The size of the heart is prominent.  This may be secondary to magnification.  4. There are surgical changes associated with a prior sternotomy.  .      Electronically signed by: Prosper Singer MD  Date:    02/10/2023  Time:    08:20               Narrative:    EXAMINATION:  XR CHEST 1 VIEW    CLINICAL HISTORY:  Cough, unspecified    COMPARISON:  02/01/2022    FINDINGS:  There are surgical changes associated with a prior sternotomy.  The size of the heart is prominent.  There is no focal pulmonary infiltrate visualized.  There is blunting of the right costophrenic angle.  The left costophrenic angle is sharp.  There is no pneumothorax.                                              The Emergency Provider reviewed the vital signs and test results, which are outlined above.    ED Discussion     9:11 AM: Reassessed pt at this time.  Pt states her condition has improved at this time. Discussed with pt all pertinent ED information and results. Discussed pt dx and plan of tx. Pt states that she has a prescription for Prednisone at home. Gave pt all f/u and return to the ED instructions. All questions and concerns were addressed at this time. Pt expresses understanding of information and instructions, and is comfortable with plan to discharge. Pt is stable for discharge.    I discussed with patient and/or  family/caretaker that evaluation in the ED does not suggest any emergent or life threatening medical conditions requiring immediate intervention beyond what was provided in the ED, and I believe patient is safe for discharge.  Regardless, an unremarkable evaluation in the ED does not preclude the development or presence of a serious of life threatening condition. As such, patient was instructed to return immediately for any worsening or change in current symptoms.         ED Medication(s):  Medications - No data to display     Follow-up Information       Tayler Ovalle DO.    Specialty: Internal Medicine  Contact information:  [address] [place] [address]  [phone]               [facility] - Emergency Dept..    Specialty: Emergency Medicine  Why: If symptoms worsen  Contact information:  [address] [address]  [facility] [id number] [phone]                         Discharge Medication List as of 2/10/2023  9:12 AM            Medical Decision Making    Medical Decision Making:   Initial Assessment:   77-year-old female with a history of COPD continues to smoke.  She was given Solu-Medrol and breathing treatment prior to arrival.  I did continue with additional neb treatments chest x-ray and blood work is unremarkable.  She started feeling much better was ambulatory without any difficulties.  O2 sat was in the high 90s she is hemodynamically stable feeling better her sons at bedside she was discharged in stable condition with COPD exacerbation.  Patient did have a prescription for prednisone that she was going to  at the pharmacy.  She does have nebulizer medication and rescue inhalers.  She was counseled on smoking cessation proximally 15 minutes.  Clinical Tests:   Lab Tests: Ordered and Reviewed  Radiological Study: Ordered and Reviewed   Additional MDM:   Smoking Cessation: The patient was counseled on tobacco related  health complications.      Scribe Attestation:    Scribe #1: I performed the above scribed service and the documentation accurately describes the services I performed. I attest to the accuracy of the note.    Attending:   Physician Attestation Statement for Scribe #1: I, Trae Gallego Jr., MD, personally performed the services described in this documentation, as scribed by Uriel Christianson, in my presence, and it is both accurate and complete.          Clinical Impression       ICD-10-CM ICD-9-CM   1. COPD exacerbation  J44.1 491.21   2. Cough  R05.9 786.2   3. Continuous dependence on cigarette smoking  F17.210 305.1       Disposition:   Disposition: Discharged  Condition: Stable       Trae Gallego Jr., MD  02/10/23 1046

## 2023-02-11 ENCOUNTER — HOSPITAL ENCOUNTER (EMERGENCY)
Facility: HOSPITAL | Age: 78
Discharge: HOME | End: 2023-02-11
Attending: EMERGENCY MEDICINE
Payer: MEDICARE

## 2023-02-11 VITALS
TEMPERATURE: 99 F | WEIGHT: 186.75 LBS | SYSTOLIC BLOOD PRESSURE: 141 MMHG | BODY MASS INDEX: 30.14 KG/M2 | RESPIRATION RATE: 44 BRPM | DIASTOLIC BLOOD PRESSURE: 63 MMHG | HEART RATE: 77 BPM | OXYGEN SATURATION: 90 %

## 2023-02-11 DIAGNOSIS — I10 HYPERTENSION, UNSPECIFIED TYPE: ICD-10-CM

## 2023-02-11 DIAGNOSIS — J44.1 COPD EXACERBATION: Primary | ICD-10-CM

## 2023-02-11 PROCEDURE — 25000003 PHARM REV CODE 250: Mod: HCNC | Performed by: EMERGENCY MEDICINE

## 2023-02-11 PROCEDURE — 25000242 PHARM REV CODE 250 ALT 637 W/ HCPCS: Mod: HCNC | Performed by: EMERGENCY MEDICINE

## 2023-02-11 PROCEDURE — 99285 EMERGENCY DEPT VISIT HI MDM: CPT | Mod: HCNC,25

## 2023-02-11 PROCEDURE — 63600175 PHARM REV CODE 636 W HCPCS: Mod: HCNC | Performed by: EMERGENCY MEDICINE

## 2023-02-11 PROCEDURE — 94640 AIRWAY INHALATION TREATMENT: CPT | Mod: HCNC,XB

## 2023-02-11 PROCEDURE — 96372 THER/PROPH/DIAG INJ SC/IM: CPT | Performed by: EMERGENCY MEDICINE

## 2023-02-11 RX ORDER — CLONIDINE HYDROCHLORIDE 0.2 MG/1
0.2 TABLET ORAL
Status: COMPLETED | OUTPATIENT
Start: 2023-02-11 | End: 2023-02-11

## 2023-02-11 RX ORDER — TERBUTALINE SULFATE 1 MG/ML
0.25 INJECTION SUBCUTANEOUS ONCE
Status: COMPLETED | OUTPATIENT
Start: 2023-02-11 | End: 2023-02-11

## 2023-02-11 RX ORDER — AMLODIPINE BESYLATE 5 MG/1
10 TABLET ORAL
Status: COMPLETED | OUTPATIENT
Start: 2023-02-11 | End: 2023-02-11

## 2023-02-11 RX ORDER — IPRATROPIUM BROMIDE AND ALBUTEROL SULFATE 2.5; .5 MG/3ML; MG/3ML
3 SOLUTION RESPIRATORY (INHALATION)
Status: COMPLETED | OUTPATIENT
Start: 2023-02-11 | End: 2023-02-11

## 2023-02-11 RX ADMIN — AMLODIPINE BESYLATE 10 MG: 5 TABLET ORAL at 03:02

## 2023-02-11 RX ADMIN — TERBUTALINE SULFATE 0.25 MG: 1 INJECTION SUBCUTANEOUS at 04:02

## 2023-02-11 RX ADMIN — IPRATROPIUM BROMIDE AND ALBUTEROL SULFATE 3 ML: 2.5; .5 SOLUTION RESPIRATORY (INHALATION) at 03:02

## 2023-02-11 RX ADMIN — CLONIDINE HYDROCHLORIDE 0.2 MG: 0.2 TABLET ORAL at 04:02

## 2023-02-11 NOTE — ED PROVIDER NOTES
Encounter Date: 2/11/2023       History     Chief Complaint   Patient presents with    Shortness of Breath     Was seen yesterday for same, discharged with steroids and antibiotics, reports still feel short of breath.     HPI  77-year-old  female presents complaining of shortness of breath.  Patient was evaluated yesterday diagnosed with COPD exacerbation.  Patient's heavy tobacco use but quit Wednesday.  She did not take her blood pressure medication this morning.  She is short of breath with any activity.  She denies any chest pain or palpitations.  She denies any fevers or chills.  She notes she does have a cough as productive of some yellow sputum.  She was discharged yesterday on albuterol prednisone and antibiotics.    Review of patient's allergies indicates:  No Known Allergies  Past Medical History:   Diagnosis Date    Acute coronary syndrome     Anxiety     Bilateral carotid artery stenosis 11/17/2015    Chronic pain     Colon polyp     Coronary artery disease     GERD (gastroesophageal reflux disease)     Hyperlipidemia     Hypertension     Hypothyroidism     Low back pain     Lupus     Osteoporosis     Poor circulation     Pre-operative clearance 7/12/2019    PVD (peripheral vascular disease)     S/P peripheral artery angioplasty 02/13/2014    SCCA (squamous cell carcinoma) of skin 10/2019    Dr. ZANDRA Rivas--oncology    Skin disease      Past Surgical History:   Procedure Laterality Date    AORTOGRAPHY WITH SERIALOGRAPHY N/A 5/25/2021    Procedure: AORTOGRAM, WITH RUNOFF;  Surgeon: Christopher Cohen MD;  Location: [facility] CATH LAB;  Service: Cardiology;  Laterality: N/A;  COVID-19, mRNA, LNP-S, PF, 30 mcg/0.3 mL dose (COVID-19, MRNA, LN-S, PF (Pfizer)) 1/30/2021, 1/9/2021    Atherosclerotic PVD with intermittent claudication Bilateral 05/2021    Dr. Cohen    CATARACT EXTRACTION      COLONOSCOPY N/A 1/4/2017    Procedure: COLONOSCOPY;  Surgeon: Sylvester Arboleda III, MD;  Location: [facility] ENDO;   Service: Endoscopy;  Laterality: N/A;    COLONOSCOPY N/A 8/4/2020    Procedure: COLONOSCOPY;  Surgeon: Sylvester Arboleda III, MD;  Location: [facility];  Service: Endoscopy;  Laterality: N/A;    CORONARY ANGIOPLASTY      CORONARY ARTERY BYPASS GRAFT      EXCISION OF MASS OF HAND Right 2/11/2022    Procedure: EXCISION, MASS, HAND;  Surgeon: Hernan Culver MD;  Location: [facility] OR;  Service: Orthopedics;  Laterality: Right;   thenar eminence skin lesion excised and a Z-plasty skin flap for coverage    HYSTERECTOMY      OOPHORECTOMY      THYROIDECTOMY       Family History   Problem Relation Age of Onset    Hypertension Mother     Stroke Mother     Lung cancer Daughter     Melanoma Neg Hx     Psoriasis Neg Hx     Lupus Neg Hx     Eczema Neg Hx      Social History     Tobacco Use    Smoking status: Every Day     Packs/day: 0.25     Years: 40.00     Pack years: 10.00     Types: Cigarettes     Start date: 1961    Smokeless tobacco: Never    Tobacco comments:     hold midnight prior to surgery   Substance Use Topics    Alcohol use: Never    Drug use: No     Review of Systems   Constitutional:  Negative for chills and fever.   HENT:  Negative for rhinorrhea and sore throat.    Respiratory:  Positive for cough, chest tightness, shortness of breath and wheezing.    Cardiovascular:  Negative for chest pain, palpitations and leg swelling.   Neurological:  Positive for weakness.   All other systems reviewed and are negative.    Physical Exam     Initial Vitals [02/11/23 1525]   BP Pulse Resp Temp SpO2   (!) 190/102 78 (!) 24 98.8 °F (37.1 °C) (!) 92 %      MAP       --         Physical Exam  Nursing Notes and Vital Signs Reviewed.  Constitutional: Patient is in no acute distress. Well-developed and well-nourished.  Room smells of tobacco.  Head: Atraumatic. Normocephalic.  Eyes:  EOM intact.  No scleral icterus.  ENT: Mucous membranes are moist.  Nares clear   Neck:  Full ROM. No JVD.  Cardiovascular: Regular rate. Regular  rhythm No murmurs, rubs, or gallops. Distal pulses are 2+ and symmetric  Pulmonary/Chest: No respiratory distress.  Patient is wheezing in all lung fields.  No accessory muscle use.  Abdominal: Soft and non-distended.  There is no tenderness.  No rebound, guarding, or rigidity. Good bowel sounds.  Genitourinary: No CVA tenderness.  No suprapubic tenderness  Musculoskeletal: Moves all extremities. No obvious deformities.  5 x 5 strength in all extremities   Skin: Warm and dry.  Neurological:  Alert, awake, and appropriate.  Normal speech.  No acute focal neurological deficits are appreciated.  Two through 12 intact bilaterally.  Psychiatric: Normal affect. Good eye contact. Appropriate in content.    ED Course   Procedures  Labs Reviewed - No data to display       Imaging Results    None         4:57 PM: Reassessed pt at this time. Discussed with pt all pertinent ED information and results. Discussed pt dx and plan of tx. Gave pt all f/u and return to the ED instructions. All questions and concerns were addressed at this time. Pt expresses understanding of information and instructions, and is comfortable with plan to discharge. Pt is stable for discharge.    I discussed with patient and/or family/caretaker that evaluation in the ED does not suggest any emergent or life threatening medical conditions requiring immediate intervention beyond what was provided in the ED, and I believe patient is safe for discharge.  Regardless, an unremarkable evaluation in the ED does not preclude the development or presence of a serious of life threatening condition. As such, patient was instructed to return immediately for any worsening or change in current symptoms.      Medications   amLODIPine tablet 10 mg (10 mg Oral Given 2/11/23 1559)   terbutaline injection 0.25 mg (0.25 mg Subcutaneous Given 2/11/23 1601)   albuterol-ipratropium 2.5 mg-0.5 mg/3 mL nebulizer solution 3 mL (3 mLs Nebulization Given 2/11/23 1548)   cloNIDine  tablet 0.2 mg (0.2 mg Oral Given 2/11/23 7848)     Medical Decision Making:   Patient is stable and nontoxic.  Patient has a history of longstanding tobacco abuse.  She presents wheezing and short of breath.  She is currently started on antibiotics and steroids yesterday for COPD exacerbation.  Patient is feeling much better after breathing treatments.  She is stable safe for discharge in my opinion.  She did not desire admission.  She did have an elevated blood pressure but has been noncompliant with medications this morning.  She is stable safe for discharge in my opinion blood pressure is now improved.                        Clinical Impression:   Final diagnoses:  [J44.1] COPD exacerbation (Primary)  [I10] Hypertension, unspecified type        ED Disposition Condition    Discharge Stable          ED Prescriptions    None       Follow-up Information       Follow up With Specialties Details Why Contact Info    Tayler Ovalle DO Internal Medicine   [address] [place] [address]  [phone]               Axel Alexander Jr., MD  02/11/23 2132

## 2023-02-11 NOTE — DISCHARGE INSTRUCTIONS
Continue all home medications.  Do not take your amlodipine today.  Follow up with her doctor on Monday.  Return as needed

## 2023-02-13 ENCOUNTER — OFFICE VISIT (OUTPATIENT)
Dept: INTERNAL MEDICINE | Facility: CLINIC | Age: 78
End: 2023-02-13
Payer: MEDICARE

## 2023-02-13 ENCOUNTER — TELEPHONE (OUTPATIENT)
Dept: INTERNAL MEDICINE | Facility: CLINIC | Age: 78
End: 2023-02-13

## 2023-02-13 VITALS
DIASTOLIC BLOOD PRESSURE: 80 MMHG | OXYGEN SATURATION: 95 % | HEIGHT: 66 IN | SYSTOLIC BLOOD PRESSURE: 138 MMHG | BODY MASS INDEX: 29.8 KG/M2 | HEART RATE: 60 BPM | RESPIRATION RATE: 20 BRPM | WEIGHT: 185.44 LBS | TEMPERATURE: 97 F

## 2023-02-13 DIAGNOSIS — J44.9 CHRONIC OBSTRUCTIVE PULMONARY DISEASE, UNSPECIFIED COPD TYPE: Primary | ICD-10-CM

## 2023-02-13 PROCEDURE — 3079F DIAST BP 80-89 MM HG: CPT | Mod: HCNC,CPTII,S$GLB, | Performed by: INTERNAL MEDICINE

## 2023-02-13 PROCEDURE — 1100F PTFALLS ASSESS-DOCD GE2>/YR: CPT | Mod: HCNC,CPTII,S$GLB, | Performed by: INTERNAL MEDICINE

## 2023-02-13 PROCEDURE — 3075F SYST BP GE 130 - 139MM HG: CPT | Mod: HCNC,CPTII,S$GLB, | Performed by: INTERNAL MEDICINE

## 2023-02-13 PROCEDURE — 99999 PR PBB SHADOW E&M-EST. PATIENT-LVL V: CPT | Mod: PBBFAC,HCNC,, | Performed by: INTERNAL MEDICINE

## 2023-02-13 PROCEDURE — 99214 PR OFFICE/OUTPT VISIT, EST, LEVL IV, 30-39 MIN: ICD-10-PCS | Mod: HCNC,S$GLB,, | Performed by: INTERNAL MEDICINE

## 2023-02-13 PROCEDURE — 1160F RVW MEDS BY RX/DR IN RCRD: CPT | Mod: HCNC,CPTII,S$GLB, | Performed by: INTERNAL MEDICINE

## 2023-02-13 PROCEDURE — 1100F PR PT FALLS ASSESS DOC 2+ FALLS/FALL W/INJURY/YR: ICD-10-PCS | Mod: HCNC,CPTII,S$GLB, | Performed by: INTERNAL MEDICINE

## 2023-02-13 PROCEDURE — 1159F MED LIST DOCD IN RCRD: CPT | Mod: HCNC,CPTII,S$GLB, | Performed by: INTERNAL MEDICINE

## 2023-02-13 PROCEDURE — 1125F PR PAIN SEVERITY QUANTIFIED, PAIN PRESENT: ICD-10-PCS | Mod: HCNC,CPTII,S$GLB, | Performed by: INTERNAL MEDICINE

## 2023-02-13 PROCEDURE — 3079F PR MOST RECENT DIASTOLIC BLOOD PRESSURE 80-89 MM HG: ICD-10-PCS | Mod: HCNC,CPTII,S$GLB, | Performed by: INTERNAL MEDICINE

## 2023-02-13 PROCEDURE — 1159F PR MEDICATION LIST DOCUMENTED IN MEDICAL RECORD: ICD-10-PCS | Mod: HCNC,CPTII,S$GLB, | Performed by: INTERNAL MEDICINE

## 2023-02-13 PROCEDURE — 1160F PR REVIEW ALL MEDS BY PRESCRIBER/CLIN PHARMACIST DOCUMENTED: ICD-10-PCS | Mod: HCNC,CPTII,S$GLB, | Performed by: INTERNAL MEDICINE

## 2023-02-13 PROCEDURE — 99214 OFFICE O/P EST MOD 30 MIN: CPT | Mod: HCNC,S$GLB,, | Performed by: INTERNAL MEDICINE

## 2023-02-13 PROCEDURE — 3288F PR FALLS RISK ASSESSMENT DOCUMENTED: ICD-10-PCS | Mod: HCNC,CPTII,S$GLB, | Performed by: INTERNAL MEDICINE

## 2023-02-13 PROCEDURE — 3288F FALL RISK ASSESSMENT DOCD: CPT | Mod: HCNC,CPTII,S$GLB, | Performed by: INTERNAL MEDICINE

## 2023-02-13 PROCEDURE — 1125F AMNT PAIN NOTED PAIN PRSNT: CPT | Mod: HCNC,CPTII,S$GLB, | Performed by: INTERNAL MEDICINE

## 2023-02-13 PROCEDURE — 99999 PR PBB SHADOW E&M-EST. PATIENT-LVL V: ICD-10-PCS | Mod: PBBFAC,HCNC,, | Performed by: INTERNAL MEDICINE

## 2023-02-13 PROCEDURE — 3075F PR MOST RECENT SYSTOLIC BLOOD PRESS GE 130-139MM HG: ICD-10-PCS | Mod: HCNC,CPTII,S$GLB, | Performed by: INTERNAL MEDICINE

## 2023-02-13 RX ORDER — IPRATROPIUM BROMIDE AND ALBUTEROL SULFATE 2.5; .5 MG/3ML; MG/3ML
3 SOLUTION RESPIRATORY (INHALATION) EVERY 6 HOURS PRN
Qty: 75 ML | Refills: 2 | Status: SHIPPED | OUTPATIENT
Start: 2023-02-13

## 2023-02-13 NOTE — PROGRESS NOTES
Flory Soria Ancram  77 y.o. Black or  female  [id number]    Chief Complaint:  Chief Complaint   Patient presents with    Hospital Follow Up     ER [facility]        History of Present Illness:  Presents to the clinic for an ER visit follow up. She went to the ER on 2/10 and 2/11 for COPD exacerbation. She states the breathing treatment made her feel better. She has an inhaler at home but not a nebulizer machine.   She is currently completing a course of antibiotics and steroids.   She states she quit smoking 5 days ago.     History:  Past Medical History:   Diagnosis Date    Acute coronary syndrome     Anxiety     Bilateral carotid artery stenosis 11/17/2015    Chronic pain     Colon polyp     Coronary artery disease     GERD (gastroesophageal reflux disease)     Hyperlipidemia     Hypertension     Hypothyroidism     Low back pain     Lupus     Osteoporosis     Poor circulation     Pre-operative clearance 7/12/2019    PVD (peripheral vascular disease)     S/P peripheral artery angioplasty 02/13/2014    SCCA (squamous cell carcinoma) of skin 10/2019    Dr. ZANDRA Rivas--oncology    Skin disease        Medications:  Current Outpatient Medications on File Prior to Visit   Medication Sig Dispense Refill    albuterol (PROVENTIL/VENTOLIN HFA) 90 mcg/actuation inhaler Inhale 2 puffs into the lungs every 6 (six) hours as needed for Wheezing. 18 g 3    amLODIPine (NORVASC) 10 MG tablet Take 1 tablet (10 mg total) by mouth once daily. 90 tablet 3    aspirin (ECOTRIN) 81 MG EC tablet Take 1 tablet (81 mg total) by mouth once daily.  0    atorvastatin (LIPITOR) 80 MG tablet Take 1 tablet (80 mg total) by mouth once daily. 90 tablet 3    citalopram (CELEXA) 10 MG tablet Take 10 mg by mouth once daily.      clopidogreL (PLAVIX) 75 mg tablet TAKE 1 TABLET(75 MG) BY MOUTH EVERY DAY 90 tablet 3    diclofenac sodium (VOLTAREN) 1 % Gel Apply 2 g topically 4 (four) times daily. for 10 days 100 g 0    ergocalciferol  (ERGOCALCIFEROL) 50,000 unit Cap Take 1 capsule (50,000 Units total) by mouth every 7 days. 12 capsule 0    ezetimibe (ZETIA) 10 mg tablet TAKE 1 TABLET(10 MG) BY MOUTH EVERY DAY (Patient taking differently: Take 10 mg by mouth once daily.) 90 tablet 3    fluocinonide 0.05% (LIDEX) 0.05 % cream Apply to affected area of hands every other day 60 g 3    levothyroxine (SYNTHROID) 137 MCG Tab tablet TAKE 1 TABLET BY MOUTH EVERY DAY 90 tablet 0    ondansetron (ZOFRAN) 4 MG tablet TAKE 1 TABLET(4 MG) BY MOUTH EVERY 8 HOURS AS NEEDED FOR NAUSEA 15 tablet 0    oxyCODONE-acetaminophen (PERCOCET)  mg per tablet Take 1 tablet by mouth.      prochlorperazine (COMPAZINE) 10 MG tablet       tazarotene (TAZORAC) 0.1 % cream Apply to affected area of hands every other day 60 g 3    traZODone (DESYREL) 50 MG tablet TAKE 1 TABLET BY MOUTH EVERY DAY AT NIGHT AS NEEDED FOR INSOMNIA 30 tablet 3    valsartan (DIOVAN) 320 MG tablet TAKE 1 TABLET(320 MG) BY MOUTH EVERY DAY 90 tablet 3     No current facility-administered medications on file prior to visit.       Allergies:  Review of patient's allergies indicates:  No Known Allergies    Review of Systems   Constitutional:  Negative for fever.   Respiratory:  Positive for cough, shortness of breath and wheezing.    Cardiovascular:  Negative for chest pain.     Exam:  Vitals:    02/13/23 1303   BP: 138/80   Pulse: 60   Resp: 20   Temp: 97.3 °F (36.3 °C)     Weight: 84.1 kg (185 lb 6.5 oz)   Body mass index is 29.93 kg/m².      Physical Exam  Vitals reviewed.   Constitutional:       General: She is not in acute distress.     Appearance: She is well-developed. She is not ill-appearing.   Eyes:      General: No scleral icterus.  Cardiovascular:      Rate and Rhythm: Normal rate and regular rhythm.      Heart sounds: Normal heart sounds.   Pulmonary:      Effort: Pulmonary effort is normal. No respiratory distress.      Breath sounds: Wheezing present. No rales.   Skin:     General: Skin  is warm and dry.   Neurological:      Mental Status: She is alert and oriented to person, place, and time.   Psychiatric:         Behavior: Behavior normal.       Assessment:  The encounter diagnosis was Chronic obstructive pulmonary disease, unspecified COPD type.    Plan:  Chronic obstructive pulmonary disease, unspecified COPD type  -     albuterol-ipratropium (DUO-NEB) 2.5 mg-0.5 mg/3 mL nebulizer solution; Take 3 mLs by nebulization every 6 (six) hours as needed for Wheezing. Rescue  Dispense: 75 mL; Refill: 2  -     NEBULIZER FOR HOME USE  -     advised to complete antibiotics and steroids   -     counseled regarding smoking cessation     RTC if symptoms worsen or fail to resolve with treatment.

## 2023-02-16 ENCOUNTER — TELEPHONE (OUTPATIENT)
Dept: ORTHOPEDICS | Facility: CLINIC | Age: 78
End: 2023-02-16
Payer: MEDICARE

## 2023-02-16 NOTE — TELEPHONE ENCOUNTER
----- Message from Verenice Wiley RN sent at 2/16/2023  3:21 PM CST -----  Regarding: surgery 2/24  Good Afternoon,    Pt states she will need to cancel surgery scheduled for 2/24/23.  Pt instructed to call Dr. Culver's office.    Thank you,    PAT

## 2023-02-20 ENCOUNTER — TELEPHONE (OUTPATIENT)
Dept: ORTHOPEDICS | Facility: CLINIC | Age: 78
End: 2023-02-20
Payer: MEDICARE

## 2023-02-20 NOTE — TELEPHONE ENCOUNTER
Attempted to return call no answer left voicemail       ----- Message from Keenan Guy sent at 2/20/2023 11:03 AM CST -----  Contact: Patient  Flory Cam would like a call back at [phone], in regards to her upcoming appointment.

## 2023-02-28 ENCOUNTER — EXTERNAL CHRONIC CARE MANAGEMENT (OUTPATIENT)
Dept: PRIMARY CARE CLINIC | Facility: CLINIC | Age: 78
End: 2023-02-28
Payer: MEDICARE

## 2023-02-28 PROCEDURE — 99490 CHRNC CARE MGMT STAFF 1ST 20: CPT | Mod: S$GLB,,, | Performed by: INTERNAL MEDICINE

## 2023-02-28 PROCEDURE — 99490 PR CHRONIC CARE MGMT, 1ST 20 MIN: ICD-10-PCS | Mod: S$GLB,,, | Performed by: INTERNAL MEDICINE

## 2023-03-08 ENCOUNTER — TELEPHONE (OUTPATIENT)
Dept: CARDIOLOGY | Facility: CLINIC | Age: 78
End: 2023-03-08
Payer: MEDICARE

## 2023-03-08 NOTE — TELEPHONE ENCOUNTER
Spoke to patient and r/s to 4/24 to [facility] due to family emergency----- Message from Keenan Guy sent at 3/8/2023  9:01 AM CST -----  Contact: Patient  Flory Cam would like a call back at [phone], in regards to her upcoming appointment.

## 2023-03-16 ENCOUNTER — TELEPHONE (OUTPATIENT)
Dept: ORTHOPEDICS | Facility: CLINIC | Age: 78
End: 2023-03-16
Payer: MEDICARE

## 2023-03-16 NOTE — TELEPHONE ENCOUNTER
Attempted to return the patients call no answer so I left voicemail       ----- Message from Jorge Luis Adan sent at 3/16/2023  2:14 PM CDT -----  Contact: self  Pt is asking for an return concerning an appt she had , please call back at .[phone] Thx CJ

## 2023-03-20 ENCOUNTER — HOSPITAL ENCOUNTER (OUTPATIENT)
Dept: RADIOLOGY | Facility: HOSPITAL | Age: 78
Discharge: HOME | End: 2023-03-20
Attending: INTERNAL MEDICINE
Payer: MEDICARE

## 2023-03-20 ENCOUNTER — OFFICE VISIT (OUTPATIENT)
Dept: PODIATRY | Facility: CLINIC | Age: 78
End: 2023-03-20
Payer: MEDICARE

## 2023-03-20 VITALS — BODY MASS INDEX: 29.73 KG/M2 | WEIGHT: 185 LBS | HEIGHT: 66 IN

## 2023-03-20 DIAGNOSIS — M79.674 TOE PAIN, RIGHT: ICD-10-CM

## 2023-03-20 DIAGNOSIS — L93.0 DISCOID LUPUS ERYTHEMATOSUS: ICD-10-CM

## 2023-03-20 DIAGNOSIS — M79.676 PAIN OF FIFTH TOE: Primary | ICD-10-CM

## 2023-03-20 DIAGNOSIS — L84 CORN OR CALLUS: ICD-10-CM

## 2023-03-20 DIAGNOSIS — I73.9 PERIPHERAL VASCULAR DISEASE: ICD-10-CM

## 2023-03-20 PROCEDURE — 99999 PR PBB SHADOW E&M-EST. PATIENT-LVL III: ICD-10-PCS | Mod: PBBFAC,HCNC,, | Performed by: PODIATRIST

## 2023-03-20 PROCEDURE — 99999 PR PBB SHADOW E&M-EST. PATIENT-LVL III: CPT | Mod: PBBFAC,HCNC,, | Performed by: PODIATRIST

## 2023-03-20 PROCEDURE — 73630 X-RAY EXAM OF FOOT: CPT | Mod: TC,HCNC,RT

## 2023-03-20 PROCEDURE — 73630 XR FOOT COMPLETE 3 VIEW RIGHT: ICD-10-PCS | Mod: 26,HCNC,RT, | Performed by: RADIOLOGY

## 2023-03-20 PROCEDURE — 11056 PARNG/CUTG B9 HYPRKR LES 2-4: CPT | Mod: Q8,HCNC,S$GLB, | Performed by: PODIATRIST

## 2023-03-20 PROCEDURE — 1159F MED LIST DOCD IN RCRD: CPT | Mod: HCNC,CPTII,S$GLB, | Performed by: PODIATRIST

## 2023-03-20 PROCEDURE — 1159F PR MEDICATION LIST DOCUMENTED IN MEDICAL RECORD: ICD-10-PCS | Mod: HCNC,CPTII,S$GLB, | Performed by: PODIATRIST

## 2023-03-20 PROCEDURE — 99213 OFFICE O/P EST LOW 20 MIN: CPT | Mod: 25,HCNC,S$GLB, | Performed by: PODIATRIST

## 2023-03-20 PROCEDURE — 3288F PR FALLS RISK ASSESSMENT DOCUMENTED: ICD-10-PCS | Mod: HCNC,CPTII,S$GLB, | Performed by: PODIATRIST

## 2023-03-20 PROCEDURE — 1101F PT FALLS ASSESS-DOCD LE1/YR: CPT | Mod: HCNC,CPTII,S$GLB, | Performed by: PODIATRIST

## 2023-03-20 PROCEDURE — 1125F AMNT PAIN NOTED PAIN PRSNT: CPT | Mod: HCNC,CPTII,S$GLB, | Performed by: PODIATRIST

## 2023-03-20 PROCEDURE — 99213 PR OFFICE/OUTPT VISIT, EST, LEVL III, 20-29 MIN: ICD-10-PCS | Mod: 25,HCNC,S$GLB, | Performed by: PODIATRIST

## 2023-03-20 PROCEDURE — 73630 X-RAY EXAM OF FOOT: CPT | Mod: 26,HCNC,RT, | Performed by: RADIOLOGY

## 2023-03-20 PROCEDURE — 1101F PR PT FALLS ASSESS DOC 0-1 FALLS W/OUT INJ PAST YR: ICD-10-PCS | Mod: HCNC,CPTII,S$GLB, | Performed by: PODIATRIST

## 2023-03-20 PROCEDURE — 11056 PR TRIM BENIGN HYPERKERATOTIC SKIN LESION,2-4: ICD-10-PCS | Mod: Q8,HCNC,S$GLB, | Performed by: PODIATRIST

## 2023-03-20 PROCEDURE — 3288F FALL RISK ASSESSMENT DOCD: CPT | Mod: HCNC,CPTII,S$GLB, | Performed by: PODIATRIST

## 2023-03-20 PROCEDURE — 1125F PR PAIN SEVERITY QUANTIFIED, PAIN PRESENT: ICD-10-PCS | Mod: HCNC,CPTII,S$GLB, | Performed by: PODIATRIST

## 2023-03-20 NOTE — PROGRESS NOTES
PODIATRY NOTE    CHIEF COMPLAINT  Chief Complaint   Patient presents with    Toe Pain     C/o 5th toe pain, lateral aspect of toe, rates pain 7/10, pain with certain shoes, poss corn, x 1 year, x-rays today, non-diabetic, wears sandals and socks, last seen PCP Tayler Ovalle on 02/13/23       HPI  SUBJECTIVE: Flory Cam is a 77 y.o. female w/ PMH of PVD, Lupus, hx of intermittent leg claudifcation- s/p vascular intervention with much improvement in symptoms and other medical problems who presents to clinic for high risk  foot exam and care. Patient admits to painful toenails and calluses aggravated by increased weight bearing, shoe gear, and pressure. States pain is relieved with routine debridements.     She does complain about feeling depressed and anxious. She denies suicidal ideations.       HgA1c:   Hemoglobin A1C   Date Value Ref Range Status   08/04/2017 5.8 (H) 4.0 - 5.6 % Final     Comment:     According to ADA guidelines, hemoglobin A1c <7.0% represents  optimal control in non-pregnant diabetic patients. Different  metrics may apply to specific patient populations.   Standards of Medical Care in Diabetes-2016.  For the purpose of screening for the presence of diabetes:  <5.7%     Consistent with the absence of diabetes  5.7-6.4%  Consistent with increasing risk for diabetes   (prediabetes)  >or=6.5%  Consistent with diabetes  Currently, no consensus exists for use of hemoglobin A1c  for diagnosis of diabetes for children.  This Hemoglobin A1c assay has significant interference with fetal   hemoglobin   (HbF). The results are invalid for patients with abnormal amounts of   HbF,   including those with known Hereditary Persistence   of Fetal Hemoglobin. Heterozygous hemoglobin variants (HbAS, HbAC,   HbAD, HbAE, HbA2) do not significantly interfere with this assay;   however, presence of multiple variants in a sample may impact the %   interference.     09/14/2016 6.2 4.5 - 6.2 % Final      "Comment:     According to ADA guidelines, hemoglobin A1C <7.0% represents  optimal control in non-pregnant diabetic patients.  Different  metrics may apply to specific populations.   Standards of Medical Care in Diabetes - 2016.  For the purpose of screening for the presence of diabetes:  <5.7%     Consistent with the absence of diabetes  5.7-6.4%  Consistent with increasing risk for diabetes   (prediabetes)  >or=6.5%  Consistent with diabetes  Currently no consensus exists for use of hemoglobin A1C  for diagnosis of diabetes for children.     04/13/2010 5.9 4.0 - 6.2 % Final     REVIEW OF SYSTEMS  General: This patient is well-developed, well-nourished and appears stated age, well-oriented to person, place and time, and cooperative and pleasant on today's visit  Constitutional: Negative for chills and fever.   Respiratory: Negative for shortness of breath.    Cardiovascular: Negative for chest pain, palpitations, orthopnea  Gastrointestinal: Negative for diarrhea, nausea and vomiting.   Musculoskeletal: Positive for above noted in HPI  Skin:positive for skin and nail changes   Peripheral Vascular: no claudication or cyanosis  Psychiatric/Behavioral: Negative for altered mental status     PHYSICAL EXAM  Vitals:    03/20/23 1040   Weight: 83.9 kg (185 lb)   Height: 5' 6" (1.676 m)   PainSc:   6       GEN:  This patient is well-developed, well-nourished and appears stated age, well-oriented to person, place and time, and cooperative and pleasant on today's visit.      LOWER EXTREMITY  Vascular:   DP pedal pulse 0/4 b/l, PT pedal pulse 1/4 b/l  Skin temperature warm to warm from prox to distally  CFT <5 secs b/l  There is LE edema noted b/l.     Dermatologic:   Thickened, dystrophic, elongated toenails with subungal debris 1-5 b/l.   Webspaces are C/D/I B/L.  There is hyperkeratotic tissue noted plantar aspect of b/l feet at medial arch x 2, dorsal lateral right fifth digit  Skin texture and turgor dry with " hyperkeratosis diffusely b/l plantar heel   There is no pedal hair growth noted    Neurologic:  Vibratory sensation diminished at level of Hallux IPJ b/l    Protective sensation absent at 0/10 sites upon examination with [place] Weinsten 5.07 g monofilament.   Propioception intact at 1st MTPJ b/l.   Achilles and patellar deep tendon reflexes intact  Babinski reflex absent b/l. Light touch and sharp/dull sensation intact b/l.    Musculoskeletal/Orthopedic:  Pain dorsolateral fifth digit  Muscle strength is 5/5 for foot inverters, everters, plantarflexors, and dorsiflexors. Muscle tone is normal.  Pain free range of motion in all four quadrants with stiffness and limitation b/l  PAIN with palpation of callus plantar medial arch, LEFT    IMAGING:     Right Lower Arterial CFA has triphasic flow.     PFA has triphasic flow.     SFAo has biphasic flow.     SFAp has biphasic flow.     SFAm has biphasic flow.     SFAd has biphasic flow.     POP has biphasic flow.     AT has biphasic flow.     TIB TRNK has biphasic flow.     PT has biphasic flow.     PER has biphasic flow.   Left Lower Arterial CFA has biphasic flow.     PFA has biphasic flow.     SFAo has monophasic flow.   SFAp is occluded.   SFAm is occluded.     SFAd is occluded and is stented.   Left SFAd stent velocity origin: 0, proximal: 0, mid: 0, distal: 0    POP has monophasic flow.     AT has monophasic flow.     TIB TRNK has monophasic flow.     PT has monophasic flow.     PER has monophasic flow.     ASSESSMENT  Encounter Diagnoses   Name Primary?    Peripheral vascular disease Yes    Corn or callus     Discoid lupus erythematosus     Pain of fifth toe            Plan:  -Discuss presenting problems, etiology, pathologic processes and management options with patient today.     Peripheral vascular disease    Corn or callus    Discoid lupus erythematosus    Pain of fifth toe            -With patient's permission via verbal consent, the involved area was cleansed  with an alcohol swab. Trimming of hyperkeratotic lesions deep to epidermal layer x 2 was performed with a #15 blade without incident. Patient relates relief following the procedure. Patient will continue to monitor the areas daily, inspect feet, wear protective shoe gear when ambulatory, moisturizer to maintain skin integrity.    -Recommendations on purchase of Urea 40 and/or Amlactin was provided for calluses. Metatarsal pad dispensed and patient was instructed on how to apply for offloading callus. Shoe recommendations provided for accomodative foot wear.    Patient has above noted diagnosis and/or medical co-morbidities.They are being treated and managed by their  Primary Care Physician for management of comorbid states which are deemed to be currently stable.          Future Appointments   Date Time Provider Department Center   3/27/2023  1:20 PM Tayler Ovalle DO [facility] IM  Medical C   4/24/2023  4:10 PM EKG, [facility]  Medical C   4/24/2023  4:20 PM Christopher Cohen MD [facility] CARDIO  Medical C   7/6/2023 11:00 AM Melva Nettles DPM [facility] POD [facility]

## 2023-03-27 ENCOUNTER — OFFICE VISIT (OUTPATIENT)
Dept: INTERNAL MEDICINE | Facility: CLINIC | Age: 78
End: 2023-03-27
Payer: MEDICARE

## 2023-03-27 VITALS
HEIGHT: 66 IN | OXYGEN SATURATION: 96 % | DIASTOLIC BLOOD PRESSURE: 70 MMHG | WEIGHT: 185.44 LBS | HEART RATE: 68 BPM | TEMPERATURE: 97 F | RESPIRATION RATE: 20 BRPM | BODY MASS INDEX: 29.8 KG/M2 | SYSTOLIC BLOOD PRESSURE: 154 MMHG

## 2023-03-27 DIAGNOSIS — M81.0 AGE-RELATED OSTEOPOROSIS WITHOUT CURRENT PATHOLOGICAL FRACTURE: ICD-10-CM

## 2023-03-27 DIAGNOSIS — M25.511 BILATERAL SHOULDER PAIN, UNSPECIFIED CHRONICITY: Primary | ICD-10-CM

## 2023-03-27 DIAGNOSIS — M25.512 BILATERAL SHOULDER PAIN, UNSPECIFIED CHRONICITY: Primary | ICD-10-CM

## 2023-03-27 PROCEDURE — 3288F PR FALLS RISK ASSESSMENT DOCUMENTED: ICD-10-PCS | Mod: HCNC,CPTII,S$GLB, | Performed by: INTERNAL MEDICINE

## 2023-03-27 PROCEDURE — 3077F SYST BP >= 140 MM HG: CPT | Mod: HCNC,CPTII,S$GLB, | Performed by: INTERNAL MEDICINE

## 2023-03-27 PROCEDURE — 99214 OFFICE O/P EST MOD 30 MIN: CPT | Mod: HCNC,S$GLB,, | Performed by: INTERNAL MEDICINE

## 2023-03-27 PROCEDURE — 3288F FALL RISK ASSESSMENT DOCD: CPT | Mod: HCNC,CPTII,S$GLB, | Performed by: INTERNAL MEDICINE

## 2023-03-27 PROCEDURE — 1160F RVW MEDS BY RX/DR IN RCRD: CPT | Mod: HCNC,CPTII,S$GLB, | Performed by: INTERNAL MEDICINE

## 2023-03-27 PROCEDURE — 1125F AMNT PAIN NOTED PAIN PRSNT: CPT | Mod: HCNC,CPTII,S$GLB, | Performed by: INTERNAL MEDICINE

## 2023-03-27 PROCEDURE — 1159F PR MEDICATION LIST DOCUMENTED IN MEDICAL RECORD: ICD-10-PCS | Mod: HCNC,CPTII,S$GLB, | Performed by: INTERNAL MEDICINE

## 2023-03-27 PROCEDURE — 99214 PR OFFICE/OUTPT VISIT, EST, LEVL IV, 30-39 MIN: ICD-10-PCS | Mod: HCNC,S$GLB,, | Performed by: INTERNAL MEDICINE

## 2023-03-27 PROCEDURE — 1160F PR REVIEW ALL MEDS BY PRESCRIBER/CLIN PHARMACIST DOCUMENTED: ICD-10-PCS | Mod: HCNC,CPTII,S$GLB, | Performed by: INTERNAL MEDICINE

## 2023-03-27 PROCEDURE — 3078F DIAST BP <80 MM HG: CPT | Mod: HCNC,CPTII,S$GLB, | Performed by: INTERNAL MEDICINE

## 2023-03-27 PROCEDURE — 1125F PR PAIN SEVERITY QUANTIFIED, PAIN PRESENT: ICD-10-PCS | Mod: HCNC,CPTII,S$GLB, | Performed by: INTERNAL MEDICINE

## 2023-03-27 PROCEDURE — 99999 PR PBB SHADOW E&M-EST. PATIENT-LVL V: ICD-10-PCS | Mod: PBBFAC,HCNC,, | Performed by: INTERNAL MEDICINE

## 2023-03-27 PROCEDURE — 1101F PT FALLS ASSESS-DOCD LE1/YR: CPT | Mod: HCNC,CPTII,S$GLB, | Performed by: INTERNAL MEDICINE

## 2023-03-27 PROCEDURE — 99999 PR PBB SHADOW E&M-EST. PATIENT-LVL V: CPT | Mod: PBBFAC,HCNC,, | Performed by: INTERNAL MEDICINE

## 2023-03-27 PROCEDURE — 3078F PR MOST RECENT DIASTOLIC BLOOD PRESSURE < 80 MM HG: ICD-10-PCS | Mod: HCNC,CPTII,S$GLB, | Performed by: INTERNAL MEDICINE

## 2023-03-27 PROCEDURE — 3077F PR MOST RECENT SYSTOLIC BLOOD PRESSURE >= 140 MM HG: ICD-10-PCS | Mod: HCNC,CPTII,S$GLB, | Performed by: INTERNAL MEDICINE

## 2023-03-27 PROCEDURE — 1159F MED LIST DOCD IN RCRD: CPT | Mod: HCNC,CPTII,S$GLB, | Performed by: INTERNAL MEDICINE

## 2023-03-27 PROCEDURE — 1101F PR PT FALLS ASSESS DOC 0-1 FALLS W/OUT INJ PAST YR: ICD-10-PCS | Mod: HCNC,CPTII,S$GLB, | Performed by: INTERNAL MEDICINE

## 2023-03-27 RX ORDER — PREDNISONE 20 MG/1
TABLET ORAL
Qty: 11 TABLET | Refills: 0 | Status: SHIPPED | OUTPATIENT
Start: 2023-03-27 | End: 2023-10-30

## 2023-03-27 NOTE — PROGRESS NOTES
Subjective:       Patient ID: Flory Cam is a 77 y.o. female.    Chief Complaint: Shoulder Pain    Shoulder Pain   The pain is present in the right shoulder and left shoulder. This is a recurrent problem. The current episode started 1 to 4 weeks ago. There has been no history of extremity trauma. The problem occurs intermittently. The problem has been waxing and waning. The quality of the pain is described as aching. The pain is moderate. Associated symptoms include a limited range of motion and stiffness. Pertinent negatives include no fever, joint swelling, numbness or tingling. The symptoms are aggravated by activity. She has tried rest for the symptoms. The treatment provided mild relief. Family history includes arthritis. There is no history of diabetes or Injuries to Extremity.   Review of Systems   Constitutional:  Negative for fever.   Musculoskeletal:  Positive for arthralgias, myalgias, neck pain and stiffness.   Neurological:  Negative for tingling and numbness.       Objective:      Physical Exam  Vitals reviewed.   Constitutional:       General: She is not in acute distress.     Appearance: She is well-developed. She is not ill-appearing.   Cardiovascular:      Rate and Rhythm: Normal rate.   Pulmonary:      Effort: Pulmonary effort is normal. No respiratory distress.   Musculoskeletal:      Right shoulder: Tenderness present. No swelling, bony tenderness or crepitus. Decreased range of motion.      Left shoulder: Tenderness present. No swelling, bony tenderness or crepitus. Decreased range of motion.   Neurological:      Mental Status: She is alert and oriented to person, place, and time.   Psychiatric:         Behavior: Behavior normal.         Thought Content: Thought content normal.         Judgment: Judgment normal.       Assessment:       Problem List Items Addressed This Visit       Osteoporosis (Chronic)    Relevant Orders    DXA Bone Density Axial Skeleton 1 or more sites     Other  Visit Diagnoses       Bilateral shoulder pain, unspecified chronicity    -  Primary    Relevant Medications    predniSONE (DELTASONE) 20 MG tablet    Other Relevant Orders    Ambulatory referral/consult to Physical/Occupational Therapy              Plan:       Flory was seen today for shoulder pain.    Diagnoses and all orders for this visit:    Bilateral shoulder pain, unspecified chronicity  -     predniSONE (DELTASONE) 20 MG tablet; One tablet BID x 3 days, one tablet daily x 3 days then 1/2 tablet daily x 4 days.  -     Ambulatory referral/consult to Physical/Occupational Therapy; Future    Age-related osteoporosis without current pathological fracture  -     DXA Bone Density Axial Skeleton 1 or more sites; Future    RTC if symptoms worsen or fail to resolve with treatment.

## 2023-03-28 DIAGNOSIS — R11.0 NAUSEA: ICD-10-CM

## 2023-03-28 NOTE — TELEPHONE ENCOUNTER
Patient called stating that she only had one Rx waiting for her to . She stated she know something for nausea and others that was discuss in visit. Can you please advise?

## 2023-03-28 NOTE — TELEPHONE ENCOUNTER
No new care gaps identified.  [facility] Embedded Care Gaps. Reference number: [id number]. 3/28/2023   4:21:31 PM CDT

## 2023-03-28 NOTE — TELEPHONE ENCOUNTER
----- Message from Nataly Blue sent at 3/28/2023  3:57 PM CDT -----  Contact: Flory  Patient is calling to speak with the nurse regarding medication. Reports didn't get all the medication that the patient asked for sent over. Please give patient a call back at .[phone].

## 2023-03-31 ENCOUNTER — EXTERNAL CHRONIC CARE MANAGEMENT (OUTPATIENT)
Dept: PRIMARY CARE CLINIC | Facility: CLINIC | Age: 78
End: 2023-03-31
Payer: MEDICARE

## 2023-03-31 PROCEDURE — 99490 PR CHRONIC CARE MGMT, 1ST 20 MIN: ICD-10-PCS | Mod: S$GLB,,, | Performed by: INTERNAL MEDICINE

## 2023-03-31 PROCEDURE — 99490 CHRNC CARE MGMT STAFF 1ST 20: CPT | Mod: S$GLB,,, | Performed by: INTERNAL MEDICINE

## 2023-03-31 PROCEDURE — 99439 CHRNC CARE MGMT STAF EA ADDL: CPT | Mod: S$GLB,,, | Performed by: INTERNAL MEDICINE

## 2023-03-31 PROCEDURE — 99439 PR CHRONIC CARE MGMT, EA ADDTL 20 MIN: ICD-10-PCS | Mod: S$GLB,,, | Performed by: INTERNAL MEDICINE

## 2023-03-31 RX ORDER — ONDANSETRON 4 MG/1
TABLET, FILM COATED ORAL
Qty: 15 TABLET | Refills: 0 | Status: SHIPPED | OUTPATIENT
Start: 2023-03-31 | End: 2023-05-05 | Stop reason: SDUPTHER

## 2023-04-05 ENCOUNTER — APPOINTMENT (OUTPATIENT)
Dept: RADIOLOGY | Facility: HOSPITAL | Age: 78
End: 2023-04-05
Attending: INTERNAL MEDICINE
Payer: MEDICARE

## 2023-04-05 DIAGNOSIS — M81.0 AGE-RELATED OSTEOPOROSIS WITHOUT CURRENT PATHOLOGICAL FRACTURE: ICD-10-CM

## 2023-04-05 PROCEDURE — 77080 DXA BONE DENSITY AXIAL: CPT | Mod: 26,HCNC,, | Performed by: RADIOLOGY

## 2023-04-05 PROCEDURE — 77080 DXA BONE DENSITY AXIAL SKELETON 1 OR MORE SITES: ICD-10-PCS | Mod: 26,HCNC,, | Performed by: RADIOLOGY

## 2023-04-05 PROCEDURE — 77080 DXA BONE DENSITY AXIAL: CPT | Mod: TC,HCNC

## 2023-04-24 ENCOUNTER — TELEPHONE (OUTPATIENT)
Dept: CARDIOLOGY | Facility: CLINIC | Age: 78
End: 2023-04-24
Payer: MEDICARE

## 2023-04-24 PROBLEM — Z01.810 PRE-OPERATIVE CARDIOVASCULAR EXAMINATION: Status: ACTIVE | Noted: 2023-04-24

## 2023-04-24 NOTE — TELEPHONE ENCOUNTER
Contacted pt and rescheduled today's appt for 05/02 with Pedro. Pt states she needs to reschedule due to unreliable transportation. Pt stated understanding w/o Q/C      ----- Message from Nadya Haq sent at 4/24/2023  3:36 PM CDT -----  Regarding: PATIENT CALL BACK  Name of Who is Calling: NO JARVIS JUNE [[id number]]      What is the request in detail: Patient is requesting a call back to reschedule her apt. Patient stated her transportation canceled on her and would like to speak with the office regarding the next available apt for the clearance. Please advise!        Can the clinic reply by [facility]: NO        What [facility] to [facility] Back [facility]: [phone]

## 2023-04-30 ENCOUNTER — EXTERNAL CHRONIC CARE MANAGEMENT (OUTPATIENT)
Dept: PRIMARY CARE CLINIC | Facility: CLINIC | Age: 78
End: 2023-04-30
Payer: MEDICARE

## 2023-04-30 PROCEDURE — 99490 PR CHRONIC CARE MGMT, 1ST 20 MIN: ICD-10-PCS | Mod: S$GLB,,, | Performed by: INTERNAL MEDICINE

## 2023-04-30 PROCEDURE — 99490 CHRNC CARE MGMT STAFF 1ST 20: CPT | Mod: S$GLB,,, | Performed by: INTERNAL MEDICINE

## 2023-05-02 ENCOUNTER — HOSPITAL ENCOUNTER (OUTPATIENT)
Dept: CARDIOLOGY | Facility: HOSPITAL | Age: 78
Discharge: HOME | End: 2023-05-02
Attending: INTERNAL MEDICINE
Payer: MEDICARE

## 2023-05-02 ENCOUNTER — OFFICE VISIT (OUTPATIENT)
Dept: CARDIOLOGY | Facility: CLINIC | Age: 78
End: 2023-05-02
Payer: MEDICARE

## 2023-05-02 VITALS
HEIGHT: 66 IN | OXYGEN SATURATION: 95 % | SYSTOLIC BLOOD PRESSURE: 180 MMHG | HEART RATE: 62 BPM | WEIGHT: 184.31 LBS | DIASTOLIC BLOOD PRESSURE: 82 MMHG | BODY MASS INDEX: 29.62 KG/M2

## 2023-05-02 DIAGNOSIS — J44.9 CHRONIC OBSTRUCTIVE PULMONARY DISEASE, UNSPECIFIED COPD TYPE: ICD-10-CM

## 2023-05-02 DIAGNOSIS — I25.10 ATHEROSCLEROSIS OF NATIVE CORONARY ARTERY OF NATIVE HEART WITHOUT ANGINA PECTORIS: ICD-10-CM

## 2023-05-02 DIAGNOSIS — R26.81 GAIT INSTABILITY: ICD-10-CM

## 2023-05-02 DIAGNOSIS — E78.5 HYPERLIPIDEMIA LDL GOAL <70: Chronic | ICD-10-CM

## 2023-05-02 DIAGNOSIS — I70.0 CALCIFICATION OF AORTA: ICD-10-CM

## 2023-05-02 DIAGNOSIS — I10 HYPERTENSION GOAL BP (BLOOD PRESSURE) < 140/90: Chronic | ICD-10-CM

## 2023-05-02 DIAGNOSIS — I65.29 STENOSIS OF CAROTID ARTERY, UNSPECIFIED LATERALITY: ICD-10-CM

## 2023-05-02 DIAGNOSIS — R42 DIZZINESS: Primary | ICD-10-CM

## 2023-05-02 DIAGNOSIS — I70.219 ATHEROSCLEROTIC PVD WITH INTERMITTENT CLAUDICATION: Chronic | ICD-10-CM

## 2023-05-02 DIAGNOSIS — I10 PRIMARY HYPERTENSION: ICD-10-CM

## 2023-05-02 DIAGNOSIS — F17.210 CIGARETTE NICOTINE DEPENDENCE WITHOUT COMPLICATION: ICD-10-CM

## 2023-05-02 DIAGNOSIS — Z01.810 PRE-OPERATIVE CARDIOVASCULAR EXAMINATION: ICD-10-CM

## 2023-05-02 DIAGNOSIS — I45.2 BIFASCICULAR BLOCK: ICD-10-CM

## 2023-05-02 DIAGNOSIS — I65.23 BILATERAL CAROTID ARTERY STENOSIS: ICD-10-CM

## 2023-05-02 DIAGNOSIS — Z95.1 HX OF CABG: Chronic | ICD-10-CM

## 2023-05-02 DIAGNOSIS — R29.6 RECURRENT FALLS: ICD-10-CM

## 2023-05-02 DIAGNOSIS — I73.9 PERIPHERAL VASCULAR DISEASE: Chronic | ICD-10-CM

## 2023-05-02 PROCEDURE — 3079F PR MOST RECENT DIASTOLIC BLOOD PRESSURE 80-89 MM HG: ICD-10-PCS | Mod: CPTII,S$GLB,, | Performed by: PHYSICIAN ASSISTANT

## 2023-05-02 PROCEDURE — 3077F PR MOST RECENT SYSTOLIC BLOOD PRESSURE >= 140 MM HG: ICD-10-PCS | Mod: CPTII,S$GLB,, | Performed by: PHYSICIAN ASSISTANT

## 2023-05-02 PROCEDURE — 3077F SYST BP >= 140 MM HG: CPT | Mod: CPTII,S$GLB,, | Performed by: PHYSICIAN ASSISTANT

## 2023-05-02 PROCEDURE — 3288F FALL RISK ASSESSMENT DOCD: CPT | Mod: CPTII,S$GLB,, | Performed by: PHYSICIAN ASSISTANT

## 2023-05-02 PROCEDURE — 1100F PTFALLS ASSESS-DOCD GE2>/YR: CPT | Mod: CPTII,S$GLB,, | Performed by: PHYSICIAN ASSISTANT

## 2023-05-02 PROCEDURE — 99999 PR PBB SHADOW E&M-EST. PATIENT-LVL V: CPT | Mod: PBBFAC,,, | Performed by: PHYSICIAN ASSISTANT

## 2023-05-02 PROCEDURE — 3079F DIAST BP 80-89 MM HG: CPT | Mod: CPTII,S$GLB,, | Performed by: PHYSICIAN ASSISTANT

## 2023-05-02 PROCEDURE — 1159F MED LIST DOCD IN RCRD: CPT | Mod: CPTII,S$GLB,, | Performed by: PHYSICIAN ASSISTANT

## 2023-05-02 PROCEDURE — 3288F PR FALLS RISK ASSESSMENT DOCUMENTED: ICD-10-PCS | Mod: CPTII,S$GLB,, | Performed by: PHYSICIAN ASSISTANT

## 2023-05-02 PROCEDURE — 99214 PR OFFICE/OUTPT VISIT, EST, LEVL IV, 30-39 MIN: ICD-10-PCS | Mod: S$GLB,,, | Performed by: PHYSICIAN ASSISTANT

## 2023-05-02 PROCEDURE — 93005 ELECTROCARDIOGRAM TRACING: CPT

## 2023-05-02 PROCEDURE — 1160F RVW MEDS BY RX/DR IN RCRD: CPT | Mod: CPTII,S$GLB,, | Performed by: PHYSICIAN ASSISTANT

## 2023-05-02 PROCEDURE — 1160F PR REVIEW ALL MEDS BY PRESCRIBER/CLIN PHARMACIST DOCUMENTED: ICD-10-PCS | Mod: CPTII,S$GLB,, | Performed by: PHYSICIAN ASSISTANT

## 2023-05-02 PROCEDURE — 1125F PR PAIN SEVERITY QUANTIFIED, PAIN PRESENT: ICD-10-PCS | Mod: CPTII,S$GLB,, | Performed by: PHYSICIAN ASSISTANT

## 2023-05-02 PROCEDURE — 93010 ELECTROCARDIOGRAM REPORT: CPT | Mod: ,,, | Performed by: INTERNAL MEDICINE

## 2023-05-02 PROCEDURE — 99999 PR PBB SHADOW E&M-EST. PATIENT-LVL V: ICD-10-PCS | Mod: PBBFAC,,, | Performed by: PHYSICIAN ASSISTANT

## 2023-05-02 PROCEDURE — 1125F AMNT PAIN NOTED PAIN PRSNT: CPT | Mod: CPTII,S$GLB,, | Performed by: PHYSICIAN ASSISTANT

## 2023-05-02 PROCEDURE — 1159F PR MEDICATION LIST DOCUMENTED IN MEDICAL RECORD: ICD-10-PCS | Mod: CPTII,S$GLB,, | Performed by: PHYSICIAN ASSISTANT

## 2023-05-02 PROCEDURE — 99214 OFFICE O/P EST MOD 30 MIN: CPT | Mod: S$GLB,,, | Performed by: PHYSICIAN ASSISTANT

## 2023-05-02 PROCEDURE — 1100F PR PT FALLS ASSESS DOC 2+ FALLS/FALL W/INJURY/YR: ICD-10-PCS | Mod: CPTII,S$GLB,, | Performed by: PHYSICIAN ASSISTANT

## 2023-05-02 PROCEDURE — 93010 EKG 12-LEAD: ICD-10-PCS | Mod: ,,, | Performed by: INTERNAL MEDICINE

## 2023-05-02 NOTE — PROGRESS NOTES
Subjective:   Patient ID:  Flory Cam is a 77 y.o. female who presents for follow-up of Arm Pain, Shoulder Pain, Back Pain, Pain, leg pain, lose balance, and Dizziness      HPI  Ms. 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 Patient returns today and states she is doing ok. Main issue is severe joint pain, mostly in her right arm/shoulder. States pain has gotten so severe that she is having trouble lifting her arm and performing her ADL's. Being followed by pain mgmt and does report some relief with pain medication. Scheduled to have MRI soon, unable to participate with PT/OT due to severity of pain. In addition, patient also complains of bilateral knee/leg pain and unsteady gait. States she has been having frequents falls over the past few months, unclear why. Tries to ambulate but gets dizzy and then falls. Denies any focal weakness, blurred vision, slurred speech. No CP, SOB/LAURENT. No palpitations, near syncope, or syncope. BP elevated today but patient did not take her meds prior to appt. Still smokes occasionally. EKG today shows no acute changes, chronic bifascicular block.    Echo 10/20 showed normal EF. Stress test 10/20 negative for ischemia, +fixed defect.     Review of Systems   Constitutional: Negative for chills, decreased appetite, fever and malaise/fatigue.   HENT:  Negative for congestion, hoarse voice and sore throat.    Eyes:  Negative for blurred vision and discharge.   Cardiovascular:  Negative for chest pain, claudication, cyanosis, dyspnea on exertion, irregular heartbeat, leg swelling, near-syncope, orthopnea, palpitations and paroxysmal nocturnal dyspnea.   Respiratory:  Negative for cough, hemoptysis, shortness of breath, snoring, sputum production and  "wheezing.    Endocrine: Negative for cold intolerance and heat intolerance.   Hematologic/Lymphatic: Negative for bleeding problem. Does not bruise/bleed easily.   Skin:  Positive for rash (discoid lupus).   Musculoskeletal:  Positive for arthritis, back pain, falls and joint pain (right shoulder). Negative for joint swelling, muscle cramps, muscle weakness and myalgias.   Gastrointestinal:  Negative for abdominal pain, constipation, diarrhea, heartburn, melena and nausea.   Genitourinary:  Negative for hematuria.   Neurological:  Positive for dizziness, light-headedness, loss of balance and weakness. Negative for focal weakness, headaches, numbness, paresthesias and seizures.   Psychiatric/Behavioral:  Negative for memory loss. The patient does not have insomnia.    Allergic/Immunologic: Negative for hives.     BP (!) 180/82 (BP Location: Left arm, Patient Position: Sitting, BP Method: Large (Manual))   Pulse 62   Ht 5' 6" (1.676 m)   Wt 83.6 kg (184 lb 4.9 oz)   SpO2 95%   BMI 29.75 kg/m²     Objective:   Physical Exam  Vitals and nursing note reviewed.   Constitutional:       General: She is not in acute distress.     Appearance: She is well-developed. She is not diaphoretic.   HENT:      Head: Normocephalic and atraumatic.   Eyes:      General:         Right eye: No discharge.         Left eye: No discharge.      Pupils: Pupils are equal, round, and reactive to light.   Neck:      Thyroid: No thyromegaly.      Vascular: No JVD.      Trachea: No tracheal deviation.   Cardiovascular:      Rate and Rhythm: Normal rate and regular rhythm.      Heart sounds: Normal heart sounds, S1 normal and S2 normal. No murmur heard.  Pulmonary:      Effort: Pulmonary effort is normal. No respiratory distress.      Breath sounds: Normal breath sounds. No wheezing or rales.   Abdominal:      General: There is no distension.      Tenderness: There is no abdominal tenderness. There is no rebound.   Musculoskeletal:      Cervical " back: Neck supple.      Right lower leg: No edema.      Left lower leg: No edema.      Comments: Tenderness right arm/shoulder     Skin:     General: Skin is warm and dry.      Findings: No erythema.   Neurological:      Mental Status: She is alert and oriented to person, place, and time.   Psychiatric:         Behavior: Behavior normal.       Stress Test 10/20 Results  Conclusion         No reversible defects to suggest coronary ischemia.    Perfusion Defect There is a mild intensity, fixed defect consistent with scar  in the inferior wall(s).    There is a mild intensity fixed defect in the anterior wall of the left ventricle, likely secondary to soft tissue attenuation.    Gated perfusion images showed an ejection fraction of 61% at rest and 45% post stress.    The EKG portion of this study is negative for ischemia.    There were no arrhythmias during stress.         Chemistry        Component Value Date/Time     02/10/2023 0826    K 3.3 (L) 02/10/2023 0826     02/10/2023 0826    CO2 25 02/10/2023 0826    BUN 18 02/10/2023 0826    CREATININE 0.8 02/10/2023 0826     (H) 02/10/2023 0826        Component Value Date/Time    CALCIUM 9.3 02/10/2023 0826    ALKPHOS 150 (H) 02/10/2023 0826    AST 24 02/10/2023 0826    ALT 24 02/10/2023 0826    BILITOT 0.5 02/10/2023 0826    ESTGFRAFRICA >60.0 02/01/2022 1102    EGFRNONAA >60.0 02/01/2022 1102        Lab Results   Component Value Date    CHOL 151 09/22/2022    CHOL 121 12/06/2021    CHOL 142 03/22/2021     Lab Results   Component Value Date    HDL 52 09/22/2022    HDL 48 12/06/2021    HDL 52 03/22/2021     Lab Results   Component Value Date    LDLCALC 84.0 09/22/2022    LDLCALC 60.4 (L) 12/06/2021    LDLCALC 76.2 03/22/2021     No results found for: DLDL  Lab Results   Component Value Date    TRIG 75 09/22/2022    TRIG 63 12/06/2021    TRIG 69 03/22/2021       f1   Lab Results   Component Value Date    CHOLHDL 34.4 09/22/2022    CHOLHDL 39.7 12/06/2021     CHOLHDL 36.6 03/22/2021     Lab Results   Component Value Date    HGBA1C 5.8 (H) 08/04/2017       Assessment:      1. Dizziness    2. CAD: Hx of CABG    3. Atherosclerotic PVD with intermittent claudication    4. Hyperlipidemia LDL goal <70    5. Hypertension goal BP (blood pressure) < 140/90    6. Pre-operative cardiovascular examination    7. Peripheral vascular disease    8. Cigarette nicotine dependence without complication    9. Chronic obstructive pulmonary disease, unspecified COPD type    10. Calcification of aorta    11. Stenosis of carotid artery, unspecified laterality    12. Gait instability    13. Bilateral carotid artery stenosis    14. Atherosclerosis of native coronary artery of native heart without angina pectoris    15. Recurrent falls    16. Bifascicular block      Patient presents for f/u. Main issue seems to be severe joint pain. Being followed by pain mgmt, scheduled for MRI soon. Concerned about her dizziness/gait instability/frequent falls. Needs carotid U/S and CT of head for further evaluation as well as repeat leg studies to make sure PVD/claudication not contributing to clinical picture. Given chronic bifascicular block, would also benefit from 48 hour holter monitor. Continue same CV meds in interim. Would benefit from PT/OT once severity of pain improves.   Plan:   -CT of head without contrast, carotid U/S, holter 48 monitor  -BLE arterial U/S, VIPIN  -Continue same CV meds  -Cardiac low salt diet  -Follow-up with Dr. Cohen post-tests

## 2023-05-03 RX ORDER — ATORVASTATIN CALCIUM 80 MG/1
80 TABLET, FILM COATED ORAL DAILY
Qty: 90 TABLET | Refills: 3 | Status: SHIPPED | OUTPATIENT
Start: 2023-05-03

## 2023-05-03 RX ORDER — AMLODIPINE BESYLATE 10 MG/1
10 TABLET ORAL DAILY
Qty: 90 TABLET | Refills: 3 | Status: SHIPPED | OUTPATIENT
Start: 2023-05-03

## 2023-05-04 ENCOUNTER — TELEPHONE (OUTPATIENT)
Dept: INTERNAL MEDICINE | Facility: CLINIC | Age: 78
End: 2023-05-04
Payer: MEDICARE

## 2023-05-04 DIAGNOSIS — R11.0 NAUSEA: ICD-10-CM

## 2023-05-04 NOTE — TELEPHONE ENCOUNTER
Started a couple of days ago diarrhea and nausea she is not vomiting wants something called she has tried MOM and tums OTC with very little relief

## 2023-05-04 NOTE — TELEPHONE ENCOUNTER
----- Message from Rita Hernandez sent at 5/4/2023 12:16 PM CDT -----  Contact: pt  Pt is calling in regard to needing something called in for diarrhea and nausea.  Please call in to pharm below.  Please call her back at [phone]  tram/reina        [id number] - [place] - [address] [facility] [address]  [address]  [place] [address]  Phone: [phone] Fax: [phone]

## 2023-05-05 RX ORDER — ONDANSETRON 4 MG/1
TABLET, FILM COATED ORAL
Qty: 15 TABLET | Refills: 0 | Status: SHIPPED | OUTPATIENT
Start: 2023-05-05 | End: 2023-10-30

## 2023-05-07 ENCOUNTER — HOSPITAL ENCOUNTER (EMERGENCY)
Facility: HOSPITAL | Age: 78
Discharge: HOME | End: 2023-05-07
Attending: EMERGENCY MEDICINE
Payer: MEDICARE

## 2023-05-07 VITALS
HEIGHT: 66 IN | DIASTOLIC BLOOD PRESSURE: 88 MMHG | RESPIRATION RATE: 20 BRPM | OXYGEN SATURATION: 96 % | SYSTOLIC BLOOD PRESSURE: 185 MMHG | BODY MASS INDEX: 29.75 KG/M2 | HEART RATE: 62 BPM | TEMPERATURE: 99 F

## 2023-05-07 DIAGNOSIS — M25.569 KNEE PAIN: Primary | ICD-10-CM

## 2023-05-07 DIAGNOSIS — M25.529 ELBOW PAIN: ICD-10-CM

## 2023-05-07 DIAGNOSIS — S52.045A CLOSED NONDISPLACED FRACTURE OF CORONOID PROCESS OF LEFT ULNA, INITIAL ENCOUNTER: ICD-10-CM

## 2023-05-07 PROCEDURE — 99284 EMERGENCY DEPT VISIT MOD MDM: CPT

## 2023-05-07 PROCEDURE — 25000003 PHARM REV CODE 250: Performed by: EMERGENCY MEDICINE

## 2023-05-07 RX ORDER — CLONIDINE HYDROCHLORIDE 0.1 MG/1
0.1 TABLET ORAL
Status: COMPLETED | OUTPATIENT
Start: 2023-05-07 | End: 2023-05-07

## 2023-05-07 RX ADMIN — CLONIDINE HYDROCHLORIDE 0.1 MG: 0.1 TABLET ORAL at 08:05

## 2023-05-07 NOTE — ED PROVIDER NOTES
SCRIBE #1 NOTE: I, Edward Dennis, am scribing for, and in the presence of, Dick Cordova MD. I have scribed the entire note.       History     Chief Complaint   Patient presents with    Knee Pain     Per EMS, patient c/o right knee pain after falling 2 weeks ago; patient reports prescription for pain medication is waiting at pharmacy to be picked up     Review of patient's allergies indicates:   Allergen Reactions    Methotrexate analogues     Cellcept [mycophenolate mofetil] Rash    Imuran [azathioprine] Rash         History of Present Illness     HPI    5/7/2023, 6:42 AM  History obtained from the patient      History of Present Illness: Flory Cam is a 77 y.o. female patient with a PMHx of HTN, coronary artery disease, lupus, osteoporosis,  who presents to the Emergency Department for evaluation of knee pain which onset gradually roughly 2 weeks PTA. Pt reports falling on her right knee 2 weeks PTA and has been experiencing knee pain since. Pt reports she never filled the prescription she received for pain medication. Symptoms are constant and moderate in severity. No mitigating or exacerbating factors reported. Associated sxs include elbow pain. Patient denies any neck pain, neck stiffness, back pain, joint swelling, weakness, dizziness, and all other sxs at this time. No further complaints or concerns at this time.       Arrival mode: EMS     PCP: Tayler Ovalle DO        Past Medical History:  Past Medical History:   Diagnosis Date    Acute coronary syndrome     Anxiety     Bilateral carotid artery stenosis 11/17/2015    Chronic pain     Colon polyp     Coronary artery disease     GERD (gastroesophageal reflux disease)     Hyperlipidemia     Hypertension     Hypothyroidism     Low back pain     Lupus     Osteoporosis     Poor circulation     Pre-operative clearance 7/12/2019    PVD (peripheral vascular disease)     S/P peripheral artery angioplasty 02/13/2014    SCCA (squamous cell carcinoma) of  skin 10/2019    Dr. ZANDRA Rivas--oncology    Skin disease        Past Surgical History:  Past Surgical History:   Procedure Laterality Date    AORTOGRAPHY WITH SERIALOGRAPHY N/A 2021    Procedure: AORTOGRAM, WITH RUNOFF;  Surgeon: Christopher Cohen MD;  Location: [facility] CATH LAB;  Service: Cardiology;  Laterality: N/A;  COVID-19, mRNA, LNP-S, PF, 30 mcg/0.3 mL dose (COVID-19, MRNA, LN-S, PF (Pfizer)) 2021, 2021    Atherosclerotic PVD with intermittent claudication Bilateral 2021    Dr. Cohen    CATARACT EXTRACTION      COLONOSCOPY N/A 2017    Procedure: COLONOSCOPY;  Surgeon: Sylvester Arboleda III, MD;  Location: [facility] ENDO;  Service: Endoscopy;  Laterality: N/A;    COLONOSCOPY N/A 2020    Procedure: COLONOSCOPY;  Surgeon: Sylvester Arboleda III, MD;  Location: [facility] ENDO;  Service: Endoscopy;  Laterality: N/A;    CORONARY ANGIOPLASTY      CORONARY ARTERY BYPASS GRAFT      EXCISION OF MASS OF HAND Right 2022    Procedure: EXCISION, MASS, HAND;  Surgeon: Hernan Culver MD;  Location: [facility] OR;  Service: Orthopedics;  Laterality: Right;   thenar eminence skin lesion excised and a Z-plasty skin flap for coverage    HYSTERECTOMY      OOPHORECTOMY      THYROIDECTOMY           Family History:  Family History   Problem Relation Age of Onset    Hypertension Mother     Stroke Mother     Lung cancer Daughter     Melanoma Neg Hx     Psoriasis Neg Hx     Lupus Neg Hx     Eczema Neg Hx        Social History:  Social History     Tobacco Use    Smoking status: Former     Packs/day: 0.25     Years: 40.00     Pack years: 10.00     Types: Cigarettes     Start date:      Quit date: 2023     Years since quittin.2    Smokeless tobacco: Never    Tobacco comments:     hold midnight prior to surgery   Substance and Sexual Activity    Alcohol use: Never    Drug use: No    Sexual activity: Not Currently     Partners: Male        Review of Systems     Review of Systems   Constitutional:  Negative for  fever.   HENT:  Negative for sore throat.    Respiratory:  Negative for shortness of breath.    Cardiovascular:  Negative for chest pain.   Gastrointestinal:  Negative for nausea.   Genitourinary:  Negative for dysuria.   Musculoskeletal:  Positive for arthralgias (R knee, L elbow). Negative for back pain, joint swelling, neck pain and neck stiffness.   Skin:  Negative for rash.   Neurological:  Negative for dizziness and weakness.   Hematological:  Does not bruise/bleed easily.   All other systems reviewed and are negative.     Physical Exam     Initial Vitals [05/07/23 0631]   BP Pulse Resp Temp SpO2   (!) 157/94 74 18 98.9 °F (37.2 °C) 97 %      MAP       --          Physical Exam  Nursing Notes and Vital Signs Reviewed.  Constitutional: Patient is in no acute distress. Elderly. Frail.  Head: Atraumatic. Normocephalic.  Eyes: PERRL. EOM intact. Conjunctivae are not pale. No scleral icterus.  ENT: Mucous membranes are moist. Oropharynx is clear and symmetric.    Neck: Supple. Full ROM. No lymphadenopathy.  Cardiovascular: Regular rate. Regular rhythm. No murmurs, rubs, or gallops. Distal pulses are 2+ and symmetric.  Pulmonary/Chest: No respiratory distress. Clear to auscultation bilaterally. No wheezing or rales.  Abdominal: Soft and non-distended.  There is no tenderness.  No rebound, guarding, or rigidity. Good bowel sounds.  Genitourinary: No CVA tenderness  Musculoskeletal: Moves all extremities. No obvious deformities. No edema. No calf tenderness.  Skin: Warm and dry.  Neurological:  Alert, awake, and appropriate.  Normal speech.  No acute focal neurological deficits are appreciated.  Psychiatric: Normal affect. Good eye contact. Appropriate in content.     ED Course   Procedures  ED Vital Signs:  Vitals:    05/07/23 0631 05/07/23 0850 05/07/23 0900   BP: (!) 157/94 (S) (!) 203/90 (!) 185/88   Pulse: 74 (!) 59 62   Resp: 18 18 20   Temp: 98.9 °F (37.2 °C)     TempSrc: Oral     SpO2: 97% 97% 96%   Height: 5'  "6" (1.676 m)         Abnormal Lab Results:  Labs Reviewed - No data to display     All Lab Results:  Results for orders placed or performed during the hospital encounter of 02/10/23   CBC auto differential   Result Value Ref Range    WBC 14.74 (H) 3.90 - 12.70 K/uL    RBC 4.78 4.00 - 5.40 M/uL    Hemoglobin 14.8 12.0 - 16.0 g/dL    Hematocrit 44.4 37.0 - 48.5 %    MCV 93 82 - 98 fL    MCH 31.0 27.0 - 31.0 pg    MCHC 33.3 32.0 - 36.0 g/dL    RDW 13.3 11.5 - 14.5 %    Platelets 233 150 - 450 K/uL    MPV 9.9 9.2 - 12.9 fL    Immature Granulocytes 0.5 0.0 - 0.5 %    Gran # (ANC) 10.9 (H) 1.8 - 7.7 K/uL    Immature Grans (Abs) 0.07 (H) 0.00 - 0.04 K/uL    Lymph # 2.4 1.0 - 4.8 K/uL    Mono # 1.3 (H) 0.3 - 1.0 K/uL    Eos # 0.0 0.0 - 0.5 K/uL    Baso # 0.03 0.00 - 0.20 K/uL    nRBC 0 0 /100 WBC    Gran % 74.1 (H) 38.0 - 73.0 %    Lymph % 16.2 (L) 18.0 - 48.0 %    Mono % 8.9 4.0 - 15.0 %    Eosinophil % 0.1 0.0 - 8.0 %    Basophil % 0.2 0.0 - 1.9 %    Differential Method Automated    Comprehensive metabolic panel   Result Value Ref Range    Sodium 141 136 - 145 mmol/L    Potassium 3.3 (L) 3.5 - 5.1 mmol/L    Chloride 102 95 - 110 mmol/L    CO2 25 23 - 29 mmol/L    Glucose 122 (H) 70 - 110 mg/dL    BUN 18 8 - 23 mg/dL    Creatinine 0.8 0.5 - 1.4 mg/dL    Calcium 9.3 8.7 - 10.5 mg/dL    Total Protein 7.4 6.0 - 8.4 g/dL    Albumin 3.7 3.5 - 5.2 g/dL    Total Bilirubin 0.5 0.1 - 1.0 mg/dL    Alkaline Phosphatase 150 (H) 55 - 135 U/L    AST 24 10 - 40 U/L    ALT 24 10 - 44 U/L    Anion Gap 14 8 - 16 mmol/L    eGFR >60 >60 mL/min/1.73 m^2   HIV 1/2 Ag/Ab (4th Gen)   Result Value Ref Range    HIV 1/2 Ag/Ab Negative Negative   Hepatitis C Antibody   Result Value Ref Range    Hepatitis C Ab Negative Negative   HCV Virus Hold Specimen   Result Value Ref Range    HEP C Virus Hold Specimen Hold for HCV sendout      *Note: Due to a large number of results and/or encounters for the requested time period, some results have not been " displayed. A complete set of results can be found in Results Review.         Imaging Results:  Imaging Results              X-Ray Elbow Complete Left (Final result)  Result time 05/07/23 08:24:16      Final result by Dagoberto Bassett MD (05/07/23 08:24:16)                   Impression:      Questionable nondisplaced fracture of the ulnar coronoid process with apparent small joint effusion.      Electronically signed by: Dagoberto Bassett  Date:    05/07/2023  Time:    08:24               Narrative:    EXAMINATION:  XR ELBOW COMPLETE 3 VIEW LEFT    CLINICAL HISTORY:  Pain in unspecified elbow    TECHNIQUE:  AP, lateral, and oblique views of the left elbow were performed.    COMPARISON:  None    FINDINGS:  Questionable nondisplaced fracture of the ulnar coronoid process.  No other evidence of acute fracture or dislocation.  Bones are demineralized.  No aggressive lytic or blastic lesion.  No osseous erosion or aggressive periosteal reaction.  Joint spaces are preserved with normal alignment.  There appears to be a very small joint effusion.  Soft tissues are unremarkable.                                       X-Ray Knee Complete 4 Or More Views Right (Final result)  Result time 05/07/23 08:22:22      Final result by Dagoberto Bassett MD (05/07/23 08:22:22)                   Impression:      No acute injury.  Osteopenia and tricompartment degenerative changes with evidence of chondrocalcinosis and small joint effusion.      Electronically signed by: Dagoberto Bassett  Date:    05/07/2023  Time:    08:22               Narrative:    EXAMINATION:  XR KNEE COMP 4 OR MORE VIEWS RIGHT    CLINICAL HISTORY:  Pain in unspecified knee    TECHNIQUE:  AP, lateral, and oblique views of the right knee were performed.    COMPARISON:  X-ray dated 05/04/2022    FINDINGS:  No acute fracture or dislocation.  Bones are demineralized.  No aggressive lytic or blastic lesion.  No osseous erosion or aggressive periosteal reaction.   Mild joint space narrowing of the medial tibiofemoral compartment.  Tricompartmental marginal osteophytosis most prominently affecting the medial tibiofemoral compartment.  Normal alignment is preserved.  Small joint effusion.  Calcification noted in the lateral tibiofemoral compartment.  Mild enthesopathy at the patellar insertion of the quadriceps tendon.  Soft tissues are unremarkable.                                                  The Emergency Provider reviewed the vital signs and test results, which are outlined above.     ED Discussion       8:49 AM: Reassessed pt at this time.  Discussed with pt all pertinent ED information and results. Discussed pt dx and plan of tx. Gave pt all f/u and return to the ED instructions. All questions and concerns were addressed at this time. Pt expresses understanding of information and instructions, and is comfortable with plan to discharge. Pt is stable for discharge.    I discussed with patient and/or family/caretaker that evaluation in the ED does not suggest any emergent or life threatening medical conditions requiring immediate intervention beyond what was provided in the ED, and I believe patient is safe for discharge.  Regardless, an unremarkable evaluation in the ED does not preclude the development or presence of a serious of life threatening condition. As such, patient was instructed to return immediately for any worsening or change in current symptoms.         Medical Decision Making:   Initial Assessment:   Fell a week ago, complaining of continued knee pain and left elbow pain  Differential Diagnosis:   Fracture, sprain, pain  Clinical Tests:   Radiological Study: Ordered and Reviewed  ED Management:   imaging reviewed by me.  No acute findings except for small fracture of coronoid process of ulna.  Sling for comfort, and outpatient follow up.           ED Medication(s):  Medications   cloNIDine tablet 0.1 mg (0.1 mg Oral Given 5/7/23 5164)       Discharge  Medication List as of 5/7/2023  8:49 AM           Follow-up Information       Tayler Ovalle DO.    Specialty: Internal Medicine  Contact information:  [address] [place] [address]  [phone]                                 Scribe Attestation:   Scribe #1: I performed the above scribed service and the documentation accurately describes the services I performed. I attest to the accuracy of the note.     Attending:   Physician Attestation Statement for Scribe #1: I, Dick Cordova MD, personally performed the services described in this documentation, as scribed by Edward Dennis, in my presence, and it is both accurate and complete.           Clinical Impression       ICD-10-CM ICD-9-CM   1. Knee pain  M25.569 719.46   2. Elbow pain  M25.529 719.42   3. Closed nondisplaced fracture of coronoid process of left ulna, initial encounter  S52.045A 813.02       Disposition:   Disposition: Discharged  Condition: Stable       Dick Cordova MD  05/07/23 0929

## 2023-05-09 ENCOUNTER — TELEPHONE (OUTPATIENT)
Dept: INTERNAL MEDICINE | Facility: CLINIC | Age: 78
End: 2023-05-09
Payer: MEDICARE

## 2023-05-09 NOTE — TELEPHONE ENCOUNTER
----- Message from Martine Zepeda sent at 5/9/2023  2:27 PM CDT -----  Contact: [phone]  Patient requesting a call back at [phone] in regards to ondansetron (ZOFRAN) 4 MG tablet. She stated the pharmacy stated they haven't received the medication. Thanks KD

## 2023-05-09 NOTE — TELEPHONE ENCOUNTER
I called the pharmacy and they have the zofran ready but she also wanted something called out for diarrhea

## 2023-05-16 ENCOUNTER — TELEPHONE (OUTPATIENT)
Dept: INTERNAL MEDICINE | Facility: CLINIC | Age: 78
End: 2023-05-16
Payer: MEDICARE

## 2023-05-16 NOTE — TELEPHONE ENCOUNTER
----- Message from Rita Hernandez sent at 5/16/2023 10:42 AM CDT -----  Contact: pt  Pt is calling in regard to needing another handicap tag and a letter as dr. Ovalle done in the past.  Pt will be there at the clinic for other appts on tomorrow and would like to pick it up at the .   If any questions, [phone] thanks/mpd

## 2023-05-16 NOTE — TELEPHONE ENCOUNTER
Patient is requesting a handicap tag and a letter stating she's disable as once completed in the past for her. I did explain to patient this request will not be ready on tomorrow. Request can take up to 10 business days if patient is not in office. Can you please placed letter pt is requesting?

## 2023-05-17 ENCOUNTER — HOSPITAL ENCOUNTER (OUTPATIENT)
Dept: CARDIOLOGY | Facility: HOSPITAL | Age: 78
Discharge: HOME | End: 2023-05-17
Attending: PHYSICIAN ASSISTANT
Payer: MEDICARE

## 2023-05-17 ENCOUNTER — TELEPHONE (OUTPATIENT)
Dept: ORTHOPEDICS | Facility: CLINIC | Age: 78
End: 2023-05-17
Payer: MEDICARE

## 2023-05-17 ENCOUNTER — HOSPITAL ENCOUNTER (OUTPATIENT)
Dept: RADIOLOGY | Facility: HOSPITAL | Age: 78
Discharge: HOME | End: 2023-05-17
Attending: PHYSICIAN ASSISTANT
Payer: MEDICARE

## 2023-05-17 VITALS
HEIGHT: 66 IN | WEIGHT: 184 LBS | HEIGHT: 66 IN | WEIGHT: 184 LBS | BODY MASS INDEX: 29.57 KG/M2 | BODY MASS INDEX: 29.57 KG/M2

## 2023-05-17 VITALS — HEIGHT: 66 IN | BODY MASS INDEX: 29.57 KG/M2 | WEIGHT: 184 LBS

## 2023-05-17 DIAGNOSIS — I25.10 ATHEROSCLEROSIS OF NATIVE CORONARY ARTERY OF NATIVE HEART WITHOUT ANGINA PECTORIS: ICD-10-CM

## 2023-05-17 DIAGNOSIS — I73.9 PERIPHERAL VASCULAR DISEASE: ICD-10-CM

## 2023-05-17 DIAGNOSIS — I65.29 STENOSIS OF CAROTID ARTERY, UNSPECIFIED LATERALITY: ICD-10-CM

## 2023-05-17 DIAGNOSIS — R29.6 RECURRENT FALLS: ICD-10-CM

## 2023-05-17 DIAGNOSIS — I45.2 BIFASCICULAR BLOCK: ICD-10-CM

## 2023-05-17 DIAGNOSIS — E78.5 HYPERLIPIDEMIA LDL GOAL <70: ICD-10-CM

## 2023-05-17 DIAGNOSIS — R26.81 GAIT INSTABILITY: ICD-10-CM

## 2023-05-17 DIAGNOSIS — M79.641 RIGHT HAND PAIN: ICD-10-CM

## 2023-05-17 DIAGNOSIS — I70.219 ATHEROSCLEROTIC PVD WITH INTERMITTENT CLAUDICATION: ICD-10-CM

## 2023-05-17 DIAGNOSIS — M25.532 LEFT WRIST PAIN: Primary | ICD-10-CM

## 2023-05-17 LAB
LEFT ARM DIASTOLIC BLOOD PRESSURE: 90 MMHG
LEFT ARM SYSTOLIC BLOOD PRESSURE: 196 MMHG
LEFT CBA DIAS: 11 CM/S
LEFT CBA SYS: 58 CM/S
LEFT CCA DIST DIAS: 16 CM/S
LEFT CCA DIST SYS: 57 CM/S
LEFT CCA MID DIAS: 15 CM/S
LEFT CCA MID SYS: 70 CM/S
LEFT CCA PROX DIAS: 17 CM/S
LEFT CCA PROX SYS: 138 CM/S
LEFT ECA DIAS: 10 CM/S
LEFT ECA SYS: 84 CM/S
LEFT ICA DIST DIAS: 34 CM/S
LEFT ICA DIST SYS: 120 CM/S
LEFT ICA MID DIAS: 22 CM/S
LEFT ICA MID SYS: 76 CM/S
LEFT ICA PROX DIAS: 15 CM/S
LEFT ICA PROX SYS: 92 CM/S
LEFT VERTEBRAL DIAS: 24 CM/S
LEFT VERTEBRAL SYS: 95 CM/S
OHS CV CAROTID RIGHT ICA EDV HIGHEST: 33
OHS CV CAROTID ULTRASOUND LEFT ICA/CCA RATIO: 2.11
OHS CV CAROTID ULTRASOUND RIGHT ICA/CCA RATIO: 1.76
OHS CV PV CAROTID LEFT HIGHEST CCA: 138
OHS CV PV CAROTID LEFT HIGHEST ICA: 120
OHS CV PV CAROTID RIGHT HIGHEST CCA: 69
OHS CV PV CAROTID RIGHT HIGHEST ICA: 81
OHS CV US CAROTID LEFT HIGHEST EDV: 34
RIGHT ARM DIASTOLIC BLOOD PRESSURE: 92 MMHG
RIGHT ARM SYSTOLIC BLOOD PRESSURE: 197 MMHG
RIGHT CBA DIAS: 10 CM/S
RIGHT CBA SYS: 49 CM/S
RIGHT CCA DIST DIAS: 9 CM/S
RIGHT CCA DIST SYS: 46 CM/S
RIGHT CCA MID DIAS: 16 CM/S
RIGHT CCA MID SYS: 69 CM/S
RIGHT CCA PROX DIAS: 9 CM/S
RIGHT CCA PROX SYS: 52 CM/S
RIGHT ECA DIAS: 9 CM/S
RIGHT ECA SYS: 82 CM/S
RIGHT ICA DIST DIAS: 33 CM/S
RIGHT ICA DIST SYS: 81 CM/S
RIGHT ICA MID DIAS: 19 CM/S
RIGHT ICA MID SYS: 75 CM/S
RIGHT ICA PROX DIAS: 24 CM/S
RIGHT ICA PROX SYS: 71 CM/S
RIGHT VERTEBRAL DIAS: 18 CM/S
RIGHT VERTEBRAL SYS: 64 CM/S

## 2023-05-17 PROCEDURE — 93880 EXTRACRANIAL BILAT STUDY: CPT | Mod: 26,,, | Performed by: INTERNAL MEDICINE

## 2023-05-17 PROCEDURE — 70450 CT HEAD WITHOUT CONTRAST: ICD-10-PCS | Mod: 26,,, | Performed by: STUDENT IN AN ORGANIZED HEALTH CARE EDUCATION/TRAINING PROGRAM

## 2023-05-17 PROCEDURE — 93925 LOWER EXTREMITY STUDY: CPT

## 2023-05-17 PROCEDURE — 93922 UPR/L XTREMITY ART 2 LEVELS: CPT

## 2023-05-17 PROCEDURE — 93925 CV US DOPPLER ARTERIAL LEGS BILATERAL (CUPID ONLY): ICD-10-PCS | Mod: 26,,, | Performed by: INTERNAL MEDICINE

## 2023-05-17 PROCEDURE — 93880 CV US DOPPLER CAROTID (CUPID ONLY): ICD-10-PCS | Mod: 26,,, | Performed by: INTERNAL MEDICINE

## 2023-05-17 PROCEDURE — 93880 EXTRACRANIAL BILAT STUDY: CPT

## 2023-05-17 PROCEDURE — 93925 LOWER EXTREMITY STUDY: CPT | Mod: 26,,, | Performed by: INTERNAL MEDICINE

## 2023-05-17 PROCEDURE — 93225 XTRNL ECG REC<48 HRS REC: CPT

## 2023-05-17 PROCEDURE — 93227 XTRNL ECG REC<48 HR R&I: CPT | Mod: ,,, | Performed by: INTERNAL MEDICINE

## 2023-05-17 PROCEDURE — 93227 HOLTER MONITOR - 48 HOUR (CUPID ONLY): ICD-10-PCS | Mod: ,,, | Performed by: INTERNAL MEDICINE

## 2023-05-17 PROCEDURE — 93922 ANKLE BRACHIAL INDICES (ABI): ICD-10-PCS | Mod: 26,,, | Performed by: INTERNAL MEDICINE

## 2023-05-17 PROCEDURE — 70450 CT HEAD/BRAIN W/O DYE: CPT | Mod: TC

## 2023-05-17 PROCEDURE — 93922 UPR/L XTREMITY ART 2 LEVELS: CPT | Mod: 26,,, | Performed by: INTERNAL MEDICINE

## 2023-05-17 PROCEDURE — 70450 CT HEAD/BRAIN W/O DYE: CPT | Mod: 26,,, | Performed by: STUDENT IN AN ORGANIZED HEALTH CARE EDUCATION/TRAINING PROGRAM

## 2023-05-17 NOTE — TELEPHONE ENCOUNTER
Called pt back. She had numerous complaints she wanted addressed. She has an ulnar fx on left arm and right hand pain. Since she was established with Dr. Culver she requested to be scheduled with him. She will arrive early in case she needs new xrays done.  Scheduled her with Dr. Raymond for her knee pain since she wanted to be seen at [facility] only.  Pt verbalized understanding of both appointment dates and times

## 2023-05-17 NOTE — TELEPHONE ENCOUNTER
----- Message from Kathy Jeronimo sent at 5/17/2023  1:03 PM CDT -----  Contact: Flory Munguia is calling in regards to getting an appt.Please call back at [phone]              Thanks  DELL

## 2023-05-18 ENCOUNTER — TELEPHONE (OUTPATIENT)
Dept: CARDIOLOGY | Facility: CLINIC | Age: 78
End: 2023-05-18
Payer: MEDICARE

## 2023-05-18 DIAGNOSIS — R93.0 ABNORMAL CT OF THE HEAD: Primary | ICD-10-CM

## 2023-05-18 LAB
LEFT ABI: 0.7
LEFT ANT TIBIAL SYS PSV: 68 CM/S
LEFT ARM BP: 196 MMHG
LEFT CFA PSV: 476 CM/S
LEFT DORSALIS PEDIS: 137 MMHG
LEFT PERONEAL SYS PSV: 45 CM/S
LEFT POPLITEAL PSV: 64 CM/S
LEFT POST TIBIAL SYS PSV: 51 CM/S
LEFT POSTERIOR TIBIAL: 119 MMHG
LEFT PROFUNDA SYS PSV: 109 CM/S
LEFT SUPER FEMORAL DIST SYS PSV: 0 CM/S
LEFT SUPER FEMORAL MID SYS PSV: 0 CM/S
LEFT SUPER FEMORAL OSTIAL SYS PSV: 166 CM/S
LEFT SUPER FEMORAL PROX SYS PSV: 0 CM/S
LEFT TBI: 0.61
LEFT TIB/PER TRUNK SYS PSV: 61 CM/S
LEFT TOE PRESSURE: 121 MMHG
OHS CV LEFT LOWER EXTREMITY ABI (NO CALC): 0.7
OHS CV LOWER EXTREMITY STENT MEASUREMENTS - LEFT - DSFA S1 - DIST: 0
OHS CV LOWER EXTREMITY STENT MEASUREMENTS - LEFT - DSFA S1 - MID: 0
OHS CV LOWER EXTREMITY STENT MEASUREMENTS - LEFT - DSFA S1 - OST: 0
OHS CV LOWER EXTREMITY STENT MEASUREMENTS - LEFT - DSFA S1 - PROX: 0
OHS CV RIGHT ABI LOWER EXTREMITY (NO CALC): 0.72
RIGHT ABI: 0.72
RIGHT ANT TIBIAL SYS PSV: 32 CM/S
RIGHT ARM BP: 197 MMHG
RIGHT CFA PSV: 76 CM/S
RIGHT DORSALIS PEDIS: 142 MMHG
RIGHT PERONEAL SYS PSV: 75 CM/S
RIGHT POPLITEAL PSV: 117 CM/S
RIGHT POST TIBIAL SYS PSV: 54 CM/S
RIGHT POSTERIOR TIBIAL: 135 MMHG
RIGHT PROFUNDA SYS PSV: 243 CM/S
RIGHT SUPER FEMORAL DIST SYS PSV: 148 CM/S
RIGHT SUPER FEMORAL MID SYS PSV: 229 CM/S
RIGHT SUPER FEMORAL OSTIAL SYS PSV: 265 CM/S
RIGHT SUPER FEMORAL PROX SYS PSV: 263 CM/S
RIGHT TBI: 0.6
RIGHT TIB/PER TRUNK SYS PSV: 92 CM/S
RIGHT TOE PRESSURE: 119 MMHG

## 2023-05-18 NOTE — TELEPHONE ENCOUNTER
Please phone patient. Leg studies reviewed, show slight worsening/significant blockage. She needs appt with Dr. Cohen in next 1-2 weeks (can put in a vascular slot). Please arrange.    CT of head reviewed, no acute findings but does show evidence of old strokes. She needs neurology appt ASAP, referral entered. Please verify she is taking ASA and Plavix.     Carotid U/S showed minimal blockage. Continue current meds/therapy.    Thanks

## 2023-05-19 NOTE — TELEPHONE ENCOUNTER
Contacted pt and informed her that her Carotid ultrasound shows minimal blockage. Her head CT does not show any acute findings, but does show evidence of old strokes. A referral for neurology placed and scheduling message sent over. Her leg studies show slight worsening and significant blockage. Pt verified she is still taking asa and Plavix. I informed her that she needs to be seen by Vicki in next 1-2 weeks and we would call her back once an appt was made. Pt stated understanding w/o q/c      Please phone patient. Leg studies reviewed, show slight worsening/significant blockage. She needs appt with Dr. Cohen in next 1-2 weeks (can put in a vascular slot). Please arrange.     CT of head reviewed, no acute findings but does show evidence of old strokes. She needs neurology appt ASAP, referral entered. Please verify she is taking ASA and Plavix.      Carotid U/S showed minimal blockage. Continue current meds/therapy.     Thanks   declines

## 2023-05-19 NOTE — TELEPHONE ENCOUNTER
Handicap form completed.   I need more details regarding the letter (who is it being addressed to? What disability she is referring to?)

## 2023-05-22 ENCOUNTER — HOSPITAL ENCOUNTER (EMERGENCY)
Facility: HOSPITAL | Age: 78
Discharge: HOME | End: 2023-05-22
Attending: EMERGENCY MEDICINE
Payer: MEDICARE

## 2023-05-22 ENCOUNTER — TELEPHONE (OUTPATIENT)
Dept: CARDIOLOGY | Facility: CLINIC | Age: 78
End: 2023-05-22
Payer: MEDICARE

## 2023-05-22 VITALS
SYSTOLIC BLOOD PRESSURE: 162 MMHG | DIASTOLIC BLOOD PRESSURE: 76 MMHG | TEMPERATURE: 99 F | OXYGEN SATURATION: 96 % | HEART RATE: 61 BPM | RESPIRATION RATE: 25 BRPM

## 2023-05-22 DIAGNOSIS — M25.461 EFFUSION OF RIGHT KNEE: ICD-10-CM

## 2023-05-22 DIAGNOSIS — M25.529 ELBOW PAIN: ICD-10-CM

## 2023-05-22 DIAGNOSIS — M25.569 KNEE PAIN: ICD-10-CM

## 2023-05-22 DIAGNOSIS — R07.9 CHEST PAIN: ICD-10-CM

## 2023-05-22 DIAGNOSIS — R53.1 WEAKNESS: ICD-10-CM

## 2023-05-22 DIAGNOSIS — I10 HYPERTENSION, UNSPECIFIED TYPE: Primary | ICD-10-CM

## 2023-05-22 LAB
ALBUMIN SERPL BCP-MCNC: 3.4 G/DL (ref 3.5–5.2)
ALP SERPL-CCNC: 149 U/L (ref 55–135)
ALT SERPL W/O P-5'-P-CCNC: 12 U/L (ref 10–44)
ANION GAP SERPL CALC-SCNC: 11 MMOL/L (ref 8–16)
AST SERPL-CCNC: 15 U/L (ref 10–40)
BASOPHILS # BLD AUTO: 0.03 K/UL (ref 0–0.2)
BASOPHILS NFR BLD: 0.3 % (ref 0–1.9)
BILIRUB SERPL-MCNC: 0.4 MG/DL (ref 0.1–1)
BNP SERPL-MCNC: 154 PG/ML (ref 0–99)
BUN SERPL-MCNC: 14 MG/DL (ref 8–23)
CALCIUM SERPL-MCNC: 9.7 MG/DL (ref 8.7–10.5)
CHLORIDE SERPL-SCNC: 101 MMOL/L (ref 95–110)
CK SERPL-CCNC: 51 U/L (ref 20–180)
CO2 SERPL-SCNC: 27 MMOL/L (ref 23–29)
CREAT SERPL-MCNC: 0.7 MG/DL (ref 0.5–1.4)
DIFFERENTIAL METHOD: NORMAL
EOSINOPHIL # BLD AUTO: 0.1 K/UL (ref 0–0.5)
EOSINOPHIL NFR BLD: 1.2 % (ref 0–8)
ERYTHROCYTE [DISTWIDTH] IN BLOOD BY AUTOMATED COUNT: 13.1 % (ref 11.5–14.5)
EST. GFR  (NO RACE VARIABLE): >60 ML/MIN/1.73 M^2
GLUCOSE SERPL-MCNC: 107 MG/DL (ref 70–110)
HCT VFR BLD AUTO: 40 % (ref 37–48.5)
HGB BLD-MCNC: 13.2 G/DL (ref 12–16)
IMM GRANULOCYTES # BLD AUTO: 0.02 K/UL (ref 0–0.04)
IMM GRANULOCYTES NFR BLD AUTO: 0.2 % (ref 0–0.5)
LYMPHOCYTES # BLD AUTO: 1.9 K/UL (ref 1–4.8)
LYMPHOCYTES NFR BLD: 18.1 % (ref 18–48)
MCH RBC QN AUTO: 30.8 PG (ref 27–31)
MCHC RBC AUTO-ENTMCNC: 33 G/DL (ref 32–36)
MCV RBC AUTO: 94 FL (ref 82–98)
MONOCYTES # BLD AUTO: 1 K/UL (ref 0.3–1)
MONOCYTES NFR BLD: 10 % (ref 4–15)
NEUTROPHILS # BLD AUTO: 7.2 K/UL (ref 1.8–7.7)
NEUTROPHILS NFR BLD: 70.2 % (ref 38–73)
NRBC BLD-RTO: 0 /100 WBC
PLATELET # BLD AUTO: 264 K/UL (ref 150–450)
PMV BLD AUTO: 9.8 FL (ref 9.2–12.9)
POTASSIUM SERPL-SCNC: 3.6 MMOL/L (ref 3.5–5.1)
PROT SERPL-MCNC: 6.9 G/DL (ref 6–8.4)
RBC # BLD AUTO: 4.28 M/UL (ref 4–5.4)
SODIUM SERPL-SCNC: 139 MMOL/L (ref 136–145)
TROPONIN I SERPL DL<=0.01 NG/ML-MCNC: 0.02 NG/ML (ref 0–0.03)
WBC # BLD AUTO: 10.23 K/UL (ref 3.9–12.7)

## 2023-05-22 PROCEDURE — 83880 ASSAY OF NATRIURETIC PEPTIDE: CPT | Performed by: EMERGENCY MEDICINE

## 2023-05-22 PROCEDURE — 82550 ASSAY OF CK (CPK): CPT | Performed by: EMERGENCY MEDICINE

## 2023-05-22 PROCEDURE — 96374 THER/PROPH/DIAG INJ IV PUSH: CPT

## 2023-05-22 PROCEDURE — 85025 COMPLETE CBC W/AUTO DIFF WBC: CPT | Performed by: EMERGENCY MEDICINE

## 2023-05-22 PROCEDURE — 63600175 PHARM REV CODE 636 W HCPCS: Performed by: EMERGENCY MEDICINE

## 2023-05-22 PROCEDURE — 93005 ELECTROCARDIOGRAM TRACING: CPT

## 2023-05-22 PROCEDURE — 99285 EMERGENCY DEPT VISIT HI MDM: CPT | Mod: 25

## 2023-05-22 PROCEDURE — 93010 ELECTROCARDIOGRAM REPORT: CPT | Mod: ,,, | Performed by: INTERNAL MEDICINE

## 2023-05-22 PROCEDURE — 93010 EKG 12-LEAD: ICD-10-PCS | Mod: ,,, | Performed by: INTERNAL MEDICINE

## 2023-05-22 PROCEDURE — 84484 ASSAY OF TROPONIN QUANT: CPT | Performed by: EMERGENCY MEDICINE

## 2023-05-22 PROCEDURE — 25000003 PHARM REV CODE 250: Performed by: EMERGENCY MEDICINE

## 2023-05-22 PROCEDURE — 80053 COMPREHEN METABOLIC PANEL: CPT | Performed by: EMERGENCY MEDICINE

## 2023-05-22 RX ORDER — HYDRALAZINE HYDROCHLORIDE 20 MG/ML
10 INJECTION INTRAMUSCULAR; INTRAVENOUS
Status: COMPLETED | OUTPATIENT
Start: 2023-05-22 | End: 2023-05-22

## 2023-05-22 RX ORDER — CLONIDINE HYDROCHLORIDE 0.2 MG/1
0.2 TABLET ORAL
Status: COMPLETED | OUTPATIENT
Start: 2023-05-22 | End: 2023-05-22

## 2023-05-22 RX ADMIN — CLONIDINE HYDROCHLORIDE 0.2 MG: 0.2 TABLET ORAL at 10:05

## 2023-05-22 RX ADMIN — HYDRALAZINE HYDROCHLORIDE 10 MG: 20 INJECTION, SOLUTION INTRAMUSCULAR; INTRAVENOUS at 09:05

## 2023-05-22 NOTE — TELEPHONE ENCOUNTER
Spoke to patient and she states she will be going to the ER due to difficulty walking at this time.----- Message from Keenan Guy sent at 5/22/2023  7:49 AM CDT -----  Contact: patient  Flory Cam would like a call back at [phone]. Pt states that she has a couple of questions.

## 2023-05-22 NOTE — ED PROVIDER NOTES
"SCRIBE #1 NOTE: I, Zaria Tabares, am scribing for, and in the presence of, iDck Cordova MD. I have scribed the entire note.      History      Chief Complaint   Patient presents with    Leg Pain     Right leg pain x 2 weeks after fall in the bathroom, had CT 4 days ago and told she had "mini strokes" today states she feels bad all over. Denies chest pain at present. Denies shortness of breath.        Review of patient's allergies indicates:   Allergen Reactions    Methotrexate analogues     Cellcept [mycophenolate mofetil] Rash    Imuran [azathioprine] Rash        HPI   HPI    5/22/2023, 9:39 AM   History obtained from the patient and the pt's daughter at bedside      History of Present Illness: Flory Cam is a 77 y.o. female patient with a PMHx of acute coronary syndrome, anxiety, bilateral carotid artery stenosis, CAD, HLD, HTN, hypothyroid, Lupus, PVD, and s/p peripheral artery angioplasty who presents to the Emergency Department for R knee pain and L elbow pain which onset 2 weeks ago. Pt reports that she fell 2 weeks ago and has been experiencing R knee pain and L elbow pain since then. Pt was evaluated in this ER after her fall and was placed in a sling after her X-ray results revealed that she had a fracture of the coronoid process of her L ulna. Symptoms are constant and moderate in severity. Associated sxs include R knee swelling. The pt also reports that she had a recent MRI of her brain and was told 3 days ago that she had 3 "mini-strokes". Pt c/o LLE weakness. Patient denies any fever, chills, N/V/D, numbness, CP, SOB, and all other sxs at this time. Pt did not take her blood pressure medications this morning. No further complaints or concerns at this time.         Arrival mode: Personal vehicle      PCP: Tayler Ovalle DO       Past Medical History:  Past Medical History:   Diagnosis Date    Acute coronary syndrome     Anxiety     Bilateral carotid artery stenosis 11/17/2015    Chronic " pain     Colon polyp     Coronary artery disease     GERD (gastroesophageal reflux disease)     Hyperlipidemia     Hypertension     Hypothyroidism     Low back pain     Lupus     Osteoporosis     Poor circulation     Pre-operative clearance 7/12/2019    PVD (peripheral vascular disease)     S/P peripheral artery angioplasty 02/13/2014    SCCA (squamous cell carcinoma) of skin 10/2019    Dr. ZANDRA Rivas--oncology    Skin disease        Past Surgical History:  Past Surgical History:   Procedure Laterality Date    AORTOGRAPHY WITH SERIALOGRAPHY N/A 5/25/2021    Procedure: AORTOGRAM, WITH RUNOFF;  Surgeon: Christopher Cohen MD;  Location: [facility];  Service: Cardiology;  Laterality: N/A;  COVID-19, mRNA, LNP-S, PF, 30 mcg/0.3 mL dose (COVID-19, MRNA, LN-S, PF (Pfizer)) 1/30/2021, 1/9/2021    Atherosclerotic PVD with intermittent claudication Bilateral 05/2021    Dr. Cohen    CATARACT EXTRACTION      COLONOSCOPY N/A 1/4/2017    Procedure: COLONOSCOPY;  Surgeon: Sylvester Arboleda III, MD;  Location: [facility];  Service: Endoscopy;  Laterality: N/A;    COLONOSCOPY N/A 8/4/2020    Procedure: COLONOSCOPY;  Surgeon: Sylvester Arboleda III, MD;  Location: [facility];  Service: Endoscopy;  Laterality: N/A;    CORONARY ANGIOPLASTY      CORONARY ARTERY BYPASS GRAFT      EXCISION OF MASS OF HAND Right 2/11/2022    Procedure: EXCISION, MASS, HAND;  Surgeon: Hernan Culver MD;  Location: [facility];  Service: Orthopedics;  Laterality: Right;   thenar eminence skin lesion excised and a Z-plasty skin flap for coverage    HYSTERECTOMY      OOPHORECTOMY      THYROIDECTOMY           Family History:  Family History   Problem Relation Age of Onset    Hypertension Mother     Stroke Mother     Lung cancer Daughter     Melanoma Neg Hx     Psoriasis Neg Hx     Lupus Neg Hx     Eczema Neg Hx        Social History:  Social History     Tobacco Use    Smoking status: Former     Packs/day: 0.25     Years: 40.00     Pack years: 10.00     Types:  Cigarettes     Start date:      Quit date: 2023     Years since quittin.2    Smokeless tobacco: Never    Tobacco comments:     hold midnight prior to surgery   Substance and Sexual Activity    Alcohol use: Never    Drug use: No    Sexual activity: Not Currently     Partners: Male       ROS   Review of Systems   Constitutional:  Negative for chills and fever.   HENT:  Negative for sore throat.    Respiratory:  Negative for shortness of breath.    Cardiovascular:  Negative for chest pain.   Gastrointestinal:  Negative for diarrhea, nausea and vomiting.   Genitourinary:  Negative for dysuria.   Musculoskeletal:  Positive for arthralgias (R knee and L elbow) and joint swelling (R knee). Negative for back pain.   Skin:  Negative for rash.   Neurological:  Positive for weakness (LLE). Negative for numbness.   Hematological:  Does not bruise/bleed easily.   All other systems reviewed and are negative.    Physical Exam      Initial Vitals [23]   BP Pulse Resp Temp SpO2   (!) 202/91 69 20 98.5 °F (36.9 °C) 99 %      MAP       --          Physical Exam  Nursing Notes and Vital Signs Reviewed.  Constitutional: Patient is in no acute distress. Elderly. Frail.  Head: Atraumatic. Normocephalic.  Eyes: PERRL. EOM intact. Conjunctivae are not pale. No scleral icterus.  ENT: Mucous membranes are moist. Oropharynx is clear and symmetric.    Neck: Supple. Full ROM. No lymphadenopathy.  Cardiovascular: Regular rate. Regular rhythm. No murmurs, rubs, or gallops. Distal pulses are 2+ and symmetric.  Pulmonary/Chest: No respiratory distress. Clear to auscultation bilaterally. No wheezing or rales.  Abdominal: Soft and non-distended.  There is no tenderness.  No rebound, guarding, or rigidity.   Musculoskeletal: Moves all extremities. No obvious deformities. Mild R knee swelling.  Skin: Warm and dry.  Neurological:  Alert, awake, and appropriate.  Normal speech.  No acute focal neurological deficits are  appreciated.  Psychiatric: Normal affect. Good eye contact. Appropriate in content.    ED Course    Procedures  ED Vital Signs:  Vitals:    05/22/23 0927 05/22/23 1000 05/22/23 1040 05/22/23 1050   BP: (!) 202/91 (!) 186/79 (!) 204/95 (!) 198/91   Pulse: 69 (!) 57 71 63   Resp: 20 (!) 25 (!) 29 (!) 24   Temp: 98.5 °F (36.9 °C)      TempSrc: Oral      SpO2: 99% 96% 98% 98%    05/22/23 1150 05/22/23 1220   BP: (!) 168/64 (!) 162/76   Pulse: (!) 58 61   Resp: (!) 28 (!) 25   Temp:     TempSrc:     SpO2: 96% 96%       Abnormal Lab Results:  Labs Reviewed   COMPREHENSIVE METABOLIC PANEL - Abnormal; Notable for the following components:       Result Value    Albumin 3.4 (*)     Alkaline Phosphatase 149 (*)     All other components within normal limits   B-TYPE NATRIURETIC PEPTIDE - Abnormal; Notable for the following components:     (*)     All other components within normal limits   CBC W/ AUTO DIFFERENTIAL   CK   TROPONIN I   URINALYSIS, REFLEX TO URINE CULTURE        All Lab Results:  Results for orders placed or performed during the hospital encounter of 05/22/23   CBC Auto Differential   Result Value Ref Range    WBC 10.23 3.90 - 12.70 K/uL    RBC 4.28 4.00 - 5.40 M/uL    Hemoglobin 13.2 12.0 - 16.0 g/dL    Hematocrit 40.0 37.0 - 48.5 %    MCV 94 82 - 98 fL    MCH 30.8 27.0 - 31.0 pg    MCHC 33.0 32.0 - 36.0 g/dL    RDW 13.1 11.5 - 14.5 %    Platelets 264 150 - 450 K/uL    MPV 9.8 9.2 - 12.9 fL    Immature Granulocytes 0.2 0.0 - 0.5 %    Gran # (ANC) 7.2 1.8 - 7.7 K/uL    Immature Grans (Abs) 0.02 0.00 - 0.04 K/uL    Lymph # 1.9 1.0 - 4.8 K/uL    Mono # 1.0 0.3 - 1.0 K/uL    Eos # 0.1 0.0 - 0.5 K/uL    Baso # 0.03 0.00 - 0.20 K/uL    nRBC 0 0 /100 WBC    Gran % 70.2 38.0 - 73.0 %    Lymph % 18.1 18.0 - 48.0 %    Mono % 10.0 4.0 - 15.0 %    Eosinophil % 1.2 0.0 - 8.0 %    Basophil % 0.3 0.0 - 1.9 %    Differential Method Automated    Comprehensive Metabolic Panel   Result Value Ref Range    Sodium 139 136 - 145  mmol/L    Potassium 3.6 3.5 - 5.1 mmol/L    Chloride 101 95 - 110 mmol/L    CO2 27 23 - 29 mmol/L    Glucose 107 70 - 110 mg/dL    BUN 14 8 - 23 mg/dL    Creatinine 0.7 0.5 - 1.4 mg/dL    Calcium 9.7 8.7 - 10.5 mg/dL    Total Protein 6.9 6.0 - 8.4 g/dL    Albumin 3.4 (L) 3.5 - 5.2 g/dL    Total Bilirubin 0.4 0.1 - 1.0 mg/dL    Alkaline Phosphatase 149 (H) 55 - 135 U/L    AST 15 10 - 40 U/L    ALT 12 10 - 44 U/L    Anion Gap 11 8 - 16 mmol/L    eGFR >60 >60 mL/min/1.73 m^2   BNP   Result Value Ref Range     (H) 0 - 99 pg/mL   CK   Result Value Ref Range    CPK 51 20 - 180 U/L   Troponin I   Result Value Ref Range    Troponin I 0.017 0.000 - 0.026 ng/mL     *Note: Due to a large number of results and/or encounters for the requested time period, some results have not been displayed. A complete set of results can be found in Results Review.         Imaging Results:  Imaging Results              CT Head Without Contrast (Final result)  Result time 05/22/23 10:39:14      Final result by Prosper Talamantes MD (05/22/23 10:39:14)                   Impression:      No evidence of acute intracranial pathology.  Stable appearance when compared to 05/17/2023.  Consider further follow-up/evaluation as warranted.      Electronically signed by: Prosper Talamantes  Date:    05/22/2023  Time:    10:39               Narrative:    EXAMINATION:  CT HEAD WITHOUT CONTRAST    CLINICAL HISTORY:  Syncope, recurrent;    TECHNIQUE:  Low dose axial CT images obtained throughout the head without the use of intravenous contrast.  Axial, sagittal and coronal reconstructions were performed. All CT scans at this location are performed using dose modulation techniques as appropriate to a performed exam including the following: Automated exposure control; adjustment of the mA and/or kV according to patient size (this includes techniques or standardized protocols for targeted exams where dose is matched to indication/reason for exam; i. e.  extremities or head); use of iterative reconstruction technique.    COMPARISON:  CT head 05/17/2023.    FINDINGS:  Intracranial compartment:    Mild generalized cerebral volume loss and findings suggestive of chronic microvascular ischemia.  Remote lacunar type infarcts.  No parenchymal mass, hemorrhage, edema or major vascular distribution infarct. No evidence of hydrocephalus.    No extra-axial blood or fluid collections.    Skull/extracranial contents (limited evaluation):    No fracture. Mastoid air cells and paranasal sinuses are essentially clear.                                       X-Ray Chest AP Portable (Final result)  Result time 05/22/23 10:26:28      Final result by Prosper Talamantes MD (05/22/23 10:26:28)                   Impression:      Probable small bilateral pleural effusions with mild bibasilar volume loss.  Consider further follow-up/evaluation as warranted.      Electronically signed by: Prosper Talamantes  Date:    05/22/2023  Time:    10:26               Narrative:    EXAMINATION:  XR CHEST AP PORTABLE    CLINICAL HISTORY:  weakness;    TECHNIQUE:  Single frontal view of the chest was performed.    COMPARISON:  Chest radiograph 02/10/2023    FINDINGS:  Mild blunting of the costophrenic sulci suggesting small bilateral effusions with mild bibasilar volume loss.  Upper lungs are clear.  No pneumothorax.    The cardiac silhouette is stable.  The hilar and mediastinal contours are stable.    Bones are intact.                                       X-Ray Knee Complete 4 Or More Views Right (Final result)  Result time 05/22/23 10:24:52      Final result by Prosper Talamantes MD (05/22/23 10:24:52)                   Impression:      No acute abnormality, as above.      Electronically signed by: Prosper Talamantes  Date:    05/22/2023  Time:    10:24               Narrative:    EXAMINATION:  XR KNEE COMP 4 OR MORE VIEWS RIGHT    CLINICAL HISTORY:  Pain in unspecified knee    TECHNIQUE:  AP, lateral, and  lateral views of the right knee were performed.    COMPARISON:  Right knee radiographs 05/07/2023    FINDINGS:  Decreased bone density.  No acute fracture or dislocation.  Moderate medial tibiofemoral and mild lateral tibiofemoral joint space narrowing.  Mild chondrocalcinosis.  Minimal marginal osteophytosis.  Mild/moderate patellofemoral degenerative changes.  Small effusion.  Small proximal patellar enthesophyte.  Scattered atherosclerotic calcification.  Overall stable appearance when compared to the prior examination.                                       X-Ray Elbow Complete Left (Final result)  Result time 05/22/23 10:22:13      Final result by Prosper Talamantes MD (05/22/23 10:22:13)                   Impression:      As above.      Electronically signed by: Prosper Talamantes  Date:    05/22/2023  Time:    10:22               Narrative:    EXAMINATION:  XR ELBOW COMPLETE 3 VIEW LEFT    CLINICAL HISTORY:  Pain in unspecified elbow    TECHNIQUE:  AP, lateral, and oblique views of the left elbow were performed.    COMPARISON:  Left elbow radiographs 05/07/2023    FINDINGS:  Decreased bone density.  No convincing evidence of acute fracture or dislocation.  No effusion.  No significant degenerative change.  Soft tissues appear intact.  Consider further follow-up/evaluation as warranted.                                     The EKG was ordered, reviewed, and independently interpreted by the ED provider.  Interpretation time: 9:31  Rate: 68 BPM  Rhythm: normal sinus rhythm  Interpretation: RBBB. Left anterior fascicular block. Bifascicular block. Left ventricular hypertrophy with repolarization abnormality (R in aVL, Romhilt-Huffman). No STEMI.             The Emergency Provider reviewed the vital signs and test results, which are outlined above.    ED Discussion     12:12 PM: Reassessed pt at this time. Discussed with pt all pertinent ED information and results. Discussed pt dx and plan of tx. Gave pt all f/u and  return to the ED instructions. All questions and concerns were addressed at this time. Pt expresses understanding of information and instructions, and is comfortable with plan to discharge. Pt is stable for discharge.    I discussed with patient and/or family/caretaker that evaluation in the ED does not suggest any emergent or life threatening medical conditions requiring immediate intervention beyond what was provided in the ED, and I believe patient is safe for discharge.  Regardless, an unremarkable evaluation in the ED does not preclude the development or presence of a serious of life threatening condition. As such, patient was instructed to return immediately for any worsening or change in current symptoms.           ED Medication(s):  Medications   hydrALAZINE injection 10 mg (10 mg Intravenous Given 5/22/23 0240)   cloNIDine tablet 0.2 mg (0.2 mg Oral Given 5/22/23 2559)        Follow-up Information       Tayler Ovalle DO.    Specialty: Internal Medicine  Contact information:  [address] [place] [address] DR Jesi MCDOWELL [id number] [phone]                             New Prescriptions    No medications on file         Medical Decision Making    Medical Decision Making:   Initial Assessment:   Complaining of continued knee pain since falling recently.  Had imaging done.  Concerned because of the swelling.  Blood pressure is abnormally high, reports compliance, although, didn't take it this morning  Differential Diagnosis:   Knee pain, fracture, effusion, htn  Clinical Tests:   Lab Tests: Ordered and Reviewed  Radiological Study: Ordered and Reviewed  Medical Tests: Ordered and Reviewed  ED Management:  Labs and imaging reviewed by me.  No acute findings except for knee effusion.  Considered admission, but patient is feeling better, and ready to go home.           Scribe Attestation:   Scribe #1: I performed the above scribed service and the documentation accurately describes the services I performed. I attest to  the accuracy of the note.    Attending:   Physician Attestation Statement for Scribe #1: I, Dick Cordova MD, personally performed the services described in this documentation, as scribed by Zaria Tabares, in my presence, and it is both accurate and complete.          Clinical Impression       ICD-10-CM ICD-9-CM   1. Hypertension, unspecified type  I10 401.9   2. Chest pain  R07.9 786.50   3. Weakness  R53.1 780.79   4. Elbow pain  M25.529 719.42   5. Knee pain  M25.569 719.46   6. Effusion of right knee  M25.461 719.06       Disposition:   Disposition: Discharged  Condition: Stable       Dick Cordova MD  05/22/23 1238

## 2023-05-23 ENCOUNTER — TELEPHONE (OUTPATIENT)
Dept: ORTHOPEDICS | Facility: CLINIC | Age: 78
End: 2023-05-23
Payer: MEDICARE

## 2023-05-23 ENCOUNTER — TELEPHONE (OUTPATIENT)
Dept: SPORTS MEDICINE | Facility: CLINIC | Age: 78
End: 2023-05-23
Payer: MEDICARE

## 2023-05-23 ENCOUNTER — PATIENT OUTREACH (OUTPATIENT)
Dept: EMERGENCY MEDICINE | Facility: HOSPITAL | Age: 78
End: 2023-05-23
Payer: MEDICARE

## 2023-05-23 LAB
OHS CV EVENT MONITOR DAY: 0
OHS CV HOLTER LENGTH DECIMAL HOURS: 48
OHS CV HOLTER LENGTH HOURS: 48
OHS CV HOLTER LENGTH MINUTES: 0
OHS CV HOLTER SINUS AVERAGE HR: 64
OHS CV HOLTER SINUS MAX HR: 112
OHS CV HOLTER SINUS MIN HR: 45

## 2023-05-23 NOTE — TELEPHONE ENCOUNTER
Spoke to the patient an she said that she is going to call back when she is feeling better an ready to schedule.       ----- Message from Annalise Pizano, Patient Care Assistant sent at 5/23/2023 10:19 AM CDT -----  Regarding: Appt. and X-Ray  Good morning, pt had an appointment and x-ray scheduled for today. Pt is not well and is needing to reschedule. Can someone please contact her in order to so? Thanks in advance :)

## 2023-05-23 NOTE — PROGRESS NOTES
Annalise Pizano, Patient Care Assistant  ED Navigator  Emergency Department    [place] [facility] [place]  Role: Community Health Worker    Date: 05/23/2023  Patient Name: Flory Cam  MRN: [id number]  PCP: Tayler Ovalle DO    Assessment:     Flory Cam is a 77 y.o. female who has presented to ED for leg pain. Patient has visited the ED 2 times in the past 3 months. Patient did not contact PCP.     ED Navigator Initial Assessment    ED Navigator Enrollment Documentation  Consent to Services  Does patient consent to completing the assessment?: Yes  Contact  Method of Initial Contact: Phone  Transportation  Does the patient have issues with Transportation?: No  Does the patient have transportation to and from healthcare appointments?: Yes  Insurance Coverage  Do you have coverage/adequate coverage?: Yes  Type/kind of coverage: [employer] PPO SPECIAL NEEDS  Specialist Appointment  PCP Follow Up Appointment  Has the patient had an appointment with a primary care provider in the past year?: Yes  Approximate date: 3/27/23  Provider: Tayler Ovalle, DO  Does the patient have a follow up appontment with a PCP?: Yes  Upcoming appointment date: 5/26/23  Provider: Tayler Ovalle, DO  When was the last time you saw your PCP?: 3/27/23  Medications  Psychological  Food  Communication/Education  Other Financial Concerns  Other Social Barriers/Concerns  Does the patient have any additional barriers or concerns?: None  Primary Barrier  Barriers identified: Structural barrier (service availability, waiting times, etc.)  Root Cause of ED Utilization: Chronic Conditions  Plan to address Chronic Conditions: Schedule appointment for patient with their PCP/specialist per ED discharge instructions, Remind patient of upcoming PCP/specialist appointment within 24 to 48 hours before scheduled appt  Next steps: Scheduled Appointment/Referral  Scheduled Appointment Date: 5/26/23  Appointment Reminder Date: 5/25/23  Additional  Documentation: Pt has no issues with transportation or insurance. Pt is agreeable to assistance with scheduling PCP appt., and agreed to date/time. Pt would like appointments for today and tomorrow to be cancelled, as she is not going to be able to attend. Pt is lastly agreeable to assistance with messaging staff to call and reschedule her, and a reminder call on . The following message was sent to Dr. Culver's staff: Good morning, pt had an appointment and x-ray scheduled for today. Pt is not well and is needing to reschedule. Can someone please contact her in order to so? Thanks in advance :) The following message was sent to Dr. Raymond's staff: Good morning, pt has an appointment scheduled for tomorrow. Pt is not well and is needing to reschedule. Can someone please contact her in order to so? Thanks in advance :) Pt was scheduled with her PCP, Tayler Ovalle DO for 2023 at 11:20 p.m. and will be reminded on .    Annalise Pizano  [phone]            Social History     Socioeconomic History    Marital status:     Number of children: 3   Occupational History    Occupation: Retired     Comment: Dispatcher   Tobacco Use    Smoking status: Former     Packs/day: 0.25     Years: 40.00     Pack years: 10.00     Types: Cigarettes     Start date:      Quit date: 2023     Years since quittin.2    Smokeless tobacco: Never    Tobacco comments:     hold midnight prior to surgery   Substance and Sexual Activity    Alcohol use: Never    Drug use: No    Sexual activity: Not Currently     Partners: Male   Other Topics Concern    Are you pregnant or think you may be? No    Breast-feeding No   Social History Narrative    Patient is retired dispatcher.       Plan:       Pt has no issues with transportation or insurance. Pt is agreeable to assistance with scheduling PCP appt., and agreed to date/time. Pt would like appointments for today and tomorrow to be cancelled, as she is not going  to be able to attend. Pt is lastly agreeable to assistance with messaging staff to call and reschedule her, and a reminder call on 5/25. The following message was sent to Dr. Culver's staff: Good morning, pt had an appointment and x-ray scheduled for today. Pt is not well and is needing to reschedule. Can someone please contact her in order to so? Thanks in advance :) The following message was sent to Dr. Raymond's staff: Good morning, pt has an appointment scheduled for tomorrow. Pt is not well and is needing to reschedule. Can someone please contact her in order to so? Thanks in advance :) Pt was scheduled with her PCP, Tayler Ovalle DO for 5/26/2023 at 11:20 p.m. and will be reminded on 5/25.    Annalise Pizano  [phone]

## 2023-05-23 NOTE — TELEPHONE ENCOUNTER
I spoke with patient, she went to ER for leg pain on 5/22. Patient doesn't want to reschedule at this time, she will call office back next week when feeling better.

## 2023-05-23 NOTE — TELEPHONE ENCOUNTER
----- Message from Annalise Pizano Patient Care Assistant sent at 5/23/2023 10:22 AM CDT -----  Regarding: Reschedule appt.  Good morning, pt has an appointment scheduled for tomorrow. Pt is not well and is needing to reschedule. Can someone please contact her in order to so? Thanks in advance :)

## 2023-05-25 ENCOUNTER — PATIENT OUTREACH (OUTPATIENT)
Dept: EMERGENCY MEDICINE | Facility: HOSPITAL | Age: 78
End: 2023-05-25
Payer: MEDICARE

## 2023-05-25 NOTE — PROGRESS NOTES
Patient was reminded about her appointment for tomorrow with Tayler Ovalle, DO for 11:20 a.m. Pt will still be attending, as stated.    Annalise Pizano  [phone]

## 2023-05-26 ENCOUNTER — TELEPHONE (OUTPATIENT)
Dept: CARDIOLOGY | Facility: CLINIC | Age: 78
End: 2023-05-26
Payer: MEDICARE

## 2023-05-26 NOTE — TELEPHONE ENCOUNTER
Please phone patient. Holter reviewed, she does have some PVC's.     Is she having any symptoms?     She needs sooner f/u with Dr. Cohen than 6/21---can you help arrange?         Note    Placed call to patient unable to leave a message and voicemail full . Will try to call back at another time

## 2023-05-29 ENCOUNTER — PES CALL (OUTPATIENT)
Dept: ADMINISTRATIVE | Facility: CLINIC | Age: 78
End: 2023-05-29
Payer: MEDICARE

## 2023-05-31 ENCOUNTER — EXTERNAL CHRONIC CARE MANAGEMENT (OUTPATIENT)
Dept: PRIMARY CARE CLINIC | Facility: CLINIC | Age: 78
End: 2023-05-31
Payer: MEDICARE

## 2023-05-31 PROCEDURE — 99490 CHRNC CARE MGMT STAFF 1ST 20: CPT | Mod: S$GLB,,, | Performed by: INTERNAL MEDICINE

## 2023-05-31 PROCEDURE — 99490 PR CHRONIC CARE MGMT, 1ST 20 MIN: ICD-10-PCS | Mod: S$GLB,,, | Performed by: INTERNAL MEDICINE

## 2023-06-01 ENCOUNTER — TELEPHONE (OUTPATIENT)
Dept: INTERNAL MEDICINE | Facility: CLINIC | Age: 78
End: 2023-06-01
Payer: MEDICARE

## 2023-06-01 NOTE — TELEPHONE ENCOUNTER
----- Message from Gilbert Crocker sent at 6/1/2023  9:26 AM CDT -----  Flory is calling in regards to her missed appointment on last week. Please call her back at [phone].    Thanks  CF

## 2023-06-02 ENCOUNTER — TELEPHONE (OUTPATIENT)
Dept: CARDIOLOGY | Facility: CLINIC | Age: 78
End: 2023-06-02
Payer: MEDICARE

## 2023-06-02 NOTE — TELEPHONE ENCOUNTER
Spoke to patient and scheduled for 6/8 at [facility] with Annabella May----- Message from Christopher Cohen MD sent at 6/1/2023  6:59 PM CDT -----  Contact: Flory  My schedule is full she can see mid level when I am in clinic.   ----- Message -----  From: Toyin Boyd LPN  Sent: 6/1/2023   3:35 PM CDT  To: Christopher Cohen MD    Patient calling because she is still having some difficulty walking with pain in her legs and when she jessica to the ER was told she may have had some mini strokes. Wants to know if you can squeeze her in next Thursday at [facility]?  ----- Message -----  From: Marvin Londono  Sent: 6/1/2023   2:37 PM CDT  To: Vicki BAEZ Staff    Patient is calling to speak with the nurse in regards to questions. Did not give any other reason. Please give patient a callback at [phone].

## 2023-06-05 ENCOUNTER — TELEPHONE (OUTPATIENT)
Dept: INTERNAL MEDICINE | Facility: CLINIC | Age: 78
End: 2023-06-05
Payer: MEDICARE

## 2023-06-05 DIAGNOSIS — Z74.09 IMPAIRED MOBILITY AND ACTIVITIES OF DAILY LIVING: Primary | ICD-10-CM

## 2023-06-05 DIAGNOSIS — Z78.9 IMPAIRED MOBILITY AND ACTIVITIES OF DAILY LIVING: Primary | ICD-10-CM

## 2023-06-05 DIAGNOSIS — R53.81 DEBILITY: ICD-10-CM

## 2023-06-05 DIAGNOSIS — M17.10 ARTHRITIS OF KNEE: ICD-10-CM

## 2023-06-05 NOTE — TELEPHONE ENCOUNTER
Patient states that she missed her appointment on 5/26/23 because she can barely walk to the restroom and it's painful. The walker is not helping. She can't schedule any future appointments due to having no transportation as well. She just doesn't know right now how everything is going to get done. She is so sorry for missing your appointment.

## 2023-06-05 NOTE — TELEPHONE ENCOUNTER
----- Message from Sol Aragon sent at 6/5/2023 10:21 AM CDT -----  Contact: Flory Hernandez is calling to speak with a nurse . Please give patient a call back at [phone] .

## 2023-06-06 NOTE — TELEPHONE ENCOUNTER
[facility] referral ordered.   If she needs a standard wheelchair, I can order based on her diagnoses of debility and knee arthritis (ordered).   If she needs a motorized wheelchair, she needs an in person wheelchair evaluation.

## 2023-06-07 ENCOUNTER — PES CALL (OUTPATIENT)
Dept: ADMINISTRATIVE | Facility: CLINIC | Age: 78
End: 2023-06-07
Payer: MEDICARE

## 2023-06-07 ENCOUNTER — TELEPHONE (OUTPATIENT)
Dept: INTERNAL MEDICINE | Facility: CLINIC | Age: 78
End: 2023-06-07
Payer: MEDICARE

## 2023-06-07 NOTE — TELEPHONE ENCOUNTER
Informed patient about the care at home referral that was placed. I told her someone should reached out to her to set that up for her.

## 2023-06-07 NOTE — TELEPHONE ENCOUNTER
Gave pt Dr. Ovalle msg>>>[facility] referral ordered.   If she needs a standard wheelchair, I can order based on her diagnoses of debility and knee arthritis (ordered).   If she needs a motorized wheelchair, she needs an in person wheelchair evaluation.   Also assist with scheduling office visit.

## 2023-06-07 NOTE — TELEPHONE ENCOUNTER
----- Message from Chan Parada sent at 6/7/2023  4:30 PM CDT -----  Contact: Flory  .Type:  Patient Returning Call    Who Called:Flory  Who Left Message for Patient:Nurse  Does the patient know what this is regarding?:Yes  Would the patient rather a call back or a response via [facility]? Call back  Best Call Back Number:[phone]  Additional Information: NA                Thanks  KT

## 2023-06-08 ENCOUNTER — OFFICE VISIT (OUTPATIENT)
Dept: INTERNAL MEDICINE | Facility: CLINIC | Age: 78
End: 2023-06-08
Payer: MEDICARE

## 2023-06-08 ENCOUNTER — PES CALL (OUTPATIENT)
Dept: ADMINISTRATIVE | Facility: CLINIC | Age: 78
End: 2023-06-08
Payer: MEDICARE

## 2023-06-08 ENCOUNTER — OFFICE VISIT (OUTPATIENT)
Dept: CARDIOLOGY | Facility: CLINIC | Age: 78
End: 2023-06-08
Payer: MEDICARE

## 2023-06-08 VITALS
SYSTOLIC BLOOD PRESSURE: 136 MMHG | RESPIRATION RATE: 18 BRPM | HEIGHT: 66 IN | HEART RATE: 58 BPM | TEMPERATURE: 97 F | BODY MASS INDEX: 29.71 KG/M2 | OXYGEN SATURATION: 95 % | DIASTOLIC BLOOD PRESSURE: 72 MMHG

## 2023-06-08 VITALS
WEIGHT: 184.06 LBS | OXYGEN SATURATION: 97 % | BODY MASS INDEX: 29.58 KG/M2 | HEIGHT: 66 IN | DIASTOLIC BLOOD PRESSURE: 80 MMHG | HEART RATE: 72 BPM | SYSTOLIC BLOOD PRESSURE: 140 MMHG | RESPIRATION RATE: 16 BRPM

## 2023-06-08 DIAGNOSIS — L98.9 SKIN LESION OF HAND: ICD-10-CM

## 2023-06-08 DIAGNOSIS — Z78.9 IMPAIRED MOBILITY AND ACTIVITIES OF DAILY LIVING: Primary | ICD-10-CM

## 2023-06-08 DIAGNOSIS — M25.561 RIGHT KNEE PAIN, UNSPECIFIED CHRONICITY: ICD-10-CM

## 2023-06-08 DIAGNOSIS — F17.210 CIGARETTE NICOTINE DEPENDENCE WITHOUT COMPLICATION: ICD-10-CM

## 2023-06-08 DIAGNOSIS — I70.219 ATHEROSCLEROTIC PVD WITH INTERMITTENT CLAUDICATION: Chronic | ICD-10-CM

## 2023-06-08 DIAGNOSIS — I73.9 PERIPHERAL VASCULAR DISEASE: Chronic | ICD-10-CM

## 2023-06-08 DIAGNOSIS — Z95.1 HX OF CABG: Primary | Chronic | ICD-10-CM

## 2023-06-08 DIAGNOSIS — Z74.09 IMPAIRED MOBILITY AND ACTIVITIES OF DAILY LIVING: Primary | ICD-10-CM

## 2023-06-08 DIAGNOSIS — M17.11 ARTHRITIS OF KNEE, RIGHT: ICD-10-CM

## 2023-06-08 DIAGNOSIS — I10 HYPERTENSION GOAL BP (BLOOD PRESSURE) < 140/90: Chronic | ICD-10-CM

## 2023-06-08 DIAGNOSIS — E78.5 HYPERLIPIDEMIA LDL GOAL <70: Chronic | ICD-10-CM

## 2023-06-08 DIAGNOSIS — M81.0 AGE-RELATED OSTEOPOROSIS WITHOUT CURRENT PATHOLOGICAL FRACTURE: ICD-10-CM

## 2023-06-08 DIAGNOSIS — R29.6 FREQUENT FALLS: ICD-10-CM

## 2023-06-08 DIAGNOSIS — M25.561 PAIN AND SWELLING OF RIGHT KNEE: ICD-10-CM

## 2023-06-08 DIAGNOSIS — J44.9 CHRONIC OBSTRUCTIVE PULMONARY DISEASE, UNSPECIFIED COPD TYPE: ICD-10-CM

## 2023-06-08 DIAGNOSIS — M25.461 PAIN AND SWELLING OF RIGHT KNEE: ICD-10-CM

## 2023-06-08 PROCEDURE — 1159F MED LIST DOCD IN RCRD: CPT | Mod: CPTII,S$GLB,, | Performed by: INTERNAL MEDICINE

## 2023-06-08 PROCEDURE — 3079F PR MOST RECENT DIASTOLIC BLOOD PRESSURE 80-89 MM HG: ICD-10-PCS | Mod: CPTII,S$GLB,, | Performed by: NURSE PRACTITIONER

## 2023-06-08 PROCEDURE — 3075F SYST BP GE 130 - 139MM HG: CPT | Mod: CPTII,S$GLB,, | Performed by: INTERNAL MEDICINE

## 2023-06-08 PROCEDURE — 3079F DIAST BP 80-89 MM HG: CPT | Mod: CPTII,S$GLB,, | Performed by: NURSE PRACTITIONER

## 2023-06-08 PROCEDURE — 3288F FALL RISK ASSESSMENT DOCD: CPT | Mod: CPTII,S$GLB,, | Performed by: INTERNAL MEDICINE

## 2023-06-08 PROCEDURE — 99214 PR OFFICE/OUTPT VISIT, EST, LEVL IV, 30-39 MIN: ICD-10-PCS | Mod: S$GLB,,, | Performed by: INTERNAL MEDICINE

## 2023-06-08 PROCEDURE — 99999 PR PBB SHADOW E&M-EST. PATIENT-LVL IV: ICD-10-PCS | Mod: PBBFAC,,, | Performed by: NURSE PRACTITIONER

## 2023-06-08 PROCEDURE — 3078F DIAST BP <80 MM HG: CPT | Mod: CPTII,S$GLB,, | Performed by: INTERNAL MEDICINE

## 2023-06-08 PROCEDURE — 1160F RVW MEDS BY RX/DR IN RCRD: CPT | Mod: CPTII,S$GLB,, | Performed by: INTERNAL MEDICINE

## 2023-06-08 PROCEDURE — 1101F PT FALLS ASSESS-DOCD LE1/YR: CPT | Mod: CPTII,S$GLB,, | Performed by: INTERNAL MEDICINE

## 2023-06-08 PROCEDURE — 99214 PR OFFICE/OUTPT VISIT, EST, LEVL IV, 30-39 MIN: ICD-10-PCS | Mod: S$GLB,,, | Performed by: NURSE PRACTITIONER

## 2023-06-08 PROCEDURE — 3077F SYST BP >= 140 MM HG: CPT | Mod: CPTII,S$GLB,, | Performed by: NURSE PRACTITIONER

## 2023-06-08 PROCEDURE — 99999 PR PBB SHADOW E&M-EST. PATIENT-LVL V: CPT | Mod: PBBFAC,,, | Performed by: INTERNAL MEDICINE

## 2023-06-08 PROCEDURE — 99214 OFFICE O/P EST MOD 30 MIN: CPT | Mod: S$GLB,,, | Performed by: INTERNAL MEDICINE

## 2023-06-08 PROCEDURE — 1160F PR REVIEW ALL MEDS BY PRESCRIBER/CLIN PHARMACIST DOCUMENTED: ICD-10-PCS | Mod: CPTII,S$GLB,, | Performed by: INTERNAL MEDICINE

## 2023-06-08 PROCEDURE — 99214 OFFICE O/P EST MOD 30 MIN: CPT | Mod: S$GLB,,, | Performed by: NURSE PRACTITIONER

## 2023-06-08 PROCEDURE — 3078F PR MOST RECENT DIASTOLIC BLOOD PRESSURE < 80 MM HG: ICD-10-PCS | Mod: CPTII,S$GLB,, | Performed by: INTERNAL MEDICINE

## 2023-06-08 PROCEDURE — 3075F PR MOST RECENT SYSTOLIC BLOOD PRESS GE 130-139MM HG: ICD-10-PCS | Mod: CPTII,S$GLB,, | Performed by: INTERNAL MEDICINE

## 2023-06-08 PROCEDURE — 1159F MED LIST DOCD IN RCRD: CPT | Mod: CPTII,S$GLB,, | Performed by: NURSE PRACTITIONER

## 2023-06-08 PROCEDURE — 1125F PR PAIN SEVERITY QUANTIFIED, PAIN PRESENT: ICD-10-PCS | Mod: CPTII,S$GLB,, | Performed by: INTERNAL MEDICINE

## 2023-06-08 PROCEDURE — 1159F PR MEDICATION LIST DOCUMENTED IN MEDICAL RECORD: ICD-10-PCS | Mod: CPTII,S$GLB,, | Performed by: NURSE PRACTITIONER

## 2023-06-08 PROCEDURE — 3288F PR FALLS RISK ASSESSMENT DOCUMENTED: ICD-10-PCS | Mod: CPTII,S$GLB,, | Performed by: INTERNAL MEDICINE

## 2023-06-08 PROCEDURE — 99999 PR PBB SHADOW E&M-EST. PATIENT-LVL V: ICD-10-PCS | Mod: PBBFAC,,, | Performed by: INTERNAL MEDICINE

## 2023-06-08 PROCEDURE — 1159F PR MEDICATION LIST DOCUMENTED IN MEDICAL RECORD: ICD-10-PCS | Mod: CPTII,S$GLB,, | Performed by: INTERNAL MEDICINE

## 2023-06-08 PROCEDURE — 3077F PR MOST RECENT SYSTOLIC BLOOD PRESSURE >= 140 MM HG: ICD-10-PCS | Mod: CPTII,S$GLB,, | Performed by: NURSE PRACTITIONER

## 2023-06-08 PROCEDURE — 1101F PR PT FALLS ASSESS DOC 0-1 FALLS W/OUT INJ PAST YR: ICD-10-PCS | Mod: CPTII,S$GLB,, | Performed by: INTERNAL MEDICINE

## 2023-06-08 PROCEDURE — 99999 PR PBB SHADOW E&M-EST. PATIENT-LVL IV: CPT | Mod: PBBFAC,,, | Performed by: NURSE PRACTITIONER

## 2023-06-08 PROCEDURE — 1125F AMNT PAIN NOTED PAIN PRSNT: CPT | Mod: CPTII,S$GLB,, | Performed by: INTERNAL MEDICINE

## 2023-06-08 NOTE — PROGRESS NOTES
Subjective:   Patient ID:  Flory Cam is a 77 y.o. female who presents for evaluation of Hypertension, Leg Swelling, and Arm Pain      HPI    Flory Cam is a 77 year old female who presents to clinic for follow up. She had a fall in May 2023 and was seen at [facility] ED. She reports today continued issues with right knee pain and swelling and left elbow pain.     Discussed with Dr Cohen and he evaluated patient today in office today. PAD is stable today in office. Main concern is issue related to her knee and elbow pain.     Denies chest pain or anginal equivalents. No shortness of breath, LAURENT or palpitations. Denies orthopnea, PND or abdominal bloating. Reports regular walking without any issues lately. NO leg swelling or claudications. No recent falls, syncope or near syncopal events. Reports compliance with medications and dietary restrictions. NO CNS complaints to suggest TIA or CVA today. No signs of abnormal bleeding on ASA and Plavix.     Reports compliance with medications.       Past Medical History:   Diagnosis Date    Acute coronary syndrome     Anxiety     Bilateral carotid artery stenosis 11/17/2015    Chronic pain     Colon polyp     Coronary artery disease     GERD (gastroesophageal reflux disease)     Hyperlipidemia     Hypertension     Hypothyroidism     Low back pain     Lupus     Osteoporosis     Poor circulation     Pre-operative clearance 7/12/2019    PVD (peripheral vascular disease)     S/P peripheral artery angioplasty 02/13/2014    SCCA (squamous cell carcinoma) of skin 10/2019    Dr. ZANDRA Rivas--oncology    Skin disease        Past Surgical History:   Procedure Laterality Date    AORTOGRAPHY WITH SERIALOGRAPHY N/A 5/25/2021    Procedure: AORTOGRAM, WITH RUNOFF;  Surgeon: Christopher Cohen MD;  Location: [facility] CATH LAB;  Service: Cardiology;  Laterality: N/A;  COVID-19, mRNA, LNP-S, PF, 30 mcg/0.3 mL dose (COVID-19, MRNA, LN-S, PF (Pfizer)) 1/30/2021, 1/9/2021     Atherosclerotic PVD with intermittent claudication Bilateral 2021    Dr. Cohen    CATARACT EXTRACTION      COLONOSCOPY N/A 2017    Procedure: COLONOSCOPY;  Surgeon: Sylvester Arboleda III, MD;  Location: [facility] ENDO;  Service: Endoscopy;  Laterality: N/A;    COLONOSCOPY N/A 2020    Procedure: COLONOSCOPY;  Surgeon: Sylvester Arboleda III, MD;  Location: [facility] ENDO;  Service: Endoscopy;  Laterality: N/A;    CORONARY ANGIOPLASTY      CORONARY ARTERY BYPASS GRAFT      EXCISION OF MASS OF HAND Right 2022    Procedure: EXCISION, MASS, HAND;  Surgeon: Hernan Culver MD;  Location: [facility] OR;  Service: Orthopedics;  Laterality: Right;   thenar eminence skin lesion excised and a Z-plasty skin flap for coverage    HYSTERECTOMY      OOPHORECTOMY      THYROIDECTOMY         Social History     Tobacco Use    Smoking status: Former     Packs/day: 0.25     Years: 40.00     Pack years: 10.00     Types: Cigarettes     Start date:      Quit date: 2023     Years since quittin.3    Smokeless tobacco: Never    Tobacco comments:     hold midnight prior to surgery   Substance Use Topics    Alcohol use: Never    Drug use: No       Family History   Problem Relation Age of Onset    Hypertension Mother     Stroke Mother     Lung cancer Daughter     Melanoma Neg Hx     Psoriasis Neg Hx     Lupus Neg Hx     Eczema Neg Hx        Wt Readings from Last 3 Encounters:   23 83.5 kg (184 lb 1.4 oz)   23 83.5 kg (184 lb)   23 83.5 kg (184 lb)     Temp Readings from Last 3 Encounters:   23 98.5 °F (36.9 °C) (Oral)   23 98.9 °F (37.2 °C) (Oral)   23 96.9 °F (36.1 °C) (Tympanic)     BP Readings from Last 3 Encounters:   23 (!) 140/80   23 (!) 162/76   23 (!) 185/88     Pulse Readings from Last 3 Encounters:   23 72   23 61   23 62       Current Outpatient Medications on File Prior to Visit   Medication Sig Dispense Refill    albuterol (PROVENTIL/VENTOLIN  HFA) 90 mcg/actuation inhaler Inhale 2 puffs into the lungs every 6 (six) hours as needed for Wheezing. 18 g 3    albuterol-ipratropium (DUO-NEB) 2.5 mg-0.5 mg/3 mL nebulizer solution Take 3 mLs by nebulization every 6 (six) hours as needed for Wheezing. Rescue 75 mL 2    amLODIPine (NORVASC) 10 MG tablet Take 1 tablet (10 mg total) by mouth once daily. 90 tablet 3    atorvastatin (LIPITOR) 80 MG tablet Take 1 tablet (80 mg total) by mouth once daily. 90 tablet 3    citalopram (CELEXA) 10 MG tablet Take 10 mg by mouth once daily.      clopidogreL (PLAVIX) 75 mg tablet TAKE 1 TABLET(75 MG) BY MOUTH EVERY DAY 90 tablet 3    ergocalciferol (ERGOCALCIFEROL) 50,000 unit Cap Take 1 capsule (50,000 Units total) by mouth every 7 days. 12 capsule 0    ezetimibe (ZETIA) 10 mg tablet TAKE 1 TABLET(10 MG) BY MOUTH EVERY DAY 90 tablet 3    fluocinonide 0.05% (LIDEX) 0.05 % cream Apply to affected area of hands every other day 60 g 3    levothyroxine (SYNTHROID) 137 MCG Tab tablet TAKE 1 TABLET BY MOUTH EVERY DAY 90 tablet 0    ondansetron (ZOFRAN) 4 MG tablet TAKE 1 TABLET(4 MG) BY MOUTH EVERY 8 HOURS AS NEEDED FOR NAUSEA 15 tablet 0    oxyCODONE-acetaminophen (PERCOCET)  mg per tablet Take 1 tablet by mouth.      predniSONE (DELTASONE) 20 MG tablet One tablet BID x 3 days, one tablet daily x 3 days then 1/2 tablet daily x 4 days. 11 tablet 0    prochlorperazine (COMPAZINE) 10 MG tablet       tazarotene (TAZORAC) 0.1 % cream Apply to affected area of hands every other day 60 g 3    traZODone (DESYREL) 50 MG tablet TAKE 1 TABLET BY MOUTH EVERY DAY AT NIGHT AS NEEDED FOR INSOMNIA 30 tablet 3    valsartan (DIOVAN) 320 MG tablet TAKE 1 TABLET(320 MG) BY MOUTH EVERY DAY 90 tablet 3    aspirin (ECOTRIN) 81 MG EC tablet Take 1 tablet (81 mg total) by mouth once daily.  0    diclofenac sodium (VOLTAREN) 1 % Gel Apply 2 g topically 4 (four) times daily. for 10 days 100 g 0     No current facility-administered medications on file  prior to visit.       Holter monitor - 48 hour    Result Date: 5/23/2023  · 5% MM PVC load. No symptoms entered. No malignant arrhythmias noted.   PVCs are not c/w outflow tract PVCs  · RBBB  · Well balanced autonomic tone  · Details in text          Results for orders placed during the hospital encounter of 10/19/20    Echo Color Flow Doppler? Yes    Interpretation Summary  · The left ventricle is normal in size with normal systolic function. The estimated ejection fraction is 55%.  · Normal left ventricular diastolic function.  · There is moderate left ventricular concentric hypertrophy.  · Normal right ventricular systolic function.  · Normal central venous pressure (3 mmHg).  · The estimated PA systolic pressure is 59 mmHg.  · There is pulmonary hypertension.    Results for orders placed during the hospital encounter of 10/19/20    Nuclear Stress - Cardiology Interpreted    Interpretation Summary    No reversible defects to suggest coronary ischemia.    Perfusion Defect There is a mild intensity, fixed defect consistent with scar  in the inferior wall(s).    There is a mild intensity fixed defect in the anterior wall of the left ventricle, likely secondary to soft tissue attenuation.    Gated perfusion images showed an ejection fraction of 61% at rest and 45% post stress.    The EKG portion of this study is negative for ischemia.    There were no arrhythmias during stress.        Results for orders placed or performed during the hospital encounter of 05/22/23   EKG 12-lead    Collection Time: 05/22/23  9:31 AM    Narrative    Test Reason : R53.1,    Vent. Rate : 068 BPM     Atrial Rate : 068 BPM     P-R Int : 196 ms          QRS Dur : 148 ms      QT Int : 430 ms       P-R-T Axes : 040 -59 092 degrees     QTc Int : 457 ms    Normal sinus rhythm  Right bundle branch block  Left anterior fascicular block   Bifascicular block   LVH with repolarization abnormality ( R in aVL , Romhilt-Huffman )  Abnormal ECG  When  "compared with ECG of 02-MAY-2023 10:21,  Nonspecific T wave abnormality no longer evident in Inferior leads  Confirmed by Higinio Feldman MD ([id number]) on 5/22/2023 8:58:34 PM    Referred By: AMELIE   SELF           Confirmed By:Higinio Feldman MD         Review of Systems   Constitutional: Positive for malaise/fatigue.   HENT:  Negative for hearing loss and hoarse voice.    Eyes:  Negative for blurred vision and visual disturbance.   Cardiovascular:  Negative for chest pain, claudication, dyspnea on exertion, irregular heartbeat, leg swelling, near-syncope, orthopnea, palpitations, paroxysmal nocturnal dyspnea and syncope.   Respiratory:  Negative for cough, hemoptysis, shortness of breath, sleep disturbances due to breathing, snoring and wheezing.    Endocrine: Negative for cold intolerance and heat intolerance.   Hematologic/Lymphatic: Does not bruise/bleed easily.   Skin:  Negative for color change, dry skin and nail changes.   Musculoskeletal:  Positive for arthritis, back pain, falls, joint pain, joint swelling (right knee) and myalgias.   Gastrointestinal:  Negative for bloating, abdominal pain, constipation, nausea and vomiting.   Genitourinary:  Negative for dysuria, flank pain, hematuria and hesitancy.   Neurological:  Positive for weakness. Negative for headaches, light-headedness, loss of balance, numbness and paresthesias.   Psychiatric/Behavioral:  Negative for altered mental status.    Allergic/Immunologic: Negative for environmental allergies.       Objective:BP (!) 140/80   Pulse 72   Resp 16   Ht 5' 6" (1.676 m)   Wt 83.5 kg (184 lb 1.4 oz)   SpO2 97%   BMI 29.71 kg/m²      Physical Exam  Vitals and nursing note reviewed.   Constitutional:       General: She is not in acute distress.     Appearance: Normal appearance. She is well-developed. She is not ill-appearing.   HENT:      Head: Normocephalic and atraumatic.      Nose: Nose normal.      Mouth/Throat:      Mouth: Mucous membranes are " moist.   Eyes:      Pupils: Pupils are equal, round, and reactive to light.   Neck:      Thyroid: No thyromegaly.      Vascular: No JVD.      Trachea: No tracheal deviation.   Cardiovascular:      Rate and Rhythm: Normal rate and regular rhythm.      Chest Wall: PMI is not displaced.      Pulses: Intact distal pulses.           Radial pulses are 2+ on the right side and 2+ on the left side.        Dorsalis pedis pulses are 2+ on the right side and 2+ on the left side.      Heart sounds: S1 normal and S2 normal. Heart sounds not distant. No murmur heard.  Pulmonary:      Effort: Pulmonary effort is normal. No respiratory distress.      Breath sounds: Normal breath sounds. No wheezing.   Abdominal:      General: Bowel sounds are normal. There is no distension.      Palpations: Abdomen is soft.      Tenderness: There is no abdominal tenderness.   Musculoskeletal:         General: No swelling. Normal range of motion.      Cervical back: Full passive range of motion without pain, normal range of motion and neck supple.      Right knee: Swelling and effusion present.      Right lower leg: No edema.      Left lower leg: No edema.      Right ankle: No swelling.      Left ankle: No swelling.   Skin:     General: Skin is warm and dry.      Capillary Refill: Capillary refill takes less than 2 seconds.      Nails: There is no clubbing.   Neurological:      General: No focal deficit present.      Mental Status: She is alert and oriented to person, place, and time.      Motor: Weakness present.   Psychiatric:         Speech: Speech normal.         Behavior: Behavior normal.         Thought Content: Thought content normal.         Judgment: Judgment normal.       Lab Results   Component Value Date    CHOL 151 09/22/2022    CHOL 121 12/06/2021    CHOL 142 03/22/2021     Lab Results   Component Value Date    HDL 52 09/22/2022    HDL 48 12/06/2021    HDL 52 03/22/2021     Lab Results   Component Value Date    LDLCALC 84.0 09/22/2022     LDLCALC 60.4 (L) 12/06/2021    LDLCALC 76.2 03/22/2021     Lab Results   Component Value Date    TRIG 75 09/22/2022    TRIG 63 12/06/2021    TRIG 69 03/22/2021     Lab Results   Component Value Date    CHOLHDL 34.4 09/22/2022    CHOLHDL 39.7 12/06/2021    CHOLHDL 36.6 03/22/2021       Chemistry        Component Value Date/Time     05/22/2023 0955    K 3.6 05/22/2023 0955     05/22/2023 0955    CO2 27 05/22/2023 0955    BUN 14 05/22/2023 0955    CREATININE 0.7 05/22/2023 0955     05/22/2023 0955        Component Value Date/Time    CALCIUM 9.7 05/22/2023 0955    ALKPHOS 149 (H) 05/22/2023 0955    AST 15 05/22/2023 0955    ALT 12 05/22/2023 0955    BILITOT 0.4 05/22/2023 0955    ESTGFRAFRICA >60.0 02/01/2022 1102    EGFRNONAA >60.0 02/01/2022 1102          Lab Results   Component Value Date    TSH 3.011 10/25/2022     Lab Results   Component Value Date    INR 1.0 05/22/2021    INR 1.0 10/20/2020    INR 1.0 04/28/2017     Lab Results   Component Value Date    WBC 10.23 05/22/2023    HGB 13.2 05/22/2023    HCT 40.0 05/22/2023    MCV 94 05/22/2023     05/22/2023          Assessment:      1. CAD: Hx of CABG    2. Atherosclerotic PVD with intermittent claudication    3. Hypertension goal BP (blood pressure) < 140/90    4. Peripheral vascular disease    5. Hyperlipidemia LDL goal <70    6. Chronic obstructive pulmonary disease, unspecified COPD type    7. Cigarette nicotine dependence without complication    8. Pain and swelling of right knee        Plan:     Needs to be seen by Ortho ASAP  Continue ASA Plavix Statin   Dash diet 2 gm sodium restriction  F/U with Dr Cohen as scheduled    DELORES Irwin  [facility]

## 2023-06-08 NOTE — PROGRESS NOTES
Flory [place]  77 y.o. Black or  female  [id number]    Chief Complaint:  Chief Complaint   Patient presents with    wheelchair evaluation       History of Present Illness:  Presents to the clinic for a motorized wheelchair evaluation. She has difficulty getting around and unable to perform ADLs (bathing, toileting and grooming) primarily due to her right knee pain and swelling. She is currently using a walker and is unable to walk at all without assistive devices. She would like a power chair in order to perform her ADLs.    She had a regular wheelchair and walker but is unable to propel/use walker due to chronic lesions on her hand. The lesions are very painful.   She fell approximately 6 weeks ago and has been to the ER twice. X-rays showed degenerative changes but no acute findings.  She has osteoporosis.     History:  Past Medical History:   Diagnosis Date    Acute coronary syndrome     Anxiety     Bilateral carotid artery stenosis 11/17/2015    Chronic pain     Colon polyp     Coronary artery disease     GERD (gastroesophageal reflux disease)     Hyperlipidemia     Hypertension     Hypothyroidism     Low back pain     Lupus     Osteoporosis     Poor circulation     Pre-operative clearance 7/12/2019    PVD (peripheral vascular disease)     S/P peripheral artery angioplasty 02/13/2014    SCCA (squamous cell carcinoma) of skin 10/2019    Dr. ZANDRA Rivas--oncology    Skin disease        Medications:  Current Outpatient Medications on File Prior to Visit   Medication Sig Dispense Refill    albuterol (PROVENTIL/VENTOLIN HFA) 90 mcg/actuation inhaler Inhale 2 puffs into the lungs every 6 (six) hours as needed for Wheezing. 18 g 3    albuterol-ipratropium (DUO-NEB) 2.5 mg-0.5 mg/3 mL nebulizer solution Take 3 mLs by nebulization every 6 (six) hours as needed for Wheezing. Rescue 75 mL 2    amLODIPine (NORVASC) 10 MG tablet Take 1 tablet (10 mg total) by mouth once daily. 90 tablet 3    atorvastatin  (LIPITOR) 80 MG tablet Take 1 tablet (80 mg total) by mouth once daily. 90 tablet 3    citalopram (CELEXA) 10 MG tablet Take 10 mg by mouth once daily.      clopidogreL (PLAVIX) 75 mg tablet TAKE 1 TABLET(75 MG) BY MOUTH EVERY DAY 90 tablet 3    ergocalciferol (ERGOCALCIFEROL) 50,000 unit Cap Take 1 capsule (50,000 Units total) by mouth every 7 days. 12 capsule 0    ezetimibe (ZETIA) 10 mg tablet TAKE 1 TABLET(10 MG) BY MOUTH EVERY DAY 90 tablet 3    fluocinonide 0.05% (LIDEX) 0.05 % cream Apply to affected area of hands every other day 60 g 3    levothyroxine (SYNTHROID) 137 MCG Tab tablet TAKE 1 TABLET BY MOUTH EVERY DAY 90 tablet 0    ondansetron (ZOFRAN) 4 MG tablet TAKE 1 TABLET(4 MG) BY MOUTH EVERY 8 HOURS AS NEEDED FOR NAUSEA 15 tablet 0    oxyCODONE-acetaminophen (PERCOCET)  mg per tablet Take 1 tablet by mouth.      predniSONE (DELTASONE) 20 MG tablet One tablet BID x 3 days, one tablet daily x 3 days then 1/2 tablet daily x 4 days. 11 tablet 0    prochlorperazine (COMPAZINE) 10 MG tablet       tazarotene (TAZORAC) 0.1 % cream Apply to affected area of hands every other day 60 g 3    traZODone (DESYREL) 50 MG tablet TAKE 1 TABLET BY MOUTH EVERY DAY AT NIGHT AS NEEDED FOR INSOMNIA 30 tablet 3    valsartan (DIOVAN) 320 MG tablet TAKE 1 TABLET(320 MG) BY MOUTH EVERY DAY 90 tablet 3    aspirin (ECOTRIN) 81 MG EC tablet Take 1 tablet (81 mg total) by mouth once daily.  0    diclofenac sodium (VOLTAREN) 1 % Gel Apply 2 g topically 4 (four) times daily. for 10 days 100 g 0     No current facility-administered medications on file prior to visit.       Allergies:  Review of patient's allergies indicates:   Allergen Reactions    Methotrexate analogues     Cellcept [mycophenolate mofetil] Rash    Imuran [azathioprine] Rash       Review of Systems   Musculoskeletal:  Positive for falls and joint pain.   Skin:  Positive for rash.   Neurological:  Positive for weakness.     Exam:  Vitals:    06/08/23 1005   BP:  136/72   Pulse: (!) 58   Resp: 18   Temp: 97.2 °F (36.2 °C)         Body mass index is 29.71 kg/m².      Physical Exam  Vitals reviewed.   Constitutional:       General: She is not in acute distress.     Appearance: She is well-developed. She is not ill-appearing.   Pulmonary:      Effort: Pulmonary effort is normal. No respiratory distress.   Musculoskeletal:      Right knee: Swelling present. Decreased range of motion. Tenderness present.   Neurological:      Mental Status: She is alert and oriented to person, place, and time.      Sensory: Sensation is intact.      Motor: Weakness present.      Gait: Gait abnormal.      Comments: BUE--strength 4/5  RLE--strength 2/5  LLE--strength 4/5   Psychiatric:         Behavior: Behavior normal.         Thought Content: Thought content normal.         Judgment: Judgment normal.       Assessment:  The primary encounter diagnosis was Impaired mobility and activities of daily living. Diagnoses of Acute pain of right knee, Arthritis of knee, right, Frequent falls, and Age-related osteoporosis without current pathological fracture were also pertinent to this visit.    Plan:  Impaired mobility and activities of daily living  -     WHEELCHAIR FOR HOME USE  -     scooter has been ruled out due to limited room in the home (supplier assessment provided)    Right knee pain, unspecified chronicity   -     Ambulatory referral/consult to Orthopedics; Future; Expected date: 06/15/2023    Arthritis of knee, right  -     WHEELCHAIR FOR HOME USE  -     Ambulatory referral/consult to Orthopedics; Future; Expected date: 06/15/2023    Frequent falls  -     WHEELCHAIR FOR HOME USE    Skin lesions of hand     Age-related osteoporosis without current pathological fracture  -     WHEELCHAIR FOR HOME USE    RTC as needed.

## 2023-06-10 NOTE — TELEPHONE ENCOUNTER
Refill Routing Note   Medication(s) are not appropriate for processing by [facility] for the following reason(s):      Due for refill >6 months ago    [facility] action(s):  Defer None identified          Appointments  past 12m or future 3m with PCP    Date Provider   Last Visit   6/8/2023 Tayler Ovalle DO   Next Visit   Visit date not found Tayler Ovalle DO   ED visits in past 90 days: 2        Note composed:9:43 AM 06/10/2023

## 2023-06-10 NOTE — TELEPHONE ENCOUNTER
No care due was identified.  [facility] Embedded Care Due Messages. Reference number: [id number].   6/10/2023 5:56:04 AM CDT

## 2023-06-12 ENCOUNTER — TELEPHONE (OUTPATIENT)
Dept: INTERNAL MEDICINE | Facility: CLINIC | Age: 78
End: 2023-06-12
Payer: MEDICARE

## 2023-06-12 DIAGNOSIS — R29.6 FREQUENT FALLS: ICD-10-CM

## 2023-06-12 DIAGNOSIS — M81.0 AGE-RELATED OSTEOPOROSIS WITHOUT CURRENT PATHOLOGICAL FRACTURE: ICD-10-CM

## 2023-06-12 DIAGNOSIS — Z74.09 IMPAIRED MOBILITY AND ACTIVITIES OF DAILY LIVING: Primary | ICD-10-CM

## 2023-06-12 DIAGNOSIS — Z78.9 IMPAIRED MOBILITY AND ACTIVITIES OF DAILY LIVING: Primary | ICD-10-CM

## 2023-06-12 DIAGNOSIS — M17.11 ARTHRITIS OF RIGHT KNEE: ICD-10-CM

## 2023-06-12 RX ORDER — LEVOTHYROXINE SODIUM 137 UG/1
TABLET ORAL
Qty: 90 TABLET | Refills: 1 | Status: SHIPPED | OUTPATIENT
Start: 2023-06-12

## 2023-06-12 NOTE — TELEPHONE ENCOUNTER
----- Message from Chan Parada sent at 6/12/2023 10:42 AM CDT -----  Contact: Flory Munguia is calling in regards to wanting to discuss appt for orthopedics. Please call back at [phone]                        Thanks  KT

## 2023-06-14 ENCOUNTER — TELEPHONE (OUTPATIENT)
Dept: CARDIOLOGY | Facility: CLINIC | Age: 78
End: 2023-06-14
Payer: MEDICARE

## 2023-06-14 ENCOUNTER — TELEPHONE (OUTPATIENT)
Dept: INTERNAL MEDICINE | Facility: CLINIC | Age: 78
End: 2023-06-14
Payer: MEDICARE

## 2023-06-14 NOTE — TELEPHONE ENCOUNTER
"Patient stated that some ldy called her about delivery her standard wheelchair. Patient informed lady that she doesn't have anyone to push her and she can't wheeled herself. She was told to get in touch with her pcp office about wheelchair. I explained that in Dr. Ovalle notes it states, "  motorized." I faxed over to [employer]. Patient states she doesn't know where the lady was calling from. Patient will like a motorized wheelchair. I informed the patient to reach out to lady at the number she called from and see if they can reach out to Dr. Ovalle office to see if it's anything we can assist with.  "

## 2023-06-14 NOTE — TELEPHONE ENCOUNTER
Spoke to patient and she verbalized an understanding. Transferred to the  to try to get her appointment scheduled with Ortho----- Message from Christopher Cohen MD sent at 6/14/2023  8:38 AM CDT -----  SHE HAS JOINT ISSUES HER PVD IS STABLE . I DO NOT NEED TO SEE HER UNLESS SHE AN ULCER OR GANGRENE . I SAW HER WITH LEE   ----- Message -----  From: Toyin Boyd LPN  Sent: 6/14/2023   8:33 AM CDT  To: Christopher Cohen MD    Please advise. She saw Lee last week . Is there anything that needs to be done for her legs. She has an old appt with you next Wednesday  ----- Message -----  From: Sidney Londono  Sent: 6/14/2023   8:18 AM CDT  To: Vicki [facility]AE[facility] Staff        Name of Who is Calling:PT          What is the request in detail:PT is requesting a call back to discuss both her legs hurting her and she can barley walk, PT states she had a fall and her pain has been getting worse and worse and she is tired of running to the ER. Please be Advised!          Can the clinic reply by [facility]:no          What Number to Call Back if not in [phone]

## 2023-06-14 NOTE — TELEPHONE ENCOUNTER
----- Message from Tayler Ovalle DO sent at 6/13/2023  9:11 PM CDT -----  Contact: Flory    ----- Message -----  From: Sol Aragon  Sent: 6/13/2023  11:28 AM CDT  To: Tayler Ovalle DO    Patient is calling to speak with a nurse regarding wheelchair . Please give a call back  at [phone] .

## 2023-06-16 ENCOUNTER — TELEPHONE (OUTPATIENT)
Dept: INTERNAL MEDICINE | Facility: CLINIC | Age: 78
End: 2023-06-16
Payer: MEDICARE

## 2023-06-16 NOTE — TELEPHONE ENCOUNTER
Patient was calling Dr. Ovalle office for a status update on her power chair/scooter. I informed patient I will give [employer] a call to see the status.    Order is still being worked on.

## 2023-06-16 NOTE — TELEPHONE ENCOUNTER
----- Message from Chan Parada sent at 6/16/2023  9:24 AM CDT -----  Contact: Flory Munguia is calling in regards to getting the status of a motorized scooter. Please call back at [phone]                                Thanks  KT

## 2023-06-20 ENCOUNTER — TELEPHONE (OUTPATIENT)
Dept: INTERNAL MEDICINE | Facility: CLINIC | Age: 78
End: 2023-06-20
Payer: MEDICARE

## 2023-06-20 NOTE — TELEPHONE ENCOUNTER
----- Message from Jabari Ramirez sent at 6/20/2023  3:32 PM CDT -----  Who Called: Eddie Rogers         Who Left Message for Patient: Alexus         Does the patient know what this is regarding?: Unknown          Best Call Back Number: [phone]       Additional Information:

## 2023-06-23 ENCOUNTER — CARE AT HOME (OUTPATIENT)
Dept: HOME HEALTH SERVICES | Facility: CLINIC | Age: 78
End: 2023-06-23
Payer: MEDICARE

## 2023-06-23 VITALS
RESPIRATION RATE: 17 BRPM | DIASTOLIC BLOOD PRESSURE: 68 MMHG | HEART RATE: 64 BPM | OXYGEN SATURATION: 98 % | SYSTOLIC BLOOD PRESSURE: 138 MMHG

## 2023-06-23 DIAGNOSIS — R53.81 DEBILITY: ICD-10-CM

## 2023-06-23 DIAGNOSIS — M25.561 PAIN AND SWELLING OF RIGHT KNEE: ICD-10-CM

## 2023-06-23 DIAGNOSIS — M25.522 LEFT ELBOW PAIN: ICD-10-CM

## 2023-06-23 DIAGNOSIS — Z74.09 IMPAIRED MOBILITY AND ACTIVITIES OF DAILY LIVING: ICD-10-CM

## 2023-06-23 DIAGNOSIS — M25.461 PAIN AND SWELLING OF RIGHT KNEE: ICD-10-CM

## 2023-06-23 DIAGNOSIS — Z78.9 IMPAIRED MOBILITY AND ACTIVITIES OF DAILY LIVING: ICD-10-CM

## 2023-06-23 PROCEDURE — 99348 PR HOME VISIT,ESTAB PATIENT,LEVEL II: ICD-10-PCS | Mod: ,,,

## 2023-06-23 PROCEDURE — 99348 HOME/RES VST EST LOW MDM 30: CPT | Mod: ,,,

## 2023-06-23 NOTE — ASSESSMENT & PLAN NOTE
Pain following fall last month  Xray during that time with no fracture/effusion  F/u with sports medicine  Pain medication prn

## 2023-06-23 NOTE — PROGRESS NOTES
[facility]  Medical Home Visit    Visit Date: 6/23/2023  Encounter Provider: Brendon Ramirez  PCP:  Tayler Ovalle DO    Subjective:      Patient ID: Flory Cam is a 77 y.o. female.    Consult Requested By:  Dr. Tayler Ovalle  Reason for Consult:  Follow up    Flory is being seen at home due to being seen at home due to physical debility that presents a taxing effort to leave the home, to mitigate high risk of hospital readmission and/or due to the limited availability of reliable or safe options for transportation to the point of access to the provider. Prior to treatment on this visit the chart was reviewed and patient verbal consent was obtained.    Chief Complaint: Follow-up      Patient is being seen today for a follow up visit.  Upon visit today patient is ambulating in her apartment.  It is difficult for her to get around due to right knee pain.  She had a fall in May and has been limited with mobility since. Denies any recent falls.  Takes pain medication prn. Currently ambulates with walker. She reports a power chair is coming Tuesday to aid with ambulation. She has an appointment with sports medicine next week.  Appetite has been fair. Denies any other complaints or concerns. Questions elicited. Time allowed for questions, all issues addressed. Contact info given for any concerns or changes. Will follow up in 4 weeks.          Review of Systems   Constitutional: Negative.    HENT: Negative.     Eyes: Negative.    Respiratory: Negative.  Negative for chest tightness.    Cardiovascular: Negative.  Negative for leg swelling.   Gastrointestinal: Negative.    Endocrine: Negative.    Genitourinary: Negative.    Musculoskeletal:  Positive for arthralgias, gait problem and joint swelling.   Skin: Negative.    Allergic/Immunologic: Negative.    Neurological:  Positive for weakness.   Hematological: Negative.    Psychiatric/Behavioral: Negative.  Negative for agitation.    All other systems reviewed and  are negative.    Assessments:  Environmental: Patient lives in an apartment complex on the ground floor. Grandson lives with her.  There is adequate lighting and the temperature is comfortable.    Functional Status: has motorized scooter coming on Tuesday to aid with ambulation.   Safety: Fall precautions.   Nutritional: Adequate food in the home.   Home Health/DME/Supplies: No HH    Objective:   Physical Exam  Vitals reviewed.   Constitutional:       General: She is not in acute distress.     Appearance: Normal appearance. She is well-developed.   HENT:      Head: Normocephalic and atraumatic.      Nose: Nose normal.      Mouth/Throat:      Mouth: Mucous membranes are dry.      Pharynx: Oropharynx is clear.   Eyes:      Pupils: Pupils are equal, round, and reactive to light.   Cardiovascular:      Rate and Rhythm: Normal rate and regular rhythm.      Pulses: Normal pulses.      Heart sounds: Normal heart sounds.   Pulmonary:      Effort: Pulmonary effort is normal.      Breath sounds: Normal breath sounds.   Abdominal:      General: Bowel sounds are normal.      Palpations: Abdomen is soft.   Musculoskeletal:         General: Swelling (right knee) and tenderness (left elbow, right knee) present. Normal range of motion.      Cervical back: Normal range of motion and neck supple.   Skin:     General: Skin is warm and dry.   Neurological:      General: No focal deficit present.      Mental Status: She is alert and oriented to person, place, and time. Mental status is at baseline.      Motor: Weakness present.      Gait: Gait abnormal.   Psychiatric:         Mood and Affect: Mood normal.         Behavior: Behavior normal.         Thought Content: Thought content normal.         Judgment: Judgment normal.       Vitals:    06/23/23 1449   BP: 138/68   Pulse: 64   Resp: 17   SpO2: 98%     There is no height or weight on file to calculate BMI.    Assessment:     1. Impaired mobility and activities of daily living    2.  Debility    3. Pain and swelling of right knee    4. Left elbow pain        Plan:     Ethical / Legal: Advance Care Planning   Surrogate decision maker:  Jaciel Murillo, Relationship: Granddaughter  Code Status:  Full  [place]:  None  Other advance directive:  None, Capacity to make medical decisions:  Yes, Conflict No       Problem List Items Addressed This Visit          Orthopedic    Pain and swelling of right knee    Current Assessment & Plan     S/p fall in 5/2023  XR at that time revealed no fracture, had small effusion  Has f/u appointment with sports med 6/28  Pain medication prn           Left elbow pain    Current Assessment & Plan     Pain following fall last month  Xray during that time with no fracture/effusion  F/u with sports medicine  Pain medication prn              Other Visit Diagnoses       Impaired mobility and activities of daily living        has arthritis  pain in right knee with swelling  Has scooter coming on tue per patient      Debility        pain, swelling right knee since fall in May  Sports med visit next week  Fall precautions           - Continue all medications, treatments and therapies as ordered.   - Follow all instructions, recommendations as discussed.  - Maintain Safety Precautions at all times.  - Attend all medical appointments as scheduled.  - For worsening symptoms: call Primary Care Physician or Nurse Practitioner.  - For emergencies, call 911 or immediately report to the nearest emergency room.   Were controlled substances prescribed?  No  Total visit time: 38 min  Follow Up Appointments:   Future Appointments   Date Time Provider Department Center   6/28/2023  8:40 AM James Raymond MD ON [facility]   7/6/2023 11:00 AM Melva Nettles DPM [facility] [place]   3/27/2024 11:00 AM Mesfin Yoo MD ON [facility]       Signature: Brendon Ramirez NP

## 2023-06-23 NOTE — ASSESSMENT & PLAN NOTE
S/p fall in 5/2023  XR at that time revealed no fracture, had small effusion  Has f/u appointment with sports med 6/28  Pain medication prn

## 2023-06-26 ENCOUNTER — HOSPITAL ENCOUNTER (EMERGENCY)
Facility: HOSPITAL | Age: 78
Discharge: HOME | End: 2023-06-26
Attending: EMERGENCY MEDICINE
Payer: MEDICARE

## 2023-06-26 VITALS
TEMPERATURE: 98 F | OXYGEN SATURATION: 98 % | RESPIRATION RATE: 16 BRPM | SYSTOLIC BLOOD PRESSURE: 142 MMHG | DIASTOLIC BLOOD PRESSURE: 63 MMHG | HEART RATE: 61 BPM | BODY MASS INDEX: 30 KG/M2 | WEIGHT: 185.88 LBS

## 2023-06-26 DIAGNOSIS — R42 DIZZINESS: Primary | ICD-10-CM

## 2023-06-26 DIAGNOSIS — R53.1 WEAKNESS: ICD-10-CM

## 2023-06-26 DIAGNOSIS — E86.0 DEHYDRATION: ICD-10-CM

## 2023-06-26 DIAGNOSIS — R79.89 TROPONIN I ABOVE REFERENCE RANGE: ICD-10-CM

## 2023-06-26 LAB
ALBUMIN SERPL BCP-MCNC: 3.3 G/DL (ref 3.5–5.2)
ALP SERPL-CCNC: 130 U/L (ref 55–135)
ALT SERPL W/O P-5'-P-CCNC: 9 U/L (ref 10–44)
ANION GAP SERPL CALC-SCNC: 9 MMOL/L (ref 8–16)
AST SERPL-CCNC: 19 U/L (ref 10–40)
BASOPHILS # BLD AUTO: 0.05 K/UL (ref 0–0.2)
BASOPHILS NFR BLD: 0.4 % (ref 0–1.9)
BILIRUB SERPL-MCNC: 0.4 MG/DL (ref 0.1–1)
BILIRUB UR QL STRIP: NEGATIVE
BUN SERPL-MCNC: 16 MG/DL (ref 8–23)
CALCIUM SERPL-MCNC: 9.3 MG/DL (ref 8.7–10.5)
CHLORIDE SERPL-SCNC: 101 MMOL/L (ref 95–110)
CK SERPL-CCNC: 58 U/L (ref 20–180)
CLARITY UR: CLEAR
CO2 SERPL-SCNC: 28 MMOL/L (ref 23–29)
COLOR UR: COLORLESS
CREAT SERPL-MCNC: 0.7 MG/DL (ref 0.5–1.4)
DIFFERENTIAL METHOD: NORMAL
EOSINOPHIL # BLD AUTO: 0.1 K/UL (ref 0–0.5)
EOSINOPHIL NFR BLD: 1.2 % (ref 0–8)
ERYTHROCYTE [DISTWIDTH] IN BLOOD BY AUTOMATED COUNT: 12.3 % (ref 11.5–14.5)
EST. GFR  (NO RACE VARIABLE): >60 ML/MIN/1.73 M^2
GLUCOSE SERPL-MCNC: 102 MG/DL (ref 70–110)
GLUCOSE UR QL STRIP: NEGATIVE
HCT VFR BLD AUTO: 40.2 % (ref 37–48.5)
HGB BLD-MCNC: 13.2 G/DL (ref 12–16)
HGB UR QL STRIP: NEGATIVE
IMM GRANULOCYTES # BLD AUTO: 0.04 K/UL (ref 0–0.04)
IMM GRANULOCYTES NFR BLD AUTO: 0.4 % (ref 0–0.5)
KETONES UR QL STRIP: NEGATIVE
LEUKOCYTE ESTERASE UR QL STRIP: NEGATIVE
LYMPHOCYTES # BLD AUTO: 2.6 K/UL (ref 1–4.8)
LYMPHOCYTES NFR BLD: 23.1 % (ref 18–48)
MCH RBC QN AUTO: 30 PG (ref 27–31)
MCHC RBC AUTO-ENTMCNC: 32.8 G/DL (ref 32–36)
MCV RBC AUTO: 91 FL (ref 82–98)
MONOCYTES # BLD AUTO: 1 K/UL (ref 0.3–1)
MONOCYTES NFR BLD: 8.6 % (ref 4–15)
NEUTROPHILS # BLD AUTO: 7.5 K/UL (ref 1.8–7.7)
NEUTROPHILS NFR BLD: 66.3 % (ref 38–73)
NITRITE UR QL STRIP: NEGATIVE
NRBC BLD-RTO: 0 /100 WBC
PH UR STRIP: 7 [PH] (ref 5–8)
PLATELET # BLD AUTO: 290 K/UL (ref 150–450)
PMV BLD AUTO: 9.8 FL (ref 9.2–12.9)
POTASSIUM SERPL-SCNC: 3.3 MMOL/L (ref 3.5–5.1)
PROT SERPL-MCNC: 7 G/DL (ref 6–8.4)
PROT UR QL STRIP: NEGATIVE
RBC # BLD AUTO: 4.4 M/UL (ref 4–5.4)
SODIUM SERPL-SCNC: 138 MMOL/L (ref 136–145)
SP GR UR STRIP: 1 (ref 1–1.03)
TROPONIN I SERPL DL<=0.01 NG/ML-MCNC: 0.03 NG/ML (ref 0–0.03)
TROPONIN I SERPL DL<=0.01 NG/ML-MCNC: 0.05 NG/ML (ref 0–0.03)
URN SPEC COLLECT METH UR: ABNORMAL
UROBILINOGEN UR STRIP-ACNC: NEGATIVE EU/DL
WBC # BLD AUTO: 11.38 K/UL (ref 3.9–12.7)

## 2023-06-26 PROCEDURE — 81003 URINALYSIS AUTO W/O SCOPE: CPT | Performed by: EMERGENCY MEDICINE

## 2023-06-26 PROCEDURE — 80053 COMPREHEN METABOLIC PANEL: CPT | Performed by: EMERGENCY MEDICINE

## 2023-06-26 PROCEDURE — 96360 HYDRATION IV INFUSION INIT: CPT

## 2023-06-26 PROCEDURE — 25000003 PHARM REV CODE 250: Performed by: EMERGENCY MEDICINE

## 2023-06-26 PROCEDURE — 93010 ELECTROCARDIOGRAM REPORT: CPT | Mod: ,,, | Performed by: INTERNAL MEDICINE

## 2023-06-26 PROCEDURE — 85025 COMPLETE CBC W/AUTO DIFF WBC: CPT | Performed by: EMERGENCY MEDICINE

## 2023-06-26 PROCEDURE — 82550 ASSAY OF CK (CPK): CPT | Performed by: EMERGENCY MEDICINE

## 2023-06-26 PROCEDURE — 84484 ASSAY OF TROPONIN QUANT: CPT | Mod: 91 | Performed by: EMERGENCY MEDICINE

## 2023-06-26 PROCEDURE — 99285 EMERGENCY DEPT VISIT HI MDM: CPT | Mod: 25

## 2023-06-26 PROCEDURE — 93010 EKG 12-LEAD: ICD-10-PCS | Mod: ,,, | Performed by: INTERNAL MEDICINE

## 2023-06-26 PROCEDURE — 93005 ELECTROCARDIOGRAM TRACING: CPT

## 2023-06-26 RX ORDER — CLONIDINE HYDROCHLORIDE 0.1 MG/1
0.1 TABLET ORAL
Status: COMPLETED | OUTPATIENT
Start: 2023-06-26 | End: 2023-06-26

## 2023-06-26 RX ADMIN — CLONIDINE HYDROCHLORIDE 0.1 MG: 0.1 TABLET ORAL at 01:06

## 2023-06-26 RX ADMIN — SODIUM CHLORIDE 500 ML: 9 INJECTION, SOLUTION INTRAVENOUS at 12:06

## 2023-06-26 NOTE — ED PROVIDER NOTES
SCRIBE #1 NOTE: I, Jodi Mcneal, am scribing for, and in the presence of, Axel Alexander Jr., MD. I have scribed the entire note.       History     Chief Complaint   Patient presents with    Dizziness     No AC in house and feels weak and dizzy.     Review of patient's allergies indicates:   Allergen Reactions    Methotrexate analogues     Cellcept [mycophenolate mofetil] Rash    Imuran [azathioprine] Rash         History of Present Illness     HPI    6/26/2023, 12:10 AM  History obtained from the patient      History of Present Illness: Flory Cam is a 77 y.o. female patient with a PMHx of anxiety, bilateral carotid artery stenosis, CAD, GERD, HTN, HLD, and PVD who presents to the Emergency Department for evaluation of dizziness which onset PTA. Pt reports that her A/C has been broken for about a week. Symptoms are constant and moderate in severity. Pt reports that sxs are worse with standing. No mitigating or other exacerbating factors reported. Associated sxs include generalized weakness and chills. Patient denies any fever, CP, palpitations, abdominal pain, N/V/D, and all other sxs at this time. No prior Tx reported. Pt reports tobacco use. Strong smell of tobacco at bedside. No further complaints or concerns at this time.       Arrival mode: Personal vehicle    PCP: Tayler Ovalle DO        Past Medical History:  Past Medical History:   Diagnosis Date    Acute coronary syndrome     Anxiety     Bilateral carotid artery stenosis 11/17/2015    Chronic pain     Colon polyp     Coronary artery disease     GERD (gastroesophageal reflux disease)     Hyperlipidemia     Hypertension     Hypothyroidism     Low back pain     Lupus     Osteoporosis     Poor circulation     Pre-operative clearance 7/12/2019    PVD (peripheral vascular disease)     S/P peripheral artery angioplasty 02/13/2014    SCCA (squamous cell carcinoma) of skin 10/2019    Dr. ZANDRA Rivas--oncology    Skin disease        Past Surgical  History:  Past Surgical History:   Procedure Laterality Date    AORTOGRAPHY WITH SERIALOGRAPHY N/A 2021    Procedure: AORTOGRAM, WITH RUNOFF;  Surgeon: Christopher Cohen MD;  Location: [facility] CATH LAB;  Service: Cardiology;  Laterality: N/A;  COVID-19, mRNA, LNP-S, PF, 30 mcg/0.3 mL dose (COVID-19, MRNA, LN-S, PF (Pfizer)) 2021, 2021    Atherosclerotic PVD with intermittent claudication Bilateral 2021    Dr. Cohen    CATARACT EXTRACTION      COLONOSCOPY N/A 2017    Procedure: COLONOSCOPY;  Surgeon: Sylvester Arboleda III, MD;  Location: [facility] ENDO;  Service: Endoscopy;  Laterality: N/A;    COLONOSCOPY N/A 2020    Procedure: COLONOSCOPY;  Surgeon: Sylvester Arboleda III, MD;  Location: [facility] ENDO;  Service: Endoscopy;  Laterality: N/A;    CORONARY ANGIOPLASTY      CORONARY ARTERY BYPASS GRAFT      EXCISION OF MASS OF HAND Right 2022    Procedure: EXCISION, MASS, HAND;  Surgeon: Hernan Culver MD;  Location: [facility] OR;  Service: Orthopedics;  Laterality: Right;   thenar eminence skin lesion excised and a Z-plasty skin flap for coverage    HYSTERECTOMY      OOPHORECTOMY      THYROIDECTOMY           Family History:  Family History   Problem Relation Age of Onset    Hypertension Mother     Stroke Mother     Lung cancer Daughter     Melanoma Neg Hx     Psoriasis Neg Hx     Lupus Neg Hx     Eczema Neg Hx        Social History:  Social History     Tobacco Use    Smoking status: Former     Packs/day: 0.25     Years: 40.00     Pack years: 10.00     Types: Cigarettes     Start date:      Quit date: 2023     Years since quittin.3    Smokeless tobacco: Never    Tobacco comments:     hold midnight prior to surgery   Substance and Sexual Activity    Alcohol use: Never    Drug use: No    Sexual activity: Not Currently     Partners: Male        Review of Systems     Review of Systems   Constitutional:  Positive for chills. Negative for fever.   HENT:  Negative for sore throat.    Respiratory:   Negative for shortness of breath.    Cardiovascular:  Negative for chest pain and palpitations.   Gastrointestinal:  Negative for abdominal pain, diarrhea, nausea and vomiting.   Genitourinary:  Negative for dysuria.   Musculoskeletal:  Negative for back pain.   Skin:  Negative for rash.   Neurological:  Positive for dizziness and weakness (generalized).   Hematological:  Does not bruise/bleed easily.   All other systems reviewed and are negative.     Physical Exam     Initial Vitals [06/26/23 0004]   BP Pulse Resp Temp SpO2   (!) 160/70 68 18 98.7 °F (37.1 °C) 97 %      MAP       --          Physical Exam  Nursing Notes and Vital Signs Reviewed.  Constitutional: Patient is in no acute distress. Well-developed and well-nourished.  Head: Atraumatic. Normocephalic.  Eyes:  EOM intact.  No scleral icterus.  ENT: Mucous membranes are moist.  Nares clear   Neck:  Full ROM. No JVD.  Cardiovascular: Regular rate. Regular rhythm No murmurs, rubs, or gallops. Distal pulses are 2+ and symmetric  Pulmonary/Chest: No respiratory distress. Clear to auscultation bilaterally. No wheezing or rales.  Equal chest wall rise bilaterally  Abdominal: Soft and non-distended.  There is no tenderness.  No rebound, guarding, or rigidity. Good bowel sounds.  Genitourinary: No CVA tenderness.  No suprapubic tenderness  Musculoskeletal: Moves all extremities. No obvious deformities.  5 x 5 strength in all extremities   Skin: Warm and dry.  Neurological:  Alert, awake, and appropriate.  Normal speech.  No acute focal neurological deficits are appreciated.  Two through 12 intact bilaterally.  Psychiatric: Normal affect. Good eye contact. Appropriate in content.     ED Course   Procedures  ED Vital Signs:  Vitals:    06/26/23 0004 06/26/23 0051 06/26/23 0138 06/26/23 0243   BP: (!) 160/70  (!) 191/82 (!) 171/70   Pulse: 68 72  60   Resp: 18      Temp: 98.7 °F (37.1 °C)      TempSrc: Oral      SpO2: 97%   98%   Weight:        06/26/23 0303 06/26/23  0309   BP: (!) 157/67    Pulse: 60    Resp: 17    Temp:     TempSrc:     SpO2: 95%    Weight:  84.3 kg (185 lb 13.6 oz)       Abnormal Lab Results:  Labs Reviewed   COMPREHENSIVE METABOLIC PANEL - Abnormal; Notable for the following components:       Result Value    Potassium 3.3 (*)     Albumin 3.3 (*)     ALT 9 (*)     All other components within normal limits   TROPONIN I - Abnormal; Notable for the following components:    Troponin I 0.048 (*)     All other components within normal limits   URINALYSIS, REFLEX TO URINE CULTURE - Abnormal; Notable for the following components:    Color, UA Colorless (*)     All other components within normal limits    Narrative:     Specimen Source->Urine   TROPONIN I - Abnormal; Notable for the following components:    Troponin I 0.027 (*)     All other components within normal limits   CBC W/ AUTO DIFFERENTIAL   CK        All Lab Results:  Results for orders placed or performed during the hospital encounter of 06/26/23   Comprehensive metabolic panel   Result Value Ref Range    Sodium 138 136 - 145 mmol/L    Potassium 3.3 (L) 3.5 - 5.1 mmol/L    Chloride 101 95 - 110 mmol/L    CO2 28 23 - 29 mmol/L    Glucose 102 70 - 110 mg/dL    BUN 16 8 - 23 mg/dL    Creatinine 0.7 0.5 - 1.4 mg/dL    Calcium 9.3 8.7 - 10.5 mg/dL    Total Protein 7.0 6.0 - 8.4 g/dL    Albumin 3.3 (L) 3.5 - 5.2 g/dL    Total Bilirubin 0.4 0.1 - 1.0 mg/dL    Alkaline Phosphatase 130 55 - 135 U/L    AST 19 10 - 40 U/L    ALT 9 (L) 10 - 44 U/L    eGFR >60 >60 mL/min/1.73 m^2    Anion Gap 9 8 - 16 mmol/L   CBC auto differential   Result Value Ref Range    WBC 11.38 3.90 - 12.70 K/uL    RBC 4.40 4.00 - 5.40 M/uL    Hemoglobin 13.2 12.0 - 16.0 g/dL    Hematocrit 40.2 37.0 - 48.5 %    MCV 91 82 - 98 fL    MCH 30.0 27.0 - 31.0 pg    MCHC 32.8 32.0 - 36.0 g/dL    RDW 12.3 11.5 - 14.5 %    Platelets 290 150 - 450 K/uL    MPV 9.8 9.2 - 12.9 fL    Immature Granulocytes 0.4 0.0 - 0.5 %    Gran # (ANC) 7.5 1.8 - 7.7 K/uL     Immature Grans (Abs) 0.04 0.00 - 0.04 K/uL    Lymph # 2.6 1.0 - 4.8 K/uL    Mono # 1.0 0.3 - 1.0 K/uL    Eos # 0.1 0.0 - 0.5 K/uL    Baso # 0.05 0.00 - 0.20 K/uL    nRBC 0 0 /100 WBC    Gran % 66.3 38.0 - 73.0 %    Lymph % 23.1 18.0 - 48.0 %    Mono % 8.6 4.0 - 15.0 %    Eosinophil % 1.2 0.0 - 8.0 %    Basophil % 0.4 0.0 - 1.9 %    Differential Method Automated    Troponin I   Result Value Ref Range    Troponin I 0.048 (H) 0.000 - 0.026 ng/mL   Urinalysis, Reflex to Urine Culture Urine, Clean Catch    Specimen: Urine   Result Value Ref Range    Specimen UA Urine, Clean Catch     Color, UA Colorless (A) Yellow, Straw, Leyda    Appearance, UA Clear Clear    pH, UA 7.0 5.0 - 8.0    Specific Gravity, UA 1.005 1.005 - 1.030    Protein, UA Negative Negative    Glucose, UA Negative Negative    Ketones, UA Negative Negative    Bilirubin (UA) Negative Negative    Occult Blood UA Negative Negative    Nitrite, UA Negative Negative    Urobilinogen, UA Negative <2.0 EU/dL    Leukocytes, UA Negative Negative   CPK   Result Value Ref Range    CPK 58 20 - 180 U/L   Troponin I   Result Value Ref Range    Troponin I 0.027 (H) 0.000 - 0.026 ng/mL     *Note: Due to a large number of results and/or encounters for the requested time period, some results have not been displayed. A complete set of results can be found in Results Review.         Imaging Results:  Imaging Results              X-Ray Chest AP Portable (Preliminary result)  Result time 06/26/23 00:38:45      Wet Read by Axel Alexander Jr., MD (06/26/23 00:38:45, [facility] - Emergency Dept., Emergency Medicine)    Rotated.  No infiltrate.  Cardiomegaly.  Postoperative changes                                     The EKG was ordered, reviewed, and independently interpreted by the ED provider.  Interpretation time: 00:30  Rate: 60 BPM  Rhythm: Sinus rhythm with premature atrial complexes  Interpretation: Right bundle branch block. Left anterior fascicular block. * * Bifascicular  block * *. No STEMI.           The Emergency Provider reviewed the vital signs and test results, which are outlined above.     ED Discussion     3:27 AM: Reassessed pt at this time. Discussed with pt all pertinent ED information and results. Discussed pt dx and plan of tx. Gave pt all f/u and return to the ED instructions. All questions and concerns were addressed at this time. Pt expresses understanding of information and instructions, and is comfortable with plan to discharge. Pt is stable for discharge.    I discussed with patient and/or family/caretaker that evaluation in the ED does not suggest any emergent or life threatening medical conditions requiring immediate intervention beyond what was provided in the ED, and I believe patient is safe for discharge.  Regardless, an unremarkable evaluation in the ED does not preclude the development or presence of a serious of life threatening condition. As such, patient was instructed to return immediately for any worsening or change in current symptoms.       Medical Decision Making:   History:   Old Medical Records: I decided to obtain old medical records.  Old Records Summarized: records from previous admission(s) and records from clinic visits.       <> Summary of Records: Have reviewed the patient's prior laboratory history.  This was utilized in comparison with today's results.  Initial Assessment:   Patient presents complaining of generalized weakness.  She is been in the home without any air conditioning all week.  Is due to get fixed tomorrow.  She denies any other symptoms.  She has no chest pain palpitations shortness of breath abdominal pain nausea vomiting diarrhea or fever/chills.  Physical exam is benign.  Differential Diagnosis:   Dizziness, ASCVD, cardiac ischemia, dehydration, orthostatic hypotension.  Clinical Tests:   Lab Tests: Ordered and Reviewed  Radiological Study: Ordered and Reviewed  Medical Tests: Ordered and Reviewed  ED  Management:  Evaluated patient hit patient with history and physical examination.  I ordered a EKG and independently interpreted.  This is showing no STEMI and no dysrhythmia.  Ordered interpreted a chest x-ray.  There is no infiltrate.  It is rotated a bit.  There are postoperative changes noted.  I ordered interpreted all laboratory studies.  She would a negative UA and normal CPK CMP showing mild hypokalemia with a potassium of 3.3 and a normal CBC.  Initial troponin was a bit elevated and a 3 hour repeat was trending downward.  Have compared these to prior and the patient has a history of elevated troponins in the recent past.  Patient does get a bit dizzy when she stands which is positive for orthostatics.  I have dosed the patient with IV fluids she feels much better.  Clinically the patient is dehydrated with dizziness secondary to prolonged heat exposure.  She is stable safe for discharge in my opinion.  I discussed with her all findings well as the plan of care.  She verbalized agreement understanding with all instructions seems reliable.  I have advised the patient to quit smoking.  She will return if her symptoms worsen any way or for any problems questions or concerns         ED Medication(s):  Medications   sodium chloride 0.9% bolus 500 mL 500 mL (0 mLs Intravenous Stopped 6/26/23 0132)   cloNIDine tablet 0.1 mg (0.1 mg Oral Given 6/26/23 0138)       New Prescriptions    No medications on file        Follow-up Information       Tayler Ovalle DO.    Specialty: Internal Medicine  Contact information:  [address] [place] [address] [address] Jesi MCDOWELL [id number] [phone]                                 Scribe Attestation:   Scribe #1: I performed the above scribed service and the documentation accurately describes the services I performed. I attest to the accuracy of the note.     Attending:   Physician Attestation Statement for Scribe #1: I, Axel Alexander Jr., MD, personally performed the services described in  this documentation, as scribed by Jodi Mcneal, in my presence, and it is both accurate and complete.           Clinical Impression       ICD-10-CM ICD-9-CM   1. Dizziness  R42 780.4   2. Weakness  R53.1 780.79   3. Dehydration  E86.0 276.51   4. Troponin I above reference range  R77.8 790.6       Disposition:   Disposition: Discharged  Condition: Stable       Axel Alexander Jr., MD  06/26/23 0339

## 2023-06-27 ENCOUNTER — PATIENT OUTREACH (OUTPATIENT)
Dept: EMERGENCY MEDICINE | Facility: HOSPITAL | Age: 78
End: 2023-06-27
Payer: MEDICARE

## 2023-06-27 NOTE — PROGRESS NOTES
Patient declined assistance making a follow-up appointment with her PCP at this time.      Annalise Pizano  [phone]

## 2023-06-28 ENCOUNTER — OFFICE VISIT (OUTPATIENT)
Dept: SPORTS MEDICINE | Facility: CLINIC | Age: 78
End: 2023-06-28
Payer: MEDICARE

## 2023-06-28 DIAGNOSIS — M19.90 EXACERBATION OF OSTEOARTHRITIS: ICD-10-CM

## 2023-06-28 DIAGNOSIS — S83.281A TEAR OF LATERAL MENISCUS OF RIGHT KNEE, CURRENT, UNSPECIFIED TEAR TYPE, INITIAL ENCOUNTER: ICD-10-CM

## 2023-06-28 DIAGNOSIS — M17.11 PRIMARY OSTEOARTHRITIS OF RIGHT KNEE: Primary | ICD-10-CM

## 2023-06-28 DIAGNOSIS — M25.461 EFFUSION OF RIGHT KNEE: Primary | ICD-10-CM

## 2023-06-28 DIAGNOSIS — M17.11 PRIMARY OSTEOARTHRITIS OF RIGHT KNEE: ICD-10-CM

## 2023-06-28 LAB
APPEARANCE FLD: NORMAL
BODY FLD TYPE: NORMAL
BODY FLD TYPE: NORMAL
COLOR FLD: NORMAL
CRYSTALS FLD MICRO: NEGATIVE
LYMPHOCYTES NFR FLD MANUAL: 1 %
MONOS+MACROS NFR FLD MANUAL: 2 %
NEUTROPHILS NFR FLD MANUAL: 97 %
PATH INTERP FLD-IMP: NORMAL
WBC # FLD: NORMAL /CU MM

## 2023-06-28 PROCEDURE — 20611 DRAIN/INJ JOINT/BURSA W/US: CPT | Mod: RT,S$GLB,, | Performed by: STUDENT IN AN ORGANIZED HEALTH CARE EDUCATION/TRAINING PROGRAM

## 2023-06-28 PROCEDURE — 97760 PR ORTHOTIC MGMT&TRAINJ INITIAL ENC EA 15 MINS: ICD-10-PCS | Mod: GP,S$GLB,97, | Performed by: STUDENT IN AN ORGANIZED HEALTH CARE EDUCATION/TRAINING PROGRAM

## 2023-06-28 PROCEDURE — 87205 SMEAR GRAM STAIN: CPT | Performed by: STUDENT IN AN ORGANIZED HEALTH CARE EDUCATION/TRAINING PROGRAM

## 2023-06-28 PROCEDURE — 1160F RVW MEDS BY RX/DR IN RCRD: CPT | Mod: CPTII,S$GLB,, | Performed by: STUDENT IN AN ORGANIZED HEALTH CARE EDUCATION/TRAINING PROGRAM

## 2023-06-28 PROCEDURE — 1159F MED LIST DOCD IN RCRD: CPT | Mod: CPTII,S$GLB,, | Performed by: STUDENT IN AN ORGANIZED HEALTH CARE EDUCATION/TRAINING PROGRAM

## 2023-06-28 PROCEDURE — 97760 ORTHOTIC MGMT&TRAING 1ST ENC: CPT | Mod: GP,S$GLB,97, | Performed by: STUDENT IN AN ORGANIZED HEALTH CARE EDUCATION/TRAINING PROGRAM

## 2023-06-28 PROCEDURE — 20611 LARGE JOINT ASPIRATION/INJECTION: R SUPRA PATELLAR BURSA: ICD-10-PCS | Mod: RT,S$GLB,, | Performed by: STUDENT IN AN ORGANIZED HEALTH CARE EDUCATION/TRAINING PROGRAM

## 2023-06-28 PROCEDURE — 1160F PR REVIEW ALL MEDS BY PRESCRIBER/CLIN PHARMACIST DOCUMENTED: ICD-10-PCS | Mod: CPTII,S$GLB,, | Performed by: STUDENT IN AN ORGANIZED HEALTH CARE EDUCATION/TRAINING PROGRAM

## 2023-06-28 PROCEDURE — 99204 OFFICE O/P NEW MOD 45 MIN: CPT | Mod: 25,S$GLB,, | Performed by: STUDENT IN AN ORGANIZED HEALTH CARE EDUCATION/TRAINING PROGRAM

## 2023-06-28 PROCEDURE — 99204 PR OFFICE/OUTPT VISIT, NEW, LEVL IV, 45-59 MIN: ICD-10-PCS | Mod: 25,S$GLB,, | Performed by: STUDENT IN AN ORGANIZED HEALTH CARE EDUCATION/TRAINING PROGRAM

## 2023-06-28 PROCEDURE — 87075 CULTR BACTERIA EXCEPT BLOOD: CPT | Performed by: STUDENT IN AN ORGANIZED HEALTH CARE EDUCATION/TRAINING PROGRAM

## 2023-06-28 PROCEDURE — 99999 PR PBB SHADOW E&M-EST. PATIENT-LVL II: CPT | Mod: PBBFAC,,, | Performed by: STUDENT IN AN ORGANIZED HEALTH CARE EDUCATION/TRAINING PROGRAM

## 2023-06-28 PROCEDURE — 99999 PR PBB SHADOW E&M-EST. PATIENT-LVL II: ICD-10-PCS | Mod: PBBFAC,,, | Performed by: STUDENT IN AN ORGANIZED HEALTH CARE EDUCATION/TRAINING PROGRAM

## 2023-06-28 PROCEDURE — 87070 CULTURE OTHR SPECIMN AEROBIC: CPT | Performed by: STUDENT IN AN ORGANIZED HEALTH CARE EDUCATION/TRAINING PROGRAM

## 2023-06-28 PROCEDURE — 89060 EXAM SYNOVIAL FLUID CRYSTALS: CPT | Performed by: STUDENT IN AN ORGANIZED HEALTH CARE EDUCATION/TRAINING PROGRAM

## 2023-06-28 PROCEDURE — 89051 BODY FLUID CELL COUNT: CPT | Performed by: STUDENT IN AN ORGANIZED HEALTH CARE EDUCATION/TRAINING PROGRAM

## 2023-06-28 PROCEDURE — 1159F PR MEDICATION LIST DOCUMENTED IN MEDICAL RECORD: ICD-10-PCS | Mod: CPTII,S$GLB,, | Performed by: STUDENT IN AN ORGANIZED HEALTH CARE EDUCATION/TRAINING PROGRAM

## 2023-06-28 RX ORDER — TRIAMCINOLONE ACETONIDE 40 MG/ML
40 INJECTION, SUSPENSION INTRA-ARTICULAR; INTRAMUSCULAR
Status: DISCONTINUED | OUTPATIENT
Start: 2023-06-28 | End: 2023-06-28 | Stop reason: HOSPADM

## 2023-06-28 RX ADMIN — TRIAMCINOLONE ACETONIDE 40 MG: 40 INJECTION, SUSPENSION INTRA-ARTICULAR; INTRAMUSCULAR at 08:06

## 2023-06-28 NOTE — PATIENT INSTRUCTIONS
Assessment:  Flory Cam is a 78 y.o. female No chief complaint on file.      Encounter Diagnoses   Name Primary?    Effusion of right knee Yes    Primary osteoarthritis of right knee     Tear of lateral meniscus of right knee, current, unspecified tear type, initial encounter     Exacerbation of osteoarthritis         Plan:  Ultrasound guided aspiration and steroid injection right knee  We discussed the proper protocols after the injection such as no submerging pools, baths tubs, or hot tubs for 24 hr.  Showering is okay today.  We also discussed that blood sugars can be elevated after an injection and asked patient to properly checked her sugars over the next few days and contact their PCP if there are any concerns.  We discussed red flags such as fevers, chills, red, warm, tender joint at the area of injection to please seek medical care immediately.    Apply topical diclofenac (Voltaren) up to 4 times a day to the affected area.  It can be bought over the counter at any local pharmacy.    Patient can use ice every 2 hours for 15 minutes as needed  Hinged knee brace given to patient- right knee   Under the direction of Dr. James Raymond, 15 minutes were spent sizing, fitting, and educating for durable medical equipment application today.  CPT 57964.  Labs collected and sent from aspiration     Follow-up: 8 weeks or sooner if there are any problems between now and then.    Thank you for choosing [facility] and Dr. James Raymond for your orthopedic & sports medicine care. It is our goal to provide you with exceptional care that will help keep you healthy, active, and get you back in the game.    Please do not hesitate to reach out to us via email, phone, or MyChart with any questions, concerns, or feedback.    If you felt that you received exemplary care today, please consider leaving us feedback on [employer]  at:  [email or web address]    If you are experiencing pain/discomfort ,or have questions after 5pm and would like to be connected to the [facility] on-call team, please call this number and specify which Sports Medicine provider is treating you: [phone]

## 2023-06-28 NOTE — PROGRESS NOTES
Patient ID: Flory Cam  YOB: 1945  MRN: [id number]    Chief Complaint: Pain and Swelling of the Right Knee      Referred By: Tayler Ovalle DO for right knee pain    History of Present Illness:   Flory Cam is a 77 y.o. female patient with a PMHx of HTN, coronary artery disease, lupus, osteoporosis,  who presents here today for right knee pain. She went to the Emergency Department for evaluation of knee pain which onset gradually roughly 2-3 weeks ago . Pt reports falling on her right knee and has been experiencing knee pain and swelling since then. She is ambulating by wheelchair and is here today with her Granddaughter, and states that she is still experiencing knee pain, 9/10.        Past Medical History:   Past Medical History:   Diagnosis Date    Acute coronary syndrome     Anxiety     Bilateral carotid artery stenosis 11/17/2015    Chronic pain     Colon polyp     Coronary artery disease     GERD (gastroesophageal reflux disease)     Hyperlipidemia     Hypertension     Hypothyroidism     Low back pain     Lupus     Osteoporosis     Poor circulation     Pre-operative clearance 7/12/2019    PVD (peripheral vascular disease)     S/P peripheral artery angioplasty 02/13/2014    SCCA (squamous cell carcinoma) of skin 10/2019    Dr. ZANDRA Rivas--oncology    Skin disease      Past Surgical History:   Procedure Laterality Date    AORTOGRAPHY WITH SERIALOGRAPHY N/A 5/25/2021    Procedure: AORTOGRAM, WITH RUNOFF;  Surgeon: Christopher Cohen MD;  Location: [facility] CATH LAB;  Service: Cardiology;  Laterality: N/A;  COVID-19, mRNA, LNP-S, PF, 30 mcg/0.3 mL dose (COVID-19, MRNA, LN-S, PF (Pfizer)) 1/30/2021, 1/9/2021    Atherosclerotic PVD with intermittent claudication Bilateral 05/2021    Dr. Cohen    CATARACT EXTRACTION      COLONOSCOPY N/A 1/4/2017    Procedure: COLONOSCOPY;  Surgeon: Sylvester Arboleda III, MD;  Location: [facility] ENDO;  Service: Endoscopy;  Laterality: N/A;    COLONOSCOPY  N/A 2020    Procedure: COLONOSCOPY;  Surgeon: Sylvester Arboleda III, MD;  Location: [facility] ENDO;  Service: Endoscopy;  Laterality: N/A;    CORONARY ANGIOPLASTY      CORONARY ARTERY BYPASS GRAFT      EXCISION OF MASS OF HAND Right 2022    Procedure: EXCISION, MASS, HAND;  Surgeon: Hernan Culver MD;  Location: [facility] OR;  Service: Orthopedics;  Laterality: Right;   thenar eminence skin lesion excised and a Z-plasty skin flap for coverage    HYSTERECTOMY      OOPHORECTOMY      THYROIDECTOMY       Family History   Problem Relation Age of Onset    Hypertension Mother     Stroke Mother     Lung cancer Daughter     Melanoma Neg Hx     Psoriasis Neg Hx     Lupus Neg Hx     Eczema Neg Hx      Social History     Socioeconomic History    Marital status:     Number of children: 3   Occupational History    Occupation: Retired     Comment: Dispatcher   Tobacco Use    Smoking status: Former     Packs/day: 0.25     Years: 40.00     Pack years: 10.00     Types: Cigarettes     Start date:      Quit date: 2023     Years since quittin.3    Smokeless tobacco: Never    Tobacco comments:     hold midnight prior to surgery   Substance and Sexual Activity    Alcohol use: Never    Drug use: No    Sexual activity: Not Currently     Partners: Male   Other Topics Concern    Are you pregnant or think you may be? No    Breast-feeding No   Social History Narrative    Patient is retired dispatcher.     Medication List with Changes/Refills   Current Medications    ALBUTEROL (PROVENTIL/VENTOLIN HFA) 90 MCG/ACTUATION INHALER    Inhale 2 puffs into the lungs every 6 (six) hours as needed for Wheezing.    ALBUTEROL-IPRATROPIUM (DUO-NEB) 2.5 MG-0.5 MG/3 ML NEBULIZER SOLUTION    Take 3 mLs by nebulization every 6 (six) hours as needed for Wheezing. Rescue    AMLODIPINE (NORVASC) 10 MG TABLET    Take 1 tablet (10 mg total) by mouth once daily.    ASPIRIN (ECOTRIN) 81 MG EC TABLET    Take 1 tablet (81 mg total) by mouth once  daily.    ATORVASTATIN (LIPITOR) 80 MG TABLET    Take 1 tablet (80 mg total) by mouth once daily.    CITALOPRAM (CELEXA) 10 MG TABLET    Take 10 mg by mouth once daily.    CLOPIDOGREL (PLAVIX) 75 MG TABLET    TAKE 1 TABLET(75 MG) BY MOUTH EVERY DAY    DICLOFENAC SODIUM (VOLTAREN) 1 % GEL    Apply 2 g topically 4 (four) times daily. for 10 days    ERGOCALCIFEROL (ERGOCALCIFEROL) 50,000 UNIT CAP    Take 1 capsule (50,000 Units total) by mouth every 7 days.    EZETIMIBE (ZETIA) 10 MG TABLET    TAKE 1 TABLET(10 MG) BY MOUTH EVERY DAY    FLUOCINONIDE 0.05% (LIDEX) 0.05 % CREAM    Apply to affected area of hands every other day    LEVOTHYROXINE (SYNTHROID) 137 MCG TAB TABLET    TAKE 1 TABLET BY MOUTH EVERY DAY    ONDANSETRON (ZOFRAN) 4 MG TABLET    TAKE 1 TABLET(4 MG) BY MOUTH EVERY 8 HOURS AS NEEDED FOR NAUSEA    OXYCODONE-ACETAMINOPHEN (PERCOCET)  MG PER TABLET    Take 1 tablet by mouth.    PREDNISONE (DELTASONE) 20 MG TABLET    One tablet BID x 3 days, one tablet daily x 3 days then 1/2 tablet daily x 4 days.    PROCHLORPERAZINE (COMPAZINE) 10 MG TABLET        TAZAROTENE (TAZORAC) 0.1 % CREAM    Apply to affected area of hands every other day    TRAZODONE (DESYREL) 50 MG TABLET    TAKE 1 TABLET BY MOUTH EVERY DAY AT NIGHT AS NEEDED FOR INSOMNIA    VALSARTAN (DIOVAN) 320 MG TABLET    TAKE 1 TABLET(320 MG) BY MOUTH EVERY DAY     Review of patient's allergies indicates:   Allergen Reactions    Methotrexate analogues     Cellcept [mycophenolate mofetil] Rash    Imuran [azathioprine] Rash       Physical Exam:   There is no height or weight on file to calculate BMI.    GENERAL: Well appearing, in no acute distress.  HEAD: Normocephalic and atraumatic.  ENT: External ears and nose grossly normal.  EYES: EOMI bilaterally  PULMONARY: Respirations are grossly even and non-labored.  NEURO: Awake, alert, and oriented x 3.  SKIN: No obvious rashes appreciated.  PSYCH: Mood & affect are appropriate.    Detailed MSK exam:      Right knee exam:   -ROM: extension 0, flexion 100  -TTP: Medial joint line and Lateral joint line  -effusion: moderate  -Patellar apprehension negative  -Anny test positive -  lateral  -stable to varus and valgus stress tests  -Lachman test difficult to assess, anterior drawer test difficult to assess, posterior drawer test difficult to assess    Left knee exam:   -ROM: extension 0, flexion 120  -TTP: None  -effusion: none  -Patellar apprehension negative  -Anny test negative  -stable to varus and valgus stress tests  -Lachman test difficult to assess, anterior drawer test difficult to assess, posterior drawer test difficult to assess      Imaging:  X-Ray Chest AP Portable  Narrative: EXAMINATION:  XR CHEST AP PORTABLE    CLINICAL HISTORY:  weakness;    FINDINGS:  Single view of the chest.  Comparison 05/22/2023.  Patient rotation limits evaluation.    Sternotomy wires are intact.    Cardiac silhouette is normal.  The lungs demonstrate no evidence of active disease.  No evidence of pleural effusion or pneumothorax.  Bones appear intact.  Moderate scattered degenerative changes and diffuse osteopenia.  Aorta demonstrates moderate atherosclerotic disease.  Impression: No acute process seen.    Electronically signed by: Solitario Park MD  Date:    06/26/2023  Time:    07:26        Relevant imaging results were reviewed and interpreted by me and per my read shows medial compartment joint space narrowing with osteophytes on right knee radiographs.  This was discussed with the patient and / or family today.     Assessment:  Flory Cam is a 78 y.o. female presenting with right knee pain and swelling s/p fall last month.   History, physical and radiographs are consistent with a likely diagnosis of OA exacerbation, possible LMT.   Plan: Steroid injection given today (see separate procedure note for details). We discussed the proper protocols after the injection such as no submerging pools, baths tubs, or  hot tubs for 24 hr.  Showering is okay today.  We also discussed that blood sugars can be elevated after an injection and asked patient to properly checked her sugars over the next few days and contact their PCP if there are any concerns.  We discussed red flags such as fevers, chills, red, warm, tender joint at the area of injection to please seek medical care immediately.   23 cc aspirated and sent to lab for analysis. Ice, voltaren gel as needed. Knee brace given. Continue conservative management for pain.   Follow up 8 weeks. All questions answered.      Effusion of right knee  -     Sports Medicine US - Guidance for Needle Placement  -     Large Joint Aspiration/Injection: R supra patellar bursa  -     WBC & Diff,Body Fluid Joint Fluid, Right Knee  -     Body fluid crystal Joint Fluid, Right Knee  -     CULTURE, ANAEROBE  -     CULTURE, FLUID  (AEROBIC)    Primary osteoarthritis of right knee  -     Large Joint Aspiration/Injection: R supra patellar bursa    Tear of lateral meniscus of right knee, current, unspecified tear type, initial encounter    Exacerbation of osteoarthritis  -     Large Joint Aspiration/Injection: R supra patellar bursa       Ultrasound guidance was used for needle localization. Images were saved and stored for documentation. The appropriate structures were visualized. Dynamic visualization of the needle was continuous throughout the procedures and maintained good position.     At least 15 minutes were spent sizing, fitting, and educating for durable medical equipment application today. This service was performed under direction of James Raymond MD. CPT 49299.      A copy of today's visit note has been sent to the referring provider.     Electronically signed:  James Raymond MD, MPH  06/28/2023  9:00 AM

## 2023-06-28 NOTE — PROCEDURES
Large Joint Aspiration/Injection: R supra patellar bursa    Date/Time: 6/28/2023 8:40 AM  Performed by: James Raymond MD  Authorized by: James Raymond MD     Consent Done?:  Yes (Verbal)  Indications:  Arthritis, pain, joint swelling and diagnostic evaluation  Site marked: the procedure site was marked    Timeout: prior to procedure the correct patient, procedure, and site was verified    Prep: patient was prepped and draped in usual sterile fashion    Local anesthetic:  Bupivacaine 0.5% without epinephrine and lidocaine 1% without epinephrine    Details:  Needle Size:  18 G  Ultrasonic Guidance for needle placement?: Yes    Images are saved and documented.  Approach:  Anterolateral  Location:  Knee  Site:  R supra patellar bursa  Medications:  40 mg triamcinolone acetonide 40 mg/mL  Aspirate amount (mL):  23  Aspirate:  Yellow, cloudy and blood-tinged  Lab: fluid sent for laboratory analysis    Patient tolerance:  Patient tolerated the procedure well with no immediate complications     Ultrasound guidance was used for needle localization. Images were saved and stored for documentation. The appropriate structures were visualized. Dynamic visualization of the needle was continuous throughout the procedures and maintained good position.

## 2023-06-28 NOTE — PROCEDURES
Sports Medicine US - Guidance for Needle Placement    Date/Time: 6/28/2023 8:40 AM  Performed by: James Raymond MD  Authorized by: James Raymond MD   Preparation: Patient was prepped and draped in the usual sterile fashion.  Local anesthesia used: no    Anesthesia:  Local anesthesia used: no    Sedation:  Patient sedated: no    Patient tolerance: patient tolerated the procedure well with no immediate complications  Comments: Ultrasound guidance was used for needle localization. Images were saved and stored for documentation. The appropriate structures were visualized. Dynamic visualization of the needle was continuous throughout the procedures and maintained good position.

## 2023-06-29 LAB — PATH INTERP FLD-IMP: NORMAL

## 2023-06-30 ENCOUNTER — EXTERNAL CHRONIC CARE MANAGEMENT (OUTPATIENT)
Dept: PRIMARY CARE CLINIC | Facility: CLINIC | Age: 78
End: 2023-06-30
Payer: MEDICARE

## 2023-06-30 PROCEDURE — 99490 CHRNC CARE MGMT STAFF 1ST 20: CPT | Mod: S$GLB,,, | Performed by: INTERNAL MEDICINE

## 2023-06-30 PROCEDURE — 99490 PR CHRONIC CARE MGMT, 1ST 20 MIN: ICD-10-PCS | Mod: S$GLB,,, | Performed by: INTERNAL MEDICINE

## 2023-07-03 ENCOUNTER — TELEPHONE (OUTPATIENT)
Dept: INTERNAL MEDICINE | Facility: CLINIC | Age: 78
End: 2023-07-03
Payer: MEDICARE

## 2023-07-03 LAB
BACTERIA FLD AEROBE CULT: NO GROWTH
GRAM STN SPEC: NORMAL
GRAM STN SPEC: NORMAL

## 2023-07-03 NOTE — TELEPHONE ENCOUNTER
Spoke to pt.  Pt states she has been without air conditioning for almost a month and without a letter detailing the medical necessity of AC due to her Lupus and other health conditions they will not fix her AC.  Please provide a letter for pt stating the medical necessity of having a working AC if appropriate and I will print and get it to the pt.

## 2023-07-03 NOTE — LETTER
July 5, 2023    Flory Cam  [address]  [facility] [address]             [facility]  [address] [place] [id number]  Phone: [phone]  Fax: [phone] To whom it may concern:     Flory Cam is currently a patient under my care. She is well known to me. Due to multiple chronic medical conditions it is of medical necessity that she has a functioning air conditioner in her dwelling.   Please accommodate.     If you have any questions or concerns, do not hesitate to contact my office.     Sincerely,       Tayler Ovalle D.O.   Internal Medicine Physician

## 2023-07-03 NOTE — TELEPHONE ENCOUNTER
----- Message from Kathy Jeronimo sent at 7/3/2023  7:46 AM CDT -----  Contact: Flory  Ezra is calling in regards to her needing a letter stating her condition.Due to the housing she live in will not fix her air condition.please call back at [phone]            Thanks  DELL

## 2023-07-05 NOTE — TELEPHONE ENCOUNTER
Spoke with pt.  Pt requests letter be mailed to address on file for her.  Letter printed and mailed.

## 2023-07-07 LAB — BACTERIA SPEC ANAEROBE CULT: NORMAL

## 2023-07-31 ENCOUNTER — EXTERNAL CHRONIC CARE MANAGEMENT (OUTPATIENT)
Dept: PRIMARY CARE CLINIC | Facility: CLINIC | Age: 78
End: 2023-07-31
Payer: MEDICARE

## 2023-07-31 PROCEDURE — 99439 CHRNC CARE MGMT STAF EA ADDL: CPT | Mod: S$GLB,,, | Performed by: INTERNAL MEDICINE

## 2023-07-31 PROCEDURE — 99439 PR CHRONIC CARE MGMT, EA ADDTL 20 MIN: ICD-10-PCS | Mod: S$GLB,,, | Performed by: INTERNAL MEDICINE

## 2023-07-31 PROCEDURE — 99490 PR CHRONIC CARE MGMT, 1ST 20 MIN: ICD-10-PCS | Mod: S$GLB,,, | Performed by: INTERNAL MEDICINE

## 2023-07-31 PROCEDURE — 99490 CHRNC CARE MGMT STAFF 1ST 20: CPT | Mod: S$GLB,,, | Performed by: INTERNAL MEDICINE

## 2023-08-09 ENCOUNTER — TELEPHONE (OUTPATIENT)
Dept: INTERNAL MEDICINE | Facility: CLINIC | Age: 78
End: 2023-08-09
Payer: MEDICARE

## 2023-08-09 NOTE — TELEPHONE ENCOUNTER
----- Message from Maddi Londono sent at 8/9/2023  8:01 AM CDT -----  Contact: self [phone]  Patient called in requesting a call back she has some questions. Please call back [phone]. Thanks tpw

## 2023-08-14 ENCOUNTER — TELEPHONE (OUTPATIENT)
Dept: ADMINISTRATIVE | Facility: HOSPITAL | Age: 78
End: 2023-08-14
Payer: MEDICARE

## 2023-08-16 ENCOUNTER — TELEPHONE (OUTPATIENT)
Dept: ORTHOPEDICS | Facility: CLINIC | Age: 78
End: 2023-08-16
Payer: MEDICARE

## 2023-08-31 ENCOUNTER — EXTERNAL CHRONIC CARE MANAGEMENT (OUTPATIENT)
Dept: PRIMARY CARE CLINIC | Facility: CLINIC | Age: 78
End: 2023-08-31
Payer: MEDICARE

## 2023-08-31 PROCEDURE — 99439 CHRNC CARE MGMT STAF EA ADDL: CPT | Mod: S$GLB,,, | Performed by: INTERNAL MEDICINE

## 2023-08-31 PROCEDURE — 99490 PR CHRONIC CARE MGMT, 1ST 20 MIN: ICD-10-PCS | Mod: S$GLB,,, | Performed by: INTERNAL MEDICINE

## 2023-08-31 PROCEDURE — 99439 PR CHRONIC CARE MGMT, EA ADDTL 20 MIN: ICD-10-PCS | Mod: S$GLB,,, | Performed by: INTERNAL MEDICINE

## 2023-08-31 PROCEDURE — 99490 CHRNC CARE MGMT STAFF 1ST 20: CPT | Mod: S$GLB,,, | Performed by: INTERNAL MEDICINE

## 2023-09-01 ENCOUNTER — TELEPHONE (OUTPATIENT)
Dept: INTERNAL MEDICINE | Facility: CLINIC | Age: 78
End: 2023-09-01
Payer: MEDICARE

## 2023-09-01 NOTE — TELEPHONE ENCOUNTER
Informed patient that pcp leaves clinic early on Fridays and she will need to come into the office to have any medication called out for her. Patient denies. I tried to recommend Imodium for diarrhea. It is OTC pt states that stuff doesn't work.

## 2023-09-15 ENCOUNTER — CARE AT HOME (OUTPATIENT)
Dept: HOME HEALTH SERVICES | Facility: CLINIC | Age: 78
End: 2023-09-15
Payer: MEDICARE

## 2023-09-15 VITALS
OXYGEN SATURATION: 99 % | HEART RATE: 78 BPM | SYSTOLIC BLOOD PRESSURE: 158 MMHG | DIASTOLIC BLOOD PRESSURE: 88 MMHG | RESPIRATION RATE: 18 BRPM

## 2023-09-15 DIAGNOSIS — Z78.9 IMPAIRED MOBILITY AND ACTIVITIES OF DAILY LIVING: ICD-10-CM

## 2023-09-15 DIAGNOSIS — Z74.09 IMPAIRED MOBILITY AND ACTIVITIES OF DAILY LIVING: ICD-10-CM

## 2023-09-15 DIAGNOSIS — R53.1 WEAKNESS: ICD-10-CM

## 2023-09-15 DIAGNOSIS — Z95.1 HX OF CABG: Chronic | ICD-10-CM

## 2023-09-15 DIAGNOSIS — I10 HYPERTENSION GOAL BP (BLOOD PRESSURE) < 140/90: Primary | Chronic | ICD-10-CM

## 2023-09-15 PROCEDURE — 99349 HOME/RES VST EST MOD MDM 40: CPT | Mod: S$GLB,,,

## 2023-09-15 PROCEDURE — 99349 PR HOME VISIT,ESTAB PATIENT,LEVEL III: ICD-10-PCS | Mod: S$GLB,,,

## 2023-09-15 NOTE — PROGRESS NOTES
[facility]  Medical Home Visit    Visit Date: 9/15/2023  Encounter Provider: Brendon Ramirez  PCP:  Tayler Ovalle DO    Subjective:      Patient ID: Flory Cam is a 78 y.o. female.    Consult Requested By:  No ref. provider found  Reason for Consult:  Routine follow up    Flory is being seen at home due to being seen at home due to physical debility that presents a taxing effort to leave the home, to mitigate high risk of hospital readmission and/or due to the limited availability of reliable or safe options for transportation to the point of access to the provider. Prior to treatment on this visit the chart was reviewed and patient verbal consent was obtained.    Chief Complaint: Follow-up      Patient is a 77 y.o. female patient being seen today for a follow up visit. She has medical diagnoses including HTN, coronary artery disease, lupus, and osteoporosis. Upon visit today she is sitting on her couch in no acute distress.  Her grandson is present during visit. She ambulates with a walker and uses a scooter. Reports weakness and joint pain. Ordered HH and PT/OT to [facility]. Denies recent falls. Appetite is good. Has trouble getting to her appointments, social work consulted. Denies any further complaints or concerns at this time. Reports compliance with medications. Questions elicited. Time allowed for questions, all issues addressed. Contact info given for any concerns or changes.             Review of Systems   Constitutional: Negative.    HENT: Negative.     Eyes: Negative.    Respiratory: Negative.  Negative for chest tightness.    Cardiovascular: Negative.  Negative for leg swelling.   Gastrointestinal: Negative.    Endocrine: Negative.    Genitourinary: Negative.    Musculoskeletal:  Positive for arthralgias and gait problem.   Skin: Negative.    Allergic/Immunologic: Negative.    Neurological:  Positive for weakness.   Hematological: Negative.    Psychiatric/Behavioral: Negative.  Negative for  agitation.    All other systems reviewed and are negative.      Assessments:  Environmental: Patient lives in an apartment complex on the ground floor.  There is adequate lighting and the temperature is comfortable.    Functional Status: Ambulates with walker and uses scooter.  Safety: Fall precautions.   Nutritional: Adequate food in the home.   Home Health/DME/Supplies: No HH.    Objective:   Physical Exam  Vitals reviewed.   Constitutional:       General: She is not in acute distress.     Appearance: Normal appearance. She is well-developed.   HENT:      Head: Normocephalic and atraumatic.      Nose: Nose normal.      Mouth/Throat:      Mouth: Mucous membranes are dry.      Pharynx: Oropharynx is clear.   Eyes:      Pupils: Pupils are equal, round, and reactive to light.   Cardiovascular:      Rate and Rhythm: Normal rate and regular rhythm.      Pulses: Normal pulses.      Heart sounds: Normal heart sounds.   Pulmonary:      Effort: Pulmonary effort is normal.      Breath sounds: Normal breath sounds.   Abdominal:      General: Bowel sounds are normal.      Palpations: Abdomen is soft.   Musculoskeletal:         General: Normal range of motion.      Cervical back: Normal range of motion and neck supple.   Skin:     General: Skin is warm and dry.   Neurological:      General: No focal deficit present.      Mental Status: She is alert and oriented to person, place, and time. Mental status is at baseline.      Motor: Weakness present.      Gait: Gait abnormal.   Psychiatric:         Mood and Affect: Mood normal.         Behavior: Behavior normal.         Thought Content: Thought content normal.         Judgment: Judgment normal.         Vitals:    09/15/23 1121   BP: (!) 158/88   Pulse: 78   Resp: 18   SpO2: 99%     There is no height or weight on file to calculate BMI.    Assessment:     1. Hypertension goal BP (blood pressure) < 140/90    2. CAD: Hx of CABG    3. Weakness    4. Impaired mobility and activities of  daily living        Plan:     Ethical / Legal: Advance Care Planning   Surrogate decision maker:  Jaciel Crawford, Relationship: Son  Code Status:  Full  [place]:  None  Other advance directive:  None, Capacity to make medical decisions:  Yes, Conflict No       Problem List Items Addressed This Visit          Cardiac/Vascular    Hypertension goal BP (blood pressure) < 140/90 - Primary (Chronic)    Current Assessment & Plan     BP slightly elevated during visit  Low sodium diet  Continue current medication  Monitor         Relevant Orders    Ambulatory referral/consult to Home Health    CAD: Hx of CABG (Chronic)    Current Assessment & Plan     Stable  Continue current medication         Relevant Orders    Ambulatory referral/consult to Home Health     Other Visit Diagnoses       Weakness        Ordered HH with PT/OT to eval   Fall precautions    Relevant Orders    Ambulatory referral/consult to Home Health    Impaired mobility and activities of daily living        Fall precautions  Ambulating with walker and using scooter  PT/OT           - Continue all medications, treatments and therapies as ordered.   - Follow all instructions, recommendations as discussed.  - Maintain Safety Precautions at all times.  - Attend all medical appointments as scheduled.  - For worsening symptoms: call Primary Care Physician or Nurse Practitioner.  - For emergencies, call 911 or immediately report to the nearest emergency room.     Were controlled substances prescribed?  No    Total visit time: 30+ minutes of total time spent on the encounter, which includes face to face time and non-face to face time preparing to see the patient (eg, review of tests), Obtaining and/or reviewing separately obtained history, documenting clinical information in the electronic or other health record, independently interpreting results (not separately reported) and communicating results to the patient/family/caregiver, or Care coordination (not separately  reported).     Follow Up Appointments:   Future Appointments   Date Time Provider Department Center   10/23/2023 10:30 AM Anabel Taylor PA-C [facility] RHEU  Medical C   10/30/2023  8:40 AM Tayler Ovalle DO [facility] Medical C   10/30/2023  9:30 AM Meghan Ambrocio NP [facility] C   3/27/2024 11:00 AM Mesfin Yoo MD [facility] NEURO  Medical C       Signature: Brendon Ramirez NP

## 2023-09-25 ENCOUNTER — TELEPHONE (OUTPATIENT)
Dept: DERMATOLOGY | Facility: CLINIC | Age: 78
End: 2023-09-25
Payer: MEDICARE

## 2023-09-25 NOTE — TELEPHONE ENCOUNTER
Attempted to return call. No answer. Message left to return call.  ----- Message from Stephen Beauchamp MA sent at 9/25/2023  1:55 PM CDT -----  The patient calling to schedule follow up appointments. Please give her a call back at [phone]

## 2023-09-27 ENCOUNTER — HOSPITAL ENCOUNTER (EMERGENCY)
Facility: HOSPITAL | Age: 78
Discharge: HOME | End: 2023-09-27
Attending: EMERGENCY MEDICINE
Payer: MEDICARE

## 2023-09-27 VITALS
HEART RATE: 71 BPM | SYSTOLIC BLOOD PRESSURE: 162 MMHG | TEMPERATURE: 98 F | OXYGEN SATURATION: 94 % | RESPIRATION RATE: 18 BRPM | DIASTOLIC BLOOD PRESSURE: 76 MMHG

## 2023-09-27 DIAGNOSIS — F41.9 ANXIETY: ICD-10-CM

## 2023-09-27 DIAGNOSIS — G89.4 CHRONIC PAIN DISORDER: Primary | ICD-10-CM

## 2023-09-27 LAB — SARS-COV-2 RDRP RESP QL NAA+PROBE: NEGATIVE

## 2023-09-27 PROCEDURE — 99284 EMERGENCY DEPT VISIT MOD MDM: CPT | Mod: 25,HCNC

## 2023-09-27 PROCEDURE — 25000003 PHARM REV CODE 250: Mod: HCNC | Performed by: EMERGENCY MEDICINE

## 2023-09-27 PROCEDURE — U0002 COVID-19 LAB TEST NON-CDC: HCPCS | Mod: HCNC | Performed by: EMERGENCY MEDICINE

## 2023-09-27 RX ORDER — BUTALBITAL, ACETAMINOPHEN AND CAFFEINE 50; 325; 40 MG/1; MG/1; MG/1
1 TABLET ORAL
Status: COMPLETED | OUTPATIENT
Start: 2023-09-27 | End: 2023-09-27

## 2023-09-27 RX ADMIN — BUTALBITAL, ACETAMINOPHEN, AND CAFFEINE 1 TABLET: 325; 50; 40 TABLET ORAL at 01:09

## 2023-09-27 NOTE — ED PROVIDER NOTES
SCRIBE #1 NOTE: I, Zaria Tabares, am scribing for, and in the presence of, Janet Santa MD. I have scribed the entire note.      History      Chief Complaint   Patient presents with    Generalized Body Aches     Brought in by ambulance for generalized body aches and headache.       Review of patient's allergies indicates:   Allergen Reactions    Methotrexate analogues     Cellcept [mycophenolate mofetil] Rash    Imuran [azathioprine] Rash        HPI   HPI    9/27/2023, 10:50 AM   History obtained from the patient      History of Present Illness: Flory Cam is a 78 y.o. female patient with a PMHx of bilateral carotid artery stenosis, CAD, HLD, HTN, hypothyroidism, Lupus, and PVD who presents to the Emergency Department for generalized myalgias and a headache which onset this morning. This morning, the pt had an appointment with her occupational therapist and started complaining of generalized myalgias and a headache, so her blood pressure was taken. The pt's blood pressure was noted to be 210/100, so 911 was called. Pt reports that she took her blood pressure medications today. EMS reports that they provided treatment for the pt's blood pressure and it lowered into the 160s systolic. Symptoms are constant and moderate in severity. No mitigating or exacerbating factors reported. No associated sxs reported. Patient denies any fever, chills, CP, SOB, N/V/D, weakness, and all other sxs at this time. Pt reports that she saw her PCP, Dr. Ovalle, a couple of months ago and has an appointment to see her PCP on 10/27/23. No further complaints or concerns at this time.         Arrival mode: EMS    PCP: Tayler Ovalle DO       Past Medical History:  Past Medical History:   Diagnosis Date    Acute coronary syndrome     Anxiety     Bilateral carotid artery stenosis 11/17/2015    Chronic pain     Colon polyp     Coronary artery disease     GERD (gastroesophageal reflux disease)     Hyperlipidemia     Hypertension      Hypothyroidism     Low back pain     Lupus     Osteoporosis     Poor circulation     Pre-operative clearance 2019    PVD (peripheral vascular disease)     S/P peripheral artery angioplasty 2014    SCCA (squamous cell carcinoma) of skin 10/2019    Dr. ZANDRA Rivas--oncology    Skin disease        Past Surgical History:  Past Surgical History:   Procedure Laterality Date    AORTOGRAPHY WITH SERIALOGRAPHY N/A 2021    Procedure: AORTOGRAM, WITH RUNOFF;  Surgeon: Christopher Cohen MD;  Location: [facility] CATH LAB;  Service: Cardiology;  Laterality: N/A;  COVID-19, mRNA, LNP-S, PF, 30 mcg/0.3 mL dose (COVID-19, MRNA, LN-S, PF (Pfizer)) 2021, 2021    Atherosclerotic PVD with intermittent claudication Bilateral 2021    Dr. Cohen    CATARACT EXTRACTION      COLONOSCOPY N/A 2017    Procedure: COLONOSCOPY;  Surgeon: Sylvester Arboleda III, MD;  Location: [facility] ENDO;  Service: Endoscopy;  Laterality: N/A;    COLONOSCOPY N/A 2020    Procedure: COLONOSCOPY;  Surgeon: Sylvester Arboleda III, MD;  Location: [facility] ENDO;  Service: Endoscopy;  Laterality: N/A;    CORONARY ANGIOPLASTY      CORONARY ARTERY BYPASS GRAFT      EXCISION OF MASS OF HAND Right 2022    Procedure: EXCISION, MASS, HAND;  Surgeon: Hernan Culver MD;  Location: [facility] OR;  Service: Orthopedics;  Laterality: Right;   thenar eminence skin lesion excised and a Z-plasty skin flap for coverage    HYSTERECTOMY      OOPHORECTOMY      THYROIDECTOMY           Family History:  Family History   Problem Relation Age of Onset    Hypertension Mother     Stroke Mother     Lung cancer Daughter     Melanoma Neg Hx     Psoriasis Neg Hx     Lupus Neg Hx     Eczema Neg Hx        Social History:  Social History     Tobacco Use    Smoking status: Former     Current packs/day: 0.00     Average packs/day: 0.3 packs/day for 62.1 years (15.5 ttl pk-yrs)     Types: Cigarettes     Start date:      Quit date: 2023     Years since quittin.6     Smokeless tobacco: Never    Tobacco comments:     hold midnight prior to surgery   Substance and Sexual Activity    Alcohol use: Never    Drug use: No    Sexual activity: Not Currently     Partners: Male       ROS   Review of Systems   Constitutional:  Negative for chills and fever.   HENT:  Negative for sore throat.    Respiratory:  Negative for shortness of breath.    Cardiovascular:  Negative for chest pain.   Gastrointestinal:  Negative for diarrhea, nausea and vomiting.   Genitourinary:  Negative for dysuria.   Musculoskeletal:  Positive for myalgias (generalized). Negative for back pain.   Skin:  Negative for rash.   Neurological:  Positive for headaches. Negative for weakness.   Hematological:  Does not bruise/bleed easily.   All other systems reviewed and are negative.      Physical Exam      Initial Vitals [09/27/23 1040]   BP Pulse Resp Temp SpO2   (!) 162/76 71 18 98.3 °F (36.8 °C) (!) 94 %      MAP       --          Physical Exam  Nursing Notes and Vital Signs Reviewed.  Constitutional: Patient is in no acute distress. Well-developed and well-nourished.  Head: Atraumatic. Normocephalic.  Eyes: PERRL. EOM intact. Conjunctivae are not pale. No scleral icterus.  ENT: Mucous membranes are moist. Oropharynx is clear and symmetric.    Neck: Supple. Full ROM. No lymphadenopathy.  Cardiovascular: Regular rate. Regular rhythm. No murmurs, rubs, or gallops. Distal pulses are 2+ and symmetric.  Pulmonary/Chest: No respiratory distress. Clear to auscultation bilaterally. No wheezing or rales.  Abdominal: Soft and non-distended.  There is no tenderness.  No rebound, guarding, or rigidity.   Musculoskeletal: Moves all extremities. No obvious deformities. No edema.  Skin: Warm and dry.  Neurological:  Alert, awake, and appropriate.  Normal speech.  No acute focal neurological deficits are appreciated.  Psychiatric: Normal affect. Good eye contact. Appropriate in content.    ED Course    Procedures  ED Vital  Signs:  Vitals:    09/27/23 1040   BP: (!) 162/76   Pulse: 71   Resp: 18   Temp: 98.3 °F (36.8 °C)   TempSrc: Oral   SpO2: (!) 94%       Abnormal Lab Results:  Labs Reviewed   SARS-COV-2 RNA AMPLIFICATION, QUAL        All Lab Results:  Results for orders placed or performed during the hospital encounter of 09/27/23   COVID-19 Rapid Screening   Result Value Ref Range    SARS-CoV-2 RNA, Amplification, Qual Negative Negative     *Note: Due to a large number of results and/or encounters for the requested time period, some results have not been displayed. A complete set of results can be found in Results Review.           Imaging Results:  Imaging Results              CT Head Without Contrast (Final result)  Result time 09/27/23 11:56:45      Final result by Edward Ovalle MD (09/27/23 11:56:45)                   Impression:      Negative for acute intracranial abnormality.    All CT scans at this facility are performed  using dose modulation techniques as appropriate to performed exam including the following:  automated exposure control; adjustment of mA and/or kV according to the patients size (this includes techniques or standardized protocols for targeted exams where dose is matched to indication/reason for exam: i.e. extremities or head);  iterative reconstruction technique.      Electronically signed by: Edward Ovalle MD  Date:    09/27/2023  Time:    11:56               Narrative:    EXAMINATION:  CT HEAD WITHOUT CONTRAST    CLINICAL HISTORY:  Headache, new or worsening (Age >= 50y);    TECHNIQUE:  Axial CT images obtained throughout the head without intravenous contrast.    COMPARISON:  05/22/2023    FINDINGS:  Negative for acute hemorrhage, mass effect, extraaxial collection, hydrocephalus.    Stable mild chronic small-vessel ischemic changes in the white matter.  Calcified plaque skull-base vasculature.    The paranasal sinuses and mastoids are clear.    The calvarium is unremarkable with no fractures.                                               The Emergency Provider reviewed the vital signs and test results, which are outlined above.    ED Discussion     2:07 PM: Reassessed pt at this time.  Discussed with pt all pertinent ED information and results. Discussed pt dx and plan of tx. Gave pt all f/u and return to the ED instructions. All questions and concerns were addressed at this time. Pt expresses understanding of information and instructions, and is comfortable with plan to discharge. Pt is stable for discharge.    I discussed with patient and/or family/caretaker that evaluation in the ED does not suggest any emergent or life threatening medical conditions requiring immediate intervention beyond what was provided in the ED, and I believe patient is safe for discharge.  Regardless, an unremarkable evaluation in the ED does not preclude the development or presence of a serious of life threatening condition. As such, patient was instructed to return immediately for any worsening or change in current symptoms.           ED Medication(s):  Medications   butalbital-acetaminophen-caffeine -40 mg per tablet 1 tablet (1 tablet Oral Given 9/27/23 1302)        Follow-up Information       Tayler Ovalle DO. Schedule an appointment as soon as possible for a visit in 2 days.    Specialty: Internal Medicine  Why: Return to the Emergency Room, If symptoms worsen  Contact information:  [address] [place] [address] [address] Jesi MCDOWELL [address]  [phone]                             Discharge Medication List as of 9/27/2023  1:56 PM            Medical Decision Making    Medical Decision Making  Anxiety  Covid  ICH    Presents with pain everywhere, headache, uncontrolled BP, no vomiting, no dizziness, no vision changes, patient's exam is otherwise normal, BP elevated but doesn't need emergent tx, CT head negative, covid negative, symptoms are chronic, no indication for further workup, stable for discharge with outpatient follow up.      Amount and/or Complexity of Data Reviewed  Labs: ordered. Decision-making details documented in ED Course.  Radiology: ordered. Decision-making details documented in ED Course.    Risk  Prescription drug management.                Scribe Attestation:   Scribe #1: I performed the above scribed service and the documentation accurately describes the services I performed. I attest to the accuracy of the note.    Attending:   Physician Attestation Statement for Scribe #1: I, Janet Santa MD, personally performed the services described in this documentation, as scribed by Zaria Tabares, in my presence, and it is both accurate and complete.          Clinical Impression       ICD-10-CM ICD-9-CM   1. Chronic pain disorder  G89.4 338.4   2. Anxiety  F41.9 300.00       Disposition:   Disposition: Discharged  Condition: Stable         Janet Santa MD  09/27/23 8511

## 2023-09-28 ENCOUNTER — PATIENT OUTREACH (OUTPATIENT)
Dept: EMERGENCY MEDICINE | Facility: HOSPITAL | Age: 78
End: 2023-09-28
Payer: MEDICARE

## 2023-09-28 NOTE — PROGRESS NOTES
1st attempt to reach patient to assist with scheduling a post ED 7-day follow up with PCP. I left a voicemail.

## 2023-09-29 NOTE — PROGRESS NOTES
ED Navigator attempted to contact patient to assist with scheduling a post ED 7-day follow up with PCP, patient is unable to reach. ED Navigator to close encounter at this time.

## 2023-09-30 ENCOUNTER — EXTERNAL CHRONIC CARE MANAGEMENT (OUTPATIENT)
Dept: PRIMARY CARE CLINIC | Facility: CLINIC | Age: 78
End: 2023-09-30
Payer: MEDICARE

## 2023-09-30 PROCEDURE — 99490 CHRNC CARE MGMT STAFF 1ST 20: CPT | Mod: S$GLB,,, | Performed by: INTERNAL MEDICINE

## 2023-09-30 PROCEDURE — 99490 PR CHRONIC CARE MGMT, 1ST 20 MIN: ICD-10-PCS | Mod: S$GLB,,, | Performed by: INTERNAL MEDICINE

## 2023-10-02 DIAGNOSIS — E55.9 VITAMIN D DEFICIENCY: ICD-10-CM

## 2023-10-02 NOTE — TELEPHONE ENCOUNTER
Refill Routing Note   Medication(s) are not appropriate for processing by [facility] for the following reason(s):      Medication outside of protocol    [facility] action(s):  Route Care Due:  None identified            Appointments  past 12m or future 3m with PCP    Date Provider   Last Visit   6/8/2023 Tayler Ovalle DO   Next Visit   10/30/2023 Tayler Ovalle DO   ED visits in past 90 days: 1        Note composed:11:14 AM 10/02/2023

## 2023-10-03 RX ORDER — ERGOCALCIFEROL 1.25 MG/1
50000 CAPSULE ORAL
Qty: 12 CAPSULE | Refills: 2 | Status: SHIPPED | OUTPATIENT
Start: 2023-10-03

## 2023-10-13 ENCOUNTER — TELEPHONE (OUTPATIENT)
Dept: CARDIOLOGY | Facility: CLINIC | Age: 78
End: 2023-10-13
Payer: MEDICARE

## 2023-10-13 NOTE — TELEPHONE ENCOUNTER
Spoke to patient and scheduled f/u on 10/31 with Marcela Vasquez----- Message from Shannon Jeronimo sent at 10/13/2023  1:41 PM CDT -----  Contact: self [phone]  Pt is calling to speak with nurse regarding personal information . Please call back at [phone] . Thanksdj

## 2023-10-20 ENCOUNTER — EXTERNAL HOME HEALTH (OUTPATIENT)
Dept: HOME HEALTH SERVICES | Facility: HOSPITAL | Age: 78
End: 2023-10-20
Payer: MEDICARE

## 2023-10-23 ENCOUNTER — TELEPHONE (OUTPATIENT)
Dept: RHEUMATOLOGY | Facility: CLINIC | Age: 78
End: 2023-10-23
Payer: MEDICARE

## 2023-10-23 NOTE — TELEPHONE ENCOUNTER
----- Message from Madai Ramirez sent at 10/23/2023  9:15 AM CDT -----  Contact: [phone]  [email or web address]     Patient is calling for Medical Advice regarding:pt is calling for the transportation    How long has patient had these symptoms:    Pharmacy name and phone#:    Would like response via [facility]:  no     Comments:pt states the transportation came to early to come get her and she is asking if the office can call her to reschedule the appt she has an appt today please give return call

## 2023-10-23 NOTE — TELEPHONE ENCOUNTER
Vidhya w/ pt regarding rescheduling appt today. Advised pt that Anabel will no longer be with [facility] as of November 2nd.     Next available isn't until Next year.     Pt stated she will give us a call back to reschedule appt.

## 2023-10-30 ENCOUNTER — OFFICE VISIT (OUTPATIENT)
Dept: INTERNAL MEDICINE | Facility: CLINIC | Age: 78
End: 2023-10-30
Payer: MEDICARE

## 2023-10-30 VITALS
HEIGHT: 66 IN | WEIGHT: 173.06 LBS | OXYGEN SATURATION: 97 % | HEART RATE: 60 BPM | SYSTOLIC BLOOD PRESSURE: 160 MMHG | DIASTOLIC BLOOD PRESSURE: 88 MMHG | BODY MASS INDEX: 27.81 KG/M2 | RESPIRATION RATE: 20 BRPM | TEMPERATURE: 99 F

## 2023-10-30 DIAGNOSIS — E03.9 HYPOTHYROIDISM (ACQUIRED): ICD-10-CM

## 2023-10-30 DIAGNOSIS — I10 ESSENTIAL HYPERTENSION: ICD-10-CM

## 2023-10-30 DIAGNOSIS — Z00.00 ANNUAL PHYSICAL EXAM: Primary | ICD-10-CM

## 2023-10-30 DIAGNOSIS — I73.9 PVD (PERIPHERAL VASCULAR DISEASE): ICD-10-CM

## 2023-10-30 DIAGNOSIS — R29.6 FALLS FREQUENTLY: ICD-10-CM

## 2023-10-30 DIAGNOSIS — E78.5 HYPERLIPIDEMIA LDL GOAL <70: Chronic | ICD-10-CM

## 2023-10-30 PROCEDURE — 1159F MED LIST DOCD IN RCRD: CPT | Mod: HCNC,CPTII,S$GLB, | Performed by: INTERNAL MEDICINE

## 2023-10-30 PROCEDURE — 99214 OFFICE O/P EST MOD 30 MIN: CPT | Mod: HCNC,S$GLB,, | Performed by: INTERNAL MEDICINE

## 2023-10-30 PROCEDURE — 99214 PR OFFICE/OUTPT VISIT, EST, LEVL IV, 30-39 MIN: ICD-10-PCS | Mod: HCNC,S$GLB,, | Performed by: INTERNAL MEDICINE

## 2023-10-30 PROCEDURE — 99999 PR PBB SHADOW E&M-EST. PATIENT-LVL V: ICD-10-PCS | Mod: PBBFAC,HCNC,, | Performed by: INTERNAL MEDICINE

## 2023-10-30 PROCEDURE — 1100F PTFALLS ASSESS-DOCD GE2>/YR: CPT | Mod: HCNC,CPTII,S$GLB, | Performed by: INTERNAL MEDICINE

## 2023-10-30 PROCEDURE — 3079F PR MOST RECENT DIASTOLIC BLOOD PRESSURE 80-89 MM HG: ICD-10-PCS | Mod: HCNC,CPTII,S$GLB, | Performed by: INTERNAL MEDICINE

## 2023-10-30 PROCEDURE — 1125F AMNT PAIN NOTED PAIN PRSNT: CPT | Mod: HCNC,CPTII,S$GLB, | Performed by: INTERNAL MEDICINE

## 2023-10-30 PROCEDURE — 3077F SYST BP >= 140 MM HG: CPT | Mod: HCNC,CPTII,S$GLB, | Performed by: INTERNAL MEDICINE

## 2023-10-30 PROCEDURE — 1160F RVW MEDS BY RX/DR IN RCRD: CPT | Mod: HCNC,CPTII,S$GLB, | Performed by: INTERNAL MEDICINE

## 2023-10-30 PROCEDURE — 3288F FALL RISK ASSESSMENT DOCD: CPT | Mod: HCNC,CPTII,S$GLB, | Performed by: INTERNAL MEDICINE

## 2023-10-30 PROCEDURE — 3288F PR FALLS RISK ASSESSMENT DOCUMENTED: ICD-10-PCS | Mod: HCNC,CPTII,S$GLB, | Performed by: INTERNAL MEDICINE

## 2023-10-30 PROCEDURE — 1100F PR PT FALLS ASSESS DOC 2+ FALLS/FALL W/INJURY/YR: ICD-10-PCS | Mod: HCNC,CPTII,S$GLB, | Performed by: INTERNAL MEDICINE

## 2023-10-30 PROCEDURE — 1159F PR MEDICATION LIST DOCUMENTED IN MEDICAL RECORD: ICD-10-PCS | Mod: HCNC,CPTII,S$GLB, | Performed by: INTERNAL MEDICINE

## 2023-10-30 PROCEDURE — 3079F DIAST BP 80-89 MM HG: CPT | Mod: HCNC,CPTII,S$GLB, | Performed by: INTERNAL MEDICINE

## 2023-10-30 PROCEDURE — 1160F PR REVIEW ALL MEDS BY PRESCRIBER/CLIN PHARMACIST DOCUMENTED: ICD-10-PCS | Mod: HCNC,CPTII,S$GLB, | Performed by: INTERNAL MEDICINE

## 2023-10-30 PROCEDURE — 1125F PR PAIN SEVERITY QUANTIFIED, PAIN PRESENT: ICD-10-PCS | Mod: HCNC,CPTII,S$GLB, | Performed by: INTERNAL MEDICINE

## 2023-10-30 PROCEDURE — 99999 PR PBB SHADOW E&M-EST. PATIENT-LVL V: CPT | Mod: PBBFAC,HCNC,, | Performed by: INTERNAL MEDICINE

## 2023-10-30 PROCEDURE — 3077F PR MOST RECENT SYSTOLIC BLOOD PRESSURE >= 140 MM HG: ICD-10-PCS | Mod: HCNC,CPTII,S$GLB, | Performed by: INTERNAL MEDICINE

## 2023-10-30 RX ORDER — HYDRALAZINE HYDROCHLORIDE 25 MG/1
25 TABLET, FILM COATED ORAL EVERY 12 HOURS
Qty: 60 TABLET | Refills: 1 | Status: SHIPPED | OUTPATIENT
Start: 2023-10-30 | End: 2023-10-31

## 2023-10-30 NOTE — TELEPHONE ENCOUNTER
----- Message from Tayler Chavez sent at 9/1/2023  1:26 PM CDT -----  Type: Patient Call Back    Who called:pt     What is the request in detail:pt requesting to speak to nurse about getting a prescription for diahrrea.. call pt     Can the clinic reply by [facility]?    Would the patient rather a call back or a response via [facility]? call    Best call back number:[phone] ([place])       Additional Information:       5

## 2023-10-30 NOTE — TELEPHONE ENCOUNTER
No care due was identified.  [facility] Embedded Care Due Messages. Reference number: [id number].   10/30/2023 9:42:04 AM CDT

## 2023-10-30 NOTE — TELEPHONE ENCOUNTER
Refill Routing Note   Medication(s) are not appropriate for processing by [facility] for the following reason(s):      Medication outside of protocol    [facility] action(s):  Route Care Due:  None identified              Appointments  past 12m or future 3m with PCP    Date Provider   Last Visit   10/30/2023 Tayler Ovalle DO   Next Visit   Visit date not found Tayler Ovalle DO   ED visits in past 90 days: 1        Note composed:10:04 AM 10/30/2023

## 2023-10-30 NOTE — PROGRESS NOTES
Flory Soria Phoenix  78 y.o. Black or  female  [id number]    Chief Complaint:  Chief Complaint   Patient presents with    Annual Exam       History of Present Illness:  Presents for an annual exam.   HTN--elevated. States she has been getting high readings at home as well. She has a nurse that comes out weekly. She is taking amlodipine and valsartan as prescribed.   HLD--compliant with atorvastatin and ezetimibe.   PVD--compliant with clopidogrel, atorvastatin and ezetimibe.   Hypothyroidism--compliant with levothyroxine.   Reports three falls over the summer. She uses a walker but has a motorized chair and plans to use it. She needs to get her transportation form completed. She has had  problems with her knee since the first fall and feels that is the reason for subsequent falls.     Laboratory   Lab Results   Component Value Date    WBC 11.38 06/26/2023    HGB 13.2 06/26/2023    HCT 40.2 06/26/2023     06/26/2023    CHOL 151 09/22/2022    TRIG 75 09/22/2022    HDL 52 09/22/2022    ALT 9 (L) 06/26/2023    AST 19 06/26/2023     06/26/2023    K 3.3 (L) 06/26/2023     06/26/2023    CREATININE 0.7 06/26/2023    BUN 16 06/26/2023    CO2 28 06/26/2023    TSH 3.011 10/25/2022    INR 1.0 05/22/2021    HGBA1C 5.8 (H) 08/04/2017     Review of Systems   Constitutional:  Positive for malaise/fatigue and weight loss.   Respiratory:  Negative for shortness of breath.    Cardiovascular:  Negative for chest pain.   Gastrointestinal:  Positive for nausea. Negative for abdominal pain, constipation and diarrhea.   Musculoskeletal:  Positive for falls and joint pain.   Neurological:  Negative for dizziness.       The following were reviewed: Active problem list, medication list, allergies, family history, social history, and Health Maintenance.     History:  Past Medical History:   Diagnosis Date    Acute coronary syndrome     Anxiety     Bilateral carotid artery stenosis 11/17/2015    Chronic pain      Colon polyp     Coronary artery disease     GERD (gastroesophageal reflux disease)     Hyperlipidemia     Hypertension     Hypothyroidism     Low back pain     Lupus     Osteoporosis     Poor circulation     Pre-operative clearance 7/12/2019    PVD (peripheral vascular disease)     S/P peripheral artery angioplasty 02/13/2014    SCCA (squamous cell carcinoma) of skin 10/2019    Dr. ZANDRA Rivas--oncology    Skin disease      Past Surgical History:   Procedure Laterality Date    AORTOGRAPHY WITH SERIALOGRAPHY N/A 5/25/2021    Procedure: AORTOGRAM, WITH RUNOFF;  Surgeon: Christopher Cohen MD;  Location: [facility] CATH LAB;  Service: Cardiology;  Laterality: N/A;  COVID-19, mRNA, LNP-S, PF, 30 mcg/0.3 mL dose (COVID-19, MRNA, LN-S, PF (Pfizer)) 1/30/2021, 1/9/2021    Atherosclerotic PVD with intermittent claudication Bilateral 05/2021    Dr. Cohen    CATARACT EXTRACTION      COLONOSCOPY N/A 1/4/2017    Procedure: COLONOSCOPY;  Surgeon: Sylvester Arboleda III, MD;  Location: [facility] ENDO;  Service: Endoscopy;  Laterality: N/A;    COLONOSCOPY N/A 8/4/2020    Procedure: COLONOSCOPY;  Surgeon: Sylvester Arboleda III, MD;  Location: [facility] ENDO;  Service: Endoscopy;  Laterality: N/A;    CORONARY ANGIOPLASTY      CORONARY ARTERY BYPASS GRAFT      EXCISION OF MASS OF HAND Right 2/11/2022    Procedure: EXCISION, MASS, HAND;  Surgeon: Hernan Culver MD;  Location: [facility] OR;  Service: Orthopedics;  Laterality: Right;   thenar eminence skin lesion excised and a Z-plasty skin flap for coverage    HYSTERECTOMY      OOPHORECTOMY      THYROIDECTOMY       Family History   Problem Relation Age of Onset    Hypertension Mother     Stroke Mother     Lung cancer Daughter     Melanoma Neg Hx     Psoriasis Neg Hx     Lupus Neg Hx     Eczema Neg Hx      Social History     Socioeconomic History    Marital status:     Number of children: 3   Occupational History    Occupation: Retired     Comment: Dispatcher   Tobacco Use    Smoking status: Former      Current packs/day: 0.00     Average packs/day: 0.3 packs/day for 62.1 years (15.5 ttl pk-yrs)     Types: Cigarettes     Start date:      Quit date: 2023     Years since quittin.7    Smokeless tobacco: Never    Tobacco comments:     hold midnight prior to surgery   Substance and Sexual Activity    Alcohol use: Never    Drug use: No    Sexual activity: Not Currently     Partners: Male   Other Topics Concern    Are you pregnant or think you may be? No    Breast-feeding No   Social History Narrative    Patient is retired dispatcher.     Social Determinants of Health     Financial Resource Strain: Low Risk  (6/3/2021)    Overall Financial Resource Strain (CARDIA)     Difficulty of Paying Living Expenses: Not hard at all   Food Insecurity: No Food Insecurity (6/3/2021)    Hunger Vital Sign     Worried About Running Out of Food in the Last Year: Never true     Ran Out of Food in the Last Year: Never true   Transportation Needs: Unmet Transportation Needs (6/3/2021)    PRAPARE - Transportation     Lack of Transportation (Medical): Yes     Lack of Transportation (Non-Medical): Yes   Physical Activity: Insufficiently Active (6/3/2021)    Exercise Vital Sign     Days of Exercise per Week: 2 days     Minutes of Exercise per Session: 10 min   Stress: Stress Concern Present (6/3/2021)    [employer] - Occupational Stress Questionnaire     Feeling of Stress : To some extent   Social Connections: Socially Isolated (6/3/2021)    Social Connection and Isolation Panel [NHANES]     Frequency of Communication with Friends and Family: More than three times a week     Frequency of Social Gatherings with Friends and Family: Twice a week     Attends Taoism Services: Never     Active Member of Clubs or Organizations: No     Attends Club or Organization Meetings: Never     Marital Status:    Housing Stability: Low Risk  (6/3/2021)    Housing Stability Vital Sign     Unable to Pay for  Housing in the Last Year: No     Number of Places Lived in the Last Year: 1     Unstable Housing in the Last Year: No     Patient Active Problem List   Diagnosis    Hyperlipidemia LDL goal <70    Essential hypertension    Hypothyroidism (acquired)    Peripheral vascular disease    Eczema    Osteoporosis    Osteoarthritis    Nicotine dependence    CAD: Hx of CABG    Atherosclerotic PVD with intermittent claudication    Carotid artery stenosis    Discoid lupus erythematosus    Rheumatoid arthritis involving both feet with positive rheumatoid factor    Lichen planus    Hx of colonic polyps    Chronic obstructive pulmonary disease    Medication monitoring encounter    Accelerated hypertension    Bradycardia    Right leg weakness    Calcification of aorta    Lung nodule    Pulmonary heart disease    Bilateral carpal tunnel syndrome    Pre-operative cardiovascular examination    Pain and swelling of right knee    Left elbow pain     Review of patient's allergies indicates:   Allergen Reactions    Methotrexate analogues     Cellcept [mycophenolate mofetil] Rash    Imuran [azathioprine] Rash       Health Maintenance  Health Maintenance Topics with due status: Not Due       Topic Last Completion Date    TETANUS VACCINE 02/21/2017    Colonoscopy 08/04/2020    DEXA Scan 04/05/2023    Aspirin/Antiplatelet Therapy 10/30/2023     Health Maintenance Due   Topic Date Due    Shingles Vaccine (1 of 2) Never done    RSV Vaccine (Age 60+) (1 - 1-dose 60+ series) Never done    COVID-19 Vaccine (4 - 2023-24 season) 09/01/2023    Lipid Panel  09/22/2023       Medications:  Current Outpatient Medications on File Prior to Visit   Medication Sig Dispense Refill    albuterol (PROVENTIL/VENTOLIN HFA) 90 mcg/actuation inhaler Inhale 2 puffs into the lungs every 6 (six) hours as needed for Wheezing. 18 g 3    albuterol-ipratropium (DUO-NEB) 2.5 mg-0.5 mg/3 mL nebulizer solution Take 3 mLs by nebulization every 6 (six) hours as needed for  Wheezing. Rescue 75 mL 2    amLODIPine (NORVASC) 10 MG tablet Take 1 tablet (10 mg total) by mouth once daily. 90 tablet 3    aspirin (ECOTRIN) 81 MG EC tablet Take 1 tablet (81 mg total) by mouth once daily.  0    atorvastatin (LIPITOR) 80 MG tablet Take 1 tablet (80 mg total) by mouth once daily. 90 tablet 3    clopidogreL (PLAVIX) 75 mg tablet TAKE 1 TABLET(75 MG) BY MOUTH EVERY DAY 90 tablet 3    ergocalciferol (ERGOCALCIFEROL) 50,000 unit Cap TAKE 1 CAPSULE BY MOUTH EVERY 7 DAYS 12 capsule 2    ezetimibe (ZETIA) 10 mg tablet TAKE 1 TABLET(10 MG) BY MOUTH EVERY DAY 90 tablet 3    levothyroxine (SYNTHROID) 137 MCG Tab tablet TAKE 1 TABLET BY MOUTH EVERY DAY 90 tablet 1    traZODone (DESYREL) 50 MG tablet TAKE 1 TABLET BY MOUTH EVERY DAY AT NIGHT AS NEEDED FOR INSOMNIA 30 tablet 3    valsartan (DIOVAN) 320 MG tablet TAKE 1 TABLET(320 MG) BY MOUTH EVERY DAY 90 tablet 3    citalopram (CELEXA) 10 MG tablet Take 10 mg by mouth once daily.      oxyCODONE-acetaminophen (PERCOCET)  mg per tablet Take 1 tablet by mouth.      prochlorperazine (COMPAZINE) 10 MG tablet          Medications have been reviewed and reconciled with patient at visit today.    Exam:  Vitals:    10/30/23 0902   BP: (!) 160/88   Pulse: 60   Resp: 20   Temp: 99 °F (37.2 °C)     Weight: 78.5 kg (173 lb 1 oz)   Body mass index is 27.93 kg/m².      Physical Exam  Vitals reviewed.   Constitutional:       General: She is not in acute distress.     Appearance: She is well-developed. She is not ill-appearing.   Eyes:      General: No scleral icterus.  Cardiovascular:      Rate and Rhythm: Normal rate and regular rhythm.      Heart sounds: Normal heart sounds.   Pulmonary:      Effort: Pulmonary effort is normal. No respiratory distress.      Breath sounds: Normal breath sounds.   Abdominal:      General: Bowel sounds are normal.      Palpations: Abdomen is soft.   Skin:     General: Skin is warm and dry.   Neurological:      Mental Status: She is  alert and oriented to person, place, and time.   Psychiatric:         Behavior: Behavior normal.       Assessment:  The primary encounter diagnosis was Annual physical exam. Diagnoses of Essential hypertension, Hyperlipidemia LDL goal <70, PVD (peripheral vascular disease), Hypothyroidism (acquired), and Falls frequently were also pertinent to this visit.    Plan:  Annual physical exam  -     Counseled regarding age appropriate screenings and immunizations  -     Counseled regarding lifestyle modifications    Essential hypertension  -     Comprehensive Metabolic Panel; Standing  -     Lipid Panel; Standing  -     add hydrALAZINE (APRESOLINE) 25 MG tablet; Take 1 tablet (25 mg total) by mouth every 12 (twelve) hours.  Dispense: 60 tablet; Refill: 1. Discussed medication risks and benefits.   -     continue amlodipine and valsartan     Hyperlipidemia LDL goal <70  -     Comprehensive Metabolic Panel; Standing  -     Lipid Panel; Standing    PVD (peripheral vascular disease)  -     Lipid Panel; Standing    Hypothyroidism (acquired)  -     TSH; Standing    Falls frequently  -     fall precautions discussed    CATS form completed.     Schedule labs.     Follow up in about 4 weeks (around 11/27/2023).

## 2023-10-31 ENCOUNTER — EXTERNAL CHRONIC CARE MANAGEMENT (OUTPATIENT)
Dept: PRIMARY CARE CLINIC | Facility: CLINIC | Age: 78
End: 2023-10-31
Payer: MEDICARE

## 2023-10-31 ENCOUNTER — TELEPHONE (OUTPATIENT)
Dept: CARDIOLOGY | Facility: CLINIC | Age: 78
End: 2023-10-31
Payer: MEDICARE

## 2023-10-31 DIAGNOSIS — I10 ESSENTIAL HYPERTENSION: ICD-10-CM

## 2023-10-31 PROCEDURE — 99439 PR CHRONIC CARE MGMT, EA ADDTL 20 MIN: ICD-10-PCS | Mod: S$GLB,,, | Performed by: INTERNAL MEDICINE

## 2023-10-31 PROCEDURE — 99490 CHRNC CARE MGMT STAFF 1ST 20: CPT | Mod: S$GLB,,, | Performed by: INTERNAL MEDICINE

## 2023-10-31 PROCEDURE — 99490 PR CHRONIC CARE MGMT, 1ST 20 MIN: ICD-10-PCS | Mod: S$GLB,,, | Performed by: INTERNAL MEDICINE

## 2023-10-31 PROCEDURE — 99439 CHRNC CARE MGMT STAF EA ADDL: CPT | Mod: S$GLB,,, | Performed by: INTERNAL MEDICINE

## 2023-10-31 RX ORDER — VALSARTAN 320 MG/1
TABLET ORAL
Qty: 90 TABLET | Refills: 3 | Status: SHIPPED | OUTPATIENT
Start: 2023-10-31

## 2023-10-31 RX ORDER — HYDRALAZINE HYDROCHLORIDE 25 MG/1
25 TABLET, FILM COATED ORAL EVERY 12 HOURS
Qty: 180 TABLET | Refills: 1 | Status: SHIPPED | OUTPATIENT
Start: 2023-10-31 | End: 2023-12-19 | Stop reason: SDUPTHER

## 2023-11-02 ENCOUNTER — DOCUMENT SCAN (OUTPATIENT)
Dept: HOME HEALTH SERVICES | Facility: HOSPITAL | Age: 78
End: 2023-11-02
Payer: MEDICARE

## 2023-11-03 ENCOUNTER — TELEPHONE (OUTPATIENT)
Dept: RHEUMATOLOGY | Facility: CLINIC | Age: 78
End: 2023-11-03
Payer: MEDICARE

## 2023-11-03 NOTE — TELEPHONE ENCOUNTER
----- Message from Rita Hernandez sent at 11/3/2023  3:47 PM CDT -----  Contact: pt  Type:  Sooner Apoointment Request    Caller is requesting a sooner appointment.  Caller declined first available appointment listed below.  Caller will not accept being placed on the waitlist and is requesting a message be sent to doctor.  Name of Caller: pt  When is the first available appointment? 5/2024  Symptoms: lupus  Would the patient rather a call back or a response via [facility]? phone  Best Call Back Number: [phone]  Additional Information:

## 2023-11-06 ENCOUNTER — OFFICE VISIT (OUTPATIENT)
Dept: RHEUMATOLOGY | Facility: CLINIC | Age: 78
End: 2023-11-06
Payer: MEDICARE

## 2023-11-06 VITALS
SYSTOLIC BLOOD PRESSURE: 157 MMHG | WEIGHT: 174 LBS | DIASTOLIC BLOOD PRESSURE: 84 MMHG | HEART RATE: 67 BPM | HEIGHT: 66 IN | BODY MASS INDEX: 27.97 KG/M2

## 2023-11-06 DIAGNOSIS — L93.0 DISCOID LUPUS ERYTHEMATOSUS: Primary | Chronic | ICD-10-CM

## 2023-11-06 DIAGNOSIS — M05.771 RHEUMATOID ARTHRITIS INVOLVING BOTH FEET WITH POSITIVE RHEUMATOID FACTOR: Chronic | ICD-10-CM

## 2023-11-06 DIAGNOSIS — M05.772 RHEUMATOID ARTHRITIS INVOLVING BOTH FEET WITH POSITIVE RHEUMATOID FACTOR: Chronic | ICD-10-CM

## 2023-11-06 PROCEDURE — 1159F MED LIST DOCD IN RCRD: CPT | Mod: HCNC,CPTII,S$GLB, | Performed by: STUDENT IN AN ORGANIZED HEALTH CARE EDUCATION/TRAINING PROGRAM

## 2023-11-06 PROCEDURE — 1100F PTFALLS ASSESS-DOCD GE2>/YR: CPT | Mod: HCNC,CPTII,S$GLB, | Performed by: STUDENT IN AN ORGANIZED HEALTH CARE EDUCATION/TRAINING PROGRAM

## 2023-11-06 PROCEDURE — 99214 OFFICE O/P EST MOD 30 MIN: CPT | Mod: HCNC,S$GLB,, | Performed by: STUDENT IN AN ORGANIZED HEALTH CARE EDUCATION/TRAINING PROGRAM

## 2023-11-06 PROCEDURE — 3077F SYST BP >= 140 MM HG: CPT | Mod: HCNC,CPTII,S$GLB, | Performed by: STUDENT IN AN ORGANIZED HEALTH CARE EDUCATION/TRAINING PROGRAM

## 2023-11-06 PROCEDURE — 3288F PR FALLS RISK ASSESSMENT DOCUMENTED: ICD-10-PCS | Mod: HCNC,CPTII,S$GLB, | Performed by: STUDENT IN AN ORGANIZED HEALTH CARE EDUCATION/TRAINING PROGRAM

## 2023-11-06 PROCEDURE — 3288F FALL RISK ASSESSMENT DOCD: CPT | Mod: HCNC,CPTII,S$GLB, | Performed by: STUDENT IN AN ORGANIZED HEALTH CARE EDUCATION/TRAINING PROGRAM

## 2023-11-06 PROCEDURE — 1100F PR PT FALLS ASSESS DOC 2+ FALLS/FALL W/INJURY/YR: ICD-10-PCS | Mod: HCNC,CPTII,S$GLB, | Performed by: STUDENT IN AN ORGANIZED HEALTH CARE EDUCATION/TRAINING PROGRAM

## 2023-11-06 PROCEDURE — 99999 PR PBB SHADOW E&M-EST. PATIENT-LVL IV: CPT | Mod: PBBFAC,HCNC,, | Performed by: STUDENT IN AN ORGANIZED HEALTH CARE EDUCATION/TRAINING PROGRAM

## 2023-11-06 PROCEDURE — 1125F PR PAIN SEVERITY QUANTIFIED, PAIN PRESENT: ICD-10-PCS | Mod: HCNC,CPTII,S$GLB, | Performed by: STUDENT IN AN ORGANIZED HEALTH CARE EDUCATION/TRAINING PROGRAM

## 2023-11-06 PROCEDURE — 1125F AMNT PAIN NOTED PAIN PRSNT: CPT | Mod: HCNC,CPTII,S$GLB, | Performed by: STUDENT IN AN ORGANIZED HEALTH CARE EDUCATION/TRAINING PROGRAM

## 2023-11-06 PROCEDURE — 1159F PR MEDICATION LIST DOCUMENTED IN MEDICAL RECORD: ICD-10-PCS | Mod: HCNC,CPTII,S$GLB, | Performed by: STUDENT IN AN ORGANIZED HEALTH CARE EDUCATION/TRAINING PROGRAM

## 2023-11-06 PROCEDURE — 1160F RVW MEDS BY RX/DR IN RCRD: CPT | Mod: HCNC,CPTII,S$GLB, | Performed by: STUDENT IN AN ORGANIZED HEALTH CARE EDUCATION/TRAINING PROGRAM

## 2023-11-06 PROCEDURE — 3079F PR MOST RECENT DIASTOLIC BLOOD PRESSURE 80-89 MM HG: ICD-10-PCS | Mod: HCNC,CPTII,S$GLB, | Performed by: STUDENT IN AN ORGANIZED HEALTH CARE EDUCATION/TRAINING PROGRAM

## 2023-11-06 PROCEDURE — 99999 PR PBB SHADOW E&M-EST. PATIENT-LVL IV: ICD-10-PCS | Mod: PBBFAC,HCNC,, | Performed by: STUDENT IN AN ORGANIZED HEALTH CARE EDUCATION/TRAINING PROGRAM

## 2023-11-06 PROCEDURE — 1160F PR REVIEW ALL MEDS BY PRESCRIBER/CLIN PHARMACIST DOCUMENTED: ICD-10-PCS | Mod: HCNC,CPTII,S$GLB, | Performed by: STUDENT IN AN ORGANIZED HEALTH CARE EDUCATION/TRAINING PROGRAM

## 2023-11-06 PROCEDURE — 3077F PR MOST RECENT SYSTOLIC BLOOD PRESSURE >= 140 MM HG: ICD-10-PCS | Mod: HCNC,CPTII,S$GLB, | Performed by: STUDENT IN AN ORGANIZED HEALTH CARE EDUCATION/TRAINING PROGRAM

## 2023-11-06 PROCEDURE — 99214 PR OFFICE/OUTPT VISIT, EST, LEVL IV, 30-39 MIN: ICD-10-PCS | Mod: HCNC,S$GLB,, | Performed by: STUDENT IN AN ORGANIZED HEALTH CARE EDUCATION/TRAINING PROGRAM

## 2023-11-06 PROCEDURE — 3079F DIAST BP 80-89 MM HG: CPT | Mod: HCNC,CPTII,S$GLB, | Performed by: STUDENT IN AN ORGANIZED HEALTH CARE EDUCATION/TRAINING PROGRAM

## 2023-11-06 NOTE — PATIENT INSTRUCTIONS
Try taking Tylenol up to 3000mg daily (from all sources)    Try taking Turmeric (make sure contains black pepper extract)    Try using Voltaren gel up to four times daily on painful joints

## 2023-11-07 NOTE — PROGRESS NOTES
RHEUMATOLOGY CLINIC ESTABLISHED PATIENT VISIT    Reason for consult:- rheumatoid arthritis versus lupus    Chief complaints, HPI, ROS, EXAM, Assessment & Plans:-    Flory Cam is a 78 y.o. pleasant female who presents to follow-up today for diagnosis of lupus versus rheumatoid arthritis.  She was previously seen by Dr. Monteiro in 2020.  Previously diagnosed with lupus and she is also had skin findings on her hands suggestive lichen planus versus discoid lupus.  She was previously on hydroxychloroquine but was advised to discontinue this by her ophthalmologist.  Most bothersome for her is recurrent lesions on her hands.  These has been removed but come back.  Pathology most consistent with verruca vulgaris.  She is following with Dermatology as well as orthopedic hand surgery.  She denies significant joint pains and states she has no actively tender or swollen joints.  Rheumatologic review of systems otherwise negative.  She has multiple hyperkeratotic wart like appearing lesions on her hands.  See photos below.  No synovitis, dactylitis or enthesitis.            Reviewed all available old and outside pertinent medical records.    All lab results personally reviewed and interpreted by me.    1. Discoid lupus erythematosus    2. Rheumatoid arthritis involving both feet with positive rheumatoid factor        Problem List Items Addressed This Visit          Derm    Discoid lupus erythematosus - Primary (Chronic)    Relevant Orders    KYLE Screen w/Reflex    C3 Complement    C4 Complement    Urinalysis    Protein Electrophoresis, Serum    C-Reactive Protein    Rheumatoid Factor    Cyclic Citrullinated Peptide Antibody, IgG    Sedimentation rate    Protein/Creatinine Ratio, Urine       Immunology/Multi System    Rheumatoid arthritis involving both feet with positive rheumatoid factor (Chronic)    Relevant Orders    KYLE Screen w/Reflex    C3 Complement    C4 Complement    Urinalysis    Protein Electrophoresis,  Serum    C-Reactive Protein    Rheumatoid Factor    Cyclic Citrullinated Peptide Antibody, IgG    Sedimentation rate    Protein/Creatinine Ratio, Urine       Patient following up today for previous diagnoses of lupus versus rheumatoid arthritis as well as possible discoid lupus versus lichen planus  No obvious active rheumatoid arthritis or lupus-like symptoms at this time   We will update KYLE, C3, C4, UA/U PCR, ESR/CRP, RF/CCP  Advised to continue follow up with Dermatology and follow their treatment recommendations    # Follow up in about 6 months (around 5/6/2024).    Chronic comorbid conditions affecting medical decision making today:    Past Medical History:   Diagnosis Date    Acute coronary syndrome     Anxiety     Bilateral carotid artery stenosis 11/17/2015    Chronic pain     Colon polyp     Coronary artery disease     GERD (gastroesophageal reflux disease)     Hyperlipidemia     Hypertension     Hypothyroidism     Low back pain     Lupus     Osteoporosis     Poor circulation     Pre-operative clearance 7/12/2019    PVD (peripheral vascular disease)     S/P peripheral artery angioplasty 02/13/2014    SCCA (squamous cell carcinoma) of skin 10/2019    Dr. ZANDRA Rivas--oncology    Skin disease        Past Surgical History:   Procedure Laterality Date    AORTOGRAPHY WITH SERIALOGRAPHY N/A 5/25/2021    Procedure: AORTOGRAM, WITH RUNOFF;  Surgeon: Christopher Cohen MD;  Location: [facility] CATH LAB;  Service: Cardiology;  Laterality: N/A;  COVID-19, mRNA, LNP-S, PF, 30 mcg/0.3 mL dose (COVID-19, MRNA, LN-S, PF (Pfizer)) 1/30/2021, 1/9/2021    Atherosclerotic PVD with intermittent claudication Bilateral 05/2021    Dr. Cohen    CATARACT EXTRACTION      COLONOSCOPY N/A 1/4/2017    Procedure: COLONOSCOPY;  Surgeon: Sylvester Arboleda III, MD;  Location: [facility] ENDO;  Service: Endoscopy;  Laterality: N/A;    COLONOSCOPY N/A 8/4/2020    Procedure: COLONOSCOPY;  Surgeon: Sylvester Arboleda III, MD;  Location: [facility] ENDO;  Service:  Endoscopy;  Laterality: N/A;    CORONARY ANGIOPLASTY      CORONARY ARTERY BYPASS GRAFT      EXCISION OF MASS OF HAND Right 2022    Procedure: EXCISION, MASS, HAND;  Surgeon: Hernan Culver MD;  Location: [facility];  Service: Orthopedics;  Laterality: Right;   thenar eminence skin lesion excised and a Z-plasty skin flap for coverage    HYSTERECTOMY      OOPHORECTOMY      THYROIDECTOMY          Social History     Tobacco Use    Smoking status: Former     Current packs/day: 0.00     Average packs/day: 0.3 packs/day for 62.1 years (15.5 ttl pk-yrs)     Types: Cigarettes     Start date:      Quit date: 2023     Years since quittin.7    Smokeless tobacco: Never    Tobacco comments:     hold midnight prior to surgery   Substance Use Topics    Alcohol use: Never    Drug use: No       Family History   Problem Relation Age of Onset    Hypertension Mother     Stroke Mother     Lung cancer Daughter     Melanoma Neg Hx     Psoriasis Neg Hx     Lupus Neg Hx     Eczema Neg Hx        Review of patient's allergies indicates:   Allergen Reactions    Methotrexate analogues     Cellcept [mycophenolate mofetil] Rash    Imuran [azathioprine] Rash       Medication List with Changes/Refills   Current Medications    ALBUTEROL (PROVENTIL/VENTOLIN HFA) 90 MCG/ACTUATION INHALER    Inhale 2 puffs into the lungs every 6 (six) hours as needed for Wheezing.    ALBUTEROL-IPRATROPIUM (DUO-NEB) 2.5 MG-0.5 MG/3 ML NEBULIZER SOLUTION    Take 3 mLs by nebulization every 6 (six) hours as needed for Wheezing. Rescue    AMLODIPINE (NORVASC) 10 MG TABLET    Take 1 tablet (10 mg total) by mouth once daily.    ASPIRIN (ECOTRIN) 81 MG EC TABLET    Take 1 tablet (81 mg total) by mouth once daily.    ATORVASTATIN (LIPITOR) 80 MG TABLET    Take 1 tablet (80 mg total) by mouth once daily.    CITALOPRAM (CELEXA) 10 MG TABLET    Take 10 mg by mouth once daily.    CLOPIDOGREL (PLAVIX) 75 MG TABLET    TAKE 1 TABLET(75 MG) BY MOUTH EVERY DAY     ERGOCALCIFEROL (ERGOCALCIFEROL) 50,000 UNIT CAP    TAKE 1 CAPSULE BY MOUTH EVERY 7 DAYS    EZETIMIBE (ZETIA) 10 MG TABLET    TAKE 1 TABLET(10 MG) BY MOUTH EVERY DAY    HYDRALAZINE (APRESOLINE) 25 MG TABLET    TAKE 1 TABLET(25 MG) BY MOUTH EVERY 12 HOURS    LEVOTHYROXINE (SYNTHROID) 137 MCG TAB TABLET    TAKE 1 TABLET BY MOUTH EVERY DAY    OXYCODONE-ACETAMINOPHEN (PERCOCET)  MG PER TABLET    Take 1 tablet by mouth.    PROCHLORPERAZINE (COMPAZINE) 10 MG TABLET        TRAZODONE (DESYREL) 50 MG TABLET    TAKE 1 TABLET BY MOUTH EVERY DAY AT NIGHT AS NEEDED FOR INSOMNIA    VALSARTAN (DIOVAN) 320 MG TABLET    TAKE 1 TABLET(320 MG) BY MOUTH EVERY DAY         Disclaimer: This note was prepared using voice recognition system and is likely to have sound alike errors and is not proofread.  Please message me with any questions.    32 minutes of total time spent on the encounter, which includes face to face time and non-face to face time preparing to see the patient (eg, review of tests), Obtaining and/or reviewing separately obtained history, Documenting clinical information in the electronic or other health record, Independently interpreting results (not separately reported) and communicating results to the patient/family/caregiver, or Care coordination (not separately reported).     Thank you for allowing me to participate in the care of Flory Cam.    Hang Chanel MD

## 2023-11-16 ENCOUNTER — LAB VISIT (OUTPATIENT)
Dept: LAB | Facility: HOSPITAL | Age: 78
End: 2023-11-16
Attending: INTERNAL MEDICINE
Payer: MEDICARE

## 2023-11-16 DIAGNOSIS — E78.5 HYPERLIPIDEMIA LDL GOAL <70: Chronic | ICD-10-CM

## 2023-11-16 DIAGNOSIS — E03.9 HYPOTHYROIDISM (ACQUIRED): ICD-10-CM

## 2023-11-16 DIAGNOSIS — I73.9 PVD (PERIPHERAL VASCULAR DISEASE): ICD-10-CM

## 2023-11-16 DIAGNOSIS — I10 ESSENTIAL HYPERTENSION: ICD-10-CM

## 2023-11-16 LAB
ALBUMIN SERPL BCP-MCNC: 3.5 G/DL (ref 3.5–5.2)
ALP SERPL-CCNC: 146 U/L (ref 55–135)
ALT SERPL W/O P-5'-P-CCNC: 14 U/L (ref 10–44)
ANION GAP SERPL CALC-SCNC: 12 MMOL/L (ref 8–16)
AST SERPL-CCNC: 19 U/L (ref 10–40)
BILIRUB SERPL-MCNC: 0.5 MG/DL (ref 0.1–1)
BUN SERPL-MCNC: 15 MG/DL (ref 8–23)
CALCIUM SERPL-MCNC: 9.7 MG/DL (ref 8.7–10.5)
CHLORIDE SERPL-SCNC: 101 MMOL/L (ref 95–110)
CHOLEST SERPL-MCNC: 135 MG/DL (ref 120–199)
CHOLEST/HDLC SERPL: 2.9 {RATIO} (ref 2–5)
CO2 SERPL-SCNC: 30 MMOL/L (ref 23–29)
CREAT SERPL-MCNC: 0.7 MG/DL (ref 0.5–1.4)
EST. GFR  (NO RACE VARIABLE): >60 ML/MIN/1.73 M^2
GLUCOSE SERPL-MCNC: 95 MG/DL (ref 70–110)
HDLC SERPL-MCNC: 47 MG/DL (ref 40–75)
HDLC SERPL: 34.8 % (ref 20–50)
LDLC SERPL CALC-MCNC: 73.4 MG/DL (ref 63–159)
NONHDLC SERPL-MCNC: 88 MG/DL
POTASSIUM SERPL-SCNC: 3.6 MMOL/L (ref 3.5–5.1)
PROT SERPL-MCNC: 7.2 G/DL (ref 6–8.4)
SODIUM SERPL-SCNC: 143 MMOL/L (ref 136–145)
TRIGL SERPL-MCNC: 73 MG/DL (ref 30–150)
TSH SERPL DL<=0.005 MIU/L-ACNC: 2.51 UIU/ML (ref 0.4–4)

## 2023-11-16 PROCEDURE — 80053 COMPREHEN METABOLIC PANEL: CPT | Mod: HCNC | Performed by: INTERNAL MEDICINE

## 2023-11-16 PROCEDURE — 36415 COLL VENOUS BLD VENIPUNCTURE: CPT | Mod: HCNC | Performed by: INTERNAL MEDICINE

## 2023-11-16 PROCEDURE — 84443 ASSAY THYROID STIM HORMONE: CPT | Mod: HCNC | Performed by: INTERNAL MEDICINE

## 2023-11-16 PROCEDURE — 80061 LIPID PANEL: CPT | Mod: HCNC | Performed by: INTERNAL MEDICINE

## 2023-11-24 ENCOUNTER — PATIENT OUTREACH (OUTPATIENT)
Dept: ADMINISTRATIVE | Facility: HOSPITAL | Age: 78
End: 2023-11-24
Payer: MEDICARE

## 2023-11-24 NOTE — PROGRESS NOTES
Working HTN report:     Called to discuss scheduling appointment and to get updated BP reading.  Pt has upcoming PCP appt 11/28/2023

## 2023-11-30 ENCOUNTER — EXTERNAL CHRONIC CARE MANAGEMENT (OUTPATIENT)
Dept: PRIMARY CARE CLINIC | Facility: CLINIC | Age: 78
End: 2023-11-30
Payer: MEDICARE

## 2023-11-30 ENCOUNTER — EXTERNAL HOME HEALTH (OUTPATIENT)
Dept: HOME HEALTH SERVICES | Facility: HOSPITAL | Age: 78
End: 2023-11-30
Payer: MEDICARE

## 2023-11-30 PROCEDURE — 99490 PR CHRONIC CARE MGMT, 1ST 20 MIN: ICD-10-PCS | Mod: S$GLB,,, | Performed by: INTERNAL MEDICINE

## 2023-11-30 PROCEDURE — 99490 CHRNC CARE MGMT STAFF 1ST 20: CPT | Mod: S$GLB,,, | Performed by: INTERNAL MEDICINE

## 2023-12-11 ENCOUNTER — HOSPITAL ENCOUNTER (EMERGENCY)
Facility: HOSPITAL | Age: 78
Discharge: HOME | End: 2023-12-11
Attending: EMERGENCY MEDICINE
Payer: MEDICARE

## 2023-12-11 ENCOUNTER — TELEPHONE (OUTPATIENT)
Dept: CARDIOLOGY | Facility: CLINIC | Age: 78
End: 2023-12-11
Payer: MEDICARE

## 2023-12-11 VITALS
DIASTOLIC BLOOD PRESSURE: 84 MMHG | HEART RATE: 65 BPM | OXYGEN SATURATION: 97 % | RESPIRATION RATE: 20 BRPM | SYSTOLIC BLOOD PRESSURE: 185 MMHG | TEMPERATURE: 98 F

## 2023-12-11 DIAGNOSIS — M25.551 RIGHT HIP PAIN: ICD-10-CM

## 2023-12-11 DIAGNOSIS — M79.604 RIGHT LEG PAIN: ICD-10-CM

## 2023-12-11 DIAGNOSIS — I10 ESSENTIAL HYPERTENSION: Primary | ICD-10-CM

## 2023-12-11 PROCEDURE — 99283 EMERGENCY DEPT VISIT LOW MDM: CPT | Mod: HCNC

## 2023-12-11 PROCEDURE — 25000003 PHARM REV CODE 250: Mod: HCNC | Performed by: EMERGENCY MEDICINE

## 2023-12-11 RX ORDER — CLONIDINE HYDROCHLORIDE 0.2 MG/1
0.2 TABLET ORAL
Status: COMPLETED | OUTPATIENT
Start: 2023-12-11 | End: 2023-12-11

## 2023-12-11 RX ORDER — CLONIDINE HYDROCHLORIDE 0.1 MG/1
0.1 TABLET ORAL
Status: COMPLETED | OUTPATIENT
Start: 2023-12-11 | End: 2023-12-11

## 2023-12-11 RX ORDER — IBUPROFEN 600 MG/1
600 TABLET ORAL
Status: COMPLETED | OUTPATIENT
Start: 2023-12-11 | End: 2023-12-11

## 2023-12-11 RX ORDER — ACETAMINOPHEN 500 MG
1000 TABLET ORAL
Status: COMPLETED | OUTPATIENT
Start: 2023-12-11 | End: 2023-12-11

## 2023-12-11 RX ADMIN — IBUPROFEN 600 MG: 600 TABLET, FILM COATED ORAL at 03:12

## 2023-12-11 RX ADMIN — CLONIDINE HYDROCHLORIDE 0.1 MG: 0.1 TABLET ORAL at 03:12

## 2023-12-11 RX ADMIN — CLONIDINE HYDROCHLORIDE 0.2 MG: 0.2 TABLET ORAL at 03:12

## 2023-12-11 RX ADMIN — ACETAMINOPHEN 1000 MG: 500 TABLET ORAL at 01:12

## 2023-12-11 NOTE — TELEPHONE ENCOUNTER
Called pt back regarding pain around inner thigh. Pt states she has real bad pain around her inner thigh that radiates to her abdomen, and also has a history of multiple mini strokes. I informed pt that due to her history and pain, I would advise she go to the ED. Pt vu w/o q/c      ----- Message from Leyda Kincaid sent at 12/11/2023 10:08 AM CST -----  Name of Who is Calling:patient    What is the request in detail:patient requesting a call to speak with nurse regarding a pain that is near her upper inner thigh    Can the clinic reply by [facility]:no    What Number to Call Back if not in [facility]: [phone]

## 2023-12-11 NOTE — ED PROVIDER NOTES
SCRIBE #1 NOTE: I, Solitario Mathews, am scribing for, and in the presence of, No att. providers found. I have scribed the HPI, ROS, and PEx.      History     Chief Complaint   Patient presents with    Groin Pain     Right groin pain after physical therapy x 4 days. Pt has no other complaints.      Review of patient's allergies indicates:   Allergen Reactions    Methotrexate analogues     Cellcept [mycophenolate mofetil] Rash    Imuran [azathioprine] Rash         History of Present Illness     HPI    12/11/2023, 1:40 PM  History obtained from the patient      History of Present Illness: Flory Cam is a 78 y.o. female patient with a PMHx of GERD, lupus, HTN, and CAD with a PSHx of stent placement  who presents to the Emergency Department for evaluation of right hip pain which onset over the last few days. Had a fall several months ago. Had right knee recently drained. No recent traumas, believed her physical therapy may be causing her pain but is unsure. Was recommenced to the ED by her cardiologist Dr. Cohen. Symptoms are constant and moderate in severity. No mitigating or exacerbating factors reported. No associated sxs reported. Patient denies any fevers, chills, n/v/d, SOB, HA, and all other sxs at this time. Pt took Valsartan. No further complaints or concerns at this time.       Arrival mode: EMS    PCP: Tayler Ovalle DO        Past Medical History:  Past Medical History:   Diagnosis Date    Acute coronary syndrome     Anxiety     Bilateral carotid artery stenosis 11/17/2015    Chronic pain     Colon polyp     Coronary artery disease     GERD (gastroesophageal reflux disease)     Hyperlipidemia     Hypertension     Hypothyroidism     Low back pain     Lupus     Osteoporosis     Poor circulation     Pre-operative clearance 7/12/2019    PVD (peripheral vascular disease)     S/P peripheral artery angioplasty 02/13/2014    SCCA (squamous cell carcinoma) of skin 10/2019    Dr. ZANDRA Rivas--oncology    Skin  disease        Past Surgical History:  Past Surgical History:   Procedure Laterality Date    AORTOGRAPHY WITH SERIALOGRAPHY N/A 2021    Procedure: AORTOGRAM, WITH RUNOFF;  Surgeon: Christopher Cohen MD;  Location: [facility] CATH LAB;  Service: Cardiology;  Laterality: N/A;  COVID-19, mRNA, LNP-S, PF, 30 mcg/0.3 mL dose (COVID-19, MRNA, LN-S, PF (Pfizer)) 2021, 2021    Atherosclerotic PVD with intermittent claudication Bilateral 2021    Dr. Cohen    CATARACT EXTRACTION      COLONOSCOPY N/A 2017    Procedure: COLONOSCOPY;  Surgeon: Sylvester Arboleda III, MD;  Location: [facility] ENDO;  Service: Endoscopy;  Laterality: N/A;    COLONOSCOPY N/A 2020    Procedure: COLONOSCOPY;  Surgeon: Sylvester Arboleda III, MD;  Location: [facility] ENDO;  Service: Endoscopy;  Laterality: N/A;    CORONARY ANGIOPLASTY      CORONARY ARTERY BYPASS GRAFT      EXCISION OF MASS OF HAND Right 2022    Procedure: EXCISION, MASS, HAND;  Surgeon: Hernan Culver MD;  Location: [facility] OR;  Service: Orthopedics;  Laterality: Right;   thenar eminence skin lesion excised and a Z-plasty skin flap for coverage    HYSTERECTOMY      OOPHORECTOMY      THYROIDECTOMY           Family History:  Family History   Problem Relation Age of Onset    Hypertension Mother     Stroke Mother     Lung cancer Daughter     Melanoma Neg Hx     Psoriasis Neg Hx     Lupus Neg Hx     Eczema Neg Hx        Social History:  Social History     Tobacco Use    Smoking status: Former     Current packs/day: 0.00     Average packs/day: 0.3 packs/day for 62.1 years (15.5 ttl pk-yrs)     Types: Cigarettes     Start date:      Quit date: 2023     Years since quittin.8    Smokeless tobacco: Never    Tobacco comments:     hold midnight prior to surgery   Substance and Sexual Activity    Alcohol use: Never    Drug use: No    Sexual activity: Not Currently     Partners: Male        Review of Systems     Review of Systems   Constitutional:  Negative for chills and  fever.   HENT:  Negative for sore throat.    Respiratory:  Negative for shortness of breath.    Cardiovascular:  Negative for chest pain.   Gastrointestinal:  Negative for diarrhea, nausea and vomiting.   Genitourinary:  Negative for dysuria.   Musculoskeletal:  Positive for arthralgias (right hip). Negative for back pain.   Skin:  Negative for rash.   Neurological:  Negative for weakness and headaches.   Hematological:  Does not bruise/bleed easily.   All other systems reviewed and are negative.       Physical Exam     Initial Vitals [12/11/23 1148]   BP Pulse Resp Temp SpO2   (!) 197/82 68 20 98.2 °F (36.8 °C) 95 %      MAP       --          Physical Exam  Nursing Notes and Vital Signs Reviewed.  Constitutional: Patient is in no acute distress. Well-developed and well-nourished.  Head: Atraumatic. Normocephalic.  Eyes: PERRL. EOM intact. Conjunctivae are not pale. No scleral icterus.  ENT: Mucous membranes are moist.   Neck: Supple. Full ROM  Cardiovascular: Regular rate. Regular rhythm. No murmurs, rubs, or gallops. Distal pulses are 2+ and symmetric.  Pulmonary/Chest: No respiratory distress. Clear to auscultation bilaterally. No wheezing or rales.  Abdominal: Soft and non-distended.  There is no tenderness.  No rebound, guarding, or rigidity.  Genitourinary: No CVA tenderness  Musculoskeletal: Moves all extremities. No obvious deformities. No edema. No knee affusion.  Skin: Warm and dry.  Neurological:  Alert, awake, and appropriate.  Normal speech.  No acute focal neurological deficits are appreciated.  Psychiatric: Normal affect. Good eye contact. Appropriate in content.     ED Course   Procedures  ED Vital Signs:  Vitals:    12/11/23 1148 12/11/23 1403 12/11/23 1519 12/11/23 1541   BP: (!) 197/82 (!) 218/111 (!) 213/157 (!) 233/102   Pulse: 68 73  69   Resp: 20      Temp: 98.2 °F (36.8 °C)      TempSrc: Oral      SpO2: 95% 97%  96%    12/11/23 1613   BP: (!) 185/84   Pulse: 65   Resp:    Temp:    TempSrc:     SpO2: 97%       Abnormal Lab Results:  Labs Reviewed - No data to display         Imaging Results:  Imaging Results              X-Ray Hip 2 or 3 views Right (with Pelvis when performed) (Final result)  Result time 12/11/23 14:23:34      Final result by Rae Majano MD (12/11/23 14:23:34)                   Impression:      Bones are demineralized.  Vascular calcification with stents and surgical change/clips bilateral groin.  No overt acute fracture or joint malalignment seen.      Electronically signed by: Rae Majano  Date:    12/11/2023  Time:    14:23               Narrative:    EXAMINATION:  XR HIP WITH PELVIS WHEN PERFORMED, 2 OR 3  VIEWS RIGHT    CLINICAL HISTORY:  Pain in right hip    COMPARISON:  None available                                       X-Ray Knee 3 View Right (Final result)  Result time 12/11/23 14:23:45      Final result by Tyrone Crocker MD (12/11/23 14:23:45)                   Impression:      No acute finding      Electronically signed by: Tyrone Crocker  Date:    12/11/2023  Time:    14:23               Narrative:    EXAMINATION:  XR KNEE 3 VIEW RIGHT    CLINICAL HISTORY:  Pain in right leg    TECHNIQUE:  AP, lateral, and Merchant views of the right knee were performed.    COMPARISON:  May 22, 2003.    FINDINGS:  Moderate medial and lateral base.  Tricompartmental osteophytosis.  CPPD.  No acute dislocation.  No effusion.                                                  The Emergency Provider reviewed the vital signs and test results, which are outlined above.     ED Discussion            Medical Decision Making  Problems Addressed:  Essential hypertension: acute illness or injury     Details: Blood pressure medication given with improvement.  Advised outpatient follow up with PCP for continued management of essential hypertension  Right hip pain: acute illness or injury     Details: x-rays taken.  Pain medication    Right leg pain: acute illness or injury     Details:  X-rays taken.  Pain medication given    Amount and/or Complexity of Data Reviewed  External Data Reviewed: notes.     Details: Past medical history, medication list, and allergy list all reviewed  Radiology: ordered. Decision-making details documented in ED Course.    Risk  OTC drugs.  Prescription drug management.                ED Medication(s):  Medications   acetaminophen tablet 1,000 mg (1,000 mg Oral Given 12/11/23 1340)   cloNIDine tablet 0.2 mg (0.2 mg Oral Given 12/11/23 1519)   cloNIDine tablet 0.1 mg (0.1 mg Oral Given 12/11/23 1553)   ibuprofen tablet 600 mg (600 mg Oral Given 12/11/23 1553)       Discharge Medication List as of 12/11/2023  4:28 PM           Follow-up Information       Call  Tayler Ovalle DO.    Specialty: Internal Medicine  Contact information:  [address] [place] [address]  [phone]               [facility] - Emergency Dept..    Specialty: Emergency Medicine  Why: As needed, If symptoms worsen  Contact information:  [address] [address] [facility] [address]  [facility] [id number] [phone]                               Scribe Attestation:   Scribe #1: I performed the above scribed service and the documentation accurately describes the services I performed. I attest to the accuracy of the note.     Attending:   Physician Attestation Statement for Scribe #1: I, No att. providers found, personally performed the services described in this documentation, as scribed by Solitario Mathews, in my presence, and it is both accurate and complete.       Scribe Attestation:   Scribe #2: I performed the above scribed service and the documentation accurately describes the services I performed. I attest to the accuracy of the note.    Attending Attestation:           Physician Attestation for Scribe:    Physician Attestation Statement for Scribe #2: I, Manish Whitney Jr, MD., reviewed documentation, as scribed by Akosua Shah in my presence, and it is both accurate and complete. I  also acknowledge and confirm the content of the note done by Kendra #1.           Clinical Impression       ICD-10-CM ICD-9-CM   1. Essential hypertension  I10 401.9   2. Right hip pain  M25.551 719.45   3. Right leg pain  M79.604 729.5       Disposition:   Disposition: Discharged  Condition: Stable        Solitario Mathews MD  12/19/23 0231

## 2023-12-12 ENCOUNTER — HOSPITAL ENCOUNTER (EMERGENCY)
Facility: HOSPITAL | Age: 78
Discharge: HOME | End: 2023-12-12
Attending: EMERGENCY MEDICINE
Payer: MEDICARE

## 2023-12-12 ENCOUNTER — PATIENT OUTREACH (OUTPATIENT)
Dept: EMERGENCY MEDICINE | Facility: HOSPITAL | Age: 78
End: 2023-12-12

## 2023-12-12 VITALS
BODY MASS INDEX: 27.97 KG/M2 | TEMPERATURE: 99 F | RESPIRATION RATE: 18 BRPM | DIASTOLIC BLOOD PRESSURE: 69 MMHG | HEART RATE: 52 BPM | OXYGEN SATURATION: 97 % | WEIGHT: 174 LBS | SYSTOLIC BLOOD PRESSURE: 146 MMHG | HEIGHT: 66 IN

## 2023-12-12 DIAGNOSIS — R29.810 FACIAL WEAKNESS: Primary | ICD-10-CM

## 2023-12-12 DIAGNOSIS — R29.818 ACUTE FOCAL NEUROLOGICAL DEFICIT: ICD-10-CM

## 2023-12-12 LAB
ALBUMIN SERPL BCP-MCNC: 3.6 G/DL (ref 3.5–5.2)
ALP SERPL-CCNC: 136 U/L (ref 55–135)
ALT SERPL W/O P-5'-P-CCNC: 12 U/L (ref 10–44)
ANION GAP SERPL CALC-SCNC: 12 MMOL/L (ref 8–16)
AST SERPL-CCNC: 17 U/L (ref 10–40)
BASOPHILS # BLD AUTO: 0.05 K/UL (ref 0–0.2)
BASOPHILS NFR BLD: 0.5 % (ref 0–1.9)
BILIRUB SERPL-MCNC: 0.4 MG/DL (ref 0.1–1)
BUN SERPL-MCNC: 20 MG/DL (ref 8–23)
CALCIUM SERPL-MCNC: 9.3 MG/DL (ref 8.7–10.5)
CHLORIDE SERPL-SCNC: 102 MMOL/L (ref 95–110)
CO2 SERPL-SCNC: 27 MMOL/L (ref 23–29)
CREAT SERPL-MCNC: 0.8 MG/DL (ref 0.5–1.4)
DIFFERENTIAL METHOD: NORMAL
EOSINOPHIL # BLD AUTO: 0.2 K/UL (ref 0–0.5)
EOSINOPHIL NFR BLD: 1.8 % (ref 0–8)
ERYTHROCYTE [DISTWIDTH] IN BLOOD BY AUTOMATED COUNT: 14.3 % (ref 11.5–14.5)
EST. GFR  (NO RACE VARIABLE): >60 ML/MIN/1.73 M^2
GLUCOSE SERPL-MCNC: 95 MG/DL (ref 70–110)
HCT VFR BLD AUTO: 40.9 % (ref 37–48.5)
HGB BLD-MCNC: 13.4 G/DL (ref 12–16)
IMM GRANULOCYTES # BLD AUTO: 0.02 K/UL (ref 0–0.04)
IMM GRANULOCYTES NFR BLD AUTO: 0.2 % (ref 0–0.5)
INR PPP: 1 (ref 0.8–1.2)
LYMPHOCYTES # BLD AUTO: 2.9 K/UL (ref 1–4.8)
LYMPHOCYTES NFR BLD: 30.8 % (ref 18–48)
MCH RBC QN AUTO: 29.7 PG (ref 27–31)
MCHC RBC AUTO-ENTMCNC: 32.8 G/DL (ref 32–36)
MCV RBC AUTO: 91 FL (ref 82–98)
MONOCYTES # BLD AUTO: 0.7 K/UL (ref 0.3–1)
MONOCYTES NFR BLD: 7.7 % (ref 4–15)
NEUTROPHILS # BLD AUTO: 5.5 K/UL (ref 1.8–7.7)
NEUTROPHILS NFR BLD: 59 % (ref 38–73)
NRBC BLD-RTO: 0 /100 WBC
PLATELET # BLD AUTO: 254 K/UL (ref 150–450)
PMV BLD AUTO: 9.9 FL (ref 9.2–12.9)
POCT GLUCOSE: 94 MG/DL (ref 70–110)
POTASSIUM SERPL-SCNC: 4 MMOL/L (ref 3.5–5.1)
PROT SERPL-MCNC: 7.2 G/DL (ref 6–8.4)
PROTHROMBIN TIME: 10.4 SEC (ref 9–12.5)
RBC # BLD AUTO: 4.51 M/UL (ref 4–5.4)
SODIUM SERPL-SCNC: 141 MMOL/L (ref 136–145)
TSH SERPL DL<=0.005 MIU/L-ACNC: 1.71 UIU/ML (ref 0.4–4)
WBC # BLD AUTO: 9.31 K/UL (ref 3.9–12.7)

## 2023-12-12 PROCEDURE — 93010 ELECTROCARDIOGRAM REPORT: CPT | Mod: HCNC,,, | Performed by: INTERNAL MEDICINE

## 2023-12-12 PROCEDURE — 93010 EKG 12-LEAD: ICD-10-PCS | Mod: HCNC,,, | Performed by: INTERNAL MEDICINE

## 2023-12-12 PROCEDURE — 85025 COMPLETE CBC W/AUTO DIFF WBC: CPT | Mod: HCNC | Performed by: EMERGENCY MEDICINE

## 2023-12-12 PROCEDURE — 99285 EMERGENCY DEPT VISIT HI MDM: CPT | Mod: 25,HCNC

## 2023-12-12 PROCEDURE — 82962 GLUCOSE BLOOD TEST: CPT | Mod: HCNC

## 2023-12-12 PROCEDURE — 96374 THER/PROPH/DIAG INJ IV PUSH: CPT | Mod: HCNC

## 2023-12-12 PROCEDURE — 85610 PROTHROMBIN TIME: CPT | Mod: HCNC | Performed by: EMERGENCY MEDICINE

## 2023-12-12 PROCEDURE — G0426 PR INPT TELEHEALTH CONSULT 50M: ICD-10-PCS | Mod: 95,HCNC,, | Performed by: PSYCHIATRY & NEUROLOGY

## 2023-12-12 PROCEDURE — 25000003 PHARM REV CODE 250: Mod: HCNC | Performed by: EMERGENCY MEDICINE

## 2023-12-12 PROCEDURE — 80061 LIPID PANEL: CPT | Mod: HCNC | Performed by: EMERGENCY MEDICINE

## 2023-12-12 PROCEDURE — 84443 ASSAY THYROID STIM HORMONE: CPT | Mod: HCNC | Performed by: EMERGENCY MEDICINE

## 2023-12-12 PROCEDURE — 63600175 PHARM REV CODE 636 W HCPCS: Mod: HCNC | Performed by: EMERGENCY MEDICINE

## 2023-12-12 PROCEDURE — 80053 COMPREHEN METABOLIC PANEL: CPT | Mod: HCNC | Performed by: EMERGENCY MEDICINE

## 2023-12-12 PROCEDURE — 93005 ELECTROCARDIOGRAM TRACING: CPT | Mod: HCNC

## 2023-12-12 PROCEDURE — G0426 INPT/ED TELECONSULT50: HCPCS | Mod: 95,HCNC,, | Performed by: PSYCHIATRY & NEUROLOGY

## 2023-12-12 PROCEDURE — 99900035 HC TECH TIME PER 15 MIN (STAT): Mod: HCNC

## 2023-12-12 RX ORDER — LORAZEPAM 2 MG/ML
1 INJECTION INTRAMUSCULAR
Status: COMPLETED | OUTPATIENT
Start: 2023-12-12 | End: 2023-12-12

## 2023-12-12 RX ORDER — HYDRALAZINE HYDROCHLORIDE 20 MG/ML
20 INJECTION INTRAMUSCULAR; INTRAVENOUS
Status: DISCONTINUED | OUTPATIENT
Start: 2023-12-12 | End: 2023-12-12

## 2023-12-12 RX ORDER — CLONIDINE HYDROCHLORIDE 0.2 MG/1
0.2 TABLET ORAL
Status: COMPLETED | OUTPATIENT
Start: 2023-12-12 | End: 2023-12-12

## 2023-12-12 RX ORDER — NIFEDIPINE 30 MG/1
30 TABLET, EXTENDED RELEASE ORAL DAILY
Status: DISCONTINUED | OUTPATIENT
Start: 2023-12-12 | End: 2023-12-12 | Stop reason: HOSPADM

## 2023-12-12 RX ADMIN — LORAZEPAM 1 MG: 2 INJECTION INTRAMUSCULAR; INTRAVENOUS at 04:12

## 2023-12-12 RX ADMIN — NIFEDIPINE 30 MG: 30 TABLET, FILM COATED, EXTENDED RELEASE ORAL at 03:12

## 2023-12-12 RX ADMIN — CLONIDINE HYDROCHLORIDE 0.2 MG: 0.2 TABLET ORAL at 03:12

## 2023-12-12 NOTE — ED PROVIDER NOTES
SCRIBE #1 NOTE: I, Uriel Christianson, am scribing for, and in the presence of, Kaylene Kuhn MD. I have scribed the HPI, ROS, and PEx.     SCRIBE #2 NOTE: I, Crystal Kelly, am scribing for, and in the presence of,  Manish Whitney Jr., MD. I have scribed the remaining portions of the note not scribed by Scribe #1.     History      Chief Complaint   Patient presents with    Facial Droop     Per EMS, patient c/o left sided facial droop; LKW 12pm today; patient's caregiver reports patient woke up with left sided facial droop after taking a nap       Review of patient's allergies indicates:   Allergen Reactions    Methotrexate analogues     Cellcept [mycophenolate mofetil] Rash    Imuran [azathioprine] Rash        HPI   HPI    12/12/2023, 2:09 PM   History obtained from the patient      History of Present Illness: Flory Cam is a 78 y.o. female patient with a PMHx of CAD, HTN, lupus, PVD who presents to the Emergency Department for L-sided facial droop, onset today at 1:00 PM. Last known well time was 12 PM today; caregiver states that the pt woke up with a L-sided facial droop after taking a nap. Symptoms are constant and moderate in severity. No mitigating or exacerbating factors reported. No associated sxs reported. Patient denies any fever, chills, n/v/d, SOB, CP, weakness, numbness, slurred speech, headache, and all other sxs at this time. No prior Tx reported. No further complaints or concerns at this time.     Arrival mode: EMS    PCP: Tayler Ovalle DO       Past Medical History:  Past Medical History:   Diagnosis Date    Acute coronary syndrome     Anxiety     Bilateral carotid artery stenosis 11/17/2015    Chronic pain     Colon polyp     Coronary artery disease     GERD (gastroesophageal reflux disease)     Hyperlipidemia     Hypertension     Hypothyroidism     Low back pain     Lupus     Osteoporosis     Poor circulation     Pre-operative clearance 7/12/2019    PVD (peripheral vascular disease)      S/P peripheral artery angioplasty 2014    SCCA (squamous cell carcinoma) of skin 10/2019    Dr. ZANDRA Rivas--oncology    Skin disease        Past Surgical History:  Past Surgical History:   Procedure Laterality Date    AORTOGRAPHY WITH SERIALOGRAPHY N/A 2021    Procedure: AORTOGRAM, WITH RUNOFF;  Surgeon: Christopher Cohen MD;  Location: [facility] CATH LAB;  Service: Cardiology;  Laterality: N/A;  COVID-19, mRNA, LNP-S, PF, 30 mcg/0.3 mL dose (COVID-19, MRNA, LN-S, PF (Pfizer)) 2021, 2021    Atherosclerotic PVD with intermittent claudication Bilateral 2021    Dr. Cohen    CATARACT EXTRACTION      COLONOSCOPY N/A 2017    Procedure: COLONOSCOPY;  Surgeon: Sylvester Arboleda III, MD;  Location: [facility] ENDO;  Service: Endoscopy;  Laterality: N/A;    COLONOSCOPY N/A 2020    Procedure: COLONOSCOPY;  Surgeon: Sylvester Arboleda III, MD;  Location: [facility] ENDO;  Service: Endoscopy;  Laterality: N/A;    CORONARY ANGIOPLASTY      CORONARY ARTERY BYPASS GRAFT      EXCISION OF MASS OF HAND Right 2022    Procedure: EXCISION, MASS, HAND;  Surgeon: Hernan Culver MD;  Location: [facility] OR;  Service: Orthopedics;  Laterality: Right;   thenar eminence skin lesion excised and a Z-plasty skin flap for coverage    HYSTERECTOMY      OOPHORECTOMY      THYROIDECTOMY           Family History:  Family History   Problem Relation Age of Onset    Hypertension Mother     Stroke Mother     Lung cancer Daughter     Melanoma Neg Hx     Psoriasis Neg Hx     Lupus Neg Hx     Eczema Neg Hx        Social History:  Social History     Tobacco Use    Smoking status: Former     Current packs/day: 0.00     Average packs/day: 0.3 packs/day for 62.1 years (15.5 ttl pk-yrs)     Types: Cigarettes     Start date:      Quit date: 2023     Years since quittin.8    Smokeless tobacco: Never    Tobacco comments:     hold midnight prior to surgery   Substance and Sexual Activity    Alcohol use: Never    Drug use: No    Sexual  activity: Not Currently     Partners: Male       ROS   Review of Systems   Constitutional:  Negative for chills and fever.   HENT:  Negative for sore throat.    Respiratory:  Negative for shortness of breath.    Cardiovascular:  Negative for chest pain.   Gastrointestinal:  Negative for diarrhea, nausea and vomiting.   Genitourinary:  Negative for dysuria.   Musculoskeletal:  Negative for back pain.   Skin:  Negative for rash.   Neurological:  Positive for facial asymmetry (L-sided facial droop). Negative for dizziness, speech difficulty, weakness, numbness and headaches.   Hematological:  Does not bruise/bleed easily.   All other systems reviewed and are negative.    Physical Exam      Initial Vitals   BP Pulse Resp Temp SpO2   12/12/23 1412 12/12/23 1412 12/12/23 1412 12/12/23 1446 12/12/23 1412   (!) 157/106 65 20 98.9 °F (37.2 °C) 96 %      MAP       --                 Physical Exam  Nursing Notes and Vital Signs Reviewed.  Constitutional: Patient is in no acute distress. Well-developed and well-nourished.  Head: Atraumatic. Normocephalic.  Eyes: PERRL. EOM intact. Conjunctivae are not pale. No scleral icterus.  ENT: Mucous membranes are moist. Oropharynx is clear and symmetric.    Neck: Supple. Full ROM.   Cardiovascular: Regular rate. Regular rhythm. No murmurs, rubs, or gallops. Distal pulses are 2+ and symmetric.  Pulmonary/Chest: No respiratory distress. Clear to auscultation bilaterally. No wheezing or rales.  Abdominal: Soft and non-distended.  There is no tenderness.  No rebound, guarding, or rigidity.   Musculoskeletal: Moves all extremities. No obvious deformities. No edema.  Skin: Warts noted to R hand.  Neurological: Patient is alert and oriented to person, place and time. Slightly disconjugate gaze. L-sided lower facial droop. Strength is full bilaterally; it is equal and 5/5 in bilateral upper and lower extremities. There is no pronator drift of outstretched arms. Light touch sense is intact.  "Speech is clear and normal.     ED Course    Procedures  ED Vital Signs:  Vitals:    12/12/23 1412 12/12/23 1425 12/12/23 1445 12/12/23 1446   BP: (!) 157/106 (!) 243/94     Pulse: 65 70     Resp: 20 16     Temp:    98.9 °F (37.2 °C)   TempSrc:    Oral   SpO2: 96% 98%     Weight:   78.9 kg (174 lb)    Height: 5' 6" (1.676 m)       12/12/23 1502 12/12/23 1504 12/12/23 1506 12/12/23 1531   BP: (!) 217/90 (!) 217/90  (!) 192/81   Pulse: 60  69 (!) 56   Resp:    (!) 25   Temp:       TempSrc:       SpO2: 98%   98%   Weight:       Height:        12/12/23 1547 12/12/23 1617 12/12/23 1819 12/12/23 1900   BP: 138/63 (!) 147/74 (!) 149/68 (!) 148/66   Pulse: (!) 57 (!) 46 (!) 47 (!) 50   Resp:   (!) 21 (!) 21   Temp:       TempSrc:       SpO2: 97% 97% 100% 99%   Weight:       Height:        12/12/23 2004   BP: (!) 146/69   Pulse: (!) 52   Resp: 18   Temp: 98.5 °F (36.9 °C)   TempSrc: Oral   SpO2: 97%   Weight:    Height:        Abnormal Lab Results:  Labs Reviewed   COMPREHENSIVE METABOLIC PANEL - Abnormal; Notable for the following components:       Result Value    Alkaline Phosphatase 136 (*)     All other components within normal limits   LIPID PANEL - Abnormal; Notable for the following components:    LDL Cholesterol 62.6 (*)     All other components within normal limits   CBC W/ AUTO DIFFERENTIAL   PROTIME-INR   TSH   POCT GLUCOSE        All Lab Results:  Results for orders placed or performed during the hospital encounter of 12/12/23   CBC W/ AUTO DIFFERENTIAL   Result Value Ref Range    WBC 9.31 3.90 - 12.70 K/uL    RBC 4.51 4.00 - 5.40 M/uL    Hemoglobin 13.4 12.0 - 16.0 g/dL    Hematocrit 40.9 37.0 - 48.5 %    MCV 91 82 - 98 fL    MCH 29.7 27.0 - 31.0 pg    MCHC 32.8 32.0 - 36.0 g/dL    RDW 14.3 11.5 - 14.5 %    Platelets 254 150 - 450 K/uL    MPV 9.9 9.2 - 12.9 fL    Immature Granulocytes 0.2 0.0 - 0.5 %    Gran # (ANC) 5.5 1.8 - 7.7 K/uL    Immature Grans (Abs) 0.02 0.00 - 0.04 K/uL    Lymph # 2.9 1.0 - 4.8 K/uL    " Mono # 0.7 0.3 - 1.0 K/uL    Eos # 0.2 0.0 - 0.5 K/uL    Baso # 0.05 0.00 - 0.20 K/uL    nRBC 0 0 /100 WBC    Gran % 59.0 38.0 - 73.0 %    Lymph % 30.8 18.0 - 48.0 %    Mono % 7.7 4.0 - 15.0 %    Eosinophil % 1.8 0.0 - 8.0 %    Basophil % 0.5 0.0 - 1.9 %    Differential Method Automated    Comprehensive metabolic panel   Result Value Ref Range    Sodium 141 136 - 145 mmol/L    Potassium 4.0 3.5 - 5.1 mmol/L    Chloride 102 95 - 110 mmol/L    CO2 27 23 - 29 mmol/L    Glucose 95 70 - 110 mg/dL    BUN 20 8 - 23 mg/dL    Creatinine 0.8 0.5 - 1.4 mg/dL    Calcium 9.3 8.7 - 10.5 mg/dL    Total Protein 7.2 6.0 - 8.4 g/dL    Albumin 3.6 3.5 - 5.2 g/dL    Total Bilirubin 0.4 0.1 - 1.0 mg/dL    Alkaline Phosphatase 136 (H) 55 - 135 U/L    AST 17 10 - 40 U/L    ALT 12 10 - 44 U/L    eGFR >60 >60 mL/min/1.73 m^2    Anion Gap 12 8 - 16 mmol/L   Protime-INR   Result Value Ref Range    Prothrombin Time 10.4 9.0 - 12.5 sec    INR 1.0 0.8 - 1.2   TSH   Result Value Ref Range    TSH 1.711 0.400 - 4.000 uIU/mL   LDL - Lipid Panel   Result Value Ref Range    Cholesterol 122 120 - 199 mg/dL    Triglycerides 77 30 - 150 mg/dL    HDL 44 40 - 75 mg/dL    LDL Cholesterol 62.6 (L) 63.0 - 159.0 mg/dL    HDL/Cholesterol Ratio 36.1 20.0 - 50.0 %    Total Cholesterol/HDL Ratio 2.8 2.0 - 5.0    Non-HDL Cholesterol 78 mg/dL   POCT glucose   Result Value Ref Range    POCT Glucose 94 70 - 110 mg/dL     *Note: Due to a large number of results and/or encounters for the requested time period, some results have not been displayed. A complete set of results can be found in Results Review.     Imaging Results:  Imaging Results              MRA Neck without contrast (Final result)  Result time 12/12/23 18:32:51      Final result by Rae Majano MD (12/12/23 18:32:51)                   Impression:      Imaging significantly degraded.  Questionable intraluminal linear defect left mid cervical carotid.  No overt large vessel occlusion again as  visualized with image degradation      Electronically signed by: Rae Majano  Date:    12/12/2023  Time:    18:32               Narrative:    EXAMINATION:  MRA NECK WITHOUT CONTRAST    CLINICAL HISTORY:  Stroke, follow up;Neuro deficit, acute, stroke suspected;    TECHNIQUE:  Contrast enhanced MRA of the neck with multiple MIP reconstructions.  intravenous contrast was used.    COMPARISON:  No prior comparison                                       MRI Brain Without Contrast (Final result)  Result time 12/12/23 17:27:55      Final result by Rae Majano MD (12/12/23 17:27:55)                   Impression:      No acute ischemia or other acute finding as visualized image degradation      Electronically signed by: Rae Majano  Date:    12/12/2023  Time:    17:27               Narrative:    EXAMINATION:  MRI BRAIN WITHOUT CONTRAST    CLINICAL HISTORY:  Stroke, follow up;    TECHNIQUE:  Multiplanar multisequence MR imaging of the brain was performed without contrast.    COMPARISON:  CT correlation    FINDINGS:  No acute ischemia seen    No mass effect or midline shift    Presumed chronic microangiopathic change supratentorial and infratentorial at least moderate to severe    Right mastoid air cell effusion is                                       MRA Brain without contrast (Final result)  Result time 12/12/23 17:23:43      Final result by Rae Majano MD (12/12/23 17:23:43)                   Impression:      Diffuse multifocal stenoses greater left      Electronically signed by: Rae Majano  Date:    12/12/2023  Time:    17:23               Narrative:    EXAMINATION:  MRA BRAIN WITHOUT CONTRAST    CLINICAL HISTORY:  Stroke, follow up;Neuro deficit, acute, stroke suspected;    TECHNIQUE:  Non-contrast 3-D time-of-flight intracranial MR angiography was performed through the [place] with MIP reformatting.    COMPARISON:  Head CT correlation is    FINDINGS:  There is diffuse  disease with multifocal stenoses most marked distal left M1, left A1 and PCAs greater left.  No overt proximal occlusion.                                       CT Head Without Contrast (Final result)  Result time 12/12/23 14:26:53      Final result by Aakash Burnett MD (12/12/23 14:26:53)                   Impression:      No CT evidence of acute intracranial abnormality.    All CT scans at this facility are performed  using dose modulation techniques as appropriate to performed exam including the following:  automated exposure control; adjustment of mA and/or kV according to the patients size (this includes techniques or standardized protocols for targeted exams where dose is matched to indication/reason for exam: i.e. extremities or head);  iterative reconstruction technique.      Electronically signed by: Aakash Burnett MD  Date:    12/12/2023  Time:    14:26               Narrative:    EXAMINATION:  CT HEAD WITHOUT CONTRAST    CLINICAL HISTORY:  Neuro deficit, acute, stroke suspected;    TECHNIQUE:  Axial CT imaging was performed through the head without intravenous contrast.    COMPARISON:  09/27/2023    FINDINGS:  No hydrocephalus, midline shift, mass effect, or acute intracranial hemorrhage. Stable chronic deep white matter microangiopathy and a remote right basal ganglia lacunar infarction.  Basilar cisterns and posterior fossa are normal. The visualized paranasal sinuses and mastoid air cells are clear. The skull is intact.                                       The EKG was ordered, reviewed, and independently interpreted by the ED provider.  Interpretation time: 14:43  Rate: 58 BPM  Rhythm: sinus bradycardia  Interpretation: RBBB. Left anterior fascicular block. Bifascicular block. LVH with repolarization abnormality. No STEMI.           The Emergency Provider reviewed the vital signs and test results, which are outlined above.    ED Discussion     2:46 PM: Discussed pt's case with Dr. Cadena (Vascular Neurology  via Tele Stroke consult), who evaluated pt at bedside and recommends MRI brain without contrast and MRA head/neck.    4:00 PM: Dr. Kuhn transfers care of patient to Dr. Whitney pending imaging results.    4:23 PM: Upon reevaluation, Dr. Whitney agrees with Dr. Kuhn's plan of care. The pt is resting comfortably and is in no acute distress. Discussed available test results and notified pt of pending labs. Answered any questions at this time.     7:34 PM: Reassessed pt at this time. At this time, pt has a GCS of 15 and is able to answer questions appropriately. All cranial nerves are intact. There are no acute focal neurological deficits. Pt's NIH score is 0. Discussed pt dx and plan of tx. Gave pt all f/u and return to the ED instructions. All questions and concerns were addressed at this time. Pt expresses understanding of information and instructions, and is comfortable with plan to discharge. Pt is stable for discharge.    I discussed with patient and family that evaluation in the ED does not suggest any emergent or life threatening medical conditions requiring immediate intervention beyond what was provided in the ED, and I believe patient is safe for discharge.  Regardless, an unremarkable evaluation in the ED does not preclude the development or presence of a serious of life threatening condition. As such, patient was instructed to return immediately for any worsening or change in current symptoms.           ED Medication(s):  Medications   cloNIDine tablet 0.2 mg (0.2 mg Oral Given 12/12/23 1504)   LORazepam injection 1 mg (1 mg Intravenous Given 12/12/23 1627)        Follow-up Information       Tayler Ovalle DO. Schedule an appointment as soon as possible for a visit in 1 week.    Specialty: Internal Medicine  Contact information:  [address] [place] [address] DR Jesi MCDOWELL [address] [phone]               [address] Schedule an appointment as soon as possible for a visit in 1 week.     Specialty: Cardiology  Contact information:  [address] [facility] [id number] [phone]  Additional information:  Please park on the [address] side and use the Clinic entrance. Check in on the 3rd floor, to the right.             [facility] Neurology [facility]. Schedule an appointment as soon as possible for a visit in 1 week.    Specialty: Neurology  Contact information:  [address] [facility] [id number] [phone]  Additional information:  Please park on the [address] side and use the Clinic entrance. Check in on the 1st floor, to the right across from the café.             [facility] - Emergency Dept..    Specialty: Emergency Medicine  Why: As needed, If symptoms worsen  Contact information:  [address]  [facility] [id number] [phone]                         Discharge Medication List as of 12/12/2023  7:33 PM            Medical Decision Making    Medical Decision Making  Amount and/or Complexity of Data Reviewed  Labs: ordered. Decision-making details documented in ED Course.  Radiology: ordered and independent interpretation performed. Decision-making details documented in ED Course.  ECG/medicine tests: ordered and independent interpretation performed. Decision-making details documented in ED Course.    Risk  OTC drugs.  Prescription drug management.  Parenteral controlled substances.  Decision regarding hospitalization.  Risk Details: OTC drugs, prescription drugs and controlled substances considered.  Due to patient's symptoms improving and pain controlled pain medications ordered appropriately.  Hospitalization considered.  Due to patient's symptoms improving, no facial weakness noted on exam/reexam, no airway issues, able to manage oral secretions, tolerate PO and ambulate without assistance, patient will be able to be discharged with close f/u c pcp/cardiology/neurology within one week and to return to ER if any issues  arise.          Additional MDM:     NIH Stroke Scale:   Interval = initial 15 minute assessment from arrival  Level of consciousness = 0 - alert  LOC questions = 0 - answers both correctly  LOC commands = 0 - performs both correctly  Best gaze = 0 - normal  Visual = 0 - no visual loss  Facial palsy = 2 - partial  Motor left arm =  0 - no drift  Motor right arm =  0 - no drift  Motor left leg = 0 - no drift  Motor right leg =  0 - no drift  Limb ataxia = 0 - absent  Sensory = 0 - normal  Best language = 0 - no aphasia  Dysarthria = 0 - normal articulation  Extinction and inattention = 0 - no neglect  NIH Stroke Scale Total = 2              Scribe Attestation:   Scribe #1: I performed the above scribed service and the documentation accurately describes the services I performed. I attest to the accuracy of the note.    Attending:   Physician Attestation Statement for Scribe #1: I, Kaylene Kuhn MD, personally performed the services described in this documentation, as scribed by Uriel Christianson, in my presence, and it is both accurate and complete.       Scribe Attestation:   Scribe #2: I performed the above scribed service and the documentation accurately describes the services I performed. I attest to the accuracy of the note.    Attending Attestation:           Physician Attestation for Scribe:    Physician Attestation Statement for Scribe #2: I, Manish Whitney Jr., MD, reviewed documentation, as scribed by Crystal Kelly in my presence, and it is both accurate and complete. I also acknowledge and confirm the content of the note done by Scribe #1.          Clinical Impression       ICD-10-CM ICD-9-CM   1. Facial weakness  R29.810 781.94   2. Acute focal neurological deficit  R29.818 781.99       Disposition:   Disposition: Discharged  Condition: Stable         Manish Whitney Jr., MD  12/18/23 2019

## 2023-12-13 ENCOUNTER — TELEPHONE (OUTPATIENT)
Dept: INTERNAL MEDICINE | Facility: CLINIC | Age: 78
End: 2023-12-13
Payer: MEDICARE

## 2023-12-13 LAB
CHOLEST SERPL-MCNC: 122 MG/DL (ref 120–199)
CHOLEST/HDLC SERPL: 2.8 {RATIO} (ref 2–5)
HDLC SERPL-MCNC: 44 MG/DL (ref 40–75)
HDLC SERPL: 36.1 % (ref 20–50)
LDLC SERPL CALC-MCNC: 62.6 MG/DL (ref 63–159)
NONHDLC SERPL-MCNC: 78 MG/DL
TRIGL SERPL-MCNC: 77 MG/DL (ref 30–150)

## 2023-12-13 NOTE — TELEMEDICINE CONSULT
"[facility]  Vascular Neurology  Comprehensive Stroke Center  TeleVascular Neurology Acute Consultation Note        Consult Information  Consults    Consulting Provider: PEG PANIAGUA   Current Providers  No providers found    Patient Location:  [facility] EMERGENCY DEPARTMENT Emergency Department    Spoke hospital nurse at bedside with patient assisting consultant.  Patient information was obtained from patient.       Stroke Documentation  Acute Stroke Times   Last Known Normal Date: 12/11/23  Last Known Normal Time: 1200  Stroke Team Called Date: 12/13/23  Stroke Team Called Time: 1410  Stroke Team Arrival Date: 12/13/23  Stroke Team Arrival Time: 1420  CT Interpretation Time: 1420  Thrombolytic Therapy Recommended: No  Thrombectomy Recommended: No    NIH Scale:         Modified [place]:    [place] Coma Scale:     ABCD2 Score:    [id number] Score:    HAS -BLED Score:    ICH Score:    Hunt & Strauss Classification:      Blood pressure (!) 146/69, pulse (!) 52, temperature 98.5 °F (36.9 °C), temperature source Oral, resp. rate 18, height 5' 6" (1.676 m), weight 78.9 kg (174 lb), SpO2 97 %.    Diagnoses  No problems updated.    Medical Decision Making  HPI:  78 y.o. female with h/o HTN, seen in ED yesterday for HTN, represents today for BP to 180s at home.  She also reports HA for last several days.  L facial droop noted by nursing staff who had cared for patient yesterday.  Patient denies any other symptoms.     Images personally reviewed and interpreted:  Study: Head CT  Study Interpretation: no acute findings     Assessment and plan:  77 y/o woman with HTN re-represented today for HTN and noted to have L facial droop.  Agree that there appears to be some mild L facial droop involving L lower face.  Patient denies prior stroke but R thalamic chronic lacune noted on CT.  Consider new stroke vs recrudescence.  Recommend MRI brain for further evaluation.    Lytics recommendation: Thrombolytic " therapy not recommended due to Outside of treatment window  and Mild Non-Disabling Symptoms  Thrombectomy recommendation: No; at this time symptoms not suggestive of large vessel occlusion  Placement recommendation: pending further studies               ROS  Physical Exam  Neurological:      Mental Status: She is oriented to person, place, and time.      Motor: No weakness.      Comments: L lower facial droop       Past Medical History:   Diagnosis Date    Acute coronary syndrome     Anxiety     Bilateral carotid artery stenosis 11/17/2015    Chronic pain     Colon polyp     Coronary artery disease     GERD (gastroesophageal reflux disease)     Hyperlipidemia     Hypertension     Hypothyroidism     Low back pain     Lupus     Osteoporosis     Poor circulation     Pre-operative clearance 7/12/2019    PVD (peripheral vascular disease)     S/P peripheral artery angioplasty 02/13/2014    SCCA (squamous cell carcinoma) of skin 10/2019    Dr. ZANDRA Rivas--oncology    Skin disease      Past Surgical History:   Procedure Laterality Date    AORTOGRAPHY WITH SERIALOGRAPHY N/A 5/25/2021    Procedure: AORTOGRAM, WITH RUNOFF;  Surgeon: Christopher Cohen MD;  Location: [facility] CATH LAB;  Service: Cardiology;  Laterality: N/A;  COVID-19, mRNA, LNP-S, PF, 30 mcg/0.3 mL dose (COVID-19, MRNA, LN-S, PF (Pfizer)) 1/30/2021, 1/9/2021    Atherosclerotic PVD with intermittent claudication Bilateral 05/2021    Dr. Cohen    CATARACT EXTRACTION      COLONOSCOPY N/A 1/4/2017    Procedure: COLONOSCOPY;  Surgeon: Sylvester Arboleda III, MD;  Location: [facility] ENDO;  Service: Endoscopy;  Laterality: N/A;    COLONOSCOPY N/A 8/4/2020    Procedure: COLONOSCOPY;  Surgeon: Sylvester Arboleda III, MD;  Location: [facility] ENDO;  Service: Endoscopy;  Laterality: N/A;    CORONARY ANGIOPLASTY      CORONARY ARTERY BYPASS GRAFT      EXCISION OF MASS OF HAND Right 2/11/2022    Procedure: EXCISION, MASS, HAND;  Surgeon: Hernan Culver MD;  Location: [facility] OR;  Service:  Orthopedics;  Laterality: Right;   thenar eminence skin lesion excised and a Z-plasty skin flap for coverage    HYSTERECTOMY      OOPHORECTOMY      THYROIDECTOMY       Family History   Problem Relation Age of Onset    Hypertension Mother     Stroke Mother     Lung cancer Daughter     Melanoma Neg Hx     Psoriasis Neg Hx     Lupus Neg Hx     Eczema Neg Hx            Acacia Cadena MD      Emergent/Acute neurological consultation requested by spoke provider due to critical concerns for possible cerebrovascular event that could result in permanent loss of neurologic/bodily function, severe disability or death of this patient.  Immediate/timely evaluation by a highly prepared expert is paramount for optimal outcomes  High risk for neurological deterioration if not properly diagnosed  High risk for neurological deterioration if not treated promplty/as soon as possible  Complex diagnostic evaluation may be required (advanced imaging)  High risk treatment options (thrombolytics and/or thrombectomy)    Patient care was coordinated with spoke provider, including but not limted to    Discussing likely diagnosis/etiology of symptoms  Making recommendations for further diagnostic studies  Making recommendations for intravenous thrombolytics or other advanced therapies  Making recommendations for disposition (admission/transfer for higher level of care)

## 2023-12-13 NOTE — TELEPHONE ENCOUNTER
----- Message from Sofia Christy sent at 12/13/2023 11:27 AM CST -----  Regarding: ED Follow Up  Hello,     This patient was seen in the ED on 12/11/23 and needs a 7-day follow up appointment.  Could you please assist in getting a visit scheduled for her.  Thank you!      Sofia Christy  ED Navigator

## 2023-12-13 NOTE — SUBJECTIVE & OBJECTIVE
HPI:  78 y.o. female with h/o HTN, seen in ED yesterday for HTN, represents today for BP to 180s at home.  She also reports HA for last several days.  L facial droop noted by nursing staff who had cared for patient yesterday.  Patient denies any other symptoms.     Images personally reviewed and interpreted:  Study: Head CT  Study Interpretation: no acute findings     Assessment and plan:  79 y/o woman with HTN re-represented today for HTN and noted to have L facial droop.  Agree that there appears to be some mild L facial droop involving L lower face.  Patient denies prior stroke but R thalamic chronic lacune noted on CT.  Consider new stroke vs recrudescence.  Recommend MRI brain for further evaluation.    Lytics recommendation: Thrombolytic therapy not recommended due to Outside of treatment window  and Mild Non-Disabling Symptoms  Thrombectomy recommendation: No; at this time symptoms not suggestive of large vessel occlusion  Placement recommendation: pending further studies

## 2023-12-18 PROBLEM — R29.810 FACIAL WEAKNESS: Status: ACTIVE | Noted: 2023-12-18

## 2023-12-19 ENCOUNTER — HOSPITAL ENCOUNTER (OUTPATIENT)
Dept: CARDIOLOGY | Facility: HOSPITAL | Age: 78
Discharge: HOME | End: 2023-12-19
Payer: MEDICARE

## 2023-12-19 ENCOUNTER — OFFICE VISIT (OUTPATIENT)
Dept: CARDIOLOGY | Facility: CLINIC | Age: 78
End: 2023-12-19
Payer: MEDICARE

## 2023-12-19 VITALS
DIASTOLIC BLOOD PRESSURE: 68 MMHG | WEIGHT: 167.13 LBS | BODY MASS INDEX: 26.86 KG/M2 | HEIGHT: 66 IN | SYSTOLIC BLOOD PRESSURE: 160 MMHG | OXYGEN SATURATION: 98 %

## 2023-12-19 DIAGNOSIS — I10 ESSENTIAL HYPERTENSION: ICD-10-CM

## 2023-12-19 DIAGNOSIS — I70.219 ATHEROSCLEROTIC PVD WITH INTERMITTENT CLAUDICATION: Chronic | ICD-10-CM

## 2023-12-19 DIAGNOSIS — I70.219 ATHEROSCLEROTIC PVD WITH INTERMITTENT CLAUDICATION: Primary | Chronic | ICD-10-CM

## 2023-12-19 DIAGNOSIS — I73.9 PERIPHERAL VASCULAR DISEASE: Chronic | ICD-10-CM

## 2023-12-19 DIAGNOSIS — F17.210 CIGARETTE NICOTINE DEPENDENCE WITHOUT COMPLICATION: ICD-10-CM

## 2023-12-19 DIAGNOSIS — I10 ACCELERATED HYPERTENSION: Primary | ICD-10-CM

## 2023-12-19 DIAGNOSIS — I70.0 CALCIFICATION OF AORTA: ICD-10-CM

## 2023-12-19 DIAGNOSIS — R00.1 BRADYCARDIA: ICD-10-CM

## 2023-12-19 DIAGNOSIS — Z95.1 HX OF CABG: Chronic | ICD-10-CM

## 2023-12-19 DIAGNOSIS — I65.29 STENOSIS OF CAROTID ARTERY, UNSPECIFIED LATERALITY: ICD-10-CM

## 2023-12-19 PROCEDURE — 3288F FALL RISK ASSESSMENT DOCD: CPT | Mod: HCNC,CPTII,S$GLB, | Performed by: PHYSICIAN ASSISTANT

## 2023-12-19 PROCEDURE — 3077F PR MOST RECENT SYSTOLIC BLOOD PRESSURE >= 140 MM HG: ICD-10-PCS | Mod: HCNC,CPTII,S$GLB, | Performed by: PHYSICIAN ASSISTANT

## 2023-12-19 PROCEDURE — 1159F MED LIST DOCD IN RCRD: CPT | Mod: HCNC,CPTII,S$GLB, | Performed by: PHYSICIAN ASSISTANT

## 2023-12-19 PROCEDURE — 1159F PR MEDICATION LIST DOCUMENTED IN MEDICAL RECORD: ICD-10-PCS | Mod: HCNC,CPTII,S$GLB, | Performed by: PHYSICIAN ASSISTANT

## 2023-12-19 PROCEDURE — 3077F SYST BP >= 140 MM HG: CPT | Mod: HCNC,CPTII,S$GLB, | Performed by: PHYSICIAN ASSISTANT

## 2023-12-19 PROCEDURE — 1160F PR REVIEW ALL MEDS BY PRESCRIBER/CLIN PHARMACIST DOCUMENTED: ICD-10-PCS | Mod: HCNC,CPTII,S$GLB, | Performed by: PHYSICIAN ASSISTANT

## 2023-12-19 PROCEDURE — 1101F PR PT FALLS ASSESS DOC 0-1 FALLS W/OUT INJ PAST YR: ICD-10-PCS | Mod: HCNC,CPTII,S$GLB, | Performed by: PHYSICIAN ASSISTANT

## 2023-12-19 PROCEDURE — 1101F PT FALLS ASSESS-DOCD LE1/YR: CPT | Mod: HCNC,CPTII,S$GLB, | Performed by: PHYSICIAN ASSISTANT

## 2023-12-19 PROCEDURE — 99999 PR PBB SHADOW E&M-EST. PATIENT-LVL IV: CPT | Mod: PBBFAC,HCNC,, | Performed by: PHYSICIAN ASSISTANT

## 2023-12-19 PROCEDURE — 1125F AMNT PAIN NOTED PAIN PRSNT: CPT | Mod: HCNC,CPTII,S$GLB, | Performed by: PHYSICIAN ASSISTANT

## 2023-12-19 PROCEDURE — 1125F PR PAIN SEVERITY QUANTIFIED, PAIN PRESENT: ICD-10-PCS | Mod: HCNC,CPTII,S$GLB, | Performed by: PHYSICIAN ASSISTANT

## 2023-12-19 PROCEDURE — 93010 ELECTROCARDIOGRAM REPORT: CPT | Mod: HCNC,,, | Performed by: INTERNAL MEDICINE

## 2023-12-19 PROCEDURE — 93005 ELECTROCARDIOGRAM TRACING: CPT | Mod: HCNC

## 2023-12-19 PROCEDURE — 3288F PR FALLS RISK ASSESSMENT DOCUMENTED: ICD-10-PCS | Mod: HCNC,CPTII,S$GLB, | Performed by: PHYSICIAN ASSISTANT

## 2023-12-19 PROCEDURE — 3078F PR MOST RECENT DIASTOLIC BLOOD PRESSURE < 80 MM HG: ICD-10-PCS | Mod: HCNC,CPTII,S$GLB, | Performed by: PHYSICIAN ASSISTANT

## 2023-12-19 PROCEDURE — 3078F DIAST BP <80 MM HG: CPT | Mod: HCNC,CPTII,S$GLB, | Performed by: PHYSICIAN ASSISTANT

## 2023-12-19 PROCEDURE — 99214 OFFICE O/P EST MOD 30 MIN: CPT | Mod: HCNC,S$GLB,, | Performed by: PHYSICIAN ASSISTANT

## 2023-12-19 PROCEDURE — 1160F RVW MEDS BY RX/DR IN RCRD: CPT | Mod: HCNC,CPTII,S$GLB, | Performed by: PHYSICIAN ASSISTANT

## 2023-12-19 PROCEDURE — 99999 PR PBB SHADOW E&M-EST. PATIENT-LVL IV: ICD-10-PCS | Mod: PBBFAC,HCNC,, | Performed by: PHYSICIAN ASSISTANT

## 2023-12-19 PROCEDURE — 99214 PR OFFICE/OUTPT VISIT, EST, LEVL IV, 30-39 MIN: ICD-10-PCS | Mod: HCNC,S$GLB,, | Performed by: PHYSICIAN ASSISTANT

## 2023-12-19 PROCEDURE — 93010 EKG 12-LEAD: ICD-10-PCS | Mod: HCNC,,, | Performed by: INTERNAL MEDICINE

## 2023-12-19 RX ORDER — HYDRALAZINE HYDROCHLORIDE 50 MG/1
50 TABLET, FILM COATED ORAL EVERY 12 HOURS
Qty: 60 TABLET | Refills: 11 | Status: SHIPPED | OUTPATIENT
Start: 2023-12-19

## 2023-12-19 NOTE — PROGRESS NOTES
Subjective:   Patient ID:  Flory Cam is a 78 y.o. female who presents for follow-up of ED visit      HPI  Ms. 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 Patient recently seen at [facility] ED due to HTN and focal neurological deficit (facial droop)> Workup including MRA of neck and MRI of brain un-revealing (with exception of chronic microvascular disease). She returns today and states she is doing ok. Main complaint is right knee pain. CV wise, seems to be doing ok. No chest pain, heaviness, or tightness. No SOB/LAURENT. No palpitations, LH, dizziness, near syncope, or syncope. No s/s suggestive of CHF. BP elevated today, has been running at high, repeat with slight improvement. She is compliant with her medications, took meds about an hour prior to appointment. Smoking occasionally. Using a walker to help with ambulation. Feels a lot of her BP spikes are related to pain issues.     Previously discussed leg studies/leg pain with Dr. Cohen. Soquel symptoms more related to arthritic pain.      EKG today shows NSR, bifascicular block, T wave abnormality with lateral ischemia, LVH changes. Echo 10/20 showed normal EF. Stress test 10/20 negative for ischemia, +fixed defect.     Review of Systems   Constitutional: Negative for chills, decreased appetite, fever and malaise/fatigue.   HENT:  Negative for congestion, hoarse voice and sore throat.    Eyes:  Negative for blurred vision and discharge.   Cardiovascular:  Negative for chest pain, claudication, cyanosis, dyspnea on exertion, irregular heartbeat, leg swelling, near-syncope, orthopnea, palpitations and paroxysmal nocturnal dyspnea.   Respiratory:  Negative for cough, hemoptysis, shortness of breath, snoring, sputum production and  "wheezing.    Endocrine: Negative for cold intolerance and heat intolerance.   Hematologic/Lymphatic: Negative for bleeding problem. Does not bruise/bleed easily.   Skin:  Positive for rash.   Musculoskeletal:  Positive for arthritis, back pain and joint pain. Negative for joint swelling, muscle cramps, muscle weakness and myalgias.   Gastrointestinal:  Negative for abdominal pain, constipation, diarrhea, heartburn, melena and nausea.   Genitourinary:  Negative for hematuria.   Neurological:  Negative for dizziness, focal weakness, headaches, light-headedness, loss of balance, numbness, paresthesias, seizures and weakness.   Psychiatric/Behavioral:  Negative for memory loss. The patient does not have insomnia.    Allergic/Immunologic: Negative for hives.     BP (!) 160/68 (BP Location: Right arm, Patient Position: Sitting, BP Method: Large (Manual))   Ht 5' 6" (1.676 m)   Wt 75.8 kg (167 lb 1.7 oz)   SpO2 98%   BMI 26.97 kg/m²     Objective:   Physical Exam  Vitals and nursing note reviewed.   Constitutional:       General: She is not in acute distress.     Appearance: Normal appearance. She is well-developed. She is obese. She is not diaphoretic.   HENT:      Head: Normocephalic and atraumatic.   Eyes:      General:         Right eye: No discharge.         Left eye: No discharge.      Pupils: Pupils are equal, round, and reactive to light.   Neck:      Thyroid: No thyromegaly.      Trachea: No tracheal deviation.   Cardiovascular:      Rate and Rhythm: Normal rate and regular rhythm.      Heart sounds: Normal heart sounds, S1 normal and S2 normal. No murmur heard.  Pulmonary:      Effort: Pulmonary effort is normal. No respiratory distress.      Breath sounds: Normal breath sounds. No wheezing or rales.   Abdominal:      General: There is no distension.      Tenderness: There is no rebound.   Musculoskeletal:      Right lower leg: No edema.      Left lower leg: No edema.   Skin:     General: Skin is warm and " dry.      Findings: No erythema.      Comments: Discoid lupus rash   Neurological:      General: No focal deficit present.      Mental Status: She is alert and oriented to person, place, and time.   Psychiatric:         Mood and Affect: Mood normal.         Behavior: Behavior normal.         Thought Content: Thought content normal.       Stress Test 10/20 Results  Conclusion          No reversible defects to suggest coronary ischemia.    Perfusion Defect There is a mild intensity, fixed defect consistent with scar  in the inferior wall(s).    There is a mild intensity fixed defect in the anterior wall of the left ventricle, likely secondary to soft tissue attenuation.    Gated perfusion images showed an ejection fraction of 61% at rest and 45% post stress.    The EKG portion of this study is negative for ischemia.    There were no arrhythmias during stress.  Assessment:      1. Accelerated hypertension    2. Essential hypertension    3. Atherosclerotic PVD with intermittent claudication    4. Bradycardia    5. Calcification of aorta    6. Stenosis of carotid artery, unspecified laterality    7. Peripheral vascular disease    8. Cigarette nicotine dependence without complication      Patient presents for f/u. Doing ok. Having elevated BP readings, underlying chronic pain conditions likely contributing. Increase hydralazine to 50 mg BID. Continue other CV meds/mgmt. Low salt diet. Needs pain mgmt appt.  Plan:   -Increase hydralazine to 50 mg BID, new prescription sent  -Continue other CV meds  -Cardiac low salt diet  -Pain mgmt appt  -RTC 3 weeks for BP re-evaluation/re-check  -Smoking cessation

## 2023-12-26 ENCOUNTER — DOCUMENT SCAN (OUTPATIENT)
Dept: HOME HEALTH SERVICES | Facility: HOSPITAL | Age: 78
End: 2023-12-26
Payer: MEDICARE

## 2023-12-26 DIAGNOSIS — M79.641 RIGHT HAND PAIN: Primary | ICD-10-CM

## 2023-12-28 ENCOUNTER — DOCUMENT SCAN (OUTPATIENT)
Dept: HOME HEALTH SERVICES | Facility: HOSPITAL | Age: 78
End: 2023-12-28
Payer: MEDICARE

## 2023-12-31 ENCOUNTER — EXTERNAL CHRONIC CARE MANAGEMENT (OUTPATIENT)
Dept: PRIMARY CARE CLINIC | Facility: CLINIC | Age: 78
End: 2023-12-31
Payer: MEDICARE

## 2023-12-31 PROCEDURE — 99490 CHRNC CARE MGMT STAFF 1ST 20: CPT | Mod: S$GLB,,, | Performed by: INTERNAL MEDICINE

## 2024-01-22 ENCOUNTER — TELEPHONE (OUTPATIENT)
Dept: FAMILY MEDICINE | Facility: CLINIC | Age: 79
End: 2024-01-22

## 2024-01-22 ENCOUNTER — TELEPHONE (OUTPATIENT)
Dept: PAIN MEDICINE | Facility: CLINIC | Age: 79
End: 2024-01-22
Payer: MEDICARE

## 2024-01-22 DIAGNOSIS — F41.9 ANXIETY: Primary | ICD-10-CM

## 2024-01-22 RX ORDER — HYDROXYZINE HYDROCHLORIDE 25 MG/1
25 TABLET, FILM COATED ORAL 3 TIMES DAILY PRN
Qty: 60 TABLET | Refills: 0 | Status: SHIPPED | OUTPATIENT
Start: 2024-01-22

## 2024-01-22 NOTE — TELEPHONE ENCOUNTER
Please advise, Pt called in to get Xanax.  Stating she's been very nervous lately.  Schedule first available appt with [facility].  Pt wants to know if she can get something until appt.

## 2024-01-22 NOTE — TELEPHONE ENCOUNTER
----- Message from Karen Ballesteros sent at 1/22/2024  1:40 PM CST -----  Contact: [phone]  Patient is requesting a call in regards to concerns she has. Please call pt back at [phone]. Thanks KB

## 2024-01-22 NOTE — TELEPHONE ENCOUNTER
----- Message from Karen Ballesteros sent at 1/22/2024  1:38 PM CST -----  Contact: [phone]  Patient is calling in regards to getting a prescription for xanax. Pt stated that she has been nervous lately.         [id number] - [place], [place] - [address] [facility] [address]  [address]  [place] [address]  Phone: [phone] Fax: [phone]       Pt contact number is [phone]. Thanks KB

## 2024-01-22 NOTE — TELEPHONE ENCOUNTER
I have never prescribed Xanax for her. I recommend a milder medication. I will send to the pharmacy, but she needs to keep her appointment with G[facility].

## 2024-01-22 NOTE — TELEPHONE ENCOUNTER
----- Message from Karen Ballesteros sent at 1/22/2024  1:38 PM CST -----  Contact: [phone]  Patient is calling in regards to getting a prescription for xanax. Pt stated that she has been nervous lately.         [id number] - [place], [place] - [address] [facility] [address]  [address]  [place] [address]  Phone: [phone] Fax: [phone]       Pt contact number is [phone]. Thanks KB

## 2024-01-31 ENCOUNTER — EXTERNAL CHRONIC CARE MANAGEMENT (OUTPATIENT)
Dept: PRIMARY CARE CLINIC | Facility: CLINIC | Age: 79
End: 2024-01-31
Payer: MEDICARE

## 2024-01-31 PROCEDURE — 99490 CHRNC CARE MGMT STAFF 1ST 20: CPT | Mod: S$GLB,,, | Performed by: INTERNAL MEDICINE

## 2024-02-12 ENCOUNTER — OFFICE VISIT (OUTPATIENT)
Dept: HOME HEALTH SERVICES | Facility: CLINIC | Age: 79
End: 2024-02-12
Payer: MEDICARE

## 2024-02-12 VITALS
OXYGEN SATURATION: 98 % | SYSTOLIC BLOOD PRESSURE: 166 MMHG | HEART RATE: 63 BPM | TEMPERATURE: 98 F | WEIGHT: 167 LBS | DIASTOLIC BLOOD PRESSURE: 84 MMHG | BODY MASS INDEX: 26.84 KG/M2 | HEIGHT: 66 IN

## 2024-02-12 DIAGNOSIS — R91.1 LUNG NODULE: ICD-10-CM

## 2024-02-12 DIAGNOSIS — I27.9 PULMONARY HEART DISEASE: ICD-10-CM

## 2024-02-12 DIAGNOSIS — G56.03 BILATERAL CARPAL TUNNEL SYNDROME: ICD-10-CM

## 2024-02-12 DIAGNOSIS — I70.0 CALCIFICATION OF AORTA: ICD-10-CM

## 2024-02-12 DIAGNOSIS — M17.11 PRIMARY OSTEOARTHRITIS OF RIGHT KNEE: ICD-10-CM

## 2024-02-12 DIAGNOSIS — I65.29 STENOSIS OF CAROTID ARTERY, UNSPECIFIED LATERALITY: ICD-10-CM

## 2024-02-12 DIAGNOSIS — R26.9 ABNORMALITY OF GAIT AND MOBILITY: ICD-10-CM

## 2024-02-12 DIAGNOSIS — E78.5 HYPERLIPIDEMIA LDL GOAL <70: Chronic | ICD-10-CM

## 2024-02-12 DIAGNOSIS — I10 ESSENTIAL HYPERTENSION: ICD-10-CM

## 2024-02-12 DIAGNOSIS — L93.0 DISCOID LUPUS ERYTHEMATOSUS: Chronic | ICD-10-CM

## 2024-02-12 DIAGNOSIS — L43.9 LICHEN PLANUS: ICD-10-CM

## 2024-02-12 DIAGNOSIS — J44.9 CHRONIC OBSTRUCTIVE PULMONARY DISEASE, UNSPECIFIED COPD TYPE: ICD-10-CM

## 2024-02-12 DIAGNOSIS — Z00.00 ENCOUNTER FOR MEDICARE ANNUAL WELLNESS EXAM: ICD-10-CM

## 2024-02-12 DIAGNOSIS — F41.9 ANXIETY AND DEPRESSION: ICD-10-CM

## 2024-02-12 DIAGNOSIS — Z86.010 HX OF COLONIC POLYPS: ICD-10-CM

## 2024-02-12 DIAGNOSIS — E03.9 HYPOTHYROIDISM (ACQUIRED): ICD-10-CM

## 2024-02-12 DIAGNOSIS — M05.772 RHEUMATOID ARTHRITIS INVOLVING BOTH FEET WITH POSITIVE RHEUMATOID FACTOR: ICD-10-CM

## 2024-02-12 DIAGNOSIS — F17.210 CIGARETTE NICOTINE DEPENDENCE WITHOUT COMPLICATION: ICD-10-CM

## 2024-02-12 DIAGNOSIS — F33.0 MILD EPISODE OF RECURRENT MAJOR DEPRESSIVE DISORDER: ICD-10-CM

## 2024-02-12 DIAGNOSIS — Z95.1 HX OF CABG: Chronic | ICD-10-CM

## 2024-02-12 DIAGNOSIS — F32.A ANXIETY AND DEPRESSION: ICD-10-CM

## 2024-02-12 DIAGNOSIS — I73.9 PERIPHERAL VASCULAR DISEASE: ICD-10-CM

## 2024-02-12 DIAGNOSIS — Z00.00 ENCOUNTER FOR PREVENTIVE HEALTH EXAMINATION: Primary | ICD-10-CM

## 2024-02-12 DIAGNOSIS — M85.80 OSTEOPENIA, UNSPECIFIED LOCATION: ICD-10-CM

## 2024-02-12 DIAGNOSIS — M05.771 RHEUMATOID ARTHRITIS INVOLVING BOTH FEET WITH POSITIVE RHEUMATOID FACTOR: ICD-10-CM

## 2024-02-12 DIAGNOSIS — L30.9 ECZEMA, UNSPECIFIED TYPE: ICD-10-CM

## 2024-02-12 DIAGNOSIS — R00.1 BRADYCARDIA: ICD-10-CM

## 2024-02-12 PROBLEM — Z01.810 PRE-OPERATIVE CARDIOVASCULAR EXAMINATION: Status: RESOLVED | Noted: 2023-04-24 | Resolved: 2024-02-12

## 2024-02-12 PROBLEM — M25.461 PAIN AND SWELLING OF RIGHT KNEE: Status: RESOLVED | Noted: 2023-06-08 | Resolved: 2024-02-12

## 2024-02-12 PROBLEM — R29.810 FACIAL WEAKNESS: Status: RESOLVED | Noted: 2023-12-18 | Resolved: 2024-02-12

## 2024-02-12 PROBLEM — M25.561 PAIN AND SWELLING OF RIGHT KNEE: Status: RESOLVED | Noted: 2023-06-08 | Resolved: 2024-02-12

## 2024-02-12 PROBLEM — R29.818 ACUTE FOCAL NEUROLOGICAL DEFICIT: Status: RESOLVED | Noted: 2023-12-12 | Resolved: 2024-02-12

## 2024-02-12 PROBLEM — M25.522 LEFT ELBOW PAIN: Status: RESOLVED | Noted: 2023-06-23 | Resolved: 2024-02-12

## 2024-02-12 PROCEDURE — G0439 PPPS, SUBSEQ VISIT: HCPCS | Mod: S$GLB,,, | Performed by: NURSE PRACTITIONER

## 2024-02-12 PROCEDURE — 3077F SYST BP >= 140 MM HG: CPT | Mod: CPTII,S$GLB,, | Performed by: NURSE PRACTITIONER

## 2024-02-12 PROCEDURE — G9919 SCRN ND POS ND PROV OF REC: HCPCS | Mod: CPTII,S$GLB,, | Performed by: NURSE PRACTITIONER

## 2024-02-12 PROCEDURE — 1159F MED LIST DOCD IN RCRD: CPT | Mod: CPTII,S$GLB,, | Performed by: NURSE PRACTITIONER

## 2024-02-12 PROCEDURE — 1160F RVW MEDS BY RX/DR IN RCRD: CPT | Mod: CPTII,S$GLB,, | Performed by: NURSE PRACTITIONER

## 2024-02-12 PROCEDURE — 3288F FALL RISK ASSESSMENT DOCD: CPT | Mod: CPTII,S$GLB,, | Performed by: NURSE PRACTITIONER

## 2024-02-12 PROCEDURE — 1125F AMNT PAIN NOTED PAIN PRSNT: CPT | Mod: CPTII,S$GLB,, | Performed by: NURSE PRACTITIONER

## 2024-02-12 PROCEDURE — 1100F PTFALLS ASSESS-DOCD GE2>/YR: CPT | Mod: CPTII,S$GLB,, | Performed by: NURSE PRACTITIONER

## 2024-02-12 PROCEDURE — 3079F DIAST BP 80-89 MM HG: CPT | Mod: CPTII,S$GLB,, | Performed by: NURSE PRACTITIONER

## 2024-02-12 NOTE — Clinical Note
Medicare awv complete. Health maintenance:  shingles, rsv, covid 19 vaccines due-encouraged pt to obtain at a pharmacy.  Pt declined pneumococcal vaccine. LDCT lung screen due based on smoking history of 30 pack years --tried to order but medicare rules states not covered due to age restriction.   Depression-not taking celexa. Pt agreed to counseling. Referral placed.   Bp 166/84. Pt states she has not taken her bp meds yet today. pt rechecked bp later and bp 140/70s.

## 2024-02-12 NOTE — PATIENT INSTRUCTIONS
Counseling and Referral of Other Preventative  (Italic type indicates deductible and co-insurance are waived)    Patient Name: Flory Cam  Today's Date: 2/12/2024    Health Maintenance       Date Due Completion Date    Shingles Vaccine (1 of 2) Never done ---    RSV Vaccine (Age 60+ and Pregnant patients) (1 - 1-dose 60+ series) Never done ---    COVID-19 Vaccine (4 - 2023-24 season) 09/01/2023 12/6/2021    Pneumococcal Vaccines (Age 65+) (2 of 2 - PCV) 10/30/2024 (Originally 12/12/2007) 12/12/2006    Lipid Panel 12/12/2024 12/12/2023    Aspirin/Antiplatelet Therapy 12/19/2024 12/19/2023    DEXA Scan 04/05/2025 4/5/2023    Override on 12/28/2011: Done (Osteopenia; Moderate fx risk; reepat in 2 yrs. check vit D.)    Colonoscopy 08/04/2025 8/4/2020    TETANUS VACCINE 02/21/2027 2/21/2017 (ClinicallyNA)    Override on 2/21/2017: Not Clinically Appropriate        No orders of the defined types were placed in this encounter.    The following information is provided to all patients.  This information is to help you find resources for any of the problems found today that may be affecting your health:                  Living healthy guide: www.[facility].[place].gov      Understanding Diabetes: www.diabetes.org      Eating healthy: www.cdc.gov/healthyweight      CDC home safety checklist: www.cdc.gov/steadi/patient.html      Agency on Aging: www.goea.[place].gov      Alcoholics anonymous (AA): www.aa.org      Physical Activity: www.denise.nih.gov/cm4yroe      Tobacco use: www.quitwithusla.org

## 2024-02-14 ENCOUNTER — TELEPHONE (OUTPATIENT)
Dept: INTERNAL MEDICINE | Facility: CLINIC | Age: 79
End: 2024-02-14
Payer: MEDICARE

## 2024-02-14 NOTE — TELEPHONE ENCOUNTER
Patient has an appt with pain management on 03/06/2024 Dr Pham patient said she has had a fall and wants   She has been taking Motrin but its making her stomach hurt she said she is not supposed to take Motrin but 3 200 mgs has been helping with the pain from the fall left leg and shoulder pain   She wants to know what else she can take to help with the pain until she can see pain management

## 2024-02-14 NOTE — TELEPHONE ENCOUNTER
----- Message from Taylor Watkins sent at 2/14/2024  1:19 PM CST -----  Patient is requesting a call back regarding a question she have. Call back number is .[phone]. Thx.EL

## 2024-02-29 ENCOUNTER — EXTERNAL CHRONIC CARE MANAGEMENT (OUTPATIENT)
Dept: PRIMARY CARE CLINIC | Facility: CLINIC | Age: 79
End: 2024-02-29
Payer: MEDICARE

## 2024-02-29 PROCEDURE — 99490 CHRNC CARE MGMT STAFF 1ST 20: CPT | Mod: S$GLB,,, | Performed by: INTERNAL MEDICINE

## 2024-03-03 ENCOUNTER — EXTERNAL HOME HEALTH (OUTPATIENT)
Dept: HOME HEALTH SERVICES | Facility: HOSPITAL | Age: 79
End: 2024-03-03
Payer: MEDICARE

## 2024-03-04 ENCOUNTER — OFFICE VISIT (OUTPATIENT)
Dept: NEUROLOGY | Facility: CLINIC | Age: 79
End: 2024-03-04
Payer: MEDICARE

## 2024-03-04 VITALS
DIASTOLIC BLOOD PRESSURE: 70 MMHG | HEIGHT: 66 IN | BODY MASS INDEX: 26.84 KG/M2 | RESPIRATION RATE: 16 BRPM | HEART RATE: 61 BPM | OXYGEN SATURATION: 99 % | WEIGHT: 167 LBS | SYSTOLIC BLOOD PRESSURE: 113 MMHG

## 2024-03-04 DIAGNOSIS — E78.5 HYPERLIPIDEMIA LDL GOAL <70: Chronic | ICD-10-CM

## 2024-03-04 DIAGNOSIS — M85.80 OSTEOPENIA, UNSPECIFIED LOCATION: ICD-10-CM

## 2024-03-04 DIAGNOSIS — I27.9 PULMONARY HEART DISEASE: ICD-10-CM

## 2024-03-04 DIAGNOSIS — M05.772 RHEUMATOID ARTHRITIS INVOLVING BOTH FEET WITH POSITIVE RHEUMATOID FACTOR: Chronic | ICD-10-CM

## 2024-03-04 DIAGNOSIS — L93.0 DISCOID LUPUS ERYTHEMATOSUS: Chronic | ICD-10-CM

## 2024-03-04 DIAGNOSIS — R91.1 LUNG NODULE: ICD-10-CM

## 2024-03-04 DIAGNOSIS — F33.0 MILD EPISODE OF RECURRENT MAJOR DEPRESSIVE DISORDER: ICD-10-CM

## 2024-03-04 DIAGNOSIS — I73.9 PERIPHERAL VASCULAR DISEASE: Chronic | ICD-10-CM

## 2024-03-04 DIAGNOSIS — M05.771 RHEUMATOID ARTHRITIS INVOLVING BOTH FEET WITH POSITIVE RHEUMATOID FACTOR: Chronic | ICD-10-CM

## 2024-03-04 DIAGNOSIS — L30.9 ECZEMA, UNSPECIFIED TYPE: ICD-10-CM

## 2024-03-04 DIAGNOSIS — I65.29 STENOSIS OF CAROTID ARTERY, UNSPECIFIED LATERALITY: ICD-10-CM

## 2024-03-04 DIAGNOSIS — Z86.010 HX OF COLONIC POLYPS: ICD-10-CM

## 2024-03-04 DIAGNOSIS — Z95.1 HX OF CABG: Chronic | ICD-10-CM

## 2024-03-04 DIAGNOSIS — I67.9 SMALL VESSEL DISEASE, CEREBROVASCULAR: Primary | ICD-10-CM

## 2024-03-04 DIAGNOSIS — I10 ESSENTIAL HYPERTENSION: ICD-10-CM

## 2024-03-04 DIAGNOSIS — E03.9 HYPOTHYROIDISM (ACQUIRED): ICD-10-CM

## 2024-03-04 DIAGNOSIS — M15.9 PRIMARY OSTEOARTHRITIS INVOLVING MULTIPLE JOINTS: ICD-10-CM

## 2024-03-04 DIAGNOSIS — F17.210 CIGARETTE NICOTINE DEPENDENCE WITHOUT COMPLICATION: ICD-10-CM

## 2024-03-04 DIAGNOSIS — R93.0 ABNORMAL CT OF THE HEAD: ICD-10-CM

## 2024-03-04 DIAGNOSIS — I70.0 CALCIFICATION OF AORTA: ICD-10-CM

## 2024-03-04 DIAGNOSIS — J44.9 CHRONIC OBSTRUCTIVE PULMONARY DISEASE, UNSPECIFIED COPD TYPE: ICD-10-CM

## 2024-03-04 DIAGNOSIS — I67.4 HYPERTENSIVE ENCEPHALOPATHY: ICD-10-CM

## 2024-03-04 DIAGNOSIS — L43.9 LICHEN PLANUS: ICD-10-CM

## 2024-03-04 PROBLEM — Z51.81 MEDICATION MONITORING ENCOUNTER: Status: RESOLVED | Noted: 2018-04-30 | Resolved: 2024-03-04

## 2024-03-04 PROBLEM — R00.1 BRADYCARDIA: Status: RESOLVED | Noted: 2020-10-21 | Resolved: 2024-03-04

## 2024-03-04 PROBLEM — G56.03 BILATERAL CARPAL TUNNEL SYNDROME: Status: RESOLVED | Noted: 2022-11-02 | Resolved: 2024-03-04

## 2024-03-04 PROBLEM — R29.898 RIGHT LEG WEAKNESS: Status: RESOLVED | Noted: 2020-12-10 | Resolved: 2024-03-04

## 2024-03-04 PROCEDURE — 3074F SYST BP LT 130 MM HG: CPT | Mod: HCNC,CPTII,S$GLB, | Performed by: PSYCHIATRY & NEUROLOGY

## 2024-03-04 PROCEDURE — 1159F MED LIST DOCD IN RCRD: CPT | Mod: HCNC,CPTII,S$GLB, | Performed by: PSYCHIATRY & NEUROLOGY

## 2024-03-04 PROCEDURE — 99999 PR PBB SHADOW E&M-EST. PATIENT-LVL V: CPT | Mod: PBBFAC,HCNC,, | Performed by: PSYCHIATRY & NEUROLOGY

## 2024-03-04 PROCEDURE — 1100F PTFALLS ASSESS-DOCD GE2>/YR: CPT | Mod: HCNC,CPTII,S$GLB, | Performed by: PSYCHIATRY & NEUROLOGY

## 2024-03-04 PROCEDURE — 3078F DIAST BP <80 MM HG: CPT | Mod: HCNC,CPTII,S$GLB, | Performed by: PSYCHIATRY & NEUROLOGY

## 2024-03-04 PROCEDURE — 3288F FALL RISK ASSESSMENT DOCD: CPT | Mod: HCNC,CPTII,S$GLB, | Performed by: PSYCHIATRY & NEUROLOGY

## 2024-03-04 PROCEDURE — 1125F AMNT PAIN NOTED PAIN PRSNT: CPT | Mod: HCNC,CPTII,S$GLB, | Performed by: PSYCHIATRY & NEUROLOGY

## 2024-03-04 PROCEDURE — 99205 OFFICE O/P NEW HI 60 MIN: CPT | Mod: HCNC,S$GLB,, | Performed by: PSYCHIATRY & NEUROLOGY

## 2024-03-04 NOTE — PROGRESS NOTES
Subjective:       Patient ID: Flory Cam is a 78 y.o. female.    Chief Complaint: Abnormal CT of the head           HPI    The patient presented on 03- for evaluation.    The patient has been experiencing recurrent transient neurological symptoms associated with accelerated HTN. VRFs include Smoking and Uncontrolled HTN. Has CAD and PAD. Serial brain imaging showed evidence of small vessel disease (microvascular changes) with no evidence of well-defined infarcts.     Has also experienced gait problems and falls. No history of strokes.No difficulty picking up feet from the floor. Normal arm swing. No tremors or shaking. Exam showed evidence of unstable knees (RT>LT).       Review of Systems   Constitutional:  Negative for appetite change and fatigue.   HENT:  Negative for hearing loss and tinnitus.    Eyes:  Negative for photophobia and visual disturbance.   Respiratory:  Negative for apnea and shortness of breath.    Cardiovascular:  Negative for chest pain and palpitations.   Gastrointestinal:  Negative for nausea and vomiting.   Endocrine: Negative for cold intolerance and heat intolerance.   Genitourinary:  Negative for difficulty urinating and urgency.   Musculoskeletal:  Positive for arthralgias, back pain and gait problem. Negative for joint swelling, myalgias, neck pain and neck stiffness.   Skin:  Positive for rash. Negative for color change and wound.   Allergic/Immunologic: Negative for environmental allergies and immunocompromised state.   Neurological:  Negative for dizziness, tremors, seizures, syncope, facial asymmetry, speech difficulty, weakness, light-headedness, numbness and headaches.   Hematological:  Negative for adenopathy. Does not bruise/bleed easily.   Psychiatric/Behavioral:  Positive for dysphoric mood. Negative for agitation, behavioral problems, confusion, decreased concentration, hallucinations, self-injury, sleep disturbance and suicidal ideas. The patient is  nervous/anxious. The patient is not hyperactive.                  Current Outpatient Medications:     albuterol (PROVENTIL/VENTOLIN HFA) 90 mcg/actuation inhaler, Inhale 2 puffs into the lungs every 6 (six) hours as needed for Wheezing., Disp: 18 g, Rfl: 3    albuterol-ipratropium (DUO-NEB) 2.5 mg-0.5 mg/3 mL nebulizer solution, Take 3 mLs by nebulization every 6 (six) hours as needed for Wheezing. Rescue, Disp: 75 mL, Rfl: 2    amLODIPine (NORVASC) 10 MG tablet, Take 1 tablet (10 mg total) by mouth once daily., Disp: 90 tablet, Rfl: 3    aspirin (ECOTRIN) 81 MG EC tablet, Take 1 tablet (81 mg total) by mouth once daily., Disp: , Rfl: 0    atorvastatin (LIPITOR) 80 MG tablet, Take 1 tablet (80 mg total) by mouth once daily., Disp: 90 tablet, Rfl: 3    clopidogreL (PLAVIX) 75 mg tablet, TAKE 1 TABLET(75 MG) BY MOUTH EVERY DAY, Disp: 90 tablet, Rfl: 3    ergocalciferol (ERGOCALCIFEROL) 50,000 unit Cap, TAKE 1 CAPSULE BY MOUTH EVERY 7 DAYS, Disp: 12 capsule, Rfl: 2    ezetimibe (ZETIA) 10 mg tablet, TAKE 1 TABLET(10 MG) BY MOUTH EVERY DAY, Disp: 90 tablet, Rfl: 3    hydrALAZINE (APRESOLINE) 50 MG tablet, Take 1 tablet (50 mg total) by mouth every 12 (twelve) hours., Disp: 60 tablet, Rfl: 11    hydrOXYzine HCL (ATARAX) 25 MG tablet, Take 1 tablet (25 mg total) by mouth 3 (three) times daily as needed for Anxiety., Disp: 60 tablet, Rfl: 0    levothyroxine (SYNTHROID) 137 MCG Tab tablet, TAKE 1 TABLET BY MOUTH EVERY DAY, Disp: 90 tablet, Rfl: 1    traZODone (DESYREL) 50 MG tablet, TAKE 1 TABLET BY MOUTH EVERY DAY AT NIGHT AS NEEDED FOR INSOMNIA, Disp: 30 tablet, Rfl: 3    valsartan (DIOVAN) 320 MG tablet, TAKE 1 TABLET(320 MG) BY MOUTH EVERY DAY, Disp: 90 tablet, Rfl: 3    Past Medical History:   Diagnosis Date    Acute coronary syndrome     Anxiety     Bilateral carotid artery stenosis 11/17/2015    Chronic pain     Colon polyp     Coronary artery disease     GERD (gastroesophageal reflux disease)     Hyperlipidemia      Hypertension     Hypothyroidism     Low back pain     Lupus     Osteoporosis     Poor circulation     Pre-operative clearance 7/12/2019    PVD (peripheral vascular disease)     S/P peripheral artery angioplasty 02/13/2014    SCCA (squamous cell carcinoma) of skin 10/2019    Dr. ZANDRA Rivas--oncology    Skin disease        Past Surgical History:   Procedure Laterality Date    AORTOGRAPHY WITH SERIALOGRAPHY N/A 5/25/2021    Procedure: AORTOGRAM, WITH RUNOFF;  Surgeon: Christopher Cohen MD;  Location: [facility] CATH LAB;  Service: Cardiology;  Laterality: N/A;  COVID-19, mRNA, LNP-S, PF, 30 mcg/0.3 mL dose (COVID-19, MRNA, LN-S, PF (Pfizer)) 1/30/2021, 1/9/2021    Atherosclerotic PVD with intermittent claudication Bilateral 05/2021    Dr. Cohen    CATARACT EXTRACTION      COLONOSCOPY N/A 1/4/2017    Procedure: COLONOSCOPY;  Surgeon: Sylvester Arboleda III, MD;  Location: [facility] ENDO;  Service: Endoscopy;  Laterality: N/A;    COLONOSCOPY N/A 8/4/2020    Procedure: COLONOSCOPY;  Surgeon: Sylvester Arboleda III, MD;  Location: [facility] ENDO;  Service: Endoscopy;  Laterality: N/A;    CORONARY ANGIOPLASTY      CORONARY ARTERY BYPASS GRAFT      EXCISION OF MASS OF HAND Right 2/11/2022    Procedure: EXCISION, MASS, HAND;  Surgeon: Hernan Culver MD;  Location: [facility] OR;  Service: Orthopedics;  Laterality: Right;   thenar eminence skin lesion excised and a Z-plasty skin flap for coverage    HYSTERECTOMY      OOPHORECTOMY      THYROIDECTOMY         Social History     Socioeconomic History    Marital status:     Number of children: 3   Occupational History    Occupation: Retired     Comment: Dispatcher   Tobacco Use    Smoking status: Some Days     Current packs/day: 0.50     Average packs/day: 0.5 packs/day for 60.2 years (30.1 ttl pk-yrs)     Types: Cigarettes     Start date: 1964    Smokeless tobacco: Never   Substance and Sexual Activity    Alcohol use: Never    Drug use: No    Sexual activity: Not Currently     Partners: Male    Other Topics Concern    Are you pregnant or think you may be? No    Breast-feeding No   Social History Narrative    Patient is retired dispatcher.     Social Determinants of Health     Financial Resource Strain: Low Risk  (2/12/2024)    Overall Financial Resource Strain (CARDIA)     Difficulty of Paying Living Expenses: Not hard at all   Food Insecurity: No Food Insecurity (2/12/2024)    Hunger Vital Sign     Worried About Running Out of Food in the Last Year: Never true     Ran Out of Food in the Last Year: Never true   Transportation Needs: Unmet Transportation Needs (2/12/2024)    PRAPARE - Transportation     Lack of Transportation (Medical): Yes     Lack of Transportation (Non-Medical): Yes   Physical Activity: Insufficiently Active (2/12/2024)    Exercise Vital Sign     Days of Exercise per Week: 2 days     Minutes of Exercise per Session: 10 min   Stress: Stress Concern Present (2/12/2024)    [place] [employer] - Occupational Stress Questionnaire     Feeling of Stress : To some extent   Social Connections: Socially Isolated (2/12/2024)    Social Connection and Isolation Panel [NHANES]     Frequency of Communication with Friends and Family: More than three times a week     Frequency of Social Gatherings with Friends and Family: Twice a week     Attends [employer] Services: Never     Active Member of Clubs or Organizations: No     Attends Club or Organization Meetings: Never     Marital Status:    Housing Stability: Low Risk  (2/12/2024)    Housing Stability Vital Sign     Unable to Pay for Housing in the Last Year: No     Number of Places Lived in the Last Year: 1     Unstable Housing in the Last Year: No             Past/Current Medical/Surgical History, Past/Current Social History, Past/Current Family History and Past/Current Medications were reviewed in detail.        Objective:           VITAL SIGNS WERE REVIEWED      GENERAL APPEARANCE:     The patient looks  comfortable.    BMI 26.95    No signs of respiratory distress.    Normal breathing pattern.    No dysmorphic features    Normal eye contact.       GENERAL MEDICAL EXAM:    HEENT:  Head is atraumatic normocephalic.     FUNDUSCOPIC (OPHTHALMOSCOPIC) EXAMINATION showed no disc edema (papilledema).      NECK: No JVD. No visible lesions or goiters.     CHEST-CARDIOPULMONARY: No cyanosis. No tachypnea. Normal respiratory effort.    [id number]: No jaundice. No stomas or lesions. No visible hernias. No catheters.     SKIN, HAIR, NAILS: Skin rash lichen planus.    LIMBS: No varicose veins. No visible swelling.    MUSCULOSKELETAL: OA/RA visible deformities           Neurologic Exam     Mental Status   Oriented to person, place, and time.   Follows 3 step commands.   Attention: normal. Concentration: normal.   Speech: speech is normal   Level of consciousness: alert  Able to name object. Able to repeat. Normal comprehension.     Cranial Nerves   Cranial nerves II through XII intact.     CN II   Visual fields full to confrontation.   Visual acuity: normal  Right visual field deficit: none  Left visual field deficit: none     CN III, IV, VI   Pupils are equal, round, and reactive to light.  Extraocular motions are normal.   Right pupil: Size: 2 mm. Shape: regular. Reactivity: brisk. Consensual response: intact. Accommodation: intact.   Left pupil: Size: 2 mm. Shape: regular. Reactivity: brisk. Consensual response: intact. Accommodation: intact.   CN III: no CN III palsy  CN VI: no CN VI palsy  Nystagmus: none   Diplopia: none  Ophthalmoparesis: none  Upgaze: normal  Downgaze: normal  Conjugate gaze: present  Vestibulo-ocular reflex: present    CN V   Facial sensation intact.   Right facial sensation deficit: none  Left facial sensation deficit: none  Jaw jerk: normal    CN VII   Facial expression full, symmetric.   Right facial weakness: none  Left facial weakness: none    CN VIII   CN VIII normal.    Hearing: intact    CN IX, X   CN IX normal.   CN X normal.   Palate: symmetric    CN XI   CN XI normal.   Right sternocleidomastoid strength: normal  Left sternocleidomastoid strength: normal  Right trapezius strength: normal  Left trapezius strength: normal    CN XII   CN XII normal.   Tongue: not atrophic  Fasciculations: absent  Tongue deviation: none    Motor Exam   Muscle bulk: normal  Overall muscle tone: normal  Right arm tone: normal  Left arm tone: normal  Right arm pronator drift: absent  Left arm pronator drift: absent  Right leg tone: normal  Left leg tone: normal    Strength   Right neck flexion: 5/5  Left neck flexion: 5/5  Right neck extension: 5/5  Left neck extension: 5/5  Right deltoid: 5/5  Left deltoid: 5/5  Right biceps: 5/5  Left biceps: 5/5  Right triceps: 5/5  Left triceps: 5/5  Right wrist flexion: 5/5  Left wrist flexion: 5/5  Right wrist extension: 5/5  Left wrist extension: 5/5  Right interossei: 5/5  Left interossei: 5/5  Right iliopsoas: 5/5  Left iliopsoas: 5/5  Right quadriceps: 5/5  Left quadriceps: 5/5  Right hamstrin/5  Left hamstrin/5  Right glutei: 5/5  Left glutei: 5/5  Right anterior tibial: 5/5  Left anterior tibial: 5/5  Right posterior tibial: 5/5  Left posterior tibial: 5/5  Right peroneal: 5/5  Left peroneal: 5/5  Right gastroc: 5/5  Left gastroc: 5/5    Sensory Exam   Light touch normal.   Right arm light touch: normal  Left arm light touch: normal  Right leg light touch: normal  Left leg light touch: normal  Right arm vibration: normal  Left arm vibration: normal  Right leg vibration: decreased from toes  Left leg vibration: decreased from toes  Proprioception normal.   Right arm proprioception: normal  Left arm proprioception: normal  Right leg proprioception: normal  Left leg proprioception: normal  Pinprick normal.   Right arm pinprick: normal  Left arm pinprick: normal  Right leg pinprick: normal  Left leg pinprick: normal  Graphesthesia:  normal  Stereognosis: normal    Gait, Coordination, and Reflexes     Gait  Gait: normal (Antalgic Unstable Knees OA)    Coordination   Finger to nose coordination: normal  Heel to shin coordination: normal    Tremor   Resting tremor: absent  Intention tremor: absent  Action tremor: absent    Reflexes   Right brachioradialis: 2+  Left brachioradialis: 2+  Right biceps: 2+  Left biceps: 2+  Right triceps: 2+  Left triceps: 2+  Right patellar: 1+  Left patellar: 1+  Right achilles: 0  Left achilles: 0  Right plantar: normal  Left plantar: normal  Right [place]: absent  Left [place]: absent  Right ankle clonus: absent  Left ankle clonus: absent  Right pendular knee jerk: absent  Left pendular knee jerk: absent        Lab Results   Component Value Date    WBC 9.31 12/12/2023    HGB 13.4 12/12/2023    HCT 40.9 12/12/2023    MCV 91 12/12/2023     12/12/2023       Sodium   Date Value Ref Range Status   12/12/2023 141 136 - 145 mmol/L Final     Potassium   Date Value Ref Range Status   12/12/2023 4.0 3.5 - 5.1 mmol/L Final     Chloride   Date Value Ref Range Status   12/12/2023 102 95 - 110 mmol/L Final     CO2   Date Value Ref Range Status   12/12/2023 27 23 - 29 mmol/L Final     Glucose   Date Value Ref Range Status   12/12/2023 95 70 - 110 mg/dL Final     BUN   Date Value Ref Range Status   12/12/2023 20 8 - 23 mg/dL Final     Creatinine   Date Value Ref Range Status   12/12/2023 0.8 0.5 - 1.4 mg/dL Final     Calcium   Date Value Ref Range Status   12/12/2023 9.3 8.7 - 10.5 mg/dL Final     Total Protein   Date Value Ref Range Status   12/12/2023 7.2 6.0 - 8.4 g/dL Final     Albumin   Date Value Ref Range Status   12/12/2023 3.6 3.5 - 5.2 g/dL Final     Total Bilirubin   Date Value Ref Range Status   12/12/2023 0.4 0.1 - 1.0 mg/dL Final     Comment:     For infants and newborns, interpretation of results should be based  on gestational age, weight and in agreement with clinical  observations.    Premature Infant  recommended reference ranges:  Up to 24 hours.............<8.0 mg/dL  Up to 48 hours............<12.0 mg/dL  3-5 days..................<15.0 mg/dL  6-29 days.................<15.0 mg/dL       Alkaline Phosphatase   Date Value Ref Range Status   2023 136 (H) 55 - 135 U/L Final     AST   Date Value Ref Range Status   2023 17 10 - 40 U/L Final     ALT   Date Value Ref Range Status   2023 12 10 - 44 U/L Final     Anion Gap   Date Value Ref Range Status   2023 12 8 - 16 mmol/L Final     eGFR if    Date Value Ref Range Status   2022 >60.0 >60 mL/min/1.73 m^2 Final     eGFR if non    Date Value Ref Range Status   2022 >60.0 >60 mL/min/1.73 m^2 Final     Comment:     Calculation used to obtain the estimated glomerular filtration  rate (eGFR) is the CKD-EPI equation.          Lab Results   Component Value Date    [id number] 528 2010       Lab Results   Component Value Date    TSH 1.711 2023    FREET4 0.89 10/25/2022           LABORATORY EVALUATION      RADIOLOGY EVALUATION       10-    CTH Unremarkable.      2021    [facility]        2022    CT C-Spine Mild-Moderate DDD      2023    [facility]        2023    [facility]        2023    [facility]        2023       [facility]      Brain MRI     MRA H/N No LVO              NEUROPHYSIOLOGY EVALUATION     PATHOLOGY EVALUATION        NEUROCOGNITIVE AND NEUROPSYCHOLOGY EVALUATION           Reviewed the neuroimaging independently       Assessment:           1. Small vessel disease, cerebrovascular    2. Abnormal CT of the head    3. CAD: Hx of CABG    4. Calcification of aorta    5. Chronic obstructive pulmonary disease, unspecified COPD type    6. Stenosis of carotid artery, unspecified laterality    7. Discoid lupus erythematosus    8. Eczema, unspecified type    9. Essential hypertension    10. Hx of colonic polyps    11. Hyperlipidemia LDL goal <70    12. Hypothyroidism  (acquired)    13. Lichen planus    14. Lung nodule    15. Mild episode of recurrent major depressive disorder    16. Cigarette nicotine dependence without complication    17. Primary osteoarthritis involving multiple joints    18. Osteopenia, unspecified location    19. Peripheral vascular disease    20. Pulmonary heart disease    21. Rheumatoid arthritis involving both feet with positive rheumatoid factor    22. Hypertensive encephalopathy          Plan:               CEREBRAL SMALL VESSEL DISEASE (), SYSTEMIC VASCULOPATHY, SMOKING, UNCONTROLLED HYPERTENSION       Vascular Risk Factors (VRFs) stratification (BP (120-130/70-80) and Smoking Cessation) is the mainstay of stroke prevention (>90%). The benefit of antiplatelets is < 20% stroke risk reduction.       Referred her to smoking cessation program.    Continue HTN control with digital medicine.    Call 911 if any SUDDEN:   Weakness  Numbness  Slurring of speech  Speech difficulty   Vertigo  Loss of balance  Loss of vision  Loss of hearing  Double vision  Trouble swallowing  Trouble breathing  Facial drooping        GAIT DIFFICULTY, LIKELY RELATED TO KNEE INSTABILITY       Falling down precautions.    Orthopedics evaluation and treatment.                  MEDICAL/SURGICAL COMORBIDITIES     All relevant medical comorbidities noted and managed by primary care physician and medical care team.          HEALTHY LIFESTYLE AND PREVENTATIVE CARE    The patient to adhere to the age-appropriate health maintenance guidelines including screening tests and vaccinations. The patient to adhere to  healthy lifestyle, optimal weight, exercise, healthy diet, good sleep hygiene and avoiding drugs including smoking, alcohol and recreational drugs.            Mesfin Yoo MD, FAAN    Attending Neurologist/Epileptologist         Diplomate, American Board of Psychiatry and Neurology    Diplomate, American Board of Clinical Neurophysiology     Fellow, American Academy of Neurology            I spent a total of 61 minutes on the day of the visit.  This includes face to face time and non-face to face time preparing to see the patient (eg, review of tests), obtaining and/or reviewing separately obtained history, documenting clinical information in the electronic or other health record, independently interpreting results and communicating results to the patient/family/caregiver, or care coordinator.

## 2024-03-06 ENCOUNTER — TELEPHONE (OUTPATIENT)
Dept: PAIN MEDICINE | Facility: CLINIC | Age: 79
End: 2024-03-06
Payer: MEDICARE

## 2024-03-06 ENCOUNTER — OFFICE VISIT (OUTPATIENT)
Dept: PAIN MEDICINE | Facility: CLINIC | Age: 79
End: 2024-03-06
Payer: MEDICARE

## 2024-03-06 VITALS
HEIGHT: 66 IN | BODY MASS INDEX: 27.28 KG/M2 | WEIGHT: 169.75 LBS | RESPIRATION RATE: 17 BRPM | SYSTOLIC BLOOD PRESSURE: 144 MMHG | DIASTOLIC BLOOD PRESSURE: 75 MMHG

## 2024-03-06 DIAGNOSIS — Z72.0 TOBACCO ABUSE: ICD-10-CM

## 2024-03-06 DIAGNOSIS — G89.4 CHRONIC PAIN SYNDROME: ICD-10-CM

## 2024-03-06 DIAGNOSIS — G89.29 CHRONIC PAIN OF RIGHT KNEE: ICD-10-CM

## 2024-03-06 DIAGNOSIS — M47.816 LUMBAR SPONDYLOSIS: Primary | ICD-10-CM

## 2024-03-06 DIAGNOSIS — M25.561 CHRONIC PAIN OF RIGHT KNEE: ICD-10-CM

## 2024-03-06 DIAGNOSIS — M79.12 MYALGIA OF AUXILIARY MUSCLES, HEAD AND NECK: ICD-10-CM

## 2024-03-06 DIAGNOSIS — M46.1 SACROILIITIS: ICD-10-CM

## 2024-03-06 PROCEDURE — 3288F FALL RISK ASSESSMENT DOCD: CPT | Mod: HCNC,CPTII,S$GLB, | Performed by: PHYSICAL MEDICINE & REHABILITATION

## 2024-03-06 PROCEDURE — 3077F SYST BP >= 140 MM HG: CPT | Mod: HCNC,CPTII,S$GLB, | Performed by: PHYSICAL MEDICINE & REHABILITATION

## 2024-03-06 PROCEDURE — 3078F DIAST BP <80 MM HG: CPT | Mod: HCNC,CPTII,S$GLB, | Performed by: PHYSICAL MEDICINE & REHABILITATION

## 2024-03-06 PROCEDURE — 1125F AMNT PAIN NOTED PAIN PRSNT: CPT | Mod: HCNC,CPTII,S$GLB, | Performed by: PHYSICAL MEDICINE & REHABILITATION

## 2024-03-06 PROCEDURE — 1101F PT FALLS ASSESS-DOCD LE1/YR: CPT | Mod: HCNC,CPTII,S$GLB, | Performed by: PHYSICAL MEDICINE & REHABILITATION

## 2024-03-06 PROCEDURE — 99999 PR PBB SHADOW E&M-EST. PATIENT-LVL III: CPT | Mod: PBBFAC,HCNC,, | Performed by: PHYSICAL MEDICINE & REHABILITATION

## 2024-03-06 PROCEDURE — 99204 OFFICE O/P NEW MOD 45 MIN: CPT | Mod: HCNC,S$GLB,, | Performed by: PHYSICAL MEDICINE & REHABILITATION

## 2024-03-06 PROCEDURE — 1159F MED LIST DOCD IN RCRD: CPT | Mod: HCNC,CPTII,S$GLB, | Performed by: PHYSICAL MEDICINE & REHABILITATION

## 2024-03-06 NOTE — TELEPHONE ENCOUNTER
----- Message from Caroline Arboleda sent at 3/6/2024 12:56 PM CST -----  NO [place] calling regarding Patient Advice for wanting to speak to the nurse to confirm if she received the MRI results for her previous doctor, call back to inform [phone]

## 2024-03-06 NOTE — PROGRESS NOTES
New Patient Chronic Pain Note (Initial Visit)    Referring Physician: Alfonso Sharp    PCP: Tayler Ovalle DO    Chief Complaint:   Chief Complaint   Patient presents with    Low-back Pain     Right     Leg Pain     Right         SUBJECTIVE:    Flory Cam is a 78 y.o. female who presents to the clinic for the evaluation of leg pain.  He is self referred.  She has past history depression, lupus, COPD, hyperlipidemia, peripheral vascular disease, CAD s/p CABG, hypertension, rheumatoid arthritis, hypothyroidism, osteopenia, multiple other medical comorbidities as listed in her chart.   The pain started several +years ago and symptoms have been worsening.The pain is located in the lumbosacral area and radiates to the right lower extremity.  She also reports chronic right shoulder pain and chronic right knee pain..  The pain is described as  sharp, stabbing, aching  and is rated as 7/10. The pain is rated with a score of  7/10 on the BEST day and a score of 10/10 on the WORST day.  Symptoms interfere with daily activity. The pain is exacerbated by movement.  The pain is mitigated by medications and rest.     Patient denies night fever/night sweats, urinary incontinence, bowel incontinence, significant weight loss, significant motor weakness, and loss of sensations.    Pain Disability Index Review:         3/6/2024     9:41 AM   Last 3 PDI Scores   Pain Disability Index (PDI) 42       Non-Pharmacologic Treatments:  Physical Therapy/Home Exercise: yes, recently completed home health therapy  Ice/Heat:yes  TENS: no  Acupuncture: no  Massage: no  Chiropractic: no    Other: no      Pain Medications:  - Opioids:  Percocet  - Adjuvant Medications:  Trazodone, NSAIDs  - Anti-Coagulants: Aspirin and Plavix ( Clopidogrel)     report:  Reviewed and consistent with medication use as prescribed.    Pain Procedures:   -previous lumbar interventions, unknown types      Imaging:   X-ray right hip 12/11/2023:  Bones are  demineralized. Vascular calcification with stents and surgical change/clips bilateral groin. No overt acute fracture or joint malalignment seen.     X-ray right knee 12/11/2023:  Moderate medial and lateral base. Tricompartmental osteophytosis. CPPD. No acute dislocation. No effusion.     CT cervical spine 11/04/2022:  Mild-to-moderate multilevel degenerative changes. No acute finding. The neural foramina and central canal are patent.     Past Medical History:   Diagnosis Date    Acute coronary syndrome     Anxiety     Bilateral carotid artery stenosis 11/17/2015    Chronic pain     Colon polyp     Coronary artery disease     GERD (gastroesophageal reflux disease)     Hyperlipidemia     Hypertension     Hypothyroidism     Low back pain     Lupus     Osteoporosis     Poor circulation     Pre-operative clearance 7/12/2019    PVD (peripheral vascular disease)     S/P peripheral artery angioplasty 02/13/2014    SCCA (squamous cell carcinoma) of skin 10/2019    Dr. ZANDRA Rivas--oncology    Skin disease      Past Surgical History:   Procedure Laterality Date    AORTOGRAPHY WITH SERIALOGRAPHY N/A 5/25/2021    Procedure: AORTOGRAM, WITH RUNOFF;  Surgeon: Christopher Cohen MD;  Location: [facility] CATH LAB;  Service: Cardiology;  Laterality: N/A;  COVID-19, mRNA, LNP-S, PF, 30 mcg/0.3 mL dose (COVID-19, MRNA, LN-S, PF (Pfizer)) 1/30/2021, 1/9/2021    Atherosclerotic PVD with intermittent claudication Bilateral 05/2021    Dr. Cohen    CATARACT EXTRACTION      COLONOSCOPY N/A 1/4/2017    Procedure: COLONOSCOPY;  Surgeon: Sylvester Arboleda III, MD;  Location: [facility] ENDO;  Service: Endoscopy;  Laterality: N/A;    COLONOSCOPY N/A 8/4/2020    Procedure: COLONOSCOPY;  Surgeon: Sylvester Arboleda III, MD;  Location: [facility] ENDO;  Service: Endoscopy;  Laterality: N/A;    CORONARY ANGIOPLASTY      CORONARY ARTERY BYPASS GRAFT      EXCISION OF MASS OF HAND Right 2/11/2022    Procedure: EXCISION, MASS, HAND;  Surgeon: Hernan Culver MD;   Location: [facility] OR;  Service: Orthopedics;  Laterality: Right;   thenar eminence skin lesion excised and a Z-plasty skin flap for coverage    HYSTERECTOMY      OOPHORECTOMY      THYROIDECTOMY       Social History     Socioeconomic History    Marital status:     Number of children: 3   Occupational History    Occupation: Retired     Comment: Dispatcher   Tobacco Use    Smoking status: Some Days     Current packs/day: 0.50     Average packs/day: 0.5 packs/day for 60.2 years (30.1 ttl pk-yrs)     Types: Cigarettes     Start date: 1964    Smokeless tobacco: Never   Substance and Sexual Activity    Alcohol use: Never    Drug use: No    Sexual activity: Not Currently     Partners: Male   Other Topics Concern    Are you pregnant or think you may be? No    Breast-feeding No   Social History Narrative    Patient is retired dispatcher.     Social Determinants of Health     Financial Resource Strain: Low Risk  (2/12/2024)    Overall Financial Resource Strain (CARDIA)     Difficulty of Paying Living Expenses: Not hard at all   Food Insecurity: No Food Insecurity (2/12/2024)    Hunger Vital Sign     Worried About Running Out of Food in the Last Year: Never true     Ran Out of Food in the Last Year: Never true   Transportation Needs: Unmet Transportation Needs (2/12/2024)    PRAPARE - Transportation     Lack of Transportation (Medical): Yes     Lack of Transportation (Non-Medical): Yes   Physical Activity: Insufficiently Active (2/12/2024)    Exercise Vital Sign     Days of Exercise per Week: 2 days     Minutes of Exercise per Session: 10 min   Stress: Stress Concern Present (2/12/2024)    [employer] - Occupational Stress Questionnaire     Feeling of Stress : To some extent   Social Connections: Socially Isolated (2/12/2024)    Social Connection and Isolation Panel [NHANES]     Frequency of Communication with Friends and Family: More than three times a week     Frequency of Social Gatherings  with Friends and Family: Twice a week     Attends Yazidism Services: Never     Active Member of Clubs or Organizations: No     Attends Club or Organization Meetings: Never     Marital Status:    Housing Stability: Low Risk  (2/12/2024)    Housing Stability Vital Sign     Unable to Pay for Housing in the Last Year: No     Number of Places Lived in the Last Year: 1     Unstable Housing in the Last Year: No     Family History   Problem Relation Age of Onset    Hypertension Mother     Stroke Mother     Lung cancer Daughter     Melanoma Neg Hx     Psoriasis Neg Hx     Lupus Neg Hx     Eczema Neg Hx        Review of patient's allergies indicates:   Allergen Reactions    Methotrexate analogues     Cellcept [mycophenolate mofetil] Rash    Imuran [azathioprine] Rash       Current Outpatient Medications   Medication Sig    albuterol (PROVENTIL/VENTOLIN HFA) 90 mcg/actuation inhaler Inhale 2 puffs into the lungs every 6 (six) hours as needed for Wheezing.    albuterol-ipratropium (DUO-NEB) 2.5 mg-0.5 mg/3 mL nebulizer solution Take 3 mLs by nebulization every 6 (six) hours as needed for Wheezing. Rescue    amLODIPine (NORVASC) 10 MG tablet Take 1 tablet (10 mg total) by mouth once daily.    atorvastatin (LIPITOR) 80 MG tablet Take 1 tablet (80 mg total) by mouth once daily.    clopidogreL (PLAVIX) 75 mg tablet TAKE 1 TABLET(75 MG) BY MOUTH EVERY DAY    ergocalciferol (ERGOCALCIFEROL) 50,000 unit Cap TAKE 1 CAPSULE BY MOUTH EVERY 7 DAYS    ezetimibe (ZETIA) 10 mg tablet TAKE 1 TABLET(10 MG) BY MOUTH EVERY DAY    hydrALAZINE (APRESOLINE) 50 MG tablet Take 1 tablet (50 mg total) by mouth every 12 (twelve) hours.    hydrOXYzine HCL (ATARAX) 25 MG tablet Take 1 tablet (25 mg total) by mouth 3 (three) times daily as needed for Anxiety.    levothyroxine (SYNTHROID) 137 MCG Tab tablet TAKE 1 TABLET BY MOUTH EVERY DAY    traZODone (DESYREL) 50 MG tablet TAKE 1 TABLET BY MOUTH EVERY DAY AT NIGHT AS NEEDED FOR INSOMNIA     "valsartan (DIOVAN) 320 MG tablet TAKE 1 TABLET(320 MG) BY MOUTH EVERY DAY    aspirin (ECOTRIN) 81 MG EC tablet Take 1 tablet (81 mg total) by mouth once daily.     No current facility-administered medications for this visit.       Review of Systems     GENERAL:  No weight loss, malaise or fevers.  HEENT:   No recent changes in vision or hearing  NECK:  Negative for lumps, no difficulty with swallowing.  RESPIRATORY:  Negative for cough, wheezing or shortness of breath, patient denies any recent URI.  CARDIOVASCULAR:  Negative for chest pain, leg swelling or palpitations.  GI:  Negative for abdominal discomfort, blood in stools or black stools or change in bowel habits.  MUSCULOSKELETAL:  See HPI.  SKIN:  Negative for lesions, rash, and itching.  PSYCH:  No mood disorder or recent psychosocial stressors.  Patients sleep is not disturbed secondary to pain.  HEMATOLOGY/LYMPHOLOGY:  Negative for prolonged bleeding, bruising easily or swollen nodes.  Patient is not currently taking any anti-coagulants  NEURO:   No history of headaches, syncope, paralysis, seizures or tremors.  All other reviewed and negative other than HPI.    OBJECTIVE:    BP (!) 144/75   Pulse (P) 60   Resp 17   Ht 5' 6" (1.676 m)   Wt 77 kg (169 lb 12.1 oz)   BMI 27.40 kg/m²         Physical Exam    GENERAL: Well appearing, in no acute distress, alert and oriented x3.  PSYCH:  Mood and affect appropriate.  SKIN: Skin color, texture, turgor normal, no rashes or lesions.  HEAD/FACE:  Normocephalic, atraumatic. Cranial nerves grossly intact.  NECK:  Moderate pain to palpation over the cervical paraspinous muscles. Spurling negative to radicular pain.  Mild-to-moderate pain with neck flexion, extension, and lateral flexion.   CV: RRR with palpation of the radial artery.  PULM: No evidence of respiratory difficulty, symmetric chest rise.  GI:  Soft and non-tender.  BACK: Straight leg raising in the sitting and supine positions is equivocal to " radicular pain.  Moderate pain to palpation over the facet joints of the lumbar spine and lumbar paraspinals.  Limited range of motion secondary to pain reproduction.  Axial loading positive bilaterally.  EXTREMITIES: Peripheral joint ROM is full and pain free without obvious instability or laxity in all four extremities. No deformities, edema, or skin discoloration. Good capillary refill.  MUSCULOSKELETAL: Shoulder, hip, and knee provocative maneuvers are unremarkable.  There is no pain with palpation over the sacroiliac joints bilaterally.  FABERs test is unremarkable.  FADIRs test is equivocal.   Bilateral upper and lower extremity strength is normal and symmetric.  No atrophy or tone abnormalities are noted.  NEURO: Bilateral upper and lower extremity coordination and muscle stretch reflexes are physiologic and symmetric.  Plantar response are downgoing. No clonus.  No loss of sensation is noted.  GAIT:  Slow, antalgic.      LABS:  Lab Results   Component Value Date    WBC 9.31 12/12/2023    HGB 13.4 12/12/2023    HCT 40.9 12/12/2023    MCV 91 12/12/2023     12/12/2023       CMP  Sodium   Date Value Ref Range Status   12/12/2023 141 136 - 145 mmol/L Final     Potassium   Date Value Ref Range Status   12/12/2023 4.0 3.5 - 5.1 mmol/L Final     Chloride   Date Value Ref Range Status   12/12/2023 102 95 - 110 mmol/L Final     CO2   Date Value Ref Range Status   12/12/2023 27 23 - 29 mmol/L Final     Glucose   Date Value Ref Range Status   12/12/2023 95 70 - 110 mg/dL Final     BUN   Date Value Ref Range Status   12/12/2023 20 8 - 23 mg/dL Final     Creatinine   Date Value Ref Range Status   12/12/2023 0.8 0.5 - 1.4 mg/dL Final     Calcium   Date Value Ref Range Status   12/12/2023 9.3 8.7 - 10.5 mg/dL Final     Total Protein   Date Value Ref Range Status   12/12/2023 7.2 6.0 - 8.4 g/dL Final     Albumin   Date Value Ref Range Status   12/12/2023 3.6 3.5 - 5.2 g/dL Final     Total Bilirubin   Date Value Ref  Range Status   12/12/2023 0.4 0.1 - 1.0 mg/dL Final     Comment:     For infants and newborns, interpretation of results should be based  on gestational age, weight and in agreement with clinical  observations.    Premature Infant recommended reference ranges:  Up to 24 hours.............<8.0 mg/dL  Up to 48 hours............<12.0 mg/dL  3-5 days..................<15.0 mg/dL  6-29 days.................<15.0 mg/dL       Alkaline Phosphatase   Date Value Ref Range Status   12/12/2023 136 (H) 55 - 135 U/L Final     AST   Date Value Ref Range Status   12/12/2023 17 10 - 40 U/L Final     ALT   Date Value Ref Range Status   12/12/2023 12 10 - 44 U/L Final     Anion Gap   Date Value Ref Range Status   12/12/2023 12 8 - 16 mmol/L Final     eGFR if    Date Value Ref Range Status   02/01/2022 >60.0 >60 mL/min/1.73 m^2 Final     eGFR if non    Date Value Ref Range Status   02/01/2022 >60.0 >60 mL/min/1.73 m^2 Final     Comment:     Calculation used to obtain the estimated glomerular filtration  rate (eGFR) is the CKD-EPI equation.          Lab Results   Component Value Date    HGBA1C 5.8 (H) 08/04/2017             ASSESSMENT: 78 y.o. year old female with chronic shoulder, knee, back and leg pain, consistent with     No diagnosis found.      PLAN:   - Interventions:  None at this time .  Consider lumbar MBB/RFA, dependent on external records review and imaging review    - Anticoagulation use: Patient is taking aspirin and Plavix    - Medications: I have stressed the importance of physical activity and a home exercise plan to help with pain and improve health. and Patient can continue with medications for now since they are providing benefits, using them appropriately, and without side effects.         - Therapy:  Advised patient continue with activities as tolerated    - Psychological:  Discussed coping mechanisms to address chronic pain issues    - Labs:  Reviewed    - Imaging: Reviewed  available imaging with patient and answered any questions they had regarding study.    - Consults/Referrals:  None at this time    - Records:  Attempt to obtain [facility] Reviewed/Obtain old records from outside physicians and imaging    - Follow up visit: return to clinic as needed    - Counseled patient regarding the importance of activity modification and physical therapy    - This condition does not require this patient to take time off of work, and the primary goal of our Pain Management services is to improve the patient's functional capacity.    - Patient Questions: Answered all of the patient's questions regarding diagnosis, therapy, and treatment        The above plan and management options were discussed at length with patient. Patient is in agreement with the above and verbalized understanding.    I discussed the goals of interventional chronic pain management with the patient on today's visit.  I explained the utility of injections for diagnostic and therapeutic purposes.  We discussed a multimodal approach to pain including treating the patient's given worst pain at any given time.  We will use a systematic approach to addressing pain.  We will also adopt a multimodal approach that includes injections, adjuvant medications, physical therapy, at times psychiatry.  There may be a limited role for opioid use intermittently in the treatment of pain, more particularly for acute pain although no one approach can be used as a sole treatment modality.    I emphasized the importance of regular exercise, core strengthening and stretching, diet and weight loss as a cornerstone of long-term pain management.      Keenan Pham MD  Interventional Pain Management  [facility]    Disclaimer:  This note was prepared using voice recognition system and is likely to have sound alike errors that may have been overlooked even after proof reading.  Please call me with any questions

## 2024-03-11 ENCOUNTER — TELEPHONE (OUTPATIENT)
Dept: PAIN MEDICINE | Facility: CLINIC | Age: 79
End: 2024-03-11
Payer: MEDICARE

## 2024-03-11 NOTE — TELEPHONE ENCOUNTER
----- Message from Marianna Parada sent at 3/11/2024 10:15 AM CDT -----  Contact: self  Patient requesting a call back regarding getting her medication called out that was discussed at her appointment last week. Please call back @ [phone]

## 2024-03-13 ENCOUNTER — TELEPHONE (OUTPATIENT)
Dept: PAIN MEDICINE | Facility: CLINIC | Age: 79
End: 2024-03-13
Payer: MEDICARE

## 2024-03-13 ENCOUNTER — TELEPHONE (OUTPATIENT)
Dept: SMOKING CESSATION | Facility: CLINIC | Age: 79
End: 2024-03-13
Payer: MEDICARE

## 2024-03-13 NOTE — TELEPHONE ENCOUNTER
Attempt to contact patient in regards to her missed Intake appointment. Mailbox is full. No alternate numbers listed.

## 2024-03-13 NOTE — TELEPHONE ENCOUNTER
----- Message from Crissy Thurston sent at 3/13/2024  1:25 PM CDT -----  Contact: azalea Ruth is calling requesting speaking to a nurse.  Please give her a call back at [phone]

## 2024-03-20 ENCOUNTER — TELEPHONE (OUTPATIENT)
Dept: PAIN MEDICINE | Facility: CLINIC | Age: 79
End: 2024-03-20
Payer: MEDICARE

## 2024-03-20 NOTE — TELEPHONE ENCOUNTER
----- Message from Renan Boston sent at 3/20/2024 10:11 AM CDT -----  Contact: Flory Ruth is needing a call back in regards to constant pain and if she is able to get medicine sent over. Please give her a call back at [phone]

## 2024-04-15 ENCOUNTER — TELEPHONE (OUTPATIENT)
Dept: INTERNAL MEDICINE | Facility: CLINIC | Age: 79
End: 2024-04-15
Payer: MEDICARE

## 2024-04-15 NOTE — TELEPHONE ENCOUNTER
----- Message from Angeline Lizarraga sent at 4/15/2024  9:57 AM CDT -----  Contact: self   Patient needs call back. It's regarding a slight memory loss this morning that scared her. Please call to advise

## 2024-04-16 DIAGNOSIS — E78.49 OTHER HYPERLIPIDEMIA: ICD-10-CM

## 2024-04-16 RX ORDER — EZETIMIBE 10 MG/1
TABLET ORAL
Qty: 90 TABLET | Refills: 3 | Status: SHIPPED | OUTPATIENT
Start: 2024-04-16

## 2024-04-30 ENCOUNTER — EXTERNAL CHRONIC CARE MANAGEMENT (OUTPATIENT)
Dept: PRIMARY CARE CLINIC | Facility: CLINIC | Age: 79
End: 2024-04-30
Payer: MEDICARE

## 2024-04-30 PROCEDURE — 99490 CHRNC CARE MGMT STAFF 1ST 20: CPT | Mod: S$GLB,,, | Performed by: INTERNAL MEDICINE

## 2024-05-06 NOTE — PROGRESS NOTES
Chronic Pain Note    Referring Physician: No ref. provider found    PCP: Tayler Ovalle DO    Chief Complaint:   Chief Complaint   Patient presents with    Back Pain     X several years         SUBJECTIVE:  Interval History (5/14/2024):  Patient Flory Cam presents today for follow-up visit.  Patient here for follow-up of lower back pain.  Pain primarily remains axial in the lower back and does not radiate into the lower extremities.  She rates her pain today a 7/10 but states it will go up to a 9/10.  Pain is worse with prolonged standing and movement.  She has done physical therapy in the past with minimal relief.  She was previously followed by [facility] where she had previous lumbar interventions but is not quite sure what she had done.  She is currently only taking Motrin for her symptoms.  She also has a right hand palmar growth and is seeing Rheumatology and Dermatology for this.  This does cause her some discomfort and she has had several surgeries on her hand in the past.  Patient denies night fever/night sweats, urinary incontinence, bowel incontinence, significant weight loss and significant motor weakness.   Patient denies any other complaints or concerns at this time.      Interval History 3/6/2024  Flory Cam is a 78 y.o. female who presents to the clinic for the evaluation of leg pain.  He is self referred.  She has past history depression, lupus, COPD, hyperlipidemia, peripheral vascular disease, CAD s/p CABG, hypertension, rheumatoid arthritis, hypothyroidism, osteopenia, multiple other medical comorbidities as listed in her chart.   The pain started several +years ago and symptoms have been worsening.The pain is located in the lumbosacral area and radiates to the right lower extremity.  She also reports chronic right shoulder pain and chronic right knee pain..  The pain is described as  sharp, stabbing, aching  and is rated as 7/10. The pain is rated with a score of  7/10  on the BEST day and a score of 10/10 on the WORST day.  Symptoms interfere with daily activity. The pain is exacerbated by movement.  The pain is mitigated by medications and rest.     Patient denies night fever/night sweats, urinary incontinence, bowel incontinence, significant weight loss, significant motor weakness, and loss of sensations.    Pain Disability Index Review:         5/14/2024     9:11 AM 3/6/2024     9:41 AM   Last 3 PDI Scores   Pain Disability Index (PDI) 49 42       Non-Pharmacologic Treatments:  Physical Therapy/Home Exercise: yes, recently completed home health therapy  Ice/Heat:yes  TENS: no  Acupuncture: no  Massage: no  Chiropractic: no    Other: no      Pain Medications:  - Opioids:  Percocet  - Adjuvant Medications:  Trazodone, NSAIDs  - Anti-Coagulants: Aspirin and Plavix ( Clopidogrel)     report:  Reviewed and consistent with medication use as prescribed.    Pain Procedures:   -previous lumbar interventions, unknown types      Imaging:   X-ray right hip 12/11/2023:  Bones are demineralized. Vascular calcification with stents and surgical change/clips bilateral groin. No overt acute fracture or joint malalignment seen.     X-ray right knee 12/11/2023:  Moderate medial and lateral base. Tricompartmental osteophytosis. CPPD. No acute dislocation. No effusion.     CT cervical spine 11/04/2022:  Mild-to-moderate multilevel degenerative changes. No acute finding. The neural foramina and central canal are patent.     Past Medical History:   Diagnosis Date    Acute coronary syndrome     Anxiety     Bilateral carotid artery stenosis 11/17/2015    Chronic pain     Colon polyp     Coronary artery disease     GERD (gastroesophageal reflux disease)     Hyperlipidemia     Hypertension     Hypothyroidism     Low back pain     Lupus     Osteoporosis     Poor circulation     Pre-operative clearance 7/12/2019    PVD (peripheral vascular disease)     S/P peripheral artery angi[facility]    Dr. ZANDRA Rivas--oncology    Skin disease      Past Surgical History:   Procedure Laterality Date    AORTOGRAPHY WITH SERIALOGRAPHY N/A 5/25/2021    Procedure: AORTOGRAM, WITH RUNOFF;  Surgeon: Christopher Cohen MD;  Location: [facility] CATH LAB;  Service: Cardiology;  Laterality: N/A;  COVID-19, mRNA, LNP-S, PF, 30 mcg/0.3 mL dose (COVID-19, MRNA, LN-S, PF (Pfizer)) 1/30/2021, 1/9/2021    Atherosclerotic PVD with intermittent claudication Bilateral 05/2021    Dr. Cohen    CATARACT EXTRACTION      COLONOSCOPY N/A 1/4/2017    Procedure: COLONOSCOPY;  Surgeon: Sylvester Arboleda III, MD;  Location: [facility] ENDO;  Service: Endoscopy;  Laterality: N/A;    COLONOSCOPY N/A 8/4/2020    Procedure: COLONOSCOPY;  Surgeon: Sylvester Arboleda III, MD;  Location: [facility] ENDO;  Service: Endoscopy;  Laterality: N/A;    CORONARY ANGIOPLASTY      CORONARY ARTERY BYPASS GRAFT      EXCISION OF MASS OF HAND Right 2/11/2022    Procedure: EXCISION, MASS, HAND;  Surgeon: Hernan Culver MD;  Location: [facility] OR;  Service: Orthopedics;  Laterality: Right;   thenar eminence skin lesion excised and a Z-plasty skin flap for coverage    HYSTERECTOMY      OOPHORECTOMY      THYROIDECTOMY       Social History     Socioeconomic History    Marital status:     Number of children: 3   Occupational History    Occupation: Retired     Comment: Dispatcher   Tobacco Use    Smoking status: Some Days     Current packs/day: 0.50     Average packs/day: 0.5 packs/day for 60.4 years (30.2 ttl pk-yrs)     Types: Cigarettes     Start date: 1964    Smokeless tobacco: Never   Substance and Sexual Activity    Alcohol use: Never    Drug use: No    Sexual activity: Not Currently     Partners: Male   Other Topics Concern    Are you pregnant or think you may be? No    Breast-feeding No   Social History Narrative    Patient is retired dispatcher.     Social Determinants of Health     Financial Resource Strain: Medium Risk  (4/27/2024)    Overall Financial Resource Strain (CARDIA)     Difficulty of Paying Living Expenses: Somewhat hard   Food Insecurity: Food Insecurity Present (4/27/2024)    Hunger Vital Sign     Worried About Running Out of Food in the Last Year: Sometimes true     Ran Out of Food in the Last Year: Patient declined   Transportation Needs: Unmet Transportation Needs (4/27/2024)    PRAPARE - Transportation     Lack of Transportation (Medical): Yes     Lack of Transportation (Non-Medical): Yes   Physical Activity: Insufficiently Active (4/27/2024)    Exercise Vital Sign     Days of Exercise per Week: 3 days     Minutes of Exercise per Session: 10 min   Stress: Stress Concern Present (4/27/2024)    [employer] [place] [employer] - Occupational Stress Questionnaire     Feeling of Stress : To some extent   Housing Stability: Low Risk  (2/12/2024)    Housing Stability Vital Sign     Unable to Pay for Housing in the Last Year: No     Number of Places Lived in the Last Year: 1     Unstable Housing in the Last Year: No     Family History   Problem Relation Name Age of Onset    Hypertension Mother      Stroke Mother      Lung cancer Daughter      Melanoma Neg Hx      Psoriasis Neg Hx      Lupus Neg Hx      Eczema Neg Hx         Review of patient's allergies indicates:   Allergen Reactions    Methotrexate analogues     Cellcept [mycophenolate mofetil] Rash    Imuran [azathioprine] Rash       Current Outpatient Medications   Medication Sig    albuterol (PROVENTIL/VENTOLIN HFA) 90 mcg/actuation inhaler Inhale 2 puffs into the lungs every 6 (six) hours as needed for Wheezing.    albuterol-ipratropium (DUO-NEB) 2.5 mg-0.5 mg/3 mL nebulizer solution Take 3 mLs by nebulization every 6 (six) hours as needed for Wheezing. Rescue    amLODIPine (NORVASC) 10 MG tablet Take 1 tablet (10 mg total) by mouth once daily.    atorvastatin (LIPITOR) 80 MG tablet Take 1 tablet (80 mg total) by mouth once daily.    clopidogreL (PLAVIX) 75  mg tablet TAKE 1 TABLET(75 MG) BY MOUTH EVERY DAY    ergocalciferol (ERGOCALCIFEROL) 50,000 unit Cap TAKE 1 CAPSULE BY MOUTH EVERY 7 DAYS    ezetimibe (ZETIA) 10 mg tablet TAKE 1 TABLET(10 MG) BY MOUTH EVERY DAY    hydrALAZINE (APRESOLINE) 50 MG tablet Take 1 tablet (50 mg total) by mouth every 12 (twelve) hours.    hydrOXYzine HCL (ATARAX) 25 MG tablet Take 1 tablet (25 mg total) by mouth 3 (three) times daily as needed for Anxiety.    levothyroxine (SYNTHROID) 137 MCG Tab tablet TAKE 1 TABLET BY MOUTH EVERY DAY    traZODone (DESYREL) 50 MG tablet TAKE 1 TABLET BY MOUTH EVERY DAY AT NIGHT AS NEEDED FOR INSOMNIA    valsartan (DIOVAN) 320 MG tablet TAKE 1 TABLET(320 MG) BY MOUTH EVERY DAY    aspirin (ECOTRIN) 81 MG EC tablet Take 1 tablet (81 mg total) by mouth once daily.    gabapentin (NEURONTIN) 100 MG capsule Take 1 capsule (100 mg total) by mouth every evening for 7 days, THEN 1 capsule (100 mg total) 2 (two) times daily for 7 days, THEN 1 capsule (100 mg total) 3 (three) times daily.     No current facility-administered medications for this visit.       Review of Systems     GENERAL:  No weight loss, malaise or fevers.  HEENT:   No recent changes in vision or hearing  NECK:  Negative for lumps, no difficulty with swallowing.  RESPIRATORY:  Negative for cough, wheezing or shortness of breath, patient denies any recent URI.  CARDIOVASCULAR:  Negative for chest pain, leg swelling or palpitations.  GI:  Negative for abdominal discomfort, blood in stools or black stools or change in bowel habits.  MUSCULOSKELETAL:  See HPI.  SKIN:  Negative for lesions, rash, and itching.  PSYCH:  No mood disorder or recent psychosocial stressors.  Patients sleep is not disturbed secondary to pain.  HEMATOLOGY/LYMPHOLOGY:  Negative for prolonged bleeding, bruising easily or swollen nodes.  Patient is not currently taking any anti-coagulants  NEURO:   No history of headaches, syncope, paralysis, seizures or tremors.  All other  reviewed and negative other than HPI.    OBJECTIVE:    BP (!) 179/75 (BP Location: Right arm, Patient Position: Sitting, BP Method: Small (Automatic))   Pulse 60   Wt 80.2 kg (176 lb 11.2 oz)   BMI 28.52 kg/m²         Physical Exam    GENERAL: Well appearing, in no acute distress, alert and oriented x3.  PSYCH:  Mood and affect appropriate.  SKIN: Skin color, texture, turgor normal, no rashes or lesions.  HEAD/FACE:  Normocephalic, atraumatic. Cranial nerves grossly intact.  NECK:  Moderate pain to palpation over the cervical paraspinous muscles. Spurling negative to radicular pain.  Mild-to-moderate pain with neck flexion, extension, and lateral flexion.   CV: RRR with palpation of the radial artery.  PULM: No evidence of respiratory difficulty, symmetric chest rise.  GI:  Soft and non-tender.  BACK: Straight leg raising in the sitting and supine positions is equivocal to radicular pain.  Moderate pain to palpation over the facet joints of the lumbar spine and lumbar paraspinals.  Limited range of motion secondary to pain reproduction.  Axial loading positive bilaterally.  EXTREMITIES: Peripheral joint ROM is full and pain free without obvious instability or laxity in all four extremities. No deformities, edema, or skin discoloration. Good capillary refill. Growth to palm of Right hand with mild contracture  MUSCULOSKELETAL: Shoulder, hip, and knee provocative maneuvers are unremarkable.  There is no pain with palpation over the sacroiliac joints bilaterally.  FABERs test is unremarkable.  FADIRs test is equivocal.   Bilateral upper and lower extremity strength is normal and symmetric.  No atrophy or tone abnormalities are noted.  NEURO: Bilateral upper and lower extremity coordination and muscle stretch reflexes are physiologic and symmetric.  Plantar response are downgoing. No clonus.  No loss of sensation is noted.  GAIT:  Slow, antalgic.      LABS:  Lab Results   Component Value Date    WBC 9.31 12/12/2023     HGB 13.4 12/12/2023    HCT 40.9 12/12/2023    MCV 91 12/12/2023     12/12/2023       CMP  Sodium   Date Value Ref Range Status   12/12/2023 141 136 - 145 mmol/L Final     Potassium   Date Value Ref Range Status   12/12/2023 4.0 3.5 - 5.1 mmol/L Final     Chloride   Date Value Ref Range Status   12/12/2023 102 95 - 110 mmol/L Final     CO2   Date Value Ref Range Status   12/12/2023 27 23 - 29 mmol/L Final     Glucose   Date Value Ref Range Status   12/12/2023 95 70 - 110 mg/dL Final     BUN   Date Value Ref Range Status   12/12/2023 20 8 - 23 mg/dL Final     Creatinine   Date Value Ref Range Status   12/12/2023 0.8 0.5 - 1.4 mg/dL Final     Calcium   Date Value Ref Range Status   12/12/2023 9.3 8.7 - 10.5 mg/dL Final     Total Protein   Date Value Ref Range Status   12/12/2023 7.2 6.0 - 8.4 g/dL Final     Albumin   Date Value Ref Range Status   12/12/2023 3.6 3.5 - 5.2 g/dL Final     Total Bilirubin   Date Value Ref Range Status   12/12/2023 0.4 0.1 - 1.0 mg/dL Final     Comment:     For infants and newborns, interpretation of results should be based  on gestational age, weight and in agreement with clinical  observations.    Premature Infant recommended reference ranges:  Up to 24 hours.............<8.0 mg/dL  Up to 48 hours............<12.0 mg/dL  3-5 days..................<15.0 mg/dL  6-29 days.................<15.0 mg/dL       Alkaline Phosphatase   Date Value Ref Range Status   12/12/2023 136 (H) 55 - 135 U/L Final     AST   Date Value Ref Range Status   12/12/2023 17 10 - 40 U/L Final     ALT   Date Value Ref Range Status   12/12/2023 12 10 - 44 U/L Final     Anion Gap   Date Value Ref Range Status   12/12/2023 12 8 - 16 mmol/L Final     eGFR if    Date Value Ref Range Status   02/01/2022 >60.0 >60 mL/min/1.73 m^2 Final     eGFR if non    Date Value Ref Range Status   02/01/2022 >60.0 >60 mL/min/1.73 m^2 Final     Comment:     Calculation used to obtain the estimated  glomerular filtration  rate (eGFR) is the CKD-EPI equation.          Lab Results   Component Value Date    HGBA1C 5.8 (H) 08/04/2017             ASSESSMENT: 78 y.o. year old female with chronic shoulder, knee, back and leg pain, consistent with     1. Lumbar spondylosis  X-Ray Lumbar Complete Including Flex And Ext      2. Right hand pain              PLAN:   - Interventions:  None at this time .  Consider lumbar MBB/RFA, dependent on external records review and imaging review, will get lumbar xray today     - Anticoagulation use: Patient is taking aspirin and Plavix    - Medications: I have stressed the importance of physical activity and a home exercise plan to help with pain and improve health. and Patient can continue with medications for now since they are providing benefits, using them appropriately, and without side effects.     Start gabapentin 100mg QHS x 1 week, then BID x 1 week, then TID. We have reviewed potential side effects of this medication including daytime somnolence, weight gain and peripheral edema      - Therapy:  Advised patient continue with activities as tolerated    - Psychological:  Discussed coping mechanisms to address chronic pain issues    - Labs:  Reviewed    - Imaging: Will get updated lumbar xray    - Consults/Referrals:  Continue follow up with rheumatology and dermatology for Right hand growth    - Records:  Will again attempt to obtain [facility] Reviewed/Obtain old records from outside physicians and imaging    - Follow up visit: 3 months or sooner if needed    - Counseled patient regarding the importance of activity modification and physical therapy    - This condition does not require this patient to take time off of work, and the primary goal of our Pain Management services is to improve the patient's functional capacity.    - Patient Questions: Answered all of the patient's questions regarding diagnosis, therapy, and treatment        The above  plan and management options were discussed at length with patient. Patient is in agreement with the above and verbalized understanding.    I discussed the goals of interventional chronic pain management with the patient on today's visit.  I explained the utility of injections for diagnostic and therapeutic purposes.  We discussed a multimodal approach to pain including treating the patient's given worst pain at any given time.  We will use a systematic approach to addressing pain.  We will also adopt a multimodal approach that includes injections, adjuvant medications, physical therapy, at times psychiatry.  There may be a limited role for opioid use intermittently in the treatment of pain, more particularly for acute pain although no one approach can be used as a sole treatment modality.    I emphasized the importance of regular exercise, core strengthening and stretching, diet and weight loss as a cornerstone of long-term pain management.      Jennifer Silva NP  Interventional Pain Management  [facility]    Disclaimer:  This note was prepared using voice recognition system and is likely to have sound alike errors that may have been overlooked even after proof reading.  Please call me with any questions

## 2024-05-13 ENCOUNTER — OFFICE VISIT (OUTPATIENT)
Dept: RHEUMATOLOGY | Facility: CLINIC | Age: 79
End: 2024-05-13
Payer: MEDICARE

## 2024-05-13 ENCOUNTER — TELEPHONE (OUTPATIENT)
Dept: PAIN MEDICINE | Facility: CLINIC | Age: 79
End: 2024-05-13
Payer: MEDICARE

## 2024-05-13 ENCOUNTER — TELEPHONE (OUTPATIENT)
Dept: RHEUMATOLOGY | Facility: CLINIC | Age: 79
End: 2024-05-13
Payer: MEDICARE

## 2024-05-13 VITALS
BODY MASS INDEX: 28.28 KG/M2 | HEIGHT: 66 IN | DIASTOLIC BLOOD PRESSURE: 79 MMHG | HEART RATE: 58 BPM | WEIGHT: 176 LBS | SYSTOLIC BLOOD PRESSURE: 158 MMHG

## 2024-05-13 DIAGNOSIS — B07.9 VERRUCA VULGARIS: Primary | ICD-10-CM

## 2024-05-13 DIAGNOSIS — M05.772 RHEUMATOID ARTHRITIS INVOLVING BOTH FEET WITH POSITIVE RHEUMATOID FACTOR: ICD-10-CM

## 2024-05-13 DIAGNOSIS — M05.771 RHEUMATOID ARTHRITIS INVOLVING BOTH FEET WITH POSITIVE RHEUMATOID FACTOR: ICD-10-CM

## 2024-05-13 DIAGNOSIS — L93.0 DISCOID LUPUS ERYTHEMATOSUS: ICD-10-CM

## 2024-05-13 PROCEDURE — 99999 PR PBB SHADOW E&M-EST. PATIENT-LVL IV: CPT | Mod: PBBFAC,HCNC,, | Performed by: STUDENT IN AN ORGANIZED HEALTH CARE EDUCATION/TRAINING PROGRAM

## 2024-05-13 PROCEDURE — 3288F FALL RISK ASSESSMENT DOCD: CPT | Mod: HCNC,CPTII,S$GLB, | Performed by: STUDENT IN AN ORGANIZED HEALTH CARE EDUCATION/TRAINING PROGRAM

## 2024-05-13 PROCEDURE — 1125F AMNT PAIN NOTED PAIN PRSNT: CPT | Mod: HCNC,CPTII,S$GLB, | Performed by: STUDENT IN AN ORGANIZED HEALTH CARE EDUCATION/TRAINING PROGRAM

## 2024-05-13 PROCEDURE — 1160F RVW MEDS BY RX/DR IN RCRD: CPT | Mod: HCNC,CPTII,S$GLB, | Performed by: STUDENT IN AN ORGANIZED HEALTH CARE EDUCATION/TRAINING PROGRAM

## 2024-05-13 PROCEDURE — 1159F MED LIST DOCD IN RCRD: CPT | Mod: HCNC,CPTII,S$GLB, | Performed by: STUDENT IN AN ORGANIZED HEALTH CARE EDUCATION/TRAINING PROGRAM

## 2024-05-13 PROCEDURE — 3078F DIAST BP <80 MM HG: CPT | Mod: HCNC,CPTII,S$GLB, | Performed by: STUDENT IN AN ORGANIZED HEALTH CARE EDUCATION/TRAINING PROGRAM

## 2024-05-13 PROCEDURE — 1101F PT FALLS ASSESS-DOCD LE1/YR: CPT | Mod: HCNC,CPTII,S$GLB, | Performed by: STUDENT IN AN ORGANIZED HEALTH CARE EDUCATION/TRAINING PROGRAM

## 2024-05-13 PROCEDURE — 99214 OFFICE O/P EST MOD 30 MIN: CPT | Mod: HCNC,S$GLB,, | Performed by: STUDENT IN AN ORGANIZED HEALTH CARE EDUCATION/TRAINING PROGRAM

## 2024-05-13 PROCEDURE — 3077F SYST BP >= 140 MM HG: CPT | Mod: HCNC,CPTII,S$GLB, | Performed by: STUDENT IN AN ORGANIZED HEALTH CARE EDUCATION/TRAINING PROGRAM

## 2024-05-13 NOTE — TELEPHONE ENCOUNTER
----- Message from Delicia Waddell MA sent at 5/13/2024  3:12 PM CDT -----  Contact: [id number].  Sukumar referral pleaseee  ----- Message -----  From: Shannon Jeronimo  Sent: 5/13/2024   2:45 PM CDT  To: Darío ASHTON Staff    Calling stating office isnt in network with pt medicaid insurance . Please call back at [phone]. Thanksdj

## 2024-05-14 ENCOUNTER — OFFICE VISIT (OUTPATIENT)
Dept: PAIN MEDICINE | Facility: CLINIC | Age: 79
End: 2024-05-14
Payer: MEDICARE

## 2024-05-14 VITALS
WEIGHT: 176.69 LBS | SYSTOLIC BLOOD PRESSURE: 179 MMHG | DIASTOLIC BLOOD PRESSURE: 75 MMHG | HEART RATE: 60 BPM | BODY MASS INDEX: 28.52 KG/M2

## 2024-05-14 DIAGNOSIS — M79.641 RIGHT HAND PAIN: ICD-10-CM

## 2024-05-14 DIAGNOSIS — M47.816 LUMBAR SPONDYLOSIS: Primary | ICD-10-CM

## 2024-05-14 PROCEDURE — 1100F PTFALLS ASSESS-DOCD GE2>/YR: CPT | Mod: HCNC,CPTII,S$GLB, | Performed by: NURSE PRACTITIONER

## 2024-05-14 PROCEDURE — 99214 OFFICE O/P EST MOD 30 MIN: CPT | Mod: HCNC,S$GLB,, | Performed by: NURSE PRACTITIONER

## 2024-05-14 PROCEDURE — 1159F MED LIST DOCD IN RCRD: CPT | Mod: HCNC,CPTII,S$GLB, | Performed by: NURSE PRACTITIONER

## 2024-05-14 PROCEDURE — 3288F FALL RISK ASSESSMENT DOCD: CPT | Mod: HCNC,CPTII,S$GLB, | Performed by: NURSE PRACTITIONER

## 2024-05-14 PROCEDURE — 3078F DIAST BP <80 MM HG: CPT | Mod: HCNC,CPTII,S$GLB, | Performed by: NURSE PRACTITIONER

## 2024-05-14 PROCEDURE — 3077F SYST BP >= 140 MM HG: CPT | Mod: HCNC,CPTII,S$GLB, | Performed by: NURSE PRACTITIONER

## 2024-05-14 PROCEDURE — 99999 PR PBB SHADOW E&M-EST. PATIENT-LVL III: CPT | Mod: PBBFAC,HCNC,, | Performed by: NURSE PRACTITIONER

## 2024-05-14 RX ORDER — GABAPENTIN 100 MG/1
CAPSULE ORAL
Qty: 90 CAPSULE | Refills: 1 | Status: SHIPPED | OUTPATIENT
Start: 2024-05-14 | End: 2024-08-12

## 2024-05-21 ENCOUNTER — TELEPHONE (OUTPATIENT)
Dept: PAIN MEDICINE | Facility: CLINIC | Age: 79
End: 2024-05-21
Payer: MEDICARE

## 2024-05-21 NOTE — TELEPHONE ENCOUNTER
Called pt and patient informed me that she would like to try a different medication other than gabapentin because she says it makes her sleepy and she can hardly do anything. She last saw you on 05/14/2024. Prescribed gabapentin 100 mg. I informed pt that I would let provider know.    Karla HUERTA

## 2024-05-21 NOTE — TELEPHONE ENCOUNTER
Per Jennifer Silva, Called pt and informed her to Stop gabapentin and try low dose lyrica at bedtime. I also told her to let us know if this still causes drowsiness, as it can still be a side effect. Pt verbalized understanding.    Karla HUERTA

## 2024-05-21 NOTE — TELEPHONE ENCOUNTER
----- Message from Bee Hernandez sent at 5/21/2024  9:23 AM CDT -----  .Type:  Needs Medical Advice    Who Called: pt    Would the patient rather a call back or a response via [facility]? Call back  Best Call Back Number:  [phone]  Additional Information:     Pt stated she would like a call back

## 2024-05-21 NOTE — TELEPHONE ENCOUNTER
----- Message from Jennifer Silva NP sent at 5/21/2024 11:58 AM CDT -----  Stop gabapentin  Will try low dose lyrica at bedtime. Tell her to let us know if this still causes drowsiness, as it can still be a side effect  ----- Message -----  From: Karla Gerber MA  Sent: 5/21/2024  10:01 AM CDT  To: Jennifer Silva NP    Hi, patient would like to try a different medication other than gabapentin because she says it makes her sleepy and she can hardly do anything. She last saw you on 05/14/2024. Prescribed gabapentin 100 mg.  ----- Message -----  From: Bee Hernandez  Sent: 5/21/2024   9:25 AM CDT  To: Shira Rodriguez Staff    .Type:  Needs Medical Advice    Who Called: pt    Would the patient rather a call back or a response via [facility]? Call back  Best Call Back Number:  [phone]  Additional Information:     Pt stated she would like a call back

## 2024-05-22 RX ORDER — PREGABALIN 25 MG/1
25 CAPSULE ORAL NIGHTLY PRN
Qty: 30 CAPSULE | Refills: 2 | Status: SHIPPED | OUTPATIENT
Start: 2024-05-22 | End: 2024-11-20

## 2024-05-26 NOTE — PROGRESS NOTES
RHEUMATOLOGY CLINIC ESTABLISHED PATIENT VISIT    Reason for consult:- rheumatoid arthritis versus lupus    Chief complaints, HPI, ROS, EXAM, Assessment & Plans:-    Flory Cam is a 78 y.o. pleasant female who presents to follow-up from her previous visit November for management of lupus versus rheumatoid arthritis.  Neither of these diagnoses seem active based on her symptoms.  Most bothersome for her is her recurrent lesions on hands.  Previous pathology most consistent with verruca vulgaris.  Continues to have significant pain associated with these lesions.  Rheumatologic review of systems otherwise negative.  See photos below.  No synovitis, dactylitis or enthesitis.          Reviewed all available old and outside pertinent medical records.    All lab results personally reviewed and interpreted by me.    1. Verruca vulgaris    2. Discoid lupus erythematosus    3. Rheumatoid arthritis involving both feet with positive rheumatoid factor        Problem List Items Addressed This Visit          Derm    Discoid lupus erythematosus (Chronic)       Immunology/Multi System    Rheumatoid arthritis involving both feet with positive rheumatoid factor (Chronic)     Other Visit Diagnoses       Verruca vulgaris    -  Primary    Relevant Orders    Ambulatory referral/consult to Dermatology            Patient following up today for previous diagnosis of lupus versus rheumatoid arthritis  Pathology of lesions on hands most consistent with verruca vulgaris  Encouraged to follow up with Dermatology  No signs or symptoms of active autoimmune disease otherwise.    # Follow up in about 1 year (around 5/13/2025).    Chronic comorbid conditions affecting medical decision making today:    Past Medical History:   Diagnosis Date    Acute coronary syndrome     Anxiety     Bilateral carotid artery stenosis 11/17/2015    Chronic pain     Colon polyp     Coronary artery disease     GERD (gastroesophageal reflux disease)      Hyperlipidemia     Hypertension     Hypothyroidism     Low back pain     Lupus     Osteoporosis     Poor circulation     Pre-operative clearance 7/12/2019    PVD (peripheral vascular disease)     S/P peripheral artery angioplasty 02/13/2014    SCCA (squamous cell carcinoma) of skin 10/2019    Dr. ZANDRA Rivas--oncology    Skin disease        Past Surgical History:   Procedure Laterality Date    AORTOGRAPHY WITH SERIALOGRAPHY N/A 5/25/2021    Procedure: AORTOGRAM, WITH RUNOFF;  Surgeon: Christopher Cohen MD;  Location: [facility] CATH LAB;  Service: Cardiology;  Laterality: N/A;  COVID-19, mRNA, LNP-S, PF, 30 mcg/0.3 mL dose (COVID-19, MRNA, LN-S, PF (Pfizer)) 1/30/2021, 1/9/2021    Atherosclerotic PVD with intermittent claudication Bilateral 05/2021    Dr. Cohen    CATARACT EXTRACTION      COLONOSCOPY N/A 1/4/2017    Procedure: COLONOSCOPY;  Surgeon: Sylvester Arboleda III, MD;  Location: [facility] ENDO;  Service: Endoscopy;  Laterality: N/A;    COLONOSCOPY N/A 8/4/2020    Procedure: COLONOSCOPY;  Surgeon: Sylvester Arboleda III, MD;  Location: [facility] ENDO;  Service: Endoscopy;  Laterality: N/A;    CORONARY ANGIOPLASTY      CORONARY ARTERY BYPASS GRAFT      EXCISION OF MASS OF HAND Right 2/11/2022    Procedure: EXCISION, MASS, HAND;  Surgeon: Hernan Culver MD;  Location: [facility] OR;  Service: Orthopedics;  Laterality: Right;   thenar eminence skin lesion excised and a Z-plasty skin flap for coverage    HYSTERECTOMY      OOPHORECTOMY      THYROIDECTOMY          Social History     Tobacco Use    Smoking status: Some Days     Current packs/day: 0.50     Average packs/day: 0.5 packs/day for 60.4 years (30.2 ttl pk-yrs)     Types: Cigarettes     Start date: 1964    Smokeless tobacco: Never   Substance Use Topics    Alcohol use: Never    Drug use: No       Family History   Problem Relation Name Age of Onset    Hypertension Mother      Stroke Mother      Lung cancer Daughter      Melanoma Neg Hx      Psoriasis Neg Hx      Lupus Neg Hx       Eczema Neg Hx         Review of patient's allergies indicates:   Allergen Reactions    Methotrexate analogues     Cellcept [mycophenolate mofetil] Rash    Imuran [azathioprine] Rash       Medication List with Changes/Refills   New Medications    GABAPENTIN (NEURONTIN) 100 MG CAPSULE    Take 1 capsule (100 mg total) by mouth every evening for 7 days, THEN 1 capsule (100 mg total) 2 (two) times daily for 7 days, THEN 1 capsule (100 mg total) 3 (three) times daily.    PREGABALIN (LYRICA) 25 MG CAPSULE    Take 1 capsule (25 mg total) by mouth nightly as needed.   Current Medications    ALBUTEROL (PROVENTIL/VENTOLIN HFA) 90 MCG/ACTUATION INHALER    Inhale 2 puffs into the lungs every 6 (six) hours as needed for Wheezing.    ALBUTEROL-IPRATROPIUM (DUO-NEB) 2.5 MG-0.5 MG/3 ML NEBULIZER SOLUTION    Take 3 mLs by nebulization every 6 (six) hours as needed for Wheezing. Rescue    AMLODIPINE (NORVASC) 10 MG TABLET    Take 1 tablet (10 mg total) by mouth once daily.    ASPIRIN (ECOTRIN) 81 MG EC TABLET    Take 1 tablet (81 mg total) by mouth once daily.    ATORVASTATIN (LIPITOR) 80 MG TABLET    Take 1 tablet (80 mg total) by mouth once daily.    CLOPIDOGREL (PLAVIX) 75 MG TABLET    TAKE 1 TABLET(75 MG) BY MOUTH EVERY DAY    ERGOCALCIFEROL (ERGOCALCIFEROL) 50,000 UNIT CAP    TAKE 1 CAPSULE BY MOUTH EVERY 7 DAYS    EZETIMIBE (ZETIA) 10 MG TABLET    TAKE 1 TABLET(10 MG) BY MOUTH EVERY DAY    HYDRALAZINE (APRESOLINE) 50 MG TABLET    Take 1 tablet (50 mg total) by mouth every 12 (twelve) hours.    HYDROXYZINE HCL (ATARAX) 25 MG TABLET    Take 1 tablet (25 mg total) by mouth 3 (three) times daily as needed for Anxiety.    LEVOTHYROXINE (SYNTHROID) 137 MCG TAB TABLET    TAKE 1 TABLET BY MOUTH EVERY DAY    TRAZODONE (DESYREL) 50 MG TABLET    TAKE 1 TABLET BY MOUTH EVERY DAY AT NIGHT AS NEEDED FOR INSOMNIA    VALSARTAN (DIOVAN) 320 MG TABLET    TAKE 1 TABLET(320 MG) BY MOUTH EVERY DAY         Disclaimer: This note was prepared using  voice recognition system and is likely to have sound alike errors and is not proofread.  Please message me with any questions.    32 minutes of total time spent on the encounter, which includes face to face time and non-face to face time preparing to see the patient (eg, review of tests), Obtaining and/or reviewing separately obtained history, Documenting clinical information in the electronic or other health record, Independently interpreting results (not separately reported) and communicating results to the patient/family/caregiver, or Care coordination (not separately reported).     Thank you for allowing me to participate in the care of Flory Cam.    Hang Chanel MD

## 2024-05-27 ENCOUNTER — TELEPHONE (OUTPATIENT)
Dept: PAIN MEDICINE | Facility: CLINIC | Age: 79
End: 2024-05-27
Payer: MEDICARE

## 2024-05-27 NOTE — TELEPHONE ENCOUNTER
Called pt and patient informed me that she called the pharmacy and they told her that they will fill her medication for her.    Karla HUERTA

## 2024-05-27 NOTE — TELEPHONE ENCOUNTER
----- Message from Shannon Jeronimo sent at 5/27/2024 10:59 AM CDT -----  Contact: self [phone]  Pt is calling to speak with nurse regarding medication  . Please call back at [phone] . Thanksdj

## 2024-05-28 DIAGNOSIS — I73.9 PAD (PERIPHERAL ARTERY DISEASE): ICD-10-CM

## 2024-05-28 NOTE — TELEPHONE ENCOUNTER
Care Due:                  Date            Visit Type   Department     Provider  --------------------------------------------------------------------------------                                EP -                              PRIMARY      [facility] INTERNAL  Last Visit: 10-      CARE ([facility])   TRINITY Ovalle                              EP -                              PRIMARY      [facility] INTERNAL  Next Visit: 10-      CARE ([facility])   TRINITY Ovalle                                                            Last  Test          Frequency    Reason                     Performed    Due Date  --------------------------------------------------------------------------------    Vitamin D...  12 months..  ergocalciferol...........  Not Found    Overdue    [employer] Embedded Care Due Messages. Reference number: [id number].   5/28/2024 4:06:03 PM CDT

## 2024-05-29 NOTE — TELEPHONE ENCOUNTER
Refill Routing Note   Medication(s) are not appropriate for processing by [facility] for the following reason(s):        Due for refill >6 months ago  ED/Hospital Visit since last OV with provider    [facility] action(s):  Defer   Requires labs : Yes               Appointments  past 12m or future 3m with PCP    Date Provider   Last Visit   10/30/2023 Tayler Ovalle DO   Next Visit   10/31/2024 Tayler Ovalle DO   ED visits in past 90 days: 0        Note composed:7:21 PM 05/28/2024

## 2024-05-30 RX ORDER — CLOPIDOGREL BISULFATE 75 MG/1
75 TABLET ORAL DAILY
Qty: 90 TABLET | Refills: 1 | Status: SHIPPED | OUTPATIENT
Start: 2024-05-30

## 2024-05-31 ENCOUNTER — EXTERNAL CHRONIC CARE MANAGEMENT (OUTPATIENT)
Dept: PRIMARY CARE CLINIC | Facility: CLINIC | Age: 79
End: 2024-05-31
Payer: MEDICARE

## 2024-05-31 ENCOUNTER — OFFICE VISIT (OUTPATIENT)
Dept: PODIATRY | Facility: CLINIC | Age: 79
End: 2024-05-31
Payer: MEDICARE

## 2024-05-31 VITALS — HEIGHT: 66 IN | WEIGHT: 176.81 LBS | BODY MASS INDEX: 28.42 KG/M2

## 2024-05-31 DIAGNOSIS — B35.1 DERMATOPHYTOSIS OF NAIL: ICD-10-CM

## 2024-05-31 DIAGNOSIS — L93.0 DISCOID LUPUS ERYTHEMATOSUS: ICD-10-CM

## 2024-05-31 DIAGNOSIS — I73.9 PERIPHERAL VASCULAR DISEASE: Primary | ICD-10-CM

## 2024-05-31 DIAGNOSIS — M79.676 PAIN OF FIFTH TOE: ICD-10-CM

## 2024-05-31 DIAGNOSIS — L84 CORN OR CALLUS: ICD-10-CM

## 2024-05-31 PROCEDURE — 11721 DEBRIDE NAIL 6 OR MORE: CPT | Mod: Q9,59,HCNC,S$GLB | Performed by: PODIATRIST

## 2024-05-31 PROCEDURE — 1159F MED LIST DOCD IN RCRD: CPT | Mod: HCNC,CPTII,S$GLB, | Performed by: PODIATRIST

## 2024-05-31 PROCEDURE — 99213 OFFICE O/P EST LOW 20 MIN: CPT | Mod: 25,HCNC,S$GLB, | Performed by: PODIATRIST

## 2024-05-31 PROCEDURE — 1126F AMNT PAIN NOTED NONE PRSNT: CPT | Mod: HCNC,CPTII,S$GLB, | Performed by: PODIATRIST

## 2024-05-31 PROCEDURE — 1100F PTFALLS ASSESS-DOCD GE2>/YR: CPT | Mod: HCNC,CPTII,S$GLB, | Performed by: PODIATRIST

## 2024-05-31 PROCEDURE — 99490 CHRNC CARE MGMT STAFF 1ST 20: CPT | Mod: S$GLB,,, | Performed by: INTERNAL MEDICINE

## 2024-05-31 PROCEDURE — 99999 PR PBB SHADOW E&M-EST. PATIENT-LVL III: CPT | Mod: PBBFAC,HCNC,, | Performed by: PODIATRIST

## 2024-05-31 PROCEDURE — 11056 PARNG/CUTG B9 HYPRKR LES 2-4: CPT | Mod: Q9,HCNC,S$GLB, | Performed by: PODIATRIST

## 2024-05-31 PROCEDURE — 3288F FALL RISK ASSESSMENT DOCD: CPT | Mod: HCNC,CPTII,S$GLB, | Performed by: PODIATRIST

## 2024-05-31 NOTE — PROGRESS NOTES
PODIATRY NOTE    CHIEF COMPLAINT  Chief Complaint   Patient presents with    Nail Problem     Patient denies pain at present. Patient is nondiabetic.  Patient states can't wear certain shoes because of pinky toe nail, but causes discomfort. Patient is wearing slipepers       HPI  SUBJECTIVE: Flory Cam is a 78 y.o. female w/ PMH of PVD, Lupus, hx of intermittent leg claudifcation- s/p vascular intervention with much improvement in symptoms and other medical problems who presents to clinic for high risk  foot exam and care. Patient admits to painful toenails and calluses aggravated by increased weight bearing, shoe gear, and pressure. States pain is relieved with routine debridements.     She does complain about feeling depressed and anxious. She denies suicidal ideations.       HgA1c:   Hemoglobin A1C   Date Value Ref Range Status   08/04/2017 5.8 (H) 4.0 - 5.6 % Final     Comment:     According to ADA guidelines, hemoglobin A1c <7.0% represents  optimal control in non-pregnant diabetic patients. Different  metrics may apply to specific patient populations.   Standards of Medical Care in Diabetes-2016.  For the purpose of screening for the presence of diabetes:  <5.7%     Consistent with the absence of diabetes  5.7-6.4%  Consistent with increasing risk for diabetes   (prediabetes)  >or=6.5%  Consistent with diabetes  Currently, no consensus exists for use of hemoglobin A1c  for diagnosis of diabetes for children.  This Hemoglobin A1c assay has significant interference with fetal   hemoglobin   (HbF). The results are invalid for patients with abnormal amounts of   HbF,   including those with known Hereditary Persistence   of Fetal Hemoglobin. Heterozygous hemoglobin variants (HbAS, HbAC,   HbAD, HbAE, HbA2) do not significantly interfere with this assay;   however, presence of multiple variants in a sample may impact the %   interference.     09/14/2016 6.2 4.5 - 6.2 % Final     Comment:     According to  "ADA guidelines, hemoglobin A1C <7.0% represents  optimal control in non-pregnant diabetic patients.  Different  metrics may apply to specific populations.   Standards of Medical Care in Diabetes - 2016.  For the purpose of screening for the presence of diabetes:  <5.7%     Consistent with the absence of diabetes  5.7-6.4%  Consistent with increasing risk for diabetes   (prediabetes)  >or=6.5%  Consistent with diabetes  Currently no consensus exists for use of hemoglobin A1C  for diagnosis of diabetes for children.     04/13/2010 5.9 4.0 - 6.2 % Final     REVIEW OF SYSTEMS  General: This patient is well-developed, well-nourished and appears stated age, well-oriented to person, place and time, and cooperative and pleasant on today's visit  Constitutional: Negative for chills and fever.   Respiratory: Negative for shortness of breath.    Cardiovascular: Negative for chest pain, palpitations, orthopnea  Gastrointestinal: Negative for diarrhea, nausea and vomiting.   Musculoskeletal: Positive for above noted in HPI  Skin:positive for skin and nail changes   Peripheral Vascular: no claudication or cyanosis  Psychiatric/Behavioral: Negative for altered mental status     PHYSICAL EXAM  Vitals:    05/31/24 1058   Weight: 80.2 kg (176 lb 12.9 oz)   Height: 5' 6" (1.676 m)   PainSc: 0-No pain   PainLoc: Toe       GEN:  This patient is well-developed, well-nourished and appears stated age, well-oriented to person, place and time, and cooperative and pleasant on today's visit.      LOWER EXTREMITY  Vascular:   DP pedal pulse 0/4 b/l, PT pedal pulse 1/4 b/l  Skin temperature warm to warm from prox to distally  CFT <5 secs b/l  There is LE edema noted b/l.     Dermatologic:   Thickened, dystrophic, elongated toenails with subungal debris 1-5 b/l.   Webspaces are C/D/I B/L.  There is hyperkeratotic tissue noted plantar aspect of b/l feet at medial arch x 2, dorsal lateral right fifth digit  Skin texture and turgor dry with " hyperkeratosis diffusely b/l plantar heel   There is no pedal hair growth noted    Neurologic:  Vibratory sensation diminished at level of Hallux IPJ b/l    Protective sensation absent at 0/10 sites upon examination with [place] Weinsten 5.07 g monofilament.   Propioception intact at 1st MTPJ b/l.   Achilles and patellar deep tendon reflexes intact  Babinski reflex absent b/l. Light touch and sharp/dull sensation intact b/l.    Musculoskeletal/Orthopedic:  Pain dorsolateral fifth digit  Muscle strength is 5/5 for foot inverters, everters, plantarflexors, and dorsiflexors. Muscle tone is normal.  Pain free range of motion in all four quadrants with stiffness and limitation b/l  PAIN with palpation of callus plantar medial arch, LEFT    IMAGING:     Right Lower Arterial CFA has triphasic flow.     PFA has triphasic flow.     SFAo has biphasic flow.     SFAp has biphasic flow.     SFAm has biphasic flow.     SFAd has biphasic flow.     POP has biphasic flow.     AT has biphasic flow.     TIB TRNK has biphasic flow.     PT has biphasic flow.     PER has biphasic flow.   Left Lower Arterial CFA has biphasic flow.     PFA has biphasic flow.     SFAo has monophasic flow.   SFAp is occluded.   SFAm is occluded.     SFAd is occluded and is stented.   Left SFAd stent velocity origin: 0, proximal: 0, mid: 0, distal: 0    POP has monophasic flow.     AT has monophasic flow.     TIB TRNK has monophasic flow.     PT has monophasic flow.     PER has monophasic flow.     ASSESSMENT  Encounter Diagnoses   Name Primary?    Peripheral vascular disease Yes    Corn or callus     Discoid lupus erythematosus     Dermatophytosis of nail     Pain of fifth toe              Plan:  -Discuss presenting problems, etiology, pathologic processes and management options with patient today.     Peripheral vascular disease    Corn or callus    Discoid lupus erythematosus    Dermatophytosis of nail    Pain of fifth toe          -With patient's  permission, the elongated onychomycotic toenails, as outlined in the physical examination, were sharply debrided with a double action nail nipper to their soft tissue attachment. If indicated, the nails were then smoothed down in thickness with a mechanical rotary christian and/or sotero board to facilitate in further debridement removing all offending nail and subungual debris.      -With patient's permission via verbal consent, the involved area was cleansed with an alcohol swab. Trimming of hyperkeratotic lesions deep to epidermal layer x 2 was performed with a #15 blade without incident. Patient relates relief following the procedure. Patient will continue to monitor the areas daily, inspect feet, wear protective shoe gear when ambulatory, moisturizer to maintain skin integrity.    -Recommendations on purchase of Urea 40 and/or Amlactin was provided for calluses. Metatarsal pad dispensed and patient was instructed on how to apply for offloading callus. Shoe recommendations provided for accomodative foot wear.    Patient has above noted diagnosis and/or medical co-morbidities.They are being treated and managed by their  Primary Care Physician for management of comorbid states which are deemed to be currently stable.          Future Appointments   Date Time Provider Department Center   8/14/2024 12:20 PM Jennifer Silva NP [facility] IN [facility]   10/1/2024 11:00 AM Melva Nettles DPM [facility] [place]   10/31/2024  1:00 PM Tayler Ovalle DO [facility]

## 2024-06-03 ENCOUNTER — TELEPHONE (OUTPATIENT)
Dept: PODIATRY | Facility: CLINIC | Age: 79
End: 2024-06-03
Payer: MEDICARE

## 2024-06-03 NOTE — TELEPHONE ENCOUNTER
Called pt, the family member answered states she will let Ms. Cam know I called and she will call back.   Erica Butler, M.A. [facility]  Office of Dr. Melva Nettles         ----- Message from Rai Moyer sent at 6/3/2024  9:10 AM CDT -----  Contact: azalea  The pt is calling to get an update on her medications that she was given She stated that h pharmacy still hasn't gotten it. Please give a call back at [phone]     [id number] - [place] - [address] AT [address]  [address] [address]  Phone: [phone] Fax: [phone]

## 2024-06-19 RX ORDER — AMLODIPINE BESYLATE 10 MG/1
10 TABLET ORAL
Qty: 90 TABLET | Refills: 3 | Status: SHIPPED | OUTPATIENT
Start: 2024-06-19

## 2024-06-30 ENCOUNTER — EXTERNAL CHRONIC CARE MANAGEMENT (OUTPATIENT)
Dept: PRIMARY CARE CLINIC | Facility: CLINIC | Age: 79
End: 2024-06-30
Payer: MEDICARE

## 2024-06-30 PROCEDURE — 99490 CHRNC CARE MGMT STAFF 1ST 20: CPT | Mod: S$GLB,,, | Performed by: INTERNAL MEDICINE

## 2024-06-30 PROCEDURE — 99439 CHRNC CARE MGMT STAF EA ADDL: CPT | Mod: S$GLB,,, | Performed by: INTERNAL MEDICINE

## 2024-07-01 ENCOUNTER — TELEPHONE (OUTPATIENT)
Dept: DERMATOLOGY | Facility: CLINIC | Age: 79
End: 2024-07-01
Payer: MEDICARE

## 2024-07-25 ENCOUNTER — HOSPITAL ENCOUNTER (OUTPATIENT)
Dept: RADIOLOGY | Facility: HOSPITAL | Age: 79
Discharge: HOME | End: 2024-07-25
Attending: PHYSICIAN ASSISTANT
Payer: MEDICARE

## 2024-07-25 ENCOUNTER — OFFICE VISIT (OUTPATIENT)
Dept: INTERNAL MEDICINE | Facility: CLINIC | Age: 79
End: 2024-07-25
Payer: MEDICARE

## 2024-07-25 VITALS
OXYGEN SATURATION: 95 % | BODY MASS INDEX: 28.84 KG/M2 | HEIGHT: 66 IN | RESPIRATION RATE: 20 BRPM | SYSTOLIC BLOOD PRESSURE: 132 MMHG | DIASTOLIC BLOOD PRESSURE: 70 MMHG | HEART RATE: 64 BPM | WEIGHT: 179.44 LBS | TEMPERATURE: 98 F

## 2024-07-25 DIAGNOSIS — I73.9 PERIPHERAL VASCULAR DISEASE: Chronic | ICD-10-CM

## 2024-07-25 DIAGNOSIS — R19.7 DIARRHEA, UNSPECIFIED TYPE: ICD-10-CM

## 2024-07-25 DIAGNOSIS — Z95.1 HX OF CABG: Chronic | ICD-10-CM

## 2024-07-25 DIAGNOSIS — M05.772 RHEUMATOID ARTHRITIS INVOLVING BOTH FEET WITH POSITIVE RHEUMATOID FACTOR: Chronic | ICD-10-CM

## 2024-07-25 DIAGNOSIS — M05.771 RHEUMATOID ARTHRITIS INVOLVING BOTH FEET WITH POSITIVE RHEUMATOID FACTOR: Chronic | ICD-10-CM

## 2024-07-25 DIAGNOSIS — E03.9 HYPOTHYROIDISM (ACQUIRED): ICD-10-CM

## 2024-07-25 DIAGNOSIS — I70.0 CALCIFICATION OF AORTA: ICD-10-CM

## 2024-07-25 DIAGNOSIS — I10 ESSENTIAL HYPERTENSION: Primary | ICD-10-CM

## 2024-07-25 DIAGNOSIS — F17.200 TOBACCO DEPENDENCE: ICD-10-CM

## 2024-07-25 DIAGNOSIS — E78.5 HYPERLIPIDEMIA LDL GOAL <70: Chronic | ICD-10-CM

## 2024-07-25 DIAGNOSIS — I65.29 STENOSIS OF CAROTID ARTERY, UNSPECIFIED LATERALITY: ICD-10-CM

## 2024-07-25 DIAGNOSIS — Z12.31 ENCOUNTER FOR SCREENING MAMMOGRAM FOR MALIGNANT NEOPLASM OF BREAST: ICD-10-CM

## 2024-07-25 PROCEDURE — 99214 OFFICE O/P EST MOD 30 MIN: CPT | Mod: HCNC,S$GLB,, | Performed by: PHYSICIAN ASSISTANT

## 2024-07-25 PROCEDURE — 1159F MED LIST DOCD IN RCRD: CPT | Mod: HCNC,CPTII,S$GLB, | Performed by: PHYSICIAN ASSISTANT

## 2024-07-25 PROCEDURE — G2211 COMPLEX E/M VISIT ADD ON: HCPCS | Mod: HCNC,S$GLB,, | Performed by: PHYSICIAN ASSISTANT

## 2024-07-25 PROCEDURE — 99999 PR PBB SHADOW E&M-EST. PATIENT-LVL V: CPT | Mod: PBBFAC,HCNC,, | Performed by: PHYSICIAN ASSISTANT

## 2024-07-25 PROCEDURE — 1125F AMNT PAIN NOTED PAIN PRSNT: CPT | Mod: HCNC,CPTII,S$GLB, | Performed by: PHYSICIAN ASSISTANT

## 2024-07-25 PROCEDURE — 74019 RADEX ABDOMEN 2 VIEWS: CPT | Mod: TC,HCNC

## 2024-07-25 PROCEDURE — 3075F SYST BP GE 130 - 139MM HG: CPT | Mod: HCNC,CPTII,S$GLB, | Performed by: PHYSICIAN ASSISTANT

## 2024-07-25 PROCEDURE — 3288F FALL RISK ASSESSMENT DOCD: CPT | Mod: HCNC,CPTII,S$GLB, | Performed by: PHYSICIAN ASSISTANT

## 2024-07-25 PROCEDURE — 74019 RADEX ABDOMEN 2 VIEWS: CPT | Mod: 26,HCNC,, | Performed by: RADIOLOGY

## 2024-07-25 PROCEDURE — 1100F PTFALLS ASSESS-DOCD GE2>/YR: CPT | Mod: HCNC,CPTII,S$GLB, | Performed by: PHYSICIAN ASSISTANT

## 2024-07-25 PROCEDURE — 3078F DIAST BP <80 MM HG: CPT | Mod: HCNC,CPTII,S$GLB, | Performed by: PHYSICIAN ASSISTANT

## 2024-07-25 PROCEDURE — 1160F RVW MEDS BY RX/DR IN RCRD: CPT | Mod: HCNC,CPTII,S$GLB, | Performed by: PHYSICIAN ASSISTANT

## 2024-07-25 NOTE — PROGRESS NOTES
"Subjective:      Patient ID: Flory Cam is a 79 y.o. female.    Chief Complaint: Annual Exam    Patient is known to me, being seen today for follow up.     HTN- valsartan 320mg, amlodipine 10mg, hydralazine 50mg bid   BP at home 150/70s  HLD- on statin and zetia   Thyroid- synthroid 137mcg     Due for labs     Reports any time she eats/drinks she has diarrhea x1wk  Denies recent antibiotics or hospitalizations   No sick contacts     Last visit Oct 2023 w PCP.       Review of Systems   Constitutional:  Negative for chills, diaphoresis and fever.   HENT:  Negative for congestion, rhinorrhea and sore throat.    Respiratory:  Negative for cough, shortness of breath and wheezing.    Gastrointestinal:  Positive for diarrhea (x1wk). Negative for abdominal pain, blood in stool, constipation, nausea and vomiting.   Endocrine: Positive for cold intolerance.   Genitourinary:  Negative for dysuria, frequency and hematuria.   Skin:  Negative for rash.   Neurological:  Negative for dizziness, light-headedness and headaches.       Objective:   /70 (BP Location: Left arm, Patient Position: Sitting, BP Method: Large (Manual))   Pulse 64   Temp 97.5 °F (36.4 °C) (Tympanic)   Resp 20   Ht 5' 6" (1.676 m)   Wt 81.4 kg (179 lb 7.3 oz)   SpO2 95%   BMI 28.96 kg/m²   Physical Exam  Constitutional:       General: She is not in acute distress.     Appearance: Normal appearance. She is well-developed. She is not ill-appearing.   HENT:      Head: Normocephalic and atraumatic.   Cardiovascular:      Rate and Rhythm: Normal rate and regular rhythm.      Heart sounds: Normal heart sounds. No murmur heard.  Pulmonary:      Effort: Pulmonary effort is normal. No respiratory distress.      Breath sounds: Normal breath sounds. No decreased breath sounds.   Musculoskeletal:      Right lower leg: No edema.      Left lower leg: No edema.   Skin:     General: Skin is warm and dry.      Findings: No rash.   Psychiatric:         " Speech: Speech normal.         Behavior: Behavior normal.         Thought Content: Thought content normal.       Assessment:      1. Essential hypertension    2. Hyperlipidemia LDL goal <70    3. Stenosis of carotid artery, unspecified laterality    4. Hypothyroidism (acquired)    5. Calcification of aorta    6. CAD: Hx of CABG    7. Peripheral vascular disease    8. Rheumatoid arthritis involving both feet with positive rheumatoid factor    9. Diarrhea, unspecified type    10. Tobacco dependence    11. Encounter for screening mammogram for malignant neoplasm of breast       Plan:   Essential hypertension  -     CBC Auto Differential; Future; Expected date: 07/25/2024  -     Comprehensive Metabolic Panel; Future; Expected date: 07/25/2024    Hyperlipidemia LDL goal <70  -     Lipid Panel; Future; Expected date: 07/25/2024    Stenosis of carotid artery, unspecified laterality   Followed by Card, continue statin     Hypothyroidism (acquired)  -     TSH; Future; Expected date: 07/25/2024  -     T4, Free; Future; Expected date: 07/25/2024    Calcification of aorta   Followed by Card, continue statin     CAD: Hx of CABG   Followed by Card, continue statin     Peripheral vascular disease   Followed by Card, continue statin     Rheumatoid arthritis involving both feet with positive rheumatoid factor   Followed by Rheum     Diarrhea, unspecified type  -     US Abdomen Complete; Future; Expected date: 07/25/2024  -     X-Ray Abdomen Flat And Erect; Future; Expected date: 07/25/2024  -     Clostridium difficile EIA; Future  -     Giardia / Cryptosporidum, EIA; Future; Expected date: 07/25/2024  -     H. pylori antigen, stool; Future; Expected date: 07/25/2024  -     WBC, Stool; Future; Expected date: 07/25/2024  -     Stool culture; Future; Expected date: 07/25/2024  -     Stool Exam-Ova,Cysts,Parasites; Future; Expected date: 07/25/2024    Tobacco dependence  -     CT Chest Lung Screening Low Dose; Future; Expected date:  07/25/2024    Encounter for screening mammogram for malignant neoplasm of breast  -     Mammo Digital Screening Bilat w/ Jus; Future; Expected date: 07/25/2024        Card f/u to be scheduled     Fasting labs     6mth f/u PCP     Discussed worsening signs/symptoms and when to return to clinic or go to ED.   Patient expresses understanding and agrees with treatment plan.

## 2024-07-29 ENCOUNTER — HOSPITAL ENCOUNTER (OUTPATIENT)
Dept: RADIOLOGY | Facility: HOSPITAL | Age: 79
Discharge: HOME | End: 2024-07-29
Attending: PHYSICIAN ASSISTANT
Payer: MEDICARE

## 2024-07-29 DIAGNOSIS — R19.7 DIARRHEA, UNSPECIFIED TYPE: ICD-10-CM

## 2024-07-29 PROCEDURE — 76700 US EXAM ABDOM COMPLETE: CPT | Mod: TC,HCNC

## 2024-07-29 PROCEDURE — 76700 US EXAM ABDOM COMPLETE: CPT | Mod: 26,HCNC,, | Performed by: RADIOLOGY

## 2024-07-31 ENCOUNTER — EXTERNAL CHRONIC CARE MANAGEMENT (OUTPATIENT)
Dept: PRIMARY CARE CLINIC | Facility: CLINIC | Age: 79
End: 2024-07-31
Payer: MEDICARE

## 2024-07-31 PROCEDURE — 99490 CHRNC CARE MGMT STAFF 1ST 20: CPT | Mod: S$GLB,,, | Performed by: INTERNAL MEDICINE

## 2024-08-02 DIAGNOSIS — I71.43 INFRARENAL ABDOMINAL AORTIC ANEURYSM (AAA) WITHOUT RUPTURE: Primary | ICD-10-CM

## 2024-08-08 ENCOUNTER — HOSPITAL ENCOUNTER (OUTPATIENT)
Dept: RADIOLOGY | Facility: HOSPITAL | Age: 79
Discharge: HOME | End: 2024-08-08
Attending: PHYSICIAN ASSISTANT
Payer: MEDICARE

## 2024-08-08 DIAGNOSIS — I71.43 INFRARENAL ABDOMINAL AORTIC ANEURYSM (AAA) WITHOUT RUPTURE: ICD-10-CM

## 2024-08-08 PROCEDURE — 76775 US EXAM ABDO BACK WALL LIM: CPT | Mod: 26,HCNC,, | Performed by: RADIOLOGY

## 2024-08-08 PROCEDURE — 76775 US EXAM ABDO BACK WALL LIM: CPT | Mod: TC,HCNC

## 2024-08-13 DIAGNOSIS — I71.43 INFRARENAL ABDOMINAL AORTIC ANEURYSM (AAA) WITHOUT RUPTURE: Primary | ICD-10-CM

## 2024-08-14 NOTE — PROGRESS NOTES
Chronic Pain Note    Referring Physician: No ref. provider found    PCP: Tayler Ovalle DO    Chief Complaint:   Chief Complaint   Patient presents with    Low-back Pain        SUBJECTIVE:  Interval History (8/16/2024):  Patient Flory Cam presents today for follow-up visit.  Patient was last seen on 5/14/2024. At that visit, the plan was to get lumbar xrays and consider MBB/RFA. She hs not had xrays yet. Pain remains primarily in the lower back and is worse with prolonged standing and walking. Her pain today is 7/10. Pain does not radiate. She has attended PT in the past with minimal relief. She has tried gabpentin and lyrica but had side effects of drowsiness.   Patient denies night fever/night sweats, urinary incontinence, bowel incontinence, significant weight loss and significant motor weakness.   Patient denies any other complaints or concerns at this time.      Interval History (5/14/2024):  Patient Flory Cam presents today for follow-up visit.  Patient here for follow-up of lower back pain.  Pain primarily remains axial in the lower back and does not radiate into the lower extremities.  She rates her pain today a 7/10 but states it will go up to a 9/10.  Pain is worse with prolonged standing and movement.  She has done physical therapy in the past with minimal relief.  She was previously followed by [facility] where she had previous lumbar interventions but is not quite sure what she had done.  She is currently only taking Motrin for her symptoms.  She also has a right hand palmar growth and is seeing Rheumatology and Dermatology for this.  This does cause her some discomfort and she has had several surgeries on her hand in the past.  Patient denies night fever/night sweats, urinary incontinence, bowel incontinence, significant weight loss and significant motor weakness.   Patient denies any other complaints or concerns at this time.      Interval History 3/6/2024  Flory Soria  Tutu is a 79 y.o. female who presents to the clinic for the evaluation of leg pain.  He is self referred.  She has past history depression, lupus, COPD, hyperlipidemia, peripheral vascular disease, CAD s/p CABG, hypertension, rheumatoid arthritis, hypothyroidism, osteopenia, multiple other medical comorbidities as listed in her chart.   The pain started several +years ago and symptoms have been worsening.The pain is located in the lumbosacral area and radiates to the right lower extremity.  She also reports chronic right shoulder pain and chronic right knee pain..  The pain is described as  sharp, stabbing, aching  and is rated as 7/10. The pain is rated with a score of  7/10 on the BEST day and a score of 10/10 on the WORST day.  Symptoms interfere with daily activity. The pain is exacerbated by movement.  The pain is mitigated by medications and rest.     Patient denies night fever/night sweats, urinary incontinence, bowel incontinence, significant weight loss, significant motor weakness, and loss of sensations.    Pain Disability Index Review:         8/16/2024    10:13 AM 5/14/2024     9:11 AM 3/6/2024     9:41 AM   Last 3 PDI Scores   Pain Disability Index (PDI) [phone]       Non-Pharmacologic Treatments:  Physical Therapy/Home Exercise: yes, recently completed home health therapy  Ice/Heat:yes  TENS: no  Acupuncture: no  Massage: no  Chiropractic: no    Other: no      Pain Medications:  - Opioids:  Percocet  - Adjuvant Medications:  Trazodone, NSAIDs  - Anti-Coagulants: Aspirin and Plavix ( Clopidogrel)     report:  Reviewed and consistent with medication use as prescribed.    Pain Procedures:   -previous lumbar interventions, unknown types      Imaging:   Lumbar xray 8/16/2024  FINDINGS:  Alignment is normal.  There is degenerative disc space narrowing at L4-5 and L5-S1 and spondylosis from L2 to 3 through L5-S1.  Vertebral body heights are maintained.  No fractures are identified. No pars defects are  visible on the oblique views. The sacroiliac joints appear normal and symmetrical.  Normal alignment is maintained over the full range of motion through flexion and extension.        Impression:     1.  Disc degeneration from L2 through S1 including moderate to severe disc space narrowing at L4-5 and L5-S1.  2.  No evidence of acute injury.  3.  Normal alignment is maintained overall range of motion through flexion and extension.       X-ray right hip 12/11/2023:  Bones are demineralized. Vascular calcification with stents and surgical change/clips bilateral groin. No overt acute fracture or joint malalignment seen.     X-ray right knee 12/11/2023:  Moderate medial and lateral base. Tricompartmental osteophytosis. CPPD. No acute dislocation. No effusion.     CT cervical spine 11/04/2022:  Mild-to-moderate multilevel degenerative changes. No acute finding. The neural foramina and central canal are patent.     Past Medical History:   Diagnosis Date    Acute coronary syndrome     Anxiety     Bilateral carotid artery stenosis 11/17/2015    Chronic pain     Colon polyp     Coronary artery disease     GERD (gastroesophageal reflux disease)     Hyperlipidemia     Hypertension     Hypothyroidism     Low back pain     Lupus     Osteoporosis     Poor circulation     Pre-operative clearance 7/12/2019    PVD (peripheral vascular disease)     S/P peripheral artery angioplasty 02/13/2014    SCCA (squamous cell carcinoma) of skin 10/2019    Dr. ZANDRA Rivas--oncology    Skin disease      Past Surgical History:   Procedure Laterality Date    AORTOGRAPHY WITH SERIALOGRAPHY N/A 5/25/2021    Procedure: AORTOGRAM, WITH RUNOFF;  Surgeon: Christopher Cohen MD;  Location: [facility] ([place] CATH LAB;  Service: Cardiology;  Laterality: N/A;  COVID-19, mRNA, LNP-S, PF, 30 mcg/0.3 mL dose (COVID-19, MRNA, LN-S, PF (Pfizer)) 1/30/2021, 1/9/2021    Atherosclerotic PVD with intermittent claudication Bilateral 05/2021    Dr. Cohen    CATARACT EXTRACTION       COLONOSCOPY N/A 1/4/2017    Procedure: COLONOSCOPY;  Surgeon: Sylvester Arboleda III, MD;  Location: [facility] ENDO;  Service: Endoscopy;  Laterality: N/A;    COLONOSCOPY N/A 8/4/2020    Procedure: COLONOSCOPY;  Surgeon: Sylvester Arboleda III, MD;  Location: [facility] ENDO;  Service: Endoscopy;  Laterality: N/A;    CORONARY ANGIOPLASTY      CORONARY ARTERY BYPASS GRAFT      EXCISION OF MASS OF HAND Right 2/11/2022    Procedure: EXCISION, MASS, HAND;  Surgeon: Hernan Culver MD;  Location: [facility] OR;  Service: Orthopedics;  Laterality: Right;   thenar eminence skin lesion excised and a Z-plasty skin flap for coverage    HYSTERECTOMY      OOPHORECTOMY      THYROIDECTOMY       Social History     Socioeconomic History    Marital status:     Number of children: 3   Occupational History    Occupation: Retired     Comment: Dispatcher   Tobacco Use    Smoking status: Some Days     Current packs/day: 0.50     Average packs/day: 0.5 packs/day for 60.6 years (30.3 ttl pk-yrs)     Types: Cigarettes     Start date: 1964    Smokeless tobacco: Never   Substance and Sexual Activity    Alcohol use: Never    Drug use: No    Sexual activity: Not Currently     Partners: Male   Other Topics Concern    Are you pregnant or think you may be? No    Breast-feeding No   Social History Narrative    Patient is retired dispatcher.     Social Determinants of Health     Financial Resource Strain: Medium Risk (4/27/2024)    Overall Financial Resource Strain (CARDIA)     Difficulty of Paying Living Expenses: Somewhat hard   Food Insecurity: Food Insecurity Present (4/27/2024)    Hunger Vital Sign     Worried About Running Out of Food in the Last Year: Sometimes true     Ran Out of Food in the Last Year: Patient declined   Transportation Needs: Unmet Transportation Needs (4/27/2024)    PRAPARE - Transportation     Lack of Transportation (Medical): Yes     Lack of Transportation (Non-Medical): Yes   Physical Activity: Insufficiently Active (4/27/2024)     Exercise Vital Sign     Days of Exercise per Week: 3 days     Minutes of Exercise per Session: 10 min   Stress: Stress Concern Present (4/27/2024)    [employer] - Occupational Stress Questionnaire     Feeling of Stress : To some extent   Housing Stability: Low Risk  (2/12/2024)    Housing Stability Vital Sign     Unable to Pay for Housing in the Last Year: No     Number of Places Lived in the Last Year: 1     Unstable Housing in the Last Year: No     Family History   Problem Relation Name Age of Onset    Hypertension Mother      Stroke Mother      Lung cancer Daughter      Melanoma Neg Hx      Psoriasis Neg Hx      Lupus Neg Hx      Eczema Neg Hx         Review of patient's allergies indicates:   Allergen Reactions    Methotrexate analogues     Cellcept [mycophenolate mofetil] Rash    Imuran [azathioprine] Rash       Current Outpatient Medications   Medication Sig    albuterol (PROVENTIL/VENTOLIN HFA) 90 mcg/actuation inhaler Inhale 2 puffs into the lungs every 6 (six) hours as needed for Wheezing.    albuterol-ipratropium (DUO-NEB) 2.5 mg-0.5 mg/3 mL nebulizer solution Take 3 mLs by nebulization every 6 (six) hours as needed for Wheezing. Rescue    amLODIPine (NORVASC) 10 MG tablet TAKE 1 TABLET(10 MG) BY MOUTH EVERY DAY    aspirin (ECOTRIN) 81 MG EC tablet Take 1 tablet (81 mg total) by mouth once daily.    atorvastatin (LIPITOR) 80 MG tablet Take 1 tablet (80 mg total) by mouth once daily.    clopidogreL (PLAVIX) 75 mg tablet Take 1 tablet (75 mg total) by mouth once daily.    ergocalciferol (ERGOCALCIFEROL) 50,000 unit Cap TAKE 1 CAPSULE BY MOUTH EVERY 7 DAYS    ezetimibe (ZETIA) 10 mg tablet TAKE 1 TABLET(10 MG) BY MOUTH EVERY DAY    hydrALAZINE (APRESOLINE) 50 MG tablet Take 1 tablet (50 mg total) by mouth every 12 (twelve) hours.    hydrOXYzine HCL (ATARAX) 25 MG tablet Take 1 tablet (25 mg total) by mouth 3 (three) times daily as needed for Anxiety.    levothyroxine  "(SYNTHROID) 137 MCG Tab tablet Take 1 tablet (137 mcg total) by mouth before breakfast.    pregabalin (LYRICA) 25 MG capsule Take 1 capsule (25 mg total) by mouth nightly as needed.    traZODone (DESYREL) 50 MG tablet TAKE 1 TABLET BY MOUTH EVERY DAY AT NIGHT AS NEEDED FOR INSOMNIA    valsartan (DIOVAN) 320 MG tablet TAKE 1 TABLET(320 MG) BY MOUTH EVERY DAY    gabapentin (NEURONTIN) 100 MG capsule Take 1 capsule (100 mg total) by mouth every evening for 7 days, THEN 1 capsule (100 mg total) 2 (two) times daily for 7 days, THEN 1 capsule (100 mg total) 3 (three) times daily. (Patient not taking: Reported on 8/16/2024)     No current facility-administered medications for this visit.       Review of Systems     GENERAL:  No weight loss, malaise or fevers.  HEENT:   No recent changes in vision or hearing  NECK:  Negative for lumps, no difficulty with swallowing.  RESPIRATORY:  Negative for cough, wheezing or shortness of breath, patient denies any recent URI.  CARDIOVASCULAR:  Negative for chest pain, leg swelling or palpitations.  GI:  Negative for abdominal discomfort, blood in stools or black stools or change in bowel habits.  MUSCULOSKELETAL:  See HPI.  SKIN:  Negative for lesions, rash, and itching.  PSYCH:  No mood disorder or recent psychosocial stressors.  Patients sleep is not disturbed secondary to pain.  HEMATOLOGY/LYMPHOLOGY:  Negative for prolonged bleeding, bruising easily or swollen nodes.  Patient is not currently taking any anti-coagulants  NEURO:   No history of headaches, syncope, paralysis, seizures or tremors.  All other reviewed and negative other than HPI.    OBJECTIVE:    BP (!) 145/84   Pulse 71   Ht 5' 6" (1.676 m)   Wt 83.7 kg (184 lb 10.2 oz)   BMI 29.80 kg/m²         Physical Exam    GENERAL: Well appearing, in no acute distress, alert and oriented x3.  PSYCH:  Mood and affect appropriate.  SKIN: Skin color, texture, turgor normal, no rashes or lesions.  HEAD/FACE:  Normocephalic, " atraumatic. Cranial nerves grossly intact.  NECK:  Moderate pain to palpation over the cervical paraspinous muscles. Spurling negative to radicular pain.  Mild-to-moderate pain with neck flexion, extension, and lateral flexion.   CV: RRR with palpation of the radial artery.  PULM: No evidence of respiratory difficulty, symmetric chest rise.  GI:  Soft and non-tender.  BACK: Straight leg raising in the sitting and supine positions is equivocal to radicular pain.  Moderate pain to palpation over the facet joints of the lumbar spine and lumbar paraspinals.  Limited range of motion secondary to pain reproduction.  Axial loading positive bilaterally.  EXTREMITIES: Peripheral joint ROM is full and pain free without obvious instability or laxity in all four extremities. No deformities, edema, or skin discoloration. Good capillary refill. Growth to palm of Right hand with mild contracture  MUSCULOSKELETAL: Shoulder, hip, and knee provocative maneuvers are unremarkable.  There is no pain with palpation over the sacroiliac joints bilaterally.  FABERs test is unremarkable.  FADIRs test is equivocal.   Bilateral upper and lower extremity strength is normal and symmetric.  No atrophy or tone abnormalities are noted.  NEURO: Bilateral upper and lower extremity coordination and muscle stretch reflexes are physiologic and symmetric.  Plantar response are downgoing. No clonus.  No loss of sensation is noted.  GAIT:  Slow, antalgic.      LABS:  Lab Results   Component Value Date    WBC 8.53 07/29/2024    HGB 13.4 07/29/2024    HCT 41.3 07/29/2024    MCV 93 07/29/2024     07/29/2024       CMP  Sodium   Date Value Ref Range Status   07/29/2024 140 136 - 145 mmol/L Final     Potassium   Date Value Ref Range Status   07/29/2024 3.9 3.5 - 5.1 mmol/L Final     Chloride   Date Value Ref Range Status   07/29/2024 103 95 - 110 mmol/L Final     CO2   Date Value Ref Range Status   07/29/2024 29 23 - 29 mmol/L Final     Glucose   Date  Value Ref Range Status   07/29/2024 89 70 - 110 mg/dL Final     BUN   Date Value Ref Range Status   07/29/2024 11 8 - 23 mg/dL Final     Creatinine   Date Value Ref Range Status   07/29/2024 0.7 0.5 - 1.4 mg/dL Final     Calcium   Date Value Ref Range Status   07/29/2024 9.7 8.7 - 10.5 mg/dL Final     Total Protein   Date Value Ref Range Status   07/29/2024 7.1 6.0 - 8.4 g/dL Final     Albumin   Date Value Ref Range Status   07/29/2024 3.7 3.5 - 5.2 g/dL Final     Total Bilirubin   Date Value Ref Range Status   07/29/2024 0.4 0.1 - 1.0 mg/dL Final     Comment:     For infants and newborns, interpretation of results should be based  on gestational age, weight and in agreement with clinical  observations.    Premature Infant recommended reference ranges:  Up to 24 hours.............<8.0 mg/dL  Up to 48 hours............<12.0 mg/dL  3-5 days..................<15.0 mg/dL  6-29 days.................<15.0 mg/dL       Alkaline Phosphatase   Date Value Ref Range Status   07/29/2024 121 55 - 135 U/L Final     AST   Date Value Ref Range Status   07/29/2024 22 10 - 40 U/L Final     ALT   Date Value Ref Range Status   07/29/2024 18 10 - 44 U/L Final     Anion Gap   Date Value Ref Range Status   07/29/2024 8 8 - 16 mmol/L Final     eGFR if    Date Value Ref Range Status   02/01/2022 >60.0 >60 mL/min/1.73 m^2 Final     eGFR if non    Date Value Ref Range Status   02/01/2022 >60.0 >60 mL/min/1.73 m^2 Final     Comment:     Calculation used to obtain the estimated glomerular filtration  rate (eGFR) is the CKD-EPI equation.          Lab Results   Component Value Date    HGBA1C 5.8 (H) 08/04/2017             ASSESSMENT: 79 y.o. year old female with chronic shoulder, knee, back and leg pain, consistent with     1. Lumbar spondylosis  X-Ray Lumbar Complete Including Flex And Ext      2. Chronic pain syndrome                PLAN:   - Interventions: will get lumbar xray today, consider MRI. Consider lumbar  MBB/RFA- discussed with patient  Explained the risks and benefits of the procedure in detail with the patient today in clinic along with alternative treatment options, and the patient elected to pursue the intervention.          - Anticoagulation use: Patient is taking aspirin and Plavix    - Medications: I have stressed the importance of physical activity and a home exercise plan to help with pain and improve health. and Patient can continue with medications for now since they are providing benefits, using them appropriately, and without side effects.     Pt Dced gabapentin- drowsiness  Has not tolerated lyrica  Discussed cymbalta- patient is not interested      - Therapy:  Advised patient continue with activities as tolerated    - Psychological:  Discussed coping mechanisms to address chronic pain issues    - Labs:  Reviewed    - Imaging: Will get updated lumbar xray    - Consults/Referrals:  Continue follow up with rheumatology and dermatology for Right hand growth    - Records:  Will again attempt to obtain [facility] Reviewed/Obtain old records from outside physicians and imaging    - Follow up visit: will call with xray results  Addendum: Pt called and VM left and [employer] message sent. Candidate for MBB. Ordered placed for bilateral L3-5 MBB #1. If >80% relief then will get MBB #2, then RFA.    - Counseled patient regarding the importance of activity modification and physical therapy    - This condition does not require this patient to take time off of work, and the primary goal of our Pain Management services is to improve the patient's functional capacity.    - Patient Questions: Answered all of the patient's questions regarding diagnosis, therapy, and treatment        The above plan and management options were discussed at length with patient. Patient is in agreement with the above and verbalized understanding.    I discussed the goals of interventional chronic pain management  with the patient on today's visit.  I explained the utility of injections for diagnostic and therapeutic purposes.  We discussed a multimodal approach to pain including treating the patient's given worst pain at any given time.  We will use a systematic approach to addressing pain.  We will also adopt a multimodal approach that includes injections, adjuvant medications, physical therapy, at times psychiatry.  There may be a limited role for opioid use intermittently in the treatment of pain, more particularly for acute pain although no one approach can be used as a sole treatment modality.    I emphasized the importance of regular exercise, core strengthening and stretching, diet and weight loss as a cornerstone of long-term pain management.      Jennifer Silva NP  Interventional Pain Management  [facility]    Disclaimer:  This note was prepared using voice recognition system and is likely to have sound alike errors that may have been overlooked even after proof reading.  Please call me with any questions

## 2024-08-16 ENCOUNTER — TELEPHONE (OUTPATIENT)
Dept: PAIN MEDICINE | Facility: CLINIC | Age: 79
End: 2024-08-16

## 2024-08-16 ENCOUNTER — HOSPITAL ENCOUNTER (OUTPATIENT)
Dept: RADIOLOGY | Facility: HOSPITAL | Age: 79
Discharge: HOME | End: 2024-08-16
Attending: NURSE PRACTITIONER
Payer: MEDICARE

## 2024-08-16 ENCOUNTER — OFFICE VISIT (OUTPATIENT)
Dept: PAIN MEDICINE | Facility: CLINIC | Age: 79
End: 2024-08-16
Payer: MEDICARE

## 2024-08-16 VITALS
DIASTOLIC BLOOD PRESSURE: 84 MMHG | BODY MASS INDEX: 29.67 KG/M2 | HEART RATE: 71 BPM | SYSTOLIC BLOOD PRESSURE: 145 MMHG | HEIGHT: 66 IN | WEIGHT: 184.63 LBS

## 2024-08-16 DIAGNOSIS — M47.816 LUMBAR SPONDYLOSIS: Primary | ICD-10-CM

## 2024-08-16 DIAGNOSIS — G89.4 CHRONIC PAIN SYNDROME: ICD-10-CM

## 2024-08-16 DIAGNOSIS — M47.816 LUMBAR SPONDYLOSIS: ICD-10-CM

## 2024-08-16 PROCEDURE — 72114 X-RAY EXAM L-S SPINE BENDING: CPT | Mod: TC,HCNC

## 2024-08-16 PROCEDURE — 99999 PR PBB SHADOW E&M-EST. PATIENT-LVL IV: CPT | Mod: PBBFAC,HCNC,, | Performed by: NURSE PRACTITIONER

## 2024-08-16 PROCEDURE — 72114 X-RAY EXAM L-S SPINE BENDING: CPT | Mod: 26,HCNC,, | Performed by: RADIOLOGY

## 2024-08-16 NOTE — TELEPHONE ENCOUNTER
----- Message from Jennifer Silva NP sent at 8/16/2024  2:21 PM CDT -----  Pain Provider:Ankit  Patient Name:  azalea roberts  MRN: [id number]  Case: bilateral L3-5 MBB #1  Level:L3-5  Laterality:bilateral  Anticoagulation:do not need to hold    Call day after for percentage relief

## 2024-08-16 NOTE — TELEPHONE ENCOUNTER
Call to schedule pt injection with Dr Pham, no answer left vm for pt to call office back to schedule injection.    .Roni HUERTA

## 2024-08-19 ENCOUNTER — TELEPHONE (OUTPATIENT)
Dept: PAIN MEDICINE | Facility: CLINIC | Age: 79
End: 2024-08-19
Payer: MEDICARE

## 2024-08-19 ENCOUNTER — PATIENT MESSAGE (OUTPATIENT)
Dept: PAIN MEDICINE | Facility: CLINIC | Age: 79
End: 2024-08-19
Payer: MEDICARE

## 2024-08-19 NOTE — TELEPHONE ENCOUNTER
----- Message from Marzena Paz MA sent at 8/19/2024  9:59 AM CDT -----  Contact: Flory    ----- Message -----  From: Kathy Mendez  Sent: 8/19/2024   9:46 AM CDT  To: Shira Rodriguez Staff    Pt is calling in regards to getting an appt.please call back at .[phone]           Thanks  DELL

## 2024-08-31 ENCOUNTER — EXTERNAL CHRONIC CARE MANAGEMENT (OUTPATIENT)
Dept: PRIMARY CARE CLINIC | Facility: CLINIC | Age: 79
End: 2024-08-31
Payer: MEDICARE

## 2024-08-31 PROCEDURE — 99490 CHRNC CARE MGMT STAFF 1ST 20: CPT | Mod: S$GLB,,, | Performed by: INTERNAL MEDICINE

## 2024-09-03 ENCOUNTER — PATIENT MESSAGE (OUTPATIENT)
Dept: PAIN MEDICINE | Facility: HOSPITAL | Age: 79
End: 2024-09-03
Payer: MEDICARE

## 2024-09-03 ENCOUNTER — PATIENT MESSAGE (OUTPATIENT)
Dept: CARDIOLOGY | Facility: CLINIC | Age: 79
End: 2024-09-03
Payer: MEDICARE

## 2024-09-04 ENCOUNTER — PATIENT MESSAGE (OUTPATIENT)
Dept: PAIN MEDICINE | Facility: HOSPITAL | Age: 79
End: 2024-09-04
Payer: MEDICARE

## 2024-09-05 ENCOUNTER — TELEPHONE (OUTPATIENT)
Dept: INTERNAL MEDICINE | Facility: CLINIC | Age: 79
End: 2024-09-05
Payer: MEDICARE

## 2024-09-05 NOTE — TELEPHONE ENCOUNTER
----- Message from Taylor Watkins sent at 9/5/2024  7:51 AM CDT -----  Name of Caller:.Flory Cam   Best Call Back Number:.[phone]   Additional Information: Patient have a question but she didn't disclose any additional information. She is requesting a call back please.

## 2024-09-09 ENCOUNTER — TELEPHONE (OUTPATIENT)
Dept: CARDIOLOGY | Facility: CLINIC | Age: 79
End: 2024-09-09
Payer: MEDICARE

## 2024-09-09 NOTE — TELEPHONE ENCOUNTER
Called pt back and got her rescheduled for 10/07/24. Pt anselmo w/o q/c    ----- Message from Stephen Beauchamp MA sent at 9/9/2024  1:14 PM CDT -----  The patient calling to speak with staff about her appointment today. Please give her a call back at [phone].

## 2024-09-14 ENCOUNTER — HOSPITAL ENCOUNTER (OUTPATIENT)
Facility: HOSPITAL | Age: 79
Discharge: HOME HEALTH | End: 2024-09-15
Attending: FAMILY MEDICINE | Admitting: INTERNAL MEDICINE
Payer: MEDICARE

## 2024-09-14 DIAGNOSIS — R09.89 SUSPECTED CEREBROVASCULAR ACCIDENT (CVA): ICD-10-CM

## 2024-09-14 DIAGNOSIS — R29.818 ACUTE FOCAL NEUROLOGICAL DEFICIT: Primary | ICD-10-CM

## 2024-09-14 DIAGNOSIS — R29.810 FACIAL DROOP: ICD-10-CM

## 2024-09-14 DIAGNOSIS — L93.0 DISCOID LUPUS: ICD-10-CM

## 2024-09-14 DIAGNOSIS — I10 HYPERTENSION, UNSPECIFIED TYPE: ICD-10-CM

## 2024-09-14 DIAGNOSIS — I73.9 PAD (PERIPHERAL ARTERY DISEASE): ICD-10-CM

## 2024-09-14 LAB
ALBUMIN SERPL BCP-MCNC: 3.5 G/DL (ref 3.5–5.2)
ALP SERPL-CCNC: 127 U/L (ref 55–135)
ALT SERPL W/O P-5'-P-CCNC: 17 U/L (ref 10–44)
ANION GAP SERPL CALC-SCNC: 10 MMOL/L (ref 8–16)
AST SERPL-CCNC: 21 U/L (ref 10–40)
BASOPHILS # BLD AUTO: 0.04 K/UL (ref 0–0.2)
BASOPHILS NFR BLD: 0.5 % (ref 0–1.9)
BILIRUB SERPL-MCNC: 0.5 MG/DL (ref 0.1–1)
BUN SERPL-MCNC: 13 MG/DL (ref 8–23)
CALCIUM SERPL-MCNC: 9.3 MG/DL (ref 8.7–10.5)
CHLORIDE SERPL-SCNC: 105 MMOL/L (ref 95–110)
CHOLEST SERPL-MCNC: 135 MG/DL (ref 120–199)
CHOLEST/HDLC SERPL: 2.5 {RATIO} (ref 2–5)
CO2 SERPL-SCNC: 26 MMOL/L (ref 23–29)
CREAT SERPL-MCNC: 0.7 MG/DL (ref 0.5–1.4)
DIFFERENTIAL METHOD BLD: NORMAL
EOSINOPHIL # BLD AUTO: 0.2 K/UL (ref 0–0.5)
EOSINOPHIL NFR BLD: 2 % (ref 0–8)
ERYTHROCYTE [DISTWIDTH] IN BLOOD BY AUTOMATED COUNT: 14 % (ref 11.5–14.5)
EST. GFR  (NO RACE VARIABLE): >60 ML/MIN/1.73 M^2
GLUCOSE SERPL-MCNC: 98 MG/DL (ref 70–110)
HCT VFR BLD AUTO: 40 % (ref 37–48.5)
HDLC SERPL-MCNC: 53 MG/DL (ref 40–75)
HDLC SERPL: 39.3 % (ref 20–50)
HGB BLD-MCNC: 13.3 G/DL (ref 12–16)
IMM GRANULOCYTES # BLD AUTO: 0.03 K/UL (ref 0–0.04)
IMM GRANULOCYTES NFR BLD AUTO: 0.4 % (ref 0–0.5)
INR PPP: 0.9 (ref 0.8–1.2)
LDLC SERPL CALC-MCNC: 71.8 MG/DL (ref 63–159)
LYMPHOCYTES # BLD AUTO: 2.4 K/UL (ref 1–4.8)
LYMPHOCYTES NFR BLD: 30 % (ref 18–48)
MCH RBC QN AUTO: 30.9 PG (ref 27–31)
MCHC RBC AUTO-ENTMCNC: 33.3 G/DL (ref 32–36)
MCV RBC AUTO: 93 FL (ref 82–98)
MONOCYTES # BLD AUTO: 0.8 K/UL (ref 0.3–1)
MONOCYTES NFR BLD: 10.3 % (ref 4–15)
NEUTROPHILS # BLD AUTO: 4.6 K/UL (ref 1.8–7.7)
NEUTROPHILS NFR BLD: 56.8 % (ref 38–73)
NONHDLC SERPL-MCNC: 82 MG/DL
NRBC BLD-RTO: 0 /100 WBC
PLATELET # BLD AUTO: 259 K/UL (ref 150–450)
PMV BLD AUTO: 9.9 FL (ref 9.2–12.9)
POCT GLUCOSE: 100 MG/DL (ref 70–110)
POTASSIUM SERPL-SCNC: 4 MMOL/L (ref 3.5–5.1)
PROT SERPL-MCNC: 6.9 G/DL (ref 6–8.4)
PROTHROMBIN TIME: 10.5 SEC (ref 9–12.5)
RBC # BLD AUTO: 4.3 M/UL (ref 4–5.4)
SODIUM SERPL-SCNC: 141 MMOL/L (ref 136–145)
TRIGL SERPL-MCNC: 51 MG/DL (ref 30–150)
TSH SERPL DL<=0.005 MIU/L-ACNC: 0.77 UIU/ML (ref 0.4–4)
WBC # BLD AUTO: 8.08 K/UL (ref 3.9–12.7)

## 2024-09-14 PROCEDURE — 93005 ELECTROCARDIOGRAM TRACING: CPT | Mod: HCNC

## 2024-09-14 PROCEDURE — 84443 ASSAY THYROID STIM HORMONE: CPT | Mod: HCNC | Performed by: FAMILY MEDICINE

## 2024-09-14 PROCEDURE — 85025 COMPLETE CBC W/AUTO DIFF WBC: CPT | Mod: HCNC | Performed by: FAMILY MEDICINE

## 2024-09-14 PROCEDURE — 99285 EMERGENCY DEPT VISIT HI MDM: CPT | Mod: 25,HCNC

## 2024-09-14 PROCEDURE — 82962 GLUCOSE BLOOD TEST: CPT | Mod: HCNC

## 2024-09-14 PROCEDURE — G0426 INPT/ED TELECONSULT50: HCPCS | Mod: HCNC,95,, | Performed by: STUDENT IN AN ORGANIZED HEALTH CARE EDUCATION/TRAINING PROGRAM

## 2024-09-14 PROCEDURE — 93010 ELECTROCARDIOGRAM REPORT: CPT | Mod: HCNC,,, | Performed by: INTERNAL MEDICINE

## 2024-09-14 PROCEDURE — 80061 LIPID PANEL: CPT | Mod: HCNC | Performed by: FAMILY MEDICINE

## 2024-09-14 PROCEDURE — 25500020 PHARM REV CODE 255: Mod: HCNC | Performed by: INTERNAL MEDICINE

## 2024-09-14 PROCEDURE — 83036 HEMOGLOBIN GLYCOSYLATED A1C: CPT | Mod: HCNC | Performed by: FAMILY MEDICINE

## 2024-09-14 PROCEDURE — G0378 HOSPITAL OBSERVATION PER HR: HCPCS | Mod: HCNC

## 2024-09-14 PROCEDURE — 96374 THER/PROPH/DIAG INJ IV PUSH: CPT | Mod: HCNC

## 2024-09-14 PROCEDURE — 25000003 PHARM REV CODE 250: Mod: HCNC | Performed by: INTERNAL MEDICINE

## 2024-09-14 PROCEDURE — 80053 COMPREHEN METABOLIC PANEL: CPT | Mod: HCNC | Performed by: FAMILY MEDICINE

## 2024-09-14 PROCEDURE — 63600175 PHARM REV CODE 636 W HCPCS: Mod: HCNC | Performed by: INTERNAL MEDICINE

## 2024-09-14 PROCEDURE — 85610 PROTHROMBIN TIME: CPT | Mod: HCNC | Performed by: FAMILY MEDICINE

## 2024-09-14 PROCEDURE — 25000003 PHARM REV CODE 250: Mod: HCNC | Performed by: NURSE PRACTITIONER

## 2024-09-14 PROCEDURE — 36415 COLL VENOUS BLD VENIPUNCTURE: CPT | Mod: HCNC | Performed by: FAMILY MEDICINE

## 2024-09-14 RX ORDER — CLOPIDOGREL BISULFATE 75 MG/1
75 TABLET ORAL DAILY
Status: DISCONTINUED | OUTPATIENT
Start: 2024-09-14 | End: 2024-09-14

## 2024-09-14 RX ORDER — VALSARTAN 160 MG/1
320 TABLET ORAL DAILY
Status: DISCONTINUED | OUTPATIENT
Start: 2024-09-14 | End: 2024-09-15 | Stop reason: HOSPADM

## 2024-09-14 RX ORDER — ACETAMINOPHEN 325 MG/1
650 TABLET ORAL EVERY 8 HOURS PRN
Status: DISCONTINUED | OUTPATIENT
Start: 2024-09-14 | End: 2024-09-15 | Stop reason: HOSPADM

## 2024-09-14 RX ORDER — HYDRALAZINE HYDROCHLORIDE 20 MG/ML
10 INJECTION INTRAMUSCULAR; INTRAVENOUS EVERY 6 HOURS PRN
Status: DISCONTINUED | OUTPATIENT
Start: 2024-09-14 | End: 2024-09-15 | Stop reason: HOSPADM

## 2024-09-14 RX ORDER — HYDRALAZINE HYDROCHLORIDE 50 MG/1
50 TABLET, FILM COATED ORAL EVERY 12 HOURS
Status: DISCONTINUED | OUTPATIENT
Start: 2024-09-14 | End: 2024-09-15 | Stop reason: HOSPADM

## 2024-09-14 RX ORDER — ATORVASTATIN CALCIUM 40 MG/1
80 TABLET, FILM COATED ORAL DAILY
Status: DISCONTINUED | OUTPATIENT
Start: 2024-09-14 | End: 2024-09-15 | Stop reason: HOSPADM

## 2024-09-14 RX ORDER — ASPIRIN 325 MG
325 TABLET ORAL ONCE
Status: COMPLETED | OUTPATIENT
Start: 2024-09-14 | End: 2024-09-14

## 2024-09-14 RX ORDER — CLOPIDOGREL BISULFATE 75 MG/1
75 TABLET ORAL DAILY
Status: DISCONTINUED | OUTPATIENT
Start: 2024-09-15 | End: 2024-09-15 | Stop reason: HOSPADM

## 2024-09-14 RX ORDER — CLOPIDOGREL BISULFATE 300 MG/1
300 TABLET, FILM COATED ORAL ONCE
Status: COMPLETED | OUTPATIENT
Start: 2024-09-14 | End: 2024-09-14

## 2024-09-14 RX ORDER — ONDANSETRON HYDROCHLORIDE 2 MG/ML
4 INJECTION, SOLUTION INTRAVENOUS EVERY 8 HOURS PRN
Status: DISCONTINUED | OUTPATIENT
Start: 2024-09-14 | End: 2024-09-15 | Stop reason: HOSPADM

## 2024-09-14 RX ORDER — SODIUM CHLORIDE 0.9 % (FLUSH) 0.9 %
10 SYRINGE (ML) INJECTION
Status: DISCONTINUED | OUTPATIENT
Start: 2024-09-14 | End: 2024-09-15 | Stop reason: HOSPADM

## 2024-09-14 RX ORDER — ASPIRIN 81 MG/1
81 TABLET ORAL DAILY
Status: DISCONTINUED | OUTPATIENT
Start: 2024-09-15 | End: 2024-09-15 | Stop reason: HOSPADM

## 2024-09-14 RX ORDER — AMLODIPINE BESYLATE 10 MG/1
10 TABLET ORAL DAILY
Status: DISCONTINUED | OUTPATIENT
Start: 2024-09-14 | End: 2024-09-15 | Stop reason: HOSPADM

## 2024-09-14 RX ORDER — ASPIRIN 81 MG/1
81 TABLET ORAL DAILY
Status: DISCONTINUED | OUTPATIENT
Start: 2024-09-14 | End: 2024-09-14

## 2024-09-14 RX ORDER — EZETIMIBE 10 MG/1
10 TABLET ORAL DAILY
Status: DISCONTINUED | OUTPATIENT
Start: 2024-09-15 | End: 2024-09-15 | Stop reason: HOSPADM

## 2024-09-14 RX ORDER — TRAZODONE HYDROCHLORIDE 50 MG/1
50 TABLET ORAL NIGHTLY PRN
Status: DISCONTINUED | OUTPATIENT
Start: 2024-09-14 | End: 2024-09-15 | Stop reason: HOSPADM

## 2024-09-14 RX ORDER — IPRATROPIUM BROMIDE AND ALBUTEROL SULFATE 2.5; .5 MG/3ML; MG/3ML
3 SOLUTION RESPIRATORY (INHALATION) EVERY 6 HOURS PRN
Status: DISCONTINUED | OUTPATIENT
Start: 2024-09-14 | End: 2024-09-15 | Stop reason: HOSPADM

## 2024-09-14 RX ADMIN — CLOPIDOGREL BISULFATE 300 MG: 300 TABLET, FILM COATED ORAL at 05:09

## 2024-09-14 RX ADMIN — ASPIRIN 325 MG ORAL TABLET 325 MG: 325 PILL ORAL at 05:09

## 2024-09-14 RX ADMIN — HYDRALAZINE HYDROCHLORIDE 10 MG: 20 INJECTION INTRAMUSCULAR; INTRAVENOUS at 07:09

## 2024-09-14 RX ADMIN — VALSARTAN 320 MG: 160 TABLET, FILM COATED ORAL at 06:09

## 2024-09-14 RX ADMIN — ATORVASTATIN CALCIUM 80 MG: 40 TABLET, FILM COATED ORAL at 05:09

## 2024-09-14 RX ADMIN — AMLODIPINE BESYLATE 10 MG: 10 TABLET ORAL at 05:09

## 2024-09-14 RX ADMIN — IOHEXOL 100 ML: 350 INJECTION, SOLUTION INTRAVENOUS at 03:09

## 2024-09-14 RX ADMIN — HYDRALAZINE HYDROCHLORIDE 50 MG: 50 TABLET ORAL at 10:09

## 2024-09-14 NOTE — SUBJECTIVE & OBJECTIVE
Past Medical History:   Diagnosis Date    Acute coronary syndrome     Anxiety     Bilateral carotid artery stenosis 11/17/2015    Chronic pain     Colon polyp     Coronary artery disease     GERD (gastroesophageal reflux disease)     Hyperlipidemia     Hypertension     Hypothyroidism     Low back pain     Lupus     Osteoporosis     Poor circulation     Pre-operative clearance 7/12/2019    PVD (peripheral vascular disease)     S/P peripheral artery angioplasty 02/13/2014    SCCA (squamous cell carcinoma) of skin 10/2019    Dr. ZANDRA Rivas--oncology    Skin disease        Past Surgical History:   Procedure Laterality Date    AORTOGRAPHY WITH SERIALOGRAPHY N/A 5/25/2021    Procedure: AORTOGRAM, WITH RUNOFF;  Surgeon: Christopher Cohen MD;  Location: [facility] CATH LAB;  Service: Cardiology;  Laterality: N/A;  COVID-19, mRNA, LNP-S, PF, 30 mcg/0.3 mL dose (COVID-19, MRNA, LN-S, PF (Pfizer)) 1/30/2021, 1/9/2021    Atherosclerotic PVD with intermittent claudication Bilateral 05/2021    Dr. Cohen    CATARACT EXTRACTION      COLONOSCOPY N/A 1/4/2017    Procedure: COLONOSCOPY;  Surgeon: Sylvester Arboleda III, MD;  Location: [facility] ENDO;  Service: Endoscopy;  Laterality: N/A;    COLONOSCOPY N/A 8/4/2020    Procedure: COLONOSCOPY;  Surgeon: Sylvester Arboleda III, MD;  Location: [facility] ENDO;  Service: Endoscopy;  Laterality: N/A;    CORONARY ANGIOPLASTY      CORONARY ARTERY BYPASS GRAFT      EXCISION OF MASS OF HAND Right 2/11/2022    Procedure: EXCISION, MASS, HAND;  Surgeon: Hernan Culver MD;  Location: [facility] OR;  Service: Orthopedics;  Laterality: Right;   thenar eminence skin lesion excised and a Z-plasty skin flap for coverage    HYSTERECTOMY      OOPHORECTOMY      THYROIDECTOMY         Review of patient's allergies indicates:   Allergen Reactions    Methotrexate analogues     Cellcept [mycophenolate mofetil] Rash    Imuran [azathioprine] Rash       No current facility-administered medications on file prior to encounter.      Current Outpatient Medications on File Prior to Encounter   Medication Sig    albuterol (PROVENTIL/VENTOLIN HFA) 90 mcg/actuation inhaler Inhale 2 puffs into the lungs every 6 (six) hours as needed for Wheezing.    albuterol-ipratropium (DUO-NEB) 2.5 mg-0.5 mg/3 mL nebulizer solution Take 3 mLs by nebulization every 6 (six) hours as needed for Wheezing. Rescue    amLODIPine (NORVASC) 10 MG tablet TAKE 1 TABLET(10 MG) BY MOUTH EVERY DAY    aspirin (ECOTRIN) 81 MG EC tablet Take 1 tablet (81 mg total) by mouth once daily.    atorvastatin (LIPITOR) 80 MG tablet Take 1 tablet (80 mg total) by mouth once daily.    clopidogreL (PLAVIX) 75 mg tablet Take 1 tablet (75 mg total) by mouth once daily.    ergocalciferol (ERGOCALCIFEROL) 50,000 unit Cap TAKE 1 CAPSULE BY MOUTH EVERY 7 DAYS    ezetimibe (ZETIA) 10 mg tablet TAKE 1 TABLET(10 MG) BY MOUTH EVERY DAY    gabapentin (NEURONTIN) 100 MG capsule Take 1 capsule (100 mg total) by mouth every evening for 7 days, THEN 1 capsule (100 mg total) 2 (two) times daily for 7 days, THEN 1 capsule (100 mg total) 3 (three) times daily. (Patient not taking: Reported on 8/16/2024)    hydrALAZINE (APRESOLINE) 50 MG tablet Take 1 tablet (50 mg total) by mouth every 12 (twelve) hours.    hydrOXYzine HCL (ATARAX) 25 MG tablet Take 1 tablet (25 mg total) by mouth 3 (three) times daily as needed for Anxiety.    levothyroxine (SYNTHROID) 137 MCG Tab tablet Take 1 tablet (137 mcg total) by mouth before breakfast.    pregabalin (LYRICA) 25 MG capsule Take 1 capsule (25 mg total) by mouth nightly as needed.    traZODone (DESYREL) 50 MG tablet TAKE 1 TABLET BY MOUTH EVERY DAY AT NIGHT AS NEEDED FOR INSOMNIA    valsartan (DIOVAN) 320 MG tablet TAKE 1 TABLET(320 MG) BY MOUTH EVERY DAY     Family History       Problem Relation (Age of Onset)    Hypertension Mother    Lung cancer Daughter    Stroke Mother          Tobacco Use    Smoking status: Some Days     Current packs/day: 0.50     Average  packs/day: 0.5 packs/day for 60.7 years (30.4 ttl pk-yrs)     Types: Cigarettes     Start date: 1965    Smokeless tobacco: Never   Substance and Sexual Activity    Alcohol use: Never    Drug use: No    Sexual activity: Not Currently     Partners: Male     Review of Systems   Constitutional:  Positive for activity change. Negative for chills and fever.   HENT:  Negative for trouble swallowing.    Eyes:  Negative for visual disturbance.   Respiratory:  Negative for cough, choking, chest tightness and shortness of breath.    Cardiovascular:  Negative for chest pain.   Gastrointestinal:  Negative for abdominal pain, constipation, diarrhea, nausea and vomiting.   Genitourinary:  Negative for difficulty urinating.   Musculoskeletal:  Positive for arthralgias.   Skin:  Negative for color change.   Neurological:  Positive for facial asymmetry. Negative for speech difficulty, weakness, light-headedness, numbness and headaches.   Psychiatric/Behavioral:  Negative for agitation and behavioral problems.      Objective:     Vital Signs (Most Recent):  Temp: 98 °F (36.7 °C) (09/14/24 1032)  Pulse: 62 (09/14/24 1237)  Resp: 20 (09/14/24 1048)  BP: (!) 192/78 (09/14/24 1302)  SpO2: 99 % (09/14/24 1218) Vital Signs (24h Range):  Temp:  [98 °F (36.7 °C)] 98 °F (36.7 °C)  Pulse:  [60-72] 62  Resp:  [20] 20  SpO2:  [99 %-100 %] 99 %  BP: (173-202)/(71-98) 192/78     Weight: 81.6 kg (180 lb)  Body mass index is 29.05 kg/m².     Physical Exam  Vitals reviewed.   Constitutional:       General: She is not in acute distress.     Appearance: She is not ill-appearing.   HENT:      Head: Normocephalic.      Mouth/Throat:      Mouth: Mucous membranes are moist.   Eyes:      Extraocular Movements: Extraocular movements intact.   Cardiovascular:      Rate and Rhythm: Normal rate.      Pulses: Normal pulses.   Pulmonary:      Effort: Pulmonary effort is normal. No respiratory distress.      Breath sounds: Normal breath sounds.   Abdominal:       General: Bowel sounds are normal. There is no distension.      Palpations: Abdomen is soft.      Tenderness: There is no abdominal tenderness.   Genitourinary:     Comments: deferred  Musculoskeletal:         General: Normal range of motion.      Cervical back: Normal range of motion.      Right lower leg: No edema.      Left lower leg: No edema.   Skin:     General: Skin is warm and dry.   Neurological:      Mental Status: She is alert and oriented to person, place, and time. Mental status is at baseline.   Psychiatric:         Mood and Affect: Mood normal.         Behavior: Behavior normal.                Significant Labs: All pertinent labs within the past 24 hours have been reviewed.  CBC:   Recent Labs   Lab 09/14/24  1103   WBC 8.08   HGB 13.3   HCT 40.0        CMP:   Recent Labs   Lab 09/14/24  1103      K 4.0      CO2 26   GLU 98   BUN 13   CREATININE 0.7   CALCIUM 9.3   PROT 6.9   ALBUMIN 3.5   BILITOT 0.5   ALKPHOS 127   AST 21   ALT 17   ANIONGAP 10     Coagulation:   Recent Labs   Lab 09/14/24  1103   INR 0.9     POCT Glucose:   Recent Labs   Lab 09/14/24  1037   POCTGLUCOSE 100     TSH:   Recent Labs   Lab 09/14/24  1103   TSH 0.771       Significant Imaging:   Imaging Results              CT Head Without Contrast (Edited Result - FINAL)  Result time 09/14/24 11:13:42      Addendum (preliminary) 1 of 1 by Hang Elmore MD (09/14/24 11:13:42)      A call report was made to Dr. Robledo at 10:45 hours on October 14, 2024      Electronically signed by: Hang Elmore MD  Date:    09/14/2024  Time:    11:13                 Final result by Hang Elmore MD (09/14/24 10:56:07)                   Impression:      1.  Negative for acute intracranial process. Negative for hemorrhage, or skull fracture.    2.  Cerebral atrophy noted.  Intracranial atherosclerotic disease noted.  Small vessel ischemic changes noted.  Stable right thalamic lacunar infarct.    3.  Mild paranasal sinus disease in  distribution described above.    The 4.  Other stable findings as noted above.    All CT scans at this facility are performed  using dose modulation techniques as appropriate to performed exam including the following:  automated exposure control; adjustment of mA and/or kV according to the patients size (this includes techniques or standardized protocols for targeted exams where dose is matched to indication/reason for exam: i.e. extremities or head);  iterative reconstruction technique.      Electronically signed by: Hang Elmore MD  Date:    09/14/2024  Time:    10:56               Narrative:    EXAMINATION:  CT HEAD WITHOUT CONTRAST    CLINICAL HISTORY:  Neuro deficit, acute, stroke suspected;    TECHNIQUE:  Axial images through the brain and posterior fossa were obtained without the use of IV contrast.  Sagittal and coronal reconstructions are provided for review.    COMPARISON:  December 12, 2023    FINDINGS:  The ventricles are midline and the CSF spaces are prominent.  The gray-white matter junction is well preserved. Negative for intracranial vascular abnormalities. Negative for mass, mass effect, cerebral edema, hemorrhage or abnormal fluid collections.  Intracranial atherosclerotic disease noted.  Small vessel ischemic changes noted.  Right thalamic lacunar infarct again seen.  Falx calcifications.    The skull and scalp are  intact.  Redemonstration of a small amount of fluid within the right mastoid air cells and debris within the right ear canal.  Mild ethmoid air cell mucoperiosteal thickening.  Posterior right ethmoid air cell osteoma.  Right sphenoid sinus mucoperiosteal thickening.  The rest of the paranasal sinuses, mastoid air cells, middle ears and ear canals are clear. The globes are intact.

## 2024-09-14 NOTE — ED PROVIDER NOTES
SCRIBE #1 NOTE: I, Lui Mcdowell, am scribing for, and in the presence of, Lily Robledo MD. I have scribed the entire note.       History     Chief Complaint   Patient presents with    Facial Droop     Pt states she woke up this morning and noticed her face drooping on left side at 0800. Hx of stroke and TIA. Pt also c/o generalized pain.      Review of patient's allergies indicates:   Allergen Reactions    Methotrexate analogues     Cellcept [mycophenolate mofetil] Rash    Imuran [azathioprine] Rash         History of Present Illness     HPI    9/14/2024, 10:38 AM  History obtained from the son and patient      History of Present Illness: Flory Cam is a 79 y.o. female patient with a PMHx of HTN, HLD, GERD, CAD, PVD, anxiety, osteoporosis, hypothyroidism, SCCA, and colon polyp who presents to the Emergency Department for evaluation of right facial droop which onset gradually since she woke up at 8:00 AM this morning. Pt states she noticed her right facial side twitching this morning. Pt is also complaining of right arm weakness, however, pt notes she recently had surgery to her right palm for lupus. Symptoms are constant and moderate in severity. No mitigating or exacerbating factors reported. Associated sxs include generalized pain and rash to right palm. Patient denies any speech difficulty, numbness, SOB, CP, and all other sxs at this time. No prior Tx reported. Pt has a hx of stroke and TIA, but reports her last stroke was over 60 years ago. Pt states she is compliant with all her medications but did not take it this morning. No further complaints or concerns at this time.       Arrival mode: Personal vehicle      PCP: Tayler Ovalle DO        Past Medical History:  Past Medical History:   Diagnosis Date    Acute coronary syndrome     Anxiety     Bilateral carotid artery stenosis 11/17/2015    Chronic pain     Colon polyp     Coronary artery disease     GERD (gastroesophageal reflux disease)      Hyperlipidemia     Hypertension     Hypothyroidism     Low back pain     Lupus     Osteoporosis     Poor circulation     Pre-operative clearance 7/12/2019    PVD (peripheral vascular disease)     S/P peripheral artery angioplasty 02/13/2014    SCCA (squamous cell carcinoma) of skin 10/2019    Dr. ZANDRA Rivas--oncology    Skin disease        Past Surgical History:  Past Surgical History:   Procedure Laterality Date    AORTOGRAPHY WITH SERIALOGRAPHY N/A 5/25/2021    Procedure: AORTOGRAM, WITH RUNOFF;  Surgeon: Christopher Cohen MD;  Location: [facility] CATH LAB;  Service: Cardiology;  Laterality: N/A;  COVID-19, mRNA, LNP-S, PF, 30 mcg/0.3 mL dose (COVID-19, MRNA, LN-S, PF (Pfizer)) 1/30/2021, 1/9/2021    Atherosclerotic PVD with intermittent claudication Bilateral 05/2021    Dr. Cohen    CATARACT EXTRACTION      COLONOSCOPY N/A 1/4/2017    Procedure: COLONOSCOPY;  Surgeon: Sylvester Arboleda III, MD;  Location: [facility] ENDO;  Service: Endoscopy;  Laterality: N/A;    COLONOSCOPY N/A 8/4/2020    Procedure: COLONOSCOPY;  Surgeon: Sylvester Arboleda III, MD;  Location: [facility] ENDO;  Service: Endoscopy;  Laterality: N/A;    CORONARY ANGIOPLASTY      CORONARY ARTERY BYPASS GRAFT      EXCISION OF MASS OF HAND Right 2/11/2022    Procedure: EXCISION, MASS, HAND;  Surgeon: Hernan Culver MD;  Location: [facility] OR;  Service: Orthopedics;  Laterality: Right;   thenar eminence skin lesion excised and a Z-plasty skin flap for coverage    HYSTERECTOMY      OOPHORECTOMY      THYROIDECTOMY           Family History:  Family History   Problem Relation Name Age of Onset    Hypertension Mother      Stroke Mother      Lung cancer Daughter      Melanoma Neg Hx      Psoriasis Neg Hx      Lupus Neg Hx      Eczema Neg Hx         Social History:  Social History     Tobacco Use    Smoking status: Some Days     Current packs/day: 0.50     Average packs/day: 0.5 packs/day for 60.7 years (30.4 ttl pk-yrs)     Types: Cigarettes     Start date: 1964     Smokeless tobacco: Never   Substance and Sexual Activity    Alcohol use: Never    Drug use: No    Sexual activity: Not Currently     Partners: Male        Review of Systems     Review of Systems   Constitutional:  Negative for chills and fever.   HENT:  Negative for sore throat.    Respiratory:  Negative for shortness of breath.    Cardiovascular:  Negative for chest pain.   Gastrointestinal:  Negative for nausea and vomiting.   Genitourinary:  Negative for dysuria.   Musculoskeletal:  Positive for myalgias (generalized pain). Negative for back pain.   Skin:  Positive for rash (right palm).   Neurological:  Positive for facial asymmetry (right side) and weakness (right arm). Negative for speech difficulty and numbness.   Hematological:  Does not bruise/bleed easily.   All other systems reviewed and are negative.     Physical Exam     Initial Vitals [09/14/24 1032]   BP Pulse Resp Temp SpO2   (!) 202/98 72 20 98 °F (36.7 °C) 100 %      MAP       --          Physical Exam  Nursing Notes and Vital Signs Reviewed.  Constitutional: Patient is in no acute distress. Well-developed and well-nourished.  Head: Atraumatic. Normocephalic. Right facial droop.  Eyes: PERRL. EOM intact. Conjunctivae are not pale. No scleral icterus.  ENT: Mucous membranes are moist. Oropharynx is clear and symmetric.    Mouth: Tongue deviated to right  Neck: Supple. Full ROM. No lymphadenopathy.  Cardiovascular: Regular rate. Regular rhythm. No murmurs, rubs, or gallops. Distal pulses are 2+ and symmetric.  Pulmonary/Chest: No respiratory distress. Clear to auscultation bilaterally. No wheezing or rales.  Abdominal: Soft and non-distended.  There is no tenderness.  No rebound, guarding, or rigidity. Good bowel sound.  Genitourinary: No CVA tenderness  Musculoskeletal: Moves all extremities. Right arm weakness. No edema. No calf tenderness.  Skin: Warm and dry. Scaling erythematous rash consistent with lupus rash to palm of right hand no drainage  "no infection.   Neurological:  Alert, awake, and appropriate.  Normal speech. Right arm weakness,    Psychiatric: Normal affect. Good eye contact. Appropriate in content.     ED Course   Procedures  ED Vital Signs:  Vitals:    09/15/24 0050 09/15/24 0120 09/15/24 0230 09/15/24 0320   BP: (!) 178/77 135/65 129/60 135/72   Pulse: 68 63 64 63   Resp: 20 (!) 23 (!) 37 (!) 24   Temp: 97.8 °F (36.6 °C)      TempSrc:       SpO2: 97% 95% 95% 95%   Weight:       Height:        09/15/24 0420 09/15/24 0530 09/15/24 0632 09/15/24 0700   BP: (!) 145/80 (!) 180/75 (!) 146/67 135/62   Pulse: 64 (!) 58 (!) 59 60   Resp: (!) 23 17 19 18   Temp:  97.7 °F (36.5 °C)     TempSrc:       SpO2: 96% 98% 95% 95%   Weight:       Height:        09/15/24 0800 09/15/24 0915 09/15/24 1154 09/15/24 1228   BP: 138/76  138/76 139/62   Pulse: 67 63 67 (!) 57   Resp: 18 18  18   Temp:    98.4 °F (36.9 °C)   TempSrc:    Oral   SpO2: 97% (!) 94%  98%   Weight:   81.6 kg (179 lb 14.3 oz)    Height:   5' 6" (1.676 m)     09/15/24 1345 09/15/24 1423 09/15/24 1430   BP:      Pulse: 60 61 62   Resp:  18    Temp:      TempSrc:      SpO2:  97%    Weight:      Height:          Abnormal Lab Results:  Labs Reviewed   HEMOGLOBIN A1C - Abnormal       Result Value    Hemoglobin A1C 5.7 (*)     Estimated Avg Glucose 117     CBC W/ AUTO DIFFERENTIAL    WBC 8.08      RBC 4.30      Hemoglobin 13.3      Hematocrit 40.0      MCV 93      MCH 30.9      MCHC 33.3      RDW 14.0      Platelets 259      MPV 9.9      Immature Granulocytes 0.4      Gran # (ANC) 4.6      Immature Grans (Abs) 0.03      Lymph # 2.4      Mono # 0.8      Eos # 0.2      Baso # 0.04      nRBC 0      Gran % 56.8      Lymph % 30.0      Mono % 10.3      Eosinophil % 2.0      Basophil % 0.5      Differential Method Automated     COMPREHENSIVE METABOLIC PANEL    Sodium 141      Potassium 4.0      Chloride 105      CO2 26      Glucose 98      BUN 13      Creatinine 0.7      Calcium 9.3      Total Protein 6.9  "     Albumin 3.5      Total Bilirubin 0.5      Alkaline Phosphatase 127      AST 21      ALT 17      eGFR >60      Anion Gap 10     PROTIME-INR    Prothrombin Time 10.5      INR 0.9     TSH    TSH 0.771     LIPID PANEL    Cholesterol 135      Triglycerides 51      HDL 53      LDL Cholesterol 71.8      HDL/Cholesterol Ratio 39.3      Total Cholesterol/HDL Ratio 2.5      Non-HDL Cholesterol 82     HEMOGLOBIN A1C   POCT GLUCOSE    POCT Glucose 100          All Lab Results:  Results for orders placed or performed during the hospital encounter of 09/14/24   CBC W/ AUTO DIFFERENTIAL   Result Value Ref Range    WBC 8.08 3.90 - 12.70 K/uL    RBC 4.30 4.00 - 5.40 M/uL    Hemoglobin 13.3 12.0 - 16.0 g/dL    Hematocrit 40.0 37.0 - 48.5 %    MCV 93 82 - 98 fL    MCH 30.9 27.0 - 31.0 pg    MCHC 33.3 32.0 - 36.0 g/dL    RDW 14.0 11.5 - 14.5 %    Platelets 259 150 - 450 K/uL    MPV 9.9 9.2 - 12.9 fL    Immature Granulocytes 0.4 0.0 - 0.5 %    Gran # (ANC) 4.6 1.8 - 7.7 K/uL    Immature Grans (Abs) 0.03 0.00 - 0.04 K/uL    Lymph # 2.4 1.0 - 4.8 K/uL    Mono # 0.8 0.3 - 1.0 K/uL    Eos # 0.2 0.0 - 0.5 K/uL    Baso # 0.04 0.00 - 0.20 K/uL    nRBC 0 0 /100 WBC    Gran % 56.8 38.0 - 73.0 %    Lymph % 30.0 18.0 - 48.0 %    Mono % 10.3 4.0 - 15.0 %    Eosinophil % 2.0 0.0 - 8.0 %    Basophil % 0.5 0.0 - 1.9 %    Differential Method Automated    Comprehensive metabolic panel   Result Value Ref Range    Sodium 141 136 - 145 mmol/L    Potassium 4.0 3.5 - 5.1 mmol/L    Chloride 105 95 - 110 mmol/L    CO2 26 23 - 29 mmol/L    Glucose 98 70 - 110 mg/dL    BUN 13 8 - 23 mg/dL    Creatinine 0.7 0.5 - 1.4 mg/dL    Calcium 9.3 8.7 - 10.5 mg/dL    Total Protein 6.9 6.0 - 8.4 g/dL    Albumin 3.5 3.5 - 5.2 g/dL    Total Bilirubin 0.5 0.1 - 1.0 mg/dL    Alkaline Phosphatase 127 55 - 135 U/L    AST 21 10 - 40 U/L    ALT 17 10 - 44 U/L    eGFR >60 >60 mL/min/1.73 m^2    Anion Gap 10 8 - 16 mmol/L   Protime-INR   Result Value Ref Range    Prothrombin Time  10.5 9.0 - 12.5 sec    INR 0.9 0.8 - 1.2   TSH   Result Value Ref Range    TSH 0.771 0.400 - 4.000 uIU/mL   LDL - Lipid Panel   Result Value Ref Range    Cholesterol 135 120 - 199 mg/dL    Triglycerides 51 30 - 150 mg/dL    HDL 53 40 - 75 mg/dL    LDL Cholesterol 71.8 63.0 - 159.0 mg/dL    HDL/Cholesterol Ratio 39.3 20.0 - 50.0 %    Total Cholesterol/HDL Ratio 2.5 2.0 - 5.0    Non-HDL Cholesterol 82 mg/dL   Hemoglobin A1C   Result Value Ref Range    Hemoglobin A1C 5.7 (H) 4.0 - 5.6 %    Estimated Avg Glucose 117 68 - 131 mg/dL   ECG 12 lead   Result Value Ref Range    QRS Duration 140 ms    [facility] QTC Calculation 446 ms   Echo Saline Bubble? Yes   Result Value Ref Range    BSA 1.95 m2    LVOT stroke volume 85.44 cm3    LVIDd 5.19 3.5 - 6.0 cm    LV Systolic Volume 53.12 mL    LV Systolic Volume Index 27.8 mL/m2    LVIDs 3.56 2.1 - 4.0 cm    LV Diastolic Volume 129.00 mL    LV Diastolic Volume Index 67.54 mL/m2    Left Ventricular End Systolic Volume by Teichholz Method 53.12 mL    Left Ventricular End Diastolic Volume by Teichholz Method 129.00 mL    IVS 1.44 (A) 0.6 - 1.1 cm    LVOT diameter 1.83 cm    LVOT area 2.6 cm2    FS 31 28 - 44 %    Left Ventricle Relative Wall Thickness 0.46 cm    Posterior Wall 1.20 (A) 0.6 - 1.1 cm    LV mass 283.69 g    LV Mass Index 149 g/m2    MV Peak E Mark 0.84 m/s    TDI LATERAL 0.11 m/s    TDI SEPTAL 0.07 m/s    E/E' ratio 9.33 m/s    MV Peak A Mark 0.78 m/s    TR Max Mark 2.7 m/s    E/A ratio 1.08     IVRT 97.05 msec    E wave deceleration time 323.14 msec    LV SEPTAL E/E' RATIO 12.00 m/s    LV LATERAL E/E' RATIO 7.64 m/s    LVOT peak mark 1.47 m/s    Left Ventricular Outflow Tract Mean Velocity 0.95 cm/s    Left Ventricular Outflow Tract Mean Gradient 4.00 mmHg    RV S' 8.28 cm/s    RVOT peak VTI 15.3 cm    RV/LV Ratio 0.84 cm    LA size 4.64 cm    Left Atrium Minor Axis 6.13 cm    Left Atrium Major Axis 5.89 cm    RA Major Axis 5.04 cm    AV mean gradient 5 mmHg    AV peak gradient  12 mmHg    Ao peak blue 1.72 m/s    Ao VTI 34.30 cm    LVOT peak VTI 32.50 cm    AV valve area 2.49 cm²    AV Velocity Ratio 0.85     AV index (prosthetic) 0.95     KERRIE by Velocity Ratio 2.25 cm²    Mr max blue 6.00 m/s    Triscuspid Valve Regurgitation Peak Gradient 29 mmHg    PV mean gradient 2 mmHg    PV PEAK VELOCITY 1.26 m/s    PV peak gradient 6 mmHg    Pulmonary Valve Mean Velocity 0.82 m/s    RVOT peak blue 0.93 m/s    Ao root annulus 3.13 cm    STJ 2.16 cm    Ascending aorta 2.89 cm    IVC diameter 2.12 cm    Mean e' 0.09 m/s    ZLVIDS 0.57     ZLVIDD -0.35     RVDD 4.34 cm    LA Volume Index 55.8 mL/m2    LA volume 106.62 cm3    LA WIDTH 4.5 cm    RA Width 2.9 cm    TV resting pulmonary artery pressure 37 mmHg    RV TB RVSP 11 mmHg    Est. RA pres 8 mmHg   POCT glucose   Result Value Ref Range    POCT Glucose 100 70 - 110 mg/dL     *Note: Due to a large number of results and/or encounters for the requested time period, some results have not been displayed. A complete set of results can be found in Results Review.         Imaging Results:  Imaging Results              MRI Brain Without Contrast (Final result)  Result time 09/14/24 16:15:18      Final result by Bree BrowneWalker), MD (09/14/24 16:15:18)                   Impression:      No acute ischemic changes are infarction.  No hemorrhage mass or midline shift.  Stable exam.      Electronically signed by: Bree Browne MD  Date:    09/14/2024  Time:    16:15               Narrative:    EXAMINATION:  MRI BRAIN WITHOUT CONTRAST    CLINICAL HISTORY:  Neuro deficit, acute, stroke suspected;    TECHNIQUE:  Multiplanar multisequence MR imaging of the brain was performed without contrast.    COMPARISON:  CTA brain 09/14/2024, MRI brain 12/12/2023    FINDINGS:  No evidence of hemorrhage or mass or midline shift.  No evidence of restricted diffusion to suggest acute ischemic changes or infarction.    There is extensive periventricular white matter  increased signal on FLAIR and T2 weighted images.  Distribution and appearance is similar to prior MRI.                                       CTA Head and Neck (xpd) (Final result)  Result time 09/14/24 16:03:46      Final result by Bree BrowneWalkerMD perry (09/14/24 16:03:46)                   Impression:      Greater than 50% diameter stenosis involving the proximal cervical ICAs bilaterally.  There is also fairly significant atherosclerotic plaque involving the cavernous portions and petrous portions of the internal carotid arteries bilaterally.    Direct origin of the left vertebral artery from the aortic arch with significant proximal atherosclerosis likely corresponding to 75% diameter stenosis.    There is scattered atherosclerotic plaque seen elsewhere involving the vertebral arteries bilaterally including the distally prior to the confluence into the basilar artery.    No large vessel intracranial occlusions.  There appears to be mild narrowing of the left A1 segment.  Otherwise the anterior and middle cerebral arteries bilaterally appear unremarkable.  No large vessel occlusions.  Posterior cerebral arteries are also intact.    There is moderate plaque of the proximal left subclavian artery with approximately 60% diameter stenosis.  Moderate plaque related to the proximal right brachiocephalic artery.  Stenosis less than 50%.    All CT scans at this facility use dose modulation, iterative reconstruction and/or weight based dosing when appropriate to reduce radiation dose to as low as reasonably achievable.      Electronically signed by: Bree Browne MD  Date:    09/14/2024  Time:    16:03               Narrative:    EXAMINATION:  CTA HEAD AND NECK (XPD)    CLINICAL HISTORY:  Stroke/TIA, determine embolic source;    TECHNIQUE:  Standard contrast enhanced CTA of brain with 100ml of Omnipaque 350 with 3D MIP reformations. No previous for comparison.    COMPARISON:  CT brain same day, MRA brain  12/12/2023    FINDINGS:  The brain is grossly normal    The anterior circulation is normal. The right and left internal carotid arteries are normal.    There is moderate plaque involving the proximal left subclavian artery with approximately 60% diameter stenosis.  The left vertebral artery has a direct origin from the aortic arch.  There is plaque seen at the origin of the left vertebral artery as well as the proximal left vertebral artery suggesting greater than 75% diameter stenosis.  There is plaque elsewhere involving the left vertebral artery but no significant narrowing or stenosis.  Vertebral artery is intact until its confluence with the right vertebral artery.  There is scattered atherosclerotic plaque of the right vertebral artery but no significant narrowing or stenosis suspected.    There is moderate plaque at the carotid left carotid bulb with greater than 50% diameter stenosis of the proximal ICA.  The remainder of the cervical portion of the left internal carotid artery is unremarkable.    Moderate plaque at the carotid bulb and proximal ICA with approximate the 50-60% diameter stenosis of the right cervical ICA.    There is fairly extensive plaque involving the cavernous portions of the internal carotid arteries bilaterally as well as the petrous portions of the internal carotid arteries.    The anterior cerebral arteries are intact bilaterally without significant narrowing or stenosis.  No large vessel occlusions.  There does appear to be mild diffuse narrowing of the left A1 segment.  No large vessel occlusions.    The basilar artery and posterior cerebral arteries appear intact.    The superior cerebellar arteries appear intact as well as do the anterior inferior cerebellar posteroinferior cerebellar arteries bilaterally.    There is plaque involving the distal vertebral arteries bilaterally                                       US Carotid Bilateral (Final result)  Result time 09/14/24 14:13:25       Final result by Hang Elmore MD (09/14/24 14:13:25)                   Impression:      1.  Elevated velocities within the left internal carotid artery, indicating 50-69% stenosis.    2.  Antegrade flow is seen in all vessels imaged.  Negative for elevated velocities are grayscale images to suggest significant stenosis or occlusion otherwise.  Scattered hard and soft plaque noted.    Stenosis of 50-69%-validated velocity measurements with angiographic measurements, velocity criteria are extrapolated from diameter data as defined by the Society of Radiologists in Ultrasound Consensus Conference Radiology 2003; 229;340-346.      Electronically signed by: Hang Elmore MD  Date:    09/14/2024  Time:    14:13               Narrative:    EXAMINATION:  US CAROTID BILATERAL    CLINICAL HISTORY:  suspected CVA, right facial droop;    COMPARISON:  No comparison studies are available.    FINDINGS:  Antegrade flow is seen in all vessels imaged. The gray scale images fail to demonstrate areas of significant stenosis or occlusion.  Scattered hard and soft plaque noted.    The flow velocities are as follows:    Right CCA 70 cm/sec    Right ICA 73 cm/sec    Right ECA 56 cm/sec    Right ICA to CCA ratio 1.0    Left CCA 67 cm/sec    Left  cm/sec    Left ECA 77 cm/sec    Left ICA to CCA ratio 2.1                                       CT Head Without Contrast (Edited Result - FINAL)  Result time 09/14/24 11:13:42      Addendum (preliminary) 1 of 1 by Hang Elmore MD (09/14/24 11:13:42)      A call report was made to Dr. Robledo at 10:45 hours on October 14, 2024      Electronically signed by: Hang Elmore MD  Date:    09/14/2024  Time:    11:13                 Final result by Hang Elmore MD (09/14/24 10:56:07)                   Impression:      1.  Negative for acute intracranial process. Negative for hemorrhage, or skull fracture.    2.  Cerebral atrophy noted.  Intracranial atherosclerotic disease noted.   Small vessel ischemic changes noted.  Stable right thalamic lacunar infarct.    3.  Mild paranasal sinus disease in distribution described above.    The 4.  Other stable findings as noted above.    All CT scans at this facility are performed  using dose modulation techniques as appropriate to performed exam including the following:  automated exposure control; adjustment of mA and/or kV according to the patients size (this includes techniques or standardized protocols for targeted exams where dose is matched to indication/reason for exam: i.e. extremities or head);  iterative reconstruction technique.      Electronically signed by: Hang Elmore MD  Date:    09/14/2024  Time:    10:56               Narrative:    EXAMINATION:  CT HEAD WITHOUT CONTRAST    CLINICAL HISTORY:  Neuro deficit, acute, stroke suspected;    TECHNIQUE:  Axial images through the brain and posterior fossa were obtained without the use of IV contrast.  Sagittal and coronal reconstructions are provided for review.    COMPARISON:  December 12, 2023    FINDINGS:  The ventricles are midline and the CSF spaces are prominent.  The gray-white matter junction is well preserved. Negative for intracranial vascular abnormalities. Negative for mass, mass effect, cerebral edema, hemorrhage or abnormal fluid collections.  Intracranial atherosclerotic disease noted.  Small vessel ischemic changes noted.  Right thalamic lacunar infarct again seen.  Falx calcifications.    The skull and scalp are  intact.  Redemonstration of a small amount of fluid within the right mastoid air cells and debris within the right ear canal.  Mild ethmoid air cell mucoperiosteal thickening.  Posterior right ethmoid air cell osteoma.  Right sphenoid sinus mucoperiosteal thickening.  The rest of the paranasal sinuses, mastoid air cells, middle ears and ear canals are clear. The globes are intact.                                       The EKG was ordered, reviewed, and independently  interpreted by the ED provider.  Interpretation time: 10:48  Rate: 62 BPM  Rhythm: Sinus rhythm with Premature supraventricular complexes.  Interpretation: Right bundle branch block. Left anterior fascicular block. Bifascicular block. T wave abnormality, consider lateral ischemia. No STEMI.           The Emergency Provider reviewed the vital signs and test results, which are outlined above.     ED Discussion     1:05 PM: Discussed case with Trina Naqvi MD ([facility]). Dr. Naqvi agrees with current care and management of pt and accepts admission.   Admitting Service: [facility]   Admitting Physician: Dr. Naqvi  Admit to: obs tele     1:05 PM: Re-evaluated pt. I have discussed test results, shared treatment plan, and the need for admission with patient and family at bedside. Pt and family express understanding at this time and agree with all information. All questions answered. Pt and family have no further questions or concerns at this time. Pt is ready for admit.           Medical Decision Making  78 yo female who noticed a right sided facial droop around 8 am this morning.  Her son is with her and felt the facial droop was more pronounced.  She denies any slurred speech or new weakness in arms or legs.  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  On exam she has mild right facial droop with tongue deviation to right.  No sensory deficits.  She reports having a stroke back in 1960s, but feels her function had returned after that stroke.  Code stroke was called.  CT brain negative for acute findings. Vascular neurology doesn't recommend TNK at this time.      Amount and/or Complexity of Data Reviewed  Independent Historian: caregiver     Details: Son at bedside  Labs: ordered. Decision-making details documented in ED Course.  Radiology: ordered and independent interpretation performed.  Decision-making details documented in ED Course.  ECG/medicine tests: ordered and independent interpretation performed. Decision-making details documented in ED Course.                ED Medication(s):  Medications   aspirin tablet 325 mg (325 mg Oral Given 9/14/24 1709)   clopidogreL tablet 300 mg (300 mg Oral Given 9/14/24 1709)   iohexoL (OMNIPAQUE 350) injection 100 mL (100 mLs Intravenous Given 9/14/24 1520)       Discharge Medication List as of 9/15/2024  4:01 PM           Follow-up Information       Tayler Ovalle DO. Schedule an appointment as soon as possible for a visit in 3 day(s).    Specialty: Internal Medicine  Contact information:  [address] [place] [address]  [phone]               [facility] VASCULAR NEUROLOGY. Schedule an appointment as soon as possible for a visit.    Specialty: Vascular Neurology  Contact information:  [address] [address]  [phone]             Andie Macias MD. Schedule an appointment as soon as possible for a visit.    Specialty: Vascular Surgery  Why: followup carotid stenosis, referral sent  Contact information:  [address]  [place] [address]  [phone]                                 Scribe Attestation:   Scribe #1: I performed the above scribed service and the documentation accurately describes the services I performed. I attest to the accuracy of the note.     Attending:   Physician Attestation Statement for Scribe #1: I, Lily Robledo MD, personally performed the services described in this documentation, as scribed by Lui Mcdowell, in my presence, and it is both accurate and complete.           Clinical Impression       ICD-10-CM ICD-9-CM   1. Acute focal neurological deficit  R29.818 781.99   2. Discoid lupus  L93.0 695.4   3. Facial droop  R29.810 781.94   4. Hypertension, unspecified type  I10 401.9   5. Suspected cerebrovascular accident (CVA)  R09.89 785.9   6. PAD (peripheral artery disease)   I73.9 443.9       Disposition:   Disposition: Placed in Observation  Condition: Stable         Lily Robledo MD  09/17/24 1327

## 2024-09-14 NOTE — TELEMEDICINE CONSULT
[facility]  Vascular Neurology  Comprehensive Stroke Center  TeleVascular Neurology Acute Consultation Note        Consult Information  Consult to Telemedicine - Acute Stroke  Consult performed by: Deepika Jordan MD  Consult ordered by: Annabella Dumont FNP          Consulting Provider: RANDY MIJARES   Current Providers  No providers found    Patient Location:  [facility] EMERGENCY DEPARTMENT Emergency Department    Spoke hospital nurse at bedside with patient assisting consultant.  Patient information was obtained from patient and relative(s).       Stroke Documentation  Acute Stroke Times   Last Known Normal Date: 09/13/24  Last Known Normal Time: 2200  Unknown Symptom Onset Date: Unknown Date  Unknown Symptom Onset Time: Unknown Time  Stroke Team Called Date: 09/14/24  Stroke Team Called Time: 1039  Stroke Team Arrival Date: 09/14/24  Stroke Team Arrival Time: 1045 (Patient in scanner)  CT Interpretation Time: 1110  Thrombolytic Therapy Recommended: No    NIH Scale:  1a. Level of Consciousness: 0-->Alert, keenly responsive  1b. LOC Questions: 0-->Answers both questions correctly  1c. LOC Commands: 0-->Performs both tasks correctly  2. Best Gaze: 0-->Normal  3. Visual: 0-->No visual loss  4. Facial Palsy: 1-->Minor paralysis (flattened nasolabial fold, asymmetry on smiling)  5a. Motor Arm, Left: 0-->No drift, limb holds 90 (or 45) degrees for full 10 secs  5b. Motor Arm, Right: 0-->No drift, limb holds 90 (or 45) degrees for full 10 secs  6a. Motor Leg, Left: 0-->No drift, leg holds 30 degree position for full 5 secs  6b. Motor Leg, Right: 0-->No drift, leg holds 30 degree position for full 5 secs  7. Limb Ataxia: 0-->Absent  8. Sensory: 0-->Normal, no sensory loss  9. Best Language: 0-->No aphasia, normal  10. Dysarthria: 0-->Normal  11. Extinction and Inattention (formerly Neglect): 0-->No abnormality  Total (NIH Stroke Scale): 1      Modified Jose: Score: 0  Comptche Coma Scale:      ABCD2 Score:    [id number] Score:    HAS -BLED Score:    ICH Score:    Hunt & Strauss Classification:      Blood pressure (!) 192/78, pulse 62, temperature 98 °F (36.7 °C), temperature source Oral, resp. rate 20, weight 81.6 kg (180 lb), SpO2 99%.      In my opinion, this was a: Tier 2; VAN Stroke Assessment: Negative     Medical Decision Making  HPI:  79 y.o. female w/ PMHx of HLD, HTN, reported stroke, hypothyroidism, Hx of CABG, RA, Lupus who presents w L facial weakness and drooling.   LKW 22:00     Images personally reviewed and interpreted:  Study: Head CT  Study Interpretation: No acute intracranial abnormalities, specifically no early signs of ischemia and no intracranial hemorrhage.       Assessment and plan:  NIHSS 1.   OOW for TNK.   Recommend admission for stroke w/u.     Recommendations:  CTA Head & Neck ASAP to evaluate cervico-cephalic vasculature for high risk culprit vessel disease or large/medium vessel occlusion  MRI brain to evaluate for presence and/or extent of ischemic injury  Allow for permissive HTNsion up to 220/110 until AIS is r/o w/ imaging   Lipid profile, A1c, TSH  ECHO w/o bubble study  PT/OT/SLP eval and Rx  IVF to allow for maximum cerebral perfusion  HOB flat   Load aspirin 325 mg & clopidogrel 300 mg x 1 now, followed by daily aspirin 81 mg /  clopidogrel 75 mg x 30 days followed by monotherapy thereafter  High intensity statin for LDL goal <70 long term     Recommendations discussed w/ Dr. Robledo      Lytics recommendation: Thrombolytic therapy not recommended due to Outside of treatment window   Thrombectomy recommendation: No; at this time symptoms not suggestive of large vessel occlusion  Placement recommendation: pending further studies  admit to inpatient               Past Medical History:   Diagnosis Date    Acute coronary syndrome     Anxiety     Bilateral carotid artery stenosis 11/17/2015    Chronic pain     Colon polyp     Coronary artery disease     GERD  (gastroesophageal reflux disease)     Hyperlipidemia     Hypertension     Hypothyroidism     Low back pain     Lupus     Osteoporosis     Poor circulation     Pre-operative clearance 7/12/2019    PVD (peripheral vascular disease)     S/P peripheral artery angioplasty 02/13/2014    SCCA (squamous cell carcinoma) of skin 10/2019    Dr. ZANDRA Rivas--oncology    Skin disease      Past Surgical History:   Procedure Laterality Date    AORTOGRAPHY WITH SERIALOGRAPHY N/A 5/25/2021    Procedure: AORTOGRAM, WITH RUNOFF;  Surgeon: Christopher Cohen MD;  Location: [facility] CATH LAB;  Service: Cardiology;  Laterality: N/A;  COVID-19, mRNA, LNP-S, PF, 30 mcg/0.3 mL dose (COVID-19, MRNA, LN-S, PF (Pfizer)) 1/30/2021, 1/9/2021    Atherosclerotic PVD with intermittent claudication Bilateral 05/2021    Dr. Cohen    CATARACT EXTRACTION      COLONOSCOPY N/A 1/4/2017    Procedure: COLONOSCOPY;  Surgeon: Sylvester Arboleda III, MD;  Location: [facility] ENDO;  Service: Endoscopy;  Laterality: N/A;    COLONOSCOPY N/A 8/4/2020    Procedure: COLONOSCOPY;  Surgeon: Sylvester Arboleda III, MD;  Location: [facility] ENDO;  Service: Endoscopy;  Laterality: N/A;    CORONARY ANGIOPLASTY      CORONARY ARTERY BYPASS GRAFT      EXCISION OF MASS OF HAND Right 2/11/2022    Procedure: EXCISION, MASS, HAND;  Surgeon: Hernan Culver MD;  Location: [facility] OR;  Service: Orthopedics;  Laterality: Right;   thenar eminence skin lesion excised and a Z-plasty skin flap for coverage    HYSTERECTOMY      OOPHORECTOMY      THYROIDECTOMY       Family History   Problem Relation Name Age of Onset    Hypertension Mother      Stroke Mother      Lung cancer Daughter      Melanoma Neg Hx      Psoriasis Neg Hx      Lupus Neg Hx      Eczema Neg Hx         Diagnoses  No problems updated.    Deepika Jordan MD      Emergent/Acute neurological consultation requested by spoke provider due to critical concerns for possible cerebrovascular event that could result in permanent loss of  neurologic/bodily function, severe disability or death of this patient.  Immediate/timely evaluation by a highly prepared expert is paramount for optimal outcomes  High risk for neurological deterioration if not properly diagnosed  High risk for neurological deterioration if not treated promplty/as soon as possible  Complex diagnostic evaluation may be required (advanced imaging)  High risk treatment options (thrombolytics and/or thrombectomy)    Patient care was coordinated with spoke provider, including but not limted to    Discussing likely diagnosis/etiology of symptoms  Making recommendations for further diagnostic studies  Making recommendations for intravenous thrombolytics or other advanced therapies  Making recommendations for disposition (admission/transfer for higher level of care)

## 2024-09-14 NOTE — ASSESSMENT & PLAN NOTE
Chronic, uncontrolled. Latest blood pressure and vitals reviewed-     Temp:  [98 °F (36.7 °C)]   Pulse:  [60-72]   Resp:  [20]   BP: (173-202)/(71-98)   SpO2:  [99 %-100 %] .   Home meds for hypertension were reviewed and noted below.   Hypertension Medications               amLODIPine (NORVASC) 10 MG tablet TAKE 1 TABLET(10 MG) BY MOUTH EVERY DAY    hydrALAZINE (APRESOLINE) 50 MG tablet Take 1 tablet (50 mg total) by mouth every 12 (twelve) hours.    valsartan (DIOVAN) 320 MG tablet TAKE 1 TABLET(320 MG) BY MOUTH EVERY DAY            While in the hospital, will manage blood pressure as follows; Adjust home antihypertensive regimen as follows- will hold antihypertensives until MRI confirms or denies CVA.     Will utilize p.r.n. blood pressure medication only if patient's blood pressure greater than  if MRI positive for CVA, will allow permissive HTN and treat BP if > 220/120. If MRI negative, will resume her antihypertensives and give PRN med if BP >170/100  and she develops symptoms such as worsening chest pain or shortness of breath.

## 2024-09-14 NOTE — ED NOTES
CODE stroke called. Spoke with mamie at the transfer center at 1037 and Dr. Russell accepted 1039. Patient on the way to CT scan

## 2024-09-14 NOTE — ASSESSMENT & PLAN NOTE
79 year old female placed in observation for suspected CVA. CT head negative for acute intracranial process. Negative for hemorrhage, or skull fracture. Cerebral atrophy noted.  Intracranial atherosclerotic disease noted.  Small vessel ischemic changes noted.  Stable right thalamic lacunar infarct. She has slight facial droop to right side of face. No other deficits.     -Obtain MRI brain.  -Obtain carotid US and ECHO.  -If MRI brain positive, will allow permissive HTN, treat BP if >220/120. Will also get PT/OT/SLP to evaluate.   -If MRI brain negative, will resume home antihypertensives.   -Neuro checks

## 2024-09-14 NOTE — ASSESSMENT & PLAN NOTE
Lab Results   Component Value Date    TSH 0.771 09/14/2024    TSH 1.267 07/29/2024    TSH 1.711 12/12/2023      -Continue levothyroxine

## 2024-09-14 NOTE — ASSESSMENT & PLAN NOTE
Patient's COPD is controlled currently.  Patient is currently off COPD Pathway. Continue scheduled inhalers  supplemental oxygen PRN  and monitor respiratory status closely.

## 2024-09-14 NOTE — HPI
Flory Cam is a 79 year old female with a PMHx of ACS, CABG, Tobacco use, CVA, GERD, CAD, HLD, Hypothyroidism, Lupus, Osteoporosis, and PVD who presented to the ED with c/o right side facial droop/twitching. Onset at 8AM this morning. Associated symptoms include: generalized bodyaches. ED workup shwoed: CBC/CMP unremarkable, TSH 0.771. CT head negative for acute intracranial process. Negative for hemorrhage, or skull fracture. Cerebral atrophy noted.  Intracranial atherosclerotic disease noted.  Small vessel ischemic changes noted.  Stable right thalamic lacunar infarct. Mild paranasal sinus disease in distribution described above. She was hypertensive upon arrival but patient reports she had not taken her BP medications today. Pt placed in observation for CVA rule out.

## 2024-09-14 NOTE — SUBJECTIVE & OBJECTIVE
HPI:  79 y.o. female w/ PMHx of HLD, HTN, reported stroke, hypothyroidism, Hx of CABG, RA, Lupus who presents w L facial weakness and drooling.   LKW 22:00     Images personally reviewed and interpreted:  Study: Head CT  Study Interpretation: No acute intracranial abnormalities, specifically no early signs of ischemia and no intracranial hemorrhage.       Assessment and plan:  NIHSS 1.   OOW for TNK.   Recommend admission for stroke w/u.     Recommendations:  CTA Head & Neck ASAP to evaluate cervico-cephalic vasculature for high risk culprit vessel disease or large/medium vessel occlusion  MRI brain to evaluate for presence and/or extent of ischemic injury  Allow for permissive HTNsion up to 220/110 until AIS is r/o w/ imaging   Lipid profile, A1c, TSH  ECHO w/o bubble study  PT/OT/SLP eval and Rx  IVF to allow for maximum cerebral perfusion  HOB flat   Load aspirin 325 mg & clopidogrel 300 mg x 1 now, followed by daily aspirin 81 mg /  clopidogrel 75 mg x 30 days followed by monotherapy thereafter  High intensity statin for LDL goal <70 long term     Recommendations discussed w/ Dr. Robledo      Lytics recommendation: Thrombolytic therapy not recommended due to Outside of treatment window   Thrombectomy recommendation: No; at this time symptoms not suggestive of large vessel occlusion  Placement recommendation: pending further studies  admit to inpatient

## 2024-09-14 NOTE — PLAN OF CARE
Discussed case w/ Dr. Robledo at 10:40 via [employer]. Patient is not back from [facility], requested to be contacted once she is back in the room.     Deepika Jordan MD  Vascular Neurology

## 2024-09-14 NOTE — H&P
[facility]  [facility] Medicine  History & Physical    Patient Name: Flory Cam  MRN: [id number]  Patient Class: OP- Observation  Admission Date: 9/14/2024  Attending Physician: Trina Naqvi MD   Primary Care Provider: Tayler Ovalle DO         Patient information was obtained from patient and ER records.     Subjective:     Principal Problem:Suspected cerebrovascular accident (CVA)    Chief Complaint:   Chief Complaint   Patient presents with    Facial Droop     Pt states she woke up this morning and noticed her face drooping on left side at 0800. Hx of stroke and TIA. Pt also c/o generalized pain.         HPI: Flory Cam is a 79 year old female with a PMHx of ACS, CABG, Tobacco use, CVA, GERD, CAD, HLD, Hypothyroidism, Lupus, Osteoporosis, and PVD who presented to the ED with c/o right side facial droop/twitching. Onset at 8AM this morning. Associated symptoms include: generalized bodyaches. ED workup shwoed: CBC/CMP unremarkable, TSH 0.771. CT head negative for acute intracranial process. Negative for hemorrhage, or skull fracture. Cerebral atrophy noted.  Intracranial atherosclerotic disease noted.  Small vessel ischemic changes noted.  Stable right thalamic lacunar infarct. Mild paranasal sinus disease in distribution described above. She was hypertensive upon arrival but patient reports she had not taken her BP medications today. Pt placed in observation for CVA rule out.     Past Medical History:   Diagnosis Date    Acute coronary syndrome     Anxiety     Bilateral carotid artery stenosis 11/17/2015    Chronic pain     Colon polyp     Coronary artery disease     GERD (gastroesophageal reflux disease)     Hyperlipidemia     Hypertension     Hypothyroidism     Low back pain     Lupus     Osteoporosis     Poor circulation     Pre-operative clearance 7/12/2019    PVD (peripheral vascular disease)     S/P peripheral artery angioplasty 02/13/2014    SCCA (squamous cell  carcinoma) of skin 10/2019    Dr. ZANDRA Rivas--oncology    Skin disease        Past Surgical History:   Procedure Laterality Date    AORTOGRAPHY WITH SERIALOGRAPHY N/A 5/25/2021    Procedure: AORTOGRAM, WITH RUNOFF;  Surgeon: Christopher Cohen MD;  Location: [facility] CATH LAB;  Service: Cardiology;  Laterality: N/A;  COVID-19, mRNA, LNP-S, PF, 30 mcg/0.3 mL dose (COVID-19, MRNA, LN-S, PF (Pfizer)) 1/30/2021, 1/9/2021    Atherosclerotic PVD with intermittent claudication Bilateral 05/2021    Dr. Cohen    CATARACT EXTRACTION      COLONOSCOPY N/A 1/4/2017    Procedure: COLONOSCOPY;  Surgeon: Sylvester Arboleda III, MD;  Location: [facility] ENDO;  Service: Endoscopy;  Laterality: N/A;    COLONOSCOPY N/A 8/4/2020    Procedure: COLONOSCOPY;  Surgeon: Sylvester Arboleda III, MD;  Location: [facility] ENDO;  Service: Endoscopy;  Laterality: N/A;    CORONARY ANGIOPLASTY      CORONARY ARTERY BYPASS GRAFT      EXCISION OF MASS OF HAND Right 2/11/2022    Procedure: EXCISION, MASS, HAND;  Surgeon: Hernan Culver MD;  Location: [facility] OR;  Service: Orthopedics;  Laterality: Right;   thenar eminence skin lesion excised and a Z-plasty skin flap for coverage    HYSTERECTOMY      OOPHORECTOMY      THYROIDECTOMY         Review of patient's allergies indicates:   Allergen Reactions    Methotrexate analogues     Cellcept [mycophenolate mofetil] Rash    Imuran [azathioprine] Rash       No current facility-administered medications on file prior to encounter.     Current Outpatient Medications on File Prior to Encounter   Medication Sig    albuterol (PROVENTIL/VENTOLIN HFA) 90 mcg/actuation inhaler Inhale 2 puffs into the lungs every 6 (six) hours as needed for Wheezing.    albuterol-ipratropium (DUO-NEB) 2.5 mg-0.5 mg/3 mL nebulizer solution Take 3 mLs by nebulization every 6 (six) hours as needed for Wheezing. Rescue    amLODIPine (NORVASC) 10 MG tablet TAKE 1 TABLET(10 MG) BY MOUTH EVERY DAY    aspirin (ECOTRIN) 81 MG EC tablet Take 1 tablet (81 mg  total) by mouth once daily.    atorvastatin (LIPITOR) 80 MG tablet Take 1 tablet (80 mg total) by mouth once daily.    clopidogreL (PLAVIX) 75 mg tablet Take 1 tablet (75 mg total) by mouth once daily.    ergocalciferol (ERGOCALCIFEROL) 50,000 unit Cap TAKE 1 CAPSULE BY MOUTH EVERY 7 DAYS    ezetimibe (ZETIA) 10 mg tablet TAKE 1 TABLET(10 MG) BY MOUTH EVERY DAY    gabapentin (NEURONTIN) 100 MG capsule Take 1 capsule (100 mg total) by mouth every evening for 7 days, THEN 1 capsule (100 mg total) 2 (two) times daily for 7 days, THEN 1 capsule (100 mg total) 3 (three) times daily. (Patient not taking: Reported on 8/16/2024)    hydrALAZINE (APRESOLINE) 50 MG tablet Take 1 tablet (50 mg total) by mouth every 12 (twelve) hours.    hydrOXYzine HCL (ATARAX) 25 MG tablet Take 1 tablet (25 mg total) by mouth 3 (three) times daily as needed for Anxiety.    levothyroxine (SYNTHROID) 137 MCG Tab tablet Take 1 tablet (137 mcg total) by mouth before breakfast.    pregabalin (LYRICA) 25 MG capsule Take 1 capsule (25 mg total) by mouth nightly as needed.    traZODone (DESYREL) 50 MG tablet TAKE 1 TABLET BY MOUTH EVERY DAY AT NIGHT AS NEEDED FOR INSOMNIA    valsartan (DIOVAN) 320 MG tablet TAKE 1 TABLET(320 MG) BY MOUTH EVERY DAY     Family History       Problem Relation (Age of Onset)    Hypertension Mother    Lung cancer Daughter    Stroke Mother          Tobacco Use    Smoking status: Some Days     Current packs/day: 0.50     Average packs/day: 0.5 packs/day for 60.7 years (30.4 ttl pk-yrs)     Types: Cigarettes     Start date: 1965    Smokeless tobacco: Never   Substance and Sexual Activity    Alcohol use: Never    Drug use: No    Sexual activity: Not Currently     Partners: Male     Review of Systems   Constitutional:  Positive for activity change. Negative for chills and fever.   HENT:  Negative for trouble swallowing.    Eyes:  Negative for visual disturbance.   Respiratory:  Negative for cough, choking, chest tightness and  shortness of breath.    Cardiovascular:  Negative for chest pain.   Gastrointestinal:  Negative for abdominal pain, constipation, diarrhea, nausea and vomiting.   Genitourinary:  Negative for difficulty urinating.   Musculoskeletal:  Positive for arthralgias.   Skin:  Negative for color change.   Neurological:  Positive for facial asymmetry. Negative for speech difficulty, weakness, light-headedness, numbness and headaches.   Psychiatric/Behavioral:  Negative for agitation and behavioral problems.      Objective:     Vital Signs (Most Recent):  Temp: 98 °F (36.7 °C) (09/14/24 1032)  Pulse: 62 (09/14/24 1237)  Resp: 20 (09/14/24 1048)  BP: (!) 192/78 (09/14/24 1302)  SpO2: 99 % (09/14/24 1218) Vital Signs (24h Range):  Temp:  [98 °F (36.7 °C)] 98 °F (36.7 °C)  Pulse:  [60-72] 62  Resp:  [20] 20  SpO2:  [99 %-100 %] 99 %  BP: (173-202)/(71-98) 192/78     Weight: 81.6 kg (180 lb)  Body mass index is 29.05 kg/m².     Physical Exam  Vitals reviewed.   Constitutional:       General: She is not in acute distress.     Appearance: She is not ill-appearing.   HENT:      Head: Normocephalic.      Mouth/Throat:      Mouth: Mucous membranes are moist.   Eyes:      Extraocular Movements: Extraocular movements intact.   Cardiovascular:      Rate and Rhythm: Normal rate.      Pulses: Normal pulses.   Pulmonary:      Effort: Pulmonary effort is normal. No respiratory distress.      Breath sounds: Normal breath sounds.   Abdominal:      General: Bowel sounds are normal. There is no distension.      Palpations: Abdomen is soft.      Tenderness: There is no abdominal tenderness.   Genitourinary:     Comments: deferred  Musculoskeletal:         General: Normal range of motion.      Cervical back: Normal range of motion.      Right lower leg: No edema.      Left lower leg: No edema.   Skin:     General: Skin is warm and dry.   Neurological:      Mental Status: She is alert and oriented to person, place, and time. Mental status is at  baseline.   Psychiatric:         Mood and Affect: Mood normal.         Behavior: Behavior normal.                Significant Labs: All pertinent labs within the past 24 hours have been reviewed.  CBC:   Recent Labs   Lab 09/14/24  1103   WBC 8.08   HGB 13.3   HCT 40.0        CMP:   Recent Labs   Lab 09/14/24  1103      K 4.0      CO2 26   GLU 98   BUN 13   CREATININE 0.7   CALCIUM 9.3   PROT 6.9   ALBUMIN 3.5   BILITOT 0.5   ALKPHOS 127   AST 21   ALT 17   ANIONGAP 10     Coagulation:   Recent Labs   Lab 09/14/24  1103   INR 0.9     POCT Glucose:   Recent Labs   Lab 09/14/24  1037   POCTGLUCOSE 100     TSH:   Recent Labs   Lab 09/14/24  1103   TSH 0.771       Significant Imaging:   Imaging Results              CT Head Without Contrast (Edited Result - FINAL)  Result time 09/14/24 11:13:42      Addendum (preliminary) 1 of 1 by Hang Elmore MD (09/14/24 11:13:42)      A call report was made to Dr. Robledo at 10:45 hours on October 14, 2024      Electronically signed by: Hang Elmore MD  Date:    09/14/2024  Time:    11:13                 Final result by Hang Elmore MD (09/14/24 10:56:07)                   Impression:      1.  Negative for acute intracranial process. Negative for hemorrhage, or skull fracture.    2.  Cerebral atrophy noted.  Intracranial atherosclerotic disease noted.  Small vessel ischemic changes noted.  Stable right thalamic lacunar infarct.    3.  Mild paranasal sinus disease in distribution described above.    The 4.  Other stable findings as noted above.    All CT scans at this facility are performed  using dose modulation techniques as appropriate to performed exam including the following:  automated exposure control; adjustment of mA and/or kV according to the patients size (this includes techniques or standardized protocols for targeted exams where dose is matched to indication/reason for exam: i.e. extremities or head);  iterative reconstruction  technique.      Electronically signed by: Hang Elmore MD  Date:    09/14/2024  Time:    10:56               Narrative:    EXAMINATION:  CT HEAD WITHOUT CONTRAST    CLINICAL HISTORY:  Neuro deficit, acute, stroke suspected;    TECHNIQUE:  Axial images through the brain and posterior fossa were obtained without the use of IV contrast.  Sagittal and coronal reconstructions are provided for review.    COMPARISON:  December 12, 2023    FINDINGS:  The ventricles are midline and the CSF spaces are prominent.  The gray-white matter junction is well preserved. Negative for intracranial vascular abnormalities. Negative for mass, mass effect, cerebral edema, hemorrhage or abnormal fluid collections.  Intracranial atherosclerotic disease noted.  Small vessel ischemic changes noted.  Right thalamic lacunar infarct again seen.  Falx calcifications.    The skull and scalp are  intact.  Redemonstration of a small amount of fluid within the right mastoid air cells and debris within the right ear canal.  Mild ethmoid air cell mucoperiosteal thickening.  Posterior right ethmoid air cell osteoma.  Right sphenoid sinus mucoperiosteal thickening.  The rest of the paranasal sinuses, mastoid air cells, middle ears and ear canals are clear. The globes are intact.                                     Assessment/Plan:     * Suspected cerebrovascular accident (CVA)  79 year old female placed in observation for suspected CVA. CT head negative for acute intracranial process. Negative for hemorrhage, or skull fracture. Cerebral atrophy noted.  Intracranial atherosclerotic disease noted.  Small vessel ischemic changes noted.  Stable right thalamic lacunar infarct. She has slight facial droop to right side of face. No other deficits.     -Obtain MRI brain.  -Obtain carotid US and ECHO.  -If MRI brain positive, will allow permissive HTN, treat BP if >220/120. Will also get PT/OT/SLP to evaluate.   -If MRI brain negative, will resume home  antihypertensives.   -Neuro checks       Chronic obstructive pulmonary disease  Patient's COPD is controlled currently.  Patient is currently off COPD Pathway. Continue scheduled inhalers  supplemental oxygen PRN  and monitor respiratory status closely.     Hypothyroidism (acquired)  Lab Results   Component Value Date    TSH 0.771 09/14/2024    TSH 1.267 07/29/2024    TSH 1.711 12/12/2023      -Continue levothyroxine    Essential hypertension  Chronic, uncontrolled. Latest blood pressure and vitals reviewed-     Temp:  [98 °F (36.7 °C)]   Pulse:  [60-72]   Resp:  [20]   BP: (173-202)/(71-98)   SpO2:  [99 %-100 %] .   Home meds for hypertension were reviewed and noted below.   Hypertension Medications               amLODIPine (NORVASC) 10 MG tablet TAKE 1 TABLET(10 MG) BY MOUTH EVERY DAY    hydrALAZINE (APRESOLINE) 50 MG tablet Take 1 tablet (50 mg total) by mouth every 12 (twelve) hours.    valsartan (DIOVAN) 320 MG tablet TAKE 1 TABLET(320 MG) BY MOUTH EVERY DAY            While in the hospital, will manage blood pressure as follows; Adjust home antihypertensive regimen as follows- will hold antihypertensives until MRI confirms or denies CVA.     Will utilize p.r.n. blood pressure medication only if patient's blood pressure greater than  if MRI positive for CVA, will allow permissive HTN and treat BP if > 220/120. If MRI negative, will resume her antihypertensives and give PRN med if BP >170/100  and she develops symptoms such as worsening chest pain or shortness of breath.    Hyperlipidemia LDL goal <70  Lab Results   Component Value Date    CHOL 135 07/29/2024    HDL 45 07/29/2024    LDLCALC 74.8 07/29/2024    TRIG 76 07/29/2024     -Continue statin        VTE Risk Mitigation (From admission, onward)           Ordered     IP VTE HIGH RISK PATIENT  Once         09/14/24 1340     Place sequential compression device  Until discontinued         09/14/24 1340                              Annabella Dumont,  FNP  Department of [facility] Medicine  Formerly [facility] - Emergency Dept.

## 2024-09-14 NOTE — ASSESSMENT & PLAN NOTE
Lab Results   Component Value Date    CHOL 135 07/29/2024    HDL 45 07/29/2024    LDLCALC 74.8 07/29/2024    TRIG 76 07/29/2024     -Continue statin

## 2024-09-15 VITALS
BODY MASS INDEX: 28.91 KG/M2 | HEART RATE: 62 BPM | WEIGHT: 179.88 LBS | TEMPERATURE: 98 F | DIASTOLIC BLOOD PRESSURE: 62 MMHG | OXYGEN SATURATION: 97 % | RESPIRATION RATE: 18 BRPM | SYSTOLIC BLOOD PRESSURE: 139 MMHG | HEIGHT: 66 IN

## 2024-09-15 LAB
AORTIC ROOT ANNULUS: 3.13 CM
ASCENDING AORTA: 2.89 CM
AV INDEX (PROSTH): 0.95
AV MEAN GRADIENT: 5 MMHG
AV PEAK GRADIENT: 12 MMHG
AV VALVE AREA BY VELOCITY RATIO: 2.25 CM²
AV VALVE AREA: 2.49 CM²
AV VELOCITY RATIO: 0.85
BSA FOR ECHO PROCEDURE: 1.95 M2
CV ECHO LV RWT: 0.46 CM
DOP CALC AO PEAK VEL: 1.72 M/S
DOP CALC AO VTI: 34.3 CM
DOP CALC LVOT AREA: 2.6 CM2
DOP CALC LVOT DIAMETER: 1.83 CM
DOP CALC LVOT PEAK VEL: 1.47 M/S
DOP CALC LVOT STROKE VOLUME: 85.44 CM3
DOP CALC RVOT PEAK VEL: 0.93 M/S
DOP CALC RVOT VTI: 15.3 CM
DOP CALCLVOT PEAK VEL VTI: 32.5 CM
E WAVE DECELERATION TIME: 323.14 MSEC
E/A RATIO: 1.08
E/E' RATIO: 9.33 M/S
ECHO LV POSTERIOR WALL: 1.2 CM (ref 0.6–1.1)
ESTIMATED AVG GLUCOSE: 117 MG/DL (ref 68–131)
FRACTIONAL SHORTENING: 31 % (ref 28–44)
HBA1C MFR BLD: 5.7 % (ref 4–5.6)
INTERVENTRICULAR SEPTUM: 1.44 CM (ref 0.6–1.1)
IVC DIAMETER: 2.12 CM
IVRT: 97.05 MSEC
LA MAJOR: 5.89 CM
LA MINOR: 6.13 CM
LA WIDTH: 4.5 CM
LEFT ATRIUM SIZE: 4.64 CM
LEFT ATRIUM VOLUME INDEX: 55.8 ML/M2
LEFT ATRIUM VOLUME: 106.62 CM3
LEFT INTERNAL DIMENSION IN SYSTOLE: 3.56 CM (ref 2.1–4)
LEFT VENTRICLE DIASTOLIC VOLUME INDEX: 67.54 ML/M2
LEFT VENTRICLE DIASTOLIC VOLUME: 129 ML
LEFT VENTRICLE MASS INDEX: 149 G/M2
LEFT VENTRICLE SYSTOLIC VOLUME INDEX: 27.8 ML/M2
LEFT VENTRICLE SYSTOLIC VOLUME: 53.12 ML
LEFT VENTRICULAR INTERNAL DIMENSION IN DIASTOLE: 5.19 CM (ref 3.5–6)
LEFT VENTRICULAR MASS: 283.69 G
LV LATERAL E/E' RATIO: 7.64 M/S
LV SEPTAL E/E' RATIO: 12 M/S
LVED V (TEICH): 129 ML
LVES V (TEICH): 53.12 ML
LVOT MG: 4 MMHG
LVOT MV: 0.95 CM/S
MV PEAK A VEL: 0.78 M/S
MV PEAK E VEL: 0.84 M/S
OHS CV RV/LV RATIO: 0.84 CM
OHS QRS DURATION: 140 MS
OHS QTC CALCULATION: 446 MS
PISA MRMAX VEL: 6 M/S
PISA TR MAX VEL: 2.7 M/S
PV MEAN GRADIENT: 2 MMHG
PV MV: 0.82 M/S
PV PEAK GRADIENT: 6 MMHG
PV PEAK VELOCITY: 1.26 M/S
RA MAJOR: 5.04 CM
RA PRESSURE ESTIMATED: 8 MMHG
RA WIDTH: 2.9 CM
RIGHT VENTRICULAR END-DIASTOLIC DIMENSION: 4.34 CM
RV TB RVSP: 11 MMHG
RV TISSUE DOPPLER FREE WALL SYSTOLIC VELOCITY 1 (APICAL 4 CHAMBER VIEW): 8.28 CM/S
STJ: 2.16 CM
TDI LATERAL: 0.11 M/S
TDI SEPTAL: 0.07 M/S
TDI: 0.09 M/S
TR MAX PG: 29 MMHG
TV REST PULMONARY ARTERY PRESSURE: 37 MMHG
Z-SCORE OF LEFT VENTRICULAR DIMENSION IN END DIASTOLE: -0.35
Z-SCORE OF LEFT VENTRICULAR DIMENSION IN END SYSTOLE: 0.57

## 2024-09-15 PROCEDURE — G0378 HOSPITAL OBSERVATION PER HR: HCPCS | Mod: HCNC

## 2024-09-15 PROCEDURE — 25000242 PHARM REV CODE 250 ALT 637 W/ HCPCS: Mod: HCNC | Performed by: INTERNAL MEDICINE

## 2024-09-15 PROCEDURE — 25000003 PHARM REV CODE 250: Mod: HCNC | Performed by: NURSE PRACTITIONER

## 2024-09-15 PROCEDURE — 99900035 HC TECH TIME PER 15 MIN (STAT): Mod: HCNC

## 2024-09-15 PROCEDURE — 94640 AIRWAY INHALATION TREATMENT: CPT | Mod: HCNC

## 2024-09-15 PROCEDURE — 94761 N-INVAS EAR/PLS OXIMETRY MLT: CPT | Mod: HCNC

## 2024-09-15 PROCEDURE — 25000003 PHARM REV CODE 250: Mod: HCNC | Performed by: INTERNAL MEDICINE

## 2024-09-15 RX ORDER — IPRATROPIUM BROMIDE AND ALBUTEROL SULFATE 2.5; .5 MG/3ML; MG/3ML
3 SOLUTION RESPIRATORY (INHALATION) EVERY 6 HOURS
Status: DISCONTINUED | OUTPATIENT
Start: 2024-09-15 | End: 2024-09-15 | Stop reason: HOSPADM

## 2024-09-15 RX ADMIN — VALSARTAN 320 MG: 160 TABLET, FILM COATED ORAL at 09:09

## 2024-09-15 RX ADMIN — ATORVASTATIN CALCIUM 80 MG: 40 TABLET, FILM COATED ORAL at 09:09

## 2024-09-15 RX ADMIN — HYDRALAZINE HYDROCHLORIDE 50 MG: 50 TABLET ORAL at 09:09

## 2024-09-15 RX ADMIN — AMLODIPINE BESYLATE 10 MG: 10 TABLET ORAL at 09:09

## 2024-09-15 RX ADMIN — TRAZODONE HYDROCHLORIDE 50 MG: 50 TABLET ORAL at 02:09

## 2024-09-15 RX ADMIN — CLOPIDOGREL BISULFATE 75 MG: 75 TABLET ORAL at 09:09

## 2024-09-15 RX ADMIN — ASPIRIN 81 MG: 81 TABLET, COATED ORAL at 09:09

## 2024-09-15 RX ADMIN — ACETAMINOPHEN 650 MG: 325 TABLET ORAL at 04:09

## 2024-09-15 RX ADMIN — EZETIMIBE 10 MG: 10 TABLET ORAL at 09:09

## 2024-09-15 RX ADMIN — ACETAMINOPHEN 650 MG: 325 TABLET ORAL at 02:09

## 2024-09-15 RX ADMIN — LEVOTHYROXINE SODIUM 137 MCG: 0.11 TABLET ORAL at 06:09

## 2024-09-15 RX ADMIN — IPRATROPIUM BROMIDE AND ALBUTEROL SULFATE 3 ML: 2.5; .5 SOLUTION RESPIRATORY (INHALATION) at 02:09

## 2024-09-15 NOTE — PLAN OF CARE
[facility] - Telemetry (Hospital)  Discharge Final Note    Primary Care Provider: Tayler Ovalle DO    Expected Discharge Date: 9/15/2024    Final Discharge Note (most recent)       Final Note - 09/15/24 1506          Final Note    Assessment Type Final Discharge Note (P)      Anticipated Discharge Disposition Home-Health Care c (P)    [facility]       Post-Acute Status    Post-Acute Authorization Home Health (P)      Home Health Status Referrals Sent (P)      Discharge Delays None known at this time (P)                      Important Message from Medicare             Contact Info       Tayler Ovalle DO   Specialty: Internal Medicine   Relationship: PCP - General    [address] [place] [address] [address] [place] [place] [address]   Phone: [phone]       Next Steps: Schedule an appointment as soon as possible for a visit in 3 day(s)    [facility] VASCULAR NEUROLOGY   Specialty: Vascular Neurology    [address] [facility] [address]   Phone: [phone]       Next Steps: Schedule an appointment as soon as possible for a visit    Andie Macias MD   Specialty: Vascular Surgery    [address] [facility]  [place] [address]   Phone: [phone]       Next Steps: Schedule an appointment as soon as possible for a visit    Instructions: followup carotid stenosis, referral sent          Discharge orders are in.  No other orders for either DME or services.  Jess of [facility] called to say they accept patient for  services.  No needs or discharge delays.

## 2024-09-15 NOTE — PROGRESS NOTES
AVS virtually reviewed with Ms Cam and her son Prosper in its entirety with emphasis on diet, medications, follow-up appointments and reasons to return to the ED or contact the [facility] On Call Nurse Care Line. Patient also encouraged to utilize their patient portal. Ease and convenience of use reiterated. Education complete and patient voiced understanding. All questions answered. Discharge teaching complete. Transport requested. SonProsper added to pt contacts as requested.

## 2024-09-15 NOTE — DISCHARGE SUMMARY
[facility] ([facility] Medicine  Discharge Summary      Patient Name: Flory Cam  MRN: [id number]  MARIZA: [id number]  Patient Class: OP- Observation  Admission Date: 9/14/2024  Hospital Length of Stay: 1days  Discharge Date and Time:  09/15/2024 4:22 PM  Attending Physician: Trina Naqvi MD   Discharging Provider: Trina Naqvi MD  Primary Care Provider: Tayler Ovalle DO    Primary Care Team: Networked reference to record PCT     HPI:   Flory Cam is a 79 year old female with a PMHx of ACS, CABG, Tobacco use, CVA, GERD, CAD, HLD, Hypothyroidism, Lupus, Osteoporosis, and PVD who presented to the ED with c/o right side facial droop/twitching. Onset at 8AM this morning. Associated symptoms include: generalized bodyaches. ED workup shwoed: CBC/CMP unremarkable, TSH 0.771. CT head negative for acute intracranial process. Negative for hemorrhage, or skull fracture. Cerebral atrophy noted.  Intracranial atherosclerotic disease noted.  Small vessel ischemic changes noted.  Stable right thalamic lacunar infarct. Mild paranasal sinus disease in distribution described above. She was hypertensive upon arrival but patient reports she had not taken her BP medications today. Pt placed in observation for CVA rule out.     * No surgery found *      Hospital Course:   Vascular neuro consulted   Pt had no further recurrence of R facial droop, no focal neurologic deficits on exam.   MRI Brain was negative for acute infarct. CTA Head/Neck showed no large vessel occlusion but showed greater than 50% diameter stenosis involving the proximal cervical ICAs bilaterally and significant atherosclerotic plaque involving b/l ICA, origin of L vertebral artery, and L subclvian arteries. TTE showed EF 55-60%, normal diastolic function, CVP 8, negative for shunt.   Lipid panel with LDL 71, A1c 5.7. TSH wnl.   She will continue home ASA, Plavix, statin, Zetia.   Will send amb referral to vascular neurology  and vascular surgery on discharge for management of carotid disease noted above.   Smoking cessation counseled.   Pt seen and examined on day of discharge and appears stable for discharge with HH PT/OT per my exam. Pt was set up with [facility] prior to discharge. Followup with PCP within 3-5 days.      Goals of Care Treatment Preferences:  Code Status: Full Code         Consults:   Consults (From admission, onward)          Status Ordering Provider     Inpatient consult to Social Work  Once        Provider:  (Not yet assigned)    Acknowledged TREVOR ORTEGA     Consult to Telemedicine - Acute Stroke  Once        Provider:  Deepika Jordan MD    Completed LEE JOY            No new Assessment & Plan notes have been filed under this hospital service since the last note was generated.  Service: Hospital Medicine    Final Active Diagnoses:    Diagnosis Date Noted POA    PRINCIPAL PROBLEM:  Suspected cerebrovascular accident (CVA) [R09.89] 09/14/2024 Unknown    Chronic obstructive pulmonary disease [J44.9] 12/20/2017 Yes    Hypothyroidism (acquired) [E03.9] 09/30/2013 Yes    Hyperlipidemia LDL goal <70 [E78.5] 09/30/2013 Yes     Chronic    Essential hypertension [I10] 09/30/2013 Yes      Problems Resolved During this Admission:       Discharged Condition: good    Disposition: [facility]-Health Care [facility]    Follow Up:   Follow-up Information       Tayler Ovalle DO. Schedule an appointment as soon as possible for a visit in 3 day(s).    Specialty: Internal Medicine  Contact information:  [address] [place] [address]  [phone]               [facility] VASCULAR NEUROLOGY. Schedule an appointment as soon as possible for a visit.    Specialty: Vascular Neurology  Contact information:  [address]  [facility] [address]  [phone]             Andie Macias MD. Schedule an appointment as soon as possible for a visit.    Specialty: Vascular Surgery  Why: followup carotid stenosis,  referral sent  Contact information:  [address]  [place] [address]  [phone]                           Patient Instructions:      Ambulatory referral/consult to Vascular Neurology   Standing Status: Future   Referral Priority: Routine Referral Type: Consultation   Referral Reason: Specialty Services Required   Requested Specialty: Vascular Neurology   Number of Visits Requested: 1     Ambulatory referral/consult to Home Health   Standing Status: Future   Referral Priority: Routine Referral Type: Home Health   Referral Reason: Specialty Services Required   Requested Specialty: Home Health Services   Number of Visits Requested: 1     Ambulatory referral/consult to Vascular Surgery   Standing Status: Future   Referral Priority: Routine Referral Type: Consultation   Referral Reason: Specialty Services Required   Requested Specialty: Vascular Surgery   Number of Visits Requested: 1     Ambulatory referral/consult to [facility]   Standing Status: Future   Referral Priority: Routine Referral Type: Consultation   Referral Reason: Specialty Services Required   Number of Visits Requested: 1     Diet Cardiac     Notify your health care provider if you experience any of the following:  increased confusion or weakness     Notify your health care provider if you experience any of the following:  persistent dizziness, light-headedness, or visual disturbances     Activity as tolerated       Significant Diagnostic Studies: See Hospital Course     Pending Diagnostic Studies:       None           Medications:  Reconciled Home Medications:      Medication List        CONTINUE taking these medications      albuterol 90 mcg/actuation inhaler  Commonly known as: PROVENTIL/VENTOLIN HFA  Inhale 2 puffs into the lungs every 6 (six) hours as needed for Wheezing.     albuterol-ipratropium 2.5 mg-0.5 mg/3 mL nebulizer solution  Commonly known as: DUO-NEB  Take 3 mLs by nebulization every 6 (six) hours as  needed for Wheezing. Rescue     amLODIPine 10 MG tablet  Commonly known as: NORVASC  TAKE 1 TABLET(10 MG) BY MOUTH EVERY DAY     aspirin 81 MG EC tablet  Commonly known as: ECOTRIN  Take 1 tablet (81 mg total) by mouth once daily.     atorvastatin 80 MG tablet  Commonly known as: LIPITOR  Take 1 tablet (80 mg total) by mouth once daily.     clopidogreL 75 mg tablet  Commonly known as: PLAVIX  Take 1 tablet (75 mg total) by mouth once daily.     ergocalciferol 50,000 unit Cap  Commonly known as: ERGOCALCIFEROL  TAKE 1 CAPSULE BY MOUTH EVERY 7 DAYS     ezetimibe 10 mg tablet  Commonly known as: ZETIA  TAKE 1 TABLET(10 MG) BY MOUTH EVERY DAY     gabapentin 100 MG capsule  Commonly known as: NEURONTIN  Take 1 capsule (100 mg total) by mouth every evening for 7 days, THEN 1 capsule (100 mg total) 2 (two) times daily for 7 days, THEN 1 capsule (100 mg total) 3 (three) times daily.  Start taking on: May 14, 2024     hydrALAZINE 50 MG tablet  Commonly known as: APRESOLINE  Take 1 tablet (50 mg total) by mouth every 12 (twelve) hours.     hydrOXYzine HCL 25 MG tablet  Commonly known as: ATARAX  Take 1 tablet (25 mg total) by mouth 3 (three) times daily as needed for Anxiety.     levothyroxine 137 MCG Tab tablet  Commonly known as: SYNTHROID  Take 1 tablet (137 mcg total) by mouth before breakfast.     pregabalin 25 MG capsule  Commonly known as: LYRICA  Take 1 capsule (25 mg total) by mouth nightly as needed.     traZODone 50 MG tablet  Commonly known as: DESYREL  TAKE 1 TABLET BY MOUTH EVERY DAY AT NIGHT AS NEEDED FOR INSOMNIA     valsartan 320 MG tablet  Commonly known as: DIOVAN  TAKE 1 TABLET(320 MG) BY MOUTH EVERY DAY              Indwelling Lines/Drains at time of discharge:   Lines/Drains/Airways       None                   Time spent on the discharge of patient: 35 minutes         Trina Naqvi MD  Department of Hospital Medicine  [facility])

## 2024-09-15 NOTE — HOSPITAL COURSE
Vascular neuro consulted   Pt had no further recurrence of R facial droop, no focal neurologic deficits on exam.   MRI Brain was negative for acute infarct. CTA Head/Neck showed no large vessel occlusion but showed greater than 50% diameter stenosis involving the proximal cervical ICAs bilaterally and significant atherosclerotic plaque involving b/l ICA, origin of L vertebral artery, and L subclvian arteries. TTE showed EF 55-60%, normal diastolic function, CVP 8, negative for shunt.   Lipid panel with LDL 71, A1c 5.7. TSH wnl.   She will continue home ASA, Plavix, statin, Zetia.   Will send amb referral to vascular neurology and vascular surgery on discharge for management of carotid disease noted above.   Smoking cessation counseled.   Pt seen and examined on day of discharge and appears stable for discharge with [facility]ATH[facility] PT/OT per my exam. Pt was set up with [facility] prior to discharge. Followup with PCP within 3-5 days.

## 2024-09-16 RX ORDER — ATORVASTATIN CALCIUM 80 MG/1
80 TABLET, FILM COATED ORAL
Qty: 90 TABLET | Refills: 3 | Status: SHIPPED | OUTPATIENT
Start: 2024-09-16

## 2024-09-16 NOTE — PROGRESS NOTES
"Subjective:      Patient ID: Flory Cam is a 79 y.o. female.    Chief Complaint:  " Small vessel disease, cerebrovascular "    HPI 79 Years old AA Female with PMHx of CVA / PVD / HTN and others Medical issues   came for the evaluation and recommendation of " Small vessel disease, cerebrovascular ".      Started: this past Saturday.   Describes: " feeling bad "   Timing: transient - minutes to few hours.   Frequency: once.   Pain:  3 to 6/ 10.   Location: CNS.   Family: Mother with CVA.   Medications: ASA / Plavix / Lipitor / Norvasc / Hydralazine / Diovan.   Worsen: none.   Alleviated: none.   Associated symptoms:   Headache / " mouth twitching "   Triggers: HTN.   Prodrome symptoms: none.           Referral by Hospital.        It was found to have high BP - 200's/ 90's.        She indicated has been " 100 % compliance with medications ".          Review of Systems   Neurological:         Memory difficulties.    All other systems reviewed and are negative.      Objective:     Neurologic Exam     Mental Status   Oriented to person.   Oriented to country, city and area.   Oriented to year, month, date, day and season.   Registration: recalls 3 of 3 objects. Recall of objects at 5 minutes: Missed 3 out of 3 in 3 minutes. Follows 3 step commands.   Attention: normal. Concentration: normal.   Speech: speech is normal   Level of consciousness: alert  Knowledge: consistent with education. Unable to perform simple calculations.   Able to name object. Able to read. Able to repeat. Able to write. Normal comprehension.     Cranial Nerves   Cranial nerves II through XII intact.     CN III, IV, VI   Pupils are equal, round, and reactive to light.    Motor Exam   Muscle bulk: normal  Overall muscle tone: normal    Strength   Strength 5/5 throughout.   Right neck flexion: 5/5  Left neck flexion: 5/5  Right neck extension: 5/5  Left neck extension: 5/5  Right deltoid: 5/5  Left deltoid: 5/5  Right biceps: 5/5  Left " biceps: 5/5  Right triceps: 5/5  Left triceps: 5/5  Right wrist flexion: 5/5  Left wrist flexion: 5/5  Right wrist extension: 5/5  Left wrist extension: 5/5  Right interossei: 5/5  Left interossei: 5/5  Right abdominals: 5/5  Left abdominals: 5/5  Right iliopsoas: 5/5  Left iliopsoas: 5/5  Right quadriceps: 5/5  Left quadriceps: 5/5  Right hamstrin/5  Left hamstrin/5  Right glutei: 5/5  Left glutei: 5/5  Right anterior tibial: 5/5  Left anterior tibial: 5/5  Right posterior tibial: 5/5  Left posterior tibial: 5/5  Right peroneal: 5/5  Left peroneal: 5/5  Right gastroc: 5/5  Left gastroc: 5/5    Sensory Exam   Light touch normal.   Right arm vibration: normal  Left arm vibration: normal  Right leg vibration: decreased from ankle  Left leg vibration: decreased from knee  Right arm proprioception: normal  Left arm proprioception: normal  Right leg proprioception: decreased from knee  Left leg proprioception: decreased from ankle  Pinprick normal.   Graphesthesia: normal  Stereognosis: normal    Gait, Coordination, and Reflexes     Gait  Gait: wide-based (Antalgic.)    Coordination   Romberg: positive  Finger to nose coordination: normal  Heel to shin coordination: normal  Tandem walking coordination: abnormal    Tremor   Resting tremor: absent  Intention tremor: absent  Action tremor: absent    Reflexes   Right brachioradialis: 1+  Left brachioradialis: 1+  Right biceps: 1+  Left biceps: 1+  Right triceps: 1+  Left triceps: 1+  Right patellar: 0  Left patellar: 0  Right achilles: 0  Left achilles: 0  Right : 0  Left : 0  Right plantar: normal  Left plantar: normal  Right Polk: absent  Left Polk: absent  Right ankle clonus: absent  Left ankle clonus: absent  Right pendular knee jerk: absent  Left pendular knee jerk: absent      Physical Exam  Vitals and nursing note reviewed.   Constitutional:       Appearance: Normal appearance. She is normal weight.   HENT:      Head: Normocephalic and  atraumatic.      Right Ear: Tympanic membrane normal.      Left Ear: Tympanic membrane normal.      Nose: Nose normal.      Mouth/Throat:      Mouth: Mucous membranes are moist.      Pharynx: Oropharynx is clear.   Eyes:      Extraocular Movements: Extraocular movements intact.      Conjunctiva/sclera: Conjunctivae normal.      Pupils: Pupils are equal, round, and reactive to light.   Cardiovascular:      Rate and Rhythm: Normal rate and regular rhythm.      Pulses: Normal pulses.      Heart sounds: Normal heart sounds.   Pulmonary:      Effort: Pulmonary effort is normal.      Breath sounds: Normal breath sounds.   Abdominal:      General: Abdomen is flat. Bowel sounds are normal.      Palpations: Abdomen is soft.   Genitourinary:     Comments: Deferred.   Musculoskeletal:         General: Normal range of motion.      Cervical back: Normal range of motion and neck supple.   Skin:     General: Skin is warm and dry.      Capillary Refill: Capillary refill takes less than 2 seconds.   Neurological:      Mental Status: She is alert. Mental status is at baseline.      Cranial Nerves: Cranial nerves 2-12 are intact.      Motor: Motor strength is normal.     Coordination: Romberg Test abnormal. Finger-Nose-Finger Test and Heel to Shin Test normal.      Gait: Tandem walk abnormal.      Deep Tendon Reflexes:      Reflex Scores:       Tricep reflexes are 1+ on the right side and 1+ on the left side.       Bicep reflexes are 1+ on the right side and 1+ on the left side.       Brachioradialis reflexes are 1+ on the right side and 1+ on the left side.       Patellar reflexes are 0 on the right side and 0 on the left side.       Achilles reflexes are 0 on the right side and 0 on the left side.  Psychiatric:         Mood and Affect: Mood normal.         Speech: Speech normal.         Behavior: Behavior normal.         Thought Content: Thought content normal.         Judgment: Judgment normal.          09/ 18/ 2024:  [place]  "COGNITIVE ASSESSMENT (MOCA) TOTAL SCORE: 17 / 30 - missed 4 words out of 5 in 5 minutes.      NORMAL-MILD NCD 26-30     MILD DEMENTIA 20-25     MODERATE DEMENTIA 10-19     SEVERE DEMENTIA <10.    Assessment:   79 Years old AA Female with PMHX as above came of the evaluation of " Small vessel disease, cerebrovascular ".   - Chronic Small Vessels Ds.   - Carotids stenosis.   - Moderate Dementia, Vascular.  - HTN crisis.   - Hx of TIA's.   Plan:   Patient Neurological Assessment is remarkable for severe cognitive and short term memory difficulties.   Vascular causes appears to be the culprit for her cognitive and memory issues - brain imagines with chronic / severe   Small vessels Ds and multiple extracranial and intracranial arteries stenosis - Post CABG / PVD intervention in the past.  She is taking at least 3 anti hypertensive medications and She indicated compliance - still having HTN crisis and most probable some TIA's.   Discussed compliance with medical.    My believe is that her memory difficulties is interfering with her medications compliance.   Lives with Grand son - we asked her to bring Him next visit.      Ambulatory referral to Cardiology - Carotids stenosis.     Namenda 5 mg PO BID.    Labs: CBC / CMP / Hgb A 1 C / Lipids panel / TSH / Free T 4 - 2024 - non significant abnormalities.     Personally reviewed CT Scan Head / MRI Brain - 2024 - no acute changes.    CUS: bilateral stenosis 50 - 69 % ( 09/ 2024 ).    CTA Head and Neck W/ contrast - 09/ 2024 - multiple posterior and anterior circulation stenosis ( see report )    Personally reviewed MRI Brain - 09/ 2024 - no acute changes.     TTE: EF > 55 %.     I spent a total of 60 minutes on the day of the visit.This includes face to face time and non-face to face time preparing to see the patient (eg, review of tests),   obtaining and/or reviewing separately obtained history, documenting clinical information in the electronic or other health record,   " independently interpreting results and communicating results to the patient/family/caregiver, or care coordinator.    Please do not hesitate to contact me with any updates, questions or concerns.    Chuy Hernandez MD.  General Neurologist.

## 2024-09-17 ENCOUNTER — PATIENT OUTREACH (OUTPATIENT)
Dept: ADMINISTRATIVE | Facility: CLINIC | Age: 79
End: 2024-09-17
Payer: MEDICARE

## 2024-09-17 DIAGNOSIS — I65.23 BILATERAL CAROTID ARTERY STENOSIS: Primary | ICD-10-CM

## 2024-09-17 NOTE — PROGRESS NOTES
C3 nurse attempted to contact Flory Soria Tutu  for a TCC post hospital discharge follow up call. The patient is unable to conduct the call @ this time. The patient requested a callback.    The patient does not have a scheduled HOSFU appointment within 5-7 days post hospital discharge date 09/15/2024. Message sent to Physician staff to assist with HOSFU appointment scheduling.

## 2024-09-18 ENCOUNTER — OFFICE VISIT (OUTPATIENT)
Dept: NEUROLOGY | Facility: CLINIC | Age: 79
End: 2024-09-18
Payer: MEDICARE

## 2024-09-18 VITALS
SYSTOLIC BLOOD PRESSURE: 177 MMHG | BODY MASS INDEX: 30.07 KG/M2 | WEIGHT: 186.31 LBS | DIASTOLIC BLOOD PRESSURE: 81 MMHG | HEART RATE: 66 BPM

## 2024-09-18 DIAGNOSIS — F01.B0 MODERATE VASCULAR DEMENTIA, UNSPECIFIED WHETHER BEHAVIORAL, PSYCHOTIC, OR MOOD DISTURBANCE OR ANXIETY: ICD-10-CM

## 2024-09-18 DIAGNOSIS — I65.23 CAROTID STENOSIS, BILATERAL: Primary | ICD-10-CM

## 2024-09-18 DIAGNOSIS — I10 HYPERTENSION, UNSPECIFIED TYPE: ICD-10-CM

## 2024-09-18 DIAGNOSIS — I73.9 DISEASE OF SMALL BLOOD VESSELS: ICD-10-CM

## 2024-09-18 PROCEDURE — 99999 PR PBB SHADOW E&M-EST. PATIENT-LVL V: CPT | Mod: PBBFAC,HCNC,, | Performed by: PSYCHIATRY & NEUROLOGY

## 2024-09-18 RX ORDER — MEMANTINE HYDROCHLORIDE 5 MG/1
5 TABLET ORAL 2 TIMES DAILY
Qty: 60 TABLET | Refills: 3 | Status: SHIPPED | OUTPATIENT
Start: 2024-09-18 | End: 2025-01-16

## 2024-09-18 NOTE — PROGRESS NOTES
C3 nurse spoke with Flory Cam for a TCC post hospital discharge follow up call. The patient has a scheduled .HOSFU with Dr POLI Ovalle on 9/23/24 @ 340pm

## 2024-09-19 ENCOUNTER — OFFICE VISIT (OUTPATIENT)
Dept: HOME HEALTH SERVICES | Facility: CLINIC | Age: 79
End: 2024-09-19
Payer: MEDICARE

## 2024-09-19 DIAGNOSIS — I65.29 STENOSIS OF CAROTID ARTERY, UNSPECIFIED LATERALITY: ICD-10-CM

## 2024-09-19 DIAGNOSIS — F01.B0 MODERATE VASCULAR DEMENTIA WITHOUT BEHAVIORAL DISTURBANCE, PSYCHOTIC DISTURBANCE, MOOD DISTURBANCE, OR ANXIETY: ICD-10-CM

## 2024-09-19 DIAGNOSIS — R09.89 SUSPECTED CEREBROVASCULAR ACCIDENT (CVA): ICD-10-CM

## 2024-09-19 DIAGNOSIS — Z09 HOSPITAL DISCHARGE FOLLOW-UP: Primary | ICD-10-CM

## 2024-09-19 DIAGNOSIS — I10 ESSENTIAL HYPERTENSION: ICD-10-CM

## 2024-09-19 DIAGNOSIS — R06.02 SHORTNESS OF BREATH: ICD-10-CM

## 2024-09-19 RX ORDER — ALBUTEROL SULFATE 90 UG/1
2 INHALANT RESPIRATORY (INHALATION) EVERY 6 HOURS PRN
Qty: 18 G | Refills: 3 | Status: SHIPPED | OUTPATIENT
Start: 2024-09-19

## 2024-09-19 NOTE — PROGRESS NOTES
[facility]  Transitional Care Management (TCM) Home Visit    Encounter Provider: Brendon Ramirez   PCP: Tayler Ovalle DO  Consult Requested By: Dr. Trina Naqvi  Admit Date: 9/14/24   IP Discharge Date: 9/15/24  Hospital Length of Stay:RRHLOS@ days  Days since discharge (from IP or SNF): 4   [facility] Contact Note: 9/17  Hospital Diagnosis: Suspected cerebrovascular accident (CVA) [R09.89];Essential hypertension [I10];Stenosis of carotid artery, unspecified laterality [I65.29]     HISTORY OF PRESENT ILLNESS      Patient ID: Flory Cam is a 79 y.o. female was recently admitted to the hospital, this is their TCM encounter.    Hospital Course Synopsis:  Flory Cam is a 79 year old female with a PMHx of ACS, CABG, Tobacco use, CVA, GERD, CAD, HLD, Hypothyroidism, Lupus, Osteoporosis, and PVD who presented to the ED with c/o right side facial droop/twitching. Onset at 8AM this morning. Associated symptoms include: generalized bodyaches. ED workup shwoed: CBC/CMP unremarkable, TSH 0.771. CT head negative for acute intracranial process. Negative for hemorrhage, or skull fracture. Cerebral atrophy noted.  Intracranial atherosclerotic disease noted.  Small vessel ischemic changes noted.  Stable right thalamic lacunar infarct. Mild paranasal sinus disease in distribution described above. She was hypertensive upon arrival but patient reports she had not taken her BP medications today. Pt placed in observation for CVA rule out.   Vascular neuro consulted   Pt had no further recurrence of R facial droop, no focal neurologic deficits on exam.   MRI Brain was negative for acute infarct. CTA Head/Neck showed no large vessel occlusion but showed greater than 50% diameter stenosis involving the proximal cervical ICAs bilaterally and significant atherosclerotic plaque involving b/l ICA, origin of L vertebral artery, and L subclvian arteries. TTE showed EF 55-60%, normal diastolic function, CVP 8,  negative for shunt.   Lipid panel with LDL 71, A1c 5.7. TSH wnl.   She will continue home ASA, Plavix, statin, Zetia.   Will send amb referral to vascular neurology and vascular surgery on discharge for management of carotid disease noted above.   Smoking cessation counseled.     Doing well since discharge. Denies reoccurrence of symptoms in which prompted hospital stay. Cortes present during visit. Reports occasional confusion. [facility] and Pt/OT working with patient. Ambulates independently and uses  a walker. Denies recent falls. Cortes fixes medications for patient weekly. Had follow up with neurology on 9/18. Started on memantine. Denies further complaints or concerns. Questions elicited. Time allowed for questions, all issues addressed. Contact info given for any concerns or changes.Has follow up with PCP 9/23.   DECISION MAKING TODAY       Assessment & Plan:  1. Hospital discharge follow-up    2. Suspected cerebrovascular accident (CVA)  Assessment & Plan:  Presented to the ED with right side facial droop/twitching  CT head with no acute process. Cerebral atrophy noted. Intracranial atherosclerotic disease noted. Small vessel ischemic changes noted. Stable right thalamic lacunar infarct.   MRI brain with no acute changes  CTA head/neck revealed greater than 50% diameter stenosis involving the proximal cervical ICAs bilaterally and significant atherosclerotic plaque involving b/l ICA, origin of L vertebral artery, and L subclvian arteries  No further symptoms since admission  F/U with neuro and vascular surgery      Orders:  -     Ambulatory referral/consult to [facility]    3. Essential hypertension  Assessment & Plan:  Chronic  Stable during visit  Continue current medications  Monitor bp at home  F/U with PCP    Orders:  -     Ambulatory referral/consult to [facility]    4. Stenosis of carotid artery, unspecified laterality  Overview:  5/14/12 Carotid US Rt  Heterogeneous plaque  Rt mid Int Carotid artery 20-39% stenosis . Lt Mid Int Carotid artery  40-49% heterogenous plque.    Assessment & Plan:  CTA head/neck with  no large vessel occlusion but showed greater than 50% diameter stenosis involving the proximal cervical ICAs bilaterally and significant atherosclerotic plaque involving b/l ICA, origin of L vertebral artery, and L subclvian arteries  US carotid bilateral indicated elevated velocities within the left internal carotid artery, indicating 50-69% stenosis   Continue current medications  F/U with vascular surgery    Orders:  -     Ambulatory referral/consult to [facility]    5. Moderate vascular dementia without behavioral disturbance, psychotic disturbance, mood disturbance, or anxiety  Assessment & Plan:  Recent diagnoses with neurology  Started on memantine twice daily  Continue current medication  F/U with neurology      6. Shortness of breath  -     albuterol (PROVENTIL/VENTOLIN HFA) 90 mcg/actuation inhaler; Inhale 2 puffs into the lungs every 6 (six) hours as needed for Wheezing.  Dispense: 18 g; Refill: 3         Medication List on Discharge:     Medication List            Accurate as of September 19, 2024 11:59 PM. If you have any questions, ask your nurse or doctor.                CONTINUE taking these medications      albuterol 90 mcg/actuation inhaler  Commonly known as: PROVENTIL/VENTOLIN HFA  Inhale 2 puffs into the lungs every 6 (six) hours as needed for Wheezing.     albuterol-ipratropium 2.5 mg-0.5 mg/3 mL nebulizer solution  Commonly known as: DUO-NEB  Take 3 mLs by nebulization every 6 (six) hours as needed for Wheezing. Rescue     amLODIPine 10 MG tablet  Commonly known as: NORVASC  TAKE 1 TABLET(10 MG) BY MOUTH EVERY DAY     aspirin 81 MG EC tablet  Commonly known as: ECOTRIN  Take 1 tablet (81 mg total) by mouth once daily.     atorvastatin 80 MG tablet  Commonly known as: LIPITOR  TAKE 1 TABLET(80 MG) BY MOUTH EVERY  DAY     clopidogreL 75 mg tablet  Commonly known as: PLAVIX  Take 1 tablet (75 mg total) by mouth once daily.     ergocalciferol 50,000 unit Cap  Commonly known as: ERGOCALCIFEROL  TAKE 1 CAPSULE BY MOUTH EVERY 7 DAYS     ezetimibe 10 mg tablet  Commonly known as: ZETIA  TAKE 1 TABLET(10 MG) BY MOUTH EVERY DAY     hydrALAZINE 50 MG tablet  Commonly known as: APRESOLINE  Take 1 tablet (50 mg total) by mouth every 12 (twelve) hours.     hydrOXYzine HCL 25 MG tablet  Commonly known as: ATARAX  Take 1 tablet (25 mg total) by mouth 3 (three) times daily as needed for Anxiety.     levothyroxine 137 MCG Tab tablet  Commonly known as: SYNTHROID  Take 1 tablet (137 mcg total) by mouth before breakfast.     memantine 5 MG Tab  Commonly known as: NAMENDA  Take 1 tablet (5 mg total) by mouth 2 (two) times daily.     valsartan 320 MG tablet  Commonly known as: DIOVAN  TAKE 1 TABLET(320 MG) BY MOUTH EVERY DAY              Medication Reconciliation:  Were medications changed on discharge? No  Were medications in the home? Yes  Is the patient taking the medications as directed? Yes  Does the patient understand the medications and changes? Yes  Does updated med list accurately reflects meds patient is currently taking? Yes    ENVIRONMENT OF CARE      Family and/or Caregiver present at visit?  Yes  Name of Caregiver: Indigo  History provided by: patient and caregiver    Advance Care Planning   Advanced Care Planning Status:  Patient has had an ACP conversation  Living Will: No  Power of : No  [place]: No    Does Caregiver have HCPoA: No  Changes today: none  Is patient hospice appropriate: No  (If needed, use PPS <30 or FAST score >7)  Was referral to hospice placed: No       Impression upon entering the home:  Physical Dwelling: apartment/condo   Appearance of home environment: cleaniness: clean, walking pathways: clear, lighting: adequate, and home structure: sound structure  Functional Status: independent  Mobility:  ambulatory  Nutritional access: adequate intake and access  Home Health: Yes, HH [facility]    DME/Supplies: none     Diagnostic tests reviewed/disposition: I have reviewed all completed as well as pending diagnostic tests at the time of discharge.  Disease/illness education: Take all medication as prescribed. Activity as tolerated. Keep all upcoming appts.   Establishment or re-establishment of referral orders for community resources: No other necessary community resources.   Discussion with other health care providers: No discussion with other health care providers necessary.   Does patient have a PCP at [facility]? Yes   Repatriation plan with PCP? follow-up with PCP within 30d   Does patient have an ostomy (ileostomy, colostomy, suprapubic catheter, nephrostomy tube, tracheostomy, PEG tube, pleurex catheter, cholecystostomy, etc)? No  Were BPAs reviewed? Yes    Social History     Socioeconomic History    Marital status:     Number of children: 3   Occupational History    Occupation: Retired     Comment: Dispatcher   Tobacco Use    Smoking status: Some Days     Current packs/day: 0.50     Average packs/day: 0.5 packs/day for 60.7 years (30.4 ttl pk-yrs)     Types: Cigarettes     Start date: 1964    Smokeless tobacco: Never   Substance and Sexual Activity    Alcohol use: Never    Drug use: No    Sexual activity: Not Currently     Partners: Male   Other Topics Concern    Are you pregnant or think you may be? No    Breast-feeding No   Social History Narrative    Patient is retired dispatcher.     Social Determinants of Health     Financial Resource Strain: Medium Risk (4/27/2024)    Overall Financial Resource Strain (CARDIA)     Difficulty of Paying Living Expenses: Somewhat hard   Food Insecurity: Food Insecurity Present (4/27/2024)    Hunger Vital Sign     Worried About Running Out of Food in the Last Year: Sometimes true     Ran Out of Food in the Last Year: Patient declined   Transportation Needs: Unmet  Transportation Needs (4/27/2024)    PRAPARE - Transportation     Lack of Transportation (Medical): Yes     Lack of Transportation (Non-Medical): Yes   Physical Activity: Insufficiently Active (4/27/2024)    Exercise Vital Sign     Days of Exercise per Week: 3 days     Minutes of Exercise per Session: 10 min   Stress: Stress Concern Present (4/27/2024)    [employer] - Occupational Stress Questionnaire     Feeling of Stress : To some extent   Housing Stability: Low Risk  (2/12/2024)    Housing Stability Vital Sign     Unable to Pay for Housing in the Last Year: No     Number of Places Lived in the Last Year: 1     Unstable Housing in the Last Year: No       OBJECTIVE:     Vital Signs:  Vitals:    09/19/24 1038   BP: 134/68   Pulse: (!) 59   Resp: 18       Review of Systems   Constitutional: Negative.    HENT: Negative.     Eyes: Negative.    Respiratory: Negative.  Negative for chest tightness.    Cardiovascular: Negative.  Negative for leg swelling.   Gastrointestinal: Negative.    Endocrine: Negative.    Genitourinary: Negative.    Musculoskeletal:  Positive for arthralgias and myalgias.   Skin: Negative.    Allergic/Immunologic: Negative.    Neurological: Negative.    Hematological: Negative.    Psychiatric/Behavioral:  Positive for confusion (intermittent). Negative for agitation.    All other systems reviewed and are negative.      Physical Exam:  Physical Exam  Vitals reviewed.   Constitutional:       General: She is not in acute distress.     Appearance: Normal appearance. She is well-developed.   HENT:      Head: Normocephalic and atraumatic.      Nose: Nose normal.      Mouth/Throat:      Mouth: Mucous membranes are dry.      Pharynx: Oropharynx is clear.   Eyes:      Pupils: Pupils are equal, round, and reactive to light.   Cardiovascular:      Rate and Rhythm: Normal rate and regular rhythm.      Pulses: Normal pulses.      Heart sounds: Normal heart sounds.   Pulmonary:       Effort: Pulmonary effort is normal.      Breath sounds: Normal breath sounds.   Abdominal:      General: Bowel sounds are normal.      Palpations: Abdomen is soft.   Musculoskeletal:         General: Normal range of motion.      Cervical back: Normal range of motion and neck supple.   Skin:     General: Skin is warm and dry.   Neurological:      General: No focal deficit present.      Mental Status: She is alert and oriented to person, place, and time. Mental status is at baseline.   Psychiatric:         Mood and Affect: Mood normal.         Behavior: Behavior normal.         Thought Content: Thought content normal.         Judgment: Judgment normal.         INSTRUCTIONS FOR PATIENT:   - Continue all medications, treatments and therapies as ordered.   - Follow all instructions, recommendations as discussed.  - Maintain Safety Precautions at all times.  - Attend all medical appointments as scheduled.  - For worsening symptoms: call Primary Care Physician or Nurse Practitioner.  - For emergencies, call 911 or immediately report to the nearest emergency room.   Scheduled Follow-up, Appts Reviewed with Modifications if Needed: Yes  Future Appointments   Date Time Provider Department Center   9/23/2024  3:40 PM Tayler Ovalle DO [facility] C   10/1/2024 11:00 AM Melva Nettles DPM [facility] POD [place]   10/7/2024  2:30 PM Marcela Vasquez PA-C [facility] CARDIO  Medical C   10/28/2024  9:00 AM [facility]ASCULAR ULTR[facility]   10/28/2024  9:30 AM [facility] CVT VASCULAR ULTRASOUND V [facility]   10/28/2024 10:00 AM Andie Macias MD [facility] VASSGY [facility]   12/18/2024  4:00 PM Chuy Barrow MD [facility] NEURO [facility]   1/29/2025  1:20 PM Tayler Ovalle DO [facility]       Signature: Brendon Ramirez NP    Transition of Care Visit:  I have reviewed and updated the history and problem list.  I have reconciled the medication list.  I have discussed the  hospitalization and current medical issues, prognosis and plans with the patient/family.      1

## 2024-09-20 VITALS
RESPIRATION RATE: 18 BRPM | SYSTOLIC BLOOD PRESSURE: 134 MMHG | OXYGEN SATURATION: 98 % | HEART RATE: 59 BPM | DIASTOLIC BLOOD PRESSURE: 68 MMHG

## 2024-09-20 PROBLEM — F01.B0 MODERATE VASCULAR DEMENTIA WITHOUT BEHAVIORAL DISTURBANCE, PSYCHOTIC DISTURBANCE, MOOD DISTURBANCE, OR ANXIETY: Status: ACTIVE | Noted: 2024-09-20

## 2024-09-20 NOTE — ASSESSMENT & PLAN NOTE
CTA head/neck with  no large vessel occlusion but showed greater than 50% diameter stenosis involving the proximal cervical ICAs bilaterally and significant atherosclerotic plaque involving b/l ICA, origin of L vertebral artery, and L subclvian arteries  US carotid bilateral indicated elevated velocities within the left internal carotid artery, indicating 50-69% stenosis   Continue current medications  F/U with vascular surgery

## 2024-09-20 NOTE — ASSESSMENT & PLAN NOTE
Presented to the ED with right side facial droop/twitching  CT head with no acute process. Cerebral atrophy noted. Intracranial atherosclerotic disease noted. Small vessel ischemic changes noted. Stable right thalamic lacunar infarct.   MRI brain with no acute changes  CTA head/neck revealed greater than 50% diameter stenosis involving the proximal cervical ICAs bilaterally and significant atherosclerotic plaque involving b/l ICA, origin of L vertebral artery, and L subclvian arteries  No further symptoms since admission  F/U with neuro and vascular surgery

## 2024-09-20 NOTE — ASSESSMENT & PLAN NOTE
Recent diagnoses with neurology  Started on memantine twice daily  Continue current medication  F/U with neurology

## 2024-09-23 ENCOUNTER — OFFICE VISIT (OUTPATIENT)
Dept: INTERNAL MEDICINE | Facility: CLINIC | Age: 79
End: 2024-09-23
Payer: MEDICARE

## 2024-09-23 VITALS
OXYGEN SATURATION: 96 % | SYSTOLIC BLOOD PRESSURE: 120 MMHG | TEMPERATURE: 98 F | DIASTOLIC BLOOD PRESSURE: 78 MMHG | WEIGHT: 185.88 LBS | HEART RATE: 67 BPM | HEIGHT: 66 IN | BODY MASS INDEX: 29.87 KG/M2

## 2024-09-23 DIAGNOSIS — I10 ESSENTIAL HYPERTENSION: ICD-10-CM

## 2024-09-23 DIAGNOSIS — Z09 HOSPITAL DISCHARGE FOLLOW-UP: Primary | ICD-10-CM

## 2024-09-23 DIAGNOSIS — R73.03 PREDIABETES: ICD-10-CM

## 2024-09-23 DIAGNOSIS — M25.50 ARTHRALGIA OF MULTIPLE JOINTS: ICD-10-CM

## 2024-09-23 DIAGNOSIS — I65.23 BILATERAL CAROTID ARTERY STENOSIS: ICD-10-CM

## 2024-09-23 DIAGNOSIS — F17.210 CIGARETTE NICOTINE DEPENDENCE WITHOUT COMPLICATION: ICD-10-CM

## 2024-09-23 PROCEDURE — 1160F RVW MEDS BY RX/DR IN RCRD: CPT | Mod: HCNC,CPTII,S$GLB, | Performed by: INTERNAL MEDICINE

## 2024-09-23 PROCEDURE — 1101F PT FALLS ASSESS-DOCD LE1/YR: CPT | Mod: HCNC,CPTII,S$GLB, | Performed by: INTERNAL MEDICINE

## 2024-09-23 PROCEDURE — 1125F AMNT PAIN NOTED PAIN PRSNT: CPT | Mod: HCNC,CPTII,S$GLB, | Performed by: INTERNAL MEDICINE

## 2024-09-23 PROCEDURE — 3074F SYST BP LT 130 MM HG: CPT | Mod: HCNC,CPTII,S$GLB, | Performed by: INTERNAL MEDICINE

## 2024-09-23 PROCEDURE — 1159F MED LIST DOCD IN RCRD: CPT | Mod: HCNC,CPTII,S$GLB, | Performed by: INTERNAL MEDICINE

## 2024-09-23 PROCEDURE — 99999 PR PBB SHADOW E&M-EST. PATIENT-LVL IV: CPT | Mod: PBBFAC,HCNC,, | Performed by: INTERNAL MEDICINE

## 2024-09-23 PROCEDURE — 3288F FALL RISK ASSESSMENT DOCD: CPT | Mod: HCNC,CPTII,S$GLB, | Performed by: INTERNAL MEDICINE

## 2024-09-23 PROCEDURE — 99213 OFFICE O/P EST LOW 20 MIN: CPT | Mod: HCNC,S$GLB,, | Performed by: INTERNAL MEDICINE

## 2024-09-23 PROCEDURE — 3078F DIAST BP <80 MM HG: CPT | Mod: HCNC,CPTII,S$GLB, | Performed by: INTERNAL MEDICINE

## 2024-09-23 NOTE — PROGRESS NOTES
"Flory Cam  79 y.o. Black or  female  [id number]    Chief Complaint:  Chief Complaint   Patient presents with    Hospital Follow Up       History of Present Illness:  History of Present Illness    CHIEF COMPLAINT:  Ms. Cam presents today for hospital follow up.    RECENT HOSPITALIZATION AND CARDIOVASCULAR CONCERNS:  She reports recent hospitalization due to elevated blood pressure, measured at 200/97. She experienced right-sided facial drooping and twitching, prompting emergency care. An MRI showed no evidence of acute stroke, but carotid artery imaging revealed 50% narrowing of the proximal cervical internal carotid arteries (ICA) bilaterally, with significant atherosclerotic plaque, more pronounced on the left side. She denies recent stroke symptoms but recalls having "mini-strokes" in the past. She is scheduled to see a cardiologist next month and has appointments with vascular specialists and another on October 28th.    PREDIABETES:  During her hospital stay, she was informed of a prediabetes diagnosis, which she found surprising and concerning.    MEDICATIONS:  She takes seven pills daily, including one at night for blood pressure. Her regimen includes Namenda for dementia, aspirin, Plavix, and cholesterol medicine.    PAIN MANAGEMENT:  She reports pain in her legs, arms, shoulders, and back, which has persisted since a few falls. She previously saw pain management who administered back injections and prescribed gabapentin. She expresses dissatisfaction with gabapentin due to its side effects. She has been seeing pain management for years but is unclear about her current status and was given a list of pain management doctors to contact.    NEUROLOGICAL HISTORY:  She has a history of early dementia and is taking Namenda.    GASTROINTESTINAL:  She reports a long-standing lack of appetite.    SOCIAL HISTORY:  She recently celebrated her 79th birthday with a family-organized party. She " "has strong family connections, including a granddaughter and great-grandchildren. She enjoys cooking, recently preparing a dish with shrimp, sausage, and potatoes.    UPCOMING APPOINTMENTS:  She has an appointment with Marcela Vasquez on October 7th. She expresses some confusion about the scheduling process for her various specialist appointments.    PAST MEDICAL HISTORY:  She has a history of hypertension, early dementia, previous falls, and transient ischemic attacks ("mini-strokes").         Review of Systems   Cardiovascular:  Negative for chest pain.   Musculoskeletal:  Positive for back pain, falls, joint pain and myalgias.   Neurological:  Negative for focal weakness and weakness.   Psychiatric/Behavioral:  Positive for memory loss.        Laboratory:  Lab Results   Component Value Date    WBC 8.08 09/14/2024    HGB 13.3 09/14/2024    HCT 40.0 09/14/2024     09/14/2024    CHOL 135 09/14/2024    TRIG 51 09/14/2024    HDL 53 09/14/2024    ALT 17 09/14/2024    AST 21 09/14/2024     09/14/2024    K 4.0 09/14/2024     09/14/2024    CREATININE 0.7 09/14/2024    BUN 13 09/14/2024    CO2 26 09/14/2024    TSH 0.771 09/14/2024    INR 0.9 09/14/2024    HGBA1C 5.7 (H) 09/14/2024     Lab Results   Component Value Date    LDLCALC 71.8 09/14/2024       History:  Past Medical History:   Diagnosis Date    Acute coronary syndrome     Anxiety     Bilateral carotid artery stenosis 11/17/2015    Chronic pain     Colon polyp     Coronary artery disease     GERD (gastroesophageal reflux disease)     Hyperlipidemia     Hypertension     Hypothyroidism     Low back pain     Lupus     Osteoporosis     Poor circulation     Pre-operative clearance 7/12/2019    PVD (peripheral vascular disease)     S/P peripheral artery angioplasty 02/13/2014    SCCA (squamous cell carcinoma) of skin 10/2019    Dr. ZANDRA Rivas--oncology    Skin disease        Medications:  Current Outpatient Medications on File Prior to Visit   Medication " Sig Dispense Refill    albuterol (PROVENTIL/VENTOLIN HFA) 90 mcg/actuation inhaler Inhale 2 puffs into the lungs every 6 (six) hours as needed for Wheezing. 18 g 3    albuterol-ipratropium (DUO-NEB) 2.5 mg-0.5 mg/3 mL nebulizer solution Take 3 mLs by nebulization every 6 (six) hours as needed for Wheezing. Rescue 75 mL 2    amLODIPine (NORVASC) 10 MG tablet TAKE 1 TABLET(10 MG) BY MOUTH EVERY DAY 90 tablet 3    atorvastatin (LIPITOR) 80 MG tablet TAKE 1 TABLET(80 MG) BY MOUTH EVERY DAY 90 tablet 3    clopidogreL (PLAVIX) 75 mg tablet Take 1 tablet (75 mg total) by mouth once daily. 90 tablet 1    ergocalciferol (ERGOCALCIFEROL) 50,000 unit Cap TAKE 1 CAPSULE BY MOUTH EVERY 7 DAYS 12 capsule 2    ezetimibe (ZETIA) 10 mg tablet TAKE 1 TABLET(10 MG) BY MOUTH EVERY DAY 90 tablet 3    hydrALAZINE (APRESOLINE) 50 MG tablet Take 1 tablet (50 mg total) by mouth every 12 (twelve) hours. 60 tablet 11    hydrOXYzine HCL (ATARAX) 25 MG tablet Take 1 tablet (25 mg total) by mouth 3 (three) times daily as needed for Anxiety. 60 tablet 0    levothyroxine (SYNTHROID) 137 MCG Tab tablet Take 1 tablet (137 mcg total) by mouth before breakfast. 90 tablet 1    memantine (NAMENDA) 5 MG Tab Take 1 tablet (5 mg total) by mouth 2 (two) times daily. 60 tablet 3    valsartan (DIOVAN) 320 MG tablet TAKE 1 TABLET(320 MG) BY MOUTH EVERY DAY 90 tablet 3    aspirin (ECOTRIN) 81 MG EC tablet Take 1 tablet (81 mg total) by mouth once daily.  0     No current facility-administered medications on file prior to visit.       Allergies:  Review of patient's allergies indicates:   Allergen Reactions    Methotrexate analogues     Cellcept [mycophenolate mofetil] Rash    Imuran [azathioprine] Rash       Exam:  Vitals:    09/23/24 1541   BP: 120/78   Pulse: 67   Temp: 97.6 °F (36.4 °C)     Weight: 84.3 kg (185 lb 13.6 oz)   Body mass index is 30 kg/m².      Physical Exam    Vitals: Reviewed. Blood pressure improved.  Constitutional: No acute distress.  Well-developed. Not ill-appearing.  Cardiovascular: Normal rate and regular rhythm. Normal heart sounds.  Pulmonary: Pulmonary effort is normal. No respiratory distress. Normal breath sounds.  Abdomen: Soft. Nontender. Nondistended. Normoactive bowel sounds.  Extremities: No edema.  Skin: Warm. Dry.  Neurological: Alert and oriented to person, place, and time.  Psychiatric: Behavior normal.         Assessment:  The primary encounter diagnosis was Hospital discharge follow-up. Diagnoses of Essential hypertension, Bilateral carotid artery stenosis, Prediabetes, Arthralgia of multiple joints, and Cigarette nicotine dependence without complication were also pertinent to this visit.    Assessment & Plan    HYPERTENSION:  - Assessed recent hospitalization for hypertensive urgency (/97) and subsequent neurological evaluation.  - Considered multiple risk factors contributing to vascular issues, including hypertension, smoking history, and lifestyle.  - Continued blood pressure medication (taken at night).    CAROTID ARTERY STENOSIS:  - Evaluated carotid artery stenosis: 50% narrowing of proximal cervical ICA bilaterally with significant atherosclerotic plaque.  - Discussed relationship between lifestyle factors (diet, smoking) and development of atherosclerotic plaques.  - Clarified that plaque buildup is a gradual process occurring over years.    PREDIABETES:  - Reviewed prediabetes status, noting stability over years with slight improvement.  - Explained prediabetes: not diabetes, but at risk; importance of monitoring sugar intake.  - Ms. Cam to watch sugar intake, particularly from bread, rice, pasta, and potatoes.    CHRONIC PAIN:  - Assessed chronic pain complaints, noting previous pain management treatment and current medication regimen.    MEDICATIONS/SUPPLEMENTS:  - Continued aspirin, Plavix, cholesterol medication.  - Continued Namenda (recently started by Dr. Hernandez for dementia).    FOLLOW UP:  -  Follow up with Marcela Vasquez on October 7th.  - Follow up with vascular doctor on October 28th.

## 2024-10-05 ENCOUNTER — EXTERNAL HOME HEALTH (OUTPATIENT)
Dept: HOME HEALTH SERVICES | Facility: HOSPITAL | Age: 79
End: 2024-10-05
Payer: MEDICARE

## 2024-10-14 ENCOUNTER — TELEPHONE (OUTPATIENT)
Dept: INTERNAL MEDICINE | Facility: CLINIC | Age: 79
End: 2024-10-14
Payer: MEDICARE

## 2024-10-14 NOTE — TELEPHONE ENCOUNTER
----- Message from Lonnie sent at 10/14/2024  7:34 AM CDT -----  Contact: self  [phone]  .Type: Patient Call Back        Who called:      Patient  What is the request in detail:    Called in concerning back pain and hand pain . Patient would like orders put in for Mri  Please call back   Can the clinic reply by [facility]?           Would the patient rather a call back or a response via [facility]?      call   Best call back number:  [phone]

## 2024-10-16 ENCOUNTER — HOSPITAL ENCOUNTER (EMERGENCY)
Facility: HOSPITAL | Age: 79
Discharge: HOME | End: 2024-10-16
Attending: EMERGENCY MEDICINE
Payer: MEDICARE

## 2024-10-16 VITALS
RESPIRATION RATE: 14 BRPM | WEIGHT: 182.81 LBS | TEMPERATURE: 98 F | OXYGEN SATURATION: 95 % | BODY MASS INDEX: 29.38 KG/M2 | SYSTOLIC BLOOD PRESSURE: 160 MMHG | DIASTOLIC BLOOD PRESSURE: 73 MMHG | HEIGHT: 66 IN | HEART RATE: 54 BPM

## 2024-10-16 DIAGNOSIS — M25.531 ARTHRALGIA OF WRIST, RIGHT: ICD-10-CM

## 2024-10-16 DIAGNOSIS — L93.0 DISCOID LUPUS: ICD-10-CM

## 2024-10-16 DIAGNOSIS — M25.531 RIGHT WRIST PAIN: Primary | ICD-10-CM

## 2024-10-16 DIAGNOSIS — I10 CHRONIC HYPERTENSION: ICD-10-CM

## 2024-10-16 PROCEDURE — 99283 EMERGENCY DEPT VISIT LOW MDM: CPT | Mod: 25,HCNC

## 2024-10-16 RX ORDER — MELOXICAM 7.5 MG/1
7.5 TABLET ORAL DAILY
Qty: 30 TABLET | Refills: 0 | Status: SHIPPED | OUTPATIENT
Start: 2024-10-16 | End: 2024-11-15

## 2024-10-16 NOTE — ED PROVIDER NOTES
SCRIBE #1 NOTE: IKeyon, am scribing for, and in the presence of, Janet Santa MD. I have scribed the entire note.       History     Chief Complaint   Patient presents with    Hand Pain     Patient presents to ED with c/o hand pain and swelling x 3 days.     Review of patient's allergies indicates:   Allergen Reactions    Methotrexate analogues     Cellcept [mycophenolate mofetil] Rash    Imuran [azathioprine] Rash         History of Present Illness     HPI    10/16/2024, 8:32 AM  History obtained from the patient      History of Present Illness: Flory Cam is a 79 y.o. female patient with a PMHx of HTN, lupus, anxiety, PVD, CAD, GERD, HLD, bilateral carotid artery stenosis, osteoporosis, acute coronary syndrome, hypothyroidism, and colon polyp who presents to the Emergency Department for evaluation of right wrist pain and swelling which onset gradually within the past few days. Pt recently has a surgical procedure completed on the right wrist with Dr. Pelaez (Dermatology). Pt has a f/u for the 28th of this month. Symptoms are constant and moderate in severity. No mitigating or exacerbating factors reported. No associated sxs. Patient denies any injury, trauma, fever, chills, motor or sensory weakness and all other sxs at this time. No Prior Tx. No further complaints or concerns at this time.       Arrival mode: Personal vehicle     PCP: Tayler Ovalle DO        Past Medical History:  Past Medical History:   Diagnosis Date    Acute coronary syndrome     Anxiety     Bilateral carotid artery stenosis 11/17/2015    Chronic pain     Colon polyp     Coronary artery disease     GERD (gastroesophageal reflux disease)     Hyperlipidemia     Hypertension     Hypothyroidism     Low back pain     Lupus     Osteoporosis     Poor circulation     Pre-operative clearance 7/12/2019    PVD (peripheral vascular disease)     S/P peripheral artery angioplasty 02/13/2014    SCCA (squamous cell carcinoma) of  skin 10/2019    Dr. ZANDRA Rivas--oncology    Skin disease        Past Surgical History:  Past Surgical History:   Procedure Laterality Date    AORTOGRAPHY WITH SERIALOGRAPHY N/A 5/25/2021    Procedure: AORTOGRAM, WITH RUNOFF;  Surgeon: Christopher Cohen MD;  Location: [facility] CATH LAB;  Service: Cardiology;  Laterality: N/A;  COVID-19, mRNA, LNP-S, PF, 30 mcg/0.3 mL dose (COVID-19, MRNA, LN-S, PF (Pfizer)) 1/30/2021, 1/9/2021    Atherosclerotic PVD with intermittent claudication Bilateral 05/2021    Dr. Cohen    CATARACT EXTRACTION      COLONOSCOPY N/A 1/4/2017    Procedure: COLONOSCOPY;  Surgeon: Sylvester Arboleda III, MD;  Location: [facility] ENDO;  Service: Endoscopy;  Laterality: N/A;    COLONOSCOPY N/A 8/4/2020    Procedure: COLONOSCOPY;  Surgeon: Sylvester Arboleda III, MD;  Location: [facility] ENDO;  Service: Endoscopy;  Laterality: N/A;    CORONARY ANGIOPLASTY      CORONARY ARTERY BYPASS GRAFT      EXCISION OF MASS OF HAND Right 2/11/2022    Procedure: EXCISION, MASS, HAND;  Surgeon: Hernan Culver MD;  Location: [facility] OR;  Service: Orthopedics;  Laterality: Right;   thenar eminence skin lesion excised and a Z-plasty skin flap for coverage    HYSTERECTOMY      OOPHORECTOMY      THYROIDECTOMY           Family History:  Family History   Problem Relation Name Age of Onset    Hypertension Mother      Stroke Mother      Lung cancer Daughter      Melanoma Neg Hx      Psoriasis Neg Hx      Lupus Neg Hx      Eczema Neg Hx         Social History:  Social History     Tobacco Use    Smoking status: Some Days     Current packs/day: 0.50     Average packs/day: 0.5 packs/day for 60.8 years (30.4 ttl pk-yrs)     Types: Cigarettes     Start date: 1964    Smokeless tobacco: Never   Substance and Sexual Activity    Alcohol use: Never    Drug use: No    Sexual activity: Not Currently     Partners: Male        Review of Systems     Review of Systems   Constitutional:  Negative for chills and fever.   HENT:  Negative for congestion and sore  throat.    Respiratory:  Negative for cough and shortness of breath.    Cardiovascular:  Negative for chest pain.   Gastrointestinal:  Negative for abdominal pain, nausea and vomiting.   Genitourinary:  Negative for dysuria.   Musculoskeletal:  Positive for arthralgias (right wrist pain) and joint swelling (right wrist). Negative for back pain.   Skin:  Negative for rash.   Neurological:  Negative for dizziness, weakness, numbness and headaches.   Hematological:  Does not bruise/bleed easily.   All other systems reviewed and are negative.       Physical Exam     Initial Vitals [10/16/24 0815]   BP Pulse Resp Temp SpO2   (!) 197/88 68 14 98.1 °F (36.7 °C) 98 %      MAP       --          Physical Exam  Nursing Notes and Vital Signs Reviewed.  Constitutional: Patient is in no acute distress. Chronically debilitated.  Head: Atraumatic. Normocephalic.  Eyes: PERRL. EOM intact. Conjunctivae are not pale. No scleral icterus.  ENT: Mucous membranes are moist. Oropharynx is clear and symmetric.    Neck: Supple. Full ROM. No lymphadenopathy.  Cardiovascular: Regular rate. Regular rhythm. No murmurs, rubs, or gallops. Distal pulses are 2+ and symmetric.  Pulmonary/Chest: No respiratory distress. Clear to auscultation bilaterally. No wheezing or rales.  Abdominal: Soft and non-distended.  There is no tenderness.  No rebound, guarding, or rigidity. Good bowel sounds.  Musculoskeletal: Moves all extremities. No obvious deformities. No edema. Mild soft tissue swelling along dorsum aspect of right wrist, No warmth, bony deformity, or erythema. ROM slightly limited  Skin: Warm and dry. Well-healed surgical scar over palmar aspect of right hand.  Neurological:  Alert, awake, and appropriate.  Normal speech.  No acute focal neurological deficits are appreciated.  Psychiatric: Normal affect. Good eye contact. Appropriate in content.     ED Course   Procedures  ED Vital Signs:  Vitals:    10/16/24 0815 10/16/24 0920   BP: (!) 197/88 (!)  "160/73   Pulse: 68 (!) 54   Resp: 14    Temp: 98.1 °F (36.7 °C)    TempSrc: Oral    SpO2: 98% 95%   Weight: 82.9 kg (182 lb 12.8 oz)    Height: 5' 6" (1.676 m)        Abnormal Lab Results:  Labs Reviewed - No data to display       Imaging Results:  Imaging Results              X-Ray Wrist Complete Right (Final result)  Result time 10/16/24 09:05:53      Final result by Bree Browne MD (Timothy) (10/16/24 09:05:53)                   Impression:      Negative exam.      Electronically signed by: Bree Browne MD  Date:    10/16/2024  Time:    09:05               Narrative:    EXAMINATION:  XR WRIST COMPLETE 3 VIEWS RIGHT    CLINICAL HISTORY:  Pain in right wrist    TECHNIQUE:  Standard radiography performed.  Four views.    COMPARISON:  None    FINDINGS:  Bone density and architecture are normal.  No acute findings.                                              The Emergency Provider reviewed the vital signs and test results, which are outlined above.     ED Discussion       9:19 AM: Reassessed pt at this time.  Discussed with pt all pertinent ED information and results. Discussed pt dx and plan of tx. Gave pt all f/u and return to the ED instructions. All questions and concerns were addressed at this time. Pt expresses understanding of information and instructions, and is comfortable with plan to discharge. Pt is stable for discharge.    I discussed with patient and/or family/caretaker that evaluation in the ED does not suggest any emergent or life threatening medical conditions requiring immediate intervention beyond what was provided in the ED, and I believe patient is safe for discharge.  Regardless, an unremarkable evaluation in the ED does not preclude the development or presence of a serious of life threatening condition. As such, patient was instructed to return immediately for any worsening or change in current symptoms.        Medical Decision Making  DDX: 1. Arthritis of wrist 2. Lupus Flare 3. Septic " arthritis 4. Gout    Xray reviewed and no swelling, bony deformity, no concerns for actual joint infection clinically, has lupus, will start on anti-inflammatory, refer to ortho, reasons to return given.     Amount and/or Complexity of Data Reviewed  Radiology: ordered and independent interpretation performed. Decision-making details documented in ED Course.    Risk  Prescription drug management.                ED Medication(s):  Medications - No data to display    Discharge Medication List as of 10/16/2024  9:18 AM        START taking these medications    Details   meloxicam (MOBIC) 7.5 MG tablet Take 1 tablet (7.5 mg total) by mouth once daily., Starting Wed 10/16/2024, Until Fri 11/15/2024, Normal              Follow-up Information       Tayler Ovalle DO. Schedule an appointment as soon as possible for a visit in 2 days.    Specialty: Internal Medicine  Contact information:  [id number] [place] [address] DR Jesi MCDOWELL [address]  [phone]               [facility]. Schedule an appointment as soon as possible for a visit in 2 days.    Specialty: Orthopedics  Why: Return to the Emergency Room, If symptoms worsen  Contact information:  [address] [facility]  [facility] [address]  [phone]                               Scribe Attestation:   Scribe #1: I performed the above scribed service and the documentation accurately describes the services I performed. I attest to the accuracy of the note.     Attending:   Physician Attestation Statement for Scribe #1: I, Janet Santa MD, personally performed the services described in this documentation, as scribed by Keyon Roldan, in my presence, and it is both accurate and complete.           Clinical Impression       ICD-10-CM ICD-9-CM   1. Right wrist pain  M25.531 719.43   2. 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 Chronic hypertension  I10 401.9       Disposition:   Disposition: Discharged  Condition:  Janet De La Rosa MD  10/18/24 0646

## 2024-10-17 ENCOUNTER — CARE AT HOME (OUTPATIENT)
Dept: HOME HEALTH SERVICES | Facility: CLINIC | Age: 79
End: 2024-10-17
Payer: MEDICARE

## 2024-10-17 DIAGNOSIS — F01.B0 MODERATE VASCULAR DEMENTIA WITHOUT BEHAVIORAL DISTURBANCE, PSYCHOTIC DISTURBANCE, MOOD DISTURBANCE, OR ANXIETY: ICD-10-CM

## 2024-10-17 DIAGNOSIS — M15.0 PRIMARY OSTEOARTHRITIS INVOLVING MULTIPLE JOINTS: ICD-10-CM

## 2024-10-17 DIAGNOSIS — Z09 HOSPITAL DISCHARGE FOLLOW-UP: Primary | ICD-10-CM

## 2024-10-17 DIAGNOSIS — M25.531 RIGHT WRIST PAIN: ICD-10-CM

## 2024-10-17 DIAGNOSIS — L93.0 DISCOID LUPUS ERYTHEMATOSUS: Chronic | ICD-10-CM

## 2024-10-17 DIAGNOSIS — I10 ESSENTIAL HYPERTENSION: ICD-10-CM

## 2024-10-17 PROCEDURE — 3078F DIAST BP <80 MM HG: CPT | Mod: CPTII,S$GLB,,

## 2024-10-17 PROCEDURE — 3075F SYST BP GE 130 - 139MM HG: CPT | Mod: CPTII,S$GLB,,

## 2024-10-17 PROCEDURE — 99349 HOME/RES VST EST MOD MDM 40: CPT | Mod: S$GLB,,,

## 2024-10-17 PROCEDURE — 1160F RVW MEDS BY RX/DR IN RCRD: CPT | Mod: CPTII,S$GLB,,

## 2024-10-17 PROCEDURE — 1125F AMNT PAIN NOTED PAIN PRSNT: CPT | Mod: CPTII,S$GLB,,

## 2024-10-17 PROCEDURE — 1159F MED LIST DOCD IN RCRD: CPT | Mod: CPTII,S$GLB,,

## 2024-10-17 NOTE — PROGRESS NOTES
[facility]  Transitional Care Management (TCM) Home Visit    Encounter Provider: Brnedon Ramirez   PCP: Tayler Ovalle DO  Consult Requested By: No ref. provider found  Admit Date: 10/16/24   IP Discharge Date: 10/16/24  Hospital Length of Stay:RRHLOS@ days  Days since discharge (from IP or SNF): 1   [facility] Contact Note:10/18/24  Hospital Diagnosis: No admission diagnoses are documented for this encounter.     HISTORY OF PRESENT ILLNESS      Patient ID: Flory Cam is a 79 y.o. female was recently admitted to the hospital, this is their TCM encounter.    Hospital Course Synopsis:  Flory Cam is a 79 y.o. female patient with a PMHx of HTN, lupus, anxiety, PVD, CAD, GERD, HLD, bilateral carotid artery stenosis, osteoporosis, acute coronary syndrome, hypothyroidism, and colon polyp who presents to the Emergency Department for evaluation of right wrist pain and swelling which onset gradually within the past few days. Pt recently has a s/p completed on the right wrist with Dr. Pelaez (Dermatology). Pt has a f/u for the 28th of this month.      Reports pain and swelling improved since discharge. Takes tylenol as needed for pain. Prescribed meloxicam from ED but patient does not want to take at this time. [facility] and PT/OT working with patient. Ambulates independently. Reports cough and congestion for a few days but improving. Takes coricidin OTC with relief. Denies further complaints or concerns. Need to follow up with ortho. Questions elicited. Time allowed for questions, all issues addressed. Contact info given for any concerns or changes.   DECISION MAKING TODAY       Assessment & Plan:  1. Hospital discharge follow-up    2. Right wrist pain  Comments:  takes tylenol as needed with relief  Prescribed meloxicam, not taking  F/U with ortho    3. Primary osteoarthritis involving multiple joints    4. Discoid lupus erythematosus  Assessment & Plan:  Chronic, stable  Continue current  plan      5. Essential hypertension  Assessment & Plan:  Chronic, stable  Continue current medications      6. 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 If you have any questions, ask your nurse or doctor.                CONTINUE taking these medications      albuterol 90 mcg/actuation inhaler  Commonly known as: PROVENTIL/VENTOLIN HFA  Inhale 2 puffs into the lungs every 6 (six) hours as needed for Wheezing.     albuterol-ipratropium 2.5 mg-0.5 mg/3 mL nebulizer solution  Commonly known as: DUO-NEB  Take 3 mLs by nebulization every 6 (six) hours as needed for Wheezing. Rescue     amLODIPine 10 MG tablet  Commonly known as: NORVASC  TAKE 1 TABLET(10 MG) BY MOUTH EVERY DAY     aspirin 81 MG EC tablet  Commonly known as: ECOTRIN  Take 1 tablet (81 mg total) by mouth once daily.     atorvastatin 80 MG tablet  Commonly known as: LIPITOR  TAKE 1 TABLET(80 MG) BY MOUTH EVERY DAY     clopidogreL 75 mg tablet  Commonly known as: PLAVIX  Take 1 tablet (75 mg total) by mouth once daily.     ergocalciferol 50,000 unit Cap  Commonly known as: ERGOCALCIFEROL  TAKE 1 CAPSULE BY MOUTH EVERY 7 DAYS     ezetimibe 10 mg tablet  Commonly known as: ZETIA  TAKE 1 TABLET(10 MG) BY MOUTH EVERY DAY     hydrALAZINE 50 MG tablet  Commonly known as: APRESOLINE  Take 1 tablet (50 mg total) by mouth every 12 (twelve) hours.     hydrOXYzine HCL 25 MG tablet  Commonly known as: ATARAX  Take 1 tablet (25 mg total) by mouth 3 (three) times daily as needed for Anxiety.     levothyroxine 137 MCG Tab tablet  Commonly known as: SYNTHROID  Take 1 tablet (137 mcg total) by mouth before breakfast.     meloxicam 7.5 MG tablet  Commonly known as: MOBIC  Take 1 tablet (7.5 mg total) by mouth  once daily.     memantine 5 MG Tab  Commonly known as: NAMENDA  Take 1 tablet (5 mg total) by mouth 2 (two) times daily.     valsartan 320 MG tablet  Commonly known as: DIOVAN  TAKE 1 TABLET(320 MG) BY MOUTH EVERY DAY              Medication Reconciliation:  Were medications changed on discharge? Yes  Were medications in the home? Yes  Is the patient taking the medications as directed? Yes  Does the patient understand the medications and changes? Yes  Does updated med list accurately reflects meds patient is currently taking? Yes    ENVIRONMENT OF CARE      Family and/or Caregiver present at visit?  Yes  Name of Caregiver: family  History provided by: patient    Advance Care Planning   Advanced Care Planning Status:  Patient has had an ACP conversation  Living Will: No  Power of : No  LaPOST: No    Does Caregiver have HCPoA: No  Changes today: none  Is patient hospice appropriate: No  (If needed, use PPS <30 or FAST score >7)  Was referral to hospice placed: No       Impression upon entering the home:  Physical Dwelling: apartment/condo   Appearance of home environment: cleaniness: clean, walking pathways: clear, lighting: adequate, and home structure: sound structure  Functional Status: independent  Mobility: ambulatory  Nutritional access: adequate intake and access  [facility] Yes,  [facility]    DME/Supplies: none     Diagnostic tests reviewed/disposition: I have reviewed all completed as well as pending diagnostic tests at the time of discharge.  Disease/illness education: Take all medication as prescribed. Activity as tolerated. Keep all upcoming appts.   Establishment or re-establishment of referral orders for community resources: No other necessary community resources.   Discussion with other health care providers: No discussion with other health care providers necessary.   Does patient have a PCP at [facility]? Yes   Repatriation plan with PCP? follow-up with PCP within 90d   Does patient have an  ostomy (ileostomy, colostomy, suprapubic catheter, nephrostomy tube, tracheostomy, PEG tube, pleurex catheter, cholecystostomy, etc)? No  Were BPAs reviewed? Yes    Social History     Socioeconomic History    Marital status:     Number of children: 3   Occupational History    Occupation: Retired     Comment: Dispatcher   Tobacco Use    Smoking status: Some Days     Current packs/day: 0.50     Average packs/day: 0.5 packs/day for 60.8 years (30.4 ttl pk-yrs)     Types: Cigarettes     Start date: 1964    Smokeless tobacco: Never   Substance and Sexual Activity    Alcohol use: Never    Drug use: No    Sexual activity: Not Currently     Partners: Male   Other Topics Concern    Are you pregnant or think you may be? No    Breast-feeding No   Social History Narrative    Patient is retired dispatcher.     Social Drivers of Health     Financial Resource Strain: Medium Risk (4/27/2024)    Overall Financial Resource Strain (CARDIA)     Difficulty of Paying Living Expenses: Somewhat hard   Food Insecurity: Food Insecurity Present (4/27/2024)    Hunger Vital Sign     Worried About Running Out of Food in the Last Year: Sometimes true     Ran Out of Food in the Last Year: Patient declined   Transportation Needs: Unmet Transportation Needs (4/27/2024)    PRAPARE - Transportation     Lack of Transportation (Medical): Yes     Lack of Transportation (Non-Medical): Yes   Physical Activity: Insufficiently Active (4/27/2024)    Exercise Vital Sign     Days of Exercise per Week: 3 days     Minutes of Exercise per Session: 10 min   Stress: Stress Concern Present (4/27/2024)    Citizen of [place] of Occupational Health - Occupational Stress Questionnaire     Feeling of Stress : To some extent   Housing Stability: Low Risk  (2/12/2024)    Housing Stability Vital Sign     Unable to Pay for Housing in the Last Year: No     Number of Places Lived in the Last Year: 1     Unstable Housing in the Last Year: No       OBJECTIVE:     Vital  Signs:  Vitals:    10/17/24 1323   BP: 134/68   Pulse: 68   Resp: 18       Review of Systems   Constitutional: Negative.    HENT:  Positive for congestion.    Eyes: Negative.    Respiratory: Negative.  Negative for chest tightness.    Cardiovascular: Negative.  Negative for leg swelling.   Gastrointestinal: Negative.    Endocrine: Negative.    Genitourinary: Negative.    Musculoskeletal:  Positive for arthralgias, gait problem and joint swelling.   Skin: Negative.    Allergic/Immunologic: Negative.    Hematological: Negative.    Psychiatric/Behavioral:  Positive for confusion. Negative for agitation.    All other systems reviewed and are negative.      Physical Exam:  Physical Exam  Vitals reviewed.   Constitutional:       General: She is not in acute distress.     Appearance: Normal appearance. She is well-developed.   HENT:      Head: Normocephalic and atraumatic.      Nose: Congestion present.      Mouth/Throat:      Mouth: Mucous membranes are dry.      Pharynx: Oropharynx is clear.   Eyes:      Pupils: Pupils are equal, round, and reactive to light.   Cardiovascular:      Rate and Rhythm: Normal rate and regular rhythm.      Pulses: Normal pulses.      Heart sounds: Normal heart sounds.   Pulmonary:      Effort: Pulmonary effort is normal.      Breath sounds: Normal breath sounds.   Abdominal:      General: Bowel sounds are normal.      Palpations: Abdomen is soft.   Musculoskeletal:         General: Normal range of motion.      Cervical back: Normal range of motion and neck supple.   Skin:     General: Skin is warm and dry.   Neurological:      General: No focal deficit present.      Mental Status: She is alert and oriented to person, place, and time. Mental status is at baseline.      Motor: Weakness present.   Psychiatric:         Mood and Affect: Mood normal.         Behavior: Behavior normal.         Thought Content: Thought content normal.         Judgment: Judgment normal.         INSTRUCTIONS FOR  PATIENT:   - Continue all medications, treatments and therapies as ordered.   - Follow all instructions, recommendations as discussed.  - Maintain Safety Precautions at all times.  - Attend all medical appointments as scheduled.  - For worsening symptoms: call Primary Care Physician or Nurse Practitioner.  - For emergencies, call 911 or immediately report to the nearest emergency room.   Scheduled Follow-up, Appts Reviewed with Modifications if Needed: Yes  Future Appointments   Date Time Provider Department Center   10/28/2024  9:00 AM [facility] CVT VASCULAR ULTRASOUND [facility] CVTVU [facility]   10/28/2024  9:30 AM [facility] CVT VASCULAR ULTRASOUND [facility] CVTVU [facility]   10/28/2024 10:00 AM Andie Macias MD [facility] VASSGY [facility]   11/20/2024 12:40 PM Christopher Cohen MD [facility] VAS CAR [facility]   12/18/2024  4:00 PM Chuy Barrow MD [facility] NEURO [facility]   1/29/2025  1:20 PM [facility]       Signature: Brendon Ramirez NP    Transition of Care Visit:  I have reviewed and updated the history and problem list.  I have reconciled the medication list.  I have discussed the hospitalization and current medical issues, prognosis and plans with the patient/family.

## 2024-10-18 ENCOUNTER — PATIENT OUTREACH (OUTPATIENT)
Dept: EMERGENCY MEDICINE | Facility: HOSPITAL | Age: 79
End: 2024-10-18
Payer: MEDICARE

## 2024-10-18 NOTE — PROGRESS NOTES
Pt was seen in the ED on 10/18/24 for Right wrist pain; Arthralgia of wrist, right; Discoid lupus; Chronic hypertension. ED Navigator spoke with pt for Post ED visit follow up navigation to assist with scheduling a 7-day Post ED visit follow up appt. Pt states that she had not yet contacted pcp to schedule and stated that she would like to schedule a follow up appt with orthopedics. I was unable to schedule appt for pt and sent a staff request for assistance to ortho pool.  Pt will be contacted directly for scheduling as pt will schedule as available. Pt has no additional needs at this time. Closing Encounter.     Akosua Abarca

## 2024-10-21 VITALS
RESPIRATION RATE: 18 BRPM | SYSTOLIC BLOOD PRESSURE: 134 MMHG | OXYGEN SATURATION: 97 % | DIASTOLIC BLOOD PRESSURE: 68 MMHG | HEART RATE: 68 BPM

## 2024-11-04 ENCOUNTER — INITIAL CONSULT (OUTPATIENT)
Dept: VASCULAR SURGERY | Facility: CLINIC | Age: 79
End: 2024-11-04
Payer: MEDICARE

## 2024-11-04 ENCOUNTER — HOSPITAL ENCOUNTER (OUTPATIENT)
Dept: VASCULAR SURGERY | Facility: HOSPITAL | Age: 79
Discharge: HOME | End: 2024-11-04
Attending: PHYSICIAN ASSISTANT
Payer: MEDICARE

## 2024-11-04 VITALS — DIASTOLIC BLOOD PRESSURE: 72 MMHG | SYSTOLIC BLOOD PRESSURE: 170 MMHG

## 2024-11-04 DIAGNOSIS — I71.43 INFRARENAL ABDOMINAL AORTIC ANEURYSM (AAA) WITHOUT RUPTURE: ICD-10-CM

## 2024-11-04 DIAGNOSIS — I77.9 CAROTID ARTERY DISEASE, UNSPECIFIED LATERALITY, UNSPECIFIED TYPE: ICD-10-CM

## 2024-11-04 DIAGNOSIS — I65.23 BILATERAL CAROTID ARTERY STENOSIS: ICD-10-CM

## 2024-11-04 DIAGNOSIS — I73.9 PERIPHERAL VASCULAR DISEASE: Chronic | ICD-10-CM

## 2024-11-04 DIAGNOSIS — R29.810 FACIAL DROOP: ICD-10-CM

## 2024-11-04 DIAGNOSIS — R29.818 ACUTE FOCAL NEUROLOGICAL DEFICIT: ICD-10-CM

## 2024-11-04 DIAGNOSIS — I71.40 ABDOMINAL AORTIC ANEURYSM (AAA) WITHOUT RUPTURE, UNSPECIFIED PART: ICD-10-CM

## 2024-11-04 DIAGNOSIS — R09.89 SUSPECTED CEREBROVASCULAR ACCIDENT (CVA): Primary | ICD-10-CM

## 2024-11-04 DIAGNOSIS — I10 HYPERTENSION, UNSPECIFIED TYPE: ICD-10-CM

## 2024-11-04 DIAGNOSIS — I77.6 ARTERITIS, UNSPECIFIED: ICD-10-CM

## 2024-11-04 LAB
LEFT ARM DIASTOLIC BLOOD PRESSURE: 72 MMHG
LEFT ARM SYSTOLIC BLOOD PRESSURE: 170 MMHG
LEFT CCA DIST DIAS: 13 CM/S
LEFT CCA DIST SYS: 72 CM/S
LEFT CCA MID DIAS: 13 CM/S
LEFT CCA MID SYS: 76 CM/S
LEFT CCA PROX DIAS: 11 CM/S
LEFT CCA PROX SYS: 89 CM/S
LEFT ECA DIAS: 3 CM/S
LEFT ECA SYS: 111 CM/S
LEFT ICA DIST DIAS: 21 CM/S
LEFT ICA DIST SYS: 64 CM/S
LEFT ICA MID DIAS: 21 CM/S
LEFT ICA MID SYS: 74 CM/S
LEFT ICA PROX DIAS: 20 CM/S
LEFT ICA PROX SYS: 80 CM/S
LEFT VERTEBRAL DIAS: 17 CM/S
LEFT VERTEBRAL SYS: 83 CM/S
OHS CV CAROTID RIGHT ICA EDV HIGHEST: 23
OHS CV CAROTID ULTRASOUND LEFT ICA/CCA RATIO: 1.11
OHS CV CAROTID ULTRASOUND RIGHT ICA/CCA RATIO: 1.58
OHS CV PV CAROTID LEFT HIGHEST CCA: 89
OHS CV PV CAROTID LEFT HIGHEST ICA: 80
OHS CV PV CAROTID RIGHT HIGHEST CCA: 86
OHS CV PV CAROTID RIGHT HIGHEST ICA: 109
OHS CV US CAROTID LEFT HIGHEST EDV: 21
RIGHT ARM DIASTOLIC BLOOD PRESSURE: 74 MMHG
RIGHT ARM SYSTOLIC BLOOD PRESSURE: 174 MMHG
RIGHT CCA DIST DIAS: 11 CM/S
RIGHT CCA DIST SYS: 69 CM/S
RIGHT CCA MID DIAS: 10 CM/S
RIGHT CCA MID SYS: 86 CM/S
RIGHT CCA PROX DIAS: 10 CM/S
RIGHT CCA PROX SYS: 70 CM/S
RIGHT ECA DIAS: 3 CM/S
RIGHT ECA SYS: 76 CM/S
RIGHT ICA DIST DIAS: 23 CM/S
RIGHT ICA DIST SYS: 69 CM/S
RIGHT ICA MID DIAS: 22 CM/S
RIGHT ICA MID SYS: 62 CM/S
RIGHT ICA PROX DIAS: 23 CM/S
RIGHT ICA PROX SYS: 109 CM/S
RIGHT VERTEBRAL DIAS: 17 CM/S
RIGHT VERTEBRAL SYS: 74 CM/S

## 2024-11-04 PROCEDURE — 93880 EXTRACRANIAL BILAT STUDY: CPT | Mod: 26,HCNC,, | Performed by: STUDENT IN AN ORGANIZED HEALTH CARE EDUCATION/TRAINING PROGRAM

## 2024-11-04 PROCEDURE — 99204 OFFICE O/P NEW MOD 45 MIN: CPT | Mod: HCNC,S$GLB,, | Performed by: STUDENT IN AN ORGANIZED HEALTH CARE EDUCATION/TRAINING PROGRAM

## 2024-11-04 PROCEDURE — 99999 PR PBB SHADOW E&M-EST. PATIENT-LVL III: CPT | Mod: PBBFAC,HCNC,, | Performed by: STUDENT IN AN ORGANIZED HEALTH CARE EDUCATION/TRAINING PROGRAM

## 2024-11-04 PROCEDURE — 93880 EXTRACRANIAL BILAT STUDY: CPT | Mod: HCNC

## 2024-11-04 NOTE — PROGRESS NOTES
Andie Macias    OFFICE NOTE    DATE OF VISIT: 2024  PATIENT NAME: Flory Cam  : 1945  MRN: [id number]  PRIMARY CARE PHYSICIAN: Tayler Ovalle DO  CARDIOLOGIST:   REFERRING PROVIDER: Trina Naqvi MD    CHIEF COMPLAINT   Chief Complaint   Patient presents with    Consult     Patient denies any symptoms at this time.       HISTORY OF PRESENT ILLNESS:  Flory Cam is a 79 y.o. female who presents to clinic today during workup for high blood pressure found to have old lacunar infarcts, cta was performed which suggested carotid disease. Also has a known 3.6 cm abdominal aortic aneurysm. She is asymptomatic from this abdominal aneurysm. She denies any focal neurological deficits    ALLERGIES:  Review of patient's allergies indicates:   Allergen Reactions    Methotrexate analogues     Cellcept [mycophenolate mofetil] Rash    Imuran [azathioprine] Rash       PAST MEDICAL HISTORY:  Past Medical History:   Diagnosis Date    Acute coronary syndrome     Anxiety     Bilateral carotid artery stenosis 2015    Chronic pain     Colon polyp     Coronary artery disease     GERD (gastroesophageal reflux disease)     Hyperlipidemia     Hypertension     Hypothyroidism     Low back pain     Lupus     Osteoporosis     Poor circulation     Pre-operative clearance 2019    PVD (peripheral vascular disease)     S/P peripheral artery angioplasty 2014    SCCA (squamous cell carcinoma) of skin 10/2019    Dr. ZANDRA Rivas--oncology    Skin disease        PAST SURGICAL HISTORY:  Past Surgical History:   Procedure Laterality Date    AORTOGRAPHY WITH SERIALOGRAPHY N/A 2021    Procedure: AORTOGRAM, WITH RUNOFF;  Surgeon: Christopher Cohen MD;  Location: [facility] CATH LAB;  Service: Cardiology;  Laterality: N/A;  COVID-19, mRNA, LNP-S, PF, 30 mcg/0.3 mL dose (COVID-19, MRNA, LN-S, PF (Pfizer)) 2021, 2021    Atherosclerotic PVD with intermittent claudication Bilateral 2021    Dr. Cohen     CATARACT EXTRACTION      COLONOSCOPY N/A 1/4/2017    Procedure: COLONOSCOPY;  Surgeon: Sylvester Arboleda III, MD;  Location: [facility] ENDO;  Service: Endoscopy;  Laterality: N/A;    COLONOSCOPY N/A 8/4/2020    Procedure: COLONOSCOPY;  Surgeon: Sylvester Arboleda III, MD;  Location: [facility] ENDO;  Service: Endoscopy;  Laterality: N/A;    CORONARY ANGIOPLASTY      CORONARY ARTERY BYPASS GRAFT      EXCISION OF MASS OF HAND Right 2/11/2022    Procedure: EXCISION, MASS, HAND;  Surgeon: Hernan Culver MD;  Location: [facility] OR;  Service: Orthopedics;  Laterality: Right;   thenar eminence skin lesion excised and a Z-plasty skin flap for coverage    HYSTERECTOMY      OOPHORECTOMY      THYROIDECTOMY         SOCIAL HISTORY:   Social History     Tobacco Use    Smoking status: Some Days     Current packs/day: 0.50     Average packs/day: 0.5 packs/day for 60.8 years (30.4 ttl pk-yrs)     Types: Cigarettes     Start date: 1964    Smokeless tobacco: Never   Substance Use Topics    Alcohol use: Never    Drug use: No       FAMILY HISTORY:  Family History   Problem Relation Name Age of Onset    Hypertension Mother      Stroke Mother      Lung cancer Daughter      Melanoma Neg Hx      Psoriasis Neg Hx      Lupus Neg Hx      Eczema Neg Hx         REVIEW OF SYSTEMS:  ROS  10 pt ros otherwise negative    PHYSICAL EXAM:  Vitals:    11/04/24 1143   BP: (!) 170/72      Physical Exam      Vss  Gen nad alert oriented  Palpable radial pulses b/l  Abd soft nt nd  Ext no edema noted  VASCULAR LAB STUDIES:  20-39% stenosis bilaterally  Abdominal aorta 3.6cm on last scan    ASSESSMENT AND PLAN:  Suspected cerebrovascular accident (CVA)  Has mild carotid disease bilaterally unlikely to be related to the chronic ischemic changes found on last CTA/MRI of head. Will see her annually and track progression of carotid artery disease. Ok to continue aspirin plavix statin for risk factor modification    Infrarenal abdominal aortic aneurysm (AAA) without  rupture  3.6cm abdominal aortic aneurysm will repeat u/s in  1 year      Flory was seen today for consult.    Diagnoses and all orders for this visit:    Suspected cerebrovascular accident (CVA)    Acute focal neurological deficit  -     Ambulatory referral/consult to Vascular Surgery    Facial droop  -     Ambulatory referral/consult to Vascular Surgery    Hypertension, unspecified type  -     Ambulatory referral/consult to Vascular Surgery    Peripheral vascular disease    Abdominal aortic aneurysm (AAA) without rupture, unspecified part  -     CV US Doppler Abdomen Complete; Future    Carotid artery disease, unspecified laterality, unspecified type  -     CV Ultrasound Bilateral Doppler Carotid; Future    Arteritis, unspecified  -     CV Ultrasound Bilateral Doppler Carotid; Future    Infrarenal abdominal aortic aneurysm (AAA) without rupture        No follow-ups on file.        Andie Macias  [facility]  Vascular Surgery  [phone] (Clinic Number)

## 2024-11-04 NOTE — ASSESSMENT & PLAN NOTE
Has mild carotid disease bilaterally unlikely to be related to the chronic ischemic changes found on last CTA/MRI of head. Will see her annually and track progression of carotid artery disease. Ok to continue aspirin plavix statin for risk factor modification

## 2024-11-20 ENCOUNTER — TELEPHONE (OUTPATIENT)
Dept: CARDIOLOGY | Facility: CLINIC | Age: 79
End: 2024-11-20
Payer: MEDICARE

## 2024-11-20 NOTE — TELEPHONE ENCOUNTER
Called and appt was made for her to f/u         ----- Message from Acacia sent at 11/20/2024  9:02 AM CST -----  Regarding: Patient Callback  Name of Who is Calling:   NO JARVIS JUNE [[id number]]     What is the request in detail:   Patient would like to reschedule the appointment they had for this afternoon     Can the clinic reply by [facility]:   No     What Number to [facility] Back [facility]:  Telephone Information:  Mobile          [phone]

## 2024-12-12 ENCOUNTER — OFFICE VISIT (OUTPATIENT)
Dept: CARDIOLOGY | Facility: CLINIC | Age: 79
End: 2024-12-12
Payer: MEDICARE

## 2024-12-12 ENCOUNTER — HOSPITAL ENCOUNTER (OUTPATIENT)
Dept: CARDIOLOGY | Facility: HOSPITAL | Age: 79
Discharge: HOME | End: 2024-12-12
Attending: INTERNAL MEDICINE
Payer: MEDICARE

## 2024-12-12 VITALS
HEART RATE: 64 BPM | SYSTOLIC BLOOD PRESSURE: 142 MMHG | HEIGHT: 66 IN | BODY MASS INDEX: 29.83 KG/M2 | DIASTOLIC BLOOD PRESSURE: 76 MMHG | WEIGHT: 185.63 LBS

## 2024-12-12 DIAGNOSIS — Z95.1 HX OF CABG: Chronic | ICD-10-CM

## 2024-12-12 DIAGNOSIS — I70.0 CALCIFICATION OF AORTA: ICD-10-CM

## 2024-12-12 DIAGNOSIS — M05.771 RHEUMATOID ARTHRITIS INVOLVING BOTH FEET WITH POSITIVE RHEUMATOID FACTOR: Chronic | ICD-10-CM

## 2024-12-12 DIAGNOSIS — I73.9 PERIPHERAL VASCULAR DISEASE: Primary | Chronic | ICD-10-CM

## 2024-12-12 DIAGNOSIS — I67.4 HYPERTENSIVE ENCEPHALOPATHY: ICD-10-CM

## 2024-12-12 DIAGNOSIS — F17.210 CIGARETTE NICOTINE DEPENDENCE WITHOUT COMPLICATION: ICD-10-CM

## 2024-12-12 DIAGNOSIS — J44.9 CHRONIC OBSTRUCTIVE PULMONARY DISEASE, UNSPECIFIED COPD TYPE: ICD-10-CM

## 2024-12-12 DIAGNOSIS — I71.43 INFRARENAL ABDOMINAL AORTIC ANEURYSM (AAA) WITHOUT RUPTURE: ICD-10-CM

## 2024-12-12 DIAGNOSIS — I65.29 STENOSIS OF CAROTID ARTERY, UNSPECIFIED LATERALITY: ICD-10-CM

## 2024-12-12 DIAGNOSIS — E78.5 HYPERLIPIDEMIA LDL GOAL <70: Chronic | ICD-10-CM

## 2024-12-12 DIAGNOSIS — I27.9 PULMONARY HEART DISEASE: ICD-10-CM

## 2024-12-12 DIAGNOSIS — I10 ESSENTIAL HYPERTENSION: ICD-10-CM

## 2024-12-12 DIAGNOSIS — I10 ESSENTIAL HYPERTENSION: Primary | ICD-10-CM

## 2024-12-12 DIAGNOSIS — M05.772 RHEUMATOID ARTHRITIS INVOLVING BOTH FEET WITH POSITIVE RHEUMATOID FACTOR: Chronic | ICD-10-CM

## 2024-12-12 DIAGNOSIS — R09.89 SUSPECTED CEREBROVASCULAR ACCIDENT (CVA): ICD-10-CM

## 2024-12-12 DIAGNOSIS — F01.B0 MODERATE VASCULAR DEMENTIA WITHOUT BEHAVIORAL DISTURBANCE, PSYCHOTIC DISTURBANCE, MOOD DISTURBANCE, OR ANXIETY: ICD-10-CM

## 2024-12-12 LAB
OHS QRS DURATION: 144 MS
OHS QTC CALCULATION: 445 MS

## 2024-12-12 PROCEDURE — 93010 ELECTROCARDIOGRAM REPORT: CPT | Mod: HCNC,,, | Performed by: INTERNAL MEDICINE

## 2024-12-12 PROCEDURE — 99999 PR PBB SHADOW E&M-EST. PATIENT-LVL III: CPT | Mod: PBBFAC,HCNC,, | Performed by: INTERNAL MEDICINE

## 2024-12-12 PROCEDURE — 93005 ELECTROCARDIOGRAM TRACING: CPT | Mod: HCNC

## 2024-12-12 NOTE — PROGRESS NOTES
Subjective:   Patient ID:  Flory Cam is a 79 y.o. female who presents for follow up of No chief complaint on file.      HPI  12/12/2023 YOLANDE HODGES   Ms. 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 Patient recently seen at [facility] ED due to HTN and focal neurological deficit (facial droop)> Workup including MRA of neck and MRI of brain un-revealing (with exception of chronic microvascular disease). She returns today and states she is doing ok. Main complaint is right knee pain. CV wise, seems to be doing ok. No chest pain, heaviness, or tightness. No SOB/LAURENT. No palpitations, LH, dizziness, near syncope, or syncope. No s/s suggestive of CHF. BP elevated today, has been running at high, repeat with slight improvement. She is compliant with her medications, took meds about an hour prior to appointment. Smoking occasionally. Using a walker to help with ambulation. Feels a lot of her BP spikes are related to pain issues.      Previously discussed leg studies/leg pain with Dr. Cohen. [place] symptoms more related to arthritic pain.       EKG today shows NSR, bifascicular block, T wave abnormality with lateral ischemia, LVH changes. Echo 10/20 showed normal EF. Stress test 10/20 negative for ischemia, +fixed defect.     12/12/2024   HAS MULTIPLE ARTHRITIC SYMPTOMS HAS GAIT ISSUES HAD FALLS LOST WEIGHT STILL SMOKING WAS EVALUATED WITH VASCULAR SURGERY FOR AAA AND CAROTID DISEASE CVA. HAS NO CHANGE INS TATUS CARDIIOVASCULAR WISE.  Past Medical History:   Diagnosis Date    Acute coronary syndrome     Anxiety     Bilateral carotid artery stenosis 11/17/2015    Chronic pain     Colon polyp     Coronary artery disease     GERD (gastroesophageal reflux disease)     Hyperlipidemia      Hypertension     Hypothyroidism     Low back pain     Lupus     Osteoporosis     Poor circulation     Pre-operative clearance 7/12/2019    PVD (peripheral vascular disease)     S/P peripheral artery angioplasty 02/13/2014    SCCA (squamous cell carcinoma) of skin 10/2019    Dr. ZANDRA Rivas--oncology    Skin disease        Past Surgical History:   Procedure Laterality Date    AORTOGRAPHY WITH SERIALOGRAPHY N/A 5/25/2021    Procedure: AORTOGRAM, WITH RUNOFF;  Surgeon: Christopher Cohen MD;  Location: [facility] CATH LAB;  Service: Cardiology;  Laterality: N/A;  COVID-19, mRNA, LNP-S, PF, 30 mcg/0.3 mL dose (COVID-19, MRNA, LN-S, PF (Pfizer)) 1/30/2021, 1/9/2021    Atherosclerotic PVD with intermittent claudication Bilateral 05/2021    Dr. Cohen    CATARACT EXTRACTION      COLONOSCOPY N/A 1/4/2017    Procedure: COLONOSCOPY;  Surgeon: Sylvester Arboleda III, MD;  Location: [facility] ENDO;  Service: Endoscopy;  Laterality: N/A;    COLONOSCOPY N/A 8/4/2020    Procedure: COLONOSCOPY;  Surgeon: Sylvester Arboleda III, MD;  Location: [facility] ENDO;  Service: Endoscopy;  Laterality: N/A;    CORONARY ANGIOPLASTY      CORONARY ARTERY BYPASS GRAFT      EXCISION OF MASS OF HAND Right 2/11/2022    Procedure: EXCISION, MASS, HAND;  Surgeon: Hernan Culver MD;  Location: [facility] OR;  Service: Orthopedics;  Laterality: Right;   thenar eminence skin lesion excised and a Z-plasty skin flap for coverage    HYSTERECTOMY      OOPHORECTOMY      THYROIDECTOMY         Social History     Tobacco Use    Smoking status: Some Days     Current packs/day: 0.50     Average packs/day: 0.5 packs/day for 60.9 years (30.5 ttl pk-yrs)     Types: Cigarettes     Start date: 1964    Smokeless tobacco: Never   Substance Use Topics    Alcohol use: Never    Drug use: No       Family History   Problem Relation Name Age of Onset    Hypertension Mother      Stroke Mother      Lung cancer Daughter      Melanoma Neg Hx      Psoriasis Neg Hx      Lupus Neg Hx      Eczema Neg Hx          Current Outpatient Medications   Medication Sig    albuterol (PROVENTIL/VENTOLIN HFA) 90 mcg/actuation inhaler Inhale 2 puffs into the lungs every 6 (six) hours as needed for Wheezing.    albuterol-ipratropium (DUO-NEB) 2.5 mg-0.5 mg/3 mL nebulizer solution Take 3 mLs by nebulization every 6 (six) hours as needed for Wheezing. Rescue    amLODIPine (NORVASC) 10 MG tablet TAKE 1 TABLET(10 MG) BY MOUTH EVERY DAY    atorvastatin (LIPITOR) 80 MG tablet TAKE 1 TABLET(80 MG) BY MOUTH EVERY DAY    clopidogreL (PLAVIX) 75 mg tablet Take 1 tablet (75 mg total) by mouth once daily.    ergocalciferol (ERGOCALCIFEROL) 50,000 unit Cap TAKE 1 CAPSULE BY MOUTH EVERY 7 DAYS    ezetimibe (ZETIA) 10 mg tablet TAKE 1 TABLET(10 MG) BY MOUTH EVERY DAY    hydrALAZINE (APRESOLINE) 50 MG tablet Take 1 tablet (50 mg total) by mouth every 12 (twelve) hours.    hydrOXYzine HCL (ATARAX) 25 MG tablet Take 1 tablet (25 mg total) by mouth 3 (three) times daily as needed for Anxiety.    levothyroxine (SYNTHROID) 137 MCG Tab tablet Take 1 tablet (137 mcg total) by mouth before breakfast.    memantine (NAMENDA) 5 MG Tab Take 1 tablet (5 mg total) by mouth 2 (two) times daily.    valsartan (DIOVAN) 320 MG tablet TAKE 1 TABLET(320 MG) BY MOUTH EVERY DAY    aspirin (ECOTRIN) 81 MG EC tablet Take 1 tablet (81 mg total) by mouth once daily.     No current facility-administered medications for this visit.     Current Outpatient Medications on File Prior to Visit   Medication Sig    albuterol (PROVENTIL/VENTOLIN HFA) 90 mcg/actuation inhaler Inhale 2 puffs into the lungs every 6 (six) hours as needed for Wheezing.    albuterol-ipratropium (DUO-NEB) 2.5 mg-0.5 mg/3 mL nebulizer solution Take 3 mLs by nebulization every 6 (six) hours as needed for Wheezing. Rescue    amLODIPine (NORVASC) 10 MG tablet TAKE 1 TABLET(10 MG) BY MOUTH EVERY DAY    atorvastatin (LIPITOR) 80 MG tablet TAKE 1 TABLET(80 MG) BY MOUTH EVERY DAY    clopidogreL (PLAVIX) 75 mg tablet  Take 1 tablet (75 mg total) by mouth once daily.    ergocalciferol (ERGOCALCIFEROL) 50,000 unit Cap TAKE 1 CAPSULE BY MOUTH EVERY 7 DAYS    ezetimibe (ZETIA) 10 mg tablet TAKE 1 TABLET(10 MG) BY MOUTH EVERY DAY    hydrALAZINE (APRESOLINE) 50 MG tablet Take 1 tablet (50 mg total) by mouth every 12 (twelve) hours.    hydrOXYzine HCL (ATARAX) 25 MG tablet Take 1 tablet (25 mg total) by mouth 3 (three) times daily as needed for Anxiety.    levothyroxine (SYNTHROID) 137 MCG Tab tablet Take 1 tablet (137 mcg total) by mouth before breakfast.    memantine (NAMENDA) 5 MG Tab Take 1 tablet (5 mg total) by mouth 2 (two) times daily.    valsartan (DIOVAN) 320 MG tablet TAKE 1 TABLET(320 MG) BY MOUTH EVERY DAY    aspirin (ECOTRIN) 81 MG EC tablet Take 1 tablet (81 mg total) by mouth once daily.     No current facility-administered medications on file prior to visit.     Review of patient's allergies indicates:   Allergen Reactions    Methotrexate analogues     Cellcept [mycophenolate mofetil] Rash    Imuran [azathioprine] Rash      Review of Systems   Constitutional: Positive for weight loss. Negative for diaphoresis, malaise/fatigue and weight gain.   HENT:  Negative for hoarse voice.    Eyes:  Negative for double vision and visual disturbance.   Cardiovascular:  Negative for chest pain, claudication, cyanosis, dyspnea on exertion, irregular heartbeat, leg swelling, near-syncope, orthopnea, palpitations, paroxysmal nocturnal dyspnea and syncope.   Respiratory:  Negative for cough, hemoptysis, shortness of breath and snoring.    Hematologic/Lymphatic: Negative for bleeding problem. Does not bruise/bleed easily.   Skin:  Negative for color change and poor wound healing.   Musculoskeletal:  Positive for arthritis, joint pain and stiffness. Negative for muscle cramps, muscle weakness and myalgias.   Gastrointestinal:  Negative for bloating, abdominal pain, change in bowel habit, diarrhea, heartburn, hematemesis, hematochezia,  melena and nausea.   Neurological:  Negative for excessive daytime sleepiness, dizziness, headaches, light-headedness, loss of balance, numbness and weakness.   Psychiatric/Behavioral:  Negative for memory loss. The patient does not have insomnia.    Allergic/Immunologic: Negative for hives.       Objective:   Physical Exam  Constitutional:       General: She is not in acute distress.     Appearance: Normal appearance. She is well-developed. She is not ill-appearing.   HENT:      Head: Normocephalic and atraumatic.   Eyes:      General: No scleral icterus.     Pupils: Pupils are equal, round, and reactive to light.   Neck:      Thyroid: No thyromegaly.      Vascular: Normal carotid pulses. No carotid bruit, hepatojugular reflux or JVD.      Trachea: No tracheal deviation.   Cardiovascular:      Rate and Rhythm: Normal rate and regular rhythm.      Heart sounds: Normal heart sounds. No murmur heard.     No friction rub. No gallop.   Pulmonary:      Effort: Pulmonary effort is normal. No respiratory distress.      Breath sounds: Normal breath sounds. No wheezing, rhonchi or rales.      Comments: Sca cabg well healed.   Chest:      Chest wall: No tenderness.   Abdominal:      General: Bowel sounds are normal. There is no abdominal bruit.      Palpations: Abdomen is soft. There is no hepatomegaly or pulsatile mass.      Tenderness: There is no abdominal tenderness.   Musculoskeletal:      Right shoulder: No deformity.      Cervical back: Normal range of motion and neck supple.      Right lower leg: No edema.      Left lower leg: No edema.   Skin:     General: Skin is warm and dry.      Findings: No erythema or rash.      Nails: There is no clubbing.      Comments: Discoid lupus changes.   Neurological:      Mental Status: She is alert and oriented to person, place, and time.      Cranial Nerves: No cranial nerve deficit.      Coordination: Coordination normal.      Comments: Facial droop noted.   Psychiatric:          "Mood and Affect: Mood normal.         Speech: Speech normal.         Behavior: Behavior normal.       Vitals:    12/12/24 1220 12/12/24 1221   BP: 136/70 (!) 142/76   BP Location: Right arm Left arm   Patient Position: Sitting Sitting   Pulse: 64    Weight: 84.2 kg (185 lb 10 oz)    Height: 5' 6" (1.676 m)      Lab Results   Component Value Date    CHOL 135 09/14/2024    CHOL 135 07/29/2024    CHOL 122 12/12/2023      Body mass index is 29.96 kg/m².   Lab Results   Component Value Date    HGBA1C 5.7 (H) 09/14/2024      BMP  Lab Results   Component Value Date     09/14/2024    K 4.0 09/14/2024     09/14/2024    CO2 26 09/14/2024    BUN 13 09/14/2024    CREATININE 0.7 09/14/2024    CALCIUM 9.3 09/14/2024    ANIONGAP 10 09/14/2024    EGFRNORACEVR >60 09/14/2024      Lab Results   Component Value Date    HDL 53 09/14/2024    HDL 45 07/29/2024    HDL 44 12/12/2023     Lab Results   Component Value Date    LDLCALC 71.8 09/14/2024    LDLCALC 74.8 07/29/2024    LDLCALC 62.6 (L) 12/12/2023     Lab Results   Component Value Date    TRIG 51 09/14/2024    TRIG 76 07/29/2024    TRIG 77 12/12/2023     Lab Results   Component Value Date    CHOLHDL 39.3 09/14/2024    CHOLHDL 33.3 07/29/2024    CHOLHDL 36.1 12/12/2023       Chemistry        Component Value Date/Time     09/14/2024 1103    K 4.0 09/14/2024 1103     09/14/2024 1103    CO2 26 09/14/2024 1103    BUN 13 09/14/2024 1103    CREATININE 0.7 09/14/2024 1103    GLU 98 09/14/2024 1103        Component Value Date/Time    CALCIUM 9.3 09/14/2024 1103    ALKPHOS 127 09/14/2024 1103    AST 21 09/14/2024 1103    ALT 17 09/14/2024 1103    BILITOT 0.5 09/14/2024 1103    ESTGFRAFRICA >60.0 02/01/2022 1102    EGFRNONAA >60.0 02/01/2022 1102          Lab Results   Component Value Date    TSH 0.771 09/14/2024     Lab Results   Component Value Date    INR 0.9 09/14/2024    INR 1.0 12/12/2023    INR 1.0 05/22/2021     Lab Results   Component Value Date    WBC 8.08 " 09/14/2024    HGB 13.3 09/14/2024    HCT 40.0 09/14/2024    MCV 93 09/14/2024     09/14/2024     BMP  Sodium   Date Value Ref Range Status   09/14/2024 141 136 - 145 mmol/L Final     Potassium   Date Value Ref Range Status   09/14/2024 4.0 3.5 - 5.1 mmol/L Final     Chloride   Date Value Ref Range Status   09/14/2024 105 95 - 110 mmol/L Final     CO2   Date Value Ref Range Status   09/14/2024 26 23 - 29 mmol/L Final     BUN   Date Value Ref Range Status   09/14/2024 13 8 - 23 mg/dL Final     Creatinine   Date Value Ref Range Status   09/14/2024 0.7 0.5 - 1.4 mg/dL Final     Calcium   Date Value Ref Range Status   09/14/2024 9.3 8.7 - 10.5 mg/dL Final     Anion Gap   Date Value Ref Range Status   09/14/2024 10 8 - 16 mmol/L Final     eGFR if    Date Value Ref Range Status   02/01/2022 >60.0 >60 mL/min/1.73 m^2 Final     eGFR if non    Date Value Ref Range Status   02/01/2022 >60.0 >60 mL/min/1.73 m^2 Final     Comment:     Calculation used to obtain the estimated glomerular filtration  rate (eGFR) is the CKD-EPI equation.        CrCl cannot be calculated (Patient's most recent lab result is older than the maximum 7 days allowed.).    Assessment:     1. Peripheral vascular disease    2. CAD: Hx of CABG    3. Rheumatoid arthritis involving both feet with positive rheumatoid factor    4. Hyperlipidemia LDL goal <70    5. Essential hypertension    6. Cigarette nicotine dependence without complication    7. Chronic obstructive pulmonary disease, unspecified COPD type    8. Calcification of aorta    9. Pulmonary heart disease    10. Hypertensive encephalopathy    11. Infrarenal abdominal aortic aneurysm (AAA) without rupture    12. Suspected cerebrovascular accident (CVA)    13. 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  Cad s/p cabg asymptomatic continue rf modification,.  Plan:     Continue current therapy  Cardiac low salt diet.  Risk factor modification and excercise program.  Smoking cessation counseling  F/u in 6 months with lipid cmp A1c

## 2025-01-02 DIAGNOSIS — I10 ESSENTIAL HYPERTENSION: ICD-10-CM

## 2025-01-03 RX ORDER — VALSARTAN 320 MG/1
TABLET ORAL
Qty: 90 TABLET | Refills: 3 | Status: SHIPPED | OUTPATIENT
Start: 2025-01-03

## 2025-01-03 RX ORDER — HYDRALAZINE HYDROCHLORIDE 50 MG/1
50 TABLET, FILM COATED ORAL EVERY 12 HOURS
Qty: 60 TABLET | Refills: 11 | Status: SHIPPED | OUTPATIENT
Start: 2025-01-03

## 2025-01-07 ENCOUNTER — TELEPHONE (OUTPATIENT)
Dept: INTERNAL MEDICINE | Facility: CLINIC | Age: 80
End: 2025-01-07
Payer: MEDICARE

## 2025-01-07 NOTE — TELEPHONE ENCOUNTER
----- Message from Keely sent at 1/7/2025  2:45 PM CST -----  Contact: Frida Florez  .Type:  Patient Requesting Call    Who Called:Frida Florez   Does the patient know what this is regarding?:Rep calling in requesting a DME order for nebulizer   Would the patient rather a call back or a response via [facility]? Call back  Best Call Back Number:[phone] fax [phone]  Additional Information:

## 2025-01-09 ENCOUNTER — HOSPITAL ENCOUNTER (OUTPATIENT)
Dept: RADIOLOGY | Facility: HOSPITAL | Age: 80
Discharge: HOME | End: 2025-01-09
Attending: INTERNAL MEDICINE
Payer: MEDICARE

## 2025-01-09 ENCOUNTER — OFFICE VISIT (OUTPATIENT)
Dept: INTERNAL MEDICINE | Facility: CLINIC | Age: 80
End: 2025-01-09
Payer: MEDICARE

## 2025-01-09 VITALS
BODY MASS INDEX: 29.34 KG/M2 | SYSTOLIC BLOOD PRESSURE: 124 MMHG | OXYGEN SATURATION: 98 % | RESPIRATION RATE: 20 BRPM | WEIGHT: 182.56 LBS | DIASTOLIC BLOOD PRESSURE: 72 MMHG | HEIGHT: 66 IN | HEART RATE: 70 BPM | TEMPERATURE: 98 F

## 2025-01-09 DIAGNOSIS — M79.641 RIGHT HAND PAIN: Primary | ICD-10-CM

## 2025-01-09 DIAGNOSIS — M79.641 RIGHT HAND PAIN: ICD-10-CM

## 2025-01-09 PROCEDURE — 3078F DIAST BP <80 MM HG: CPT | Mod: HCNC,CPTII,S$GLB, | Performed by: INTERNAL MEDICINE

## 2025-01-09 PROCEDURE — 73130 X-RAY EXAM OF HAND: CPT | Mod: 26,HCNC,RT, | Performed by: RADIOLOGY

## 2025-01-09 PROCEDURE — 99999 PR PBB SHADOW E&M-EST. PATIENT-LVL V: CPT | Mod: PBBFAC,HCNC,, | Performed by: INTERNAL MEDICINE

## 2025-01-09 PROCEDURE — 1125F AMNT PAIN NOTED PAIN PRSNT: CPT | Mod: HCNC,CPTII,S$GLB, | Performed by: INTERNAL MEDICINE

## 2025-01-09 PROCEDURE — 73130 X-RAY EXAM OF HAND: CPT | Mod: TC,HCNC,RT

## 2025-01-09 PROCEDURE — 3288F FALL RISK ASSESSMENT DOCD: CPT | Mod: HCNC,CPTII,S$GLB, | Performed by: INTERNAL MEDICINE

## 2025-01-09 PROCEDURE — 1160F RVW MEDS BY RX/DR IN RCRD: CPT | Mod: HCNC,CPTII,S$GLB, | Performed by: INTERNAL MEDICINE

## 2025-01-09 PROCEDURE — 3074F SYST BP LT 130 MM HG: CPT | Mod: HCNC,CPTII,S$GLB, | Performed by: INTERNAL MEDICINE

## 2025-01-09 PROCEDURE — 99213 OFFICE O/P EST LOW 20 MIN: CPT | Mod: HCNC,S$GLB,, | Performed by: INTERNAL MEDICINE

## 2025-01-09 PROCEDURE — 1159F MED LIST DOCD IN RCRD: CPT | Mod: HCNC,CPTII,S$GLB, | Performed by: INTERNAL MEDICINE

## 2025-01-09 PROCEDURE — 1100F PTFALLS ASSESS-DOCD GE2>/YR: CPT | Mod: HCNC,CPTII,S$GLB, | Performed by: INTERNAL MEDICINE

## 2025-01-09 NOTE — PROGRESS NOTES
Flory Cam  79 y.o. Black or  female  [id number]    Chief Complaint:  Chief Complaint   Patient presents with    Hand Pain       History of Present Illness:  History of Present Illness    CHIEF COMPLAINT:  Ms. Cam presents today for hand pain and difficulty with hand function.    HAND FUNCTION AND PAIN:  She experiences hand pain when attempting to  objects and has difficulty closing her hand completely, resulting in occasionally dropping items. She denies current numbness or tingling. She has a history of hand surgeries and previous wart removal from her hand.    NEUROLOGICAL HISTORY:  She has a history of TIA that occurred when her blood pressure was 200/7. MRI showed no lasting effects. Recent brain MRI for early dementia screening showed no evidence of stroke or other abnormalities that would explain current hand symptoms.      History:  Past Medical History:   Diagnosis Date    Acute coronary syndrome     Anxiety     Bilateral carotid artery stenosis 11/17/2015    Chronic pain     Colon polyp     Coronary artery disease     GERD (gastroesophageal reflux disease)     Hyperlipidemia     Hypertension     Hypothyroidism     Low back pain     Lupus     Osteoporosis     Poor circulation     Pre-operative clearance 7/12/2019    PVD (peripheral vascular disease)     S/P peripheral artery angioplasty 02/13/2014    SCCA (squamous cell carcinoma) of skin 10/2019    Dr. ZANDRA Rivas--oncology    Skin disease        Medications:  Current Outpatient Medications on File Prior to Visit   Medication Sig Dispense Refill    albuterol (PROVENTIL/VENTOLIN HFA) 90 mcg/actuation inhaler Inhale 2 puffs into the lungs every 6 (six) hours as needed for Wheezing. 18 g 3    albuterol-ipratropium (DUO-NEB) 2.5 mg-0.5 mg/3 mL nebulizer solution Take 3 mLs by nebulization every 6 (six) hours as needed for Wheezing. Rescue 75 mL 2    amLODIPine (NORVASC) 10 MG tablet TAKE 1 TABLET(10 MG) BY MOUTH EVERY DAY 90  tablet 3    aspirin (ECOTRIN) 81 MG EC tablet Take 1 tablet (81 mg total) by mouth once daily.  0    atorvastatin (LIPITOR) 80 MG tablet TAKE 1 TABLET(80 MG) BY MOUTH EVERY DAY 90 tablet 3    clopidogreL (PLAVIX) 75 mg tablet Take 1 tablet (75 mg total) by mouth once daily. 90 tablet 3    ergocalciferol (ERGOCALCIFEROL) 50,000 unit Cap TAKE 1 CAPSULE BY MOUTH EVERY 7 DAYS 12 capsule 2    ezetimibe (ZETIA) 10 mg tablet TAKE 1 TABLET(10 MG) BY MOUTH EVERY DAY 90 tablet 3    hydrALAZINE (APRESOLINE) 50 MG tablet TAKE 1 TABLET(50 MG) BY MOUTH EVERY 12 HOURS 60 tablet 11    hydrOXYzine HCL (ATARAX) 25 MG tablet Take 1 tablet (25 mg total) by mouth 3 (three) times daily as needed for Anxiety. 60 tablet 0    levothyroxine (SYNTHROID) 137 MCG Tab tablet Take 1 tablet (137 mcg total) by mouth before breakfast. 90 tablet 1    memantine (NAMENDA) 5 MG Tab Take 1 tablet (5 mg total) by mouth 2 (two) times daily. 60 tablet 3    valsartan (DIOVAN) 320 MG tablet TAKE 1 TABLET(320 MG) BY MOUTH EVERY DAY 90 tablet 3     No current facility-administered medications on file prior to visit.       Allergies:  Review of patient's allergies indicates:   Allergen Reactions    Methotrexate analogues     Cellcept [mycophenolate mofetil] Rash    Imuran [azathioprine] Rash       Exam:  Vitals:    01/09/25 1408   BP: 124/72   Pulse: 70   Resp: 20   Temp: 98.3 °F (36.8 °C)     Weight: 82.8 kg (182 lb 8.7 oz)   Body mass index is 29.46 kg/m².      Physical Exam    Vitals: Reviewed. Blood pressure is 200/7.  Constitutional: No acute distress. Well-developed. Not ill-appearing.  Eyes: No scleral icterus.  Cardiovascular: Normal rate.  Pulmonary: Pulmonary effort is normal. No respiratory distress.   Skin: Warm. Dry.  Neurological: Alert and oriented to person, place, and time.  Msk: Right hand decreased ROM, scarring in palm and raised lesions noted.   Psychiatric: Behavior normal.         Assessment:  The encounter diagnosis was Right hand  pain.    Assessment & Plan    HAND FUNCTION AND PAIN:  - Ms. Cam reports difficulty closing hand and dropping objects.  - Considered potential causes including joint issues, scar tissue from previous surgeries, and neurological factors.  - Ordered hand x-ray and referred patient to occupational therapy for hand mobility and strength exercises.  - Instructed patient to follow up after completing x-ray and therapy if symptoms do not improve.  - May refer to Dr. Culver (hand specialist) based on results and outcomes.  - Noted recent wrist x-ray from emergency room visit showed no significant findings related to hand function.    RTC to clinic at the end of the month for f/u as previously scheduled.

## 2025-01-13 DIAGNOSIS — Z00.00 ENCOUNTER FOR MEDICARE ANNUAL WELLNESS EXAM: ICD-10-CM

## 2025-01-29 ENCOUNTER — OFFICE VISIT (OUTPATIENT)
Dept: INTERNAL MEDICINE | Facility: CLINIC | Age: 80
End: 2025-01-29
Payer: MEDICARE

## 2025-01-29 VITALS
WEIGHT: 179.88 LBS | BODY MASS INDEX: 28.91 KG/M2 | TEMPERATURE: 100 F | DIASTOLIC BLOOD PRESSURE: 64 MMHG | HEIGHT: 66 IN | SYSTOLIC BLOOD PRESSURE: 108 MMHG | RESPIRATION RATE: 20 BRPM | OXYGEN SATURATION: 95 % | HEART RATE: 62 BPM

## 2025-01-29 DIAGNOSIS — L85.3 DRY SKIN: ICD-10-CM

## 2025-01-29 DIAGNOSIS — E55.9 VITAMIN D DEFICIENCY: ICD-10-CM

## 2025-01-29 DIAGNOSIS — G89.29 CHRONIC LEFT SHOULDER PAIN: ICD-10-CM

## 2025-01-29 DIAGNOSIS — M05.771 RHEUMATOID ARTHRITIS INVOLVING BOTH FEET WITH POSITIVE RHEUMATOID FACTOR: ICD-10-CM

## 2025-01-29 DIAGNOSIS — E03.9 HYPOTHYROIDISM (ACQUIRED): ICD-10-CM

## 2025-01-29 DIAGNOSIS — F41.9 ANXIETY: ICD-10-CM

## 2025-01-29 DIAGNOSIS — F01.B0 MODERATE VASCULAR DEMENTIA WITHOUT BEHAVIORAL DISTURBANCE, PSYCHOTIC DISTURBANCE, MOOD DISTURBANCE, OR ANXIETY: ICD-10-CM

## 2025-01-29 DIAGNOSIS — I27.9 PULMONARY HEART DISEASE: ICD-10-CM

## 2025-01-29 DIAGNOSIS — R73.03 PREDIABETES: ICD-10-CM

## 2025-01-29 DIAGNOSIS — M05.772 RHEUMATOID ARTHRITIS INVOLVING BOTH FEET WITH POSITIVE RHEUMATOID FACTOR: ICD-10-CM

## 2025-01-29 DIAGNOSIS — I10 ESSENTIAL HYPERTENSION: Primary | ICD-10-CM

## 2025-01-29 DIAGNOSIS — M25.512 CHRONIC LEFT SHOULDER PAIN: ICD-10-CM

## 2025-01-29 DIAGNOSIS — J44.9 CHRONIC OBSTRUCTIVE PULMONARY DISEASE, UNSPECIFIED COPD TYPE: ICD-10-CM

## 2025-01-29 PROCEDURE — 1159F MED LIST DOCD IN RCRD: CPT | Mod: HCNC,CPTII,S$GLB, | Performed by: INTERNAL MEDICINE

## 2025-01-29 PROCEDURE — 3288F FALL RISK ASSESSMENT DOCD: CPT | Mod: HCNC,CPTII,S$GLB, | Performed by: INTERNAL MEDICINE

## 2025-01-29 PROCEDURE — 99999 PR PBB SHADOW E&M-EST. PATIENT-LVL IV: CPT | Mod: PBBFAC,HCNC,, | Performed by: INTERNAL MEDICINE

## 2025-01-29 PROCEDURE — 1160F RVW MEDS BY RX/DR IN RCRD: CPT | Mod: HCNC,CPTII,S$GLB, | Performed by: INTERNAL MEDICINE

## 2025-01-29 PROCEDURE — 99214 OFFICE O/P EST MOD 30 MIN: CPT | Mod: HCNC,S$GLB,, | Performed by: INTERNAL MEDICINE

## 2025-01-29 PROCEDURE — 3078F DIAST BP <80 MM HG: CPT | Mod: HCNC,CPTII,S$GLB, | Performed by: INTERNAL MEDICINE

## 2025-01-29 PROCEDURE — 1125F AMNT PAIN NOTED PAIN PRSNT: CPT | Mod: HCNC,CPTII,S$GLB, | Performed by: INTERNAL MEDICINE

## 2025-01-29 PROCEDURE — 3074F SYST BP LT 130 MM HG: CPT | Mod: HCNC,CPTII,S$GLB, | Performed by: INTERNAL MEDICINE

## 2025-01-29 PROCEDURE — G2211 COMPLEX E/M VISIT ADD ON: HCPCS | Mod: HCNC,S$GLB,, | Performed by: INTERNAL MEDICINE

## 2025-01-29 PROCEDURE — 1101F PT FALLS ASSESS-DOCD LE1/YR: CPT | Mod: HCNC,CPTII,S$GLB, | Performed by: INTERNAL MEDICINE

## 2025-01-29 RX ORDER — HYDROXYZINE HYDROCHLORIDE 25 MG/1
25 TABLET, FILM COATED ORAL 3 TIMES DAILY PRN
Qty: 60 TABLET | Refills: 2 | Status: SHIPPED | OUTPATIENT
Start: 2025-01-29

## 2025-01-29 RX ORDER — ERGOCALCIFEROL 1.25 MG/1
50000 CAPSULE ORAL
Qty: 12 CAPSULE | Refills: 2 | Status: SHIPPED | OUTPATIENT
Start: 2025-01-29

## 2025-01-29 NOTE — PROGRESS NOTES
Flory Cam  79 y.o. Black or  female  [id number]    Chief Complaint:  Chief Complaint   Patient presents with    Follow-up     6 months     Shoulder Pain     Left        History of Present Illness:  History of Present Illness    CHIEF COMPLAINT:  Ms. Cam presents today for follow up    HYPERTENSION:  She takes multiple blood pressure medications including hydralazine, totaling eight pills daily with one taken at night. Her home blood pressure readings have been normal except for today. She denies dizziness or weakness.    PRE-DIABETES:  She has been diagnosed with pre-diabetes and acknowledges being at risk for developing diabetes.    MUSCULOSKELETAL:  She reports intermittent shoulder pain that radiates to the elbows.    RESPIRATORY:  She has a history of COPD and uses a rescue inhaler as needed. She reports occasional smoking when feeling anxious. She denies current breathing problems, wheezing, coughing, or shortness of breath.    CURRENT MEDICATIONS:  She takes thyroid medication as prescribed and hydroxyzine for anxiety at night, which helps with sleep.     VASCULAR DEMENTIA:   Suspected stroke in 2024. Saw vascular and neurology. Prescribed memantine.         Review of Systems   Constitutional:  Negative for fever.   Respiratory:  Negative for cough, sputum production, shortness of breath and wheezing.    Cardiovascular:  Negative for chest pain.   Musculoskeletal:  Positive for joint pain.   Skin:  Positive for rash.   Neurological:  Negative for dizziness and headaches.   Psychiatric/Behavioral:  The patient is nervous/anxious and has insomnia.        Laboratory:  Lab Results   Component Value Date    WBC 8.08 09/14/2024    HGB 13.3 09/14/2024    HCT 40.0 09/14/2024     09/14/2024    CHOL 135 09/14/2024    TRIG 51 09/14/2024    HDL 53 09/14/2024    ALT 17 09/14/2024    AST 21 09/14/2024     09/14/2024    K 4.0 09/14/2024     09/14/2024    CREATININE 0.7  09/14/2024    BUN 13 09/14/2024    CO2 26 09/14/2024    TSH 0.771 09/14/2024    INR 0.9 09/14/2024    HGBA1C 5.7 (H) 09/14/2024     Lab Results   Component Value Date    LDLCALC 71.8 09/14/2024       History:  Past Medical History:   Diagnosis Date    Acute coronary syndrome     Anxiety     Bilateral carotid artery stenosis 11/17/2015    Chronic pain     Colon polyp     Coronary artery disease     GERD (gastroesophageal reflux disease)     Hyperlipidemia     Hypertension     Hypothyroidism     Low back pain     Lupus     Osteoporosis     Poor circulation     Pre-operative clearance 7/12/2019    PVD (peripheral vascular disease)     S/P peripheral artery angioplasty 02/13/2014    SCCA (squamous cell carcinoma) of skin 10/2019    Dr. ZANDRA Rivas--oncology    Skin disease        Medications:  Current Outpatient Medications on File Prior to Visit   Medication Sig Dispense Refill    albuterol (PROVENTIL/VENTOLIN HFA) 90 mcg/actuation inhaler Inhale 2 puffs into the lungs every 6 (six) hours as needed for Wheezing. 18 g 3    albuterol-ipratropium (DUO-NEB) 2.5 mg-0.5 mg/3 mL nebulizer solution Take 3 mLs by nebulization every 6 (six) hours as needed for Wheezing. Rescue 75 mL 2    amLODIPine (NORVASC) 10 MG tablet TAKE 1 TABLET(10 MG) BY MOUTH EVERY DAY 90 tablet 3    aspirin (ECOTRIN) 81 MG EC tablet Take 1 tablet (81 mg total) by mouth once daily.  0    atorvastatin (LIPITOR) 80 MG tablet TAKE 1 TABLET(80 MG) BY MOUTH EVERY DAY 90 tablet 3    clopidogreL (PLAVIX) 75 mg tablet Take 1 tablet (75 mg total) by mouth once daily. 90 tablet 3    ezetimibe (ZETIA) 10 mg tablet TAKE 1 TABLET(10 MG) BY MOUTH EVERY DAY 90 tablet 3    hydrALAZINE (APRESOLINE) 50 MG tablet TAKE 1 TABLET(50 MG) BY MOUTH EVERY 12 HOURS 60 tablet 11    levothyroxine (SYNTHROID) 137 MCG Tab tablet Take 1 tablet (137 mcg total) by mouth before breakfast. 90 tablet 1    valsartan (DIOVAN) 320 MG tablet TAKE 1 TABLET(320 MG) BY MOUTH EVERY DAY 90 tablet 3     ergocalciferol (ERGOCALCIFEROL) 50,000 unit Cap TAKE 1 CAPSULE BY MOUTH EVERY 7 DAYS 12 capsule 2    hydrOXYzine HCL (ATARAX) 25 MG tablet Take 1 tablet (25 mg total) by mouth 3 (three) times daily as needed for Anxiety. 60 tablet 0    memantine (NAMENDA) 5 MG Tab Take 1 tablet (5 mg total) by mouth 2 (two) times daily. 60 tablet 3     No current facility-administered medications on file prior to visit.       Allergies:  Review of patient's allergies indicates:   Allergen Reactions    Methotrexate analogues     Cellcept [mycophenolate mofetil] Rash    Imuran [azathioprine] Rash       Exam:  Vitals:    01/29/25 1319   BP: 108/64   Pulse: 62   Resp: 20   Temp: 99.5 °F (37.5 °C)     Weight: 81.6 kg (179 lb 14.3 oz)   Body mass index is 29.04 kg/m².      Physical Exam    Vitals: Reviewed.  Constitutional: No acute distress. Well-developed. Not ill-appearing.  Eyes: No scleral icterus.  Cardiovascular: Normal rate and regular rhythm. Normal heart sounds.  Pulmonary: Pulmonary effort is normal. No respiratory distress. Normal breath sounds.  Abdomen: Soft. Nontender. Nondistended. Normoactive bowel sounds.  Extremities: No edema.  Skin: Warm. Dry skin on left arm.  Neurological: Alert and oriented to person, place, and time.  Psychiatric: Behavior normal.         Assessment:  The primary encounter diagnosis was Essential hypertension. 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    Assessment & Plan    HYPERTENSION:  - Assessed blood pressure, noting it was 124/72 during the last visit, lower than previous readings but still within normal  range.  - Inquired about symptoms related to low blood pressure.  - Ms. Cam is on multiple medications for blood pressure management, including hydralazine (7 pills during the day, 1 at night).  - Instructed patient to continue using home blood pressure machine for monitoring.    PRE-DIABETES:  - Evaluated patient's pre-diabetic status based on elevated A1C 4 months ago.  - Explained pre-diabetes and the risk of developing diabetes to the patient.  - Discussed dietary modifications for management.  - Ordered A1C level and other labs for next week.    THYROID DISORDER:  - Confirmed patient is taking thyroid medication.    SHOULDER PAIN:  - Evaluated reported intermittent shoulder pain extending to elbows.  - Assessed that the pain could be positional.  - Inquired about the location and nature of the pain.  - Suggested exercise as a potential management strategy.    RHEUMATOID ARTHRITIS WITH RHEUMATOID FACTOR OF RIGHT ANKLE AND FOOT WITHOUT ORGAN OR SYSTEMS INVOLVEMENT:  - Ms. Cam reports improvement in condition and has been performing self-therapy.  - Recommend pausing therapy while maintaining the option to resume if symptoms worsen.    CHRONIC OBSTRUCTIVE PULMONARY DISEASE, UNSPECIFIED COPD TYPE:  - Reviewed patient's COPD history.  - Ms. Cam reports no current breathing problems and is in good condition despite COPD history.  - Recommend reducing smoking frequency.  - Confirmed patient has a rescue inhaler on hand.  - Noted patient's occasional smoking when stressed.  - Recommend further reduction in smoking and discussed the potential link between smoking and  symptoms.    DRY SKIN:  - Examined patient's skin, confirming it's very dry but not red on one arm.  - Noted patient has not seen a dermatologist in a long time.  - Recommend applying water-based moisturizer to dry skin areas.    MEDICATIONS/SUPPLEMENTS:  - Refilled vitamin D and hydroxyzine (for anxiety) prescriptions, to be sent to  [facility].    VASCULAR DEMENTIA:   - Monitor  - Follow up with neurology  - Continue memantine    LABS:  - CBC ordered for next week.    FOLLOW UP:  - Follow up in 1 week for labs and as scheduled for routine appointment.

## 2025-01-31 ENCOUNTER — HOSPITAL ENCOUNTER (EMERGENCY)
Facility: HOSPITAL | Age: 80
Discharge: HOME | End: 2025-01-31
Attending: EMERGENCY MEDICINE
Payer: MEDICARE

## 2025-01-31 VITALS
SYSTOLIC BLOOD PRESSURE: 157 MMHG | TEMPERATURE: 99 F | DIASTOLIC BLOOD PRESSURE: 96 MMHG | HEART RATE: 69 BPM | RESPIRATION RATE: 16 BRPM | HEIGHT: 66 IN | WEIGHT: 177.63 LBS | OXYGEN SATURATION: 98 % | BODY MASS INDEX: 28.55 KG/M2

## 2025-01-31 DIAGNOSIS — R07.9 CHEST PAIN: ICD-10-CM

## 2025-01-31 DIAGNOSIS — I50.9 ACUTE CONGESTIVE HEART FAILURE, UNSPECIFIED HEART FAILURE TYPE: Primary | ICD-10-CM

## 2025-01-31 LAB
ALBUMIN SERPL BCP-MCNC: 3.7 G/DL (ref 3.5–5.2)
ALP SERPL-CCNC: 133 U/L (ref 40–150)
ALT SERPL W/O P-5'-P-CCNC: 20 U/L (ref 10–44)
ANION GAP SERPL CALC-SCNC: 10 MMOL/L (ref 8–16)
AST SERPL-CCNC: 22 U/L (ref 10–40)
BASOPHILS # BLD AUTO: 0.05 K/UL (ref 0–0.2)
BASOPHILS NFR BLD: 0.5 % (ref 0–1.9)
BILIRUB SERPL-MCNC: 0.5 MG/DL (ref 0.1–1)
BNP SERPL-MCNC: 189 PG/ML (ref 0–99)
BUN SERPL-MCNC: 17 MG/DL (ref 8–23)
CALCIUM SERPL-MCNC: 9.5 MG/DL (ref 8.7–10.5)
CHLORIDE SERPL-SCNC: 104 MMOL/L (ref 95–110)
CO2 SERPL-SCNC: 25 MMOL/L (ref 23–29)
CREAT SERPL-MCNC: 0.9 MG/DL (ref 0.5–1.4)
DIFFERENTIAL METHOD BLD: ABNORMAL
EOSINOPHIL # BLD AUTO: 0.1 K/UL (ref 0–0.5)
EOSINOPHIL NFR BLD: 1.4 % (ref 0–8)
ERYTHROCYTE [DISTWIDTH] IN BLOOD BY AUTOMATED COUNT: 13.2 % (ref 11.5–14.5)
EST. GFR  (NO RACE VARIABLE): >60 ML/MIN/1.73 M^2
GLUCOSE SERPL-MCNC: 114 MG/DL (ref 70–110)
HCT VFR BLD AUTO: 42.8 % (ref 37–48.5)
HGB BLD-MCNC: 14.3 G/DL (ref 12–16)
IMM GRANULOCYTES # BLD AUTO: 0.04 K/UL (ref 0–0.04)
IMM GRANULOCYTES NFR BLD AUTO: 0.4 % (ref 0–0.5)
LYMPHOCYTES # BLD AUTO: 2.2 K/UL (ref 1–4.8)
LYMPHOCYTES NFR BLD: 22.6 % (ref 18–48)
MCH RBC QN AUTO: 31.3 PG (ref 27–31)
MCHC RBC AUTO-ENTMCNC: 33.4 G/DL (ref 32–36)
MCV RBC AUTO: 94 FL (ref 82–98)
MONOCYTES # BLD AUTO: 0.8 K/UL (ref 0.3–1)
MONOCYTES NFR BLD: 7.6 % (ref 4–15)
NEUTROPHILS # BLD AUTO: 6.7 K/UL (ref 1.8–7.7)
NEUTROPHILS NFR BLD: 67.5 % (ref 38–73)
NRBC BLD-RTO: 0 /100 WBC
OHS QRS DURATION: 150 MS
OHS QTC CALCULATION: 456 MS
PLATELET # BLD AUTO: 249 K/UL (ref 150–450)
PMV BLD AUTO: 9.6 FL (ref 9.2–12.9)
POTASSIUM SERPL-SCNC: 3.5 MMOL/L (ref 3.5–5.1)
PROT SERPL-MCNC: 7.3 G/DL (ref 6–8.4)
RBC # BLD AUTO: 4.57 M/UL (ref 4–5.4)
SODIUM SERPL-SCNC: 139 MMOL/L (ref 136–145)
TROPONIN I SERPL DL<=0.01 NG/ML-MCNC: 0.03 NG/ML (ref 0–0.03)
WBC # BLD AUTO: 9.93 K/UL (ref 3.9–12.7)

## 2025-01-31 PROCEDURE — 99285 EMERGENCY DEPT VISIT HI MDM: CPT | Mod: 25,HCNC

## 2025-01-31 PROCEDURE — 85025 COMPLETE CBC W/AUTO DIFF WBC: CPT | Mod: HCNC | Performed by: NURSE PRACTITIONER

## 2025-01-31 PROCEDURE — 80053 COMPREHEN METABOLIC PANEL: CPT | Mod: HCNC | Performed by: NURSE PRACTITIONER

## 2025-01-31 PROCEDURE — 83880 ASSAY OF NATRIURETIC PEPTIDE: CPT | Mod: HCNC | Performed by: NURSE PRACTITIONER

## 2025-01-31 PROCEDURE — 84484 ASSAY OF TROPONIN QUANT: CPT | Mod: HCNC | Performed by: NURSE PRACTITIONER

## 2025-01-31 PROCEDURE — 93005 ELECTROCARDIOGRAM TRACING: CPT | Mod: HCNC

## 2025-01-31 PROCEDURE — 93010 ELECTROCARDIOGRAM REPORT: CPT | Mod: HCNC,,, | Performed by: INTERNAL MEDICINE

## 2025-01-31 RX ORDER — METOLAZONE 2.5 MG/1
2.5 TABLET ORAL DAILY
Qty: 5 TABLET | Refills: 0 | Status: SHIPPED | OUTPATIENT
Start: 2025-01-31 | End: 2025-02-20

## 2025-01-31 NOTE — FIRST PROVIDER EVALUATION
"Medical screening examination initiated.  I have conducted a focused provider triage encounter, findings are as follows:    Brief history of present illness:  pt presents with right sided chest pain    Vitals:    01/31/25 1343 01/31/25 1345 01/31/25 1348 01/31/25 1358   BP: (!) 140/68      BP Location: Right arm      Pulse: 69      Resp: 18      Temp:   97.5 °F (36.4 °C)    TempSrc:   Oral    SpO2: (!) 94% 99%     Weight: 80.6 kg (177 lb 9.6 oz)      Height:    5' 6" (1.676 m)       Pertinent physical exam:  nad    Brief workup plan:  cardiac workup     Preliminary workup initiated; this workup will be continued and followed by the physician or advanced practice provider that is assigned to the patient when roomed.  "

## 2025-01-31 NOTE — ED PROVIDER NOTES
Encounter Date: 1/31/2025       History     Chief Complaint   Patient presents with    Chest Pain     Pt c/o right sided pain near ribs with worsening during breathing. Pt denies injury. Hx of copd and cva     79-year-old female presents the emergency department for right-sided chest pain what she noticed when she woke up early this morning.  Patient reports continued pain to the right lateral rib region.  Patient reports the pain does not radiate.  Patient reports the pain is worse with the movement.  Patient denies any fever, chills, shortness of breath, back pain, abdominal pain, nausea, vomiting, and all other concerns at this time.        Review of patient's allergies indicates:   Allergen Reactions    Methotrexate analogues     Cellcept [mycophenolate mofetil] Rash    Imuran [azathioprine] Rash     Past Medical History:   Diagnosis Date    Acute coronary syndrome     Anxiety     Bilateral carotid artery stenosis 11/17/2015    Chronic pain     Colon polyp     Coronary artery disease     GERD (gastroesophageal reflux disease)     Hyperlipidemia     Hypertension     Hypothyroidism     Low back pain     Lupus     Osteoporosis     Poor circulation     Pre-operative clearance 7/12/2019    PVD (peripheral vascular disease)     S/P peripheral artery angioplasty 02/13/2014    SCCA (squamous cell carcinoma) of skin 10/2019    Dr. ZANDRA Rivas--oncology    Skin disease      Past Surgical History:   Procedure Laterality Date    AORTOGRAPHY WITH SERIALOGRAPHY N/A 5/25/2021    Procedure: AORTOGRAM, WITH RUNOFF;  Surgeon: Christopher Cohen MD;  Location: [facility] CATH LAB;  Service: Cardiology;  Laterality: N/A;  COVID-19, mRNA, LNP-S, PF, 30 mcg/0.3 mL dose (COVID-19, MRNA, LN-S, PF (Pfizer)) 1/30/2021, 1/9/2021    Atherosclerotic PVD with intermittent claudication Bilateral 05/2021    Dr. Cohen    CATARACT EXTRACTION      COLONOSCOPY N/A 1/4/2017    Procedure: COLONOSCOPY;  Surgeon: Sylvester Arboleda III, MD;  Location: [facility] ENDO;   Service: Endoscopy;  Laterality: N/A;    COLONOSCOPY N/A 8/4/2020    Procedure: COLONOSCOPY;  Surgeon: Sylvester Arboleda III, MD;  Location: [facility];  Service: Endoscopy;  Laterality: N/A;    CORONARY ANGIOPLASTY      CORONARY ARTERY BYPASS GRAFT      EXCISION OF MASS OF HAND Right 2/11/2022    Procedure: EXCISION, MASS, HAND;  Surgeon: Hernan Culver MD;  Location: [facility] OR;  Service: Orthopedics;  Laterality: Right;   thenar eminence skin lesion excised and a Z-plasty skin flap for coverage    HYSTERECTOMY      OOPHORECTOMY      THYROIDECTOMY       Family History   Problem Relation Name Age of Onset    Hypertension Mother      Stroke Mother      Lung cancer Daughter      Melanoma Neg Hx      Psoriasis Neg Hx      Lupus Neg Hx      Eczema Neg Hx       Social History     Tobacco Use    Smoking status: Some Days     Current packs/day: 0.50     Average packs/day: 0.5 packs/day for 61.1 years (30.5 ttl pk-yrs)     Types: Cigarettes     Start date: 1964    Smokeless tobacco: Never   Substance Use Topics    Alcohol use: Never    Drug use: No     Review of Systems   Constitutional:  Negative for fever.   HENT:  Negative for sore throat.    Respiratory:  Negative for shortness of breath.    Cardiovascular:  Positive for chest pain.   Gastrointestinal:  Negative for abdominal pain, nausea and vomiting.   Genitourinary:  Negative for dysuria.   Musculoskeletal:  Negative for back pain.   Skin:  Negative for rash.   Neurological:  Negative for weakness.   Hematological:  Does not bruise/bleed easily.       Physical Exam     Initial Vitals   BP Pulse Resp Temp SpO2   01/31/25 1343 01/31/25 1343 01/31/25 1343 01/31/25 1348 01/31/25 1343   (!) 140/68 69 18 97.5 °F (36.4 °C) (!) 94 %      MAP       --                Physical Exam    Nursing note and vitals reviewed.  Constitutional: She appears well-developed and well-nourished. She is not diaphoretic. No distress.   HENT:   Head: Normocephalic and atraumatic.   Eyes:  Right eye exhibits no discharge. Left eye exhibits no discharge.   Neck: Neck supple.   Normal range of motion.  Cardiovascular:  Normal rate.           Pulmonary/Chest: No respiratory distress.   Abdominal: She exhibits no distension.   Musculoskeletal:         General: Normal range of motion.      Cervical back: Normal range of motion and neck supple.     Neurological: She is alert and oriented to person, place, and time. She has normal strength.   Skin: Skin is warm and dry.   Psychiatric: She has a normal mood and affect. Her behavior is normal. Thought content normal.         ED Course   Procedures  Labs Reviewed   CBC W/ AUTO DIFFERENTIAL - Abnormal       Result Value    WBC 9.93      RBC 4.57      Hemoglobin 14.3      Hematocrit 42.8      MCV 94      MCH 31.3 (*)     MCHC 33.4      RDW 13.2      Platelets 249      MPV 9.6      Immature Granulocytes 0.4      Gran # (ANC) 6.7      Immature Grans (Abs) 0.04      Lymph # 2.2      Mono # 0.8      Eos # 0.1      Baso # 0.05      nRBC 0      Gran % 67.5      Lymph % 22.6      Mono % 7.6      Eosinophil % 1.4      Basophil % 0.5      Differential Method Automated     COMPREHENSIVE METABOLIC PANEL - Abnormal    Sodium 139      Potassium 3.5      Chloride 104      CO2 25      Glucose 114 (*)     BUN 17      Creatinine 0.9      Calcium 9.5      Total Protein 7.3      Albumin 3.7      Total Bilirubin 0.5      Alkaline Phosphatase 133      AST 22      ALT 20      eGFR >60      Anion Gap 10     B-TYPE NATRIURETIC PEPTIDE - Abnormal     (*)    TROPONIN I    Troponin I 0.026       EKG Readings: (Independently Interpreted)   Initial Reading: No STEMI. Rhythm: Normal Sinus Rhythm (Right bundle branch block). Heart Rate: 67.     ECG Results              EKG 12-lead (Final result)        Collection Time Result Time QRS Duration OHS QTC Calculation    01/31/25 13:48:07 01/31/25 15:43:24 150 456                     Final result by Interface, Lab In [facility] (01/31/25  15:43:32)                   Narrative:    Test Reason : R07.9,    Vent. Rate :  67 BPM     Atrial Rate :  67 BPM     P-R Int : 184 ms          QRS Dur : 150 ms      QT Int : 432 ms       P-R-T Axes : -24 -75  50 degrees    QTcB Int : 456 ms    Normal sinus rhythm  Right bundle branch block  Left anterior fascicular block   Bifascicular block   When compared with ECG of 12-Dec-2024 12:01,  No significant change was found  Confirmed by Bairon Lagos ([id number]) on 1/31/2025 3:43:20 PM    Referred By:            Confirmed By: Bairon Lagos                                  Imaging Results              X-Ray Chest AP Portable (Final result)  Result time 01/31/25 14:22:03      Final result by Hang Elmore MD (01/31/25 14:22:03)                   Impression:      1.  Reticular interstitial changes throughout the lungs concerning for CHF versus interstitial infectious process such as atypical viral pneumonia.    2.  Stable findings as noted above.  This includes cardiac silhouette size enlargement.      Electronically signed by: Hang Elmore MD  Date:    01/31/2025  Time:    14:22               Narrative:    EXAMINATION:  XR CHEST AP PORTABLE    CLINICAL HISTORY:  Chest Pain;    COMPARISON:  June 26, 2023    FINDINGS:  Mild vascular congestion versus reticular interstitial changes throughout the lungs.  The study is rotated to the right.  The lungs are otherwise clear.  The cardiac silhouette size is enlarged.  The trachea is midline and the mediastinal width is normal. Negative for focal infiltrate, effusion or pneumothorax. Pulmonary vasculature is congested.  Negative for osseous abnormalities. Elevation of the left hemidiaphragm.  Median sternotomy wires and CABG changes.  Tortuous aorta with calcifications of the aortic knob.  There are degenerative changes of the spine and both shoulder girdles.                                       Medications - No data to display  Medical Decision Making  Differential  diagnosis  ACS, CHF, fracture, dislocation, pneumothorax, pneumonia    Amount and/or Complexity of Data Reviewed  Labs: ordered.  Radiology: ordered.  Discussion of management or test interpretation with external provider(s): Patient's BP slightly elevated.  Patient reports no history of heart failure.  Patient is not on any diuretics.  Will place on a 5 day diuretic.  Patient reports she sees Cardiology here with [facility].  Patient states that she will call and follow up with the cardiologist and return to the ER for any worsening or concerns    Risk  Prescription drug management.                                      Clinical Impression:  Final diagnoses:  [R07.9] Chest pain  [I50.9] Acute congestive heart failure, unspecified heart failure type (Primary)          ED Disposition Condition    Discharge Stable          ED Prescriptions       Medication Sig Dispense Start Date End Date Auth. Provider    metOLazone (ZAROXOLYN) 2.5 MG tablet Take 1 tablet (2.5 mg total) by mouth once daily. for 5 days 5 tablet 1/31/2025 2/5/2025 Keenan Serrano, MISTY          Follow-up Information       Follow up With Specialties Details Why Contact Info    pcp of choice   As needed     [facility] - Emergency Dept. Emergency Medicine  If symptoms worsen [address] [facility] [address] [phone]    [facility] - Cardiology Cardiology Schedule an appointment as soon as possible for a visit   [address] [facility] [address] [facility] [address] [phone]             Keenan Serrano, NP  01/31/25 [id number]

## 2025-02-05 RX ORDER — LEVOTHYROXINE SODIUM 137 UG/1
137 TABLET ORAL
Qty: 90 TABLET | Refills: 2 | Status: SHIPPED | OUTPATIENT
Start: 2025-02-05

## 2025-02-06 ENCOUNTER — TELEPHONE (OUTPATIENT)
Dept: INTERNAL MEDICINE | Facility: CLINIC | Age: 80
End: 2025-02-06
Payer: MEDICARE

## 2025-02-06 NOTE — TELEPHONE ENCOUNTER
----- Message from Nataly sent at 2/6/2025  8:12 AM CST -----  Contact: Flory  .Patient is calling to speak with the nurse regarding questions and concern . Reports needing to speak with someone about medication  . Please give patient a call back at [phone]

## 2025-02-06 NOTE — TELEPHONE ENCOUNTER
Please advise if patient may hold Plavix 5 days prior to endoscopy procedure. Upon your approval, our team will inform patient of medication clearance prior to procedure.  Procedure is scheduled for 7/31/2020.  Thanks for your time.   [id number]

## 2025-02-10 ENCOUNTER — HOSPITAL ENCOUNTER (OUTPATIENT)
Dept: RADIOLOGY | Facility: HOSPITAL | Age: 80
Discharge: HOME | End: 2025-02-10
Attending: INTERNAL MEDICINE
Payer: MEDICARE

## 2025-02-10 ENCOUNTER — OFFICE VISIT (OUTPATIENT)
Dept: INTERNAL MEDICINE | Facility: CLINIC | Age: 80
End: 2025-02-10
Payer: MEDICARE

## 2025-02-10 VITALS
WEIGHT: 175.69 LBS | BODY MASS INDEX: 28.23 KG/M2 | RESPIRATION RATE: 20 BRPM | DIASTOLIC BLOOD PRESSURE: 64 MMHG | OXYGEN SATURATION: 98 % | HEART RATE: 56 BPM | TEMPERATURE: 96 F | HEIGHT: 66 IN | SYSTOLIC BLOOD PRESSURE: 110 MMHG

## 2025-02-10 DIAGNOSIS — R07.9 RIGHT-SIDED CHEST PAIN: Primary | ICD-10-CM

## 2025-02-10 DIAGNOSIS — M79.602 LEFT ARM PAIN: ICD-10-CM

## 2025-02-10 DIAGNOSIS — R05.9 COUGH, UNSPECIFIED TYPE: ICD-10-CM

## 2025-02-10 DIAGNOSIS — I25.10 CAD IN NATIVE ARTERY: ICD-10-CM

## 2025-02-10 DIAGNOSIS — F32.A ANXIETY AND DEPRESSION: ICD-10-CM

## 2025-02-10 DIAGNOSIS — R07.9 RIGHT-SIDED CHEST PAIN: ICD-10-CM

## 2025-02-10 DIAGNOSIS — F41.9 ANXIETY AND DEPRESSION: ICD-10-CM

## 2025-02-10 PROCEDURE — 99214 OFFICE O/P EST MOD 30 MIN: CPT | Mod: HCNC,S$GLB,, | Performed by: INTERNAL MEDICINE

## 2025-02-10 PROCEDURE — 1160F RVW MEDS BY RX/DR IN RCRD: CPT | Mod: HCNC,CPTII,S$GLB, | Performed by: INTERNAL MEDICINE

## 2025-02-10 PROCEDURE — 1159F MED LIST DOCD IN RCRD: CPT | Mod: HCNC,CPTII,S$GLB, | Performed by: INTERNAL MEDICINE

## 2025-02-10 PROCEDURE — 71046 X-RAY EXAM CHEST 2 VIEWS: CPT | Mod: 26,HCNC,, | Performed by: STUDENT IN AN ORGANIZED HEALTH CARE EDUCATION/TRAINING PROGRAM

## 2025-02-10 PROCEDURE — 99999 PR PBB SHADOW E&M-EST. PATIENT-LVL V: CPT | Mod: PBBFAC,HCNC,, | Performed by: INTERNAL MEDICINE

## 2025-02-10 PROCEDURE — 3074F SYST BP LT 130 MM HG: CPT | Mod: HCNC,CPTII,S$GLB, | Performed by: INTERNAL MEDICINE

## 2025-02-10 PROCEDURE — 1125F AMNT PAIN NOTED PAIN PRSNT: CPT | Mod: HCNC,CPTII,S$GLB, | Performed by: INTERNAL MEDICINE

## 2025-02-10 PROCEDURE — 71046 X-RAY EXAM CHEST 2 VIEWS: CPT | Mod: TC,HCNC

## 2025-02-10 PROCEDURE — 3288F FALL RISK ASSESSMENT DOCD: CPT | Mod: HCNC,CPTII,S$GLB, | Performed by: INTERNAL MEDICINE

## 2025-02-10 PROCEDURE — 73060 X-RAY EXAM OF HUMERUS: CPT | Mod: TC,HCNC,LT

## 2025-02-10 PROCEDURE — 3078F DIAST BP <80 MM HG: CPT | Mod: HCNC,CPTII,S$GLB, | Performed by: INTERNAL MEDICINE

## 2025-02-10 PROCEDURE — G2211 COMPLEX E/M VISIT ADD ON: HCPCS | Mod: HCNC,S$GLB,, | Performed by: INTERNAL MEDICINE

## 2025-02-10 PROCEDURE — 73060 X-RAY EXAM OF HUMERUS: CPT | Mod: 26,HCNC,LT, | Performed by: STUDENT IN AN ORGANIZED HEALTH CARE EDUCATION/TRAINING PROGRAM

## 2025-02-10 PROCEDURE — 1101F PT FALLS ASSESS-DOCD LE1/YR: CPT | Mod: HCNC,CPTII,S$GLB, | Performed by: INTERNAL MEDICINE

## 2025-02-10 NOTE — PROGRESS NOTES
Flory Cam  79 y.o. Black or  female  [id number]    Chief Complaint:  Chief Complaint   Patient presents with    Follow-up     ER visit on 1/31       History of Present Illness:  History of Present Illness    CHIEF COMPLAINT:  Ms. Cam presents today for follow up after recent ER visit for right-sided chest pain.    HISTORY OF PRESENT ILLNESS:  She reports not feeling like herself and has poor appetite. She experiences occasional cough and denies leg swelling. She was recently evaluated in the ER where EKG findings were unchanged from December. Chest XR showed fluid in lungs concerning for heart failure vs atypical viral pneumonia..    MENTAL HEALTH:  She reports experiencing depression and anxiety symptoms and expresses desire to speak with a therapist.    SURGICAL HISTORY:  She underwent open heart surgery 41 years ago at [facility].     ARM PAIN:   She complains of left arm pain from the shoulder to the elbow. The pain has been going on for a while but is getting worse.         Review of Systems   Constitutional:  Negative for fever.   Respiratory:  Positive for cough. Negative for shortness of breath.    Cardiovascular:  Positive for chest pain (resolved). Negative for leg swelling.   Musculoskeletal:  Positive for joint pain and myalgias.   Neurological:  Negative for dizziness.   Psychiatric/Behavioral:  Positive for depression. The patient is nervous/anxious.        Laboratory:  Lab Results   Component Value Date    WBC 9.93 01/31/2025    HGB 14.3 01/31/2025    HCT 42.8 01/31/2025     01/31/2025    CHOL 135 09/14/2024    TRIG 51 09/14/2024    HDL 53 09/14/2024    ALT 20 01/31/2025    AST 22 01/31/2025     01/31/2025    K 3.5 01/31/2025     01/31/2025    CREATININE 0.9 01/31/2025    BUN 17 01/31/2025    CO2 25 01/31/2025    TSH 0.771 09/14/2024    INR 0.9 09/14/2024    HGBA1C 5.7 (H) 09/14/2024     Lab Results   Component Value Date    LDLCALC 71.8  09/14/2024       History:  Past Medical History:   Diagnosis Date    Acute coronary syndrome     Anxiety     Bilateral carotid artery stenosis 11/17/2015    Chronic pain     Colon polyp     Coronary artery disease     GERD (gastroesophageal reflux disease)     Hyperlipidemia     Hypertension     Hypothyroidism     Low back pain     Lupus     Osteoporosis     Poor circulation     Pre-operative clearance 7/12/2019    PVD (peripheral vascular disease)     S/P peripheral artery angioplasty 02/13/2014    SCCA (squamous cell carcinoma) of skin 10/2019    Dr. ZANDRA Rivas--oncology    Skin disease        Medications:  Current Outpatient Medications on File Prior to Visit   Medication Sig Dispense Refill    albuterol (PROVENTIL/VENTOLIN HFA) 90 mcg/actuation inhaler Inhale 2 puffs into the lungs every 6 (six) hours as needed for Wheezing. 18 g 3    albuterol-ipratropium (DUO-NEB) 2.5 mg-0.5 mg/3 mL nebulizer solution Take 3 mLs by nebulization every 6 (six) hours as needed for Wheezing. Rescue 75 mL 2    amLODIPine (NORVASC) 10 MG tablet TAKE 1 TABLET(10 MG) BY MOUTH EVERY DAY 90 tablet 3    aspirin (ECOTRIN) 81 MG EC tablet Take 1 tablet (81 mg total) by mouth once daily.  0    atorvastatin (LIPITOR) 80 MG tablet TAKE 1 TABLET(80 MG) BY MOUTH EVERY DAY 90 tablet 3    clopidogreL (PLAVIX) 75 mg tablet Take 1 tablet (75 mg total) by mouth once daily. 90 tablet 3    ergocalciferol (ERGOCALCIFEROL) 50,000 unit Cap Take 1 capsule (50,000 Units total) by mouth every 7 days. 12 capsule 2    ezetimibe (ZETIA) 10 mg tablet TAKE 1 TABLET(10 MG) BY MOUTH EVERY DAY 90 tablet 3    hydrALAZINE (APRESOLINE) 50 MG tablet TAKE 1 TABLET(50 MG) BY MOUTH EVERY 12 HOURS 60 tablet 11    hydrOXYzine HCL (ATARAX) 25 MG tablet Take 1 tablet (25 mg total) by mouth 3 (three) times daily as needed for Anxiety. 60 tablet 2    levothyroxine (SYNTHROID) 137 MCG Tab tablet Take 1 tablet (137 mcg total) by mouth before breakfast. 90 tablet 2    memantine  (NAMENDA) 5 MG Tab Take 1 tablet (5 mg total) by mouth 2 (two) times daily. 60 tablet 3    metOLazone (ZAROXOLYN) 2.5 MG tablet Take 1 tablet (2.5 mg total) by mouth once daily. for 5 days 5 tablet 0    valsartan (DIOVAN) 320 MG tablet TAKE 1 TABLET(320 MG) BY MOUTH EVERY DAY 90 tablet 3     No current facility-administered medications on file prior to visit.       Allergies:  Review of patient's allergies indicates:   Allergen Reactions    Methotrexate analogues     Cellcept [mycophenolate mofetil] Rash    Imuran [azathioprine] Rash       Exam:  Vitals:    02/10/25 1022   BP: 110/64   Pulse: (!) 56   Resp: 20   Temp: 96.2 °F (35.7 °C)     Weight: 79.7 kg (175 lb 11.3 oz)   Body mass index is 28.36 kg/m².      Physical Exam    Vitals: Reviewed.  Constitutional: No acute distress. Well-developed. Not ill-appearing.  Eyes: No scleral icterus.  Cardiovascular: Normal rate and regular rhythm. Normal heart sounds.  Pulmonary: Pulmonary effort is normal. No respiratory distress. Normal breath sounds.  Abdomen: Soft. Nontender. Nondistended. Normoactive bowel sounds.  Skin: Warm. Dry.  Neurological: Alert and oriented to person, place, and time.  Psychiatric: Behavior normal.         Assessment:  The primary encounter diagnosis was Right-sided chest pain. Diagnoses of Cough, unspecified type, CAD in native artery, and Left arm pain were also pertinent to this visit.    Assessment & Plan      CHEST PAIN:  - Noted that the patient had right side chest pain near ribs during a recent ER visit.  - Reviewed ER visit notes and discussed possible causes of pain with the patient.  - Plan to repeat chest XR to further evaluate the cause of pain.  - Reviewed ER visit findings, including right-side chest pain and dyspnea.  - Noted that the patient reports the pain has resolved.  - Considered differential diagnosis between heart failure and atypical viral pneumonia based on chest XR showing pulmonary edema.  - Noted that blood test  results were not significantly elevated to confirm heart failure diagnosis.  - Will repeat chest XR to clarify diagnosis and rule out heart failure, given lack of typical heart failure symptoms and no prior history of the condition.    HEART DISEASE:  - Acknowledged the patient's history of heart disease and prior open-heart surgery from 41 years ago.  - Noted that the patient sees Dr. De Jesus, a cardiologist, every 6 months for regular checkups.  - Performed an EKG, which showed the same results as the one from December.  - Reviewed previous records and notes from cardiologist Dr. De Jesus, which did not mention heart failure.  - Plan to repeat chest XR to confirm diagnosis.  - Follow up after reviewing chest XR results to determine if an earlier appointment with Dr. De Jesus is needed before the scheduled June appointment.    DEPRESSION:  - Noted that the patient reports feeling depressed and having a lot on their mind.  - Acknowledged the patient's feelings and discussed referral to a therapist.  - Offered to make a referral for a therapist.    ANXIETY:  - Noted that the patient reports feeling anxious and having a lot on their mind.  - Acknowledged the patient's feelings and discussed referral to a therapist.  - Offered to make a referral for a therapist.    LEFT ARM PAIN:   - Imaging ordered

## 2025-02-17 ENCOUNTER — RESULTS FOLLOW-UP (OUTPATIENT)
Dept: INTERNAL MEDICINE | Facility: CLINIC | Age: 80
End: 2025-02-17
Payer: MEDICARE

## 2025-02-17 ENCOUNTER — TELEPHONE (OUTPATIENT)
Dept: INTERNAL MEDICINE | Facility: CLINIC | Age: 80
End: 2025-02-17
Payer: MEDICARE

## 2025-02-17 DIAGNOSIS — R05.9 COUGH, UNSPECIFIED TYPE: ICD-10-CM

## 2025-02-17 DIAGNOSIS — E87.70 HYPERVOLEMIA, UNSPECIFIED HYPERVOLEMIA TYPE: Primary | ICD-10-CM

## 2025-02-17 RX ORDER — FUROSEMIDE 20 MG/1
20 TABLET ORAL DAILY
Qty: 30 TABLET | Refills: 0 | Status: SHIPPED | OUTPATIENT
Start: 2025-02-17

## 2025-02-17 NOTE — TELEPHONE ENCOUNTER
----- Message from Idania sent at 2/17/2025 11:11 AM CST -----  Contact: NO JARVIS JUNE [[id number]]  ..Type:  Patient Requesting CallWho Called:NO JARVIS JUNE [[id number]]Does the patient know what this is regarding?:pt wanted to discuss labs /XraysWould the patient rather a call back or a response via Snappy shuttlener? callBe Call Back Number:.[phone] Information:

## 2025-02-20 ENCOUNTER — OFFICE VISIT (OUTPATIENT)
Dept: HOME HEALTH SERVICES | Facility: CLINIC | Age: 80
End: 2025-02-20
Payer: MEDICARE

## 2025-02-20 VITALS
TEMPERATURE: 98 F | HEART RATE: 76 BPM | BODY MASS INDEX: 28.12 KG/M2 | OXYGEN SATURATION: 97 % | WEIGHT: 175 LBS | HEIGHT: 66 IN | SYSTOLIC BLOOD PRESSURE: 119 MMHG | DIASTOLIC BLOOD PRESSURE: 72 MMHG

## 2025-02-20 DIAGNOSIS — I50.9 CONGESTIVE HEART FAILURE, UNSPECIFIED HF CHRONICITY, UNSPECIFIED HEART FAILURE TYPE: ICD-10-CM

## 2025-02-20 DIAGNOSIS — R91.1 LUNG NODULE: ICD-10-CM

## 2025-02-20 DIAGNOSIS — F01.B0 MODERATE VASCULAR DEMENTIA WITHOUT BEHAVIORAL DISTURBANCE, PSYCHOTIC DISTURBANCE, MOOD DISTURBANCE, OR ANXIETY: ICD-10-CM

## 2025-02-20 DIAGNOSIS — M05.772 RHEUMATOID ARTHRITIS INVOLVING BOTH FEET WITH POSITIVE RHEUMATOID FACTOR: Chronic | ICD-10-CM

## 2025-02-20 DIAGNOSIS — M05.771 RHEUMATOID ARTHRITIS INVOLVING BOTH FEET WITH POSITIVE RHEUMATOID FACTOR: Chronic | ICD-10-CM

## 2025-02-20 DIAGNOSIS — R09.89 SUSPECTED CEREBROVASCULAR ACCIDENT (CVA): ICD-10-CM

## 2025-02-20 DIAGNOSIS — Z78.0 POSTMENOPAUSAL: ICD-10-CM

## 2025-02-20 DIAGNOSIS — M85.80 OSTEOPENIA, UNSPECIFIED LOCATION: ICD-10-CM

## 2025-02-20 DIAGNOSIS — Z95.1 HX OF CABG: Chronic | ICD-10-CM

## 2025-02-20 DIAGNOSIS — I73.9 PERIPHERAL VASCULAR DISEASE: Chronic | ICD-10-CM

## 2025-02-20 DIAGNOSIS — R26.9 ABNORMALITY OF GAIT AND MOBILITY: ICD-10-CM

## 2025-02-20 DIAGNOSIS — E03.9 HYPOTHYROIDISM (ACQUIRED): ICD-10-CM

## 2025-02-20 DIAGNOSIS — F17.210 CIGARETTE NICOTINE DEPENDENCE WITHOUT COMPLICATION: ICD-10-CM

## 2025-02-20 DIAGNOSIS — J44.9 CHRONIC OBSTRUCTIVE PULMONARY DISEASE, UNSPECIFIED COPD TYPE: ICD-10-CM

## 2025-02-20 DIAGNOSIS — M15.0 PRIMARY OSTEOARTHRITIS INVOLVING MULTIPLE JOINTS: ICD-10-CM

## 2025-02-20 DIAGNOSIS — I65.29 STENOSIS OF CAROTID ARTERY, UNSPECIFIED LATERALITY: ICD-10-CM

## 2025-02-20 DIAGNOSIS — R73.03 PREDIABETES: ICD-10-CM

## 2025-02-20 DIAGNOSIS — I27.9 PULMONARY HEART DISEASE: ICD-10-CM

## 2025-02-20 DIAGNOSIS — L93.0 DISCOID LUPUS ERYTHEMATOSUS: Chronic | ICD-10-CM

## 2025-02-20 DIAGNOSIS — I71.43 INFRARENAL ABDOMINAL AORTIC ANEURYSM (AAA) WITHOUT RUPTURE: ICD-10-CM

## 2025-02-20 DIAGNOSIS — Z00.00 ENCOUNTER FOR PREVENTIVE HEALTH EXAMINATION: Primary | ICD-10-CM

## 2025-02-20 DIAGNOSIS — I70.0 CALCIFICATION OF AORTA: ICD-10-CM

## 2025-02-20 DIAGNOSIS — I10 ESSENTIAL HYPERTENSION: ICD-10-CM

## 2025-02-20 DIAGNOSIS — E78.5 HYPERLIPIDEMIA LDL GOAL <70: Chronic | ICD-10-CM

## 2025-02-20 DIAGNOSIS — F33.0 MILD EPISODE OF RECURRENT MAJOR DEPRESSIVE DISORDER: ICD-10-CM

## 2025-02-20 NOTE — Clinical Note
Medicare awv complete. Health maintenance:  Pneumonia, shingles, RSV, flu, updated COVID-19 vaccines due-encouraged pt to obtain at a pharmacy.  Low-dose CT lung screen ordered 07/25/2024-has not been scheduled yet.  DEXA scan ordered today due in April.  Message sent to staff to call patient to schedule these.  Patient needs follow-up with pulmonology for COPD.  Saw Dr. Hernandez in the past.  Referral placed.  [place] currently smokes 3 cigarettes per day.  She states she is ready to quit and agreed to smoking cessation program.  Referral placed.  She sees her cardiologist soon as she can not use any nicotine patches.  I put this information in the referral comment section of the order.  Possibly they could try Chantix since done.  Case management consulted-patient states she has difficulty with paying her utilities

## 2025-02-20 NOTE — PATIENT INSTRUCTIONS
Counseling and Referral of Other Preventative  (Italic type indicates deductible and co-insurance are waived)    Patient Name: Flory Cam  Today's Date: 2/20/2025    Health Maintenance       Date Due Completion Date    Shingles Vaccine (1 of 2) Never done ---    Pneumococcal Vaccines (Age 50+) (2 of 2 - PCV) 12/12/2007 12/12/2006    RSV Vaccine (Age 60+ and Pregnant patients) (1 - 1-dose 75+ series) Never done ---    LDCT Lung Screen 10/14/2020 10/14/2019    Influenza Vaccine (1) 09/01/2024 10/30/2023 (Declined)    Override on 10/30/2023: Declined    Override on 10/25/2022: Declined    COVID-19 Vaccine (4 - 2024-25 season) 09/01/2024 12/6/2021    DEXA Scan 04/05/2025 4/5/2023    Override on 12/28/2011: Done (Osteopenia; Moderate fx risk; reepat in 2 yrs. check vit D.)    Colonoscopy 08/04/2025 8/4/2020    Hemoglobin A1c (Prediabetes) 02/10/2026 2/10/2025    Lipid Panel 02/10/2026 2/10/2025    Aspirin/Antiplatelet Therapy 02/20/2026 2/20/2025    TETANUS VACCINE 02/21/2027 2/21/2017 (ClinicallyNA)    Override on 2/21/2017: Not Clinically Appropriate        Orders Placed This Encounter   Procedures    DXA Bone Density Axial Skeleton 1 or more sites    Ambulatory referral/consult to Smoking Cessation Program    Ambulatory referral/consult to Outpatient Case Management    Ambulatory referral/consult to Pulmonology     The following information is provided to all patients.  This information is to help you find resources for any of the problems found today that may be affecting your health:                  Living healthy guide: www.[facility].[place].gov      Understanding Diabetes: www.diabetes.org      Eating healthy: www.cdc.gov/healthyweight      CDC home safety checklist: www.cdc.gov/steadi/patient.html      Agency on Aging: www.goea.[place].gov      Alcoholics anonymous (AA): www.aa.org      Physical Activity: www.denise.nih.gov/mo4cdnb      Tobacco use: www.quitwithusla.org

## 2025-02-20 NOTE — PROGRESS NOTES
"Flory Cam presented for a follow-up Medicare AWV today. The following components were reviewed and updated:    Medical history  Family History  Social history  Allergies and Current Medications  Health Risk Assessment  Health Maintenance  Care Team    **See Completed Assessments for Annual Wellness visit with in the encounter summary    The following assessments were completed:  Depression Screening  Cognitive function Screening  Timed Get Up Test  Whisper Test      Opioid documentation:      Patient does not have a current opioid prescription.          Vitals:    02/20/25 1415   BP: 119/72   Pulse: 76   Temp: 98.1 °F (36.7 °C)   TempSrc: Temporal   SpO2: 97%   Weight: 79.4 kg (175 lb)   Height: 5' 6" (1.676 m)     Body mass index is 28.25 kg/m².       Physical Exam  HENT:      Mouth/Throat:      Mouth: Mucous membranes are moist.   Cardiovascular:      Rate and Rhythm: Normal rate and regular rhythm.      Pulses: Normal pulses.      Heart sounds: Normal heart sounds.   Pulmonary:      Effort: Pulmonary effort is normal.      Breath sounds: Normal breath sounds.   Skin:     General: Skin is warm and dry.   Neurological:      General: No focal deficit present.      Mental Status: She is alert and oriented to person, place, and time.      Gait: Gait abnormal.   Psychiatric:         Mood and Affect: Mood normal.         Behavior: Behavior normal.           Diagnoses and health risks identified today and associated recommendations/orders:  1. Encounter for preventive health examination  Medicare awv complete. Health maintenance:  Pneumonia, shingles, RSV, flu, updated COVID-19 vaccines due-encouraged pt to obtain at a pharmacy.  Low-dose CT lung screen ordered 07/25/2024-has not been scheduled yet.  DEXA scan ordered today due in April.  Message sent to staff to call patient to schedule these.    2. Chronic obstructive pulmonary disease, unspecified COPD type  Symptoms stable.  Continue  albuterol p.r.n..  Does " not use DuoNebs.  Needs follow up with pulmonology.  Patient saw Dr. doyle in the past longer here.  Referral  placed pulmonology.  Follow up with pcp.    - Ambulatory referral/consult to Pulmonology; Future    3. 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  No safety issues identified today.  Son lives with her.  Son manages her medications.  Continue Namenda . Follow up with Neurology      4. Rheumatoid arthritis involving both feet with positive rheumatoid factor  Symptoms stable.  Not on any medications.  Follow up with pcp.      5. Congestive heart failure, unspecified HF chronicity, unspecified heart failure type   13. Pulmonary heart disease  Recently diagnosed with acute CHF in the ER last month was given metolazone for a few days which she completed.  Patient denies any acute signs or symptoms at this time.  Denies any chest pain, pressure, shortness of breath, leg swelling.  Continue Lasix, aspirin, Lipitor, hydralazine, valsartan, Zetia.  Follow up with Cardiology has upcoming appointment.    6. Stenosis of carotid artery, unspecified laterality  Symptoms stable. Continue aspirin, Plavix, Lipitor, Zetia . Follow up with pcp and Cardiology.    7. Calcification of aorta   12. Infrarenal abdominal aortic aneurysm (AAA) without rupture  Symptoms stable. Continue aspirin, Plavix, Lipitor, Zetia . Follow up with pcp and Cardiology.    8. Peripheral vascular disease  Symptoms stable. Continue aspirin, Plavix, Lipitor, Zetia . Follow up with pcp and Cardiology.    9. CAD: Hx of CABG  Symptoms stable. Denies any chest pain, pressure, shortness of breath, leg swelling.  Continue Lasix, aspirin, Plavix, Lipitor, hydralazine, valsartan, Zetia.  Follow up with Cardiology has upcoming appointment.    10. Hyperlipidemia LDL goal <70  Lab Results   Component Value Date    CHOL 126 02/10/2025    CHOL 135 09/14/2024    CHOL 135 07/29/2024      Lab Results   Component Value Date    HDL 38 (L) 02/10/2025    HDL 53 09/14/2024    HDL 45 07/29/2024     Lab Results   Component Value Date    LDLCALC 72.8 02/10/2025    LDLCALC 71.8 09/14/2024    LDLCALC 74.8 07/29/2024     Lab Results   Component Value Date    TRIG 76 02/10/2025    TRIG 51 09/14/2024    TRIG 76 07/29/2024       Lab Results   Component Value Date    CHOLHDL 30.2 02/10/2025    CHOLHDL 39.3 09/14/2024    CHOLHDL 33.3 07/29/2024    Chronic and stable on statin medication Lipitor.  Continue Zetia and lipitor.  F/u with pcp.      11. Essential hypertension  BP stable. Continue  amlodipine, hydralazine, Lasix, valsartan.. Managed by PCP.       14. Suspected cerebrovascular accident (CVA)  History. No acute issues.  Blood pressure stable.  Continue Lipitor, aspirin, Plavix, Zetia.  Continue blood pressure medications.  Follow up with PCP.    15. Mild episode of recurrent major depressive disorder  Symptoms stable. Continue  hydroxyzine as needed for anxiety. Follow up with pcp.      16. Discoid lupus erythematosus  No acute issues.  With PCP.    17. Lung nodule  Ldct lung screen already ordered. See #1.     18. Postmenopausal  - DXA Bone Density Axial Skeleton 1 or more sites; Future    19. Hypothyroidism (acquired)  Lab Results   Component Value Date    TSH 0.815 02/10/2025    Labs stable. Continue  Synthroid. Continue routine monitoring. Managed by PCP.       20. Osteopenia, unspecified location  Chronic and stable on current management. Continue vitamin d, calcium in the diet, and weight bearing exercise. Avoid heavy etoh use and smoking.  DEXA scan ordered.  See 1..  Follow up with PCP.       21. Abnormality of gait and mobility  Chronic. Fall precautions recommended and discussed. Follow up with PCP.      22. Primary osteoarthritis involving multiple joints  Pain controlled. Follow up with pcp.      23. Cigarette nicotine dependence without complication  Total pack years:30.6   Smokes 3  cigarettes/day.  Low-dose CT lung screen already ordered.  See 1.  Patient currently smokes 3 cigarettes per day.  She states she is ready to quit and agreed to smoking cessation program.  Referral placed.  She sees her cardiologist soon as she can not use any nicotine patches.  I put this information in the referral comment section of the order.  Possibly they could try Chantix since done.  - Ambulatory referral/consult to Smoking Cessation Program; Future  - Ambulatory referral/consult to Outpatient Case Management    24. Prediabetes   Latest Reference Range & Units 04/13/10 10:20 09/14/16 08:43 08/04/17 10:03 09/14/24 17:30 02/10/25 10:01   Hemoglobin A1C External 4.0 - 5.6 % 5.9 6.2 5.8 (H) 5.7 (H) 5.7 (H)   (H): Data is abnormally high  Labs stable. Continue  diabetic diet and exercise.  Not on any medications.. Follow up with pcp.          Provided Flory with a 5-10 year written screening schedule and personal prevention plan. Recommendations were developed using the USPSTF age appropriate recommendations. Education, counseling, and referrals were provided as needed.  After Visit Summary printed and given to patient which includes a list of additional screenings\tests needed.    Follow up in about 1 year (around 2/20/2026) for annual wellness visit.      SACHIN Peterson      I offered to discuss advanced care planning, including how to pick a person who would make decisions for you if you were unable to make them for yourself, called a health care power of , and what kind of decisions you might make such as use of life sustaining treatments such as ventilators and tube feeding when faced with a life limiting illness recorded on a living will that they will need to know. (How you want to be cared for as you near the end of your natural life)     X  Patient is unwilling to engage in a discussion regarding advance directives at this time.

## 2025-02-21 ENCOUNTER — PATIENT OUTREACH (OUTPATIENT)
Dept: ADMINISTRATIVE | Facility: OTHER | Age: 80
End: 2025-02-21

## 2025-02-21 NOTE — PROGRESS NOTES
CHW - Initial Contact    This Community Health Worker completed OR updated the Social Determinant of Health questionnaire with patient via telephone today.    Pt identified barriers of most importance are: Spoe to the pt, she states she recieves assistance already but is okay with additional info. SDOH filled, pt is okay with returned call.    Support and Services:   Other information discussed the patient needs / wants help with: Spoe to the pt, she states she recieves assistance already but is okay with additional info. SDOH filled, pt is okay with returned call.    Follow up required:   Initial Outreach - Due: 3/7/2025

## 2025-02-24 ENCOUNTER — TELEPHONE (OUTPATIENT)
Dept: ADMINISTRATIVE | Facility: CLINIC | Age: 80
End: 2025-02-24
Payer: MEDICARE

## 2025-02-24 ENCOUNTER — PATIENT MESSAGE (OUTPATIENT)
Dept: INTERNAL MEDICINE | Facility: CLINIC | Age: 80
End: 2025-02-24
Payer: MEDICARE

## 2025-02-24 ENCOUNTER — TELEPHONE (OUTPATIENT)
Dept: INTERNAL MEDICINE | Facility: CLINIC | Age: 80
End: 2025-02-24
Payer: MEDICARE

## 2025-02-24 NOTE — TELEPHONE ENCOUNTER
----- Message from Carlee sent at 2/24/2025  1:02 PM CST -----  Contact: [facility] mobile  [phone]  Would you like a call back, or a response through your [facility] portal?:   N/AComments: Patients Grandson Mr. Zaldivar would like for you to mail a copy of the patients Medications list and the directions on how to take her medications to the patients home. Patient would also like for you to send the copy to her E-mail address.

## 2025-02-24 NOTE — TELEPHONE ENCOUNTER
Called pt; informed pt I was calling to schedule her an appt for her dexa and LDCT lung scan per provider's message; pt stated she would like to schedule the dexa scan now but would like to see the lung doctor first before scheduling; pt stated she has an appt with lung doctor on 3/4/25; dexa appt scheduled for 4/8/25.

## 2025-02-24 NOTE — TELEPHONE ENCOUNTER
----- Message from SACHIN Yang sent at 2/20/2025  2:48 PM CST -----  LDCT lung screen ordered 2024. please call pt to schedule soon. Dexa scan ordered-to be done in April 2025.  ----- Message -----  From: Teresa Cleaning FNP  Sent: 2/20/2025   2:48 PM CST  To: Hermila [facility]ABC[facility] Staff    LDCT lung screen ordered-please call pt to schedule soon. Dexa scan ordered-to be done in April 2025.

## 2025-02-28 ENCOUNTER — EXTERNAL CHRONIC CARE MANAGEMENT (OUTPATIENT)
Dept: PRIMARY CARE CLINIC | Facility: CLINIC | Age: 80
End: 2025-02-28
Payer: MEDICARE

## 2025-02-28 PROCEDURE — 99490 CHRNC CARE MGMT STAFF 1ST 20: CPT | Mod: S$GLB,,, | Performed by: INTERNAL MEDICINE

## 2025-03-07 ENCOUNTER — OUTPATIENT CASE MANAGEMENT (OUTPATIENT)
Dept: ADMINISTRATIVE | Facility: OTHER | Age: 80
End: 2025-03-07
Payer: MEDICARE

## 2025-03-13 ENCOUNTER — TELEPHONE (OUTPATIENT)
Dept: SMOKING CESSATION | Facility: CLINIC | Age: 80
End: 2025-03-13
Payer: MEDICARE

## 2025-03-13 NOTE — TELEPHONE ENCOUNTER
Called patient in regards to smoking cessation missed intake visit.  Left voicemail with contact information.

## 2025-03-26 ENCOUNTER — TELEPHONE (OUTPATIENT)
Dept: INTERNAL MEDICINE | Facility: CLINIC | Age: 80
End: 2025-03-26
Payer: MEDICARE

## 2025-03-26 DIAGNOSIS — R06.02 SHORTNESS OF BREATH: ICD-10-CM

## 2025-03-28 DIAGNOSIS — E87.70 HYPERVOLEMIA, UNSPECIFIED HYPERVOLEMIA TYPE: ICD-10-CM

## 2025-03-28 DIAGNOSIS — R05.9 COUGH, UNSPECIFIED TYPE: ICD-10-CM

## 2025-03-28 NOTE — TELEPHONE ENCOUNTER
Care Due:                  Date            Visit Type   Department     Provider  --------------------------------------------------------------------------------                                EP -                              PRIMARY      [facility] INTERNAL  Last Visit: 02-      CARE ([facility])   TRINITY Ovalle                              EP -                              PRIMARY      [facility] INTERNAL  Next Visit: 08-      CARE ([facility])   TRINITY Ovalle                                                            Last  Test          Frequency    Reason                     Performed    Due Date  --------------------------------------------------------------------------------    Vitamin D...  12 months..  ergocalciferol...........  Not Found    Overdue    [employer] Embedded Care Due Messages. Reference number: [id number].   3/28/2025 10:35:23 AM CDT

## 2025-03-31 ENCOUNTER — EXTERNAL CHRONIC CARE MANAGEMENT (OUTPATIENT)
Dept: PRIMARY CARE CLINIC | Facility: CLINIC | Age: 80
End: 2025-03-31
Payer: MEDICARE

## 2025-03-31 PROCEDURE — 99490 CHRNC CARE MGMT STAFF 1ST 20: CPT | Mod: S$GLB,,, | Performed by: INTERNAL MEDICINE

## 2025-03-31 PROCEDURE — 99439 CHRNC CARE MGMT STAF EA ADDL: CPT | Mod: S$GLB,,, | Performed by: INTERNAL MEDICINE

## 2025-04-01 ENCOUNTER — TELEPHONE (OUTPATIENT)
Dept: DERMATOLOGY | Facility: CLINIC | Age: 80
End: 2025-04-01
Payer: MEDICARE

## 2025-04-01 RX ORDER — FUROSEMIDE 20 MG/1
TABLET ORAL
Qty: 30 TABLET | Refills: 2 | Status: SHIPPED | OUTPATIENT
Start: 2025-04-01

## 2025-04-01 NOTE — TELEPHONE ENCOUNTER
Called and spoke to granddaughter. Offered appt tomorrow but needs 3 day notice due to transportation. Working on other options

## 2025-04-01 NOTE — TELEPHONE ENCOUNTER
Refill Routing Note   Medication(s) are not appropriate for processing by [facility] for the following reason(s):        New or recently adjusted medication    [facility] action(s):  Defer     Requires labs : Yes             Appointments  past 12m or future 3m with PCP    Date Provider   Last Visit   2/10/2025 Tayler Ovalle, DO   Next Visit   8/18/2025 Tayler Ovalle, DO   ED visits in past 90 days: 1        Note composed:9:38 AM 04/01/2025

## 2025-04-04 ENCOUNTER — PATIENT MESSAGE (OUTPATIENT)
Dept: PULMONOLOGY | Facility: CLINIC | Age: 80
End: 2025-04-04
Payer: MEDICARE

## 2025-04-08 ENCOUNTER — TELEPHONE (OUTPATIENT)
Dept: DERMATOLOGY | Facility: CLINIC | Age: 80
End: 2025-04-08
Payer: MEDICARE

## 2025-04-08 NOTE — TELEPHONE ENCOUNTER
Called and spoke to patient. Pt scheduled for 3/17 with dr. Yap at the [place]. Pt accepted and appt scheduled.

## 2025-04-21 ENCOUNTER — PATIENT OUTREACH (OUTPATIENT)
Dept: ADMINISTRATIVE | Facility: OTHER | Age: 80
End: 2025-04-21
Payer: MEDICARE

## 2025-04-21 NOTE — PROGRESS NOTES
CHW - Case Closure    This Community Health Worker spoke to caregiver via telephone today.   Pt/Caregiver reported: Exhausted Resources   Pt/Caregiver denied any additional needs at this time and agrees with episode closure at this time.  Provided caregiver with Community Health Worker's contact information and encouraged him/her to contact this Community Health Worker if additional needs arise.

## 2025-04-25 ENCOUNTER — HOSPITAL ENCOUNTER (OUTPATIENT)
Dept: RADIOLOGY | Facility: HOSPITAL | Age: 80
Discharge: HOME | End: 2025-04-25
Attending: PHYSICIAN ASSISTANT
Payer: MEDICARE

## 2025-04-25 ENCOUNTER — OFFICE VISIT (OUTPATIENT)
Dept: INTERNAL MEDICINE | Facility: CLINIC | Age: 80
End: 2025-04-25
Payer: MEDICARE

## 2025-04-25 VITALS
HEIGHT: 66 IN | HEART RATE: 72 BPM | OXYGEN SATURATION: 96 % | DIASTOLIC BLOOD PRESSURE: 70 MMHG | TEMPERATURE: 98 F | WEIGHT: 180.13 LBS | BODY MASS INDEX: 28.95 KG/M2 | SYSTOLIC BLOOD PRESSURE: 118 MMHG

## 2025-04-25 DIAGNOSIS — M25.512 CHRONIC LEFT SHOULDER PAIN: ICD-10-CM

## 2025-04-25 DIAGNOSIS — I10 ESSENTIAL HYPERTENSION: ICD-10-CM

## 2025-04-25 DIAGNOSIS — G89.29 CHRONIC LEFT SHOULDER PAIN: ICD-10-CM

## 2025-04-25 DIAGNOSIS — Z59.82 TRANSPORTATION INSECURITY: ICD-10-CM

## 2025-04-25 DIAGNOSIS — J44.9 CHRONIC OBSTRUCTIVE PULMONARY DISEASE, UNSPECIFIED COPD TYPE: ICD-10-CM

## 2025-04-25 DIAGNOSIS — E87.70 HYPERVOLEMIA, UNSPECIFIED HYPERVOLEMIA TYPE: Primary | ICD-10-CM

## 2025-04-25 DIAGNOSIS — E87.70 HYPERVOLEMIA, UNSPECIFIED HYPERVOLEMIA TYPE: ICD-10-CM

## 2025-04-25 DIAGNOSIS — L93.0 DISCOID LUPUS ERYTHEMATOSUS: Chronic | ICD-10-CM

## 2025-04-25 PROCEDURE — 73030 X-RAY EXAM OF SHOULDER: CPT | Mod: TC,LT

## 2025-04-25 PROCEDURE — 99999 PR PBB SHADOW E&M-EST. PATIENT-LVL V: CPT | Mod: PBBFAC,,, | Performed by: PHYSICIAN ASSISTANT

## 2025-04-25 PROCEDURE — 71046 X-RAY EXAM CHEST 2 VIEWS: CPT | Mod: 26,,, | Performed by: STUDENT IN AN ORGANIZED HEALTH CARE EDUCATION/TRAINING PROGRAM

## 2025-04-25 PROCEDURE — 71046 X-RAY EXAM CHEST 2 VIEWS: CPT | Mod: TC

## 2025-04-25 PROCEDURE — 73030 X-RAY EXAM OF SHOULDER: CPT | Mod: 26,LT,, | Performed by: STUDENT IN AN ORGANIZED HEALTH CARE EDUCATION/TRAINING PROGRAM

## 2025-04-25 RX ORDER — DICLOFENAC SODIUM 10 MG/G
2 GEL TOPICAL DAILY
Qty: 100 G | Refills: 1 | Status: SHIPPED | OUTPATIENT
Start: 2025-04-25

## 2025-04-25 SDOH — SOCIAL DETERMINANTS OF HEALTH (SDOH): TRANSPORTATION INSECURITY: Z59.82

## 2025-04-25 NOTE — PROGRESS NOTES
"Subjective:      Patient ID: Flory Cam is a 79 y.o. female.    Chief Complaint: Shoulder Pain (Pt states left shoulder pain that goes down to elbow. Pt states its been going on for a couple of weeks )    Patient is known to me, being seen today for follow up.     Last visit Feb 2025 w PCP  CXR at that time "Small bilateral pleural effusions-findings concerning for volume overload"  Lasix was prescribed at that time for hypervolemia   Patient was to f/u in 2wks for concerns of volume overload, however, was lost to f/u until today     Per chart review BNP has been elevated slightly in the past (189 in Jan 2025)    HTN- valsartan 320mg, amlodipine 10mg, hydralazine 50mg bid, lasix 20mg   BP at home 150/70s  HLD- on statin and zetia   Thyroid- synthroid 137mcg     Reports ongoing L arm pain for several months  Evaluated in ER for this in Jan, followed up with PCP several weeks later   X-ray humerus Feb 2025 ". Incidentally noted is marked joint space loss and hypertrophy of the left glenohumeral joint. Subchondral cyst formation and spurring of the humeral head. Osseous demineralization."  Starts in L shoulder and radiates down  Decreased ROM of shoulder   Denies numbness/tingling   Denies neck pain   Gabapentin has not helped   She has not seen ortho or PT d/t transportation difficulties     She is followed by Cardiology, [place] Dec 2024, [place] March 29       Review of Systems   Constitutional:  Negative for chills, diaphoresis and fever.   HENT:  Positive for dental problem (L sided, dentist appt next week). Negative for congestion, rhinorrhea and sore throat.    Respiratory:  Negative for cough, shortness of breath and wheezing.         H/o COPD  Smokes daily at least 1-2   Cardiovascular:  Negative for chest pain and leg swelling.   Gastrointestinal:  Negative for abdominal pain, constipation, diarrhea, nausea and vomiting.   Musculoskeletal:  Positive for arthralgias.   Skin:  Positive for rash (h/o lupus, last " "Rheum appt May 2024).   Neurological:  Negative for dizziness, light-headedness and headaches.       Objective:   /70 (BP Location: Right arm, Patient Position: Sitting)   Pulse 72   Temp 98.1 °F (36.7 °C) (Tympanic)   Ht 5' 6" (1.676 m)   Wt 81.7 kg (180 lb 1.9 oz)   SpO2 96%   BMI 29.07 kg/m²   Physical Exam  Constitutional:       General: She is not in acute distress.     Appearance: Normal appearance. She is well-developed. She is not ill-appearing.   HENT:      Head: Normocephalic and atraumatic.   Cardiovascular:      Rate and Rhythm: Normal rate and regular rhythm.      Heart sounds: Normal heart sounds. No murmur heard.  Pulmonary:      Effort: Pulmonary effort is normal. No respiratory distress.      Breath sounds: Examination of the right-upper field reveals wheezing. Examination of the left-upper field reveals decreased breath sounds. Examination of the right-middle field reveals wheezing. Examination of the left-middle field reveals decreased breath sounds. Examination of the left-lower field reveals decreased breath sounds. Decreased breath sounds and wheezing present.   Musculoskeletal:      Left shoulder: Bony tenderness (lateral/anterior) present. No swelling. Normal range of motion.      Right lower leg: No edema.      Left lower leg: No edema.   Skin:     General: Skin is warm and dry.      Findings: No rash.   Psychiatric:         Speech: Speech normal.         Behavior: Behavior normal.         Thought Content: Thought content normal.       Assessment:      1. Hypervolemia, unspecified hypervolemia type    2. Transportation insecurity    3. Chronic left shoulder pain    4. Discoid lupus erythematosus    5. Essential hypertension    6. Chronic obstructive pulmonary disease, unspecified COPD type       Plan:   Hypervolemia, unspecified hypervolemia type  -     BNP; Future; Expected date: 04/25/2025  -     X-Ray Chest PA And Lateral; Future; Expected date: 04/25/2025  -     Comprehensive " Metabolic Panel; Future; Expected date: 04/25/2025    Transportation insecurity  -     Ambulatory referral/consult to Outpatient Case Management    Chronic left shoulder pain  -     Ambulatory referral/consult to Orthopedics; Future; Expected date: 05/02/2025  -     diclofenac sodium (VOLTAREN ARTHRITIS PAIN) 1 % Gel; Apply 2 g topically once daily.  Dispense: 100 g; Refill: 1  -     X-Ray Shoulder 2 or More Views Left; Future; Expected date: 04/25/2025    Discoid lupus erythematosus    Essential hypertension    Chronic obstructive pulmonary disease, unspecified COPD type  -     Ambulatory referral/consult to Pulmonology; Future; Expected date: 05/02/2025      Discussed RICE, topical NSAIDs     F/u Rheum to be scheduled     Discussed worsening signs/symptoms and when to return to clinic or go to ED.   Patient expresses understanding and agrees with treatment plan.

## 2025-04-28 ENCOUNTER — TELEPHONE (OUTPATIENT)
Dept: INTERNAL MEDICINE | Facility: CLINIC | Age: 80
End: 2025-04-28
Payer: MEDICARE

## 2025-04-28 ENCOUNTER — PATIENT OUTREACH (OUTPATIENT)
Dept: ADMINISTRATIVE | Facility: OTHER | Age: 80
End: 2025-04-28
Payer: MEDICARE

## 2025-04-28 ENCOUNTER — RESULTS FOLLOW-UP (OUTPATIENT)
Dept: INTERNAL MEDICINE | Facility: CLINIC | Age: 80
End: 2025-04-28

## 2025-04-28 DIAGNOSIS — M05.771 RHEUMATOID ARTHRITIS INVOLVING BOTH FEET WITH POSITIVE RHEUMATOID FACTOR: Chronic | ICD-10-CM

## 2025-04-28 DIAGNOSIS — M05.772 RHEUMATOID ARTHRITIS INVOLVING BOTH FEET WITH POSITIVE RHEUMATOID FACTOR: Chronic | ICD-10-CM

## 2025-04-28 DIAGNOSIS — E87.6 HYPOKALEMIA: Primary | ICD-10-CM

## 2025-04-28 DIAGNOSIS — L93.0 DISCOID LUPUS ERYTHEMATOSUS: Primary | Chronic | ICD-10-CM

## 2025-04-28 RX ORDER — POTASSIUM CHLORIDE 20 MEQ/1
20 TABLET, EXTENDED RELEASE ORAL DAILY
Qty: 90 TABLET | Refills: 1 | Status: SHIPPED | OUTPATIENT
Start: 2025-04-28

## 2025-04-28 NOTE — PROGRESS NOTES
CHW - Initial Contact    This Community Health Worker completed OR updated the Social Determinant of Health questionnaire with patient via telephone today.    Pt identified barriers of most importance are: Spoke to the pt and she let me know of the transportation issue that she is having. When she is scheduled, an hour ahead the service requested is canceled. I will call around and try to get to the bottom of this and give pt a call back.      Referrals were put through [facility] - no: none  Support and Services:   Other information discussed the patient needs / wants help with:  Spoke to the pt and she let me know of the transportation issue that she is having. When she is scheduled, an hour ahead the service requested is canceled. I will call around and try to get to the bottom of this and give pt a call back.       Follow up required:   Initial Outreach - Due: 4/28/2025

## 2025-04-28 NOTE — TELEPHONE ENCOUNTER
----- Message from Madai sent at 4/28/2025 11:27 AM CDT -----  Contact: [phone]  [email or web address] Patient is calling for Medical Advice regarding:in regards to a rheumatology appt How long has patient had these symptoms:Pharmacy name and phone#:Patient wants a call back or thru [facility]:call back Comments:She is asking if the appt has been made possibly outside of [facility] states she was seen last week please advise Please advise patient replies from provider may take up to 48 hours.

## 2025-04-28 NOTE — TELEPHONE ENCOUNTER
Pt is asking for an external referral to be placed for rheumatology. Pt states she can't get an referral here at [facility] and would like to try somewhere else.

## 2025-04-29 ENCOUNTER — PATIENT OUTREACH (OUTPATIENT)
Dept: ADMINISTRATIVE | Facility: OTHER | Age: 80
End: 2025-04-29
Payer: MEDICARE

## 2025-04-29 NOTE — PROGRESS NOTES
CHW - Case Closure    This Community Health Worker spoke to patient via telephone today.   Pt/Caregiver reported: Exhausted resources.  Pt/Caregiver denied any additional needs at this time and agrees with episode closure at this time.  Provided patient with Community Health Worker's contact information and encouraged him/her to contact this Community Health Worker if additional needs arise.

## 2025-04-30 ENCOUNTER — EXTERNAL CHRONIC CARE MANAGEMENT (OUTPATIENT)
Dept: PRIMARY CARE CLINIC | Facility: CLINIC | Age: 80
End: 2025-04-30
Payer: MEDICARE

## 2025-04-30 PROCEDURE — 99439 CHRNC CARE MGMT STAF EA ADDL: CPT | Mod: S$GLB,,, | Performed by: INTERNAL MEDICINE

## 2025-04-30 PROCEDURE — 99490 CHRNC CARE MGMT STAFF 1ST 20: CPT | Mod: S$GLB,,, | Performed by: INTERNAL MEDICINE

## 2025-05-08 ENCOUNTER — OFFICE VISIT (OUTPATIENT)
Dept: PULMONOLOGY | Facility: CLINIC | Age: 80
End: 2025-05-08
Payer: MEDICARE

## 2025-05-08 VITALS
HEART RATE: 59 BPM | WEIGHT: 181 LBS | HEIGHT: 66 IN | BODY MASS INDEX: 29.09 KG/M2 | DIASTOLIC BLOOD PRESSURE: 72 MMHG | OXYGEN SATURATION: 97 % | SYSTOLIC BLOOD PRESSURE: 120 MMHG | RESPIRATION RATE: 18 BRPM

## 2025-05-08 DIAGNOSIS — L93.0 DISCOID LUPUS ERYTHEMATOSUS: Primary | Chronic | ICD-10-CM

## 2025-05-08 DIAGNOSIS — F17.210 CIGARETTE NICOTINE DEPENDENCE WITHOUT COMPLICATION: ICD-10-CM

## 2025-05-08 DIAGNOSIS — J44.9 CHRONIC OBSTRUCTIVE PULMONARY DISEASE, UNSPECIFIED COPD TYPE: ICD-10-CM

## 2025-05-08 PROCEDURE — 99204 OFFICE O/P NEW MOD 45 MIN: CPT | Mod: S$GLB,,, | Performed by: PHYSICIAN ASSISTANT

## 2025-05-08 PROCEDURE — 3288F FALL RISK ASSESSMENT DOCD: CPT | Mod: CPTII,S$GLB,, | Performed by: PHYSICIAN ASSISTANT

## 2025-05-08 PROCEDURE — 99999 PR PBB SHADOW E&M-EST. PATIENT-LVL IV: CPT | Mod: PBBFAC,,, | Performed by: PHYSICIAN ASSISTANT

## 2025-05-08 PROCEDURE — 1100F PTFALLS ASSESS-DOCD GE2>/YR: CPT | Mod: CPTII,S$GLB,, | Performed by: PHYSICIAN ASSISTANT

## 2025-05-08 PROCEDURE — 1159F MED LIST DOCD IN RCRD: CPT | Mod: CPTII,S$GLB,, | Performed by: PHYSICIAN ASSISTANT

## 2025-05-08 PROCEDURE — 3078F DIAST BP <80 MM HG: CPT | Mod: CPTII,S$GLB,, | Performed by: PHYSICIAN ASSISTANT

## 2025-05-08 PROCEDURE — 1160F RVW MEDS BY RX/DR IN RCRD: CPT | Mod: CPTII,S$GLB,, | Performed by: PHYSICIAN ASSISTANT

## 2025-05-08 PROCEDURE — 3074F SYST BP LT 130 MM HG: CPT | Mod: CPTII,S$GLB,, | Performed by: PHYSICIAN ASSISTANT

## 2025-05-08 NOTE — PROGRESS NOTES
"Subjective:       Patient ID: Flory Cam is a 79 y.o. female.    Chief Complaint: COPD      New patient here to establish care for COPD  Sent by primary care  She reports feeling well, does not feel like COPD affects her much  She smokes occasionally for stress relief  Worked various jobs: call center, warehouse, [employer]  Main concerns are about joint pain and shoulder pain - has ortho visit set  She coughs some days with clear mucus  No SOB, chest pain, or wheezing  Has LDCT scheduled this month  Chest X Ray with persistent lower lobe opacities  No recent fever or new pulmonary symptoms     Immunization History   Administered Date(s) Administered    COVID-19, MRNA, LN-S, PF (Pfizer) (Purple Cap) 01/09/2021, 01/30/2021, 12/06/2021    Pneumococcal Polysaccharide - 23 Valent 12/12/2006    Td (ADULT) 12/12/2006      Tobacco Use: High Risk (5/8/2025)    Patient History     Smoking Tobacco Use: Some Days     Smokeless Tobacco Use: Never     Passive Exposure: Not on file      Past Medical History:   Diagnosis Date    Acute coronary syndrome     Anxiety     Bilateral carotid artery stenosis 11/17/2015    Chronic pain     Colon polyp     Coronary artery disease     GERD (gastroesophageal reflux disease)     Hyperlipidemia     Hypertension     Hypothyroidism     Low back pain     Lupus     Osteoporosis     Poor circulation     Pre-operative clearance 7/12/2019    PVD (peripheral vascular disease)     S/P peripheral artery angioplasty 02/13/2014    SCCA (squamous cell carcinoma) of skin 10/2019    Dr. ZANDRA Rivas--oncology    Skin disease       Medications Ordered Prior to Encounter[1]     Review of Systems    Objective:       Vitals:    05/08/25 1352   BP: 120/72   Patient Position: Sitting   Pulse: (!) 59   Resp: 18   SpO2: 97%   Weight: 82.1 kg (181 lb)   Height: 5' 6" (1.676 m)       Physical Exam   Constitutional: She is oriented to person, place, and time. She appears well-developed and well-nourished. No " distress.   HENT:   Head: Normocephalic.   Mouth/Throat: Oropharynx is clear and moist.   Cardiovascular: Normal rate and regular rhythm.   Pulmonary/Chest: Effort normal. No respiratory distress. She has no wheezes. She has no rhonchi. She has no rales.   Musculoskeletal:         General: No edema.      Cervical back: Normal range of motion and neck supple.   Neurological: She is alert and oriented to person, place, and time. Gait normal.   Skin: Skin is warm and dry.   Psychiatric: She has a normal mood and affect.   Vitals reviewed.    Personal Diagnostic Review    X-Ray Chest PA And Lateral  Narrative: EXAMINATION:  XR CHEST PA AND LATERAL    CLINICAL HISTORY:  Fluid overload, unspecified    TECHNIQUE:  PA and lateral views of the chest were performed.    COMPARISON:  02/10/2025    FINDINGS:  Cardiac silhouette is unchanged.  Calcified and tortuous aorta..    Redemonstration of bilateral lower lobe airspace opacities and costophrenic angle blunting unchanged since prior.    No evidence of acute osseous abnormality.  Impression: As above.    Electronically signed by: Savage Barreto  Date:    04/25/2025  Time:    17:23  X-Ray Shoulder 2 or More Views Left  Narrative: EXAMINATION:  XR SHOULDER COMPLETE 2 OR MORE VIEWS LEFT    CLINICAL HISTORY:  Pain in left shoulder    TECHNIQUE:  Two or three views of the left shoulder were performed.    COMPARISON:  None    FINDINGS:  No evidence of acute fracture or dislocation.  Mild degenerative changes of the left AC joint.  There are advanced degenerative changes of the left glenohumeral joint with bone-on-bone apposition, subchondral cyst formation hypertrophy, and sclerosis.  Osseous demineralization. Partially visualized supraclavicular surgical clips and sternotomy wires.  Impression: As above.    Electronically signed by: Savage Barreto  Date:    04/25/2025  Time:    17:21            Assessment/Plan:       1. Discoid lupus erythematosus    2. Chronic obstructive pulmonary  disease, unspecified COPD type  Assessment & Plan:  Stable  Has albuterol for prn  Smoking cessation  [place] and 6mwd in 2 months  Will review LDCT next visit  Recommend pcv20 - she declines     Orders:  -     Ambulatory referral/consult to Pulmonology  -     Complete PFT with bronchodilator; Future  -     Stress test, pulmonary; Future; Expected date: 05/08/2025    3. Cigarette nicotine dependence without complication        Follow up in about 2 months (around 7/8/2025) for pft, walk next.    Discussed diagnosis, its evaluation, treatment and usual course. All questions answered.    Patient verbalized understanding of plan and left in no acute distress    Thank you for the courtesy of participating in the care of this patient    Elizabeth G Blough, PA-C [facility]         [1]   Current Outpatient Medications on File Prior to Visit   Medication Sig Dispense Refill    albuterol (PROVENTIL/VENTOLIN HFA) 90 mcg/actuation inhaler Inhale 2 puffs into the lungs every 6 (six) hours as needed for Wheezing. 18 g 3    amLODIPine (NORVASC) 10 MG tablet TAKE 1 TABLET(10 MG) BY MOUTH EVERY DAY 90 tablet 3    atorvastatin (LIPITOR) 80 MG tablet TAKE 1 TABLET(80 MG) BY MOUTH EVERY DAY 90 tablet 3    clopidogreL (PLAVIX) 75 mg tablet Take 1 tablet (75 mg total) by mouth once daily. 90 tablet 3    diclofenac sodium (VOLTAREN ARTHRITIS PAIN) 1 % Gel Apply 2 g topically once daily. 100 g 1    ergocalciferol (ERGOCALCIFEROL) 50,000 unit Cap Take 1 capsule (50,000 Units total) by mouth every 7 days. 12 capsule 2    ezetimibe (ZETIA) 10 mg tablet TAKE 1 TABLET(10 MG) BY MOUTH EVERY DAY 90 tablet 3    furosemide (LASIX) 20 MG tablet TAKE 1 TABLET(20 MG) BY MOUTH DAILY 30 tablet 2    hydrALAZINE (APRESOLINE) 50 MG tablet TAKE 1 TABLET(50 MG) BY MOUTH EVERY 12 HOURS 60 tablet 11    hydrOXYzine HCL (ATARAX) 25 MG tablet Take 1 tablet (25 mg total) by mouth 3 (three) times daily as needed for Anxiety. 60 tablet 2    levothyroxine  (SYNTHROID) 137 MCG Tab tablet Take 1 tablet (137 mcg total) by mouth before breakfast. 90 tablet 2    potassium chloride SA (K-DUR,KLOR-CON) 20 MEQ tablet Take 1 tablet (20 mEq total) by mouth once daily. 90 tablet 1    valsartan (DIOVAN) 320 MG tablet TAKE 1 TABLET(320 MG) BY MOUTH EVERY DAY 90 tablet 3    albuterol-ipratropium (DUO-NEB) 2.5 mg-0.5 mg/3 mL nebulizer solution Take 3 mLs by nebulization every 6 (six) hours as needed for Wheezing. Rescue (Patient not taking: Reported on 2/20/2025) 75 mL 2    aspirin (ECOTRIN) 81 MG EC tablet Take 1 tablet (81 mg total) by mouth once daily.  0    memantine (NAMENDA) 5 MG Tab Take 1 tablet (5 mg total) by mouth 2 (two) times daily. 60 tablet 3     No current facility-administered medications on file prior to visit.

## 2025-05-08 NOTE — ASSESSMENT & PLAN NOTE
Stable  Has albuterol for prn  Smoking cessation  Quentin and 6mwd in 2 months  Will review LDCT next visit  Recommend pcv20 - she declines

## 2025-05-12 ENCOUNTER — TELEPHONE (OUTPATIENT)
Dept: PSYCHIATRY | Facility: CLINIC | Age: 80
End: 2025-05-12
Payer: MEDICARE

## 2025-05-12 ENCOUNTER — PATIENT MESSAGE (OUTPATIENT)
Dept: PSYCHIATRY | Facility: CLINIC | Age: 80
End: 2025-05-12
Payer: MEDICARE

## 2025-05-16 ENCOUNTER — TELEPHONE (OUTPATIENT)
Dept: PSYCHIATRY | Facility: CLINIC | Age: 80
End: 2025-05-16
Payer: MEDICARE

## 2025-05-16 NOTE — TELEPHONE ENCOUNTER
----- Message from Jose Alfredo Zavala sent at 5/15/2025  2:41 PM CDT -----  Contact: Flory    ----- Message -----  From: Mercedez Moyer, Student RN  Sent: 5/15/2025  12:49 PM CDT  To: [facility] Psych Clinical Staff    .Type: Patient  Requesting Call BackWho Called: Stefany is this regarding?: AppointmentWould the patient rather a call back or a response via [facility]?  Call [facility] Call Back Number: [phone] Information:  Patient is requesting a call back in regard to her appointment being rescheduled. She is wanting an explanation on why and why its been set back to July. Please Call Back

## 2025-05-31 ENCOUNTER — EXTERNAL CHRONIC CARE MANAGEMENT (OUTPATIENT)
Dept: PRIMARY CARE CLINIC | Facility: CLINIC | Age: 80
End: 2025-05-31
Payer: MEDICARE

## 2025-05-31 PROCEDURE — 99490 CHRNC CARE MGMT STAFF 1ST 20: CPT | Mod: S$GLB,,, | Performed by: INTERNAL MEDICINE

## 2025-06-02 ENCOUNTER — OFFICE VISIT (OUTPATIENT)
Dept: CARDIOLOGY | Facility: CLINIC | Age: 80
End: 2025-06-02
Payer: MEDICARE

## 2025-06-02 ENCOUNTER — PATIENT MESSAGE (OUTPATIENT)
Dept: CARDIOLOGY | Facility: CLINIC | Age: 80
End: 2025-06-02

## 2025-06-02 VITALS
DIASTOLIC BLOOD PRESSURE: 60 MMHG | HEART RATE: 69 BPM | SYSTOLIC BLOOD PRESSURE: 126 MMHG | WEIGHT: 182.13 LBS | BODY MASS INDEX: 29.27 KG/M2 | HEIGHT: 66 IN

## 2025-06-02 DIAGNOSIS — I65.29 STENOSIS OF CAROTID ARTERY, UNSPECIFIED LATERALITY: ICD-10-CM

## 2025-06-02 DIAGNOSIS — E78.5 HYPERLIPIDEMIA LDL GOAL <70: Chronic | ICD-10-CM

## 2025-06-02 DIAGNOSIS — I10 ESSENTIAL HYPERTENSION: ICD-10-CM

## 2025-06-02 DIAGNOSIS — F17.210 CIGARETTE NICOTINE DEPENDENCE WITHOUT COMPLICATION: ICD-10-CM

## 2025-06-02 DIAGNOSIS — I73.9 PERIPHERAL VASCULAR DISEASE: Chronic | ICD-10-CM

## 2025-06-02 DIAGNOSIS — R73.03 PREDIABETES: ICD-10-CM

## 2025-06-02 DIAGNOSIS — Z95.1 HX OF CABG: Primary | Chronic | ICD-10-CM

## 2025-06-02 PROCEDURE — 99999 PR PBB SHADOW E&M-EST. PATIENT-LVL III: CPT | Mod: PBBFAC,,,

## 2025-06-26 ENCOUNTER — TELEPHONE (OUTPATIENT)
Dept: INTERNAL MEDICINE | Facility: CLINIC | Age: 80
End: 2025-06-26
Payer: MEDICARE

## 2025-06-26 NOTE — TELEPHONE ENCOUNTER
Copied from CRM #[id number]. Topic: General Inquiry - Patient Advice  >> Jun 26, 2025  3:19 PM Celestina wrote:  Type:  Patient Returning Call    Who Called:Flory  Who Left Message for Patient:  Does the patient know what this is regarding?:Callback  Would the patient rather a call back or a response via [facility]? Callback  Best Call Back Number:[phone]  Additional Information: Please callback concerning  Medicaid

## 2025-06-26 NOTE — TELEPHONE ENCOUNTER
Returned pt call, pt asked did we receive a paper from [employer] for scooter, informed pt we de did not . Pt stated understanding and said she will give [employer] a call

## 2025-06-30 ENCOUNTER — EXTERNAL CHRONIC CARE MANAGEMENT (OUTPATIENT)
Dept: PRIMARY CARE CLINIC | Facility: CLINIC | Age: 80
End: 2025-06-30
Payer: MEDICARE

## 2025-06-30 PROCEDURE — 99439 CHRNC CARE MGMT STAF EA ADDL: CPT | Mod: S$GLB,,, | Performed by: INTERNAL MEDICINE

## 2025-06-30 PROCEDURE — 99490 CHRNC CARE MGMT STAFF 1ST 20: CPT | Mod: S$GLB,,, | Performed by: INTERNAL MEDICINE

## 2025-07-03 DIAGNOSIS — E78.49 OTHER HYPERLIPIDEMIA: ICD-10-CM

## 2025-07-03 RX ORDER — EZETIMIBE 10 MG/1
10 TABLET ORAL DAILY
Qty: 90 TABLET | Refills: 3 | Status: SHIPPED | OUTPATIENT
Start: 2025-07-03

## 2025-07-08 ENCOUNTER — CLINICAL SUPPORT (OUTPATIENT)
Dept: PULMONOLOGY | Facility: CLINIC | Age: 80
End: 2025-07-08
Payer: MEDICARE

## 2025-07-08 ENCOUNTER — OFFICE VISIT (OUTPATIENT)
Dept: PULMONOLOGY | Facility: CLINIC | Age: 80
End: 2025-07-08
Payer: MEDICARE

## 2025-07-08 VITALS
DIASTOLIC BLOOD PRESSURE: 65 MMHG | HEART RATE: 66 BPM | BODY MASS INDEX: 29.58 KG/M2 | HEIGHT: 66 IN | OXYGEN SATURATION: 96 % | RESPIRATION RATE: 18 BRPM | WEIGHT: 184.06 LBS | SYSTOLIC BLOOD PRESSURE: 142 MMHG

## 2025-07-08 VITALS — BODY MASS INDEX: 29.57 KG/M2 | HEIGHT: 66 IN | WEIGHT: 184 LBS

## 2025-07-08 DIAGNOSIS — J44.9 CHRONIC OBSTRUCTIVE PULMONARY DISEASE, UNSPECIFIED COPD TYPE: Primary | ICD-10-CM

## 2025-07-08 DIAGNOSIS — J44.9 CHRONIC OBSTRUCTIVE PULMONARY DISEASE, UNSPECIFIED COPD TYPE: ICD-10-CM

## 2025-07-08 DIAGNOSIS — F17.210 CIGARETTE NICOTINE DEPENDENCE WITHOUT COMPLICATION: ICD-10-CM

## 2025-07-08 DIAGNOSIS — J98.4 RESTRICTIVE LUNG DISEASE: ICD-10-CM

## 2025-07-08 LAB
BRPFT: ABNORMAL
DLCO SINGLE BREATH LLN: 15.18
DLCO SINGLE BREATH PRE REF: 43 %
DLCO SINGLE BREATH REF: 20.91
DLCOC SBVA LLN: 2.65
DLCOC SBVA REF: 3.97
DLCOC SINGLE BREATH LLN: 15.18
DLCOC SINGLE BREATH REF: 20.91
DLCOVA LLN: 2.65
DLCOVA PRE REF: 84.8 %
DLCOVA PRE: 3.36 ML/(MIN*MMHG*L) (ref 2.65–5.28)
DLCOVA REF: 3.97
ERV LLN: -16449.49
ERV PRE REF: 101.9 %
ERV REF: 0.51
FEF 25 75 CHG: -11.1 %
FEF 25 75 LLN: 0.46
FEF 25 75 POST REF: 51.9 %
FEF 25 75 PRE REF: 58.3 %
FEF 25 75 REF: 1.41
FET100 CHG: 1.4 %
FEV1 CHG: -0.8 %
FEV1 FVC CHG: -6 %
FEV1 FVC LLN: 63
FEV1 FVC POST REF: 92 %
FEV1 FVC PRE REF: 97.8 %
FEV1 FVC REF: 77
FEV1 LLN: 1.16
FEV1 POST REF: 59 %
FEV1 PRE REF: 59.5 %
FEV1 REF: 1.75
FRCPLETH LLN: 2.01
FRCPLETH PREREF: 105.8 %
FRCPLETH REF: 2.84
FVC CHG: 5.5 %
FVC LLN: 1.55
FVC POST REF: 63.3 %
FVC PRE REF: 60 %
FVC REF: 2.31
IVC PRE: 1.38 L (ref 1.55–3.11)
IVC SINGLE BREATH LLN: 1.55
IVC SINGLE BREATH PRE REF: 59.7 %
IVC SINGLE BREATH REF: 2.31
MVV LLN: 68
MVV PRE REF: 43.7 %
MVV REF: 80
PEF CHG: -12.3 %
PEF LLN: 2.02
PEF POST REF: 53.2 %
PEF PRE REF: 60.7 %
PEF REF: 4.15
POST FEF 25 75: 0.73 L/S (ref 0.46–2.98)
POST FET 100: 4.07 SEC
POST FEV1 FVC: 70.87 % (ref 62.73–89.57)
POST FEV1: 1.03 L (ref 1.16–2.32)
POST FVC: 1.46 L (ref 1.55–3.11)
POST PEF: 2.21 L/S (ref 2.02–6.28)
PRE DLCO: 9 ML/(MIN*MMHG) (ref 15.18–26.65)
PRE ERV: 0.52 L (ref -16449.49–16450.51)
PRE FEF 25 75: 0.82 L/S (ref 0.46–2.98)
PRE FET 100: 4.01 SEC
PRE FEV1 FVC: 75.39 % (ref 62.73–89.57)
PRE FEV1: 1.04 L (ref 1.16–2.32)
PRE FRC PL: 3 L (ref 2.01–3.66)
PRE FVC: 1.38 L (ref 1.55–3.11)
PRE MVV: 34.89 L/MIN (ref 67.89–91.85)
PRE PEF: 2.52 L/S (ref 2.02–6.28)
PRE RV: 2.48 L (ref 1.75–2.91)
PRE TLC: 3.87 L (ref 4.29–6.26)
RAW LLN: 3.06
RAW PRE REF: 262.4 %
RAW PRE: 8.03 CMH2O*S/L (ref 3.06–3.06)
RAW REF: 3.06
RV LLN: 1.75
RV PRE REF: 106.6 %
RV REF: 2.33
RVTLC LLN: 37
RVTLC PRE REF: 138.1 %
RVTLC PRE: 64.22 % (ref 36.91–56.09)
RVTLC REF: 47
TLC LLN: 4.29
TLC PRE REF: 73.3 %
TLC REF: 5.27
VA PRE: 2.68 L (ref 5.12–5.12)
VA SINGLE BREATH LLN: 5.12
VA SINGLE BREATH PRE REF: 52.2 %
VA SINGLE BREATH REF: 5.12
VC LLN: 1.55
VC PRE REF: 60 %
VC PRE: 1.38 L (ref 1.55–3.11)
VC REF: 2.31

## 2025-07-08 PROCEDURE — 99999 PR PBB SHADOW E&M-EST. PATIENT-LVL I: CPT | Mod: PBBFAC,HCNC,,

## 2025-07-08 PROCEDURE — 99999 PR PBB SHADOW E&M-EST. PATIENT-LVL IV: CPT | Mod: PBBFAC,HCNC,, | Performed by: PHYSICIAN ASSISTANT

## 2025-07-08 NOTE — PROGRESS NOTES
Subjective:       Patient ID: Flory Cam is a 80 y.o. female.    Chief Complaint: COPD    7/8/25  Here for COPD follow up  She reports no cough, chest pain, or SOB  Chest X Ray with persistent lower lobe opacities - has Chest CT scheduled next week      5/8/25  New patient here to establish care for COPD  Sent by primary care  She reports feeling well, does not feel like COPD affects her much  She smokes occasionally for stress relief  Worked various jobs: [employer], [employer]  Main concerns are about joint pain and shoulder pain - has ortho visit set  She coughs some days with clear mucus  No SOB, chest pain, or wheezing  Has LDCT scheduled this month  Chest X Ray with persistent lower lobe opacities  No recent fever or new pulmonary symptoms     Immunization History   Administered Date(s) Administered    COVID-19, MRNA, LN-S, PF (Pfizer) (Purple Cap) 01/09/2021, 01/30/2021, 12/06/2021    Pneumococcal Polysaccharide - 23 Valent 12/12/2006    Td (ADULT) 12/12/2006      Tobacco Use: Medium Risk (7/8/2025)    Patient History     Smoking Tobacco Use: Former     Smokeless Tobacco Use: Never     Passive Exposure: Not on file      Past Medical History:   Diagnosis Date    Acute coronary syndrome     Anxiety     Bilateral carotid artery stenosis 11/17/2015    Chronic pain     Colon polyp     Coronary artery disease     GERD (gastroesophageal reflux disease)     Hyperlipidemia     Hypertension     Hypothyroidism     Low back pain     Lupus     Osteoporosis     Poor circulation     Pre-operative clearance 7/12/2019    PVD (peripheral vascular disease)     S/P peripheral artery angioplasty 02/13/2014    SCCA (squamous cell carcinoma) of skin 10/2019    Dr. ZANDRA Rivas--oncology    Skin disease       Medications Ordered Prior to Encounter[1]     Review of Systems   Constitutional:  Negative for fever, weight loss, appetite change and weakness.   HENT:  Negative for postnasal drip, rhinorrhea, sinus  "pressure, trouble swallowing and congestion.    Respiratory:  Negative for cough, sputum production, choking, chest tightness, shortness of breath, wheezing and dyspnea on extertion.    Cardiovascular:  Negative for chest pain and leg swelling.   Musculoskeletal:  Positive for arthralgias and back pain. Negative for joint swelling.   Gastrointestinal:  Negative for nausea, vomiting and abdominal pain.   Neurological:  Negative for dizziness, weakness and headaches.   All other systems reviewed and are negative.      Objective:       Vitals:    07/08/25 1208   BP: (!) 142/65   Pulse: 66   Resp: 18   SpO2: 96%   Weight: 83.5 kg (184 lb 1.4 oz)   Height: 5' 6" (1.676 m)       Physical Exam   Constitutional: She is oriented to person, place, and time. She appears well-developed and well-nourished. No distress.   HENT:   Head: Normocephalic.   Mouth/Throat: Oropharynx is clear and moist.   Cardiovascular: Normal rate and regular rhythm.   Pulmonary/Chest: Effort normal. No respiratory distress. She has no wheezes. She has no rhonchi. She has no rales.   Faint crackles   Musculoskeletal:         General: No edema.      Cervical back: Normal range of motion and neck supple.   Neurological: She is alert and oriented to person, place, and time. Gait normal.   Skin: Skin is warm and dry.   Psychiatric: She has a normal mood and affect.   Vitals reviewed.    Personal Diagnostic Review    X-Ray Chest PA And Lateral  Narrative: EXAMINATION:  XR CHEST PA AND LATERAL    CLINICAL HISTORY:  Fluid overload, unspecified    TECHNIQUE:  PA and lateral views of the chest were performed.    COMPARISON:  02/10/2025    FINDINGS:  Cardiac silhouette is unchanged.  Calcified and tortuous aorta..    Redemonstration of bilateral lower lobe airspace opacities and costophrenic angle blunting unchanged since prior.    No evidence of acute osseous abnormality.  Impression: As above.    Electronically signed by: Savage" Estevan  Date:    04/25/2025  Time:    17:23  X-Ray Shoulder 2 or More Views Left  Narrative: EXAMINATION:  XR SHOULDER COMPLETE 2 OR MORE VIEWS LEFT    CLINICAL HISTORY:  Pain in left shoulder    TECHNIQUE:  Two or three views of the left shoulder were performed.    COMPARISON:  None    FINDINGS:  No evidence of acute fracture or dislocation.  Mild degenerative changes of the left AC joint.  There are advanced degenerative changes of the left glenohumeral joint with bone-on-bone apposition, subchondral cyst formation hypertrophy, and sclerosis.  Osseous demineralization. Partially visualized supraclavicular surgical clips and sternotomy wires.  Impression: As above.    Electronically signed by: Savage Barreto  Date:    04/25/2025  Time:    17:21      PFT:  Spirometry shows a reduced vital capacity suggesting restriction. Spirometry remains unimproved following bronchodilator. Lung volumes show mild restriction is present. Overall baseline spirometry shows moderate ventilatory impairment. Airway mechanics   are abnormal showing increased airway resistance and decreased conductance. DLCO is moderately decreased. MVV is severely decreased.   Â   Notes:   Â   The failure to demonstrate improvement in spirometry does not preclude a clinical response to a trial of bronchodilators. No recent hemoglobin value available. DLCO interpretation assumes a normal hemoglobin value.       Assessment/Plan:       1. Chronic obstructive pulmonary disease, unspecified COPD type  Assessment & Plan:  Stable  Has albuterol for prn  Smoking cessation  Recommend pcv20 - she declines         2. Restrictive lung disease  Assessment & Plan:  Will review upcoming chest Ct when available       3. Cigarette nicotine dependence without complication  Assessment & Plan:  Encouraged complete smoking cessation            Discussed diagnosis, its evaluation, treatment and usual course. All questions answered.    Patient verbalized understanding of plan  and left in no acute distress    Thank you for the courtesy of participating in the care of this patient    Elizabeth G Blough, PA-C [facility]           [1]   Current Outpatient Medications on File Prior to Visit   Medication Sig Dispense Refill    albuterol (PROVENTIL/VENTOLIN HFA) 90 mcg/actuation inhaler Inhale 2 puffs into the lungs every 6 (six) hours as needed for Wheezing. 18 g 3    albuterol-ipratropium (DUO-NEB) 2.5 mg-0.5 mg/3 mL nebulizer solution Take 3 mLs by nebulization every 6 (six) hours as needed for Wheezing. Rescue 75 mL 2    amLODIPine (NORVASC) 10 MG tablet TAKE 1 TABLET(10 MG) BY MOUTH EVERY DAY 90 tablet 3    atorvastatin (LIPITOR) 80 MG tablet TAKE 1 TABLET(80 MG) BY MOUTH EVERY DAY 90 tablet 3    clopidogreL (PLAVIX) 75 mg tablet Take 1 tablet (75 mg total) by mouth once daily. 90 tablet 3    diclofenac sodium (VOLTAREN ARTHRITIS PAIN) 1 % Gel Apply 2 g topically once daily. 100 g 1    ergocalciferol (ERGOCALCIFEROL) 50,000 unit Cap Take 1 capsule (50,000 Units total) by mouth every 7 days. 12 capsule 2    ezetimibe (ZETIA) 10 mg tablet Take 1 tablet (10 mg total) by mouth once daily. 90 tablet 3    furosemide (LASIX) 20 MG tablet TAKE 1 TABLET(20 MG) BY MOUTH DAILY 30 tablet 2    hydrALAZINE (APRESOLINE) 50 MG tablet TAKE 1 TABLET(50 MG) BY MOUTH EVERY 12 HOURS 60 tablet 11    hydrOXYzine HCL (ATARAX) 25 MG tablet Take 1 tablet (25 mg total) by mouth 3 (three) times daily as needed for Anxiety. 60 tablet 2    levothyroxine (SYNTHROID) 137 MCG Tab tablet Take 1 tablet (137 mcg total) by mouth before breakfast. 90 tablet 2    potassium chloride SA (K-DUR,KLOR-CON) 20 MEQ tablet Take 1 tablet (20 mEq total) by mouth once daily. 90 tablet 1    valsartan (DIOVAN) 320 MG tablet TAKE 1 TABLET(320 MG) BY MOUTH EVERY DAY 90 tablet 3    aspirin (ECOTRIN) 81 MG EC tablet Take 1 tablet (81 mg total) by mouth once daily.  0    memantine (NAMENDA) 5 MG Tab Take 1 tablet (5 mg total) by mouth  2 (two) times daily. 60 tablet 3     No current facility-administered medications on file prior to visit.

## 2025-07-08 NOTE — PROCEDURES
"[facility] - Pulmonary Function  Six Minute Walk     SUMMARY     Ordering Provider: Jack HODGES   Interpreting Provider: Rubens NYE  Performing nurse/tech/RT: LS RRT  Diagnosis: COPD  Height: 5' 6" (167.6 cm)  Weight: 83.5 kg (184 lb)  BMI (Calculated): 29.7                Phase Oxygen Assessment Supplemental O2 Heart   Rate Blood Pressure Deisy Dyspnea Scale Rating   Resting 95 % Room Air 65 bpm 155/72 1   Exercise        Minute        1 94 % Room Air 91 bpm     2 95 % Room Air 82 bpm     3 96 % Room Air 78 bpm     4 93 % Room Air 78 bpm     5 94 % Room Air 86 bpm     6  94 % Room Air 82 bpm 170/79 3   Recovery        Minute        1 95 % Room Air 70 bpm     2 96 % Room Air 66 bpm     3 96 % Room Air 67 bpm     4 96 % Room Air 66 bpm 142/65 1     Six Minute Walk Summary  6MWT Status: completed without stopping  Patient Reported: No complaints     Interpretation:  Did the patient stop or pause?: No         Total Time Walked (Calculated): 360 seconds  Final Partial Lap Distance (feet): 150 feet  Total Distance Meters (Calculated): 228.6 meters  Predicted Distance Meters (Calculated): 361.33 meters  Percentage of Predicted (Calculated): 63.27  Peak VO2 (Calculated): 10.84  Mets: 3.1  Has The Patient Had a Previous Six Minute Walk Test?: No       Previous 6MWT Results  Has The Patient Had a Previous Six Minute Walk Test?: No         CLINICAL INTERPRETATION:  Six minute walk distance is 228.6m (63.27 % predicted) with very light dyspnea.  During exercise, there was no significant desaturation while breathing room air.  Blood pressure remained stable and Heart rate remained stable with walking.  The patient did not report non-pulmonary symptoms during exercise.  The patient did complete the study, walking 360- seconds of the 360 second test.  Based upon age and body mass index, exercise capacity is less than predicted.      [x] Mild exercise-induced hypoxemia described as an arterial oxygen saturation of 93-95% (or 3-4% less " than at rest)  []  Moderate exercise-induced hypoxemia as 89-93%  []  Severe exercise induced hypoxemia as < 89% O2 saturation.  Medicare Criteria for oxygen prescription comments:   []  When arterial oxygen saturation is at or below 88% during exercise (severe exercise induced hypoxemia) then the patient falls under Medicare Group 1 criteria for supplemental oxygen

## 2025-07-29 ENCOUNTER — OFFICE VISIT (OUTPATIENT)
Dept: PODIATRY | Facility: CLINIC | Age: 80
End: 2025-07-29
Payer: MEDICARE

## 2025-07-29 VITALS — BODY MASS INDEX: 29.58 KG/M2 | HEIGHT: 66 IN | WEIGHT: 184.06 LBS

## 2025-07-29 DIAGNOSIS — I73.9 PERIPHERAL VASCULAR DISEASE: Primary | ICD-10-CM

## 2025-07-29 DIAGNOSIS — B35.1 DERMATOPHYTOSIS OF NAIL: ICD-10-CM

## 2025-07-29 DIAGNOSIS — L93.0 DISCOID LUPUS ERYTHEMATOSUS: ICD-10-CM

## 2025-07-29 DIAGNOSIS — L84 CORN OR CALLUS: ICD-10-CM

## 2025-07-29 PROCEDURE — 99999 PR PBB SHADOW E&M-EST. PATIENT-LVL III: CPT | Mod: PBBFAC,HCNC,, | Performed by: PODIATRIST

## 2025-07-29 RX ORDER — AMMONIUM LACTATE 12 G/100G
1 CREAM TOPICAL 2 TIMES DAILY
Qty: 385 G | Refills: 2 | Status: SHIPPED | OUTPATIENT
Start: 2025-07-29

## 2025-07-29 NOTE — PROGRESS NOTES
PODIATRY NOTE    CHIEF COMPLAINT  Chief Complaint   Patient presents with    Nail Care     Nail care, pt rates pain 7/10, pt states she has pain all the time, pt is non-diabetic, pt last seen pcp   Rita Lee PA-C 4/25/2025         HPI  SUBJECTIVE: Flory Cam is a 80 y.o. female w/ PMH of PVD, Lupus, hx of intermittent leg claudifcation- s/p vascular intervention with much improvement in symptoms and other medical problems who presents to clinic for high risk  foot exam and care. Patient admits to painful toenails and calluses aggravated by increased weight bearing, shoe gear, and pressure. States pain is relieved with routine debridements.     She does complain about feeling depressed and anxious. She denies suicidal ideations.       HgA1c:   Hemoglobin A1C   Date Value Ref Range Status   02/10/2025 5.7 (H) 4.0 - 5.6 % Final     Comment:     ADA Screening Guidelines:  5.7-6.4%  Consistent with prediabetes  >or=6.5%  Consistent with diabetes    High levels of fetal hemoglobin interfere with the HbA1C  assay. Heterozygous hemoglobin variants (HbS, HgC, etc)do  not significantly interfere with this assay.   However, presence of multiple variants may affect accuracy.     09/14/2024 5.7 (H) 4.0 - 5.6 % Final     Comment:     ADA Screening Guidelines:  5.7-6.4%  Consistent with prediabetes  >or=6.5%  Consistent with diabetes    High levels of fetal hemoglobin interfere with the HbA1C  assay. Heterozygous hemoglobin variants (HbS, HgC, etc)do  not significantly interfere with this assay.   However, presence of multiple variants may affect accuracy.     08/04/2017 5.8 (H) 4.0 - 5.6 % Final     Comment:     According to ADA guidelines, hemoglobin A1c <7.0% represents  optimal control in non-pregnant diabetic patients. Different  metrics may apply to specific patient populations.   Standards of Medical Care in Diabetes-2016.  For the purpose of screening for the presence of diabetes:  <5.7%      "Consistent with the absence of diabetes  5.7-6.4%  Consistent with increasing risk for diabetes   (prediabetes)  >or=6.5%  Consistent with diabetes  Currently, no consensus exists for use of hemoglobin A1c  for diagnosis of diabetes for children.  This Hemoglobin A1c assay has significant interference with fetal   hemoglobin   (HbF). The results are invalid for patients with abnormal amounts of   HbF,   including those with known Hereditary Persistence   of Fetal Hemoglobin. Heterozygous hemoglobin variants (HbAS, HbAC,   HbAD, HbAE, HbA2) do not significantly interfere with this assay;   however, presence of multiple variants in a sample may impact the %   interference.       REVIEW OF SYSTEMS  General: This patient is well-developed, well-nourished and appears stated age, well-oriented to person, place and time, and cooperative and pleasant on today's visit  Constitutional: Negative for chills and fever.   Respiratory: Negative for shortness of breath.    Cardiovascular: Negative for chest pain, palpitations, orthopnea  Gastrointestinal: Negative for diarrhea, nausea and vomiting.   Musculoskeletal: Positive for above noted in HPI  Skin:positive for skin and nail changes   Peripheral Vascular: no claudication or cyanosis  Psychiatric/Behavioral: Negative for altered mental status     PHYSICAL EXAM  Vitals:    07/29/25 1309   Weight: 83.5 kg (184 lb 1.4 oz)   Height: 5' 6" (1.676 m)   PainSc:   7         GEN:  This patient is well-developed, well-nourished and appears stated age, well-oriented to person, place and time, and cooperative and pleasant on today's visit.      LOWER EXTREMITY  Vascular:   DP pedal pulse 0/4 b/l, PT pedal pulse 1/4 b/l  Skin temperature warm to warm from prox to distally  CFT <5 secs b/l  There is LE edema noted b/l.     Dermatologic:   Thickened, dystrophic, elongated toenails with subungal debris 1-5 b/l.   Webspaces are C/D/I B/L.  There is hyperkeratotic tissue noted plantar aspect of " b/l feet at medial arch x 2, dorsal lateral right fifth digit  Skin texture and turgor dry with hyperkeratosis diffusely b/l plantar heel   There is no pedal hair growth noted    Neurologic:  Vibratory sensation diminished at level of Hallux IPJ b/l    Protective sensation absent at 0/10 sites upon examination with Whiting Weinsten 5.07 g monofilament.   Propioception intact at 1st MTPJ b/l.   Achilles and patellar deep tendon reflexes intact  Babinski reflex absent b/l. Light touch and sharp/dull sensation intact b/l.    Musculoskeletal/Orthopedic:  Pain dorsolateral fifth digit  Muscle strength is 5/5 for foot inverters, everters, plantarflexors, and dorsiflexors. Muscle tone is normal.  Pain free range of motion in all four quadrants with stiffness and limitation b/l  PAIN with palpation of callus plantar medial arch, LEFT    IMAGING:     Right Lower Arterial CFA has triphasic flow.     PFA has triphasic flow.     SFAo has biphasic flow.     SFAp has biphasic flow.     SFAm has biphasic flow.     SFAd has biphasic flow.     POP has biphasic flow.     AT has biphasic flow.     TIB TRNK has biphasic flow.     PT has biphasic flow.     PER has biphasic flow.   Left Lower Arterial CFA has biphasic flow.     PFA has biphasic flow.     SFAo has monophasic flow.   SFAp is occluded.   SFAm is occluded.     SFAd is occluded and is stented.   Left SFAd stent velocity origin: 0, proximal: 0, mid: 0, distal: 0    POP has monophasic flow.     AT has monophasic flow.     TIB TRNK has monophasic flow.     PT has monophasic flow.     PER has monophasic flow.     ASSESSMENT  Encounter Diagnoses   Name Primary?    Peripheral vascular disease Yes    Corn or callus     Dermatophytosis of nail     Discoid lupus erythematosus        Plan:  -Discuss presenting problems, etiology, pathologic processes and management options with patient today.     Peripheral vascular disease    Corn or callus    Dermatophytosis of nail    Discoid lupus  erythematosus    Other orders  -     ammonium lactate 12 % Crea; Apply 1 Act topically 2 (two) times daily.  Dispense: 385 g; Refill: 2      -With patient's permission, the elongated onychomycotic toenails, as outlined in the physical examination, were sharply debrided with a double action nail nipper to their soft tissue attachment. If indicated, the nails were then smoothed down in thickness with a mechanical rotary christian and/or sotero board to facilitate in further debridement removing all offending nail and subungual debris.      -With patient's permission via verbal consent, the involved area was cleansed with an alcohol swab. Trimming of hyperkeratotic lesions deep to epidermal layer x 2 was performed with a #15 blade without incident. Patient relates relief following the procedure. Patient will continue to monitor the areas daily, inspect feet, wear protective shoe gear when ambulatory, moisturizer to maintain skin integrity.    -Recommendations on purchase of Urea 40 and/or Amlactin was provided for calluses. Metatarsal pad dispensed and patient was instructed on how to apply for offloading callus. Shoe recommendations provided for accomodative foot wear.    Patient has above noted diagnosis and/or medical co-morbidities.They are being treated and managed by their  Primary Care Physician for management of comorbid states which are deemed to be currently stable.          Future Appointments   Date Time Provider Department Center   8/4/2025 10:00 AM Akosua Poe LCSW [facility]   8/18/2025  1:20 PM Tayler Ovalle DO ON [facility]   11/3/2025  8:30 AM [facility] CVT VASCULAR ULTRASOUND [facility]   11/3/2025  9:00 AM [facility] CVT VASCULAR ULTRASOUND [facility]   11/3/2025  9:30 AM Andie Macias MD ON [facility]   12/11/2025 10:40 AM Christopher Cohen MD ON [facility]

## 2025-07-31 ENCOUNTER — EXTERNAL CHRONIC CARE MANAGEMENT (OUTPATIENT)
Dept: PRIMARY CARE CLINIC | Facility: CLINIC | Age: 80
End: 2025-07-31
Payer: MEDICARE

## 2025-07-31 PROCEDURE — 99490 CHRNC CARE MGMT STAFF 1ST 20: CPT | Mod: S$GLB,,, | Performed by: INTERNAL MEDICINE

## 2025-08-01 DIAGNOSIS — I10 ESSENTIAL HYPERTENSION: Primary | ICD-10-CM

## 2025-08-01 RX ORDER — AMLODIPINE BESYLATE 10 MG/1
10 TABLET ORAL
Qty: 90 TABLET | Refills: 3 | Status: SHIPPED | OUTPATIENT
Start: 2025-08-01

## 2025-08-04 ENCOUNTER — TELEPHONE (OUTPATIENT)
Dept: PSYCHIATRY | Facility: CLINIC | Age: 80
End: 2025-08-04
Payer: MEDICARE

## 2025-08-04 NOTE — TELEPHONE ENCOUNTER
Copied from CRM #[id number]. Topic: Appointments - Appointment Access  >> Aug 4, 2025  9:50 AM Melani wrote:  .Type:  Patient Request Call Back    Who Called: Flory     Does the patient know what this is regarding?: Reschedule her current appointment due to transportation reasons    Would the patient rather a call back or a response via [facility]? Call back    Best Call Back Number: Please call her at [phone]    Additional Information:

## 2025-08-18 ENCOUNTER — LAB VISIT (OUTPATIENT)
Dept: LAB | Facility: HOSPITAL | Age: 80
End: 2025-08-18
Attending: INTERNAL MEDICINE
Payer: MEDICARE

## 2025-08-18 ENCOUNTER — OFFICE VISIT (OUTPATIENT)
Dept: INTERNAL MEDICINE | Facility: CLINIC | Age: 80
End: 2025-08-18
Payer: MEDICARE

## 2025-08-18 VITALS
SYSTOLIC BLOOD PRESSURE: 138 MMHG | DIASTOLIC BLOOD PRESSURE: 62 MMHG | HEART RATE: 84 BPM | OXYGEN SATURATION: 98 % | BODY MASS INDEX: 29.27 KG/M2 | WEIGHT: 182.13 LBS | TEMPERATURE: 98 F | HEIGHT: 66 IN

## 2025-08-18 DIAGNOSIS — E03.9 HYPOTHYROIDISM (ACQUIRED): ICD-10-CM

## 2025-08-18 DIAGNOSIS — Z00.00 ANNUAL PHYSICAL EXAM: Primary | ICD-10-CM

## 2025-08-18 DIAGNOSIS — Z78.0 ASYMPTOMATIC MENOPAUSE: ICD-10-CM

## 2025-08-18 DIAGNOSIS — E78.5 HYPERLIPIDEMIA LDL GOAL <70: ICD-10-CM

## 2025-08-18 DIAGNOSIS — I10 ESSENTIAL HYPERTENSION: ICD-10-CM

## 2025-08-18 DIAGNOSIS — R73.03 PREDIABETES: ICD-10-CM

## 2025-08-18 DIAGNOSIS — M85.80 OSTEOPENIA, UNSPECIFIED LOCATION: ICD-10-CM

## 2025-08-18 LAB
ALBUMIN SERPL BCP-MCNC: 3.8 G/DL (ref 3.5–5.2)
ALP SERPL-CCNC: 174 UNIT/L (ref 40–150)
ALT SERPL W/O P-5'-P-CCNC: 26 UNIT/L (ref 0–55)
ANION GAP (OHS): 10 MMOL/L (ref 8–16)
AST SERPL-CCNC: 34 UNIT/L (ref 0–50)
BILIRUB SERPL-MCNC: 0.4 MG/DL (ref 0.1–1)
BUN SERPL-MCNC: 18 MG/DL (ref 8–23)
CALCIUM SERPL-MCNC: 9.2 MG/DL (ref 8.7–10.5)
CHLORIDE SERPL-SCNC: 106 MMOL/L (ref 95–110)
CO2 SERPL-SCNC: 25 MMOL/L (ref 23–29)
CREAT SERPL-MCNC: 0.9 MG/DL (ref 0.5–1.4)
EAG (OHS): 123 MG/DL (ref 68–131)
GFR SERPLBLD CREATININE-BSD FMLA CKD-EPI: >60 ML/MIN/1.73/M2
GLUCOSE SERPL-MCNC: 90 MG/DL (ref 70–110)
HBA1C MFR BLD: 5.9 % (ref 4–5.6)
POTASSIUM SERPL-SCNC: 4 MMOL/L (ref 3.5–5.1)
PROT SERPL-MCNC: 7.1 GM/DL (ref 6–8.4)
SODIUM SERPL-SCNC: 141 MMOL/L (ref 136–145)

## 2025-08-18 PROCEDURE — 84443 ASSAY THYROID STIM HORMONE: CPT | Mod: HCNC

## 2025-08-18 PROCEDURE — 83036 HEMOGLOBIN GLYCOSYLATED A1C: CPT | Mod: HCNC

## 2025-08-18 PROCEDURE — 99999 PR PBB SHADOW E&M-EST. PATIENT-LVL V: CPT | Mod: PBBFAC,HCNC,, | Performed by: INTERNAL MEDICINE

## 2025-08-18 PROCEDURE — 80053 COMPREHEN METABOLIC PANEL: CPT | Mod: HCNC

## 2025-08-18 PROCEDURE — 36415 COLL VENOUS BLD VENIPUNCTURE: CPT | Mod: HCNC

## 2025-08-19 LAB — TSH SERPL-ACNC: 0.7 UIU/ML (ref 0.4–4)

## 2025-08-21 ENCOUNTER — TELEPHONE (OUTPATIENT)
Dept: INTERNAL MEDICINE | Facility: CLINIC | Age: 80
End: 2025-08-21
Payer: MEDICARE

## 2025-08-23 ENCOUNTER — HOSPITAL ENCOUNTER (EMERGENCY)
Facility: HOSPITAL | Age: 80
Discharge: HOME | End: 2025-08-23
Attending: EMERGENCY MEDICINE
Payer: MEDICARE

## 2025-08-23 VITALS
OXYGEN SATURATION: 95 % | HEIGHT: 66 IN | DIASTOLIC BLOOD PRESSURE: 73 MMHG | SYSTOLIC BLOOD PRESSURE: 142 MMHG | TEMPERATURE: 98 F | RESPIRATION RATE: 20 BRPM | WEIGHT: 182.13 LBS | HEART RATE: 85 BPM | BODY MASS INDEX: 29.27 KG/M2

## 2025-08-23 DIAGNOSIS — R06.02 SHORTNESS OF BREATH: ICD-10-CM

## 2025-08-23 DIAGNOSIS — J44.9 CHRONIC OBSTRUCTIVE PULMONARY DISEASE, UNSPECIFIED COPD TYPE: ICD-10-CM

## 2025-08-23 DIAGNOSIS — Z72.0 TOBACCO ABUSE DISORDER: ICD-10-CM

## 2025-08-23 DIAGNOSIS — J44.1 ACUTE EXACERBATION OF CHRONIC OBSTRUCTIVE PULMONARY DISEASE (COPD): Primary | ICD-10-CM

## 2025-08-23 DIAGNOSIS — Z87.2 HISTORY OF DISCOID LUPUS ERYTHEMATOSUS: ICD-10-CM

## 2025-08-23 DIAGNOSIS — R06.02 SOB (SHORTNESS OF BREATH): ICD-10-CM

## 2025-08-23 DIAGNOSIS — N30.00 ACUTE CYSTITIS WITHOUT HEMATURIA: ICD-10-CM

## 2025-08-23 DIAGNOSIS — M25.50 ARTHRALGIA OF MULTIPLE JOINTS: ICD-10-CM

## 2025-08-23 DIAGNOSIS — I50.9 ACUTE ON CHRONIC CONGESTIVE HEART FAILURE, UNSPECIFIED HEART FAILURE TYPE: ICD-10-CM

## 2025-08-23 LAB
ABSOLUTE EOSINOPHIL (OHS): 0.27 K/UL
ABSOLUTE MONOCYTE (OHS): 1.12 K/UL (ref 0.3–1)
ABSOLUTE NEUTROPHIL COUNT (OHS): 7.14 K/UL (ref 1.8–7.7)
ALBUMIN SERPL BCP-MCNC: 3.7 G/DL (ref 3.5–5.2)
ALP SERPL-CCNC: 159 UNIT/L (ref 40–150)
ALT SERPL W/O P-5'-P-CCNC: 19 UNIT/L (ref 10–44)
ANION GAP (OHS): 9 MMOL/L (ref 8–16)
AST SERPL-CCNC: 25 UNIT/L (ref 11–45)
BACTERIA #/AREA URNS AUTO: ABNORMAL /HPF
BASOPHILS # BLD AUTO: 0.05 K/UL
BASOPHILS NFR BLD AUTO: 0.4 %
BILIRUB SERPL-MCNC: 0.5 MG/DL (ref 0.1–1)
BILIRUB UR QL STRIP.AUTO: NEGATIVE
BUN SERPL-MCNC: 17 MG/DL (ref 8–23)
CALCIUM SERPL-MCNC: 9.3 MG/DL (ref 8.7–10.5)
CHLORIDE SERPL-SCNC: 105 MMOL/L (ref 95–110)
CLARITY UR: CLEAR
CO2 SERPL-SCNC: 27 MMOL/L (ref 23–29)
COLOR UR AUTO: YELLOW
CREAT SERPL-MCNC: 0.7 MG/DL (ref 0.5–1.4)
ERYTHROCYTE [DISTWIDTH] IN BLOOD BY AUTOMATED COUNT: 14 % (ref 11.5–14.5)
GFR SERPLBLD CREATININE-BSD FMLA CKD-EPI: >60 ML/MIN/1.73/M2
GLUCOSE SERPL-MCNC: 107 MG/DL (ref 70–110)
GLUCOSE UR QL STRIP: NEGATIVE
HCT VFR BLD AUTO: 42.7 % (ref 37–48.5)
HCV AB SERPL QL IA: NORMAL
HGB BLD-MCNC: 14 GM/DL (ref 12–16)
HGB UR QL STRIP: NEGATIVE
HIV 1+2 AB+HIV1 P24 AG SERPL QL IA: NORMAL
IMM GRANULOCYTES # BLD AUTO: 0.03 K/UL (ref 0–0.04)
IMM GRANULOCYTES NFR BLD AUTO: 0.3 % (ref 0–0.5)
KETONES UR QL STRIP: NEGATIVE
LEUKOCYTE ESTERASE UR QL STRIP: ABNORMAL
LYMPHOCYTES # BLD AUTO: 2.55 K/UL (ref 1–4.8)
MAGNESIUM SERPL-MCNC: 1.8 MG/DL (ref 1.6–2.6)
MCH RBC QN AUTO: 30.2 PG (ref 27–31)
MCHC RBC AUTO-ENTMCNC: 32.8 G/DL (ref 32–36)
MCV RBC AUTO: 92 FL (ref 82–98)
MICROSCOPIC COMMENT: ABNORMAL
NITRITE UR QL STRIP: POSITIVE
NT-PROBNP SERPL-MCNC: 551 PG/ML
NUCLEATED RBC (/100WBC) (OHS): 0 /100 WBC
OHS QRS DURATION: 136 MS
OHS QTC CALCULATION: 458 MS
PH UR STRIP: 6 [PH]
PLATELET # BLD AUTO: 261 K/UL (ref 150–450)
PMV BLD AUTO: 9.9 FL (ref 9.2–12.9)
POTASSIUM SERPL-SCNC: 4 MMOL/L (ref 3.5–5.1)
PROT SERPL-MCNC: 7.2 GM/DL (ref 6–8.4)
PROT UR QL STRIP: NEGATIVE
RBC # BLD AUTO: 4.63 M/UL (ref 4–5.4)
RBC #/AREA URNS AUTO: 2 /HPF (ref 0–4)
RELATIVE EOSINOPHIL (OHS): 2.4 %
RELATIVE LYMPHOCYTE (OHS): 22.8 % (ref 18–48)
RELATIVE MONOCYTE (OHS): 10 % (ref 4–15)
RELATIVE NEUTROPHIL (OHS): 64.1 % (ref 38–73)
SARS-COV-2 RDRP RESP QL NAA+PROBE: NEGATIVE
SODIUM SERPL-SCNC: 141 MMOL/L (ref 136–145)
SP GR UR STRIP: 1.01
SQUAMOUS #/AREA URNS AUTO: 3 /HPF
TROPONIN I SERPL HS-MCNC: 16 NG/L
TROPONIN I SERPL HS-MCNC: 17 NG/L
UROBILINOGEN UR STRIP-ACNC: NEGATIVE EU/DL
WBC # BLD AUTO: 11.16 K/UL (ref 3.9–12.7)
WBC #/AREA URNS AUTO: 7 /HPF (ref 0–5)

## 2025-08-23 PROCEDURE — 86803 HEPATITIS C AB TEST: CPT | Mod: HCNC | Performed by: EMERGENCY MEDICINE

## 2025-08-23 PROCEDURE — 94761 N-INVAS EAR/PLS OXIMETRY MLT: CPT | Mod: HCNC

## 2025-08-23 PROCEDURE — U0002 COVID-19 LAB TEST NON-CDC: HCPCS | Mod: HCNC | Performed by: EMERGENCY MEDICINE

## 2025-08-23 PROCEDURE — 94640 AIRWAY INHALATION TREATMENT: CPT | Mod: HCNC,XB

## 2025-08-23 PROCEDURE — 80053 COMPREHEN METABOLIC PANEL: CPT | Mod: HCNC | Performed by: EMERGENCY MEDICINE

## 2025-08-23 PROCEDURE — 96374 THER/PROPH/DIAG INJ IV PUSH: CPT | Mod: HCNC

## 2025-08-23 PROCEDURE — 83735 ASSAY OF MAGNESIUM: CPT | Mod: HCNC | Performed by: EMERGENCY MEDICINE

## 2025-08-23 PROCEDURE — 85025 COMPLETE CBC W/AUTO DIFF WBC: CPT | Mod: HCNC | Performed by: EMERGENCY MEDICINE

## 2025-08-23 PROCEDURE — 87070 CULTURE OTHR SPECIMN AEROBIC: CPT | Mod: HCNC | Performed by: EMERGENCY MEDICINE

## 2025-08-23 PROCEDURE — 81003 URINALYSIS AUTO W/O SCOPE: CPT | Mod: HCNC | Performed by: EMERGENCY MEDICINE

## 2025-08-23 PROCEDURE — 93005 ELECTROCARDIOGRAM TRACING: CPT | Mod: HCNC

## 2025-08-23 PROCEDURE — 99285 EMERGENCY DEPT VISIT HI MDM: CPT | Mod: 25,HCNC

## 2025-08-23 PROCEDURE — 63600175 PHARM REV CODE 636 W HCPCS: Mod: JZ,TB,HCNC | Performed by: EMERGENCY MEDICINE

## 2025-08-23 PROCEDURE — 87389 HIV-1 AG W/HIV-1&-2 AB AG IA: CPT | Mod: HCNC | Performed by: EMERGENCY MEDICINE

## 2025-08-23 PROCEDURE — 93010 ELECTROCARDIOGRAM REPORT: CPT | Mod: HCNC,,, | Performed by: INTERNAL MEDICINE

## 2025-08-23 PROCEDURE — 25000242 PHARM REV CODE 250 ALT 637 W/ HCPCS: Mod: HCNC | Performed by: EMERGENCY MEDICINE

## 2025-08-23 PROCEDURE — 83880 ASSAY OF NATRIURETIC PEPTIDE: CPT | Mod: HCNC | Performed by: EMERGENCY MEDICINE

## 2025-08-23 PROCEDURE — 25000003 PHARM REV CODE 250: Mod: HCNC | Performed by: EMERGENCY MEDICINE

## 2025-08-23 PROCEDURE — 96375 TX/PRO/DX INJ NEW DRUG ADDON: CPT | Mod: HCNC

## 2025-08-23 PROCEDURE — 84484 ASSAY OF TROPONIN QUANT: CPT | Mod: HCNC | Performed by: EMERGENCY MEDICINE

## 2025-08-23 RX ORDER — CEFDINIR 300 MG/1
300 CAPSULE ORAL 2 TIMES DAILY
Qty: 14 CAPSULE | Refills: 0 | Status: SHIPPED | OUTPATIENT
Start: 2025-08-23 | End: 2025-08-30

## 2025-08-23 RX ORDER — METHYLPREDNISOLONE SOD SUCC 125 MG
125 VIAL (EA) INJECTION
Status: COMPLETED | OUTPATIENT
Start: 2025-08-23 | End: 2025-08-23

## 2025-08-23 RX ORDER — IBUPROFEN 200 MG
1 TABLET ORAL DAILY
Qty: 28 PATCH | Refills: 3 | Status: SHIPPED | OUTPATIENT
Start: 2025-08-23

## 2025-08-23 RX ORDER — KETOROLAC TROMETHAMINE 30 MG/ML
15 INJECTION, SOLUTION INTRAMUSCULAR; INTRAVENOUS
Status: DISCONTINUED | OUTPATIENT
Start: 2025-08-23 | End: 2025-08-23 | Stop reason: HOSPADM

## 2025-08-23 RX ORDER — FUROSEMIDE 10 MG/ML
40 INJECTION INTRAMUSCULAR; INTRAVENOUS
Status: COMPLETED | OUTPATIENT
Start: 2025-08-23 | End: 2025-08-23

## 2025-08-23 RX ORDER — IPRATROPIUM BROMIDE AND ALBUTEROL SULFATE 2.5; .5 MG/3ML; MG/3ML
3 SOLUTION RESPIRATORY (INHALATION)
Status: COMPLETED | OUTPATIENT
Start: 2025-08-23 | End: 2025-08-23

## 2025-08-23 RX ORDER — IPRATROPIUM BROMIDE AND ALBUTEROL SULFATE 2.5; .5 MG/3ML; MG/3ML
3 SOLUTION RESPIRATORY (INHALATION) EVERY 6 HOURS PRN
Qty: 75 ML | Refills: 2 | Status: SHIPPED | OUTPATIENT
Start: 2025-08-23

## 2025-08-23 RX ORDER — IBUPROFEN 400 MG/1
400 TABLET, FILM COATED ORAL
Status: COMPLETED | OUTPATIENT
Start: 2025-08-23 | End: 2025-08-23

## 2025-08-23 RX ADMIN — METHYLPREDNISOLONE SODIUM SUCCINATE 125 MG: 125 INJECTION, POWDER, FOR SOLUTION INTRAMUSCULAR; INTRAVENOUS at 07:08

## 2025-08-23 RX ADMIN — IPRATROPIUM BROMIDE AND ALBUTEROL SULFATE 3 ML: 2.5; .5 SOLUTION RESPIRATORY (INHALATION) at 07:08

## 2025-08-23 RX ADMIN — FUROSEMIDE 40 MG: 10 INJECTION, SOLUTION INTRAMUSCULAR; INTRAVENOUS at 07:08

## 2025-08-23 RX ADMIN — IBUPROFEN 400 MG: 400 TABLET, FILM COATED ORAL at 10:08

## 2025-08-24 LAB — HOLD SPECIMEN: NORMAL

## 2025-08-25 ENCOUNTER — TELEPHONE (OUTPATIENT)
Dept: INTERNAL MEDICINE | Facility: CLINIC | Age: 80
End: 2025-08-25
Payer: MEDICARE

## 2025-08-26 ENCOUNTER — OFFICE VISIT (OUTPATIENT)
Dept: PULMONOLOGY | Facility: CLINIC | Age: 80
End: 2025-08-26
Payer: MEDICARE

## 2025-08-26 VITALS
RESPIRATION RATE: 18 BRPM | HEIGHT: 66 IN | SYSTOLIC BLOOD PRESSURE: 138 MMHG | WEIGHT: 182 LBS | OXYGEN SATURATION: 90 % | DIASTOLIC BLOOD PRESSURE: 72 MMHG | BODY MASS INDEX: 29.25 KG/M2 | HEART RATE: 75 BPM

## 2025-08-26 DIAGNOSIS — R06.02 SHORTNESS OF BREATH: ICD-10-CM

## 2025-08-26 DIAGNOSIS — J44.9 CHRONIC OBSTRUCTIVE PULMONARY DISEASE, UNSPECIFIED COPD TYPE: Primary | ICD-10-CM

## 2025-08-26 DIAGNOSIS — I27.9 PULMONARY HEART DISEASE: ICD-10-CM

## 2025-08-26 DIAGNOSIS — F17.210 CIGARETTE NICOTINE DEPENDENCE WITHOUT COMPLICATION: ICD-10-CM

## 2025-08-26 DIAGNOSIS — J98.4 RESTRICTIVE LUNG DISEASE: ICD-10-CM

## 2025-08-26 LAB
BACTERIA SPT CULT: ABNORMAL
BACTERIA SPT CULT: ABNORMAL
GRAM STN SPEC: ABNORMAL
HOLD SPECIMEN: NORMAL

## 2025-08-26 PROCEDURE — 3078F DIAST BP <80 MM HG: CPT | Mod: CPTII,HCNC,S$GLB, | Performed by: PHYSICIAN ASSISTANT

## 2025-08-26 PROCEDURE — 1159F MED LIST DOCD IN RCRD: CPT | Mod: CPTII,HCNC,S$GLB, | Performed by: PHYSICIAN ASSISTANT

## 2025-08-26 PROCEDURE — 3075F SYST BP GE 130 - 139MM HG: CPT | Mod: CPTII,HCNC,S$GLB, | Performed by: PHYSICIAN ASSISTANT

## 2025-08-26 PROCEDURE — 3288F FALL RISK ASSESSMENT DOCD: CPT | Mod: CPTII,HCNC,S$GLB, | Performed by: PHYSICIAN ASSISTANT

## 2025-08-26 PROCEDURE — 99999 PR PBB SHADOW E&M-EST. PATIENT-LVL IV: CPT | Mod: PBBFAC,HCNC,, | Performed by: PHYSICIAN ASSISTANT

## 2025-08-26 PROCEDURE — 1101F PT FALLS ASSESS-DOCD LE1/YR: CPT | Mod: CPTII,HCNC,S$GLB, | Performed by: PHYSICIAN ASSISTANT

## 2025-08-26 PROCEDURE — 99214 OFFICE O/P EST MOD 30 MIN: CPT | Mod: HCNC,S$GLB,, | Performed by: PHYSICIAN ASSISTANT

## 2025-08-26 PROCEDURE — 1160F RVW MEDS BY RX/DR IN RCRD: CPT | Mod: CPTII,HCNC,S$GLB, | Performed by: PHYSICIAN ASSISTANT

## 2025-08-26 RX ORDER — ALBUTEROL SULFATE 90 UG/1
2 INHALANT RESPIRATORY (INHALATION) EVERY 6 HOURS PRN
Qty: 18 G | Refills: 3 | Status: SHIPPED | OUTPATIENT
Start: 2025-08-26

## 2025-08-27 DIAGNOSIS — F01.B0 MODERATE VASCULAR DEMENTIA, UNSPECIFIED WHETHER BEHAVIORAL, PSYCHOTIC, OR MOOD DISTURBANCE OR ANXIETY: ICD-10-CM

## 2025-08-27 RX ORDER — MEMANTINE HYDROCHLORIDE 5 MG/1
5 TABLET ORAL 2 TIMES DAILY
Qty: 60 TABLET | Refills: 3 | Status: SHIPPED | OUTPATIENT
Start: 2025-08-27 | End: 2025-12-25

## (undated) DEVICE — APPLICATOR CHLORAPREP ORN 26ML

## (undated) DEVICE — SEE MEDLINE ITEM 157027

## (undated) DEVICE — DRESSING XEROFORM FOIL PK 1X8

## (undated) DEVICE — Device

## (undated) DEVICE — KIT INTRODUCER STIFFEN MICRO

## (undated) DEVICE — BANDAGE CONFORM 3IN STRL

## (undated) DEVICE — STAPLER SKIN PROXIMATE WIDE

## (undated) DEVICE — GOWN SURG 2XL DISP TIE BACK

## (undated) DEVICE — CATH MP 4FR 125CM

## (undated) DEVICE — SEE MEDLINE ITEM 157131

## (undated) DEVICE — COVER OVERHEAD SURG LT BLUE

## (undated) DEVICE — SEE MEDLINE ITEM 152529

## (undated) DEVICE — PAD CAST SPECIALIST STRL 4

## (undated) DEVICE — GLIDESHEATH SLENDER SS 5FR10CM

## (undated) DEVICE — TUBE SPEC COLL&TRANSPORT 11ML

## (undated) DEVICE — DRESSING N ADH OIL EMUL 3X3

## (undated) DEVICE — SEE MEDLINE ITEM 157173

## (undated) DEVICE — GUIDE WIRE WHOLEY EXCHANGE 300

## (undated) DEVICE — TOURNIQUET SB QC DP 18X4IN

## (undated) DEVICE — MANIFOLD 4 PORT

## (undated) DEVICE — DRAPE SURG W/TWL 17 5/8X23

## (undated) DEVICE — GAUZE SPONGE 4X4 12PLY

## (undated) DEVICE — TOWEL OR DISP STRL BLUE 4/PK

## (undated) DEVICE — DECANTER 6 VIAL

## (undated) DEVICE — SWAB CULTURETTE II DUAL

## (undated) DEVICE — SPONGE DERMACEA GAUZE 4X4

## (undated) DEVICE — UNDERGLOVES BIOGEL PI SIZE 7.5

## (undated) DEVICE — ALCOHOL 70% ISOP RUBBING 4OZ

## (undated) DEVICE — SOL IRR NACL .9% 3000ML

## (undated) DEVICE — PAD ABD 8X10 STERILE

## (undated) DEVICE — SHEATH SHUTTLE 6FR FLEX

## (undated) DEVICE — NDL SAFETY 25G X 1.5 ECLIPSE

## (undated) DEVICE — CATH ULTRAVERSE 035 7X40X130

## (undated) DEVICE — UNDERGLOVES BIOGEL PI SZ 7 LF

## (undated) DEVICE — BANDAGE ESMARK ELASTIC ST 4X9

## (undated) DEVICE — SEE MEDLINE ITEM 152622

## (undated) DEVICE — PACK CATH LAB CUSTOM BR

## (undated) DEVICE — SHEALTH PINNACLE 7FR 90CM

## (undated) DEVICE — BANDAGE MATRIX HK LOOP 4IN 5YD

## (undated) DEVICE — POSITIONER HEAD DONUT 9IN FOAM

## (undated) DEVICE — BANDAGE ACE NON LATEX 3IN

## (undated) DEVICE — CATH ANG PIGTAIL 4FR INFINITY

## (undated) DEVICE — SEE MEDLINE ITEM 157117

## (undated) DEVICE — GLOVE SURGICAL LATEX SZ 6.5

## (undated) DEVICE — GLOVE BIOGEL SZ 8 1/2

## (undated) DEVICE — DRAPE PLASTIC U 60X72

## (undated) DEVICE — CATH URETHRAL 16FR RED

## (undated) DEVICE — ELECTRODE REM PLYHSV RETURN 9

## (undated) DEVICE — SYR 10CC LUER LOCK

## (undated) DEVICE — SUT VICRYL 2-0 CT-2 VCP269H

## (undated) DEVICE — INFLATOR ENCORE 26 BLLN INFL

## (undated) DEVICE — INTERPULSE SET

## (undated) DEVICE — CATH ULTRAVERSE 035 6X60X130

## (undated) DEVICE — SUT ETHILON 3/0 18IN PS-1

## (undated) DEVICE — SUT ETHILON 2-0 BLK MONO PS

## (undated) DEVICE — CONTRAST OMNIPAQUE 240 150ML

## (undated) DEVICE — SEE MEDLINE ITEM 146308

## (undated) DEVICE — COVER CAMERA OPERATING ROOM

## (undated) DEVICE — PAD CAST SPECIALIST STRL 3

## (undated) DEVICE — ARROW SHEATH SAF 7FR X 11CM

## (undated) DEVICE — SYR 3CC LUER LOC

## (undated) DEVICE — GUIDEWIRE SUPRA CORE 035 300CM

## (undated) DEVICE — SEE MEDLINE ITEM 154981

## (undated) DEVICE — SUPPORT ULNA NERVE PROTECTOR

## (undated) DEVICE — DRAPE EXTREMITY W/ABC NON-SLIP

## (undated) DEVICE — KIT WATCHDOG HEMSTAS VALVE 8FR

## (undated) DEVICE — COVER LIGHT HANDLE 80/CA

## (undated) DEVICE — SCRUB HIBICLENS 4% CHG 4OZ

## (undated) DEVICE — SOL 9P NACL IRR PIC IL

## (undated) DEVICE — UNDERGLOVES BIOGEL PI SIZE 8.5

## (undated) DEVICE — SUT 4-0 ETHILON 18 PS-2

## (undated) DEVICE — DRESSING XEROFORM 1X8IN